# Patient Record
Sex: FEMALE | Race: OTHER | NOT HISPANIC OR LATINO | Employment: FULL TIME | URBAN - METROPOLITAN AREA
[De-identification: names, ages, dates, MRNs, and addresses within clinical notes are randomized per-mention and may not be internally consistent; named-entity substitution may affect disease eponyms.]

---

## 2017-01-16 ENCOUNTER — HOSPITAL ENCOUNTER (OUTPATIENT)
Dept: RADIOLOGY | Facility: CLINIC | Age: 42
Discharge: HOME | End: 2017-01-16
Payer: COMMERCIAL

## 2017-01-16 ENCOUNTER — ALLSCRIPTS OFFICE VISIT (OUTPATIENT)
Dept: OTHER | Facility: OTHER | Age: 42
End: 2017-01-16

## 2017-01-16 DIAGNOSIS — M25.561 PAIN IN RIGHT KNEE: ICD-10-CM

## 2017-01-16 DIAGNOSIS — M25.562 PAIN IN LEFT KNEE: ICD-10-CM

## 2017-01-16 PROCEDURE — 73562 X-RAY EXAM OF KNEE 3: CPT

## 2018-01-13 VITALS
DIASTOLIC BLOOD PRESSURE: 70 MMHG | SYSTOLIC BLOOD PRESSURE: 106 MMHG | HEIGHT: 66 IN | BODY MASS INDEX: 21.21 KG/M2 | HEART RATE: 84 BPM | WEIGHT: 132 LBS

## 2018-01-17 ENCOUNTER — APPOINTMENT (EMERGENCY)
Dept: RADIOLOGY | Facility: HOSPITAL | Age: 43
DRG: 494 | End: 2018-01-17
Payer: COMMERCIAL

## 2018-01-17 ENCOUNTER — HOSPITAL ENCOUNTER (INPATIENT)
Facility: HOSPITAL | Age: 43
LOS: 5 days | Discharge: HOME HEALTH | DRG: 494 | End: 2018-01-22
Attending: SURGERY | Admitting: SURGERY
Payer: COMMERCIAL

## 2018-01-17 DIAGNOSIS — S82.101A CLOSED FRACTURE OF RIGHT PROXIMAL TIBIA: ICD-10-CM

## 2018-01-17 DIAGNOSIS — S82.101A CLOSED FRACTURE OF PROXIMAL END OF RIGHT TIBIA, UNSPECIFIED FRACTURE MORPHOLOGY, INITIAL ENCOUNTER: Primary | ICD-10-CM

## 2018-01-17 LAB
BASE EXCESS BLDA CALC-SCNC: 1 MMOL/L (ref -2–3)
CA-I BLD-SCNC: 1.16 MMOL/L (ref 1.12–1.32)
GLUCOSE SERPL-MCNC: 121 MG/DL (ref 65–140)
HCO3 BLDA-SCNC: 24.2 MMOL/L (ref 24–30)
HCT VFR BLD CALC: 42 % (ref 34.8–46.1)
HGB BLDA-MCNC: 14.3 G/DL (ref 11.5–15.4)
PCO2 BLD: 25 MMOL/L (ref 21–32)
PCO2 BLD: 33.9 MM HG (ref 42–50)
PH BLD: 7.46 [PH] (ref 7.3–7.4)
PO2 BLD: 21 MM HG (ref 35–45)
POTASSIUM BLD-SCNC: 3.6 MMOL/L (ref 3.5–5.3)
SAO2 % BLD FROM PO2: 40 % (ref 95–98)
SODIUM BLD-SCNC: 140 MMOL/L (ref 136–145)
SPECIMEN SOURCE: ABNORMAL

## 2018-01-17 PROCEDURE — 71045 X-RAY EXAM CHEST 1 VIEW: CPT

## 2018-01-17 PROCEDURE — 96375 TX/PRO/DX INJ NEW DRUG ADDON: CPT

## 2018-01-17 PROCEDURE — 82803 BLOOD GASES ANY COMBINATION: CPT

## 2018-01-17 PROCEDURE — 84132 ASSAY OF SERUM POTASSIUM: CPT

## 2018-01-17 PROCEDURE — 84295 ASSAY OF SERUM SODIUM: CPT

## 2018-01-17 PROCEDURE — 73590 X-RAY EXAM OF LOWER LEG: CPT

## 2018-01-17 PROCEDURE — 36415 COLL VENOUS BLD VENIPUNCTURE: CPT | Performed by: SURGERY

## 2018-01-17 PROCEDURE — 82947 ASSAY GLUCOSE BLOOD QUANT: CPT

## 2018-01-17 PROCEDURE — 96374 THER/PROPH/DIAG INJ IV PUSH: CPT

## 2018-01-17 PROCEDURE — 82330 ASSAY OF CALCIUM: CPT

## 2018-01-17 PROCEDURE — 85014 HEMATOCRIT: CPT

## 2018-01-17 PROCEDURE — 73700 CT LOWER EXTREMITY W/O DYE: CPT

## 2018-01-17 RX ORDER — OXYCODONE HYDROCHLORIDE 5 MG/1
5 TABLET ORAL EVERY 4 HOURS PRN
Status: DISCONTINUED | OUTPATIENT
Start: 2018-01-17 | End: 2018-01-22 | Stop reason: HOSPADM

## 2018-01-17 RX ORDER — ACETAMINOPHEN 325 MG/1
650 TABLET ORAL EVERY 6 HOURS PRN
Status: DISCONTINUED | OUTPATIENT
Start: 2018-01-17 | End: 2018-01-22 | Stop reason: HOSPADM

## 2018-01-17 RX ORDER — ONDANSETRON 2 MG/ML
4 INJECTION INTRAMUSCULAR; INTRAVENOUS EVERY 4 HOURS PRN
Status: DISCONTINUED | OUTPATIENT
Start: 2018-01-17 | End: 2018-01-22 | Stop reason: HOSPADM

## 2018-01-17 RX ORDER — FENTANYL CITRATE 50 UG/ML
INJECTION, SOLUTION INTRAMUSCULAR; INTRAVENOUS CODE/TRAUMA/SEDATION MEDICATION
Status: DISCONTINUED | OUTPATIENT
Start: 2018-01-17 | End: 2018-01-22 | Stop reason: HOSPADM

## 2018-01-17 RX ORDER — OXYCODONE HYDROCHLORIDE 10 MG/1
10 TABLET ORAL EVERY 4 HOURS PRN
Status: DISCONTINUED | OUTPATIENT
Start: 2018-01-17 | End: 2018-01-22 | Stop reason: HOSPADM

## 2018-01-17 RX ADMIN — HYDROMORPHONE HYDROCHLORIDE 1 MG: 1 INJECTION, SOLUTION INTRAMUSCULAR; INTRAVENOUS; SUBCUTANEOUS at 23:47

## 2018-01-17 RX ADMIN — SODIUM CHLORIDE 1000 ML: 0.9 INJECTION, SOLUTION INTRAVENOUS at 23:12

## 2018-01-17 RX ADMIN — FENTANYL CITRATE 50 MCG: 50 INJECTION, SOLUTION INTRAMUSCULAR; INTRAVENOUS at 23:12

## 2018-01-18 ENCOUNTER — ANESTHESIA EVENT (INPATIENT)
Dept: PERIOP | Facility: HOSPITAL | Age: 43
DRG: 494 | End: 2018-01-18
Payer: COMMERCIAL

## 2018-01-18 ENCOUNTER — ANESTHESIA (INPATIENT)
Dept: PERIOP | Facility: HOSPITAL | Age: 43
DRG: 494 | End: 2018-01-18
Payer: COMMERCIAL

## 2018-01-18 ENCOUNTER — APPOINTMENT (INPATIENT)
Dept: RADIOLOGY | Facility: HOSPITAL | Age: 43
DRG: 494 | End: 2018-01-18
Payer: COMMERCIAL

## 2018-01-18 ENCOUNTER — APPOINTMENT (INPATIENT)
Dept: NON INVASIVE DIAGNOSTICS | Facility: HOSPITAL | Age: 43
DRG: 494 | End: 2018-01-18
Payer: COMMERCIAL

## 2018-01-18 PROBLEM — S82.101A CLOSED FRACTURE OF RIGHT PROXIMAL TIBIA: Status: ACTIVE | Noted: 2018-01-17

## 2018-01-18 PROBLEM — I49.8 BIGEMINY: Status: ACTIVE | Noted: 2018-01-18

## 2018-01-18 PROBLEM — S82.839A: Status: ACTIVE | Noted: 2018-01-18

## 2018-01-18 LAB
ABO GROUP BLD: NORMAL
ANION GAP SERPL CALCULATED.3IONS-SCNC: 6 MMOL/L (ref 4–13)
BLD GP AB SCN SERPL QL: NEGATIVE
BUN SERPL-MCNC: 7 MG/DL (ref 5–25)
CALCIUM SERPL-MCNC: 8.4 MG/DL (ref 8.3–10.1)
CHLORIDE SERPL-SCNC: 105 MMOL/L (ref 100–108)
CO2 SERPL-SCNC: 26 MMOL/L (ref 21–32)
CREAT SERPL-MCNC: 0.63 MG/DL (ref 0.6–1.3)
ERYTHROCYTE [DISTWIDTH] IN BLOOD BY AUTOMATED COUNT: 13.5 % (ref 11.6–15.1)
GFR SERPL CREATININE-BSD FRML MDRD: 129 ML/MIN/1.73SQ M
GLUCOSE SERPL-MCNC: 121 MG/DL (ref 65–140)
HCG SERPL QL: NEGATIVE
HCT VFR BLD AUTO: 38.6 % (ref 34.8–46.1)
HGB BLD-MCNC: 12.8 G/DL (ref 11.5–15.4)
HOLD SPECIMEN: NORMAL
INR PPP: 1.12 (ref 0.86–1.16)
MCH RBC QN AUTO: 29.5 PG (ref 26.8–34.3)
MCHC RBC AUTO-ENTMCNC: 33.2 G/DL (ref 31.4–37.4)
MCV RBC AUTO: 89 FL (ref 82–98)
PLATELET # BLD AUTO: 195 THOUSANDS/UL (ref 149–390)
PMV BLD AUTO: 11.7 FL (ref 8.9–12.7)
POTASSIUM SERPL-SCNC: 3.5 MMOL/L (ref 3.5–5.3)
PROTHROMBIN TIME: 14.4 SECONDS (ref 12.1–14.4)
RBC # BLD AUTO: 4.34 MILLION/UL (ref 3.81–5.12)
RH BLD: POSITIVE
SODIUM SERPL-SCNC: 137 MMOL/L (ref 136–145)
SPECIMEN EXPIRATION DATE: NORMAL
WBC # BLD AUTO: 8.9 THOUSAND/UL (ref 4.31–10.16)

## 2018-01-18 PROCEDURE — C1769 GUIDE WIRE: HCPCS | Performed by: ORTHOPAEDIC SURGERY

## 2018-01-18 PROCEDURE — 99285 EMERGENCY DEPT VISIT HI MDM: CPT

## 2018-01-18 PROCEDURE — C1713 ANCHOR/SCREW BN/BN,TIS/BN: HCPCS | Performed by: ORTHOPAEDIC SURGERY

## 2018-01-18 PROCEDURE — 86850 RBC ANTIBODY SCREEN: CPT | Performed by: STUDENT IN AN ORGANIZED HEALTH CARE EDUCATION/TRAINING PROGRAM

## 2018-01-18 PROCEDURE — 80048 BASIC METABOLIC PNL TOTAL CA: CPT | Performed by: STUDENT IN AN ORGANIZED HEALTH CARE EDUCATION/TRAINING PROGRAM

## 2018-01-18 PROCEDURE — 86901 BLOOD TYPING SEROLOGIC RH(D): CPT | Performed by: STUDENT IN AN ORGANIZED HEALTH CARE EDUCATION/TRAINING PROGRAM

## 2018-01-18 PROCEDURE — 73560 X-RAY EXAM OF KNEE 1 OR 2: CPT

## 2018-01-18 PROCEDURE — 84703 CHORIONIC GONADOTROPIN ASSAY: CPT

## 2018-01-18 PROCEDURE — 85610 PROTHROMBIN TIME: CPT | Performed by: STUDENT IN AN ORGANIZED HEALTH CARE EDUCATION/TRAINING PROGRAM

## 2018-01-18 PROCEDURE — 0QSG04Z REPOSITION RIGHT TIBIA WITH INTERNAL FIXATION DEVICE, OPEN APPROACH: ICD-10-PCS | Performed by: ORTHOPAEDIC SURGERY

## 2018-01-18 PROCEDURE — 36415 COLL VENOUS BLD VENIPUNCTURE: CPT | Performed by: STUDENT IN AN ORGANIZED HEALTH CARE EDUCATION/TRAINING PROGRAM

## 2018-01-18 PROCEDURE — 93306 TTE W/DOPPLER COMPLETE: CPT

## 2018-01-18 PROCEDURE — 86900 BLOOD TYPING SEROLOGIC ABO: CPT | Performed by: STUDENT IN AN ORGANIZED HEALTH CARE EDUCATION/TRAINING PROGRAM

## 2018-01-18 PROCEDURE — 85027 COMPLETE CBC AUTOMATED: CPT | Performed by: STUDENT IN AN ORGANIZED HEALTH CARE EDUCATION/TRAINING PROGRAM

## 2018-01-18 PROCEDURE — 73502 X-RAY EXAM HIP UNI 2-3 VIEWS: CPT

## 2018-01-18 DEVICE — 5.0MM CANNULATED LOCKING SCREW 45MM
Type: IMPLANTABLE DEVICE | Site: TIBIA | Status: NON-FUNCTIONAL
Removed: 2018-08-02

## 2018-01-18 DEVICE — 4.5MM CORTEX SCREW SELF-TAPPING 36MM
Type: IMPLANTABLE DEVICE | Site: TIBIA | Status: NON-FUNCTIONAL
Removed: 2018-08-02

## 2018-01-18 DEVICE — 5.0MM LOCKING SCREW SLF-TPNG WITH T25 STARDRIVE RECESS 24MM
Type: IMPLANTABLE DEVICE | Site: TIBIA | Status: NON-FUNCTIONAL
Removed: 2018-08-02

## 2018-01-18 DEVICE — 5.0MM CANNULATED LOCKING SCREW 40MM
Type: IMPLANTABLE DEVICE | Site: TIBIA | Status: NON-FUNCTIONAL
Removed: 2018-08-02

## 2018-01-18 DEVICE — 5.0MM CANNULATED LOCKING SCREW 55MM
Type: IMPLANTABLE DEVICE | Site: TIBIA | Status: NON-FUNCTIONAL
Removed: 2018-08-02

## 2018-01-18 DEVICE — 5.0MM CANNULATED CONICAL SCREW 75MM
Type: IMPLANTABLE DEVICE | Site: TIBIA | Status: NON-FUNCTIONAL
Removed: 2018-08-02

## 2018-01-18 DEVICE — 5.0MM LOCKING SCREW SLF-TPNG WITH T25 STARDRIVE RECESS 42MM
Type: IMPLANTABLE DEVICE | Site: TIBIA | Status: NON-FUNCTIONAL
Removed: 2018-08-02

## 2018-01-18 DEVICE — 4.5MM LCP PROXIMAL TIBIA PLATE 14 HOLES/262MM-RIGHT
Type: IMPLANTABLE DEVICE | Site: TIBIA | Status: NON-FUNCTIONAL
Brand: LCP
Removed: 2018-08-02

## 2018-01-18 RX ORDER — GLYCOPYRROLATE 0.2 MG/ML
INJECTION INTRAMUSCULAR; INTRAVENOUS AS NEEDED
Status: DISCONTINUED | OUTPATIENT
Start: 2018-01-18 | End: 2018-01-18 | Stop reason: SURG

## 2018-01-18 RX ORDER — FENTANYL CITRATE/PF 50 MCG/ML
50 SYRINGE (ML) INJECTION
Status: COMPLETED | OUTPATIENT
Start: 2018-01-18 | End: 2018-01-18

## 2018-01-18 RX ORDER — MAGNESIUM HYDROXIDE 1200 MG/15ML
LIQUID ORAL AS NEEDED
Status: DISCONTINUED | OUTPATIENT
Start: 2018-01-18 | End: 2018-01-18 | Stop reason: HOSPADM

## 2018-01-18 RX ORDER — TRAMADOL HYDROCHLORIDE 50 MG/1
50 TABLET ORAL EVERY 6 HOURS PRN
Qty: 30 TABLET | Refills: 0 | Status: SHIPPED | OUTPATIENT
Start: 2018-01-18 | End: 2018-01-22 | Stop reason: HOSPADM

## 2018-01-18 RX ORDER — ONDANSETRON 2 MG/ML
4 INJECTION INTRAMUSCULAR; INTRAVENOUS ONCE AS NEEDED
Status: DISCONTINUED | OUTPATIENT
Start: 2018-01-18 | End: 2018-01-18 | Stop reason: HOSPADM

## 2018-01-18 RX ORDER — ONDANSETRON 2 MG/ML
INJECTION INTRAMUSCULAR; INTRAVENOUS AS NEEDED
Status: DISCONTINUED | OUTPATIENT
Start: 2018-01-18 | End: 2018-01-18 | Stop reason: SURG

## 2018-01-18 RX ORDER — SODIUM CHLORIDE, SODIUM LACTATE, POTASSIUM CHLORIDE, CALCIUM CHLORIDE 600; 310; 30; 20 MG/100ML; MG/100ML; MG/100ML; MG/100ML
125 INJECTION, SOLUTION INTRAVENOUS CONTINUOUS
Status: DISCONTINUED | OUTPATIENT
Start: 2018-01-18 | End: 2018-01-22

## 2018-01-18 RX ORDER — OXYCODONE HYDROCHLORIDE 5 MG/1
TABLET ORAL
Qty: 30 TABLET | Refills: 0 | Status: SHIPPED | OUTPATIENT
Start: 2018-01-18 | End: 2018-01-22 | Stop reason: HOSPADM

## 2018-01-18 RX ORDER — FENTANYL CITRATE 50 UG/ML
INJECTION, SOLUTION INTRAMUSCULAR; INTRAVENOUS AS NEEDED
Status: DISCONTINUED | OUTPATIENT
Start: 2018-01-18 | End: 2018-01-18 | Stop reason: SURG

## 2018-01-18 RX ORDER — PROPOFOL 10 MG/ML
INJECTION, EMULSION INTRAVENOUS AS NEEDED
Status: DISCONTINUED | OUTPATIENT
Start: 2018-01-18 | End: 2018-01-18 | Stop reason: SURG

## 2018-01-18 RX ORDER — DIPHENHYDRAMINE HYDROCHLORIDE 50 MG/ML
12.5 INJECTION INTRAMUSCULAR; INTRAVENOUS ONCE AS NEEDED
Status: DISCONTINUED | OUTPATIENT
Start: 2018-01-18 | End: 2018-01-18 | Stop reason: HOSPADM

## 2018-01-18 RX ORDER — LIDOCAINE HYDROCHLORIDE 10 MG/ML
INJECTION, SOLUTION INFILTRATION; PERINEURAL AS NEEDED
Status: DISCONTINUED | OUTPATIENT
Start: 2018-01-18 | End: 2018-01-18 | Stop reason: SURG

## 2018-01-18 RX ORDER — SODIUM CHLORIDE, SODIUM LACTATE, POTASSIUM CHLORIDE, CALCIUM CHLORIDE 600; 310; 30; 20 MG/100ML; MG/100ML; MG/100ML; MG/100ML
INJECTION, SOLUTION INTRAVENOUS CONTINUOUS PRN
Status: DISCONTINUED | OUTPATIENT
Start: 2018-01-18 | End: 2018-01-18

## 2018-01-18 RX ORDER — MIDAZOLAM HYDROCHLORIDE 1 MG/ML
INJECTION INTRAMUSCULAR; INTRAVENOUS AS NEEDED
Status: DISCONTINUED | OUTPATIENT
Start: 2018-01-18 | End: 2018-01-18 | Stop reason: SURG

## 2018-01-18 RX ORDER — SCOLOPAMINE TRANSDERMAL SYSTEM 1 MG/1
1 PATCH, EXTENDED RELEASE TRANSDERMAL
Status: COMPLETED | OUTPATIENT
Start: 2018-01-18 | End: 2018-01-18

## 2018-01-18 RX ORDER — ROCURONIUM BROMIDE 10 MG/ML
INJECTION, SOLUTION INTRAVENOUS AS NEEDED
Status: DISCONTINUED | OUTPATIENT
Start: 2018-01-18 | End: 2018-01-18 | Stop reason: SURG

## 2018-01-18 RX ORDER — METOCLOPRAMIDE HYDROCHLORIDE 5 MG/ML
10 INJECTION INTRAMUSCULAR; INTRAVENOUS ONCE AS NEEDED
Status: DISCONTINUED | OUTPATIENT
Start: 2018-01-18 | End: 2018-01-18 | Stop reason: HOSPADM

## 2018-01-18 RX ADMIN — PROPOFOL 150 MG: 10 INJECTION, EMULSION INTRAVENOUS at 10:17

## 2018-01-18 RX ADMIN — OXYCODONE HYDROCHLORIDE 10 MG: 10 TABLET ORAL at 14:55

## 2018-01-18 RX ADMIN — ONDANSETRON 4 MG: 2 INJECTION INTRAMUSCULAR; INTRAVENOUS at 10:21

## 2018-01-18 RX ADMIN — HYDROMORPHONE HYDROCHLORIDE 0.4 MG: 1 INJECTION, SOLUTION INTRAMUSCULAR; INTRAVENOUS; SUBCUTANEOUS at 13:06

## 2018-01-18 RX ADMIN — HYDROMORPHONE HYDROCHLORIDE 0.4 MG: 1 INJECTION, SOLUTION INTRAMUSCULAR; INTRAVENOUS; SUBCUTANEOUS at 12:56

## 2018-01-18 RX ADMIN — HYDROMORPHONE HYDROCHLORIDE 0.4 MG: 1 INJECTION, SOLUTION INTRAMUSCULAR; INTRAVENOUS; SUBCUTANEOUS at 13:01

## 2018-01-18 RX ADMIN — HYDROMORPHONE HYDROCHLORIDE 0.5 MG: 1 INJECTION, SOLUTION INTRAMUSCULAR; INTRAVENOUS; SUBCUTANEOUS at 11:10

## 2018-01-18 RX ADMIN — DEXAMETHASONE SODIUM PHOSPHATE 10 MG: 10 INJECTION INTRAMUSCULAR; INTRAVENOUS at 10:21

## 2018-01-18 RX ADMIN — SODIUM CHLORIDE, SODIUM LACTATE, POTASSIUM CHLORIDE, AND CALCIUM CHLORIDE: .6; .31; .03; .02 INJECTION, SOLUTION INTRAVENOUS at 18:10

## 2018-01-18 RX ADMIN — NEOSTIGMINE METHYLSULFATE 3 MG: 1 INJECTION, SOLUTION INTRAMUSCULAR; INTRAVENOUS; SUBCUTANEOUS at 11:12

## 2018-01-18 RX ADMIN — GLYCOPYRROLATE 0.6 MG: 0.2 INJECTION, SOLUTION INTRAMUSCULAR; INTRAVENOUS at 11:12

## 2018-01-18 RX ADMIN — CEFAZOLIN SODIUM 2000 MG: 2 SOLUTION INTRAVENOUS at 18:01

## 2018-01-18 RX ADMIN — SODIUM CHLORIDE, SODIUM LACTATE, POTASSIUM CHLORIDE, AND CALCIUM CHLORIDE: .6; .31; .03; .02 INJECTION, SOLUTION INTRAVENOUS at 09:47

## 2018-01-18 RX ADMIN — ROCURONIUM BROMIDE 40 MG: 10 INJECTION INTRAVENOUS at 10:17

## 2018-01-18 RX ADMIN — OXYCODONE HYDROCHLORIDE 10 MG: 10 TABLET ORAL at 21:48

## 2018-01-18 RX ADMIN — HYDROMORPHONE HYDROCHLORIDE 0.5 MG: 1 INJECTION, SOLUTION INTRAMUSCULAR; INTRAVENOUS; SUBCUTANEOUS at 18:00

## 2018-01-18 RX ADMIN — HYDROMORPHONE HYDROCHLORIDE 0.5 MG: 1 INJECTION, SOLUTION INTRAMUSCULAR; INTRAVENOUS; SUBCUTANEOUS at 06:25

## 2018-01-18 RX ADMIN — HYDROMORPHONE HYDROCHLORIDE 0.5 MG: 1 INJECTION, SOLUTION INTRAMUSCULAR; INTRAVENOUS; SUBCUTANEOUS at 02:10

## 2018-01-18 RX ADMIN — CEFAZOLIN SODIUM 2000 MG: 2 SOLUTION INTRAVENOUS at 10:23

## 2018-01-18 RX ADMIN — FENTANYL CITRATE 50 MCG: 50 INJECTION, SOLUTION INTRAMUSCULAR; INTRAVENOUS at 11:07

## 2018-01-18 RX ADMIN — SCOPALAMINE 1 PATCH: 1 PATCH, EXTENDED RELEASE TRANSDERMAL at 10:07

## 2018-01-18 RX ADMIN — HYDROMORPHONE HYDROCHLORIDE 0.5 MG: 1 INJECTION, SOLUTION INTRAMUSCULAR; INTRAVENOUS; SUBCUTANEOUS at 20:28

## 2018-01-18 RX ADMIN — FENTANYL CITRATE 50 MCG: 50 INJECTION INTRAMUSCULAR; INTRAVENOUS at 12:00

## 2018-01-18 RX ADMIN — FENTANYL CITRATE 50 MCG: 50 INJECTION INTRAMUSCULAR; INTRAVENOUS at 12:12

## 2018-01-18 RX ADMIN — LIDOCAINE HYDROCHLORIDE 50 MG: 10 INJECTION, SOLUTION INFILTRATION; PERINEURAL at 10:17

## 2018-01-18 RX ADMIN — OXYCODONE HYDROCHLORIDE 10 MG: 10 TABLET ORAL at 04:23

## 2018-01-18 RX ADMIN — HYDROMORPHONE HYDROCHLORIDE 0.4 MG: 1 INJECTION, SOLUTION INTRAMUSCULAR; INTRAVENOUS; SUBCUTANEOUS at 12:47

## 2018-01-18 RX ADMIN — SODIUM CHLORIDE, SODIUM LACTATE, POTASSIUM CHLORIDE, AND CALCIUM CHLORIDE: .6; .31; .03; .02 INJECTION, SOLUTION INTRAVENOUS at 11:00

## 2018-01-18 RX ADMIN — MIDAZOLAM HYDROCHLORIDE 2 MG: 1 INJECTION, SOLUTION INTRAMUSCULAR; INTRAVENOUS at 10:14

## 2018-01-18 RX ADMIN — HYDROMORPHONE HYDROCHLORIDE 0.4 MG: 1 INJECTION, SOLUTION INTRAMUSCULAR; INTRAVENOUS; SUBCUTANEOUS at 12:35

## 2018-01-18 RX ADMIN — ONDANSETRON 4 MG: 2 INJECTION INTRAMUSCULAR; INTRAVENOUS at 11:13

## 2018-01-18 RX ADMIN — FENTANYL CITRATE 50 MCG: 50 INJECTION, SOLUTION INTRAMUSCULAR; INTRAVENOUS at 10:17

## 2018-01-18 NOTE — OP NOTE
OPERATIVE REPORT  PATIENT NAME: Shivam Herzog    :  1975  MRN: [id number]  Pt Location: BE OR ROOM 15    SURGERY DATE: 2018    Surgeon(s) and Role:     * Desi Farmer MD - Primary     * Luisa Carbone PA-C - Devorah Lara MD - Assisting    Preop Diagnosis:  Closed fracture of proximal end of right tibia, unspecified fracture morphology, initial encounter [S82 101A]  Closed right tibial shaft fracture   Post-Op Diagnosis Codes:     * Closed fracture of proximal end of right tibia, unspecified fracture morphology, initial encounter [S82 101A]  Closed right tibial shaft fracture  Procedure(s) (LRB):  OPEN REDUCTION W/ INTERNAL FIXATION (ORIF) TIBIAL PLATEAU (Right)  OPEN REDUCTION W/ INTERNAL FIXATION (ORIF) TIBIA (Right)    Specimen(s):  * No specimens in log *    Estimated Blood Loss:   Minimal    Drains:       Anesthesia Type:   General    Operative Indications:  Closed fracture of proximal end of right tibia, unspecified fracture morphology, initial encounter [S82 101A]  Closed right tibial shaft fracture    Operative Findings:  ORIF of both fractures utilizing long locked lateral plate    Complications:   None    Procedure and Technique: Following induction of adequate level of general anesthesia, a Payan catheters and sterilely introduced into this patient's bladder  Antibiotics were administered  The right thigh was then fitted with a thigh-high tourniquet  The right lower extremity was then prepped and draped sterilely  The right lower extremity was exsanguinated gravity, the tourniquet was inflated to 300 mm of mercury  An anterolateral skin incision was created proximally gain access tibial plateau, a almost directly anterior incision was created distally for treatment of the shaft lesion  The shaft underwent open reduction and at this point time a front to back screw was placed in lag manner across the fracture obliquity    Attention was directed towards the tibial plateau  More proximally, the anterior compartment was lifted off the proximal lateral tibia and a full-thickness flap  The plateau fracture was identified  It was split depressed  The split was opened, the depressed segment was elevated, the split was closed  At this point time, a long lateral plate was applied and run subcutaneously between the 2 incisions  Through these incisions, a thorough anterior compartment fasciotomy was carried out  Proximal fixation was with 4 screws in the proximal fracture fragment  Distal fixation was with 3 screws, 2 which were locked bicortical   The fluoroscope was brought in, and final fluoroscopic films document a well-aligned fracture, but a good hardware position  The wound was then flushed with saline and closed  A drain was placed deep brought out via a separate anterolateral stab incision  The skin was closed utilizing 2 O nylon suture in mattress fashion  The drain was reconstituted  Sterile dressings applied  The tourniquet was let down, the right lower extremity is in demonstrated immediate return of brisk capillary refill  She was then placed in a posterior splint with the foot neutral position  She was awakened from general anesthesia, taken recovery room in stable condition with plans to include nonweightbearing on the right lower extremity  She will also require DVT prophylaxis     I was present for the entire procedure    Patient Disposition:  PACU     SIGNATURE: Mic Diaz MD  DATE: January 18, 2018  TIME: 11:23 AM
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
## 2018-01-18 NOTE — ED NOTES
OR requesting a pregnancy test on the patient prior to the OR  OR time will be approx 1030 this morning   Added preg test to the labs on hold       [facility]  01/18/18 0924
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
## 2018-01-18 NOTE — ED NOTES
Pt medicated for her pain   Pt's R LE repositioned on pillows, per her request      Mateusz Skelton RN  01/18/18 0551

## 2018-01-18 NOTE — SOCIAL WORK
CM met with the Pt at bedside to explain the CM role and discuss any potential D/C needs  Pt lives with boyfriend and children in a 2 story home with 2STE  Pt's 13ear old son will stay with grandparents while Pt is hospitalized  PTA IADL's, does not use any DME  Pt drives and is employed  No hx of HHC or IP rehab  Pt reports hx of OutPt PT and Depression without MH admission and managed by PCP  Pt's home pharmacy is [facility] in [place]
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
## 2018-01-18 NOTE — ED NOTES
Pt c/o increased pain in her R LE  Requesting pain medications       Carmen Macedo, RN  01/18/18 5031
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
## 2018-01-18 NOTE — ANESTHESIA POSTPROCEDURE EVALUATION
Post-Op Assessment Note      CV Status:  Stable    Mental Status:  Alert and awake    Hydration Status:  Euvolemic    PONV Controlled:  Controlled    Airway Patency:  Patent    Post Op Vitals Reviewed: Yes          Staff: CRNA           /60 (01/18/18 1145)    Temp (P) 99 2 °F (37 3 °C) (01/18/18 1141)    Pulse 76 (01/18/18 1145)   Resp 12 (01/18/18 1145)    SpO2 96 % (01/18/18 1145)
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
## 2018-01-19 LAB
APTT PPP: 34 SECONDS (ref 23–35)
HCT VFR BLD AUTO: 31.4 % (ref 34.8–46.1)
HGB BLD-MCNC: 10.4 G/DL (ref 11.5–15.4)

## 2018-01-19 PROCEDURE — G8978 MOBILITY CURRENT STATUS: HCPCS

## 2018-01-19 PROCEDURE — G8979 MOBILITY GOAL STATUS: HCPCS

## 2018-01-19 PROCEDURE — 97166 OT EVAL MOD COMPLEX 45 MIN: CPT

## 2018-01-19 PROCEDURE — 85730 THROMBOPLASTIN TIME PARTIAL: CPT | Performed by: STUDENT IN AN ORGANIZED HEALTH CARE EDUCATION/TRAINING PROGRAM

## 2018-01-19 PROCEDURE — G8987 SELF CARE CURRENT STATUS: HCPCS

## 2018-01-19 PROCEDURE — 97163 PT EVAL HIGH COMPLEX 45 MIN: CPT

## 2018-01-19 PROCEDURE — G8988 SELF CARE GOAL STATUS: HCPCS

## 2018-01-19 PROCEDURE — 85014 HEMATOCRIT: CPT | Performed by: EMERGENCY MEDICINE

## 2018-01-19 PROCEDURE — 85018 HEMOGLOBIN: CPT | Performed by: EMERGENCY MEDICINE

## 2018-01-19 RX ADMIN — CEFAZOLIN SODIUM 2000 MG: 2 SOLUTION INTRAVENOUS at 02:01

## 2018-01-19 RX ADMIN — OXYCODONE HYDROCHLORIDE 10 MG: 10 TABLET ORAL at 19:02

## 2018-01-19 RX ADMIN — HYDROMORPHONE HYDROCHLORIDE 0.5 MG: 1 INJECTION, SOLUTION INTRAMUSCULAR; INTRAVENOUS; SUBCUTANEOUS at 11:08

## 2018-01-19 RX ADMIN — ENOXAPARIN SODIUM 40 MG: 40 INJECTION SUBCUTANEOUS at 09:14

## 2018-01-19 RX ADMIN — HYDROMORPHONE HYDROCHLORIDE 0.5 MG: 1 INJECTION, SOLUTION INTRAMUSCULAR; INTRAVENOUS; SUBCUTANEOUS at 16:25

## 2018-01-19 RX ADMIN — HYDROMORPHONE HYDROCHLORIDE 0.5 MG: 1 INJECTION, SOLUTION INTRAMUSCULAR; INTRAVENOUS; SUBCUTANEOUS at 21:19

## 2018-01-19 RX ADMIN — OXYCODONE HYDROCHLORIDE 10 MG: 10 TABLET ORAL at 03:05

## 2018-01-19 RX ADMIN — OXYCODONE HYDROCHLORIDE 10 MG: 10 TABLET ORAL at 14:09

## 2018-01-19 RX ADMIN — OXYCODONE HYDROCHLORIDE 10 MG: 10 TABLET ORAL at 09:14

## 2018-01-19 RX ADMIN — HYDROMORPHONE HYDROCHLORIDE 0.5 MG: 1 INJECTION, SOLUTION INTRAMUSCULAR; INTRAVENOUS; SUBCUTANEOUS at 05:36

## 2018-01-19 RX ADMIN — OXYCODONE HYDROCHLORIDE 10 MG: 10 TABLET ORAL at 23:21
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
## 2018-01-19 NOTE — PLAN OF CARE
Problem: DISCHARGE PLANNING - CARE MANAGEMENT  Goal: Discharge to post-acute care or home with appropriate resources  INTERVENTIONS:  - Conduct assessment to determine patient/family and health care team treatment goals, and need for post-acute services based on payer coverage, community resources, and patient preferences, and barriers to discharge  - Address psychosocial, clinical, and financial barriers to discharge as identified in assessment in conjunction with the patient/family and health care team  - Arrange appropriate level of post-acute services according to patients   needs and preference and payer coverage in collaboration with the physician and health care team  - Communicate with and update the patient/family, physician, and health care team regarding progress on the discharge plan  - Arrange appropriate transportation to post-acute venues   Outcome: Progressing      Problem: Prexisting or High Potential for Compromised Skin Integrity  Goal: Skin integrity is maintained or improved  INTERVENTIONS:  - Identify patients at risk for skin breakdown  - Assess and monitor skin integrity  - Assess and monitor nutrition and hydration status  - Monitor labs (i e  albumin)  - Assess for incontinence   - Turn and reposition patient  - Assist with mobility/ambulation  - Relieve pressure over bony prominences  - Avoid friction and shearing  - Provide appropriate hygiene as needed including keeping skin clean and dry  - Evaluate need for skin moisturizer/barrier cream  - Collaborate with interdisciplinary team (i e  Nutrition, Rehabilitation, etc )   - Patient/family teaching   Outcome: Progressing      Problem: Potential for Falls  Goal: Patient will remain free of falls  INTERVENTIONS:  - Assess patient frequently for physical needs  -  Identify cognitive and physical deficits and behaviors that affect risk of falls    -  [place] fall precautions as indicated by assessment   - Educate patient/family on patient safety including physical limitations  - Instruct patient to call for assistance with activity based on assessment  - Modify environment to reduce risk of injury  - Consider OT/PT consult to assist with strengthening/mobility   Outcome: Progressing

## 2018-01-19 NOTE — PHYSICAL THERAPY NOTE
Physical Therapy Evaluation    Patient's Name:Ronn Prather    Today's Date:01/19/18    Patient Active Problem List   Diagnosis    Closed fracture of right proximal tibia    Fracture of head of fibula    Bigeminy       History reviewed  No pertinent past medical history  Past Surgical History:   Procedure Laterality Date    ORIF TIBIA FRACTURE Right 1/18/2018    Procedure: OPEN REDUCTION W/ INTERNAL FIXATION (ORIF) TIBIA;  Surgeon: Berta Zavaleta MD;  [facility];  Service: Orthopedics    ORIF TIBIAL PLATEAU Right 1/18/3722    Procedure: OPEN REDUCTION W/ INTERNAL FIXATION (ORIF) TIBIAL PLATEAU;  Surgeon: Berta Zavaleta MD;  [facility];  Service: Orthopedics        01/19/18 1550   Pain Assessment   Pain Assessment 0-10   Pain Score 8   Pain Type Acute pain   Pain Location Leg   Pain Orientation [facility] Pain Intervention(s) Ambulation/increased activity   Response to Interventions improved comfort w repositioning in chair   Home Living   Type of [address] [place] [address]; Able to live on main level with bedroom/bathroom  (2 [place]RON)   Home Equipment Crutches   Prior Function   Level of [place] Independent with ADLs and functional mobility   Lives With Family   Receives Help From [facility] in the last 6 months 0   Restrictions/Precautions   RLE Weight Bearing Per Order NWB   Other Precautions Pain; Fall Risk;Telemetry   General   Family/Caregiver Present No   Cognition   Arousal/Participation Alert   Orientation Level Oriented X4   Following Commands Follows all commands and directions without difficulty   RUE Assessment   RUE Assessment WFL   LUE Assessment   LUE Assessment WFL   RLE Assessment   RLE Assessment X   Strength RLE   RLE Overall Strength (hip fl 2-/5 knee ext 2-/5)   LLE Assessment   LLE Assessment X   Strength LLE   LLE Overall Strength 4/5   Coordination   Movements are Fluid and Coordinated 0   Coordination and Movement Description antalgic LE instability   Bed Mobility   Supine to Sit 4  Minimal assistance   Additional items Increased time required;Verbal cues;LE management;HOB elevated; Bedrails   Transfers   Sit to Stand 4  Minimal assistance   Additional items Increased time required;Verbal cues   Stand to Sit 4  Minimal assistance   Additional items Increased time required;Verbal cues   Stand pivot 4  Minimal assistance   Additional items Increased time required;Verbal cues  (RW)   Ambulation/Elevation   Gait pattern Decreased foot clearance; Improper Weight shift; Poor UE support; Antalgic  (RLE NWB)   Gait Assistance 4  Minimal assist   Additional items Verbal cues; Tactile cues   Assistive Device Rolling walker   Distance 3 ft   Balance   Dynamic Sitting Good  (forward reach)   Static Standing Fair  (RW)   Dynamic Standing Fair -  (RW)   Endurance Deficit   Endurance Deficit Yes   Endurance Deficit Description antalgic LE instability   Activity Tolerance   Activity Tolerance Patient limited by pain   Nurse Made Aware yes   Assessment   Prognosis Good   Problem List Decreased strength;Decreased range of motion;Decreased endurance; Impaired balance;Decreased mobility; Decreased coordination; Impaired judgement;Decreased safety awareness;Pain;Orthopedic restrictions;Decreased skin integrity   Assessment Pt is a 40 y o  female admitted [facility]/17/2018 w/ Closed fracture of right proximal tibia; is s/p ORIF and NWB RLE Pt exhibits significant impairments with weakness, decreased ROM, impaired balance, decreased endurance, impaired coordination, gait deviations, pain, decreased activity tolerance, decreased functional mobility tolerance, decreased safety awareness, impaired judgement, fall risk, orthopedic restrictions and decreased skin integrity; these impact independence with mobility, ADLs, and IADLs; requires min assist w transf and amb 3 ft using RW RLE maintained NWB- gait antalgic decreased foot clearance, step length walker reliant; objective measures on the Barthel Index also reveal limitations;  therapy prognosis is impacted by relevant co morbidities as noted in evaluation; clinical presentation is currently unstable/unpredictable - pt is on telemetry, neurovasc checks, presents w abnormal labs, phys impairments as noted- a significant regression from baseline; PTA, pt was Independent with mobility lives in multilevel- w BYRON, fam support avail skilled PT is indicated to to optimize functional independence and discharge planning; pending functional progress, PT recommendation at discharge is home with family support RW home PT; pt will need a wc w elev leg rests to achieve indep w locomotion and ADLs    Goals   Patient Goals go home   STG Expiration Date 01/29/18   Short Term Goal #1 1  Independent with Bed Mobility Rolling Right and Left     2  Independent with Bed Mobility Supine-Sit     3  Modified Independent with Transfer Bed-Chair     4  Increase Dynamic Sitting Balance at least 1 Grade for improved stability with functional reach activities     5  Increase Dynamic Standing Balance at least 1 Grade for improved ease with Activities of Daily Living     6  Increase Lower Extremity Strength at least 1 Grade for improved ease mobility tasks     7  Modified Independent with  Ambulation 30 feet using RW RLE NWB to facilitate home and community mobility 8  Modified Independent with Ascending/Descending 2 steps to facilitate home and community accessibility  9  Indep w wc mob 300 ft 10  Indep w wc parts mgt   Plan   Treatment/Interventions Functional transfer training;LE strengthening/ROM; Elevations; Therapeutic exercise; Endurance training;Patient/family training;Equipment eval/education; Bed mobility;Gait training; Compensatory technique education;Continued evaluation;Spoke to nursing   PT Frequency (6x/wk)   Recommendation   Recommendation Home with family support;Home PT   Equipment Recommended Walker  (wc w elev leg rests)   Barthel Index   Feeding 10   Bathing 5   Grooming Score 0   Dressing Score 5   Bladder Score 10   Bowels Score 10   Toilet Use Score 5   Transfers (Bed/Chair) Score 10   Mobility (Level Surface) Score 0   Stairs Score 0   Barthel Index Score 55

## 2018-01-19 NOTE — PROGRESS NOTES
Progress Note - Benny Showers 1975, 40 y o  female MRN: [id number]    Unit/Bed#: [facility] 911-01 Encounter: [id number]    Primary Care Provider: Jony Deluca MD   Date and time admitted to hospital: 1/17/2018 11:01 PM        Bigeminy   Assessment & Plan      discontinue tele, echo ordered- essentially normal EF 70%  Follow up pcp        Fracture of head of fibula   Assessment & Plan    - to OR yesterday with ortho        * Closed fracture of right proximal tibia   Assessment & Plan    -to OR yesterday with ortho  -pain control  -PT/OT           Dispo: pt/or  Check hgb screen for post op anemia      Subjective/Objective   Chief Complaint: I feel ok, just sore    Subjective: feeling good after surgery, has no complaints    Objective:     Meds/Allergies   No prescriptions prior to admission  Vitals: Blood pressure 101/60, pulse 69, temperature 98 2 °F (36 8 °C), temperature source Oral, resp  rate 18, weight 59 9 kg (132 lb), last menstrual period 12/25/2017, SpO2 97 %  There is no height or weight on file to calculate BMI  SpO2: SpO2: 97 %    ABG: No results found for: PHART, QFO6OEZ, PO2ART, NCB0OQD, C4LWTQYY, BEART, SOURCE      Intake/Output Summary (Last 24 hours) at 01/19/18 1138  Last data filed at 01/19/18 1015   Gross per 24 hour   Intake             1920 ml   Output             3485 ml   Net            -1565 ml       Invasive Devices     Peripheral Intravenous Line            Peripheral IV 01/17/18 Right Antecubital 1 day          Drain            Closed/Suction Drain Right Leg Accordion 10 Fr  1 day                Nutrition/GI Proph/Bowel Reg: regular    Physical Exam:   Vital signs reviewed  Gen  appearance: No acute distress  Alert and oriented x3  Head: Atraumatic, normocephalic  Eyes: EOMI, PERRLA  No icterus  Neck: Supple, no lymphadenopathy, full range of motion  Chest: Regular rate and rhythm  Lungs are clear to auscultation bilaterally  Nontender    Abdomen: Soft nontender nondistended  No signs of ecchymosis  Positive bowel sounds  Extremities: right lower extremity c/d/i incions  +fhl/ehl  Neuro: Cranial nerves II through XII intact  Nonfocal exam  GCS 15  Skin: Warm, dry  Lab Results: CBC: No results found for: WBC, HGB, HCT, MCV, PLT, ADJUSTEDWBC, MCH, MCHC, RDW, MPV, NRBC  Imaging/EKG Studies: Results: I have personally reviewed pertinent reports      Other Studies: none  VTE Prophylaxis: lovenox

## 2018-01-19 NOTE — PLAN OF CARE
Problem: PHYSICAL THERAPY ADULT  Goal: Performs mobility at highest level of function for planned discharge setting  See evaluation for individualized goals  Treatment/Interventions: Functional transfer training, LE strengthening/ROM, Elevations, Therapeutic exercise, Endurance training, Patient/family training, Equipment eval/education, Bed mobility, Gait training, Compensatory technique education, Continued evaluation, Spoke to nursing  Equipment Recommended: Amira Salguero (wc w elev leg rests)       See flowsheet documentation for full assessment, interventions and recommendations    Prognosis: Good  Problem List: Decreased strength, Decreased range of motion, Decreased endurance, Impaired balance, Decreased mobility, Decreased coordination, Impaired judgement, Decreased safety awareness, Pain, Orthopedic restrictions, Decreased skin integrity  Assessment: Pt is a 40 y o  female admitted to [facility] on 1/17/2018 w/ Closed fracture of right proximal tibia; is s/p ORIF and NWB RLE Pt exhibits significant impairments with weakness, decreased ROM, impaired balance, decreased endurance, impaired coordination, gait deviations, pain, decreased activity tolerance, decreased functional mobility tolerance, decreased safety awareness, impaired judgement, fall risk, orthopedic restrictions and decreased skin integrity; these impact independence with mobility, ADLs, and IADLs; requires min assist w transf and amb 3 ft using RW RLE maintained NWB- gait antalgic decreased foot clearance, step length walker reliant; objective measures on the Barthel Index also reveal limitations;  therapy prognosis is impacted by relevant co morbidities as noted in evaluation; clinical presentation is currently unstable/unpredictable - pt is on telemetry, neurovasc checks, presents w abnormal labs, phys impairments as noted- a significant regression from baseline; PTA, pt was Independent with mobility lives in multilevel- w [place], fam support avail skilled PT is indicated to to optimize functional independence and discharge planning; pending functional progress, PT recommendation at discharge is home with family support RW home PT; pt will need a wc w elev leg rests to achieve indep w locomotion and ADLs         Recommendation: Home with family support, Home PT          See flowsheet documentation for full assessment

## 2018-01-19 NOTE — PROGRESS NOTES
TRAUMA resident aware that pt is in [address]  Pt is asymptomatic and Nora Garcia states to monitor pt and call if she becomes symptomatic  Will cont to monitor

## 2018-01-19 NOTE — OCCUPATIONAL THERAPY NOTE
OccupationalTherapy Evaluation     Patient Name: Saurabh Kebede  WVUTM'I Date: 1/19/2018  Problem List  Patient Active Problem List   Diagnosis    Closed fracture of right proximal tibia    Fracture of head of fibula    Bigeminy     Past Medical History  History reviewed  No pertinent past medical history  Past Surgical History  Past Surgical History:   Procedure Laterality Date    ORIF TIBIA FRACTURE Right 1/18/2018    Procedure: OPEN REDUCTION W/ INTERNAL FIXATION (ORIF) TIBIA;  Surgeon: Troy Sharp MD;  [facility]: [facility];  Service: Orthopedics    ORIF TIBIAL PLATEAU Right 6/27/8480    Procedure: OPEN REDUCTION W/ INTERNAL FIXATION (ORIF) TIBIAL PLATEAU;  Surgeon: Troy Sharp MD;  [facility];  Service: Orthopedics         01/19/18 6995   Note Type   Note type Eval/Treat   Restrictions/Precautions   Weight Bearing Precautions Per Order Yes   RUE Weight Bearing Per Order WBAT   LUE Weight Bearing Per Order WBAT   RLE Weight Bearing Per Order NWB   LLE Weight Bearing Per Order WBAT   Other Precautions Pain   Pain Assessment   Pain Assessment 0-10   Pain Score 8   Pain Type Acute pain   Pain Location Leg   Pain Orientation Right   Hospital Pain Intervention(s) Repositioned; Ambulation/increased activity;Cold applied; Emotional support   Home Living   Type of [address] Multi-level; Able to live on main level with bedroom/bathroom;Stairs to enter with rails   Kameron Ch[address] 89   Prior Function   Level of [place] Independent with ADLs and functional mobility   Lives With [employer] Help From Family   ADL Assistance Independent   IADLs Independent   Falls in the last 6 months 0   Vocational Full time employment   Lifestyle   Autonomy I ADLS AND MOBILITY - I IADLS    Reciprocal Relationships SUPPORTIVE FAMILY AND FRIENDS   Service to Others WORKS FT   Intrinsic Gratification ACTIVE PTA   Subjective Subjective OFFERS NO C/O    ADL   Eating Assistance 7  Independent   Grooming Assistance 7  Independent   UB Bathing Assistance 5  Supervision/Setup   LB Bathing Assistance 3  Moderate Assistance   UB Dressing Assistance 5  Supervision/Setup   LB Dressing Assistance 3  Moderate Assistance   Toileting Assistance  4  Minimal Assistance   Bed Mobility   Supine to Sit 4  Minimal assistance   Transfers   Sit to Stand 4  Minimal assistance   Stand to Sit 4  Minimal assistance   Stand pivot 4  Minimal assistance   Balance   Static Sitting Fair +   Dynamic Sitting Fair   Static Standing Fair -   Dynamic Standing Fair -   Ambulatory Fair -   Activity Tolerance   Activity Tolerance Patient limited by fatigue;Patient limited by pain   RUE Assessment   RUE Assessment WFL   LUE Assessment   LUE Assessment WFL   Cognition   Overall Cognitive Status WFL   Assessment   Limitation Decreased ADL status; Decreased endurance;Decreased self-care trans;Decreased high-level ADLs   Prognosis Good   Assessment Pt is a 40 y o  female who was admitted to [facility] on 1/17/2018 with Closed fracture of right proximal tibia s/p ORIF  At baseline pt was completing adls and mobility independently w/o ad  Pt lives with family in 2 story home with first floor setup  Currently pt requires moderate assist for overall ADLS and min assist for functional mobility/transfers  Pt currently presents with impairments in the following categories -steps to enter environment, difficulty performing ADLS and difficulty performing IADLS  activity tolerance, endurance and standing balance/tolerance   These impairments, as well as pt's fatigue, pain, orthopedic restricitions , WBS  and risk for falls  limit pt's ability to safely engage in all baseline areas of occupation, includingdressing, toileting, functional mobility/transfers, community mobility, house maintenance, meal prep, work/volunteer work , care of children, social participation  and leisure activities  From OT standpoint, recommend home with family support upon D/C  Will need RW and BSC for home - also interested in possible w/c rental (CM informed)OT will continue to follow to address the below stated goals  Goals   Patient Goals go home   LTG Time Frame 7-10   Long Term Goal #1 refer to established goals below   Plan   Treatment Interventions ADL retraining;Functional transfer training; Endurance training;Patient/family training;Equipment evaluation/education; Compensatory technique education; Activityengagement   Goal Expiration Date   01/29/18   OT Frequency 3-5x/wk   Recommendation   OT Discharge Recommendation Home with family support   Equipment Recommended Bedside commode  (rw)   Barthel Index   Feeding 10   Bathing 0   Grooming Score 5   Dressing Score 5   Bladder Score 10   Bowels Score 10   Toilet Use Score 5   Transfers (Bed/Chair) Score 10   Mobility (Level Surface) Score 0   Stairs Score 0   Barthel Index Score 55       OCCUPATIONAL THERAPY GOALS:    *MOD I ADLS AFTER SETUP WITH USE OF ADAPTIVE DEVICES PRN  *MOD I TOILETING AND CLOTHING MANAGEMENT   *MOD I FUNCTIONAL MOB AND TRANSFERS TO ALL SURFACES WITH FAIR+ TO GOOD BALANCE/SAFETY FOR PARTICIPATION IN DYNAMIC ADLS   *DEMONSTRATE GOOD CARRYOVER WITH SAFE USE OF RW AND CARRYOVER WITH NWB RLE  DURING FUNCTIONAL TASKS  *ASSESS DME NEEDS  *INCREASE ACTIVITY TOLERANCE TO 40-45 MIN FOR PARTICIPATION IN ADLS AND ENJOYABLE ACTIVITIES     * ASSIST WITH SAFE D/C RECOMMENDATIONS     Aniceto Nur, OT

## 2018-01-19 NOTE — PROGRESS NOTES
Orthopedics   Vicky Parent 40 y o  female MRN: [id number]  Unit/Bed#: [facility] 911-01    Subjective:  42F Post operative day 1 ORIF right Tibia plateau and shaft fractures  Pain well controlled  Labs:    0  Lab Value Date/Time   HCT 38 6 01/18/2018 0430   HGB 12 8 01/18/2018 0430   HGB 14 3 01/17/2018 2311   INR 1 12 01/18/2018 0430   WBC 8 90 01/18/2018 0430       Meds:    Current Facility-Administered Medications:      EMS REPLENISHMENT MED, , Does not apply, Once, Omelia Rubins, DO    acetaminophen (TYLENOL) tablet 650 mg, 650 mg, Oral, Q6H PRN, Cam Lino MD    enoxaparin (LOVENOX) subcutaneous injection 40 mg, 40 mg, Subcutaneous, Q24H [address], Emery Baeza MD    fentanyl citrate (PF) 100 MCG/2ML, , Intravenous, Code/Trauma/Sedation Med, Omelia Rubins, DO, 50 mcg at 01/17/18 2312    HYDROmorphone (DILAUDID) injection 0 5 mg, 0 5 mg, Intravenous, Q3H PRN, Cam Lino MD, 0 5 mg at 01/19/18 0536    lactated ringers infusion, 125 mL/hr, Intravenous, Continuous, Niles Johnston MD, Stopped at 01/19/18 0304    ondansetron [facility]) injection 4 mg, 4 mg, Intravenous, Q4H PRN, Cam Lino MD    oxyCODONE (ROXICODONE) immediate release tablet 10 mg, 10 mg, Oral, Q4H PRN, Cam Lino MD, 10 mg at 01/19/18 0305    oxyCODONE (ROXICODONE) IR tablet 5 mg, 5 mg, Oral, Q4H PRN, Cam Lino MD    sodium chloride 0 9 % bolus, , , Code/Trauma/Sedation Med, Omelia Rubins, DO, 1,000 mL at 01/17/18 2312    Blood Culture:   No results found for: BLOODCX    Wound Culture:   No results found for: WOUNDCULT    Ins and Outs:  I/O last 24 hours:   In: 2280 [P O :680; I V :1600]  Out: 2720 [Urine:2585; Drains:135]          Physical:  Vitals:    01/19/18 0337   BP: 96/68   Pulse: 72   Resp: 18   Temp: 98 5 °F (36 9 °C)   SpO2: 99%     right lower extremity  · Dressings clean Dry Intact  · 4/5 strength to EHL/FHL  · Sensation intact L1-S1  · +2 DP Pulse    _*_*_*_*_*_*_*_*_*_*_*_*_*_*_*_*_*_*_*_*_*_*_*_*_*_*_*_*_*_*_*_*_*_*_*_*_*_*_*_*_*    Assessment:   42F Post operative day 1 ORIF right Tibia plateau and shaft fractures      Plan:  · Nonweight bearing right lower extremity in splint  · Up out of bed  · Ice, Elevation to affected extremity  · Analgesics  · DVT prophylaxis  · Dispo: Ortho will follow  · Will continue to assess for acute blood loss anemia    Eben Oliveira MD

## 2018-01-19 NOTE — PLAN OF CARE
Problem: OCCUPATIONAL THERAPY ADULT  Goal: Performs self-care activities at highest level of function for planned discharge setting  See evaluation for individualized goals  Treatment Interventions: ADL retraining, Functional transfer training, Endurance training, Patient/family training, Equipment evaluation/education, Compensatory technique education, Activityengagement  Equipment Recommended: Bedside commode (rw)       See flowsheet documentation for full assessment, interventions and recommendations  Limitation: Decreased ADL status, Decreased endurance, Decreased self-care trans, Decreased high-level ADLs  Prognosis: Good  Assessment: Pt is a 40 y o  female who was admitted to [facility] on 1/17/2018 with Closed fracture of right proximal tibia s/p ORIF  At baseline pt was completing adls and mobility independently w/o ad  Pt lives with family in 2 story home with first floor setup  Currently pt requires moderate assist for overall ADLS and min assist for functional mobility/transfers  Pt currently presents with impairments in the following categories -steps to enter environment, difficulty performing ADLS and difficulty performing IADLS  activity tolerance, endurance and standing balance/tolerance  These impairments, as well as pt's fatigue, pain, orthopedic restricitions , WBS  and risk for falls  limit pt's ability to safely engage in all baseline areas of occupation, includingdressing, toileting, functional mobility/transfers, community mobility, house maintenance, meal prep, work/volunteer work , care of children, social participation  and leisure activities  From OT standpoint, recommend home with family support upon D/C  Will need RW and BSC for home - also interested in possible w/c rental (CM informed)OT will continue to follow to address the below stated goals        OT Discharge Recommendation: Home with family support

## 2018-01-20 LAB
ALBUMIN SERPL BCP-MCNC: 3 G/DL (ref 3.5–5)
ALP SERPL-CCNC: 52 U/L (ref 46–116)
ALT SERPL W P-5'-P-CCNC: 14 U/L (ref 12–78)
AST SERPL W P-5'-P-CCNC: 22 U/L (ref 5–45)
BILIRUB DIRECT SERPL-MCNC: 0.14 MG/DL (ref 0–0.2)
BILIRUB SERPL-MCNC: 0.39 MG/DL (ref 0.2–1)
PROT SERPL-MCNC: 6.9 G/DL (ref 6.4–8.2)

## 2018-01-20 PROCEDURE — 97530 THERAPEUTIC ACTIVITIES: CPT

## 2018-01-20 PROCEDURE — 97535 SELF CARE MNGMENT TRAINING: CPT

## 2018-01-20 PROCEDURE — 97116 GAIT TRAINING THERAPY: CPT

## 2018-01-20 PROCEDURE — 80076 HEPATIC FUNCTION PANEL: CPT | Performed by: STUDENT IN AN ORGANIZED HEALTH CARE EDUCATION/TRAINING PROGRAM

## 2018-01-20 RX ADMIN — HYDROMORPHONE HYDROCHLORIDE 0.5 MG: 1 INJECTION, SOLUTION INTRAMUSCULAR; INTRAVENOUS; SUBCUTANEOUS at 05:40

## 2018-01-20 RX ADMIN — ENOXAPARIN SODIUM 40 MG: 40 INJECTION SUBCUTANEOUS at 10:20

## 2018-01-20 RX ADMIN — OXYCODONE HYDROCHLORIDE 10 MG: 10 TABLET ORAL at 15:37

## 2018-01-20 RX ADMIN — ACETAMINOPHEN 650 MG: 325 TABLET, FILM COATED ORAL at 11:42

## 2018-01-20 RX ADMIN — OXYCODONE HYDROCHLORIDE 10 MG: 10 TABLET ORAL at 11:40

## 2018-01-20 RX ADMIN — HYDROMORPHONE HYDROCHLORIDE 0.5 MG: 1 INJECTION, SOLUTION INTRAMUSCULAR; INTRAVENOUS; SUBCUTANEOUS at 10:19

## 2018-01-20 RX ADMIN — OXYCODONE HYDROCHLORIDE 10 MG: 10 TABLET ORAL at 20:01

## 2018-01-20 RX ADMIN — OXYCODONE HYDROCHLORIDE 10 MG: 10 TABLET ORAL at 03:29

## 2018-01-20 RX ADMIN — HYDROMORPHONE HYDROCHLORIDE 0.5 MG: 1 INJECTION, SOLUTION INTRAMUSCULAR; INTRAVENOUS; SUBCUTANEOUS at 17:45

## 2018-01-20 RX ADMIN — OXYCODONE HYDROCHLORIDE 10 MG: 10 TABLET ORAL at 07:33

## 2018-01-20 RX ADMIN — HYDROMORPHONE HYDROCHLORIDE 0.5 MG: 1 INJECTION, SOLUTION INTRAMUSCULAR; INTRAVENOUS; SUBCUTANEOUS at 14:10

## 2018-01-20 NOTE — PLAN OF CARE
Problem: OCCUPATIONAL THERAPY ADULT  Goal: Performs self-care activities at highest level of function for planned discharge setting  See evaluation for individualized goals  Treatment Interventions: ADL retraining, Functional transfer training, Endurance training, Patient/family training, Equipment evaluation/education, Compensatory technique education, Activityengagement  Equipment Recommended: Bedside commode (rw)       See flowsheet documentation for full assessment, interventions and recommendations  Outcome: Progressing  Limitation: Decreased ADL status, Decreased endurance, Decreased self-care trans, Decreased high-level ADLs  Prognosis: Good  Assessment: Pt participated in OT session w/ focus on LB ADLs, functional transfers/mob , dynamic balance when in stance, toileting, and compensatory technique education  Pt w/ minimal reports of pain while at rest - received w/ RLE elevated on x2 pillows - family present  Pt demonstrated G static and dynamic balance when standing unsupported for clothing management and grooming tasks  Pt noted w/ G safety awareness and insight into deficits - using RW/sink for support as needed when participating in functional activities in stance  Pt able to don underwear and pants w/ Min A to thread over RLE - pt family available to assist 24/hrs as needed - pt encouraged to attempt when LE's elevated in recliner chair to minimize bending - pt verbalized understanding  OT recommending BSC and shower chair for at home use following d/c to maximize safety and functional independence w/ ADLs  Refer to above for details regarding levels of assist required for functional tasks assessed during session  From OT perspective pt is safe to d/c home w/ family support vs  Home OT pending progress  Pt left seated OOB w/ LE's elevated, +ice for R knee, call auguste/personal items in reach and all needs met  RN informed of session/pt status and resumed care    OT to continue POC as appropriate during acute care stay         OT Discharge Recommendation: Home with family support (vs  home OT)  OT - OK to Discharge: Yes (when medically appropriate)

## 2018-01-20 NOTE — ASSESSMENT & PLAN NOTE
- s/p R tibial plateau ORIF 0/54  - drain discontinued today 1/20  - f/u H/H  - pain control  - PT/OT recommending home/home PT

## 2018-01-20 NOTE — PLAN OF CARE
Problem: PHYSICAL THERAPY ADULT  Goal: Performs mobility at highest level of function for planned discharge setting  See evaluation for individualized goals  Treatment/Interventions: Functional transfer training, LE strengthening/ROM, Elevations, Therapeutic exercise, Endurance training, Patient/family training, Equipment eval/education, Bed mobility, Gait training, Compensatory technique education, Continued evaluation, Spoke to nursing  Equipment Recommended: [place] (wc w elev leg rests)       See flowsheet documentation for full assessment, interventions and recommendations  Outcome: Progressing  Prognosis: Good  Problem List: Decreased strength, Decreased range of motion, Decreased endurance, Impaired balance, Decreased mobility, Orthopedic restrictions, Pain  Assessment: Patient seated on standard toilet, agreeable to partiicpate in therapy  She demonstrates improved mobility as she is able to stand with use of hand rails in bathroom and increased time due to pain levels  She was able to use abelardo UE support on walker to hop back to chair, however noted weakness and fatigue  Increased pain with while dependent  She continues to attempt to perform activities independently, however continues to require assistance at this time  She would continue to benefit from skilled PT to maximize functional independence  Barriers to Discharge: Inaccessible home environment, Decreased caregiver support  Barriers to Discharge Comments: Lives with children, STEPS  Recommendation:  (Rehab )     PT - OK to Discharge: Yes (to rehab when medically stable)    See flowsheet documentation for full assessment

## 2018-01-20 NOTE — PHYSICAL THERAPY NOTE
Physical Therapy Progress Note      01/20/18 [facility]   Pain Assessment   Pain Assessment 0-10   Pain Score 8   Pain Type Acute pain   Pain Location Leg   Pain Orientation Right   Hospital Pain Intervention(s) Ambulation/increased activity;Repositioned;Distraction; Emotional support   Response to Interventions Tolerated   Restrictions/Precautions   Weight Bearing Precautions Per Order Yes   RLE Weight Bearing Per Order NWB   Other Precautions Pain; Fall Risk   General   Chart Reviewed Yes   Response to Previous Treatment Patient with no complaints from previous session  Family/Caregiver Present No   Cognition   Overall Cognitive Status WFL   Comments Patient is pleasant and cooperative   Transfers   Toilet transfer 5  Supervision   Additional items Increased time required;Standard toilet  (rails )   Ambulation/Elevation   Gait pattern Decreased foot clearance; Improper Weight shift; Poor UE support; Antalgic   Gait Assistance 4  Minimal assist   Additional items Assist x 1;Verbal cues; Tactile cues   Assistive Device Rolling walker   Distance 8 feet    Balance   Static Sitting Good   Dynamic Sitting Fair   Static Standing Fair -   Dynamic Standing Fair -   Endurance Deficit   Endurance Deficit Yes   Endurance Deficit Description Generalized weakness   Activity Tolerance   Nurse Made Aware Appropriate to see per RN   Exercises   Hip Flexion Sitting;5 reps;AAROM; Right   Hip Abduction Sitting;5 reps;AAROM; Right   Knee AROM Long Arc Quad Sitting;5 reps;AAROM; Right   Assessment   Prognosis Good   Problem List Decreased strength;Decreased range of motion;Decreased endurance; Impaired balance;Decreased mobility;Orthopedic restrictions;Pain   Assessment Patient seated on standard toilet, agreeable to partiicpate in therapy  She demonstrates improved mobility as she is able to stand with use of hand rails in bathroom and increased time due to pain levels   She was able to use abelardo UE support on walker to hop back to chair, however noted weakness and fatigue  Increased pain with while dependent  She continues to attempt to perform activities independently, however continues to require assistance at this time  She would continue to benefit from skilled PT to maximize functional independence  Barriers to Discharge Inaccessible home environment;Decreased caregiver support   Barriers to Discharge Comments Lives with children, STEPS   Goals   Patient Goals To get better   STG Expiration Date 01/29/18   Treatment Day 1   Plan   Treatment/Interventions Functional transfer training;LE strengthening/ROM; Therapeutic exercise; Endurance training;Gait training;Spoke to nursing   Progress Progressing toward goals   PT Frequency 5x/wk  (1x weekend)   Recommendation   Recommendation (Rehab )   Equipment Recommended Walker  (Rolling)   PT - OK to Discharge Yes  (to rehab when medically stable)        01/20/18 [facility]   Pain Assessment   Pain Assessment 0-10   Pain Score 8   Pain Type Acute pain   Pain Location Leg   Pain Orientation Right   Hospital Pain Intervention(s) Ambulation/increased activity;Repositioned;Distraction; Emotional support   Response to Interventions Tolerated   Restrictions/Precautions   Weight Bearing Precautions Per Order Yes   RLE Weight Bearing Per Order NWB   Other Precautions Pain; Fall Risk   General   Chart Reviewed Yes   Response to Previous Treatment Patient with no complaints from previous session  Family/Caregiver Present No   Cognition   Overall Cognitive Status WFL   Comments Patient is pleasant and cooperative   Transfers   Toilet transfer 5  Supervision   Additional items Increased time required;Standard toilet  (rails )   Ambulation/Elevation   Gait pattern Decreased foot clearance; Improper Weight shift; Poor UE support; Antalgic   Gait Assistance 4  Minimal assist   Additional items Assist x 1;Verbal cues; Tactile cues   Assistive Device Rolling walker   Distance 8 feet    Balance   Static Sitting Good   Dynamic Sitting Fair Static Standing Fair -   Dynamic Standing Fair -   Endurance Deficit   Endurance Deficit Yes   Endurance Deficit Description Generalized weakness   Activity Tolerance   Nurse Made Aware Appropriate to see per RN   Exercises   Hip Flexion Sitting;5 reps;AAROM; Right   Hip Abduction Sitting;5 reps;AAROM; Right   Knee AROM Long Arc Quad Sitting;5 reps;AAROM; Right   Assessment   Prognosis Good   Problem List Decreased strength;Decreased range of motion;Decreased endurance; Impaired balance;Decreased mobility;Orthopedic restrictions;Pain   Assessment Patient seated on standard toilet, agreeable to partiicpate in therapy  She demonstrates improved mobility as she is able to stand with use of hand rails in bathroom and increased time due to pain levels  She was able to use abelardo UE support on walker to hop back to chair, however noted weakness and fatigue  Increased pain with while dependent  She continues to attempt to perform activities independently, however continues to require assistance at this time  She would continue to benefit from skilled PT to maximize functional independence  Barriers to Discharge Inaccessible home environment;Decreased caregiver support   Barriers to Discharge Comments Lives with children, STEPS   Goals   Patient Goals To get better   [facility] Expiration Date 01/29/18   Treatment Day 1   Plan   Treatment/Interventions Functional transfer training;LE strengthening/ROM; Therapeutic exercise; Endurance training;Gait training;Spoke to nursing   Progress Progressing toward goals   PT Frequency 5x/wk  (1x weekend)   Recommendation   Recommendation (Rehab )   Equipment Recommended Walker  (Rolling)   PT - OK to Discharge Yes  (to rehab when medically stable)       Arben Cardenas, PTA

## 2018-01-20 NOTE — PROGRESS NOTES
Orthopedics   Tim Ridley 40 y o  female MRN: [id number]  Unit/Bed#: [facility] 911-01    Subjective:  42F Post operative day 2 ORIF right Tibia plateau and shaft fractures  Pain well controlled  Labs:    0  Lab Value Date/Time   HCT 31 4 (L) 01/19/2018 1238   HCT 38 6 01/18/2018 0430   HGB 10 4 (L) 01/19/2018 1238   HGB 12 8 01/18/2018 0430   HGB 14 3 01/17/2018 2311   INR 1 12 01/18/2018 0430   WBC 8 90 01/18/2018 0430       Meds:    Current Facility-Administered Medications:      EMS REPLENISHMENT MED, , Does not apply, Once, Lebron Morris DO    acetaminophen (TYLENOL) tablet 650 mg, 650 mg, Oral, Q6H PRN, Yifan Larry MD    enoxaparin (LOVENOX) subcutaneous injection 40 mg, 40 mg, Subcutaneous, Q24H [address], Bhavani Everett MD, 40 mg at 01/19/18 0914    fentanyl citrate (PF) 100 MCG/2ML, , Intravenous, Code/Trauma/Sedation Med, Lebron Morris, DO, 50 mcg at 01/17/18 2312    HYDROmorphone (DILAUDID) injection 0 5 mg, 0 5 mg, Intravenous, Q3H PRN, Yifan Larry MD, 0 5 mg at 01/20/18 0540    lactated ringers infusion, 125 mL/hr, Intravenous, Continuous, Ceferino Blakely MD, Stopped at 01/19/18 0304    ondansetron (ZOFRAN) injection 4 mg, 4 mg, Intravenous, Q4H PRN, Yifan Larry MD    oxyCODONE (ROXICODONE) immediate release tablet 10 mg, 10 mg, Oral, Q4H PRN, Yifan Larry MD, 10 mg at 01/20/18 0329    oxyCODONE (ROXICODONE) IR tablet 5 mg, 5 mg, Oral, Q4H PRN, Yifan Larry MD    sodium chloride 0 9 % bolus, , , Code/Trauma/Sedation Med, Lebron Rigginsg, DO, 1,000 mL at 01/17/18 2312    Blood Culture:   No results found for: BLOODCX    Wound Culture:   No results found for: WOUNDCULT    Ins and Outs:  I/O last 24 hours:   In: 2020 [P O :2020]  Out: 7917 [Urine:3725; Drains:145]          Physical:  Vitals:    01/20/18 0339   BP: 102/51   Pulse: 78   Resp: 17   Temp: 98 5 °F (36 9 °C)   SpO2: 100%     right lower extremity  · Dressings clean Dry Intact  · 4/5 strength to EHL/FHL  · Sensation intact L1-S1  · +2 DP Pulse  · No erythema distally    _*_*_*_*_*_*_*_*_*_*_*_*_*_*_*_*_*_*_*_*_*_*_*_*_*_*_*_*_*_*_*_*_*_*_*_*_*_*_*_*_*    Assessment:   42F Post operative day 2 ORIF right Tibia plateau and shaft fractures      Plan:  · Nonweight bearing right lower extremity in splint  · Up out of bed  · Ice, Elevation to affected extremity  · Analgesics  · DVT prophylaxis  · Dispo: Ortho will follow  · Will continue to assess for acute blood loss anemia    Arnaldo Call PA-C

## 2018-01-20 NOTE — OCCUPATIONAL THERAPY NOTE
01/20/18 1437   Restrictions/Precautions   Weight Bearing Precautions Per Order Yes   RUE Weight Bearing Per Order WBAT   LUE Weight Bearing Per Order WBAT   RLE Weight Bearing Per Order NWB   LLE Weight Bearing Per Order WBAT   Other Precautions Pain; Fall Risk;WBS   Lifestyle   Reciprocal Relationships Pt reports having very supportive friends and family who will assist as needed following d/c   Pain Assessment   Pain Assessment 0-10   Pain Score 3   Pain Type Acute pain;Surgical pain   Pain Location Leg   Pain Orientation Right   Pain Descriptors Discomfort;Tightness;Radiating   Pain Frequency Constant/continuous   ADL   Where Assessed Chair  (standing at sink)   Grooming Assistance 5  Supervision/Setup   Grooming Deficit Steadying; Wash/dry hands; Wash/dry face; Teeth care   Grooming Comments Pt noted w/ G static/dynamic standing balance - use of RW for support when fatigued   [place] 1977; Increased time to complete; Thread RLE into pants; Thread RLE into underwear   LB Dressing Comments Pt able to stand unsupported for clothing management demonstrating G dynamic balance   Toileting Assistance  6  Modified independent  (grab bars - raised toilet seat)   Toileting Comments RW kept in close proximity for safety   Functional Standing Tolerance   Time 2-3 minutes   Activity LB dressing/grooming   Comments Pt demonstrated G tolerance to standing tasks - use of RW/sink for support as needed   Bed Mobility   Supine to Sit Unable to assess   Sit to Supine Unable to assess   Additional Comments Pt seated OOB in bedside chair upon entry and returned sitting OOB post session - all needs within reach   Transfers   Sit to Stand 5  Supervision   Additional items Assist x 1; Armrests   Stand to Sit 5  Supervision   Additional items Assist x 1; Armrests   Toilet transfer 6  Modified independent   Additional items Assist x 1;Raised toilet seat   Functional Mobility   Functional Mobility 5  Supervision   Additional Comments x1 w/ G tolerance to NWB precautions   Additional items Rolling walker   Toilet Transfers   Toilet Transfer From Rolling walker   Toilet Transfer Type To and from   Toilet Transfer to Raised toilet seat with rails   Toilet Transfer Technique Ambulating   Toilet Transfers Modified independence   Cognition   Overall Cognitive Status [facility]   Arousal/Participation Alert; Responsive; Cooperative   Attention Within functional limits   Orientation Level Oriented X4   Memory Within functional limits   Following Commands Follows all commands and directions without difficulty   Comments Pt is very pleasant and cooperative - motivated to participate w/ therapy   Activity Tolerance   Activity Tolerance Patient tolerated treatment well   Medical Staff Made Aware RN approved treatment and made aware of pt status post session   Assessment   Assessment Pt participated in OT session w/ focus on LB ADLs, functional transfers/mob , dynamic balance when in stance, toileting, and compensatory technique education  Pt w/ minimal reports of pain while at rest - received w/ RLE elevated on x2 pillows - family present  Pt demonstrated G static and dynamic balance when standing unsupported for clothing management and grooming tasks  Pt noted w/ G safety awareness and insight into deficits - using RW/sink for support as needed when participating in functional activities in stance  Pt able to don underwear and pants w/ Min A to thread over RLE - pt family available to assist 24/hrs as needed - pt encouraged to attempt when LE's elevated in recliner chair to minimize bending - pt verbalized understanding  OT recommending BSC and shower chair for at home use following d/c to maximize safety and functional independence w/ ADLs  Refer to above for details regarding levels of assist required for functional tasks assessed during session    From OT perspective pt is safe to d/c home w/ family support vs  Home OT pending progress  Pt left seated OOB w/ LE's elevated, +ice for R knee, call auguste/personal items in reach and all needs met  RN informed of session/pt status and resumed care  OT to continue POC as appropriate during acute care stay  Plan   Treatment Interventions ADL retraining;Functional transfer training; Endurance training;Patient/family training; Compensatory technique education   Goal Expiration Date 01/29/18   Treatment Day 1   OT Frequency 3-5x/wk   Recommendation   OT Discharge Recommendation Home with family support  (vs  home OT)   Equipment Recommended Bedside commode  (+ shower chair)   OT - OK to Discharge Yes  (when medically appropriate)   Barthel Index   Feeding 10   Bathing 5   Grooming Score 5   Dressing Score 5   Bladder Score 10   Bowels Score 10   Toilet Use Score 10   Transfers (Bed/Chair) Score 10   Mobility (Level Surface) Score 0   Stairs Score 0   Barthel Index Score 65   Modified [place] Scale   Modified [place] Scale 3   AISHA Mullen

## 2018-01-20 NOTE — PLAN OF CARE
Problem: DISCHARGE PLANNING - CARE MANAGEMENT  Goal: Discharge to post-acute care or home with appropriate resources  INTERVENTIONS:  - Conduct assessment to determine patient/family and health care team treatment goals, and need for post-acute services based on payer coverage, community resources, and patient preferences, and barriers to discharge  - Address psychosocial, clinical, and financial barriers to discharge as identified in assessment in conjunction with the patient/family and health care team  - Arrange appropriate level of post-acute services according to patients   needs and preference and payer coverage in collaboration with the physician and health care team  - Communicate with and update the patient/family, physician, and health care team regarding progress on the discharge plan  - Arrange appropriate transportation to post-acute venues   Outcome: Progressing      Problem: Prexisting or High Potential for Compromised Skin Integrity  Goal: Skin integrity is maintained or improved  INTERVENTIONS:  - Identify patients at risk for skin breakdown  - Assess and monitor skin integrity  - Assess and monitor nutrition and hydration status  - Monitor labs (i e  albumin)  - Assess for incontinence   - Turn and reposition patient  - Assist with mobility/ambulation  - Relieve pressure over bony prominences  - Avoid friction and shearing  - Provide appropriate hygiene as needed including keeping skin clean and dry  - Evaluate need for skin moisturizer/barrier cream  - Collaborate with interdisciplinary team (i e  Nutrition, Rehabilitation, etc )   - Patient/family teaching   Outcome: Progressing      Problem: Potential for Falls  Goal: Patient will remain free of falls  INTERVENTIONS:  - Assess patient frequently for physical needs  -  Identify cognitive and physical deficits and behaviors that affect risk of falls    -  [place] fall precautions as indicated by assessment   - Educate patient/family on patient safety including physical limitations  - Instruct patient to call for assistance with activity based on assessment  - Modify environment to reduce risk of injury  - Consider OT/PT consult to assist with strengthening/mobility   Outcome: Progressing      Problem: Nutrition/Hydration-ADULT  Goal: Nutrient/Hydration intake appropriate for improving, restoring or maintaining nutritional needs  Monitor and assess patient's nutrition/hydration status for malnutrition (ex- brittle hair, bruises, dry skin, pale skin and conjunctiva, muscle wasting, smooth red tongue, and disorientation)  Collaborate with interdisciplinary team and initiate plan and interventions as ordered  Monitor patient's weight and dietary intake as ordered or per policy  Utilize nutrition screening tool and intervene per policy  Determine patient's food preferences and provide high-protein, high-caloric foods as appropriate       INTERVENTIONS:  - Monitor oral intake, urinary output, labs, and treatment plans  - Assess nutrition and hydration status and recommend course of action  - Evaluate amount of meals eaten  - Assist patient with eating if necessary   - Allow adequate time for meals  - Recommend/ encourage appropriate diets, oral nutritional supplements, and vitamin/mineral supplements  - Order, calculate, and assess calorie counts as needed  - Recommend, monitor, and adjust tube feedings and TPN/PPN based on assessed needs  - Assess need for intravenous fluids  - Provide specific nutrition/hydration education as appropriate  - Include patient/family/caregiver in decisions related to nutrition   Outcome: Progressing

## 2018-01-20 NOTE — PROGRESS NOTES
Progress Note - Arturo Campos 1975, 40 y o  female MRN: [id number]    Unit/Bed#: [facility] 911-01 Encounter: [id number]    Primary Care Provider: Walker Paget, MD   Date and time admitted to hospital: 1/17/2018 11:01 PM    Bigeminy   Assessment & Plan    discontinue tele, echo ordered- essentially normal EF 70%  Follow up pcp        Fracture of head of fibula   Assessment & Plan    - OR on 1/18 with ortho        * Closed fracture of right proximal tibia   Assessment & Plan    - s/p R tibial plateau ORIF 3/81  - drain discontinued today 1/20  - f/u H/H  - pain control  - PT/OT recommending home/home PT          Subjective/Objective   Chief Complaint: Feeling okay    Subjective: No acute events  Was OOB to bathroom yesterday  Pain is controlled, though painful when she gets up  Motivated to get out of bed  Objective:   Meds/Allergies   No prescriptions prior to admission  Vitals: Blood pressure 112/57, pulse 99, temperature 98 1 °F (36 7 °C), temperature source Oral, resp  rate 16, weight 59 9 kg (132 lb), last menstrual period 12/25/2017, SpO2 100 %  There is no height or weight on file to calculate BMI   SpO2 Device: O2 Device: None (Room air)    ABG: No results found for: PHART, ITX4GVI, PO2ART, DEZ2NGJ, R8WSSQBO, BEART, SOURCE      Intake/Output Summary (Last 24 hours) at 01/20/18 1001  Last data filed at 01/20/18 3801   Gross per 24 hour   Intake             1180 ml   Output              745 ml   Net              435 ml       Invasive Devices     Peripheral Intravenous Line            Peripheral IV 01/17/18 Right Antecubital 2 days          Drain            Closed/Suction Drain Right Leg Accordion 10 Fr  1 day                Nutrition/GI Proph/Bowel Reg: Regular    Physical Exam:   GENERAL APPEARANCE: No acute distress, sitting comfortably  HEENT: NCAT  CV: regular rate  LUNGS: no respiratory distress  ABD: soft NT ND  EXT: RLE wrapped , palp DP, motor sensation intact  NEURO: non-focal  SKIN: intact    Lab Results:   CBC:   Lab Results   Component Value Date    HGB 10 4 (L) 01/19/2018    HCT 31 4 (L) 01/19/2018     Imaging/EKG Studies: Results: I have personally reviewed pertinent reports      Other Studies: n/a  VTE Prophylaxis: Lovenox

## 2018-01-21 RX ADMIN — OXYCODONE HYDROCHLORIDE 10 MG: 10 TABLET ORAL at 04:21

## 2018-01-21 RX ADMIN — ACETAMINOPHEN 650 MG: 325 TABLET, FILM COATED ORAL at 07:36

## 2018-01-21 RX ADMIN — OXYCODONE HYDROCHLORIDE 10 MG: 10 TABLET ORAL at 00:04

## 2018-01-21 RX ADMIN — ACETAMINOPHEN 650 MG: 325 TABLET, FILM COATED ORAL at 13:40

## 2018-01-21 RX ADMIN — OXYCODONE HYDROCHLORIDE 10 MG: 10 TABLET ORAL at 09:00

## 2018-01-21 RX ADMIN — ACETAMINOPHEN 650 MG: 325 TABLET, FILM COATED ORAL at 19:30

## 2018-01-21 RX ADMIN — OXYCODONE HYDROCHLORIDE 10 MG: 10 TABLET ORAL at 16:56

## 2018-01-21 RX ADMIN — ENOXAPARIN SODIUM 40 MG: 40 INJECTION SUBCUTANEOUS at 09:01

## 2018-01-21 RX ADMIN — ACETAMINOPHEN 650 MG: 325 TABLET, FILM COATED ORAL at 00:07

## 2018-01-21 RX ADMIN — OXYCODONE HYDROCHLORIDE 10 MG: 10 TABLET ORAL at 13:00

## 2018-01-21 NOTE — PROGRESS NOTES
Progress Note - Flako Perla 1975, 40 y o  female MRN: [id number]    Unit/Bed#: [facility] 911-01 Encounter: [id number]    Primary Care Provider: Santos Fox MD   Date and time admitted to hospital: 1/17/2018 11:01 PM      Bigeminy   Assessment & Plan    discontinue tele, echo ordered- essentially normal EF 70%  Follow up pcp        Fracture of head of fibula   Assessment & Plan    - OR on 1/18 with ortho        * Closed fracture of right proximal tibia   Assessment & Plan    - s/p R tibial plateau ORIF 4/58  - drain discontinued today 1/20  - f/u H/H  - pain control  - PT recommending rehab                  Subjective/Objective   Chief Complaint: Leg pain    Subjective: No acute events  Tolerating diet  Worked with PT yesterday, now recommending rehab  Objective:     Meds/Allergies   No prescriptions prior to admission  Vitals: Blood pressure 131/58, pulse 76, temperature 98 3 °F (36 8 °C), temperature source Oral, resp  rate 16, weight 59 9 kg (132 lb), last menstrual period 12/25/2017, SpO2 100 %  There is no height or weight on file to calculate BMI  SpO2 Device: O2 Device: None (Room air)    ABG: No results found for: PHART, NDQ0CSQ, PO2ART, YUZ5EDE, I2GYYLKB, BEART, SOURCE      Intake/Output Summary (Last 24 hours) at 01/21/18 1543  Last data filed at 01/21/18 0700   Gross per 24 hour   Intake              420 ml   Output                0 ml   Net              420 ml       Invasive Devices     Drain            Closed/Suction Drain Right Leg Accordion 10 Fr  3 days                Nutrition/GI Proph/Bowel Reg: Regular    Physical Exam:   GENERAL APPEARANCE: awake and alert  HEENT: NCAT  CV: regular rate  LUNGS: no respiratory distress  ABD: soft NT ND  EXT: palp DP, motor/sense intact  NEURO: non-focal  SKIN: intact    Lab Results: Results: I have personally reviewed pertinent reports  Imaging/EKG Studies: Results: I have personally reviewed pertinent reports      Other Studies: n/a  VTE Prophylaxis: Lovenox

## 2018-01-21 NOTE — ASSESSMENT & PLAN NOTE
- s/p R tibial plateau ORIF 8/00  - drain discontinued today 1/20  - f/u H/H  - pain control  - PT recommending rehab

## 2018-01-22 VITALS
RESPIRATION RATE: 16 BRPM | TEMPERATURE: 97.7 F | DIASTOLIC BLOOD PRESSURE: 66 MMHG | WEIGHT: 132 LBS | OXYGEN SATURATION: 100 % | HEART RATE: 106 BPM | HEIGHT: 67 IN | SYSTOLIC BLOOD PRESSURE: 135 MMHG | BODY MASS INDEX: 20.72 KG/M2

## 2018-01-22 PROCEDURE — 97530 THERAPEUTIC ACTIVITIES: CPT

## 2018-01-22 PROCEDURE — 97116 GAIT TRAINING THERAPY: CPT

## 2018-01-22 RX ORDER — ACETAMINOPHEN 325 MG/1
TABLET ORAL
Qty: 30 TABLET | Refills: 0 | Status: SHIPPED | OUTPATIENT
Start: 2018-01-22

## 2018-01-22 RX ORDER — DOCUSATE SODIUM 100 MG/1
100 CAPSULE, LIQUID FILLED ORAL 2 TIMES DAILY
Qty: 10 CAPSULE | Refills: 0 | Status: SHIPPED | OUTPATIENT
Start: 2018-01-22 | End: 2018-02-01 | Stop reason: SDUPTHER

## 2018-01-22 RX ORDER — OXYCODONE HYDROCHLORIDE 5 MG/1
TABLET ORAL
Qty: 30 TABLET | Refills: 0 | Status: SHIPPED | OUTPATIENT
Start: 2018-01-22 | End: 2018-01-26 | Stop reason: SDUPTHER

## 2018-01-22 RX ORDER — SENNOSIDES 8.6 MG
1 TABLET ORAL 2 TIMES DAILY
Qty: 120 EACH | Refills: 0 | Status: SHIPPED | OUTPATIENT
Start: 2018-01-22 | End: 2018-03-19

## 2018-01-22 RX ADMIN — ENOXAPARIN SODIUM 40 MG: 40 INJECTION SUBCUTANEOUS at 08:15

## 2018-01-22 RX ADMIN — OXYCODONE HYDROCHLORIDE 10 MG: 10 TABLET ORAL at 13:23

## 2018-01-22 RX ADMIN — OXYCODONE HYDROCHLORIDE 10 MG: 10 TABLET ORAL at 00:00

## 2018-01-22 RX ADMIN — OXYCODONE HYDROCHLORIDE 10 MG: 10 TABLET ORAL at 08:17

## 2018-01-22 RX ADMIN — ACETAMINOPHEN 650 MG: 325 TABLET, FILM COATED ORAL at 03:18

## 2018-01-22 RX ADMIN — OXYCODONE HYDROCHLORIDE 10 MG: 10 TABLET ORAL at 04:01

## 2018-01-22 NOTE — PHYSICAL THERAPY NOTE
Physical Therapy Progress Note     01/22/18 1005   Pain Assessment   Pain Assessment 0-10   Pain Score 5   Hospital Pain Intervention(s) Repositioned;Cold applied; Ambulation/increased activity; Emotional support   Response to Interventions Tolerated  Restrictions/Precautions   RLE Weight Bearing Per Order NWB   Other Precautions Pain   Subjective   Subjective The pt  states that she is willing to trial the stairs in order to be able to go home  She notes that she has help at home, and that she is comfortable with returning home  Transfers   Sit to Stand 5  Supervision   Additional items Increased time required   Stand to Sit 5  Supervision   Ambulation/Elevation   Gait pattern Excessively slow;Decreased foot clearance; Forward Flexion   Gait Assistance 5  Supervision   Additional items Verbal cues   Assistive Device Rolling walker   Distance 10 feet x 2, 40 feet x 2  Stair Management Assistance 4  Minimal assist   Additional items Assist x 1;Verbal cues; Tactile cues; Increased time required   Stair Management Technique One rail L;With crutches  (L rail and 1 crutch x 7 steps, 3 steps with B AC)   Number of Stairs 10  (Ascending and descending )   Balance   Static Sitting Normal   Dynamic Sitting Normal   Static Standing Good   Ambulatory Fair +   Activity Tolerance   Activity Tolerance Patient tolerated treatment well;Patient limited by pain   Nurse [address], RN  Assessment   Prognosis Good   Problem List Decreased strength;Decreased range of motion;Decreased endurance; Impaired balance;Decreased mobility;Orthopedic restrictions;Pain   Assessment The pt  has improving mobility as she is ambulating household distances with the rolling walker without assistance  She was able to manage her RLE for transfers as well  She was able to demonstrate stairs with the left rail and the crutch, and then with bilateral crutches in order to enter the house  She does require assistance for the stairs, and the pt  states that she has family that will be there to assist her at all times  She has demonstrated the necessary mobility in order to return home safely  Recommend that the pt  dons a shoe on her LLE with ambulation as well as needing a rolling walker and a commode  The pt  was sized and provided bilateral axillary crutches  Barriers to Discharge None   Goals   Patient Goals To go home  STG Expiration Date 01/29/18   Treatment Day 2   Plan   Treatment/Interventions Functional transfer training;LE strengthening/ROM; Elevations; Endurance training; Therapeutic exercise;Patient/family training;Bed mobility;Gait training   Progress Progressing toward goals   PT Frequency Other (Comment)  (6x a week )   Recommendation   Recommendation Home PT; Home with family support   Equipment Recommended Amira Salguero; Other (Comment)  (Rolling walker,  commode  )   PT - OK to Discharge Yes     Milka Yousif, PTA

## 2018-01-22 NOTE — ASSESSMENT & PLAN NOTE
- POD #4 s/p ORIF R tibial plateau   - drain discontinued today 1/20  - NWB RLE  - F/u with orthopedics in 2 weeks  - lovenox for 28 days  - pain control  - home PT/OT upon discharge

## 2018-01-22 NOTE — ASSESSMENT & PLAN NOTE
-ECHO without significant abnormality  - asymptomatic  - f/u with PCP for cards referral as outpatient for long term follow up

## 2018-01-22 NOTE — SOCIAL WORK
Pt cleared to d/c home with VNA, rolling walker and BSC   Pt's medication was sent to [facility]
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
## 2018-01-22 NOTE — DISCHARGE INSTRUCTIONS
Discharge Instructions - Orthopedics  Lokesh Hyman 40 y o  female MRN: [id number]  Unit/Bed#: PACU 07    Weight Bearing Status:                                           Do not bear weight on right leg  DVT prophylaxis:  Complete course of Lovenox as directed    Pain:  Continue analgesics as directed    Dressing Instructions:   Keep dressing clean, dry and intact until follow up appointment  PT/OT:  N/A, at present    Appt Instructions: If you do not have your appointment, please call the clinic at [phone] to f/u with Dr Walter Evans in 2 weeks  Otherwise followup as scheduled below:      Contact the office sooner if you experience any increased numbness/tingling in the extremities  Miscellaneous:  No working, driving or hazardous activity until cleared and while taking narcotics

## 2018-01-22 NOTE — PLAN OF CARE
Problem: PHYSICAL THERAPY ADULT  Goal: Performs mobility at highest level of function for planned discharge setting  See evaluation for individualized goals  Treatment/Interventions: Functional transfer training, LE strengthening/ROM, Elevations, Therapeutic exercise, Endurance training, Patient/family training, Equipment eval/education, Bed mobility, Gait training, Compensatory technique education, Continued evaluation, Spoke to nursing  Equipment Recommended: Aleixs Raza (wc w elev leg rests)       See flowsheet documentation for full assessment, interventions and recommendations  Outcome: Adequate for Discharge  Prognosis: Good  Problem List: Decreased strength, Decreased range of motion, Decreased endurance, Impaired balance, Decreased mobility, Orthopedic restrictions, Pain  Assessment: The pt  has improving mobility as she is ambulating household distances with the rolling walker without assistance  She was able to manage her RLE for transfers as well  She was able to demonstrate stairs with the left rail and the crutch, and then with bilateral crutches in order to enter the house  She does require assistance for the stairs, and the pt  states that she has family that will be there to assist her at all times  She has demonstrated the necessary mobility in order to return home safely  Recommend that the pt  dons a shoe on her LLE with ambulation as well as needing a rolling walker and a commode  The pt  was sized and provided bilateral axillary crutches  Barriers to Discharge: None  Barriers to Discharge Comments: Lives with children, STEPS  Recommendation: Home PT, Home with family support     PT - OK to Discharge: Yes    See flowsheet documentation for full assessment

## 2018-01-22 NOTE — PROGRESS NOTES
Orthopedics   [facility] 40 [facility]  [facility] MRN: [id number]  Unit/Bed#: [facility] 911-01    Subjective:  42F Post operative day 4 ORIF right Tibia plateau and shaft fractures  No issues overnight  Labs:    0  Lab Value Date/Time   HCT 31 4 (L) 01/19/2018 1238   HCT 38 6 01/18/2018 0430   HGB 10 4 (L) 01/19/2018 1238   HGB 12 8 01/18/2018 0430   HGB 14 3 01/17/2018 2311   INR 1 12 01/18/2018 0430   WBC 8 90 01/18/2018 0430       Meds:    Current Facility-Administered Medications:     acetaminophen (TYLENOL) tablet 650 mg, 650 mg, Oral, Q6H PRN, Romelia Jackson MD, 650 mg at 01/22/18 0318    enoxaparin (LOVENOX) subcutaneous injection 40 mg, 40 mg, Subcutaneous, Q24H [facility], Maulik Odonnell MD, 40 mg at 01/21/18 0901    fentanyl citrate (PF) 100 MCG/2ML, , Intravenous, Code/Trauma/Sedation Med, Tad Siddiqui DO, 50 mcg at 01/17/18 2312    HYDROmorphone (DILAUDID) injection 0 5 mg, 0 5 mg, Intravenous, Q3H PRN, Romelia Jackson MD, 0 5 mg at 01/20/18 1745    ondansetron (ZOFRAN) injection 4 mg, 4 mg, Intravenous, Q4H PRN, Romelia Jackson MD    oxyCODONE (ROXICODONE) immediate release tablet 10 mg, 10 mg, Oral, Q4H PRN, Romelia Jackson MD, 10 mg at 01/22/18 0401    oxyCODONE (ROXICODONE) IR tablet 5 mg, 5 mg, Oral, Q4H PRN, Romelia Jackson MD    sodium chloride 0 9 % bolus, , , Code/Trauma/Sedation MedTad DO, 1,000 mL at 01/17/18 2312    Blood Culture:   No results found for: BLOODCX    Wound Culture:   No results found for: WOUNDCULT    Ins and Outs:  I/O last 24 hours:   In: 1150 [P O :1150]  Out: -           Physical:  Vitals:    01/21/18 2325   BP: 112/55   Pulse: 89   Resp: 18   Temp: 98 3 °F (36 8 °C)   SpO2: 100%     right lower extremity  · Dressing and splint clean Dry Intact  · 4/5 strength to EHL/FHL  · Sensation intact L1-S1  · Toes warm and well perfused    _*_*_*_*_*_*_*_*_*_*_*_*_*_*_*_*_*_*_*_*_*_*_*_*_*_*_*_*_*_*_*_*_*_*_*_*_*_*_*_*_*    Assessment:   42F Post operative day 4 ORIF right Tibia plateau and shaft fractures  Doing well      Plan:  · Nonweight bearing right lower extremity in splint  · Up out of bed  · Ice, Elevation to affected extremity  · Analgesics  · DVT prophylaxis  · Dispo: [id number] Rachael Howard for discharge from ortho perspective  · Patient noted to have acute blood loss anemia due to a drop in Hbg of > 2 0g from preop levels, will monitor vital signs and resuscitate with IV fluids as needed    Tyler Melendez

## 2018-01-24 ENCOUNTER — TRANSITIONAL CARE MANAGEMENT (OUTPATIENT)
Dept: INTERNAL MEDICINE CLINIC | Facility: CLINIC | Age: 43
End: 2018-01-24

## 2018-01-25 ENCOUNTER — TELEPHONE (OUTPATIENT)
Dept: OBGYN CLINIC | Facility: HOSPITAL | Age: 43
End: 2018-01-25

## 2018-01-25 NOTE — TELEPHONE ENCOUNTER
Dr Magui Ruiz patient    Patient had surgery 1/18  She states the pain medications given to her are not controlling her pain  She is taking oxycodone and tylenol  She states she is taking 2 oxycodone and it only lasts about 3 hours  What can be done for this?  Please advise

## 2018-01-25 NOTE — TELEPHONE ENCOUNTER
Patient called that she is having uncontrolled pain  Taking oxycodone 5mg 2 every 3-4 hrs, tylenol 325mg  1 tab every 6 hrs  Patient is not on any anticoagulation therapy and is not taking any NSAIDS    Please advise

## 2018-01-25 NOTE — TELEPHONE ENCOUNTER
I have reviewed the patient's chart and her surgery    This is appropriate medication and should not be exceeded    If she is not obtaining pain relief than she should be seen and evaluated      Thank you

## 2018-01-26 ENCOUNTER — APPOINTMENT (OUTPATIENT)
Dept: RADIOLOGY | Facility: MEDICAL CENTER | Age: 43
End: 2018-01-26
Payer: COMMERCIAL

## 2018-01-26 ENCOUNTER — OFFICE VISIT (OUTPATIENT)
Dept: OBGYN CLINIC | Facility: MEDICAL CENTER | Age: 43
End: 2018-01-26

## 2018-01-26 VITALS
BODY MASS INDEX: 21.97 KG/M2 | HEIGHT: 67 IN | DIASTOLIC BLOOD PRESSURE: 84 MMHG | SYSTOLIC BLOOD PRESSURE: 118 MMHG | WEIGHT: 140 LBS | HEART RATE: 116 BPM

## 2018-01-26 DIAGNOSIS — Z48.89 AFTERCARE FOLLOWING SURGERY FOR INJURY OR TRAUMA: Primary | ICD-10-CM

## 2018-01-26 DIAGNOSIS — Z48.89 AFTERCARE FOLLOWING SURGERY FOR INJURY OR TRAUMA: ICD-10-CM

## 2018-01-26 PROCEDURE — 73590 X-RAY EXAM OF LOWER LEG: CPT

## 2018-01-26 PROCEDURE — 99024 POSTOP FOLLOW-UP VISIT: CPT | Performed by: ORTHOPAEDIC SURGERY

## 2018-01-26 RX ORDER — NAPROXEN 375 MG/1
375 TABLET ORAL
Qty: 60 TABLET | Refills: 2 | Status: SHIPPED | OUTPATIENT
Start: 2018-01-26 | End: 2018-02-01 | Stop reason: SDUPTHER

## 2018-01-26 RX ORDER — OXYCODONE HYDROCHLORIDE 5 MG/1
TABLET ORAL
Qty: 30 TABLET | Refills: 0 | Status: SHIPPED | OUTPATIENT
Start: 2018-01-26 | End: 2018-02-01 | Stop reason: SDUPTHER

## 2018-01-26 RX ORDER — IBUPROFEN 200 MG
TABLET ORAL EVERY 6 HOURS PRN
COMMUNITY
End: 2018-04-13
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
## 2018-01-26 NOTE — TELEPHONE ENCOUNTER
Patient advised that she can be seen in [address] today at 2pm due to increased pain, unrelieved by oxycodone 5mg 1-2 tabs every 4-6 hrs  Advised patient okay to take ibuprofen OTC for added pain control and Tylenol 1000mg TID

## 2018-01-26 NOTE — PATIENT INSTRUCTIONS
You have been provided a removable boot  This boot can be removed for hours of sleep and personal hygiene  You should elevate the leg frequently waist high,   As well as perform ankle range of motion while elevated      You will be seen in follow-up in 1 week for suture removal     Your remain nonweightbearing on the right leg at the present time

## 2018-01-26 NOTE — TELEPHONE ENCOUNTER
We wish to see the patient,  There had been enough calls regarding her pain,     That we wished to examine her  Please convey this to the patient and her family,    Without the patient we will not be giving her additional prescriptions

## 2018-01-30 NOTE — CASE MANAGEMENT
Notification of Discharge  This is a Notification of Discharge from our facility [address]  Please be advised that this patient has been discharge from our facility  Below you will find the admission and discharge date and time including the patients disposition  PRESENTATION DATE: 1/17/2018 11:01 PM  IP ADMISSION DATE: 1/17/18 2331  DISCHARGE DATE: 1/22/2018  2:03 PM  DISPOSITION: Home with [address] [place] [address] in the [facility] by Augie Alaniz for 2017  Network Utilization Review Department  Phone: [phone]; Fax [phone]  ATTENTION: The Network Utilization Review Department is now centralized for our 7 Facilities  Make a note that we have a new phone and fax numbers for our Department  Please call with any questions or concerns to [phone] and carefully follow the prompts so that you are directed to the right person  All voicemails are confidential  Fax any determinations, approvals, denials, and requests for initial or continue stay review clinical to [phone]  Due to HIGH CALL volume, it would be easier if you could please send faxed requests to expedite your requests and in part, help us provide discharge notifications faster

## 2018-02-01 ENCOUNTER — TELEPHONE (OUTPATIENT)
Dept: OBGYN CLINIC | Facility: HOSPITAL | Age: 43
End: 2018-02-01

## 2018-02-01 ENCOUNTER — OFFICE VISIT (OUTPATIENT)
Dept: OBGYN CLINIC | Facility: HOSPITAL | Age: 43
End: 2018-02-01

## 2018-02-01 ENCOUNTER — HOSPITAL ENCOUNTER (OUTPATIENT)
Dept: RADIOLOGY | Facility: HOSPITAL | Age: 43
Discharge: HOME | End: 2018-02-01
Attending: ORTHOPAEDIC SURGERY
Payer: COMMERCIAL

## 2018-02-01 VITALS
HEIGHT: 67 IN | WEIGHT: 132 LBS | DIASTOLIC BLOOD PRESSURE: 83 MMHG | SYSTOLIC BLOOD PRESSURE: 122 MMHG | BODY MASS INDEX: 20.72 KG/M2 | HEART RATE: 101 BPM

## 2018-02-01 DIAGNOSIS — Z48.89 AFTERCARE FOLLOWING SURGERY FOR INJURY OR TRAUMA: ICD-10-CM

## 2018-02-01 DIAGNOSIS — Z48.89 AFTERCARE FOLLOWING SURGERY FOR INJURY AND TRAUMA: Primary | ICD-10-CM

## 2018-02-01 PROBLEM — Z47.89 ORTHOPEDIC AFTERCARE: Status: ACTIVE | Noted: 2018-02-01

## 2018-02-01 PROCEDURE — 73590 X-RAY EXAM OF LOWER LEG: CPT

## 2018-02-01 PROCEDURE — 99024 POSTOP FOLLOW-UP VISIT: CPT | Performed by: ORTHOPAEDIC SURGERY

## 2018-02-01 RX ORDER — DOCUSATE SODIUM 100 MG/1
100 CAPSULE, LIQUID FILLED ORAL 2 TIMES DAILY
Qty: 30 CAPSULE | Refills: 1 | Status: SHIPPED | OUTPATIENT
Start: 2018-02-01 | End: 2018-03-19

## 2018-02-01 RX ORDER — OXYCODONE HYDROCHLORIDE 5 MG/1
TABLET ORAL
Qty: 30 TABLET | Refills: 0 | Status: SHIPPED | OUTPATIENT
Start: 2018-02-01 | End: 2018-02-06 | Stop reason: SDUPTHER

## 2018-02-01 RX ORDER — NAPROXEN 375 MG/1
375 TABLET ORAL
Qty: 90 TABLET | Refills: 0 | Status: SHIPPED | OUTPATIENT
Start: 2018-02-01 | End: 2018-04-13

## 2018-02-01 NOTE — PROGRESS NOTES
42 y o female status post ORIF right tibia fracture on 01/18/2018  She reports persistent but improving pain over her right leg  She has been adhering to her nonweightbearing restrictions is decreasing the amount of pain medication she requires  Denies any new injury to the right of a  Denies any numbness, tingling, fevers or chills    Review of Systems  Review of systems negative unless otherwise specified in HPI    Past Medical History  History reviewed  No pertinent past medical history  Past Surgical History  Past Surgical History:   Procedure Laterality Date    KNEE SURGERY      ORIF TIBIA FRACTURE Right 1/18/2018    Procedure: OPEN REDUCTION W/ INTERNAL FIXATION (ORIF) TIBIA;  Surgeon: Sonya Allison MD;  [facility];  Service: Orthopedics    ORIF TIBIAL PLATEAU Right 5/83/0466    Procedure: OPEN REDUCTION W/ INTERNAL FIXATION (ORIF) TIBIAL PLATEAU;  Surgeon: Sonya Allison MD;  [facility];  Service: Orthopedics       Current Medications  Current Outpatient Prescriptions on File Prior to Visit   Medication Sig Dispense Refill    acetaminophen (TYLENOL) 325 mg tablet 650 mg every 6 hours for mild pain 30 tablet 0    docusate sodium (COLACE) 100 mg capsule Take 1 capsule by mouth 2 (two) times a day 10 capsule 0    enoxaparin (LOVENOX) 40 mg/0 4 mL Inject 0 4 mL under the skin daily in the early morning for 30 days 11 2 mL 0    ibuprofen (MOTRIN) 200 mg tablet Take by mouth every 6 (six) hours as needed for mild pain      naproxen (NAPROSYN) 375 mg tablet Take 1 tablet (375 mg total) by mouth 3 (three) times a day with meals for 60 doses 60 tablet 2    oxyCODONE (ROXICODONE) 5 mg immediate release tablet 5 to 10 mg every 4 hours as needed for moderate to severe pain 30 tablet 0    senna (SENOKOT) 8 6 mg Take 1 tablet by mouth 2 (two) times a day 120 each 0     No current facility-administered medications on file prior to visit          Recent Labs (HCT,HGB,PT,INR,ESR,CRP,GLU,HgA1C)  @LABALLVALUEIP(HCT:3,HGB:3,PT,INR,WBC:3,ESR,CRP,GLUCOSE,HGBA1C)@      Physical exam  · General: Awake, Alert, Oriented  · Eyes: Pupils equal, round and reactive to light  · Heart: regular rate and rhythm  · Lungs: No audible wheezing  · Abdomen: soft  right lower extremity  · Well-healed incisions that are clean dry and intact with sutures in place  Minimal amount of swelling over the anterior tibia  · Tenderness palpation surrounding the incision without erythema or induration  · Knee and ankle range of motion limited secondary to pain  · Motor and sensation intact distally  · Toes well more perfused      Imaging  X-rays of the right tibia show stable alignment of the hardware without evidence of loosening or fracture      Assessment/Plan:   40 y  o female status post ORIF right tibia fracture on 01/18/2018  Sutures removed at today's visit  She has had an uneventful postoperative course thus far is progressing well  We will continue to have her nonweightbearing to her right lower extremity and start her on physical therapy to work on range of motion and strengthening    Refilled pain medications was given today Will see back in follow-up in 6 weeks for repeat examination and x-rays

## 2018-02-01 NOTE — TELEPHONE ENCOUNTER
I called the [facility]    A treatment plan was discussed    No further action required at present patient is to continue home PT for range of motion with out weight-bearing

## 2018-02-01 NOTE — TELEPHONE ENCOUNTER
Benton Brennan called from PT at [address] [place] [address] Nadeen NEWELL  She would like ina lomax to call her back about this pt

## 2018-02-06 ENCOUNTER — TELEPHONE (OUTPATIENT)
Dept: OBGYN CLINIC | Facility: HOSPITAL | Age: 43
End: 2018-02-06

## 2018-02-06 DIAGNOSIS — Z48.89 AFTERCARE FOLLOWING SURGERY FOR INJURY OR TRAUMA: ICD-10-CM

## 2018-02-06 RX ORDER — OXYCODONE HYDROCHLORIDE 5 MG/1
TABLET ORAL
Qty: 30 TABLET | Refills: 0 | Status: SHIPPED | OUTPATIENT
Start: 2018-02-06 | End: 2018-02-14 | Stop reason: SDUPTHER

## 2018-02-06 NOTE — TELEPHONE ENCOUNTER
Caller: [facility]  Callback# [phone]  Fax# [phone] [facility]  Dr Jaylen August      Requesting an order for tub transfer bench please fax when ready thanks

## 2018-02-12 NOTE — TELEPHONE ENCOUNTER
This prescription requests can be taken care of tomorrow all while we are in the office  Please call the patient and let her know of the above

## 2018-02-14 DIAGNOSIS — Z48.89 AFTERCARE FOLLOWING SURGERY FOR INJURY OR TRAUMA: ICD-10-CM

## 2018-02-14 RX ORDER — OXYCODONE HYDROCHLORIDE 5 MG/1
TABLET ORAL
Qty: 60 TABLET | Refills: 0 | Status: SHIPPED | OUTPATIENT
Start: 2018-02-14 | End: 2018-02-16 | Stop reason: SDUPTHER

## 2018-02-14 NOTE — TELEPHONE ENCOUNTER
Patient is calling back stating that we do not have a script for her pain medication and is wondering when the script will be ready for pickup

## 2018-02-15 DIAGNOSIS — Z48.89 AFTERCARE FOLLOWING SURGERY FOR INJURY OR TRAUMA: ICD-10-CM

## 2018-02-16 DIAGNOSIS — Z48.89 AFTERCARE FOLLOWING SURGERY FOR INJURY OR TRAUMA: ICD-10-CM

## 2018-02-16 RX ORDER — OXYCODONE HYDROCHLORIDE 5 MG/1
TABLET ORAL
Qty: 60 TABLET | Refills: 0 | Status: CANCELLED | OUTPATIENT
Start: 2018-02-16

## 2018-02-16 RX ORDER — OXYCODONE HYDROCHLORIDE 5 MG/1
5 TABLET ORAL EVERY 4 HOURS PRN
Qty: 30 TABLET | Refills: 0 | OUTPATIENT
Start: 2018-02-16

## 2018-02-16 RX ORDER — OXYCODONE HYDROCHLORIDE 5 MG/1
5 TABLET ORAL EVERY 6 HOURS PRN
Qty: 40 TABLET | Refills: 0 | Status: SHIPPED | OUTPATIENT
Start: 2018-02-16 | End: 2018-03-08 | Stop reason: SDUPTHER

## 2018-02-20 ENCOUNTER — TELEPHONE (OUTPATIENT)
Dept: FAMILY MEDICINE CLINIC | Facility: CLINIC | Age: 43
End: 2018-02-20

## 2018-02-20 ENCOUNTER — TELEPHONE (OUTPATIENT)
Dept: OBGYN CLINIC | Facility: HOSPITAL | Age: 43
End: 2018-02-20

## 2018-02-20 NOTE — TELEPHONE ENCOUNTER
Dr Olga Macias office - Sandra Guzman fx ORIF 1/181/18    Lilly Sample from PT [facility] called to let Dr Magui Ruiz know that patient is having swelling to the R foot with numbness  Between 2 toes  Advised this is normal    Advised ice and elevation, maintain NWB  Patient has appt on 3/2  Please advise if okay to monitor

## 2018-02-23 ENCOUNTER — TELEPHONE (OUTPATIENT)
Dept: OBGYN CLINIC | Facility: HOSPITAL | Age: 43
End: 2018-02-23

## 2018-02-23 NOTE — TELEPHONE ENCOUNTER
Caller: [facility]  call back number: [phone]  Patient's doctor: Dr Smitha Hill is asking to extend the PT orders   She needs a verbal

## 2018-03-02 ENCOUNTER — TELEPHONE (OUTPATIENT)
Dept: OBGYN CLINIC | Facility: CLINIC | Age: 43
End: 2018-03-02
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
## 2018-03-02 NOTE — TELEPHONE ENCOUNTER
Please continue physical therapy on this patient's behalf  Please call the patient and Syo McComb  and inform them of above    Thank you

## 2018-03-08 ENCOUNTER — TELEPHONE (OUTPATIENT)
Dept: OBGYN CLINIC | Facility: HOSPITAL | Age: 43
End: 2018-03-08

## 2018-03-08 DIAGNOSIS — Z48.89 AFTERCARE FOLLOWING SURGERY FOR INJURY OR TRAUMA: ICD-10-CM

## 2018-03-08 RX ORDER — OXYCODONE HYDROCHLORIDE 5 MG/1
5 TABLET ORAL EVERY 6 HOURS PRN
Qty: 60 TABLET | Refills: 0 | Status: SHIPPED | OUTPATIENT
Start: 2018-03-08 | End: 2019-07-26

## 2018-03-13 ENCOUNTER — HOSPITAL ENCOUNTER (OUTPATIENT)
Dept: RADIOLOGY | Facility: HOSPITAL | Age: 43
Discharge: HOME | End: 2018-03-13
Attending: ORTHOPAEDIC SURGERY
Payer: COMMERCIAL

## 2018-03-13 ENCOUNTER — OFFICE VISIT (OUTPATIENT)
Dept: OBGYN CLINIC | Facility: HOSPITAL | Age: 43
End: 2018-03-13

## 2018-03-13 VITALS
HEART RATE: 79 BPM | DIASTOLIC BLOOD PRESSURE: 55 MMHG | BODY MASS INDEX: 20.4 KG/M2 | SYSTOLIC BLOOD PRESSURE: 112 MMHG | HEIGHT: 67 IN | WEIGHT: 130 LBS

## 2018-03-13 DIAGNOSIS — Z48.89 AFTERCARE FOLLOWING SURGERY: ICD-10-CM

## 2018-03-13 DIAGNOSIS — Z48.89 AFTERCARE FOLLOWING SURGERY: Primary | ICD-10-CM

## 2018-03-13 PROBLEM — S82.241D CLOSED DISPLACED SPIRAL FRACTURE OF SHAFT OF RIGHT TIBIA WITH ROUTINE HEALING: Status: ACTIVE | Noted: 2018-03-13

## 2018-03-13 PROCEDURE — 73590 X-RAY EXAM OF LOWER LEG: CPT

## 2018-03-13 PROCEDURE — 99024 POSTOP FOLLOW-UP VISIT: CPT | Performed by: ORTHOPAEDIC SURGERY

## 2018-03-13 NOTE — PROGRESS NOTES
40 y o female presents for 2 months postoperative visit status post right tibial plateau fx fixation  She is doing well  She's having some mild burning sensation occasionally into her ankle  Her pain is well-controlled except for at night  He denies calf pain   She is receiving home PT she's been nonweightbearing    Review of Systems  Review of systems negative unless otherwise specified in HPI    Past Medical History  Past Medical History:   Diagnosis Date    Anemia     Edema     Fatigue     Vitamin B12 deficiency     Vitamin D deficiency        Past Surgical History  Past Surgical History:   Procedure Laterality Date    KNEE SURGERY      ORIF TIBIA FRACTURE Right 1/18/2018    Procedure: OPEN REDUCTION W/ INTERNAL FIXATION (ORIF) TIBIA;  Surgeon: uLigi Tobar MD;  [facility];  Service: Orthopedics    ORIF TIBIAL PLATEAU Right 5/67/6137    Procedure: OPEN REDUCTION W/ INTERNAL FIXATION (ORIF) TIBIAL PLATEAU;  Surgeon: Luigi Tobar MD;  [facility];  Service: Orthopedics       Current Medications  Current Outpatient Prescriptions on File Prior to Visit   Medication Sig Dispense Refill    acetaminophen (TYLENOL) 325 mg tablet 650 mg every 6 hours for mild pain 30 tablet 0    docusate sodium (COLACE) 100 mg capsule Take 1 capsule (100 mg total) by mouth 2 (two) times a day 30 capsule 1    ibuprofen (MOTRIN) 200 mg tablet Take by mouth every 6 (six) hours as needed for mild pain      oxyCODONE (ROXICODONE) 5 mg immediate release tablet Take 1 tablet (5 mg total) by mouth every 6 (six) hours as needed for severe pain Max Daily Amount: 20 mg 60 tablet 0    senna (SENOKOT) 8 6 mg Take 1 tablet by mouth 2 (two) times a day 120 each 0    enoxaparin (LOVENOX) 40 mg/0 4 mL Inject 0 4 mL under the skin daily in the early morning for 30 days 11 2 mL 0    naproxen (NAPROSYN) 375 mg tablet Take 1 tablet (375 mg total) by mouth 3 (three) times a day with meals for 60 doses 90 tablet 0     No current facility-administered medications on file prior to visit  Recent Labs [facility])    0  Lab Value Date/Time   HCT 31 4 (L) 2018 1238   HCT 43 6 10/17/2015 1109   HGB 10 4 (L) 2018 1238   HGB 14 4 10/17/2015 1109   WBC 8 90 2018 0430   WBC 4 24 (L) 10/17/2015 1109   INR 1 12 2018 0430   GLUCOSE 121 2018 0430   GLUCOSE 121 2018 2311   GLUCOSE 85 10/17/2015 1109         Physical exam  · General: Awake, Alert, Oriented  · Eyes: Pupils equal, round and reactive to light  · Heart: regular rate and rhythm  · Lungs: No audible wheezing  · Abdomen: soft  right Knee exam  · Right lower leg surgical incisions are well-healed without signs of infection  Calf is soft and nontender with expected atrophy present  Imaging  4 views right tibia were obtained here today and show stable fixation status post ORIF of the tibial shaft and proximal tibia fracture with continued healing  Procedure  none    Assessment:   40 y  o female 2 months Status post right tibia plateau fracture fixation     Plan  · Weight Bearin% wt bearing  · Physical therapy: home PT for knee and ankle ROM  · Analgesics: no RX today   · DVT ppx: none  · Xray next visit  · Follow-up 4 weeks     Patient was seen, examined and plan formulated by Dr Morris Escobar

## 2018-03-16 ENCOUNTER — TELEPHONE (OUTPATIENT)
Dept: OBGYN CLINIC | Facility: HOSPITAL | Age: 43
End: 2018-03-16

## 2018-03-16 DIAGNOSIS — Z48.89 AFTERCARE FOLLOWING SURGERY: Primary | ICD-10-CM

## 2018-03-16 NOTE — TELEPHONE ENCOUNTER
This patient absolutely can attend outpatient therapy  Physical therapy should work on restoring right knee and right ankle motion  In addition the can work on strengthening  Any time she is walking she must have her Cam walker boot on her right lower extremity  Please call the patient and inform of above   Thank you

## 2018-03-16 NOTE — TELEPHONE ENCOUNTER
[facility]   [phone]  Patient sees Dr Fide Cuevas is calling  She is getting ready to discharge patient from home physical therapy  She would like to know if patient could start out patient physical therapy next week  If this is ok, could we put a referral in the patient's chart  Please let Sharad Sender know how we can proceed

## 2018-03-16 NOTE — TELEPHONE ENCOUNTER
I spoke to Tamara Bond & she will assist the patient in setting this up  Patient will be going to a [facility]  Tamara Bond asked if a referral to out patient PT could be put in patient's chart with this information possibly included on the referral?  Please advise

## 2018-03-18 ENCOUNTER — TELEPHONE (OUTPATIENT)
Dept: OTHER | Facility: HOSPITAL | Age: 43
End: 2018-03-18

## 2018-03-18 NOTE — TELEPHONE ENCOUNTER
Patient called answering service  Recently allowed to place weight-bearing to a partial degree on right lower extremity  Has mild swelling to the right leg after prolonged weight-bearing  Patient admits she is no longer elevating leg  Denies any other significant problems  Advised ice, elevation, and if the swelling worsens or any other problems arise, repeat phone call  Patient voices understanding

## 2018-03-19 ENCOUNTER — OFFICE VISIT (OUTPATIENT)
Dept: CARDIOLOGY CLINIC | Facility: CLINIC | Age: 43
End: 2018-03-19
Payer: COMMERCIAL

## 2018-03-19 VITALS
OXYGEN SATURATION: 98 % | SYSTOLIC BLOOD PRESSURE: 112 MMHG | BODY MASS INDEX: 20.4 KG/M2 | WEIGHT: 130 LBS | DIASTOLIC BLOOD PRESSURE: 72 MMHG | HEART RATE: 78 BPM | HEIGHT: 67 IN

## 2018-03-19 DIAGNOSIS — I49.8 VENTRICULAR BIGEMINY: Primary | ICD-10-CM

## 2018-03-19 PROCEDURE — 93000 ELECTROCARDIOGRAM COMPLETE: CPT | Performed by: INTERNAL MEDICINE

## 2018-03-19 PROCEDURE — 99204 OFFICE O/P NEW MOD 45 MIN: CPT | Performed by: INTERNAL MEDICINE
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
## 2018-03-20 ENCOUNTER — HOSPITAL ENCOUNTER (OUTPATIENT)
Dept: NON INVASIVE DIAGNOSTICS | Facility: HOSPITAL | Age: 43
Discharge: HOME | End: 2018-03-20
Attending: INTERNAL MEDICINE
Payer: COMMERCIAL

## 2018-03-20 ENCOUNTER — OFFICE VISIT (OUTPATIENT)
Dept: OBGYN CLINIC | Facility: HOSPITAL | Age: 43
End: 2018-03-20

## 2018-03-20 VITALS
WEIGHT: 130.07 LBS | DIASTOLIC BLOOD PRESSURE: 85 MMHG | HEIGHT: 66 IN | HEART RATE: 76 BPM | BODY MASS INDEX: 20.9 KG/M2 | SYSTOLIC BLOOD PRESSURE: 132 MMHG

## 2018-03-20 DIAGNOSIS — Z48.89 AFTERCARE FOLLOWING SURGERY: Primary | ICD-10-CM

## 2018-03-20 DIAGNOSIS — I49.8 VENTRICULAR BIGEMINY: ICD-10-CM

## 2018-03-20 PROCEDURE — 93225 XTRNL ECG REC<48 HRS REC: CPT

## 2018-03-20 PROCEDURE — 99024 POSTOP FOLLOW-UP VISIT: CPT | Performed by: ORTHOPAEDIC SURGERY

## 2018-03-20 PROCEDURE — 93226 XTRNL ECG REC<48 HR SCAN A/R: CPT

## 2018-03-20 NOTE — PROGRESS NOTES
Assessment:       1  Aftercare following surgery          Plan:        - continue partial weight bearing right lower extremity with CAM walker  - transition to outpatient PT  - follow up as planned in 3 weeks            Subjective:     Patient ID: Maren Vincent is a 40 y o  female  Chief Complaint:    2 months s/p ORIF R tibial plateau  She was advanced to partial weight bearing and transition to outpatient PT at prior visit  She presents today for repeat evaluation after episode of knee swelling after partial weight-bearing last week which is now resolved  No other major issues  Social History     Occupational History    Teacher in [place]      Social History Main Topics    Smoking status: Never Smoker    Smokeless tobacco: Never Used    Alcohol use No      Comment: social drinker as per allscripts    Drug use: No    Sexual activity: Not on file      Review of Systems   Constitutional: Negative  Negative for chills and fever  HENT: Negative  Respiratory: Negative  Negative for shortness of breath and wheezing  Cardiovascular: Negative  Negative for chest pain and palpitations  Gastrointestinal: Negative  Negative for diarrhea, nausea and vomiting  Endocrine: Negative  Genitourinary: Negative  Skin: Negative  Neurological: Negative  Hematological: Negative  Psychiatric/Behavioral: Negative  Objective:     Right Knee Exam     Tenderness   The patient is experiencing tenderness in the lateral joint line  Other   Erythema: absent  Sensation: normal  Pulse: present  Swelling: none    Comments:  Incisions well healing, c/d/i  Appropriate ROM postop for knee and ankle            Physical Exam   Constitutional: She is oriented to person, place, and time  She appears well-developed and well-nourished  HENT:   Head: Normocephalic and atraumatic  Neck: Normal range of motion  Cardiovascular: Normal rate and intact distal pulses  Pulmonary/Chest: Effort normal  She has no wheezes  Abdominal: Soft  She exhibits no distension  Neurological: She is alert and oriented to person, place, and time  Skin: Skin is warm and dry  Psychiatric: She has a normal mood and affect           No pertinent imaging to be reviewed

## 2018-03-26 ENCOUNTER — EVALUATION (OUTPATIENT)
Dept: PHYSICAL THERAPY | Facility: CLINIC | Age: 43
End: 2018-03-26
Payer: COMMERCIAL

## 2018-03-26 DIAGNOSIS — S82.101D CLOSED FRACTURE OF PROXIMAL END OF RIGHT TIBIA WITH ROUTINE HEALING, UNSPECIFIED FRACTURE MORPHOLOGY, SUBSEQUENT ENCOUNTER: ICD-10-CM

## 2018-03-26 DIAGNOSIS — M17.10 OSTEOARTHRITIS, LOCALIZED, KNEE: ICD-10-CM

## 2018-03-26 DIAGNOSIS — Z48.89 AFTERCARE FOLLOWING SURGERY: ICD-10-CM

## 2018-03-26 DIAGNOSIS — S82.241D CLOSED DISPLACED SPIRAL FRACTURE OF SHAFT OF RIGHT TIBIA WITH ROUTINE HEALING: Primary | ICD-10-CM

## 2018-03-26 DIAGNOSIS — S82.831D CLOSED FRACTURE OF HEAD OF RIGHT FIBULA WITH ROUTINE HEALING, SUBSEQUENT ENCOUNTER: ICD-10-CM

## 2018-03-26 PROCEDURE — 97162 PT EVAL MOD COMPLEX 30 MIN: CPT

## 2018-03-26 PROCEDURE — G8978 MOBILITY CURRENT STATUS: HCPCS

## 2018-03-26 PROCEDURE — G8979 MOBILITY GOAL STATUS: HCPCS
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
## 2018-03-27 ENCOUNTER — OFFICE VISIT (OUTPATIENT)
Dept: INTERNAL MEDICINE CLINIC | Facility: CLINIC | Age: 43
End: 2018-03-27
Payer: COMMERCIAL

## 2018-03-27 VITALS
TEMPERATURE: 98 F | WEIGHT: 144 LBS | OXYGEN SATURATION: 98 % | DIASTOLIC BLOOD PRESSURE: 82 MMHG | HEIGHT: 67 IN | SYSTOLIC BLOOD PRESSURE: 122 MMHG | BODY MASS INDEX: 22.6 KG/M2 | RESPIRATION RATE: 18 BRPM | HEART RATE: 96 BPM

## 2018-03-27 DIAGNOSIS — I49.3 ASYMPTOMATIC PVCS: ICD-10-CM

## 2018-03-27 DIAGNOSIS — E55.9 VITAMIN D DEFICIENCY: ICD-10-CM

## 2018-03-27 DIAGNOSIS — Z00.00 HEALTH MAINTENANCE EXAMINATION: Primary | ICD-10-CM

## 2018-03-27 DIAGNOSIS — E53.8 VITAMIN B12 DEFICIENCY: ICD-10-CM

## 2018-03-27 DIAGNOSIS — D64.89 ANEMIA DUE TO OTHER CAUSE, NOT CLASSIFIED: ICD-10-CM

## 2018-03-27 PROCEDURE — 99396 PREV VISIT EST AGE 40-64: CPT | Performed by: INTERNAL MEDICINE
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
## 2018-03-27 NOTE — ASSESSMENT & PLAN NOTE
Reviewed Holter results: frequent PVCs  She remains asymptomatic  Normal echo  Discussed symptoms to warrant ER evaluation    Recommend starting medication to decrease burden, she is waiting to hear from her cardiologist

## 2018-03-28 PROCEDURE — 93227 XTRNL ECG REC<48 HR R&I: CPT | Performed by: INTERNAL MEDICINE

## 2018-03-29 ENCOUNTER — APPOINTMENT (OUTPATIENT)
Dept: LAB | Facility: CLINIC | Age: 43
End: 2018-03-29
Payer: COMMERCIAL

## 2018-03-29 ENCOUNTER — TELEPHONE (OUTPATIENT)
Dept: CARDIOLOGY CLINIC | Facility: CLINIC | Age: 43
End: 2018-03-29

## 2018-03-29 ENCOUNTER — OFFICE VISIT (OUTPATIENT)
Dept: PHYSICAL THERAPY | Facility: CLINIC | Age: 43
End: 2018-03-29
Payer: COMMERCIAL

## 2018-03-29 DIAGNOSIS — Z48.89 AFTERCARE FOLLOWING SURGERY: Primary | ICD-10-CM

## 2018-03-29 DIAGNOSIS — S82.241D CLOSED DISPLACED SPIRAL FRACTURE OF SHAFT OF RIGHT TIBIA WITH ROUTINE HEALING: ICD-10-CM

## 2018-03-29 LAB
25(OH)D3 SERPL-MCNC: 8.9 NG/ML (ref 30–100)
ALBUMIN SERPL BCP-MCNC: 3.5 G/DL (ref 3.5–5)
ALP SERPL-CCNC: 93 U/L (ref 46–116)
ALT SERPL W P-5'-P-CCNC: 15 U/L (ref 12–78)
ANION GAP SERPL CALCULATED.3IONS-SCNC: 3 MMOL/L (ref 4–13)
AST SERPL W P-5'-P-CCNC: 12 U/L (ref 5–45)
BASOPHILS # BLD AUTO: 0.02 THOUSANDS/ΜL (ref 0–0.1)
BASOPHILS NFR BLD AUTO: 0 % (ref 0–1)
BILIRUB SERPL-MCNC: 0.46 MG/DL (ref 0.2–1)
BUN SERPL-MCNC: 8 MG/DL (ref 5–25)
CALCIUM SERPL-MCNC: 9.1 MG/DL (ref 8.3–10.1)
CHLORIDE SERPL-SCNC: 106 MMOL/L (ref 100–108)
CHOLEST SERPL-MCNC: 140 MG/DL (ref 50–200)
CO2 SERPL-SCNC: 28 MMOL/L (ref 21–32)
CREAT SERPL-MCNC: 0.66 MG/DL (ref 0.6–1.3)
EOSINOPHIL # BLD AUTO: 0.08 THOUSAND/ΜL (ref 0–0.61)
EOSINOPHIL NFR BLD AUTO: 1 % (ref 0–6)
ERYTHROCYTE [DISTWIDTH] IN BLOOD BY AUTOMATED COUNT: 13.5 % (ref 11.6–15.1)
GFR SERPL CREATININE-BSD FRML MDRD: 127 ML/MIN/1.73SQ M
GLUCOSE P FAST SERPL-MCNC: 81 MG/DL (ref 65–99)
HCT VFR BLD AUTO: 39.6 % (ref 34.8–46.1)
HDLC SERPL-MCNC: 54 MG/DL (ref 40–60)
HGB BLD-MCNC: 12.9 G/DL (ref 11.5–15.4)
LDLC SERPL CALC-MCNC: 75 MG/DL (ref 0–100)
LYMPHOCYTES # BLD AUTO: 2.18 THOUSANDS/ΜL (ref 0.6–4.47)
LYMPHOCYTES NFR BLD AUTO: 38 % (ref 14–44)
MCH RBC QN AUTO: 28.5 PG (ref 26.8–34.3)
MCHC RBC AUTO-ENTMCNC: 32.6 G/DL (ref 31.4–37.4)
MCV RBC AUTO: 87 FL (ref 82–98)
MONOCYTES # BLD AUTO: 0.44 THOUSAND/ΜL (ref 0.17–1.22)
MONOCYTES NFR BLD AUTO: 8 % (ref 4–12)
NEUTROPHILS # BLD AUTO: 2.97 THOUSANDS/ΜL (ref 1.85–7.62)
NEUTS SEG NFR BLD AUTO: 53 % (ref 43–75)
NRBC BLD AUTO-RTO: 0 /100 WBCS
PLATELET # BLD AUTO: 238 THOUSANDS/UL (ref 149–390)
PMV BLD AUTO: 12.5 FL (ref 8.9–12.7)
POTASSIUM SERPL-SCNC: 3.9 MMOL/L (ref 3.5–5.3)
PROT SERPL-MCNC: 7.8 G/DL (ref 6.4–8.2)
RBC # BLD AUTO: 4.53 MILLION/UL (ref 3.81–5.12)
SODIUM SERPL-SCNC: 137 MMOL/L (ref 136–145)
TRIGL SERPL-MCNC: 57 MG/DL
TSH SERPL DL<=0.05 MIU/L-ACNC: 0.99 UIU/ML (ref 0.36–3.74)
VIT B12 SERPL-MCNC: 150 PG/ML (ref 100–900)
WBC # BLD AUTO: 5.7 THOUSAND/UL (ref 4.31–10.16)

## 2018-03-29 PROCEDURE — 97112 NEUROMUSCULAR REEDUCATION: CPT

## 2018-03-29 PROCEDURE — 82306 VITAMIN D 25 HYDROXY: CPT | Performed by: INTERNAL MEDICINE

## 2018-03-29 PROCEDURE — 97110 THERAPEUTIC EXERCISES: CPT

## 2018-03-29 PROCEDURE — 97140 MANUAL THERAPY 1/> REGIONS: CPT

## 2018-03-29 PROCEDURE — 36415 COLL VENOUS BLD VENIPUNCTURE: CPT | Performed by: INTERNAL MEDICINE

## 2018-03-29 PROCEDURE — 84443 ASSAY THYROID STIM HORMONE: CPT | Performed by: INTERNAL MEDICINE

## 2018-03-29 PROCEDURE — 80053 COMPREHEN METABOLIC PANEL: CPT | Performed by: INTERNAL MEDICINE

## 2018-03-29 PROCEDURE — 82607 VITAMIN B-12: CPT | Performed by: INTERNAL MEDICINE

## 2018-03-29 PROCEDURE — 80061 LIPID PANEL: CPT | Performed by: INTERNAL MEDICINE

## 2018-03-29 PROCEDURE — 85025 COMPLETE CBC W/AUTO DIFF WBC: CPT | Performed by: INTERNAL MEDICINE

## 2018-03-29 NOTE — PROGRESS NOTES
Daily Note     Today's date: 3/29/2018  Patient name: Maren Vincent  : 1975  MRN: [id number]  Referring provider: Placido Alcantar MD  Dx:   Encounter Diagnosis     ICD-10-CM    1  Aftercare following surgery Z48 89    2  Closed displaced spiral fracture of shaft of right tibia with routine healing S82 230D                   Subjective: Patient reports pain is 5/10  She reports having more difficulty straightening the leg in standing and bending it supine  Objective: See treatment diary below    Precautions: N/A    Daily Treatment Diary     Manual  3/26 3/29           Dorsiflexion/ plantarflexion mobs  8 min           Soft tissue  5 min           Knee flexion/ ext mobs                                           Exercise Diary              Warm Up: Upright bike  5 min           Heel slides X 15 X 15           Supine Knee ext stretch 2 min 3 min; 4lb           Quad sets 15 x 3 sec 15 x 3 sec           SLR  X 5           Assess hip strength             Dorsiflexion/ PF stretch w/ OP             DF/Inv/ Eversion w/ TB             DF stretch seated  20 sec x 3           BAPS board Df/ pf, CW  15x each           Leg Press  22x 35#                                                                                                                                    Modalities              Cp/ H-wave                                             Assessment: Tolerated treatment  well  Patient demonstrated improved ROM in foot post manual therapy, reported symptom relief after soft tissue at knee  Improved flexion/ extension noted with various Te  Patient demonstrated fatigue post treatment and would benefit from continued PT      Plan: Continue per plan of care  Progress treatment as tolerated  Progress leg press NV

## 2018-03-30 DIAGNOSIS — E55.9 VITAMIN D DEFICIENCY: Primary | ICD-10-CM

## 2018-03-30 RX ORDER — ERGOCALCIFEROL 1.25 MG/1
50000 CAPSULE ORAL WEEKLY
Qty: 12 CAPSULE | Refills: 0 | Status: SHIPPED | OUTPATIENT
Start: 2018-03-30 | End: 2019-03-15 | Stop reason: ALTCHOICE

## 2018-03-30 RX ORDER — CYANOCOBALAMIN 1000 UG/ML
1000 INJECTION INTRAMUSCULAR; SUBCUTANEOUS
Qty: 10 ML | Refills: 0 | Status: SHIPPED | OUTPATIENT
Start: 2018-03-30 | End: 2019-04-15 | Stop reason: SDUPTHER

## 2018-03-30 NOTE — TELEPHONE ENCOUNTER
Vitamin D very low, need to start weekly replacement  Sent to pharmacy  When completed, need to take vitamin D3 2000 units daily  Vitamin B12 is very very low  Recommend to start monthly B12 injections  Can give this any time, can schedule nurse visit      Rest of labs including cholesterol, thyroid are normal

## 2018-03-30 NOTE — TELEPHONE ENCOUNTER
Notified patient, Patient stated in the past she use to give herself the b12 injection, is that an option for her again? Please advise  If not, she will schedule nurse visit

## 2018-04-02 ENCOUNTER — APPOINTMENT (OUTPATIENT)
Dept: PHYSICAL THERAPY | Facility: CLINIC | Age: 43
End: 2018-04-02
Payer: COMMERCIAL

## 2018-04-05 ENCOUNTER — OFFICE VISIT (OUTPATIENT)
Dept: PHYSICAL THERAPY | Facility: CLINIC | Age: 43
End: 2018-04-05
Payer: COMMERCIAL

## 2018-04-05 DIAGNOSIS — S82.241D CLOSED DISPLACED SPIRAL FRACTURE OF SHAFT OF RIGHT TIBIA WITH ROUTINE HEALING: ICD-10-CM

## 2018-04-05 DIAGNOSIS — Z48.89 AFTERCARE FOLLOWING SURGERY: Primary | ICD-10-CM

## 2018-04-05 DIAGNOSIS — M17.10 OSTEOARTHRITIS, LOCALIZED, KNEE: ICD-10-CM

## 2018-04-05 DIAGNOSIS — S82.831D CLOSED FRACTURE OF HEAD OF RIGHT FIBULA WITH ROUTINE HEALING, SUBSEQUENT ENCOUNTER: ICD-10-CM

## 2018-04-05 DIAGNOSIS — S82.101D CLOSED FRACTURE OF PROXIMAL END OF RIGHT TIBIA WITH ROUTINE HEALING, UNSPECIFIED FRACTURE MORPHOLOGY, SUBSEQUENT ENCOUNTER: ICD-10-CM

## 2018-04-05 PROCEDURE — 97140 MANUAL THERAPY 1/> REGIONS: CPT | Performed by: PHYSICAL THERAPIST

## 2018-04-05 PROCEDURE — 97110 THERAPEUTIC EXERCISES: CPT | Performed by: PHYSICAL THERAPIST

## 2018-04-05 PROCEDURE — 97112 NEUROMUSCULAR REEDUCATION: CPT | Performed by: PHYSICAL THERAPIST

## 2018-04-05 NOTE — PROGRESS NOTES
Daily Note     Today's date: 2018  Patient name: Lizz Castillo  : 1975  MRN: [id number]  Referring provider: Aileen Garrido MD  Dx:   Encounter Diagnosis     ICD-10-CM    1  Aftercare following surgery Z48 89    2  Closed displaced spiral fracture of shaft of right tibia with routine healing S82 241D    3  Closed fracture of head of right fibula with routine healing, subsequent encounter S82 831D    4  Closed fracture of proximal end of right tibia with routine healing, unspecified fracture morphology, subsequent encounter S82 101D    5   Osteoarthritis, localized, knee M17 10        Start Time: 1032  Stop Time: 1119  Total time in clinic (min): 47 minutes    Subjective: Patient notes she is feeling sore from constant weather changes over the past 2 weeks      Objective: See treatment diary below     Manual  3/26 3/29  4/5                 Dorsiflexion/ plantarflexion mobs   8 min  6'                 Soft tissue   5 min  3'                 Knee flexion/ ext mobs      3'                                                                       Exercise Diary                        Warm Up: Upright bike   5 min  5'                 Heel slides X 15 X 15  20 x5"                 Supine Knee ext stretch 2 min 3 min; 4lb  3'   4 lbs                 Quad sets 15 x 3 sec 15 x 3 sec  15 x10"                 SLR   X 5  5x                 Assess hip strength                       Dorsiflexion/ PF stretch w/ OP                       DF/Inv/ Eversion w/ TB                       DF stretch seated   20 sec x 3  20" x3                 BAPS board Df/ pf, CW   15x each  20x each                 Leg Press, single leg   22x 35#  35# 3x10                  s/l R hip ABD      10x                                                                                                                                                                                                                       Modalities                      Cp/ H-wave                                                                              Assessment: Tolerated treatment well  Patient would benefit from continued PT  Patient had improved knee and ankle mobility post manuals  Patient was progressed with exercise program overall with good tolerance  Plan: Continue per plan of care

## 2018-04-09 ENCOUNTER — OFFICE VISIT (OUTPATIENT)
Dept: PHYSICAL THERAPY | Facility: CLINIC | Age: 43
End: 2018-04-09
Payer: COMMERCIAL

## 2018-04-09 DIAGNOSIS — S82.241D CLOSED DISPLACED SPIRAL FRACTURE OF SHAFT OF RIGHT TIBIA WITH ROUTINE HEALING: ICD-10-CM

## 2018-04-09 DIAGNOSIS — Z48.89 AFTERCARE FOLLOWING SURGERY: Primary | ICD-10-CM

## 2018-04-09 PROCEDURE — 97110 THERAPEUTIC EXERCISES: CPT

## 2018-04-09 PROCEDURE — 97140 MANUAL THERAPY 1/> REGIONS: CPT

## 2018-04-09 PROCEDURE — 97112 NEUROMUSCULAR REEDUCATION: CPT

## 2018-04-09 NOTE — PROGRESS NOTES
Daily Note     Today's date: 2018  Patient name: Laney Libman  : 1975  MRN: [id number]  Referring provider: Danica Sanchez MD  Dx:   Encounter Diagnosis     ICD-10-CM    1  Aftercare following surgery Z48 89    2  Closed displaced spiral fracture of shaft of right tibia with routine healing S82 909D                   Subjective: Patient reports pain levels today are 4/10  Objective: See treatment diary below       Manual  3/26 3/29  4/5  4/9               Dorsiflexion/ plantarflexion mobs   8 min  6'  5'               Soft tissue   5 min  3'                 Knee flexion/ ext mobs      3'  3'                                                                     Exercise Diary                        Warm Up: Upright bike   5 min  5'  5'               Heel slides X 15 X 15  20 x5"  10 x 10"               Supine Knee ext stretch 2 min 3 min; 4lb  3'   4 lbs  3' 4lb               Quad sets 15 x 3 sec 15 x 3 sec  15 x10"  15 x 10"               SLR   X 5  5x  x 15               Prone knee extension stretch        3 min 1lb               TKE                       DF/Inv/ Eversion w/ TB                       DF stretch seated   20 sec x 3  20" x3  20" x 5               BAPS board Df/ pf, CW   15x each  20x each  20x each               Leg Press, single leg   22x 35#  35# 3x10  35# 3 x 10                s/l R hip ABD      10x  15x                                                                                                                                                                                                                     Modalities                        CP for knee/ tibia        10 min CP                                                                            Assessment: Tolerated treatment well  Patient tolerated addition of prone knee extension stretch well  Given handout to continue at home    Patient demonstrated fatigue post treatment and would benefit from continued PT      Plan: Assess foot ROM  Monitor quad set and consider NMES  Continue per plan of care  Progress treatment as tolerated

## 2018-04-12 ENCOUNTER — OFFICE VISIT (OUTPATIENT)
Dept: PHYSICAL THERAPY | Facility: CLINIC | Age: 43
End: 2018-04-12
Payer: COMMERCIAL

## 2018-04-12 DIAGNOSIS — S82.831D CLOSED FRACTURE OF HEAD OF RIGHT FIBULA WITH ROUTINE HEALING, SUBSEQUENT ENCOUNTER: ICD-10-CM

## 2018-04-12 DIAGNOSIS — Z48.89 AFTERCARE FOLLOWING SURGERY: Primary | ICD-10-CM

## 2018-04-12 DIAGNOSIS — S82.241D CLOSED DISPLACED SPIRAL FRACTURE OF SHAFT OF RIGHT TIBIA WITH ROUTINE HEALING: ICD-10-CM

## 2018-04-12 PROCEDURE — 97112 NEUROMUSCULAR REEDUCATION: CPT

## 2018-04-12 PROCEDURE — 97140 MANUAL THERAPY 1/> REGIONS: CPT

## 2018-04-12 PROCEDURE — 97110 THERAPEUTIC EXERCISES: CPT

## 2018-04-12 NOTE — PROGRESS NOTES
Daily Note     Today's date: 2018  Patient name: Leatha Stevens  : 1975  MRN: [id number]  Referring provider: Luis Felipe Martínez MD  Dx:   Encounter Diagnosis     ICD-10-CM    1  Aftercare following surgery Z48 89    2  Closed displaced spiral fracture of shaft of right tibia with routine healing S82 368D    3  Closed fracture of head of right fibula with routine healing, subsequent encounter S82 831D                   Subjective: Patient reports pain is 6/10 in knee  She reports increased swelling with increased activity yesterday        Objective: See treatment diary below         Manual  3/26 3/29  4/5  4/9  4/12             Dorsiflexion/ plantarflexion mobs   8 min  6'  5'               Soft tissue   5 min  3'    5'            Knee flexion/ ext mobs      3'  3'  3'                                                                   Exercise Diary                      Warm Up: Upright bike   5 min  5'  5' 5'             Heel slides X 15 X 15  20 x5"  10 x 10"  10 x 10"             Supine Knee ext stretch 2 min 3 min; 4lb  3'   4 lbs  3' 4lb  3' 4lb             Quad sets 15 x 3 sec 15 x 3 sec  15 x10"  15 x 10"  15 x 5"             SLR   X 5  5x  x 15  3 x 5             Prone knee extension stretch        3 min 1lb  3 min 1lb             TKE Standing          x 15             DF/Inv/ Eversion w/ TB                       DF stretch seated   20 sec x 3  20" x3  20" x 5  20" x 5             BAPS board Df/ pf, CW   15x each  20x each  20x each  NP             Leg Press, single leg   22x 35#  35# 3x10  35# 3 x 10  40#  2 x 10              s/l R hip ABD      10x  15x  20x              DF Stretch supine          10 x 10'                                                                                                                                                                                           Modalities                        CP for knee/ tibia        10 min CP                                                                        Assessment: Improved range and improve pain levels noted with manual and Te  Patient still has difficulty with SLR, quad set, and TKE  Tolerated treatment  well  Patient demonstrated fatigue post treatment and would benefit from continued PT      Plan: Consider NMES for improved quad activation  Continue per plan of care  Progress treatment as tolerated

## 2018-04-13 ENCOUNTER — OFFICE VISIT (OUTPATIENT)
Dept: OBGYN CLINIC | Facility: MEDICAL CENTER | Age: 43
End: 2018-04-13

## 2018-04-13 ENCOUNTER — APPOINTMENT (OUTPATIENT)
Dept: RADIOLOGY | Facility: MEDICAL CENTER | Age: 43
End: 2018-04-13
Payer: COMMERCIAL

## 2018-04-13 VITALS — SYSTOLIC BLOOD PRESSURE: 113 MMHG | DIASTOLIC BLOOD PRESSURE: 78 MMHG | HEART RATE: 112 BPM

## 2018-04-13 DIAGNOSIS — Z09 FRACTURE FOLLOW-UP: ICD-10-CM

## 2018-04-13 PROCEDURE — 99024 POSTOP FOLLOW-UP VISIT: CPT | Performed by: ORTHOPAEDIC SURGERY

## 2018-04-13 PROCEDURE — 73590 X-RAY EXAM OF LOWER LEG: CPT

## 2018-04-13 RX ORDER — NAPROXEN 375 MG/1
250 TABLET ORAL 2 TIMES DAILY WITH MEALS
COMMUNITY
End: 2020-02-07 | Stop reason: ALTCHOICE

## 2018-04-13 NOTE — PROGRESS NOTES
40 y o female presents for 3 months postoperative visit status post right tibial plateau fx fixation  Patient doing well  Continues to be partial weight-bearing on right leg  Notes that her pain is drastically improved since previous visit  However she does note that she continues to have moderate swelling in the right leg    No other complaints at this time    Review of Systems  Review of systems negative unless otherwise specified in HPI    Past Medical History  Past Medical History:   Diagnosis Date    Anemia     Edema     Fatigue     Vitamin B12 deficiency     Vitamin D deficiency        Past Surgical History  Past Surgical History:   Procedure Laterality Date    KNEE ARTHROSCOPY Bilateral     KNEE SURGERY      ORIF TIBIA FRACTURE Right 1/18/2018    Procedure: OPEN REDUCTION W/ INTERNAL FIXATION (ORIF) TIBIA;  Surgeon: Erasmo Del Real MD;  Location: [facility];  Service: Orthopedics    ORIF TIBIAL PLATEAU Right 6/27/0469    Procedure: OPEN REDUCTION W/ INTERNAL FIXATION (ORIF) TIBIAL PLATEAU;  Surgeon: Erasmo Del Real MD;  Location: [facility];  Service: Orthopedics    TUBAL LIGATION         Current Medications  Current Outpatient Prescriptions on File Prior to Visit   Medication Sig Dispense Refill    acetaminophen (TYLENOL) 325 mg tablet 650 mg every 6 hours for mild pain 30 tablet 0    cyanocobalamin 1,000 mcg/mL Inject 1 mL (1,000 mcg total) into the shoulder, thigh, or buttocks every 30 (thirty) days 10 mL 0    ergocalciferol (VITAMIN D2) 50,000 units Take 1 capsule (50,000 Units total) by mouth once a week 12 capsule 0    oxyCODONE (ROXICODONE) 5 mg immediate release tablet Take 1 tablet (5 mg total) by mouth every 6 (six) hours as needed for severe pain Max Daily Amount: 20 mg 60 tablet 0    SYRINGE-NEEDLE, DISP, 3 ML 25G X 1" 3 ML MISC by Does not apply route every 30 (thirty) days 50 each 0    [DISCONTINUED] ibuprofen (MOTRIN) 200 mg tablet Take by mouth every 6 (six) hours as needed for mild pain      [DISCONTINUED] naproxen (NAPROSYN) 375 mg tablet Take 1 tablet (375 mg total) by mouth 3 (three) times a day with meals for 60 doses 90 tablet 0     No current facility-administered medications on file prior to visit  Recent Labs [facility])    0  Lab Value Date/Time   HCT 39 6 03/29/2018 1002   HCT 43 6 10/17/2015 1109   HGB 12 9 03/29/2018 1002   HGB 14 4 10/17/2015 1109   WBC 5 70 03/29/2018 1002   WBC 4 24 (L) 10/17/2015 1109   INR 1 12 01/18/2018 0430   GLUCOSE 121 01/18/2018 0430   GLUCOSE 121 01/17/2018 2311   GLUCOSE 85 10/17/2015 1109         Physical exam  · General: Awake, Alert, Oriented  · Eyes: Pupils equal, round and reactive to light  · Heart: regular rate and rhythm  · Lungs: No audible wheezing  · Abdomen: soft  right Knee exam  · Incisions well-healing  · Extension 5° flexion 110°  · Stable to varus valgus stress  · Negative lock negative posterior drawer  · Sensation grossly intact the entire leg  · 4+ out of 5 strength knee flexion extension    Imaging  X-ray right leg shows well-positioned hardware well-healing fracture of the tibial shaft and tibial plateau    Procedure  None    Assessment:   40 y  o female 3 months status post surgical fixation of right ankle fracture fixation   Doing well    Plan  · Weight Bearing:  As tolerated  · Physical therapy:  Continue therapy sessions for stretching and strengthening right leg  · Analgesics:  As needed   · DVT ppx:  None  · Patient may slowly progress to weight-bearing as tolerated and may slowly decreased usage of Cam boot over the next several weeks  · Follow-up 4 weeks   ·

## 2018-04-16 ENCOUNTER — OFFICE VISIT (OUTPATIENT)
Dept: PHYSICAL THERAPY | Facility: CLINIC | Age: 43
End: 2018-04-16
Payer: COMMERCIAL

## 2018-04-16 DIAGNOSIS — Z48.89 AFTERCARE FOLLOWING SURGERY: Primary | ICD-10-CM

## 2018-04-16 DIAGNOSIS — S82.241D CLOSED DISPLACED SPIRAL FRACTURE OF SHAFT OF RIGHT TIBIA WITH ROUTINE HEALING: ICD-10-CM

## 2018-04-16 PROCEDURE — 97110 THERAPEUTIC EXERCISES: CPT

## 2018-04-16 PROCEDURE — 97010 HOT OR COLD PACKS THERAPY: CPT

## 2018-04-16 PROCEDURE — 97140 MANUAL THERAPY 1/> REGIONS: CPT

## 2018-04-16 PROCEDURE — 97112 NEUROMUSCULAR REEDUCATION: CPT

## 2018-04-16 NOTE — PROGRESS NOTES
Daily Note     Today's date: 2018  Patient name: Nava Ibarra  : 1975  MRN: [id number]  Referring provider: Sergio Pedroza MD  Dx:   Encounter Diagnosis     ICD-10-CM    1  Aftercare following surgery Z48 89    2  Closed displaced spiral fracture of shaft of right tibia with routine healing S82 568D                   Subjective: Patient is now partial weight bearing in her normal shoe  She reports upon returning to the doctor, needing to continue to work on extension range of motion  Patient reports coming in today pain levels are 2/10        Objective: See treatment diary below         Manual  3/26 3/29  4           Dorsiflexion/ plantarflexion mobs   8 min  6'  5'               Soft tissue   5 min  3'    5' 5 min           Knee flexion/ ext mobs      3'  3'  3'  5 min                                                                 Exercise Diary                      Warm Up: Upright bike   5 min  5'  5' 5'  5'           Heel slides X 15 X 15  20 x5"  10 x 10"  10 x 10"  10 x 5"           Supine Knee ext stretch 2 min 3 min; 4lb  3'   4 lbs  3' 4lb  3' 4lb            Quad sets 15 x 3 sec 15 x 3 sec  15 x10"  15 x 10"  15 x 5"  15 x 5"           SLR   X 5  5x  x 15  3 x 5             Prone knee extension stretch        3 min 1lb  3 min 1lb  4 min; 4lb           TKE Standing          x 15  15 x 3"           DF/Inv/ Eversion w/ TB                       DF stretch seated   20 sec x 3  20" x3  20" x 5  20" x 5  20" x 5           BAPS board Df/ pf, CW   15x each  20x each  20x each  NP  NP           Leg Press, single leg   22x 35#  35# 3x10  35# 3 x 10  40#  2 x 10  40# 3 x 10            s/l R hip ABD      10x  15x  20x  20x            DF Stretch supine          10 x 10'  10 x 10"           Knee extensionater stretch            3 min                                                                                                                                                                 Modalities         4/9               CP for knee/ tibia        10 min CP    8 min                                                                    Assessment: Tolerated treatment well  Improved knee extension ROM noted post stretching, improved performance noted with TKE  Patient reports less pain in posterior knee with manual today and more over tibial tuberosity  Pain levels improve with manual  Patient demonstrated fatigue post treatment, exhibited good technique with therapeutic exercises and would benefit from continued PT      Plan: Continue per plan of care  Progress treatment as tolerated

## 2018-04-19 ENCOUNTER — OFFICE VISIT (OUTPATIENT)
Dept: PHYSICAL THERAPY | Facility: CLINIC | Age: 43
End: 2018-04-19
Payer: COMMERCIAL

## 2018-04-19 DIAGNOSIS — S82.241D CLOSED DISPLACED SPIRAL FRACTURE OF SHAFT OF RIGHT TIBIA WITH ROUTINE HEALING: ICD-10-CM

## 2018-04-19 DIAGNOSIS — Z48.89 AFTERCARE FOLLOWING SURGERY: Primary | ICD-10-CM

## 2018-04-19 DIAGNOSIS — S82.831D CLOSED FRACTURE OF HEAD OF RIGHT FIBULA WITH ROUTINE HEALING, SUBSEQUENT ENCOUNTER: ICD-10-CM

## 2018-04-19 PROCEDURE — 97110 THERAPEUTIC EXERCISES: CPT

## 2018-04-19 PROCEDURE — 97140 MANUAL THERAPY 1/> REGIONS: CPT

## 2018-04-19 PROCEDURE — 97112 NEUROMUSCULAR REEDUCATION: CPT

## 2018-04-19 PROCEDURE — 97010 HOT OR COLD PACKS THERAPY: CPT

## 2018-04-19 NOTE — PROGRESS NOTES
Daily Note     Today's date: 2018  Patient name: Lizz Castillo  : 1975  MRN: [id number]  Referring provider: Aileen Garrido MD  Dx:   Encounter Diagnosis     ICD-10-CM    1  Aftercare following surgery Z48 89    2  Closed displaced spiral fracture of shaft of right tibia with routine healing S82 241D    3  Closed fracture of head of right fibula with routine healing, subsequent encounter S82 163F                   Subjective: Patient reports to continue stretching but that the knee keeps tightening back up  She reports pain levels are about 5/10 in the front of the knee        Objective: See treatment diary below      Manual  3/26 3/29  4         Dorsiflexion/ plantarflexion mobs   8 min  6'  5'               Soft tissue   5 min  3'    5' 5 min  5 min         Knee ext mobs/ stretching      3'  3'  3'  5 min  5 min                                                               Exercise Diary                      Warm Up: Upright bike   5 min  5'  5' 5'  5'  5'         Heel slides X 15 X 15  20 x5"  10 x 10"  10 x 10"  10 x 5"  10 x 10"         Supine Knee ext stretch 2 min 3 min; 4lb  3'   4 lbs  3' 4lb  3' 4lb NP  Np         Quad sets 15 x 3 sec 15 x 3 sec  15 x10"  15 x 10"  15 x 5"  15 x 5"  15x5"         SLR   X 5  5x  x 15  3 x 5  3 x 5  4 x 5         Prone knee extension stretch        3 min 1lb  3 min 1lb  3 min; 4lb  3 min; 4lb         TKE Standing          x 15  15 x 3"  15 x 3"         DF/Inv/ Eversion w/ TB                       DF stretch seated   20 sec x 3  20" x3  20" x 5  20" x 5  20" x 5  20"x5         BAPS board Df/ pf, CW   15x each  20x each  20x each  NP  NP           Leg Press, single leg   22x 35#  35# 3x10  35# 3 x 10  40#  2 x 10  40# 3 x 10  40# 3 x 10          s/l R hip ABD      10x  15x  20x  20x  30x          DF Stretch supine          10 x 10'  10 x 10"  10x10"         Knee extensionater stretch            3 min  3 min                                                                                                                                                               Modalities         4/9               CP for knee/ tibia        10 min CP    8 min  10 min                                                               Assessment: Tolerated treatment well  Patient continues to demonstrate improved range of motion with stretching/ manual, but knee stiffens up  Patient demonstrated fatigue post treatment, exhibited good technique with therapeutic exercises and would benefit from continued PT      Plan: Continue per plan of care  Progress treatment as tolerated  Progress note last visit

## 2018-04-23 ENCOUNTER — EVALUATION (OUTPATIENT)
Dept: PHYSICAL THERAPY | Facility: CLINIC | Age: 43
End: 2018-04-23
Payer: COMMERCIAL

## 2018-04-23 DIAGNOSIS — S82.241D CLOSED DISPLACED SPIRAL FRACTURE OF SHAFT OF RIGHT TIBIA WITH ROUTINE HEALING: ICD-10-CM

## 2018-04-23 DIAGNOSIS — Z48.89 AFTERCARE FOLLOWING SURGERY: Primary | ICD-10-CM

## 2018-04-23 DIAGNOSIS — S82.101D CLOSED FRACTURE OF PROXIMAL END OF RIGHT TIBIA WITH ROUTINE HEALING, UNSPECIFIED FRACTURE MORPHOLOGY, SUBSEQUENT ENCOUNTER: ICD-10-CM

## 2018-04-23 DIAGNOSIS — S82.831D CLOSED FRACTURE OF HEAD OF RIGHT FIBULA WITH ROUTINE HEALING, SUBSEQUENT ENCOUNTER: ICD-10-CM

## 2018-04-23 PROCEDURE — 97112 NEUROMUSCULAR REEDUCATION: CPT

## 2018-04-23 PROCEDURE — G8993 SUB PT/OT CURRENT STATUS: HCPCS

## 2018-04-23 PROCEDURE — G8994 SUB PT/OT GOAL STATUS: HCPCS

## 2018-04-23 NOTE — PROGRESS NOTES
PT Evaluation     Today's date: 2018  Patient name: Laney Libman  : 1975  MRN: [id number]  Referring provider: Danica Sanchez MD  Dx:   Encounter Diagnosis     ICD-10-CM    1  Aftercare following surgery Z48 89    2  Closed displaced spiral fracture of shaft of right tibia with routine healing S82 241D    3  Closed fracture of head of right fibula with routine healing, subsequent encounter S82 831D    4  Closed fracture of proximal end of right tibia with routine healing, unspecified fracture morphology, subsequent encounter S82 101D                   Assessment  Impairments: abnormal coordination, abnormal gait, abnormal muscle firing, abnormal muscle tone, abnormal or restricted ROM, abnormal movement, activity intolerance, impaired balance, impaired physical strength, lacks appropriate home exercise program, pain with function and weight-bearing intolerance    Assessment details: Upon 4 week reassessment, patient demonstrates improved swelling, improved range of motion, improved strength  Still significant deficits noted in each of these areas  Plan to continue with physical therapy to minimize impairments and maximize function  Thank you for the referral  PT is a 40y o  year old female with the impairments listed above  Patient will benefit from skilled physical therapy to address impairments and maximize function  Thank you for the referral   Understanding of Dx/Px/POC: good   Prognosis: good    Goals  Impairment Related Goals:  Patient will have full  knee flexion/ extension AROM  --Not met  Patient will have full ankle AROM  --Not met  Patient will have gross LE strength minimum 4/5  --Not met  Patient will have 50% improvement in strength  --Not met  Functional Goals:  Patient will be able to walk two blocks with no difficulty  --Not met  Patient will report no difficulty with household chores  --Not met  Patient will be independent with HEP    Patient will ascend/ descend flight of steps with minimal to no pain/ compensation/ difficulty  Plan  Patient would benefit from: skilled PT  Planned modality interventions: cryotherapy  Planned therapy interventions: joint mobilization, manual therapy, massage, Espinal taping, neuromuscular re-education, patient education, postural training, body mechanics training, behavior modification, balance/weight bearing training, balance, strengthening, stretching, therapeutic exercise, community reintegration, flexibility, gait training, graded activity, graded exercise and home exercise program  Frequency: 2x week  Duration in weeks: 12  Treatment plan discussed with: patient        Subjective Evaluation    History of Present Illness  Mechanism of injury: Patient reports 50-60% improvement since starting physical therapy  Patient reports improved pain levels, improved range of motion, she reports being able to manage better on her own  She reports decreased swelling  She continues to report the pain levels vary-- despite being more active, she's not needing to take pain medication except at night  She reports feeling the pain all in her knee-- front, back inside  She reports still getting the pins and needles in her lateral ankle  She reports a numbness and pain in her foot-- especially with weight bearing activities  She is still partial weight bearing at this time  Pain  No pain reported  Current pain ratin  At best pain ratin  At worst pain ratin  Quality: dull ache, burning and throbbing    Patient Goals  Patient goal: "Be back to where I was before the accident  Be able to walk without assistance  Be able to walk my dog "        Objective     Observations     Additional Observation Details  Swelling noted in knee  See numbers below for specifics  Scars still healing well, slight undermining noted, patient reported more noticeable than previous   Small bump noted in more proximal scar, patient reported more swollen previously, plan to continue to monitor scars  No increased tissue temperature/ signs of infection  Patient is ambulating in regular shoe, partial weight bearing with B crutches  Step length equal, decreased TE at IC  Neurological Testing     Sensation     Knee   Left Knee   Intact: light touch    Right Knee   Intact: light touch   Diminished: light touch     Active Range of Motion   Left Knee   Flexion: 126 degrees   Extension: 3 degrees     Right Knee   Flexion: 108 degrees   Extension: 10 degrees   Left Ankle/Foot   Dorsiflexion (ke): 2 degrees   Plantar flexion: 50 degrees   Inversion: 20 degrees   Eversion: 20 degrees   Great toe extension: WFL    Right Ankle/Foot   Dorsiflexion (ke): 10 degrees   Plantar flexion: 20 degrees   Inversion: 15 degrees   Eversion: 20 degrees     Passive Range of Motion   Left Knee   Flexion: 128 degrees   Extension: 2 degrees     Right Knee   Flexion: 112 degrees   Extension: 8 degrees   Left Ankle/Foot    Dorsiflexion (ke): 2 degrees   Plantar flexion: 50 degrees   Inversion: 20 degrees   Eversion: 25 degrees     Right Ankle/Foot    Dorsiflexion (ke): 8 degrees   Plantar flexion: 22 degrees   Inversion: 16 degrees   Eversion: 25 degrees     Additional Passive Range of Motion Details  *lacks 2 degrees extension on L, lacks 8 degrees extension on R     Lacks 8 on Right    Strength/Myotome Testing     Right Knee   Flexion: 3+  Extension: 3-    Right Ankle/Foot   Dorsiflexion: 4-  Plantar flexion: 3-  Inversion: 4-  Eversion: 4-    Additional Strength Details  Quad set on R: Fair  Straight leg raise: fair, slight lag noted    Swelling     Left Knee Girth Measurement (cm)   Joint line: 34 cm  10 cm above joint line: 35 cm  10 cm below joint line: 31 3 cm    Right Knee Girth Measurement (cm)   Joint line: 36 cm  10 cm above joint line: 36 2 cm  10 cm below joint line: 35 cm  Left Ankle/Foot   Metatarsal heads: 20 cm  Figure 8: 46 cm    Right Ankle/Foot   Metatarsal heads: 20 3 cm  Figure 8: 48 5 cm      Flowsheet Rows    Flowsheet Row Most Recent Value   PT/OT G-Codes   Current Score  45   Projected Score  58   FOTO information reviewed  Yes   Assessment Type  Re-evaluation   G code set  Other PT/OT Secondary   Other PT Secondary Current Status ()  CK   Other PT Secondary Goal Status ()  CK          Precautions: N/A      Manual  3/26 3/29  4/5 4/9 4/12 4/16 4/19 4/23       Dorsiflexion/ plantarflexion mobs   8 min  6'  5'               Soft tissue   5 min  3'    5' 5 min  5 min         Knee ext mobs/ stretching      3'  3'  3'  5 min  5 min  5 min                                                             Exercise Diary         4/9 4/12             Warm Up: Upright bike   5 min  5'  5' 5'  5'  5'  5'       Heel slides X 15 X 15  20 x5"  10 x 10"  10 x 10"  10 x 5"  10 x 10"         Supine Knee ext stretch 2 min 3 min; 4lb  3'   4 lbs  3' 4lb  3' 4lb NP  Np         Quad sets 15 x 3 sec 15 x 3 sec  15 x10"  15 x 10"  15 x 5"  15 x 5"  15x5"  15x5"       SLR   X 5  5x  x 15  3 x 5  3 x 5  4 x 5  4x5       Prone knee extension stretch        3 min 1lb  3 min 1lb  3 min; 4lb  3 min; 4lb         TKE Standing          x 15  15 x 3"  15 x 3"         DF/Inv/ Eversion w/ TB                       DF stretch seated   20 sec x 3  20" x3  20" x 5  20" x 5  20" x 5  20"x5         BAPS board Df/ pf, CW   15x each  20x each  20x each  NP  NP           Leg Press, single leg   22x 35#  35# 3x10  35# 3 x 10  40#  2 x 10  40# 3 x 10  40# 3 x 10          s/l R hip ABD      10x  15x  20x  20x  30x          DF Stretch supine          10 x 10'  10 x 10"  10x10"         Knee extensionater stretch            3 min  3 min  3 min                                                                                                                                                             Modalities         4/9               CP for knee/ tibia        10 min CP    8 min  10 min

## 2018-04-26 ENCOUNTER — OFFICE VISIT (OUTPATIENT)
Dept: PHYSICAL THERAPY | Facility: CLINIC | Age: 43
End: 2018-04-26
Payer: COMMERCIAL

## 2018-04-26 DIAGNOSIS — S82.101D CLOSED FRACTURE OF PROXIMAL END OF RIGHT TIBIA WITH ROUTINE HEALING, UNSPECIFIED FRACTURE MORPHOLOGY, SUBSEQUENT ENCOUNTER: ICD-10-CM

## 2018-04-26 DIAGNOSIS — S82.241D CLOSED DISPLACED SPIRAL FRACTURE OF SHAFT OF RIGHT TIBIA WITH ROUTINE HEALING: ICD-10-CM

## 2018-04-26 DIAGNOSIS — Z48.89 AFTERCARE FOLLOWING SURGERY: Primary | ICD-10-CM

## 2018-04-26 DIAGNOSIS — S82.831D CLOSED FRACTURE OF HEAD OF RIGHT FIBULA WITH ROUTINE HEALING, SUBSEQUENT ENCOUNTER: ICD-10-CM

## 2018-04-26 PROCEDURE — 97140 MANUAL THERAPY 1/> REGIONS: CPT

## 2018-04-26 PROCEDURE — 97110 THERAPEUTIC EXERCISES: CPT

## 2018-04-26 PROCEDURE — 97014 ELECTRIC STIMULATION THERAPY: CPT

## 2018-04-26 NOTE — PROGRESS NOTES
Daily Note     Today's date: 2018  Patient name: Leatha Stevens  : 1975  MRN: [id number]  Referring provider: Luis Felipe Martínez MD  Dx:   Encounter Diagnosis     ICD-10-CM    1  Aftercare following surgery Z48 89    2  Closed displaced spiral fracture of shaft of right tibia with routine healing S82 241D    3  Closed fracture of head of right fibula with routine healing, subsequent encounter S82 831D    4  Closed fracture of proximal end of right tibia with routine healing, unspecified fracture morphology, subsequent encounter S82 101D                   Subjective: Patient reports pain is about 2/10 starting PT today  She reports she did not take her pain medication before PT        Objective: See treatment diary below    recautions: N/A      Manual  3/26 3/29  4/5  4/9  4/12  4/16  4/19  4/23  4/26     Dorsiflexion/ plantarflexion mobs   8 min  6'  5'          6 min     Soft tissue   5 min  3'    5' 5 min  5 min         Knee ext mobs/ stretching      3'  3'  3'  5 min  5 min  5 min  4 min                                                           Exercise Diary                      Warm Up: Upright bike   5 min  5'  5' 5'  5'  5'  5'  5'     Heel slides X 15 X 15  20 x5"  10 x 10"  10 x 10"  10 x 5"  10 x 10"  HS Curls       Supine Knee ext stretch 2 min 3 min; 4lb  3'   4 lbs  3' 4lb  3' 4lb NP  Np         Quad sets 15 x 3 sec 15 x 3 sec  15 x10"  15 x 10"  15 x 5"  15 x 5"  15x5"  15x5"  10 min w/ NMES     SLR   X 5  5x  x 15  3 x 5  3 x 5  4 x 5  4x5       Prone knee extension stretch        3 min 1lb  3 min 1lb  3 min; 4lb  3 min; 4lb    3 min; 4lb     TKE Standing          x 15  15 x 3"  15 x 3"    20x 3 sec     DF/Inv/ Eversion w/ TB                       DF stretch seated   20 sec x 3  20" x3  20" x 5  20" x 5  20" x 5  20"x5    20"x5     BAPS board Df/ pf, CW   15x each  20x each  20x each  NP  NP           Leg Press, single leg   22x 35#  35# 3x10  35# 3 x 10  40#  2 x 10  40# 3 x 10  40# 3 x 10    40# 4 x 10      s/l R hip ABD      10x  15x  20x  20x  30x          DF Stretch supine          10 x 10'  10 x 10"  10x10"         Knee extensionater stretch            3 min  3 min  3 min  3 min                                                                                                                                                           Modalities         4/9               CP for knee/ tibia        10 min CP    8 min  10 min  10 min  10 min                                                           Assessment: Tolerated treatment fair  Patient had significant increase in pain levels with manual stretching  Demonstrates improved range post stretching that remains temporarily  Patient demonstrated fatigue post treatment, exhibited good technique with therapeutic exercises and would benefit from continued PT      Plan: Continue per plan of care  Progress treatment as tolerated

## 2018-04-30 ENCOUNTER — OFFICE VISIT (OUTPATIENT)
Dept: PHYSICAL THERAPY | Facility: CLINIC | Age: 43
End: 2018-04-30
Payer: COMMERCIAL

## 2018-04-30 ENCOUNTER — TELEPHONE (OUTPATIENT)
Dept: OBGYN CLINIC | Facility: HOSPITAL | Age: 43
End: 2018-04-30

## 2018-04-30 DIAGNOSIS — S82.831D CLOSED FRACTURE OF HEAD OF RIGHT FIBULA WITH ROUTINE HEALING, SUBSEQUENT ENCOUNTER: ICD-10-CM

## 2018-04-30 DIAGNOSIS — Z48.89 AFTERCARE FOLLOWING SURGERY: Primary | ICD-10-CM

## 2018-04-30 DIAGNOSIS — S82.241D CLOSED DISPLACED SPIRAL FRACTURE OF SHAFT OF RIGHT TIBIA WITH ROUTINE HEALING: ICD-10-CM

## 2018-04-30 DIAGNOSIS — S82.101D CLOSED FRACTURE OF PROXIMAL END OF RIGHT TIBIA WITH ROUTINE HEALING, UNSPECIFIED FRACTURE MORPHOLOGY, SUBSEQUENT ENCOUNTER: ICD-10-CM

## 2018-04-30 PROCEDURE — 97140 MANUAL THERAPY 1/> REGIONS: CPT

## 2018-04-30 PROCEDURE — 97110 THERAPEUTIC EXERCISES: CPT

## 2018-04-30 PROCEDURE — 97014 ELECTRIC STIMULATION THERAPY: CPT

## 2018-04-30 NOTE — PROGRESS NOTES
Daily Note     Today's date: 2018  Patient name: Dwaine Kiran  : 1975  MRN: [id number]  Referring provider: Zainab Danielle MD  Dx:   Encounter Diagnosis     ICD-10-CM    1  Aftercare following surgery Z48 89    2  Closed displaced spiral fracture of shaft of right tibia with routine healing S82 241D    3  Closed fracture of head of right fibula with routine healing, subsequent encounter S82 831D    4  Closed fracture of proximal end of right tibia with routine healing, unspecified fracture morphology, subsequent encounter S82 101D                   Subjective: Patient reports she's not sure why, maybe because of the weather, but she reports increased burning and tingling type sensation in her medial ankle, medial arch, and foot        Objective: See treatment diary below    Precautions: N/A      Manual  3/26 3/29  4/5  4/9  4/12  4/16  4/19  4/23  4/26  4/30   Dorsiflexion/ plantarflexion mobs   8 min  6'  5'          6 min  6 min   Soft tissue   5 min  3'    5' 5 min  5 min         Knee ext mobs/ stretching      3'  3'  3'  5 min  5 min  5 min  4 min  6 min                                                         Exercise Diary                      Warm Up: Upright bike   5 min  5'  5' 5'  5'  5'  5'  5'  5'   Heel slides X 15 X 15  20 x5"  10 x 10"  10 x 10"  10 x 5"  10 x 10"  HS Curls    GTB x15   Supine Knee ext stretch 2 min 3 min; 4lb  3'   4 lbs  3' 4lb  3' 4lb NP  Np         Quad sets 15 x 3 sec 15 x 3 sec  15 x10"  15 x 10"  15 x 5"  15 x 5"  15x5"  15x5"  10 min w/ NMES  10 min w/ NMES   SLR   X 5  5x  x 15  3 x 5  3 x 5  4 x 5  4x5       Prone knee extension stretch        3 min 1lb  3 min 1lb  3 min; 4lb  3 min; 4lb    3 min; 4lb  3 min' 4lb   TKE Standing          x 15  15 x 3"  15 x 3"    20x 3 sec  20x3"   DF/Inv/ Eversion w/ TB                    YTB x 20   DF stretch seated   20 sec x 3  20" x3  20" x 5  20" x 5  20" x 5  20"x5    20"x5  10"x10   [facility]   15x each  20x each  20x each  NP  NP           Leg Press, single leg   22x 35#  35# 3x10  35# 3 x 10  40#  2 x 10  40# 3 x 10  40# 3 x 10    40# 4 x 10  45# 2 x 20    s/l R hip ABD      10x  15x  20x  20x  30x      30x    DF Stretch supine          10 x 10'  10 x 10"  10x10"      10x10"   Knee extensionater stretch            3 min  3 min  3 min  3 min                                                                                                                                                           Modalities         4/9               CP for knee/ tibia        10 min CP    8 min  10 min  10 min  10 min                                                         Assessment: Tolerated treatment well  Improved knee extension and dorsiflexion ROM noted post manual therapy and stretching  Reported ease with addition of new exercises, so consider upgrading bands  Patient demonstrated fatigue post treatment, exhibited good technique with therapeutic exercises and would benefit from continued PT      Plan: Continue per plan of care  Progress treatment as tolerated

## 2018-04-30 NOTE — TELEPHONE ENCOUNTER
Dr Jose M Louis    Patient called to get in sooner to see Dr Jose M Louis  She states that where all the work was done on her leg she is noticing a lump on her leg  She states that it goes up and down  No pain in that area, burning sensation (More at night), swelling but she is not sure if this is the cause of the swelling  Patient was trying to hold off until her next appt but PT stated they thought she should be seen  Please call pt at [phone]

## 2018-05-01 NOTE — TELEPHONE ENCOUNTER
He I think this represents a normal occurrence after the surgery that this patient has had  I would recommend she continue to proceed with physical therapy as I recommended at her last visit  Please call patient and inform of above    Thank you

## 2018-05-03 ENCOUNTER — OFFICE VISIT (OUTPATIENT)
Dept: PHYSICAL THERAPY | Facility: CLINIC | Age: 43
End: 2018-05-03
Payer: COMMERCIAL

## 2018-05-03 DIAGNOSIS — S82.241D CLOSED DISPLACED SPIRAL FRACTURE OF SHAFT OF RIGHT TIBIA WITH ROUTINE HEALING: ICD-10-CM

## 2018-05-03 DIAGNOSIS — Z48.89 AFTERCARE FOLLOWING SURGERY: Primary | ICD-10-CM

## 2018-05-03 DIAGNOSIS — S82.831D CLOSED FRACTURE OF HEAD OF RIGHT FIBULA WITH ROUTINE HEALING, SUBSEQUENT ENCOUNTER: ICD-10-CM

## 2018-05-03 PROCEDURE — 97110 THERAPEUTIC EXERCISES: CPT

## 2018-05-03 PROCEDURE — 97140 MANUAL THERAPY 1/> REGIONS: CPT

## 2018-05-03 PROCEDURE — 97010 HOT OR COLD PACKS THERAPY: CPT

## 2018-05-03 NOTE — TELEPHONE ENCOUNTER
Patient advised above information and verbalized understanding  I advised her to ice and elevate 20 min on 20 min off, Ok for Naproxen and Tylenol to decrease need for oxycodone 5mg  Pain medication

## 2018-05-03 NOTE — PROGRESS NOTES
Daily Note     Today's date: 5/3/2018  Patient name: Esha Bell  : 1975  MRN: [id number]  Referring provider: Jeff Pompa MD  Dx:   Encounter Diagnosis     ICD-10-CM    1  Aftercare following surgery Z48 89    2  Closed displaced spiral fracture of shaft of right tibia with routine healing S82 241D    3  Closed fracture of head of right fibula with routine healing, subsequent encounter S82 839Z                   Subjective: Patient reports she spoke to her doctor and he is not concerned about the bump she feels  She reports pain levels are about the same, 5/10  Objective: See treatment diary below      Precautions: N/A      Manual  5/3            Dorsiflexion/ plantarflexion mobs 4 min            Soft tissue             Knee ext mobs/ stretching 6 min                                              Exercise Diary              Warm Up: Upright bike 5 min            Heel slides [id number]            Supine Knee ext stretch             Quad sets 15x 3 sec            SLR 4 x 5            Prone knee extension stretch 4lb, 3 min            TKE Standing GTB 30x3"            DF/Inv/ Eversion w/ TB GTB x 20            DF stretch seated             DF Stretch Standing 20"x5 on step            Leg Press, single leg 45lb 3 x 10             S/l R hip ABD 2 x 10            HS Curls GTB x 20                                                                                                                    Modalities              CP for knee/ tibia 10 min                                              Assessment: Tolerated treatment well  Improved quality of quad set  Patient still has weakness/difficulty maintaining TKE with SLR, moderate difficulty with TKE with GTB and HS curls  Patient demonstrated fatigue post treatment, exhibited good technique with therapeutic exercises and would benefit from continued PT      Plan: Continue per plan of care  Progress treatment as tolerated

## 2018-05-07 ENCOUNTER — OFFICE VISIT (OUTPATIENT)
Dept: PHYSICAL THERAPY | Facility: CLINIC | Age: 43
End: 2018-05-07
Payer: COMMERCIAL

## 2018-05-07 DIAGNOSIS — S82.241D CLOSED DISPLACED SPIRAL FRACTURE OF SHAFT OF RIGHT TIBIA WITH ROUTINE HEALING: ICD-10-CM

## 2018-05-07 DIAGNOSIS — S82.831D CLOSED FRACTURE OF HEAD OF RIGHT FIBULA WITH ROUTINE HEALING, SUBSEQUENT ENCOUNTER: ICD-10-CM

## 2018-05-07 DIAGNOSIS — S82.101D CLOSED FRACTURE OF PROXIMAL END OF RIGHT TIBIA WITH ROUTINE HEALING, UNSPECIFIED FRACTURE MORPHOLOGY, SUBSEQUENT ENCOUNTER: ICD-10-CM

## 2018-05-07 DIAGNOSIS — Z48.89 AFTERCARE FOLLOWING SURGERY: Primary | ICD-10-CM

## 2018-05-07 PROCEDURE — 97112 NEUROMUSCULAR REEDUCATION: CPT

## 2018-05-07 PROCEDURE — 97110 THERAPEUTIC EXERCISES: CPT

## 2018-05-07 PROCEDURE — 97014 ELECTRIC STIMULATION THERAPY: CPT

## 2018-05-07 PROCEDURE — 97140 MANUAL THERAPY 1/> REGIONS: CPT

## 2018-05-07 PROCEDURE — 97010 HOT OR COLD PACKS THERAPY: CPT

## 2018-05-07 NOTE — PROGRESS NOTES
Daily Note     Today's date: 2018  Patient name: Kan Jimenez  : 1975  MRN: [id number]  Referring provider: Rebecca Ann MD  Dx:   Encounter Diagnosis     ICD-10-CM    1  Aftercare following surgery Z48 89    2  Closed displaced spiral fracture of shaft of right tibia with routine healing S82 241D    3  Closed fracture of head of right fibula with routine healing, subsequent encounter S82 831D    4  Closed fracture of proximal end of right tibia with routine healing, unspecified fracture morphology, subsequent encounter S82 101D                   Subjective: Patient reports pain is about 5/10 in her knee  She continues to report her knee stiffens up quickly with doing the exercises  Objective: See treatment diary below    Precautions: N/A      Manual  /3 5/7           Dorsiflexion (posterior) talus mobs 4 min  4 min           Soft tissue             Knee ext mobs/ stretching 6 min 6 min                                             Exercise Diary              Warm Up: Upright bike 5 min 5 min           Heel slides [id number] NP           Supine Knee ext stretch  NP           Quad sets 15x 3 sec 20x3 sec           SLR 4 x 5 4 x 5           Prone knee extension stretch 4lb, 3 min 4lb, 3 min           TKE Standing GTB 30x3" GTB 30x3"           DF/Inv/ Eversion w/ TB GTB x 20 GTB x 20           DF stretch seated  10 sec x 10           DF Stretch Standing 20"x5 on step 20" x 5 on step           Leg Press, single leg 45lb 3 x 10 45lb 3 x 10            S/l R hip ABD 2 x 10 2 x 10           HS Curls GTB x 20  GTB x 20           Mini squats  X 10                                                                                  NMES w/ quad sets  8 min                 Modalities              CP for knee/ tibia 10 min 10 min                                             Assessment: Tolerated treatment well  Patient continues to report moderate difficulty/fatigue with standing TKE   Reports difficulty with addition of mini squats  Patient given green TB to add TKE at home  Lag noticed with SLR though quality of quad set continues to improve  Patient demonstrated fatigue post treatment, exhibited good technique with therapeutic exercises and would benefit from continued PT      Plan: Continue per plan of care  Progress treatment as tolerated

## 2018-05-10 ENCOUNTER — TRANSCRIBE ORDERS (OUTPATIENT)
Dept: PHYSICAL THERAPY | Facility: CLINIC | Age: 43
End: 2018-05-10

## 2018-05-10 ENCOUNTER — OFFICE VISIT (OUTPATIENT)
Dept: PHYSICAL THERAPY | Facility: CLINIC | Age: 43
End: 2018-05-10
Payer: COMMERCIAL

## 2018-05-10 DIAGNOSIS — S82.831D CLOSED FRACTURE OF HEAD OF RIGHT FIBULA WITH ROUTINE HEALING, SUBSEQUENT ENCOUNTER: Primary | ICD-10-CM

## 2018-05-10 DIAGNOSIS — S82.101D CLOSED FRACTURE OF PROXIMAL END OF RIGHT TIBIA WITH ROUTINE HEALING, UNSPECIFIED FRACTURE MORPHOLOGY, SUBSEQUENT ENCOUNTER: ICD-10-CM

## 2018-05-10 DIAGNOSIS — S82.241D CLOSED DISPLACED SPIRAL FRACTURE OF SHAFT OF RIGHT TIBIA WITH ROUTINE HEALING: ICD-10-CM

## 2018-05-10 PROCEDURE — 97112 NEUROMUSCULAR REEDUCATION: CPT | Performed by: PHYSICAL THERAPIST

## 2018-05-10 PROCEDURE — 97140 MANUAL THERAPY 1/> REGIONS: CPT | Performed by: PHYSICAL THERAPIST

## 2018-05-10 PROCEDURE — 97014 ELECTRIC STIMULATION THERAPY: CPT | Performed by: PHYSICAL THERAPIST

## 2018-05-10 NOTE — PROGRESS NOTES
Daily Note     Today's date: 5/10/2018  Patient name: Qamar Veras  : 1975  MRN: [id number]  Referring provider: Ken Zelaya MD  Dx:   Encounter Diagnosis     ICD-10-CM    1  Closed fracture of head of right fibula with routine healing, subsequent encounter S82 831D    2  Closed fracture of proximal end of right tibia with routine healing, unspecified fracture morphology, subsequent encounter S82 101D    3  Closed displaced spiral fracture of shaft of right tibia with routine healing S82 241D                   Subjective: Patient reports continued pain at there R knee  She reports she f/u with MD tomorrow  Objective: See treatment diary below    Precautions: N/A      Manual  /3 5/ 5/8          Dorsiflexion (posterior) talus mobs 4 min  4 min CMF          Soft tissue             Knee ext mobs/ stretching 6 min 6 min CMF                                            Exercise Diary              Warm Up: Upright bike 5 min 5 min 5 min  Heel slides [id number] NP :10x10          Supine Knee ext stretch  NP           Quad sets 15x 3 sec 20x3 sec :05x20          SLR 4 x 5 4 x 5 2x10                       TKE Standing GTB 30x3" GTB 30x3" 8# at leann 2x10          DF/Inv/ Eversion w/ TB GTB x 20 GTB x 20           DF stretch seated  10 sec x 10           DF Stretch Standing 20"x5 on step 20" x 5 on step           Leg Press, single leg 45lb 3 x 10 45lb 3 x 10            S/l R hip ABD 2 x 10 2 x 10           HS Curls GTB x 20  GTB x 20           Mini squats  X 10                                        Gait    CGA 5 min                                        NMES w/ quad sets  8 min                 Modalities              CP for knee/ tibia 10 min 10 min 10 mi                                             Assessment: Modified program today  Pt is WBAT at per last MD note  Spent 15 min  Speaking with pt for first time and examining pt  Plan: Continue per plan of care    Progress treatment as tolerated

## 2018-05-10 NOTE — LETTER
5/10/18    Re: Asad Dozier  : 18    I have had the please to  Ronn's case starting today as her previous PT has resigned  I have reviewed your notes as well as the previous physical therapist notes to familiarize myself with her case  We we continue to work on ROM, strength, and gait  Please contact me with any questions or concerns      Thank you for the referral       Udell Cheadle, PT, DPT, SCS

## 2018-05-11 ENCOUNTER — OFFICE VISIT (OUTPATIENT)
Dept: OBGYN CLINIC | Facility: MEDICAL CENTER | Age: 43
End: 2018-05-11
Payer: COMMERCIAL

## 2018-05-11 ENCOUNTER — APPOINTMENT (OUTPATIENT)
Dept: RADIOLOGY | Facility: MEDICAL CENTER | Age: 43
End: 2018-05-11
Payer: COMMERCIAL

## 2018-05-11 VITALS
BODY MASS INDEX: 20.72 KG/M2 | DIASTOLIC BLOOD PRESSURE: 84 MMHG | SYSTOLIC BLOOD PRESSURE: 119 MMHG | HEART RATE: 85 BPM | HEIGHT: 67 IN | WEIGHT: 132 LBS

## 2018-05-11 DIAGNOSIS — Z48.89 AFTERCARE FOLLOWING SURGERY: ICD-10-CM

## 2018-05-11 DIAGNOSIS — Z87.81 S/P ORIF (OPEN REDUCTION INTERNAL FIXATION) FRACTURE: ICD-10-CM

## 2018-05-11 DIAGNOSIS — Z48.89 AFTERCARE FOLLOWING SURGERY: Primary | ICD-10-CM

## 2018-05-11 DIAGNOSIS — M79.604 LEG PAIN, RIGHT: ICD-10-CM

## 2018-05-11 DIAGNOSIS — Z98.890 S/P ORIF (OPEN REDUCTION INTERNAL FIXATION) FRACTURE: ICD-10-CM

## 2018-05-11 PROCEDURE — 99212 OFFICE O/P EST SF 10 MIN: CPT | Performed by: ORTHOPAEDIC SURGERY

## 2018-05-11 PROCEDURE — 73590 X-RAY EXAM OF LOWER LEG: CPT

## 2018-05-11 NOTE — PROGRESS NOTES
40 y o female returns today for re-evaluation and is now 4 months s/p right tibia ORIF from 1/18/18  She has bene attending formal PT and presents using her crutches and has been about 50% weight bearing  Denies any calf pain  She notes varying swelling anteriorly mid-tibia  No calf pain, fevers, or chills       Review of Systems  Review of systems negative unless otherwise specified in HPI    Past Medical History  Past Medical History:   Diagnosis Date    Anemia     Edema     Fatigue     Vitamin B12 deficiency     Vitamin D deficiency        Past Surgical History  Past Surgical History:   Procedure Laterality Date    KNEE ARTHROSCOPY Bilateral     KNEE SURGERY      ORIF TIBIA FRACTURE Right 1/18/2018    Procedure: OPEN REDUCTION W/ INTERNAL FIXATION (ORIF) TIBIA;  Surgeon: Timmy Elder MD;  Location: [facility];  Service: Orthopedics    ORIF TIBIAL PLATEAU Right 4/24/3923    Procedure: OPEN REDUCTION W/ INTERNAL FIXATION (ORIF) TIBIAL PLATEAU;  Surgeon: Timmy Elder MD;  Location: [facility];  Service: Orthopedics    TUBAL LIGATION         Current Medications  Current Outpatient Prescriptions on File Prior to Visit   Medication Sig Dispense Refill    acetaminophen (TYLENOL) 325 mg tablet 650 mg every 6 hours for mild pain 30 tablet 0    cyanocobalamin 1,000 mcg/mL Inject 1 mL (1,000 mcg total) into the shoulder, thigh, or buttocks every 30 (thirty) days 10 mL 0    ergocalciferol (VITAMIN D2) 50,000 units Take 1 capsule (50,000 Units total) by mouth once a week 12 capsule 0    naproxen (NAPROSYN) 375 mg tablet Take 250 mg by mouth 2 (two) times a day with meals      oxyCODONE (ROXICODONE) 5 mg immediate release tablet Take 1 tablet (5 mg total) by mouth every 6 (six) hours as needed for severe pain Max Daily Amount: 20 mg 60 tablet 0    SYRINGE-NEEDLE, DISP, 3 ML 25G X 1" 3 ML MISC by Does not apply route every 30 (thirty) days 50 each 0     No current facility-administered medications on file prior to visit  Recent Labs [facility])    0  Lab Value Date/Time   HCT 39 6 03/29/2018 1002   HCT 43 6 10/17/2015 1109   HGB 12 9 03/29/2018 1002   HGB 14 4 10/17/2015 1109   WBC 5 70 03/29/2018 1002   WBC 4 24 (L) 10/17/2015 1109   INR 1 12 01/18/2018 0430   GLUCOSE 121 01/18/2018 0430   GLUCOSE 121 01/17/2018 2311   GLUCOSE 85 10/17/2015 1109         Physical exam  · General: Awake, Alert, Oriented  · Eyes: Pupils equal, round and reactive to light  · Heart: regular rate and rhythm  · Lungs: No audible wheezing  · Abdomen: soft    Right LE:  Well healed incisions, no evidence of infection  Mild swelling   Good right knee ROM with lacking the last 5 degrees of full extension   Calf is soft and non-tender, no signs of DVT  Moves her ankle well without complaints       Imaging  Right tibia/fibula x-rays taken and reviewed today show: healing tibia fractures with hardware in acceptable position and alignment  Procedure      Assessment/Plan:   40 y  o female 4 months s/p right tibia ORIF from 1/18/18  She is healing and should continue to improve    Continue PT and wean off of crutches and progress to WBAT  ICE and elevate  Re-check in 6 weeks with repeat x-rays

## 2018-05-14 ENCOUNTER — OFFICE VISIT (OUTPATIENT)
Dept: PHYSICAL THERAPY | Facility: CLINIC | Age: 43
End: 2018-05-14
Payer: COMMERCIAL

## 2018-05-14 DIAGNOSIS — S82.101D CLOSED FRACTURE OF PROXIMAL END OF RIGHT TIBIA WITH ROUTINE HEALING, UNSPECIFIED FRACTURE MORPHOLOGY, SUBSEQUENT ENCOUNTER: ICD-10-CM

## 2018-05-14 DIAGNOSIS — Z48.89 AFTERCARE FOLLOWING SURGERY: ICD-10-CM

## 2018-05-14 DIAGNOSIS — S82.831D CLOSED FRACTURE OF HEAD OF RIGHT FIBULA WITH ROUTINE HEALING, SUBSEQUENT ENCOUNTER: Primary | ICD-10-CM

## 2018-05-14 DIAGNOSIS — S82.241D CLOSED DISPLACED SPIRAL FRACTURE OF SHAFT OF RIGHT TIBIA WITH ROUTINE HEALING: ICD-10-CM

## 2018-05-14 PROCEDURE — 97112 NEUROMUSCULAR REEDUCATION: CPT | Performed by: PHYSICAL THERAPIST

## 2018-05-14 PROCEDURE — 97140 MANUAL THERAPY 1/> REGIONS: CPT | Performed by: PHYSICAL THERAPIST

## 2018-05-14 PROCEDURE — 97110 THERAPEUTIC EXERCISES: CPT | Performed by: PHYSICAL THERAPIST

## 2018-05-14 NOTE — PROGRESS NOTES
Daily Note     Today's date: 2018  Patient name: Meet Hawkins  : 1975  MRN: [id number]  Referring provider: Rajni Burton MD  Dx:   Encounter Diagnosis     ICD-10-CM    1  Closed fracture of head of right fibula with routine healing, subsequent encounter S82 831D    2  Closed fracture of proximal end of right tibia with routine healing, unspecified fracture morphology, subsequent encounter S82 101D    3  Closed displaced spiral fracture of shaft of right tibia with routine healing S82 241D    4  Aftercare following surgery Z48 89                   Subjective: Patient saw MD and he encouraged WBAT and to get rid of the crutches as soon as able  Reported to clinic with 1 crutch today  Objective: See treatment diary below    Precautions: N/A      Manual  5/3 5/7 5/8 5/14         Dorsiflexion (posterior) talus mobs 4 min  4 min CMF CMF         Soft tissue             Knee ext mobs/ stretching 6 min 6 min CMF np                                           Exercise Diary              Warm Up: Upright bike 5 min 5 min 5 min  5 min         Heel slides [id number] NP :10x10 :10x10         Supine Knee ext stretch  NP  -         Quad sets 15x 3 sec 20x3 sec :05x20 -         SLR 4 x 5 4 x 5 2x10 3x10                      TKE Standing GTB 30x3" GTB 30x3" 8# at leann 2x10 np         DF/Inv/ Eversion w/ TB GTB x 20 GTB x 20 D/c D/c         DF stretch seated  10 sec x 10 D/c D/c         DF Stretch Standing 20"x5 on step 20" x 5 on step  :20x5         Leg Press, single leg 45lb 3 x 10 45lb 3 x 10  50# 4x10          S/l R hip ABD 2 x 10 2 x 10  2x10         HS Curls GTB x 20  GTB x 20           Mini squats  X 10 x20 with ball on wall x20 w/ ball on wall          Heel Raises    2x10          Gastroc Str    20"x5          Gait    CGA 5 min   Step-overs          Biodex     LOS Lvl 1/2  x2          Stool scoots    4 laps R          NMES w/ quad sets  8 min  -               Modalities              CP for knee/ tibia 10 min 10 min 10 mi  10 min                                           Assessment: Progressed there ex this visit by adding more standing TE and more challenging strengthening activities  Continues to favor left side  Patient able to correct to equal WB with cueing during mini squats  Plan: Continue per plan of care  Progress treatment as tolerated

## 2018-05-17 ENCOUNTER — TELEPHONE (OUTPATIENT)
Dept: OBGYN CLINIC | Facility: HOSPITAL | Age: 43
End: 2018-05-17

## 2018-05-17 NOTE — TELEPHONE ENCOUNTER
Patient sees Dr Steve Edwards  She had surgery back in January on her leg  She is traveling on a plane to [facility] next Tuesday and she wants to make sure that the doctor is okay with her traveling?

## 2018-05-17 NOTE — TELEPHONE ENCOUNTER
Patient was given above information and advised to get up an stretch her legs frequently, every 45min to an hr  Foot pumps while seated  Verbalized understand

## 2018-05-18 ENCOUNTER — OFFICE VISIT (OUTPATIENT)
Dept: PHYSICAL THERAPY | Facility: CLINIC | Age: 43
End: 2018-05-18
Payer: COMMERCIAL

## 2018-05-18 DIAGNOSIS — S82.241D CLOSED DISPLACED SPIRAL FRACTURE OF SHAFT OF RIGHT TIBIA WITH ROUTINE HEALING: ICD-10-CM

## 2018-05-18 DIAGNOSIS — S82.101D CLOSED FRACTURE OF PROXIMAL END OF RIGHT TIBIA WITH ROUTINE HEALING, UNSPECIFIED FRACTURE MORPHOLOGY, SUBSEQUENT ENCOUNTER: ICD-10-CM

## 2018-05-18 DIAGNOSIS — S82.831D CLOSED FRACTURE OF HEAD OF RIGHT FIBULA WITH ROUTINE HEALING, SUBSEQUENT ENCOUNTER: Primary | ICD-10-CM

## 2018-05-18 PROCEDURE — 97140 MANUAL THERAPY 1/> REGIONS: CPT

## 2018-05-18 PROCEDURE — 97110 THERAPEUTIC EXERCISES: CPT

## 2018-05-18 PROCEDURE — 97010 HOT OR COLD PACKS THERAPY: CPT

## 2018-05-18 PROCEDURE — 97112 NEUROMUSCULAR REEDUCATION: CPT

## 2018-05-18 NOTE — PROGRESS NOTES
Daily Note     Today's date: 2018  Patient name: Lizz Castillo  : 1975  MRN: [id number]  Referring provider: Aileen Garrido MD  Dx: No diagnosis found  Subjective: Pt has increased R leg pain today 6/10  States it's been painful because of the weather  Objective: See treatment diary below      Manual  5/3 5/7 5/8 5/14  5/18             Dorsiflexion (posterior) talus mobs 4 min  4 min CMF CMF  CMF             Soft tissue                       Knee ext mobs/ stretching 6 min 6 min CMF np  CMF + extensionator                                                                   Exercise Diary                        Warm Up: Upright bike 5 min 5 min 5 min  5 min  5 min             Heel slides [id number] NP :10x10 :10x10  :10x10             Supine Knee ext stretch   NP   -               Quad sets 15x 3 sec 20x3 sec :05x20 -               SLR 4 x 5 4 x 5 2x10 3x10  3x10                                     TKE Standing GTB 30x3" GTB 30x3" 8# at leann 2x10 np  np             DF/Inv/ Eversion w/ TB GTB x 20 GTB x 20 D/c D/c              DF stretch seated   10 sec x 10 D/c D/c               DF Stretch Standing 20"x5 on step 20" x 5 on step   :20x5  :20x5             Leg Press, single leg 45lb 3 x 10 45lb 3 x 10   50# 4x10  50#   4x10              S/l R hip ABD 2 x 10 2 x 10   2x10  2x10             HS Curls GTB x 20  GTB x 20                   Mini squats   X 10 x20 with ball on wall x20 w/ ball on wall  [id number] ball on wall              Heel Raises       2x10  2x10              Gastroc Str       20"x5  20''x5              Gait      CGA 5 min  Step-overs  2x10 4''              Biodex        LOS Lvl 1/2  x2  LOS static x 2              Stool scoots       4 laps R  4 laps R              NMES w/ quad sets   8 min   -                     Modalities                        CP for knee/ tibia 10 min 10 min 10 mi   10 min  10  10                                                                  Assessment: Tolerated treatment fair  Patient demonstrated fatigue post treatment and would benefit from continued PT   Displays slight extension lag with SLRs  Requires additional time to complete exercises  Able to GT without crutches across clinic twice but displays antalgic gait with trunk slightly flexed  Encouraged normal step pattern and upright posture as well as equal weight bearing during CKC exercises  Plan: Continue per plan of care

## 2018-05-21 ENCOUNTER — OFFICE VISIT (OUTPATIENT)
Dept: PHYSICAL THERAPY | Facility: CLINIC | Age: 43
End: 2018-05-21
Payer: COMMERCIAL

## 2018-05-21 DIAGNOSIS — Z48.89 AFTERCARE FOLLOWING SURGERY: ICD-10-CM

## 2018-05-21 DIAGNOSIS — S82.831D CLOSED FRACTURE OF HEAD OF RIGHT FIBULA WITH ROUTINE HEALING, SUBSEQUENT ENCOUNTER: Primary | ICD-10-CM

## 2018-05-21 DIAGNOSIS — S82.101D CLOSED FRACTURE OF PROXIMAL END OF RIGHT TIBIA WITH ROUTINE HEALING, UNSPECIFIED FRACTURE MORPHOLOGY, SUBSEQUENT ENCOUNTER: ICD-10-CM

## 2018-05-21 DIAGNOSIS — S82.241D CLOSED DISPLACED SPIRAL FRACTURE OF SHAFT OF RIGHT TIBIA WITH ROUTINE HEALING: ICD-10-CM

## 2018-05-21 PROCEDURE — 97112 NEUROMUSCULAR REEDUCATION: CPT

## 2018-05-21 PROCEDURE — 97110 THERAPEUTIC EXERCISES: CPT

## 2018-05-21 PROCEDURE — 97140 MANUAL THERAPY 1/> REGIONS: CPT

## 2018-05-21 NOTE — PROGRESS NOTES
Daily Note     Today's date: 2018  Patient name: Leatha Stevens  : 1975  MRN: [id number]  Referring provider: Luis Felipe Martínez MD  Dx:   Encounter Diagnosis     ICD-10-CM    1  Closed fracture of head of right fibula with routine healing, subsequent encounter S82 831D    2  Closed fracture of proximal end of right tibia with routine healing, unspecified fracture morphology, subsequent encounter S82 101D    3  Closed displaced spiral fracture of shaft of right tibia with routine healing S82 241D    4  Aftercare following surgery Z48 89                   Subjective: Patient reports 6/10 pain in her R knee  She notes straightening her knee continues to be her biggest challenge  She notes daily activities are improved; just requires more time to perform  Objective: See treatment diary below  Manual  5/3 5/7 5/8 5/14  5/18  5/21           Dorsiflexion (posterior) talus mobs 4 min  4 min CMF CMF  CMF             Soft tissue                       Knee ext mobs/ stretching 6 min 6 min CMF np  CMF + extensionator extensinator +KLS                                                                  Exercise Diary                        Warm Up: Upright bike 5 min 5 min 5 min  5 min  5 min 5 min           Heel slides [id number] NP :10x10 :10x10  :10x10 :10x10           SLR flexion 4 x 5 4 x 5 2x10 3x10  3x10 3x10           SLR abd           3x10           TKE Standing GTB 30x3" GTB 30x3" 8# at leann 2x10 np  np             DF Stretch Standing 20"x5 on step 20" x 5 on step   :20x5  :20x5 :20x5           Leg Press, single leg 45lb 3 x 10 45lb 3 x 10   50# 4x10  50#   4x10 50# 4x10            S/l R hip ABD 2 x 10 2 x 10   2x10  2x10 2x10           Mini squats   X 10 x20 with ball on wall x20 w/ ball on wall  [id number] ball on wall x20 with ball on wall             Heel Raises       2x10  2x10 2x10            Gastroc Str       20"x5  20''x5 :20x5            Gait      CGA 5 min   Step-overs  2x10 4'' around clinic            Biodex        LOS Lvl 1/2  x2  LOS static x 2 LOS static x2            Stool scoots       4 laps R  4 laps R 4 laps R                  Modalities                        CP for knee/ tibia 10 min 10 min 10 mi  10 min  10 defer                                                                 Assessment: Continued with outlined program  Progressed gait without AD; improved heel toe following stretching  Trial increase in weight with leg press next visit within tolerance  Patient instructed in HEP  Plan: Progress treatment as tolerated

## 2018-05-24 ENCOUNTER — APPOINTMENT (OUTPATIENT)
Dept: PHYSICAL THERAPY | Facility: CLINIC | Age: 43
End: 2018-05-24
Payer: COMMERCIAL

## 2018-05-29 ENCOUNTER — OFFICE VISIT (OUTPATIENT)
Dept: PHYSICAL THERAPY | Facility: CLINIC | Age: 43
End: 2018-05-29
Payer: COMMERCIAL

## 2018-05-29 DIAGNOSIS — S82.241D CLOSED DISPLACED SPIRAL FRACTURE OF SHAFT OF RIGHT TIBIA WITH ROUTINE HEALING: ICD-10-CM

## 2018-05-29 DIAGNOSIS — S82.101D CLOSED FRACTURE OF PROXIMAL END OF RIGHT TIBIA WITH ROUTINE HEALING, UNSPECIFIED FRACTURE MORPHOLOGY, SUBSEQUENT ENCOUNTER: ICD-10-CM

## 2018-05-29 DIAGNOSIS — S82.831D CLOSED FRACTURE OF HEAD OF RIGHT FIBULA WITH ROUTINE HEALING, SUBSEQUENT ENCOUNTER: Primary | ICD-10-CM

## 2018-05-29 DIAGNOSIS — Z48.89 AFTERCARE FOLLOWING SURGERY: ICD-10-CM

## 2018-05-29 PROCEDURE — 97110 THERAPEUTIC EXERCISES: CPT | Performed by: PHYSICAL THERAPIST

## 2018-05-29 PROCEDURE — 97140 MANUAL THERAPY 1/> REGIONS: CPT | Performed by: PHYSICAL THERAPIST

## 2018-05-29 PROCEDURE — 97112 NEUROMUSCULAR REEDUCATION: CPT | Performed by: PHYSICAL THERAPIST

## 2018-05-29 NOTE — PROGRESS NOTES
Daily Note     Today's date: 2018  Patient name: Aure Wu  : 1975  MRN: [id number]  Referring provider: Rosa Maria Sanabria MD  Dx:   Encounter Diagnosis     ICD-10-CM    1  Closed fracture of head of right fibula with routine healing, subsequent encounter S82 831D    2  Closed fracture of proximal end of right tibia with routine healing, unspecified fracture morphology, subsequent encounter S82 101D    3  Closed displaced spiral fracture of shaft of right tibia with routine healing S82 241D    4  Aftercare following surgery Z48 89                   Subjective: Patient reports 7/10 pain in her R knee  She states she has been keeping up with her HEP while in [facility]  Objective: See treatment diary below  Manual  5/3 5/7 5/8 5/14  5/18  5/21  5/29         STJ/TCJ mobs       cmf         Knee flexion       cmf                                                                                Exercise Diary                         Upright bike 5 min 5 min 5 min  5 min  5 min 5 min  5 min          Heel slides [id number] NP :10x10 :10x10  :10x10 :10x10  :10x10         SLR flexion 4 x 5 4 x 5 2x10 3x10  3x10 3x10  3x10         SLR abd           3x10  3x10         ERMI ext         2 min  x2         DF Stretch Standing 20"x5 on step 20" x 5 on step   :20x5  :20x5 :20x5  :20x5         Leg Press, single leg 45lb 3 x 10 45lb 3 x 10   50# 4x10  50#   4x10 50# 4x10  50#  3x10          S/l R hip ABD 2 x 10 2 x 10   2x10  2x10 2x10  2x10         Mini squats   X 10 x20 with ball on wall x20 w/ ball on wall  [id number] ball on wall x20 with ball on wall   x20 with ball on wall          Heel Raises       2x10  2x10 2x10  3x10          Gait      CGA 5 min   Step-overs  2x10 4'' around clinic  around clinic          Biodex        LOS Lvl 1/2  x2  LOS static x 2 LOS static x2  LOS static x2          Stool scoots       4 laps R  4 laps R 4 laps R   4 laps R               Modalities                        CP for knee/ tibia 10 min 10 min 10 mi  10 min  10 defer                                                                 Assessment: Pt was able to complete progression of exercises with moderate fatigue noted  Plan: Progress treatment as tolerated

## 2018-05-31 ENCOUNTER — OFFICE VISIT (OUTPATIENT)
Dept: PHYSICAL THERAPY | Facility: CLINIC | Age: 43
End: 2018-05-31
Payer: COMMERCIAL

## 2018-05-31 DIAGNOSIS — S82.241D CLOSED DISPLACED SPIRAL FRACTURE OF SHAFT OF RIGHT TIBIA WITH ROUTINE HEALING: ICD-10-CM

## 2018-05-31 DIAGNOSIS — S82.101D CLOSED FRACTURE OF PROXIMAL END OF RIGHT TIBIA WITH ROUTINE HEALING, UNSPECIFIED FRACTURE MORPHOLOGY, SUBSEQUENT ENCOUNTER: ICD-10-CM

## 2018-05-31 DIAGNOSIS — S82.831D CLOSED FRACTURE OF HEAD OF RIGHT FIBULA WITH ROUTINE HEALING, SUBSEQUENT ENCOUNTER: Primary | ICD-10-CM

## 2018-05-31 PROCEDURE — G8991 OTHER PT/OT GOAL STATUS: HCPCS

## 2018-05-31 PROCEDURE — 97140 MANUAL THERAPY 1/> REGIONS: CPT

## 2018-05-31 PROCEDURE — G8990 OTHER PT/OT CURRENT STATUS: HCPCS

## 2018-05-31 PROCEDURE — 97112 NEUROMUSCULAR REEDUCATION: CPT

## 2018-05-31 PROCEDURE — 97110 THERAPEUTIC EXERCISES: CPT

## 2018-05-31 NOTE — PROGRESS NOTES
Daily Note     Today's date: 2018  Patient name: Leatha Stevens  : 1975  MRN: [id number]  Referring provider: Luis Felipe Martínez MD  Dx:   Encounter Diagnosis     ICD-10-CM    1  Closed fracture of head of right fibula with routine healing, subsequent encounter S82 831D    2  Closed fracture of proximal end of right tibia with routine healing, unspecified fracture morphology, subsequent encounter S82 101D    3  Closed displaced spiral fracture of shaft of right tibia with routine healing S82 241D                   Subjective: Patient states she feels stiffness this morning  She reports pain as 6/10 with ambulation  Objective: See treatment diary below  Manual  5/3 5/7 5/8 5/14  5/18  5/21  5/29 5/31       STJ/TCJ mobs             cmf         Knee flexion             cmf KLS                                                                                     Exercise Diary                        Upright bike 5 min 5 min 5 min  5 min  5 min 5 min  5 min  5 min       Heel slides [id number] NP :10x10 :10x10  :10x10 :10x10  :10x10 :10x10       SLR flexion 4 x 5 4 x 5 2x10 3x10  3x10 3x10  3x10 3x10       SLR abd           3x10  3x10 3x10        ERMI ext              2 min  x2 2 min x2        DF Stretch Standing 20"x5 on step 20" x 5 on step   :20x5  :20x5 :20x5 :20x5 :20x5        Leg Press, single leg 45lb 3 x 10 45lb 3 x 10   50# 4x10  50#   4x10 50# 4x10  50#  3x10 50# 3x10       Mini squats   X 10 x20 with ball on wall x20 w/ ball on wall  [id number] ball on wall x20 with ball on wall   x20 with ball on wall x20 with ball on wall        Heel Raises       2x10  2x10 2x10  3x10 3x10        Gait      CGA 5 min   Step-overs  2x10 4'' around clinic  around clinic around clinic        Biodex        LOS Lvl 1/2  x2  LOS static x 2 LOS static x2  LOS static x2 LOS static 2x       Stool scoots    4 laps R 4 laps R 4 laps R 4 laps R 4 laps R     Bridges        :03x20     Clamshells        NPV      Step ups 8 in 2x10                                    Modalities                        CP for knee/ tibia 10 min 10 min 10 mi  10 min  10 defer                                                                 Assessment: Continued to progress outlined program  Noted pain with PROM knee flexion at end range  She notes pain with increased WBing  Overall improving stability and gait  Plan: Progress treatment as tolerated

## 2018-06-04 ENCOUNTER — EVALUATION (OUTPATIENT)
Dept: PHYSICAL THERAPY | Facility: CLINIC | Age: 43
End: 2018-06-04
Payer: COMMERCIAL

## 2018-06-04 DIAGNOSIS — Z48.89 AFTERCARE FOLLOWING SURGERY: ICD-10-CM

## 2018-06-04 DIAGNOSIS — S82.831D CLOSED FRACTURE OF HEAD OF RIGHT FIBULA WITH ROUTINE HEALING, SUBSEQUENT ENCOUNTER: Primary | ICD-10-CM

## 2018-06-04 DIAGNOSIS — S82.101D CLOSED FRACTURE OF PROXIMAL END OF RIGHT TIBIA WITH ROUTINE HEALING, UNSPECIFIED FRACTURE MORPHOLOGY, SUBSEQUENT ENCOUNTER: ICD-10-CM

## 2018-06-04 DIAGNOSIS — S82.241D CLOSED DISPLACED SPIRAL FRACTURE OF SHAFT OF RIGHT TIBIA WITH ROUTINE HEALING: ICD-10-CM

## 2018-06-04 PROCEDURE — G8991 OTHER PT/OT GOAL STATUS: HCPCS | Performed by: PHYSICAL THERAPIST

## 2018-06-04 PROCEDURE — G8990 OTHER PT/OT CURRENT STATUS: HCPCS | Performed by: PHYSICAL THERAPIST

## 2018-06-04 PROCEDURE — 97110 THERAPEUTIC EXERCISES: CPT | Performed by: PHYSICAL THERAPIST

## 2018-06-04 PROCEDURE — 97112 NEUROMUSCULAR REEDUCATION: CPT | Performed by: PHYSICAL THERAPIST

## 2018-06-04 PROCEDURE — 97140 MANUAL THERAPY 1/> REGIONS: CPT | Performed by: PHYSICAL THERAPIST

## 2018-06-04 NOTE — PROGRESS NOTES
PT Evaluation     Today's date: 2018  Patient name: Johanna Teixeira  : 1975  MRN: [id number]  Referring provider: Anthony De La Cruz MD  Dx:   Encounter Diagnosis     ICD-10-CM    1  Closed fracture of head of right fibula with routine healing, subsequent encounter S82 831D    2  Closed fracture of proximal end of right tibia with routine healing, unspecified fracture morphology, subsequent encounter S82 101D    3  Closed displaced spiral fracture of shaft of right tibia with routine healing S82 241D    4  Aftercare following surgery [id number]                   Assessment  Impairments: abnormal coordination, abnormal gait, abnormal muscle firing, abnormal muscle tone, abnormal or restricted ROM, abnormal movement, activity intolerance, impaired balance, impaired physical strength, lacks appropriate home exercise program, pain with function and weight-bearing intolerance    Assessment details: Patient presents with improved swelling, improved range of motion, improved strength  Still significant deficits noted in each of these areas  Plan to continue with physical therapy to minimize impairments and maximize function  Thank you for the referral    Understanding of Dx/Px/POC: good   Prognosis: good    Goals  Impairment Related Goals:  Patient will have full  knee flexion/ extension AROM in 6 weeks  --partially met  Patient will have full ankle AROM in 6 weeks  --partially met  Patient will have gross LE strength minimum 4/5 in 8 weeks  --partially met  Patient will have 50% improvement in strength in 8 weeks  --partiallymet  Functional Goals:  Patient will be able to walk two blocks with no difficulty in 12 weeks  --not met  Patient will report no difficulty with household chores in 12 weeks  --Not met  Patient will be independent with HEP in 12 weeks - not met  Patient will ascend/ descend flight of steps with minimal to no pain/ compensation/ difficulty in 12 weeks   - not met    Plan  Patient would benefit from: skilled PT  Planned modality interventions: cryotherapy  Planned therapy interventions: joint mobilization, manual therapy, massage, Espinal taping, neuromuscular re-education, patient education, postural training, body mechanics training, behavior modification, balance/weight bearing training, balance, strengthening, stretching, therapeutic exercise, community reintegration, flexibility, gait training, graded activity, graded exercise and home exercise program  Frequency: 2x week  Duration in weeks: 8  Treatment plan discussed with: patient        Subjective Evaluation    History of Present Illness  Mechanism of injury: Patient reports 60-70% improvement since starting physical therapy  She notes her pain is worse at her R knee  She has little pain at the R ankle  She notes 0/10 pain currently at the R ankle and 2/10 at the worse  She reports continued swelling  She notes she is improving her WB status  She is using a crutch and plans on getting a SPC this week  She notes she is still having difficulty sleeping at night due to pain at the R knee  Pain  No pain reported  Current pain ratin  At best pain ratin  At worst pain ratin  Location: R knee  Quality: dull ache, burning and throbbing    Patient Goals  Patient goal: "Be back to where I was before the accident  Be able to walk without assistance  Be able to walk my dog "        Objective     Tenderness     Right Knee   Tenderness in the lateral joint line, lateral patella, medial joint line and patellar tendon  Additional Tenderness Details  Over incision sites and throughout anterior tibialis      Neurological Testing     Sensation     Knee   Left Knee   Intact: light touch    Right Knee   Intact: light touch   Diminished: light touch     Additional Neurological Details  Neurovascular itact      Active Range of Motion     Right Knee   Flexion: 110 degrees   Extension: 7 degrees   Left Ankle/Foot   Great toe extension: WFL    Right Ankle/Foot   Dorsiflexion (ke): 0 degrees   Plantar flexion: WFL  Inversion: 10 degrees   Eversion: 7 degrees     Passive Range of Motion     Right Knee   Flexion: 117 degrees   Extension: 3 degrees     Right Ankle/Foot    Dorsiflexion (ke): 2 degrees   Plantar flexion: WFL  Inversion: 15 degrees   Eversion: 10 degrees     Joint Play     Right Ankle/Foot  Joints within functional limits are the midfoot and forefoot  Hypomobile in the fibular head, proximal tibiofibular joint, distal tibiofibular joint, talocrural joint and subtalar joint  Strength/Myotome Testing     Right Ankle/Foot   Dorsiflexion: 4+  Plantar flexion: 4+  Inversion: 4+  Eversion: 4+    Additional Strength Details  4+/5 noted with B hip abd and ER strength  4-/5 noted at the R quad  4/5 noted throughout the rest of RLE, except otherwise noted  Tests     Additional Tests Details  McConnel's test was positive on the R       Swelling     Right Ankle/Foot   Metatarsal heads: 18 cm  Figure 8: 46 cm    General Comments     Knee Comments  Gait is antalgic in nature with use of a single axillary crutch  Ankle/Foot Comments   B calcaneal eversion is noted during WB   OTC orthotics recommended  Flowsheet Rows      Most Recent Value   PT/OT G-Codes   Current Score  54   Projected Score  58   FOTO information reviewed  Yes   Assessment Type  Re-evaluation   G code set  Other PT/OT Primary   Other PT Primary Current Status ()  CK   Other PT Primary Goal Status ()  CH          Precautions: N/A    Manual  5/3 5/7 5/8 5/14 5/18 5/21 5/29 5/31  6/4     STJ/TCJ mobs             cmf    CMF     Knee flexion             cmf KLS  CMF     Re-eval                  CMF      IASTM                  NPV      PF taping                  NPV           Exercise Diary                        Upright bike 5 min 5 min 5 min  5 min  5 min 5 min  5 min   5 min  5 min      Heel slides [id number] NP :10x10 :10x10  :10x10 :10x10  :10x10 :10x10  NP     SLR flexion 4 x 5 4 x 5 2x10 3x10  3x10 3x10  3x10 3x10  3x10     SLR abd           3x10  3x10 3x10   3x10     ERMI ext              2 min  x2 2 min x2   2 min  x1     DF Stretch Standing 20"x5 on step 20" x 5 on step   :20x5  :20x5 :20x5 :20x5 :20x5   :20x4     Leg Press, single leg 45lb 3 x 10 45lb 3 x 10   50# 4x10  50#   4x10 50# 4x10  50#  3x10 50# 3x10  50#  3x10     Mini squats   X 10 x20 with ball on wall x20 w/ ball on wall  [id number] ball on wall x20 with ball on wall   x20 with ball on wall x20 with ball on wall  x20 with ball on wall      Heel Raises       2x10  2x10 2x10  3x10 3x10  d/c      Gait      CGA 5 min  Step-overs  2x10 4'' around clinic  around clinic around clinic  NP      Biodex        LOS Lvl 1/2  x2  LOS static x 2 LOS static x2  LOS static x2 LOS static 2x  NPV     Stool scoots    4 laps R 4 laps R 4 laps R 4 laps R 4 laps R 4 laps    Bridges        :03x20 NPV    Clamshells        NPV  NPV    Step ups         8 in 2x10 8 in 2x10                                   Modalities                        CP for knee/ tibia 10 min 10 min 10 mi   10 min  10 defer

## 2018-06-07 ENCOUNTER — OFFICE VISIT (OUTPATIENT)
Dept: PHYSICAL THERAPY | Facility: CLINIC | Age: 43
End: 2018-06-07
Payer: COMMERCIAL

## 2018-06-07 DIAGNOSIS — S82.101D CLOSED FRACTURE OF PROXIMAL END OF RIGHT TIBIA WITH ROUTINE HEALING, UNSPECIFIED FRACTURE MORPHOLOGY, SUBSEQUENT ENCOUNTER: ICD-10-CM

## 2018-06-07 DIAGNOSIS — S82.241D CLOSED DISPLACED SPIRAL FRACTURE OF SHAFT OF RIGHT TIBIA WITH ROUTINE HEALING: ICD-10-CM

## 2018-06-07 DIAGNOSIS — S82.831D CLOSED FRACTURE OF HEAD OF RIGHT FIBULA WITH ROUTINE HEALING, SUBSEQUENT ENCOUNTER: Primary | ICD-10-CM

## 2018-06-07 PROCEDURE — 97112 NEUROMUSCULAR REEDUCATION: CPT

## 2018-06-07 PROCEDURE — 97110 THERAPEUTIC EXERCISES: CPT

## 2018-06-07 PROCEDURE — 97140 MANUAL THERAPY 1/> REGIONS: CPT

## 2018-06-07 NOTE — PROGRESS NOTES
Daily Note     Today's date: 2018  Patient name: Gloria Mendoza  : 1975  MRN: [id number]  Referring provider: Julita Graham MD  Dx:   Encounter Diagnosis     ICD-10-CM    1  Closed fracture of head of right fibula with routine healing, subsequent encounter S82 831D    2  Closed fracture of proximal end of right tibia with routine healing, unspecified fracture morphology, subsequent encounter S82 101D    3  Closed displaced spiral fracture of shaft of right tibia with routine healing S82 241D                   Subjective: Patient states she bought a bike on Monday and began riding it short distances around her neighborhood  She note she rode further yesterday which required her to walk her bike up slight hills which exacerbated her pain symptoms  She reports having increase soreness today  She notes she also bought a physioball to perform ball squats on the wall  She ambulates into therapy with SPC  Objective: See treatment diary below  Precautions: N/A     Manual  5/3 5/7 5/8 5/14  5/18  5/21  5/29 5/31  6/4 6/7   STJ/TCJ mobs             cmf    CMF    Knee flexion             cmf KLS  CMF KLS   Re-eval                  CMF      IASTM                  NPV      PF taping                  NPV           Exercise Diary                        Upright bike 5 min 5 min 5 min  5 min  5 min 5 min  5 min  5 min  5 min  5 min   Heel slides [id number] NP :10x10 :10x10  :10x10 :10x10  :10x10 :10x10  NP :10x10   SLR flexion 4 x 5 4 x 5 2x10 3x10  3x10 3x10  3x10 3x10  3x10 3x10   SLR abd           3x10  3x10 3x10   3x10 3x10   ERMI ext              2 min  x2 2 min x2   2 min   x1 3 min x2   DF Stretch Standing 20"x5 on step 20" x 5 on step   :20x5  :20x5 :20x5 :20x5 :20x5   :20x4     Leg Press, single leg 45lb 3 x 10 45lb 3 x 10   50# 4x10  50#   4x10 50# 4x10  50#  3x10 50# 3x10  50#  3x10 55# 3x10   Mini squats   X 10 x20 with ball on wall x20 w/ ball on wall  [id number] ball on wall x20 with ball on wall   x20 with ball on wall x20 with ball on wall  x20 with ball on wall x20 with ball on wall     Heel Raises       2x10  2x10 2x10  3x10 3x10  d/c D/c     Gait      CGA 5 min  Step-overs  2x10 4'' around clinic  around clinic around clinic  NP      Biodex        LOS Lvl 1/2  x2  LOS static x 2 LOS static x2  LOS static x2 LOS static 2x  NPV LOS static 2x   Stool scoots       4 laps R 4 laps R 4 laps R 4 laps R 4 laps R 4 laps     Bridges               :03x20 NPV :03x20   Clamshells               NPV  NPV Red 2x10   Step ups                8 in 2x10 8 in 2x10                                                           Modalities                        CP for knee/ tibia 10 min 10 min 10 mi  10 min  10 defer                                                                 Assessment: Continued with outlined program, slight increase in pain symptoms following riding her bike  Added clamshells with moderate muscle fatigue  Plan: Progress treatment as tolerated

## 2018-06-11 ENCOUNTER — OFFICE VISIT (OUTPATIENT)
Dept: PHYSICAL THERAPY | Facility: CLINIC | Age: 43
End: 2018-06-11
Payer: COMMERCIAL

## 2018-06-11 DIAGNOSIS — S82.241D CLOSED DISPLACED SPIRAL FRACTURE OF SHAFT OF RIGHT TIBIA WITH ROUTINE HEALING: ICD-10-CM

## 2018-06-11 DIAGNOSIS — Z48.89 AFTERCARE FOLLOWING SURGERY: ICD-10-CM

## 2018-06-11 DIAGNOSIS — S82.831D CLOSED FRACTURE OF HEAD OF RIGHT FIBULA WITH ROUTINE HEALING, SUBSEQUENT ENCOUNTER: Primary | ICD-10-CM

## 2018-06-11 DIAGNOSIS — S82.101D CLOSED FRACTURE OF PROXIMAL END OF RIGHT TIBIA WITH ROUTINE HEALING, UNSPECIFIED FRACTURE MORPHOLOGY, SUBSEQUENT ENCOUNTER: ICD-10-CM

## 2018-06-11 PROCEDURE — 97140 MANUAL THERAPY 1/> REGIONS: CPT | Performed by: PHYSICAL THERAPIST

## 2018-06-11 PROCEDURE — 97110 THERAPEUTIC EXERCISES: CPT | Performed by: PHYSICAL THERAPIST

## 2018-06-11 NOTE — PROGRESS NOTES
Daily Note     Today's date: 2018  Patient name: Gloria Mendoza  : 1975  MRN: [id number]  Referring provider: Julita Graham MD  Dx:   Encounter Diagnosis     ICD-10-CM    1  Closed fracture of head of right fibula with routine healing, subsequent encounter S82 831D    2  Closed fracture of proximal end of right tibia with routine healing, unspecified fracture morphology, subsequent encounter S82 101D    3  Closed displaced spiral fracture of shaft of right tibia with routine healing S82 241D    4  Aftercare following surgery Z48 [id number]                   Subjective: Patient stated moderate stiffness prior to treatment session  Objective: See treatment diary below  Precautions: N/A     Manual  5/3 5/7 5/8 5/14  5/18  5/21  5/29 5/31  6/4 6/7 6/11   STJ/TCJ mobs             cmf    CMF     Knee flexion             cmf KLS  CMF KLS KK   Re-eval                  CMF       IASTM                  NPV       PF taping                  NPV            Exercise Diary                         Upright bike 5 min 5 min 5 min  5 min  5 min 5 min  5 min  5 min  5 min  5 min 5 min  Heel slides [id number] NP :10x10 :10x10  :10x10 :10x10  :10x10 :10x10  NP :10x10 np   SLR flexion 4 x 5 4 x 5 2x10 3x10  3x10 3x10  3x10 3x10  3x10 3x10 3x10   SLR abd           3x10  3x10 3x10   3x10 3x10 3x10   ERMI ext              2 min  x2 2 min x2   2 min  x1 3 min x2 3' x 2    DF Stretch Standing 20"x5 on step 20" x 5 on step   :20x5  :20x5 :20x5 :20x5 :20x5   :20x4      Leg Press, single leg 45lb 3 x 10 45lb 3 x 10   50# 4x10  50#   4x10 50# 4x10  50#  3x10 50# 3x10  50#  3x10 55# 3x10 55# 3x10   Mini squats   X 10 x20 with ball on wall x20 w/ ball on wall  [id number] ball on wall x20 with ball on wall   x20 with ball on wall x20 with ball on wall  x20 with ball on wall x20 with ball on wall  20x with ball on wall    Heel Raises       2x10  2x10 2x10  3x10 3x10  d/c D/c      Gait      CGA 5 min   Step-overs  2x10 4'' around clinic  around clinic around clinic  NP       Biodex        LOS Lvl 1/2  x2  LOS static x 2 LOS static x2  LOS static x2 LOS static 2x  NPV LOS static 2x LOS static 3x   Stool scoots       4 laps R 4 laps R 4 laps R 4 laps R 4 laps R 4 laps      Bridges               :03x20 NPV :03x20 :03x20   Clamshells               NPV  NPV Red 2x10 Red 2x10   Step ups                8 in 2x10 8 in 2x10   8 in  2x10                                                           Modalities                        CP for knee/ tibia 10 min 10 min 10 mi  10 min  10 defer                                                                 Assessment: Patient performed exercises without c/o pain; moderate pain with manual knee flexion PROM  Plan: Progress treatment as tolerated

## 2018-06-14 ENCOUNTER — OFFICE VISIT (OUTPATIENT)
Dept: PHYSICAL THERAPY | Facility: CLINIC | Age: 43
End: 2018-06-14
Payer: COMMERCIAL

## 2018-06-14 DIAGNOSIS — S82.101D CLOSED FRACTURE OF PROXIMAL END OF RIGHT TIBIA WITH ROUTINE HEALING, UNSPECIFIED FRACTURE MORPHOLOGY, SUBSEQUENT ENCOUNTER: ICD-10-CM

## 2018-06-14 DIAGNOSIS — S82.831D CLOSED FRACTURE OF HEAD OF RIGHT FIBULA WITH ROUTINE HEALING, SUBSEQUENT ENCOUNTER: Primary | ICD-10-CM

## 2018-06-14 DIAGNOSIS — S82.241D CLOSED DISPLACED SPIRAL FRACTURE OF SHAFT OF RIGHT TIBIA WITH ROUTINE HEALING: ICD-10-CM

## 2018-06-14 PROCEDURE — 97140 MANUAL THERAPY 1/> REGIONS: CPT

## 2018-06-14 PROCEDURE — 97112 NEUROMUSCULAR REEDUCATION: CPT

## 2018-06-14 PROCEDURE — 97110 THERAPEUTIC EXERCISES: CPT

## 2018-06-14 NOTE — PROGRESS NOTES
Daily Note     Today's date: 2018  Patient name: Chelsey Palacio  : 1975  MRN: [id number]  Referring provider: Felicity Santos MD  Dx:   Encounter Diagnosis     ICD-10-CM    1  Closed fracture of head of right fibula with routine healing, subsequent encounter S82 831D    2  Closed fracture of proximal end of right tibia with routine healing, unspecified fracture morphology, subsequent encounter S82 101D    3  Closed displaced spiral fracture of shaft of right tibia with routine healing S82 241D                   Subjective: Patient states she has been able to stair climb while at home with improved strength, but still notes pain and stiffness  She reports being able to "move" around better on a daily basis  She continues to use [facility] for ambulation  Objective: See treatment diary below  Precautions: N/A     Manual     STJ/TCJ mobs           CMF       Knee flexion KLS         CMF KLS KK   Re-eval           CMF        IASTM           NPV        PF taping                  NPV             Exercise Diary                          Upright bike 5 min-NP         5 min  5 min 5 min  Heel slides Manual today         NP :10x10 np   SLR flexion 1 5# 2x10         3x10 3x10 3x10   SLR abd 1 5# 2x10         3x10 3x10 3x10   ERMI ext 3 min x2         2 min   x1 3 min x2 3' x 2    Leg Press, single leg 45lb 3 x 10         50#  3x10 55# 3x10 55# 3x10   Mini squats 20x with ball on wall         x20 with ball on wall x20 with ball on wall  20x with ball on wall    Gait            NP        Biodex            NPV LOS static 2x LOS static 3x   Stool scoots          4 laps       Bridges :03x20        NPV :03x20 :03x20   Clamshells GTB 2x10        NPV Red 2x10 Red 2x10   Step ups           8 in 2x10   8 in  2x10                                                  Modalities              CP knee/tibia                                                                       Assessment: Continued with outlined program; moderate muscle fatigue and pain with LE strengthening  Patient continues to be limited with PROM flexion and ext due to pain  ** Patient arrived 35 minutes late; will resume all exercises next visit  Plan: Progress treatment as tolerated

## 2018-06-18 ENCOUNTER — OFFICE VISIT (OUTPATIENT)
Dept: PHYSICAL THERAPY | Facility: CLINIC | Age: 43
End: 2018-06-18
Payer: COMMERCIAL

## 2018-06-18 DIAGNOSIS — Z48.89 AFTERCARE FOLLOWING SURGERY: ICD-10-CM

## 2018-06-18 DIAGNOSIS — S82.831D CLOSED FRACTURE OF HEAD OF RIGHT FIBULA WITH ROUTINE HEALING, SUBSEQUENT ENCOUNTER: Primary | ICD-10-CM

## 2018-06-18 DIAGNOSIS — S82.241D CLOSED DISPLACED SPIRAL FRACTURE OF SHAFT OF RIGHT TIBIA WITH ROUTINE HEALING: ICD-10-CM

## 2018-06-18 DIAGNOSIS — S82.101D CLOSED FRACTURE OF PROXIMAL END OF RIGHT TIBIA WITH ROUTINE HEALING, UNSPECIFIED FRACTURE MORPHOLOGY, SUBSEQUENT ENCOUNTER: ICD-10-CM

## 2018-06-18 PROCEDURE — 97110 THERAPEUTIC EXERCISES: CPT

## 2018-06-18 PROCEDURE — 97140 MANUAL THERAPY 1/> REGIONS: CPT

## 2018-06-18 PROCEDURE — 97112 NEUROMUSCULAR REEDUCATION: CPT

## 2018-06-18 NOTE — PROGRESS NOTES
Daily Note     Today's date: 2018  Patient name: Chino Jackson  : 1975  MRN: [id number]  Referring provider: Leandro Salas MD  Dx:   Encounter Diagnosis     ICD-10-CM    1  Closed fracture of head of right fibula with routine healing, subsequent encounter S82 831D    2  Closed fracture of proximal end of right tibia with routine healing, unspecified fracture morphology, subsequent encounter S82 101D    3  Closed displaced spiral fracture of shaft of right tibia with routine healing S82 241D    4  Aftercare following surgery Z48 89                   Subjective: Patient reports R knee pain as 4/10 upon arrival  She states it felt worse over the weekend because she is constantly moving  She reports her ankle has been "giving out" on her  Objective: See treatment diary below  Precautions: N/A     Manual     STJ/TCJ mobs                  CMF       Knee flexion KLS KLS              CMF KLS KK   Re-eval                  CMF        IASTM                  NPV        PF taping                  NPV             Exercise Diary                         Upright bike 5 min-NP 5 min RCB              5 min  5 min 5 min  Heel slides Manual today manual              NP :10x10 np   SLR flexion 1 5# 2x10 1 5# 3x10              3x10 3x10 3x10   SLR abd 1 5# 2x10 1 5# 3x10              3x10 3x10 3x10   ERMI ext 3 min x2 3 minx2              2 min   x1 3 min x2 3' x 2    Leg Press, single leg 45lb 3 x 10 45# 3x10              50#  3x10 55# 3x10 55# 3x10   Mini squats 20x with ball on wall 20x with ball on wall              x20 with ball on wall x20 with ball on wall  20x with ball on wall    Gait    Around clinic               NP        Biodex    LOS x2              NPV LOS static 2x LOS static 3x   Stool scoots                 4 laps       Bridges :03x20 :03x30             NPV :03x20 :03x20   Clamshells GTB 2x10 GTB 2x10             NPV Red 2x10 Red 2x10   Step ups    8 in 2x10             8 in 2x10   8 in  2x10   Ankle Tband 4 way   RTB 20x                                                                         Modalities                         CP knee/tibia                                                                                                             Assessment: Continued to progress outlined program  Resumed all exercises this visit with moderate muscle fatigue  Updated HEP  Instructed on proper gait; improved weight shifting without use of SPC  Plan: Progress treatment as tolerated

## 2018-06-21 ENCOUNTER — OFFICE VISIT (OUTPATIENT)
Dept: PHYSICAL THERAPY | Facility: CLINIC | Age: 43
End: 2018-06-21
Payer: COMMERCIAL

## 2018-06-21 DIAGNOSIS — Z48.89 AFTERCARE FOLLOWING SURGERY: ICD-10-CM

## 2018-06-21 DIAGNOSIS — S82.241D CLOSED DISPLACED SPIRAL FRACTURE OF SHAFT OF RIGHT TIBIA WITH ROUTINE HEALING: ICD-10-CM

## 2018-06-21 DIAGNOSIS — S82.831D CLOSED FRACTURE OF HEAD OF RIGHT FIBULA WITH ROUTINE HEALING, SUBSEQUENT ENCOUNTER: Primary | ICD-10-CM

## 2018-06-21 DIAGNOSIS — S82.101D CLOSED FRACTURE OF PROXIMAL END OF RIGHT TIBIA WITH ROUTINE HEALING, UNSPECIFIED FRACTURE MORPHOLOGY, SUBSEQUENT ENCOUNTER: ICD-10-CM

## 2018-06-21 PROCEDURE — 97140 MANUAL THERAPY 1/> REGIONS: CPT

## 2018-06-21 PROCEDURE — 97110 THERAPEUTIC EXERCISES: CPT

## 2018-06-21 PROCEDURE — 97112 NEUROMUSCULAR REEDUCATION: CPT

## 2018-06-21 NOTE — PROGRESS NOTES
Daily Note     Today's date: 2018  Patient name: Esha Bell  : 1975  MRN: [id number]  Referring provider: Jeff Pompa MD  Dx:   Encounter Diagnosis     ICD-10-CM    1  Closed fracture of head of right fibula with routine healing, subsequent encounter S82 831D    2  Closed fracture of proximal end of right tibia with routine healing, unspecified fracture morphology, subsequent encounter S82 101D    3  Closed displaced spiral fracture of shaft of right tibia with routine healing S82 241D    4  Aftercare following surgery Z48 [id number]                   Subjective: Patient reports 3/10 pain upon arrival  She states she was wearing flip flops yesterday which she had noticed an increase in symptoms in R knee and ankle  She continues to attempt ambulation while at home without [facility]; utilizing heel/toe gait pattern  Slight improvement in gait upon arrival to therapy  Objective: See treatment diary below  Precautions: N/A     Manual     STJ/TCJ mobs                  CMF       Knee flexion KLS KLS KLS            CMF KLS KK   Re-eval                  CMF        IASTM                  NPV        PF taping     NPV             NPV             Exercise Diary                       Upright bike 5 min-NP 5 min RCB 5 min RCB             5 min  5 min 5 min  Heel slides Manual today manual manual            NP :10x10 np   SLR flexion 1 5# 2x10 1 5# 3x10 1 5# 3x10            3x10 3x10 3x10   SLR abd 1 5# 2x10 1 5# 3x10 1 5# 3x10            3x10 3x10 3x10   ERMI ext 3 min x2 3 minx2 3 minx2            2 min   x1 3 min x2 3' x 2    Leg Press, single leg 45lb 3 x 10 45# 3x10 55# 3x10            50#  3x10 55# 3x10 55# 3x10   Mini squats 20x with ball on wall 20x with ball on wall 20x with ball on wall             x20 with ball on wall x20 with ball on wall  20x with ball on wall    Gait    Around clinic  Heel/toe            NP        Biodex    LOS x2              NPV LOS static 2x LOS static 3x   Stool scoots                 4 laps       Bridges :03x20 :03x30 :03x30           NPV :03x20 :03x20   Clamshells GTB 2x10 GTB 2x10 GTB 2x10           NPV Red 2x10 Red 2x10   Step ups    8 in 2x10             8 in 2x10   8 in  2x10   Ankle Tband 4 way   RTB 20x GTB 20x                                                                       Modalities                         CP knee/tibia                                                                                                             Assessment: Continued to progress outlined program   Demonstrates improved ROM as charted below:  AROM R knee   Flexion: 112  Extension:7    PROM R knee  Flexion:124  Extension:2  Patient returns to MD tomorrow, follow up next visit  Plan: Progress treatment as tolerated

## 2018-06-22 ENCOUNTER — OFFICE VISIT (OUTPATIENT)
Dept: OBGYN CLINIC | Facility: MEDICAL CENTER | Age: 43
End: 2018-06-22
Payer: COMMERCIAL

## 2018-06-22 ENCOUNTER — APPOINTMENT (OUTPATIENT)
Dept: RADIOLOGY | Facility: MEDICAL CENTER | Age: 43
End: 2018-06-22
Payer: COMMERCIAL

## 2018-06-22 VITALS
HEART RATE: 69 BPM | HEIGHT: 67 IN | SYSTOLIC BLOOD PRESSURE: 115 MMHG | BODY MASS INDEX: 20.4 KG/M2 | WEIGHT: 130 LBS | DIASTOLIC BLOOD PRESSURE: 67 MMHG

## 2018-06-22 DIAGNOSIS — Z98.890 S/P ORIF (OPEN REDUCTION INTERNAL FIXATION) FRACTURE: Primary | ICD-10-CM

## 2018-06-22 DIAGNOSIS — Z87.81 S/P ORIF (OPEN REDUCTION INTERNAL FIXATION) FRACTURE: ICD-10-CM

## 2018-06-22 DIAGNOSIS — Z98.890 S/P ORIF (OPEN REDUCTION INTERNAL FIXATION) FRACTURE: ICD-10-CM

## 2018-06-22 DIAGNOSIS — T84.84XA PAINFUL ORTHOPAEDIC HARDWARE (HCC): ICD-10-CM

## 2018-06-22 DIAGNOSIS — M25.561 ACUTE PAIN OF RIGHT KNEE: ICD-10-CM

## 2018-06-22 DIAGNOSIS — Z87.81 S/P ORIF (OPEN REDUCTION INTERNAL FIXATION) FRACTURE: Primary | ICD-10-CM

## 2018-06-22 PROCEDURE — 99213 OFFICE O/P EST LOW 20 MIN: CPT | Performed by: ORTHOPAEDIC SURGERY

## 2018-06-22 PROCEDURE — 73590 X-RAY EXAM OF LOWER LEG: CPT

## 2018-06-22 NOTE — PROGRESS NOTES
42 y o female today for a re evaluation  5 months S/P Right tibia fibula  ORIF  Patient is currently in PT 2 x a week and using a can to walk  Patient notes she is still having constant pain in the Right leg area  She is having trouble with extending her leg  Ache pain in the Right knee and also swelling in the ankle and knee  She is currently taking Oxycodone when needed and daily Tylenol       Review of Systems  Review of systems negative unless otherwise specified in HPI    Past Medical History  Past Medical History:   Diagnosis Date    Anemia     Edema     Fatigue     Vitamin B12 deficiency     Vitamin D deficiency        Past Surgical History  Past Surgical History:   Procedure Laterality Date    KNEE ARTHROSCOPY Bilateral     KNEE SURGERY      ORIF TIBIA FRACTURE Right 1/18/2018    Procedure: OPEN REDUCTION W/ INTERNAL FIXATION (ORIF) TIBIA;  Surgeon: Ismael Garcia MD;  [facility];  Service: Orthopedics    ORIF TIBIAL PLATEAU Right 2/03/8413    Procedure: OPEN REDUCTION W/ INTERNAL FIXATION (ORIF) TIBIAL PLATEAU;  Surgeon: Ismael Garcia MD;  [facility];  Service: Orthopedics    TUBAL LIGATION         Current Medications  Current Outpatient Prescriptions on File Prior to Visit   Medication Sig Dispense Refill    acetaminophen (TYLENOL) 325 mg tablet 650 mg every 6 hours for mild pain 30 tablet 0    cyanocobalamin 1,000 mcg/mL Inject 1 mL (1,000 mcg total) into the shoulder, thigh, or buttocks every 30 (thirty) days 10 mL 0    ergocalciferol (VITAMIN D2) 50,000 units Take 1 capsule (50,000 Units total) by mouth once a week 12 capsule 0    naproxen (NAPROSYN) 375 mg tablet Take 250 mg by mouth 2 (two) times a day with meals      oxyCODONE (ROXICODONE) 5 mg immediate release tablet Take 1 tablet (5 mg total) by mouth every 6 (six) hours as needed for severe pain Max Daily Amount: 20 mg 60 tablet 0    SYRINGE-NEEDLE, DISP, 3 ML 25G X 1" 3 ML MISC by Does not apply route every 30 (thirty) days 50 each 0     No current facility-administered medications on file prior to visit  Recent Labs [facility] NABILA)    0  Lab Value Date/Time   HCT 39 6 03/29/2018 1002   HCT 43 6 10/17/2015 1109   HGB 12 9 03/29/2018 1002   HGB 14 4 10/17/2015 1109   WBC 5 70 03/29/2018 1002   WBC 4 24 (L) 10/17/2015 1109   INR 1 12 01/18/2018 0430   GLUCOSE 121 01/18/2018 0430   GLUCOSE 121 01/17/2018 2311   GLUCOSE 85 10/17/2015 1109         Physical exam  · General: Awake, Alert, Oriented  · Eyes: Pupils equal, round and reactive to light  · Heart: regular rate and rhythm  · Lungs: No audible wheezing  · Abdomen: soft    Extension good  Skin intact  Tender to the Touch  Incision site healing well     Imaging  Xray Right tibia and fibula findings rods and screws in place  Fracture is healing  Procedure  N/A    Assessment/Plan:   40 y  o female S/P Right tibia Fibula   Discussed with patient about removal or plates, rods and screws  Patient will like to schedule to have rods, plates and screws removed from Right tibia and fibula  Follow up PO

## 2018-06-25 ENCOUNTER — OFFICE VISIT (OUTPATIENT)
Dept: PHYSICAL THERAPY | Facility: CLINIC | Age: 43
End: 2018-06-25
Payer: COMMERCIAL

## 2018-06-25 DIAGNOSIS — S82.241D CLOSED DISPLACED SPIRAL FRACTURE OF SHAFT OF RIGHT TIBIA WITH ROUTINE HEALING: ICD-10-CM

## 2018-06-25 DIAGNOSIS — S82.831D CLOSED FRACTURE OF HEAD OF RIGHT FIBULA WITH ROUTINE HEALING, SUBSEQUENT ENCOUNTER: Primary | ICD-10-CM

## 2018-06-25 DIAGNOSIS — S82.101D CLOSED FRACTURE OF PROXIMAL END OF RIGHT TIBIA WITH ROUTINE HEALING, UNSPECIFIED FRACTURE MORPHOLOGY, SUBSEQUENT ENCOUNTER: ICD-10-CM

## 2018-06-25 PROCEDURE — 97110 THERAPEUTIC EXERCISES: CPT

## 2018-06-25 PROCEDURE — 97112 NEUROMUSCULAR REEDUCATION: CPT

## 2018-06-25 PROCEDURE — 97140 MANUAL THERAPY 1/> REGIONS: CPT

## 2018-06-25 NOTE — PROGRESS NOTES
Daily Note     Today's date: 2018  Patient name: Leatha Stevens  : 1975  MRN: [id number]  Referring provider: Luis Felipe Martínez MD  Dx:   Encounter Diagnosis     ICD-10-CM    1  Closed fracture of head of right fibula with routine healing, subsequent encounter S82 831D    2  Closed fracture of proximal end of right tibia with routine healing, unspecified fracture morphology, subsequent encounter S82 101D    3  Closed displaced spiral fracture of shaft of right tibia with routine healing S82 241D                   Subjective: Patient states she returned to MD which states he will remove hardware in August as planned to date  She has continued pain in her R knee, mostly at night while resting  She states she has been wearing shoes which has helped with her ankle stability  Objective: See treatment diary below  Precautions: N/A     Manual     STJ/TCJ mobs                  CMF       Knee flexion KLS KLS KLS KLS          CMF KLS KK   Re-eval                  CMF        IASTM                  NPV        PF taping     NPV             NPV             Exercise Diary                     Upright bike 5 min-NP 5 min RCB 5 min RCB  5 min RCB          5 min  5 min 5 min  Heel slides Manual today manual manual manual          NP :10x10 np   SLR flexion 1 5# 2x10 1 5# 3x10 1 5# 3x10 1 5# 3x10          3x10 3x10 3x10   SLR abd 1 5# 2x10 1 5# 3x10 1 5# 3x10 1 5# 3x10          3x10 3x10 3x10   ERMI ext 3 min x2 3 minx2 3 minx2 3 min x2          2 min   x1 3 min x2 3' x 2    Leg Press, single leg 45lb 3 x 10 45# 3x10 55# 3x10 60# 3x10          50#  3x10 55# 3x10 55# 3x10   Mini squats 20x with ball on wall 20x with ball on wall 20x with ball on wall  20x with ball on wall           x20 with ball on wall x20 with ball on wall  20x with ball on wall    Gait    Around clinic  Heel/toe Heel/toe          NP        Biodex    LOS x2              NPV LOS static 2x LOS static 3x   Stool scoots                 4 laps       Bridges :03x20 :03x30 :03x30 SL 15x         NPV :03x20 :03x20   Clamshells GTB 2x10 GTB 2x10 GTB 2x10 GTB 2x10         NPV Red 2x10 Red 2x10   Step ups    8 in 2x10             8 in 2x10   8 in  2x10   Ankle Tband 4 way   RTB 20x GTB 20x GTB 20x                                                                     Modalities                         CP knee/tibia                                                                                                              Assessment: Continued with outlined program  Noted muscle fatigue with progressions  No complaints of pain  Plan: Progress treatment as tolerated

## 2018-06-28 ENCOUNTER — OFFICE VISIT (OUTPATIENT)
Dept: PHYSICAL THERAPY | Facility: CLINIC | Age: 43
End: 2018-06-28
Payer: COMMERCIAL

## 2018-06-28 ENCOUNTER — TELEPHONE (OUTPATIENT)
Dept: PREADMISSION TESTING | Facility: HOSPITAL | Age: 43
End: 2018-06-28

## 2018-06-28 DIAGNOSIS — S82.831D CLOSED FRACTURE OF HEAD OF RIGHT FIBULA WITH ROUTINE HEALING, SUBSEQUENT ENCOUNTER: Primary | ICD-10-CM

## 2018-06-28 DIAGNOSIS — S82.241D CLOSED DISPLACED SPIRAL FRACTURE OF SHAFT OF RIGHT TIBIA WITH ROUTINE HEALING: ICD-10-CM

## 2018-06-28 DIAGNOSIS — S82.101D CLOSED FRACTURE OF PROXIMAL END OF RIGHT TIBIA WITH ROUTINE HEALING, UNSPECIFIED FRACTURE MORPHOLOGY, SUBSEQUENT ENCOUNTER: ICD-10-CM

## 2018-06-28 PROCEDURE — 97112 NEUROMUSCULAR REEDUCATION: CPT

## 2018-06-28 PROCEDURE — 97140 MANUAL THERAPY 1/> REGIONS: CPT

## 2018-06-28 PROCEDURE — 97110 THERAPEUTIC EXERCISES: CPT

## 2018-06-28 NOTE — PROGRESS NOTES
Daily Note     Today's date: 2018  Patient name: Lenin Gayle  : 1975  MRN: [id number]  Referring provider: Ramirez Kwan MD  Dx:   Encounter Diagnosis     ICD-10-CM    1  Closed fracture of head of right fibula with routine healing, subsequent encounter S82 831D    2  Closed fracture of proximal end of right tibia with routine healing, unspecified fracture morphology, subsequent encounter S82 101D    3  Closed displaced spiral fracture of shaft of right tibia with routine healing S82 241D                   Subjective: Patient states she had increase pain last night; she notes she usually has increase pain at night but last night she thought the weather might have contributed to increase symptoms  Objective: See treatment diary below  Precautions: N/A     Manual     STJ/TCJ mobs              CMF       Knee flexion KLS KLS KLS KLS KLS    CMF KLS KK   Re-eval              CMF        IASTM              NPV        PF taping     NPV         NPV             Exercise Diary               Upright bike 5 min-NP 5 min RCB 5 min RCB  5 min RCB 5 min    5 min  5 min 5 min  Heel slides Manual today manual manual manual manual    NP :10x10 np   SLR flexion 1 5# 2x10 1 5# 3x10 1 5# 3x10 1 5# 3x10 1 5# 3x10    3x10 3x10 3x10   SLR abd 1 5# 2x10 1 5# 3x10 1 5# 3x10 1 5# 3x10 1 5# 3x10    3x10 3x10 3x10   ERMI ext 3 min x2 3 minx2 3 minx2 3 min x2 3 min x 2    2 min   x1 3 min x2 3' x 2    Leg Press, single leg 45lb 3 x 10 45# 3x10 55# 3x10 60# 3x10     50#  3x10 55# 3x10 55# 3x10   Mini squats 20x with ball on wall 20x with ball on wall 20x with ball on wall  20x with ball on wall  20x with ball on wall    x20 with ball on wall x20 with ball on wall  20x with ball on wall    Gait    Around clinic  Heel/toe Heel/toe Heel/toe    NP        Biodex    LOS x2          NPV LOS static 2x LOS static 3x   Stool scoots             4 laps       Bridges :03x20 :03x30 :03x30 SL 15x SL 30x   NPV :03x20 :03x20   Clamshells GTB 2x10 GTB 2x10 GTB 2x10 GTB 2x10 GTB 2x10   NPV Red 2x10 Red 2x10   Step ups    8 in 2x10     8 in 20x   8 in 2x10   8 in  2x10   Ankle Tband 4 way   RTB 20x GTB 20x GTB 20x GTB 20x            Xwalks         RTB 20'4                                 Modalities                     CP knee/tibia                                                                                                 Assessment: Continued with outlined program  Trialed xwalks with improved stability; noted muscle fatigue  No exacerbating pain symptoms  Discomfort in R quad with positional changes  Plan: Progress treatment as tolerated

## 2018-07-02 ENCOUNTER — OFFICE VISIT (OUTPATIENT)
Dept: PHYSICAL THERAPY | Facility: CLINIC | Age: 43
End: 2018-07-02
Payer: COMMERCIAL

## 2018-07-02 ENCOUNTER — APPOINTMENT (OUTPATIENT)
Dept: RADIOLOGY | Facility: MEDICAL CENTER | Age: 43
End: 2018-07-02
Payer: COMMERCIAL

## 2018-07-02 ENCOUNTER — APPOINTMENT (OUTPATIENT)
Dept: LAB | Facility: MEDICAL CENTER | Age: 43
End: 2018-07-02
Payer: COMMERCIAL

## 2018-07-02 DIAGNOSIS — S82.831D CLOSED FRACTURE OF HEAD OF RIGHT FIBULA WITH ROUTINE HEALING, SUBSEQUENT ENCOUNTER: Primary | ICD-10-CM

## 2018-07-02 DIAGNOSIS — T84.84XA PAINFUL ORTHOPAEDIC HARDWARE (HCC): ICD-10-CM

## 2018-07-02 DIAGNOSIS — M25.561 ACUTE PAIN OF RIGHT KNEE: ICD-10-CM

## 2018-07-02 DIAGNOSIS — Z87.81 S/P ORIF (OPEN REDUCTION INTERNAL FIXATION) FRACTURE: ICD-10-CM

## 2018-07-02 DIAGNOSIS — Z98.890 S/P ORIF (OPEN REDUCTION INTERNAL FIXATION) FRACTURE: ICD-10-CM

## 2018-07-02 DIAGNOSIS — S82.241D CLOSED DISPLACED SPIRAL FRACTURE OF SHAFT OF RIGHT TIBIA WITH ROUTINE HEALING: ICD-10-CM

## 2018-07-02 DIAGNOSIS — S82.101D CLOSED FRACTURE OF PROXIMAL END OF RIGHT TIBIA WITH ROUTINE HEALING, UNSPECIFIED FRACTURE MORPHOLOGY, SUBSEQUENT ENCOUNTER: ICD-10-CM

## 2018-07-02 LAB
ALBUMIN SERPL BCP-MCNC: 3.8 G/DL (ref 3.5–5)
ALP SERPL-CCNC: 82 U/L (ref 46–116)
ALT SERPL W P-5'-P-CCNC: 18 U/L (ref 12–78)
ANION GAP SERPL CALCULATED.3IONS-SCNC: 4 MMOL/L (ref 4–13)
AST SERPL W P-5'-P-CCNC: 9 U/L (ref 5–45)
BILIRUB SERPL-MCNC: 0.5 MG/DL (ref 0.2–1)
BUN SERPL-MCNC: 9 MG/DL (ref 5–25)
CALCIUM SERPL-MCNC: 9.1 MG/DL (ref 8.3–10.1)
CHLORIDE SERPL-SCNC: 106 MMOL/L (ref 100–108)
CO2 SERPL-SCNC: 27 MMOL/L (ref 21–32)
CREAT SERPL-MCNC: 0.74 MG/DL (ref 0.6–1.3)
ERYTHROCYTE [DISTWIDTH] IN BLOOD BY AUTOMATED COUNT: 15.7 % (ref 11.6–15.1)
GFR SERPL CREATININE-BSD FRML MDRD: 116 ML/MIN/1.73SQ M
GLUCOSE SERPL-MCNC: 86 MG/DL (ref 65–140)
HCT VFR BLD AUTO: 44.5 % (ref 34.8–46.1)
HGB BLD-MCNC: 13.7 G/DL (ref 11.5–15.4)
INR PPP: 1.09 (ref 0.86–1.17)
MCH RBC QN AUTO: 27.7 PG (ref 26.8–34.3)
MCHC RBC AUTO-ENTMCNC: 30.8 G/DL (ref 31.4–37.4)
MCV RBC AUTO: 90 FL (ref 82–98)
PLATELET # BLD AUTO: 217 THOUSANDS/UL (ref 149–390)
PMV BLD AUTO: 12.6 FL (ref 8.9–12.7)
POTASSIUM SERPL-SCNC: 4 MMOL/L (ref 3.5–5.3)
PROT SERPL-MCNC: 8.1 G/DL (ref 6.4–8.2)
PROTHROMBIN TIME: 14.2 SECONDS (ref 11.8–14.2)
RBC # BLD AUTO: 4.95 MILLION/UL (ref 3.81–5.12)
SODIUM SERPL-SCNC: 137 MMOL/L (ref 136–145)
WBC # BLD AUTO: 4.38 THOUSAND/UL (ref 4.31–10.16)

## 2018-07-02 PROCEDURE — 71046 X-RAY EXAM CHEST 2 VIEWS: CPT

## 2018-07-02 PROCEDURE — 97140 MANUAL THERAPY 1/> REGIONS: CPT

## 2018-07-02 PROCEDURE — 97112 NEUROMUSCULAR REEDUCATION: CPT

## 2018-07-02 PROCEDURE — 97110 THERAPEUTIC EXERCISES: CPT

## 2018-07-02 PROCEDURE — 36415 COLL VENOUS BLD VENIPUNCTURE: CPT

## 2018-07-02 PROCEDURE — 80053 COMPREHEN METABOLIC PANEL: CPT

## 2018-07-02 PROCEDURE — 85027 COMPLETE CBC AUTOMATED: CPT

## 2018-07-02 PROCEDURE — 85610 PROTHROMBIN TIME: CPT

## 2018-07-02 NOTE — PROGRESS NOTES
Daily Note     Today's date: 2018  Patient name: Chino Jackson  : 1975  MRN: [id number]  Referring provider: Leandro Salas MD  Dx:   Encounter Diagnosis     ICD-10-CM    1  Closed fracture of head of right fibula with routine healing, subsequent encounter S82 831D    2  Closed fracture of proximal end of right tibia with routine healing, unspecified fracture morphology, subsequent encounter S82 101D    3  Closed displaced spiral fracture of shaft of right tibia with routine healing S82 241D                   Subjective: Patient states she performed exercises while swimming over the weekend  She notes continued ankle instability when not wearing supportive shoes  Objective: See treatment diary below  Precautions: N/A          Manual      STJ/TCJ mobs      CMF     Knee flexion KLS KLS KLS KLS  KLS     Re-eval           IASTM           PF taping      KLS        NPV        Exercise Diary            Upright bike 5 min-NP 5 min RCB 5 min RCB  5 min RCB 5 min  5 min  RCB     Heel slides Manual today manual manual manual manual manual     SLR flexion 1 5# 2x10 1 5# 3x10 1 5# 3x10 1 5# 3x10 1 5# 3x10 2# 3x10     SLR abd 1 5# 2x10 1 5# 3x10 1 5# 3x10 1 5# 3x10 1 5# 3x10 2# 3x10     ERMI ext 3 min x2 3 minx2 3 minx2 3 min x2 3 min x 2 3 min   x2     Leg Press, single leg 45lb 3 x 10 45# 3x10 55# 3x10 60# 3x10   60# 3x10      Mini squats 20x with ball on wall 20x with ball on wall 20x with ball on wall  20x with ball on wall  20x with ball on wall x30 with ball on wall     Gait    Around clinic  Heel/toe Heel/toe Heel/toe Heel/toe     Biodex    LOS x2            Stool scoots                Bridges :03x20 :03x30 :03x30 SL 15x SL 30x SL 30x      Clamshells GTB 2x10 GTB 2x10 GTB 2x10 GTB 2x10 GTB 2x10 BTB 2x10      Step ups    8 in 2x10     8 in 20x 8in 20x     Ankle Tband 4 way   RTB 20x GTB 20x GTB 20x GTB 20x  GTB 20x each     Xwalks         RTB 20'4  RTB 20'x 4                       Modalities                 CP knee/tibia                                                                               Assessment: Continued with outlined program  Trialed PF taping, assess nv  She demonstrates improved mobility with wall squats  Increased resistance and reps with moderate muscle fatigue  Instructed on heel/toe continuously with gait around clinic  Will continue to progress strengthening within tolerance  Plan: Progress treatment as tolerated

## 2018-07-05 ENCOUNTER — OFFICE VISIT (OUTPATIENT)
Dept: PHYSICAL THERAPY | Facility: CLINIC | Age: 43
End: 2018-07-05
Payer: COMMERCIAL

## 2018-07-05 DIAGNOSIS — S82.241D CLOSED DISPLACED SPIRAL FRACTURE OF SHAFT OF RIGHT TIBIA WITH ROUTINE HEALING: ICD-10-CM

## 2018-07-05 DIAGNOSIS — S82.831D CLOSED FRACTURE OF HEAD OF RIGHT FIBULA WITH ROUTINE HEALING, SUBSEQUENT ENCOUNTER: Primary | ICD-10-CM

## 2018-07-05 DIAGNOSIS — S82.101D CLOSED FRACTURE OF PROXIMAL END OF RIGHT TIBIA WITH ROUTINE HEALING, UNSPECIFIED FRACTURE MORPHOLOGY, SUBSEQUENT ENCOUNTER: ICD-10-CM

## 2018-07-05 DIAGNOSIS — Z48.89 AFTERCARE FOLLOWING SURGERY: ICD-10-CM

## 2018-07-05 PROCEDURE — 97140 MANUAL THERAPY 1/> REGIONS: CPT

## 2018-07-05 PROCEDURE — 97110 THERAPEUTIC EXERCISES: CPT

## 2018-07-05 PROCEDURE — 97112 NEUROMUSCULAR REEDUCATION: CPT

## 2018-07-05 NOTE — PROGRESS NOTES
Daily Note     Today's date: 2018  Patient name: Nicolasa Sandhu  : 1975  MRN: [id number]  Referring provider: Elyssa Wagner MD  Dx:   Encounter Diagnosis     ICD-10-CM    1  Closed fracture of head of right fibula with routine healing, subsequent encounter S82 831D    2  Closed fracture of proximal end of right tibia with routine healing, unspecified fracture morphology, subsequent encounter S82 101D    3  Closed displaced spiral fracture of shaft of right tibia with routine healing S82 241D    4  Aftercare following surgery [id number]        Start Time: 955  Stop Time: 1110  Total time in clinic (min): 75 minutes    Subjective: Reports upon arival 4/10  Decreased tightness and discomfort post intervention  Client is anticipating surgical intervention in 2018    Precautions: N/A           Manual      STJ/TCJ mobs           CMF       Knee flexion KLS KLS KLS KLS   KLS  KS     Re-eval                   IASTM                   PF taping           KLS             NPV             Exercise Diary                    Upright bike 5 min-NP 5 min RCB 5 min RCB  5 min RCB 5 min  5 min  RCB  5 min RCB     Heel slides Manual today manual manual manual manual manual manual     SLR flexion 1 5# 2x10 1 5# 3x10 1 5# 3x10 1 5# 3x10 1 5# 3x10 2# 3x10  2 # 3 x 10     SLR abd 1 5# 2x10 1 5# 3x10 1 5# 3x10 1 5# 3x10 1 5# 3x10 2# 3x10  2# 3 x 10     ERMI ext 3 min x2 3 minx2 3 minx2 3 min x2 3 min x 2 3 min   x2  3 min x 2     Leg Press, single leg 45lb 3 x 10 45# 3x10 55# 3x10 60# 3x10   60# 3x10   60# 3 x 10     Mini squats 20x with ball on wall 20x with ball on wall 20x with ball on wall  20x with ball on wall  20x with ball on wall x30 with ball on wall  x 30 with ball on wall     Gait    Around clinic  Heel/toe Heel/toe Heel/toe Heel/toe  heel toe     Biodex    LOS x2               Stool scoots                   Bridges :03x20 :03x30 :03x30 SL 15x SL 30x SL 30x   SL 30 x     Clamshells GTB 2x10 GTB 2x10 GTB 2x10 GTB 2x10 GTB 2x10 BTB 2x10   BTB 2 x10     Step ups    8 in 2x10     8 in 20x 8in 20x  8 " x 20     Ankle Tband 4 way   RTB 20x GTB 20x GTB 20x GTB 20x  GTB 20x each  GTB 20 x each     Xwalks         RTB 20'4  RTB 20'x 4  RTB 20'  X 4                         Modalities                   CP knee/tibia                                                                                        Assessment: Tolerated treatment well  Patient exhibited good technique with therapeutic exercises and would benefit from continued PT      Plan: Progress treatment as tolerated

## 2018-07-09 ENCOUNTER — OFFICE VISIT (OUTPATIENT)
Dept: PHYSICAL THERAPY | Facility: CLINIC | Age: 43
End: 2018-07-09
Payer: COMMERCIAL

## 2018-07-09 DIAGNOSIS — S82.831D CLOSED FRACTURE OF HEAD OF RIGHT FIBULA WITH ROUTINE HEALING, SUBSEQUENT ENCOUNTER: Primary | ICD-10-CM

## 2018-07-09 PROCEDURE — 97112 NEUROMUSCULAR REEDUCATION: CPT | Performed by: PHYSICAL THERAPIST

## 2018-07-09 PROCEDURE — 97116 GAIT TRAINING THERAPY: CPT | Performed by: PHYSICAL THERAPIST

## 2018-07-09 PROCEDURE — 97110 THERAPEUTIC EXERCISES: CPT | Performed by: PHYSICAL THERAPIST

## 2018-07-09 NOTE — PROGRESS NOTES
Daily Note     Today's date: 2018  Patient name: Lenin Gayle  : 1975  MRN: [id number]  Referring provider: Ramirez Kwan MD  Dx:   Encounter Diagnosis     ICD-10-CM    1  Closed fracture of head of right fibula with routine healing, subsequent encounter S82 831D        Start Time: 1030  Stop Time: 1150  Total time in clinic (min): 80 minutes    Subjective: Pt did not report pain today  Objective: See treatment diary below  Manual    STJ/TCJ mobs           CMF       Knee flexion KLS KLS KLS KLS   KLS  KS     Re-eval                   IASTM                   PF taping           KLS             NPV             Exercise Diary                    Upright bike 5 min-NP 5 min RCB 5 min RCB  5 min RCB 5 min  5 min  RCB  5 min RCB  5 min   Heel slides Manual today manual manual manual manual manual manual  manual   SLR flexion 1 5# 2x10 1 5# 3x10 1 5# 3x10 1 5# 3x10 1 5# 3x10 2# 3x10  2 # 3 x 10  1 5#  3x10   SLR abd 1 5# 2x10 1 5# 3x10 1 5# 3x10 1 5# 3x10 1 5# 3x10 2# 3x10  2# 3 x 10  1 5#  3x10   ERMI ext 3 min x2 3 minx2 3 minx2 3 min x2 3 min x 2 3 min   x2  3 min x 2  3min x2   Leg Press, single leg 45lb 3 x 10 45# 3x10 55# 3x10 60# 3x10   60# 3x10   60# 3 x 10  60#  3x10   Mini squats 20x with ball on wall 20x with ball on wall 20x with ball on wall  20x with ball on wall  20x with ball on wall x30 with ball on wall  x 30 with ball on wall  x30 with ball   Gait    Around clinic  Heel/toe Heel/toe Heel/toe Heel/toe  heel toe  heel toe   Biodex    LOS x2               Stool scoots                    Bridges :03x20 :03x30 :03x30 SL 15x SL 30x SL 30x   SL 30 x SL 30x   Clamshells GTB 2x10 GTB 2x10 GTB 2x10 GTB 2x10 GTB 2x10 BTB 2x10   BTB 2 x10  BTB  2x10   Step ups    8 in 2x10     8 in 20x 8in 20x  8 " x 20  8"x20   Ankle Tband 4 way   RTB 20x GTB 20x GTB 20x GTB 20x  GTB 20x each  GTB 20 x each  GTB  20x each   Xwalks         RTB 20'4  RTB 20'x 4  RTB 20'  X 4  RTB  20'x4   Sit to stands with R leg in front        10x    Shallow Lunges                 10x each side with support   Modalities                   CP knee/tibia                                                                                        Assessment: Tolerated treatment well  New exercises were introduced to further strengthen R LE  Pt required cues during new exercises for proper technique  She demonstrated fatigue following the new exercises  Patient would benefit from continued PT to improve overall strength of R LE and improve functional mobility  Plan: Continue per plan of care

## 2018-07-12 ENCOUNTER — EVALUATION (OUTPATIENT)
Dept: PHYSICAL THERAPY | Facility: CLINIC | Age: 43
End: 2018-07-12
Payer: COMMERCIAL

## 2018-07-12 DIAGNOSIS — S82.241D CLOSED DISPLACED SPIRAL FRACTURE OF SHAFT OF RIGHT TIBIA WITH ROUTINE HEALING: ICD-10-CM

## 2018-07-12 DIAGNOSIS — S82.831D CLOSED FRACTURE OF HEAD OF RIGHT FIBULA WITH ROUTINE HEALING, SUBSEQUENT ENCOUNTER: Primary | ICD-10-CM

## 2018-07-12 DIAGNOSIS — S82.101D CLOSED FRACTURE OF PROXIMAL END OF RIGHT TIBIA WITH ROUTINE HEALING, UNSPECIFIED FRACTURE MORPHOLOGY, SUBSEQUENT ENCOUNTER: ICD-10-CM

## 2018-07-12 PROCEDURE — 97110 THERAPEUTIC EXERCISES: CPT | Performed by: PHYSICAL THERAPIST

## 2018-07-12 PROCEDURE — G8991 OTHER PT/OT GOAL STATUS: HCPCS

## 2018-07-12 PROCEDURE — 97116 GAIT TRAINING THERAPY: CPT | Performed by: PHYSICAL THERAPIST

## 2018-07-12 PROCEDURE — G8990 OTHER PT/OT CURRENT STATUS: HCPCS

## 2018-07-12 PROCEDURE — 97112 NEUROMUSCULAR REEDUCATION: CPT | Performed by: PHYSICAL THERAPIST

## 2018-07-12 PROCEDURE — 97140 MANUAL THERAPY 1/> REGIONS: CPT | Performed by: PHYSICAL THERAPIST

## 2018-07-12 NOTE — PROGRESS NOTES
PT Evaluation     Today's date: 2018  Patient name: Aure Wu  : 1975  MRN: [id number]  Referring provider: Rosa Maria Sanabria MD  Dx:   Encounter Diagnosis     ICD-10-CM    1  Closed fracture of head of right fibula with routine healing, subsequent encounter S82 831D    2  Closed fracture of proximal end of right tibia with routine healing, unspecified fracture morphology, subsequent encounter S82 101D    3  Closed displaced spiral fracture of shaft of right tibia with routine healing S82 241D                   Assessment  Impairments: abnormal coordination, abnormal gait, abnormal muscle firing, abnormal muscle tone, abnormal or restricted ROM, abnormal movement, activity intolerance, impaired balance, impaired physical strength, pain with function and weight-bearing intolerance    Assessment details: Patient presents with improved swelling, improved range of motion, improved strength  She still has significant deficits noted in strength and ROM  Plan to continue with physical therapy to minimize impairments and maximize function  Thank you for the referral    Understanding of Dx/Px/POC: good   Prognosis: good    Goals  Impairment Related Goals:  Patient will have full  knee flexion/ extension AROM in 6 weeks  --not met  Patient will have full ankle AROM in 6 weeks  -- not met  Patient will have gross LE strength minimum 4/5 in 8 weeks  -- met  Patient will have 50% improvement in strength in 8 weeks  -- met  Functional Goals:  Patient will be able to walk two blocks with no difficulty in 12 weeks  --not met  Patient will report no difficulty with household chores in 12 weeks  --Not met  Patient will be independent with HEP in 12 weeks - partially met  Patient will ascend/ descend flight of steps with minimal to no pain/ compensation/ difficulty in 12 weeks   - not met (minimal difficulty)     Plan  Patient would benefit from: skilled PT  Planned modality interventions: cryotherapy  Planned therapy interventions: joint mobilization, manual therapy, massage, Espinal taping, neuromuscular re-education, patient education, postural training, body mechanics training, behavior modification, balance/weight bearing training, balance, strengthening, stretching, therapeutic exercise, flexibility, gait training, graded activity, graded exercise, home exercise program and community reintegration  Frequency: 2x week  Duration in weeks: 8  Treatment plan discussed with: patient and PTA        Subjective Evaluation    History of Present Illness  Mechanism of injury: Patient reports 60-70% improvement since starting physical therapy  She is still having difficulty standing for more than 15 minutes, walking long distances and going down stairs  She feels as though her overall mobility has improved and she can stand longer than she did prior to therapy  She is not using a device at home or for short distances but she uses a SPC for long distances and uneven surfaces  She notes her pain  at her R knee and ankle  Ankle pain is worse without shoes  She is not currently wearing orthotics  She notes 4/10 pain currently at the R ankle and 7/10 at the worse  She notes she is still having difficulty sleeping at night due to pain at the R knee  She reports numbness from her R ankle down to her foot  Pain  No pain reported  Current pain ratin  At best pain ratin  At worst pain ratin  Location: R knee  Quality: dull ache, burning and throbbing    Patient Goals  Patient goal: "Be back to where I was before the accident  Be able to walk without assistance  Be able to walk my dog "        Objective     Tenderness     Right Knee   Tenderness in the lateral joint line, lateral patella, medial joint line and pes anserinus  Additional Tenderness Details  Over incision sites and throughout anterior tibialis  Tenderness also noted over R tarsal tunnel      Neurological Testing     Sensation Knee   Left Knee   Intact: light touch    Right Knee   Intact: light touch   Diminished: light touch     Additional Neurological Details  Neurovascular itact  Active Range of Motion     Right Knee   Flexion: 112 degrees   Extension: 6 degrees     Right Ankle/Foot   Dorsiflexion (ke): 2 degrees   Plantar flexion: WFL  Inversion: 18 degrees   Eversion: 25 degrees     Passive Range of Motion     Right Knee   Flexion: 120 degrees   Extension: 2 degrees     Joint Play     Right Ankle/Foot  Joints within functional limits are the midfoot and forefoot  Hypomobile in the fibular head, proximal tibiofibular joint, distal tibiofibular joint, talocrural joint and subtalar joint  Strength/Myotome Testing     Right Ankle/Foot   Dorsiflexion: 4+  Plantar flexion: 4+  Inversion: 4+  Eversion: 4+    Additional Strength Details  4+/5 noted with R hip abductors  4-/5 noted at the R quad  4+/5 at R hamstring 4/5 in ankle plantar flexors and dorsiflexors  Tests     Additional Tests Details        Swelling     Right Ankle/Foot   Metatarsal heads: 18 cm  Figure 8: 46 cm    General Comments     Knee Comments  Decreased step length noted on the L due to poor toe off on the R  Ankle/Foot Comments   B calcaneal eversion is noted during WB   OTC orthotics recommended  Pt plans to get them             Precautions: N/A    Manual 7/12 7/9   STJ/TCJ mobs CMF           Knee flexion            Re-eval CMF/SM           IASTM            PF taping                         Exercise Diary             Upright bike 5 min          5 min   Heel slides manual        manual   SLR flexion 2#  3x10        1 5#  3x10   SLR abd 2#  3x10        1 5#  3x10   ERMI ext 3min x2        3min x2   Leg Press, single leg 3x10        60#  3x10   Mini squats         x30 with ball   Gait  Heel toe        heel toe   Biodex             Stool scoots             Bridges SL 30x       SL 30x   Clamshells         BTB  2x10   Step ups  8"x20        8"x20 Ankle Tband 4 way GTB  20x each        GTB  20x each   Xwalks RTB  20'x4        RTB  20'x4   Sit to stands with R leg in front 10x       10x    Shallow Lunges          10x each side with support   Modalities            CP knee/tibia

## 2018-07-23 NOTE — PRE-PROCEDURE INSTRUCTIONS
Pre-Surgery Instructions:   Medication Instructions    acetaminophen (TYLENOL) 325 mg tablet Instructed patient per Anesthesia Guidelines   cyanocobalamin 1,000 mcg/mL Patient was instructed by Physician and understands   ergocalciferol (VITAMIN D2) 50,000 units Patient was instructed by Physician and understands   naproxen (NAPROSYN) 375 mg tablet epic    oxyCODONE (ROXICODONE) 5 mg immediate release tablet epic   Acetaminophen Med Class     Continue to take this medication on your normal schedule  If this is an oral medication and you take it in the morning, then you may take this medicine with a sip of water  NSAID Med Class     Stop taking this medication at least 3 days prior to surgery/procedure    Pre procedure instructions given verbalizes understanding

## 2018-07-30 ENCOUNTER — APPOINTMENT (OUTPATIENT)
Dept: PHYSICAL THERAPY | Facility: CLINIC | Age: 43
End: 2018-07-30
Payer: COMMERCIAL

## 2018-08-02 ENCOUNTER — HOSPITAL ENCOUNTER (OUTPATIENT)
Facility: HOSPITAL | Age: 43
Setting detail: OUTPATIENT SURGERY
Discharge: HOME | End: 2018-08-02
Attending: ORTHOPAEDIC SURGERY | Admitting: ORTHOPAEDIC SURGERY
Payer: COMMERCIAL

## 2018-08-02 ENCOUNTER — APPOINTMENT (OUTPATIENT)
Dept: RADIOLOGY | Facility: HOSPITAL | Age: 43
End: 2018-08-02
Payer: COMMERCIAL

## 2018-08-02 ENCOUNTER — ANESTHESIA (OUTPATIENT)
Dept: PERIOP | Facility: HOSPITAL | Age: 43
End: 2018-08-02
Payer: COMMERCIAL

## 2018-08-02 ENCOUNTER — ANESTHESIA EVENT (OUTPATIENT)
Dept: PERIOP | Facility: HOSPITAL | Age: 43
End: 2018-08-02
Payer: COMMERCIAL

## 2018-08-02 VITALS
WEIGHT: 140 LBS | OXYGEN SATURATION: 100 % | BODY MASS INDEX: 21.97 KG/M2 | DIASTOLIC BLOOD PRESSURE: 49 MMHG | RESPIRATION RATE: 16 BRPM | SYSTOLIC BLOOD PRESSURE: 108 MMHG | TEMPERATURE: 99.2 F | HEART RATE: 72 BPM | HEIGHT: 67 IN

## 2018-08-02 DIAGNOSIS — T84.84XA PAINFUL ORTHOPAEDIC HARDWARE (HCC): Primary | ICD-10-CM

## 2018-08-02 PROCEDURE — 73590 X-RAY EXAM OF LOWER LEG: CPT

## 2018-08-02 PROCEDURE — 20680 REMOVAL OF IMPLANT DEEP: CPT | Performed by: ORTHOPAEDIC SURGERY

## 2018-08-02 RX ORDER — LIDOCAINE HYDROCHLORIDE 10 MG/ML
INJECTION, SOLUTION INFILTRATION; PERINEURAL AS NEEDED
Status: DISCONTINUED | OUTPATIENT
Start: 2018-08-02 | End: 2018-08-02 | Stop reason: SURG

## 2018-08-02 RX ORDER — FENTANYL CITRATE/PF 50 MCG/ML
25 SYRINGE (ML) INJECTION
Status: DISCONTINUED | OUTPATIENT
Start: 2018-08-02 | End: 2018-08-02 | Stop reason: HOSPADM

## 2018-08-02 RX ORDER — MEPERIDINE HYDROCHLORIDE 25 MG/ML
12.5 INJECTION INTRAMUSCULAR; INTRAVENOUS; SUBCUTANEOUS ONCE
Status: COMPLETED | OUTPATIENT
Start: 2018-08-02 | End: 2018-08-02

## 2018-08-02 RX ORDER — SODIUM CHLORIDE, SODIUM LACTATE, POTASSIUM CHLORIDE, CALCIUM CHLORIDE 600; 310; 30; 20 MG/100ML; MG/100ML; MG/100ML; MG/100ML
20 INJECTION, SOLUTION INTRAVENOUS CONTINUOUS
Status: DISCONTINUED | OUTPATIENT
Start: 2018-08-02 | End: 2018-08-02 | Stop reason: HOSPADM

## 2018-08-02 RX ORDER — METOCLOPRAMIDE HYDROCHLORIDE 5 MG/ML
10 INJECTION INTRAMUSCULAR; INTRAVENOUS ONCE AS NEEDED
Status: DISCONTINUED | OUTPATIENT
Start: 2018-08-02 | End: 2018-08-02 | Stop reason: HOSPADM

## 2018-08-02 RX ORDER — ALBUTEROL SULFATE 2.5 MG/3ML
2.5 SOLUTION RESPIRATORY (INHALATION) ONCE AS NEEDED
Status: DISCONTINUED | OUTPATIENT
Start: 2018-08-02 | End: 2018-08-02 | Stop reason: HOSPADM

## 2018-08-02 RX ORDER — MAGNESIUM HYDROXIDE 1200 MG/15ML
LIQUID ORAL AS NEEDED
Status: DISCONTINUED | OUTPATIENT
Start: 2018-08-02 | End: 2018-08-02 | Stop reason: HOSPADM

## 2018-08-02 RX ORDER — PROPOFOL 10 MG/ML
INJECTION, EMULSION INTRAVENOUS AS NEEDED
Status: DISCONTINUED | OUTPATIENT
Start: 2018-08-02 | End: 2018-08-02 | Stop reason: SURG

## 2018-08-02 RX ORDER — OXYCODONE HYDROCHLORIDE 5 MG/1
5 TABLET ORAL EVERY 4 HOURS PRN
Status: DISCONTINUED | OUTPATIENT
Start: 2018-08-02 | End: 2018-08-02 | Stop reason: HOSPADM

## 2018-08-02 RX ORDER — SCOLOPAMINE TRANSDERMAL SYSTEM 1 MG/1
1 PATCH, EXTENDED RELEASE TRANSDERMAL
Status: DISCONTINUED | OUTPATIENT
Start: 2018-08-02 | End: 2018-08-02 | Stop reason: HOSPADM

## 2018-08-02 RX ORDER — PROMETHAZINE HYDROCHLORIDE 25 MG/ML
25 INJECTION, SOLUTION INTRAMUSCULAR; INTRAVENOUS ONCE AS NEEDED
Status: DISCONTINUED | OUTPATIENT
Start: 2018-08-02 | End: 2018-08-02 | Stop reason: HOSPADM

## 2018-08-02 RX ORDER — ONDANSETRON 2 MG/ML
4 INJECTION INTRAMUSCULAR; INTRAVENOUS ONCE AS NEEDED
Status: DISCONTINUED | OUTPATIENT
Start: 2018-08-02 | End: 2018-08-02 | Stop reason: HOSPADM

## 2018-08-02 RX ORDER — FENTANYL CITRATE 50 UG/ML
INJECTION, SOLUTION INTRAMUSCULAR; INTRAVENOUS AS NEEDED
Status: DISCONTINUED | OUTPATIENT
Start: 2018-08-02 | End: 2018-08-02 | Stop reason: SURG

## 2018-08-02 RX ORDER — OXYCODONE HYDROCHLORIDE 5 MG/1
TABLET ORAL
Qty: 30 TABLET | Refills: 0 | Status: SHIPPED | OUTPATIENT
Start: 2018-08-02 | End: 2018-08-17

## 2018-08-02 RX ORDER — ONDANSETRON 2 MG/ML
4 INJECTION INTRAMUSCULAR; INTRAVENOUS EVERY 6 HOURS PRN
Status: DISCONTINUED | OUTPATIENT
Start: 2018-08-02 | End: 2018-08-02 | Stop reason: HOSPADM

## 2018-08-02 RX ORDER — ONDANSETRON 2 MG/ML
INJECTION INTRAMUSCULAR; INTRAVENOUS AS NEEDED
Status: DISCONTINUED | OUTPATIENT
Start: 2018-08-02 | End: 2018-08-02 | Stop reason: SURG

## 2018-08-02 RX ORDER — OXYCODONE HYDROCHLORIDE 10 MG/1
10 TABLET ORAL EVERY 4 HOURS PRN
Status: DISCONTINUED | OUTPATIENT
Start: 2018-08-02 | End: 2018-08-02 | Stop reason: HOSPADM

## 2018-08-02 RX ADMIN — PROPOFOL 150 MG: 10 INJECTION, EMULSION INTRAVENOUS at 08:44

## 2018-08-02 RX ADMIN — HYDROMORPHONE HYDROCHLORIDE 0.2 MG: 1 INJECTION, SOLUTION INTRAMUSCULAR; INTRAVENOUS; SUBCUTANEOUS at 10:39

## 2018-08-02 RX ADMIN — ONDANSETRON 4 MG: 2 INJECTION INTRAMUSCULAR; INTRAVENOUS at 09:00

## 2018-08-02 RX ADMIN — HYDROMORPHONE HYDROCHLORIDE 0.2 MG: 1 INJECTION, SOLUTION INTRAMUSCULAR; INTRAVENOUS; SUBCUTANEOUS at 10:34

## 2018-08-02 RX ADMIN — FENTANYL CITRATE 50 MCG: 50 INJECTION, SOLUTION INTRAMUSCULAR; INTRAVENOUS at 09:03

## 2018-08-02 RX ADMIN — FENTANYL CITRATE 25 MCG: 50 INJECTION, SOLUTION INTRAMUSCULAR; INTRAVENOUS at 10:25

## 2018-08-02 RX ADMIN — PROPOFOL 50 MG: 10 INJECTION, EMULSION INTRAVENOUS at 08:55

## 2018-08-02 RX ADMIN — FENTANYL CITRATE 25 MCG: 50 INJECTION, SOLUTION INTRAMUSCULAR; INTRAVENOUS at 10:01

## 2018-08-02 RX ADMIN — OXYCODONE HYDROCHLORIDE 10 MG: 10 TABLET ORAL at 11:38

## 2018-08-02 RX ADMIN — SODIUM CHLORIDE, SODIUM LACTATE, POTASSIUM CHLORIDE, AND CALCIUM CHLORIDE: .6; .31; .03; .02 INJECTION, SOLUTION INTRAVENOUS at 08:38

## 2018-08-02 RX ADMIN — MEPERIDINE HYDROCHLORIDE 12.5 MG: 25 INJECTION INTRAMUSCULAR; INTRAVENOUS; SUBCUTANEOUS at 09:59

## 2018-08-02 RX ADMIN — FENTANYL CITRATE 25 MCG: 50 INJECTION, SOLUTION INTRAMUSCULAR; INTRAVENOUS at 09:00

## 2018-08-02 RX ADMIN — SODIUM CHLORIDE, SODIUM LACTATE, POTASSIUM CHLORIDE, AND CALCIUM CHLORIDE 20 ML/HR: .6; .31; .03; .02 INJECTION, SOLUTION INTRAVENOUS at 08:36

## 2018-08-02 RX ADMIN — FENTANYL CITRATE 25 MCG: 50 INJECTION, SOLUTION INTRAMUSCULAR; INTRAVENOUS at 10:20

## 2018-08-02 RX ADMIN — SCOPOLAMINE 1 PATCH: 1 PATCH, EXTENDED RELEASE TRANSDERMAL at 08:00

## 2018-08-02 RX ADMIN — HYDROMORPHONE HYDROCHLORIDE 0.2 MG: 1 INJECTION, SOLUTION INTRAMUSCULAR; INTRAVENOUS; SUBCUTANEOUS at 10:48

## 2018-08-02 RX ADMIN — FENTANYL CITRATE 25 MCG: 50 INJECTION, SOLUTION INTRAMUSCULAR; INTRAVENOUS at 10:09

## 2018-08-02 RX ADMIN — LIDOCAINE HYDROCHLORIDE 50 MG: 10 INJECTION, SOLUTION INFILTRATION; PERINEURAL at 08:44

## 2018-08-02 RX ADMIN — HYDROMORPHONE HYDROCHLORIDE 0.2 MG: 1 INJECTION, SOLUTION INTRAMUSCULAR; INTRAVENOUS; SUBCUTANEOUS at 10:55

## 2018-08-02 RX ADMIN — HYDROMORPHONE HYDROCHLORIDE 0.2 MG: 1 INJECTION, SOLUTION INTRAMUSCULAR; INTRAVENOUS; SUBCUTANEOUS at 10:27

## 2018-08-02 RX ADMIN — DEXAMETHASONE SODIUM PHOSPHATE 4 MG: 10 INJECTION INTRAMUSCULAR; INTRAVENOUS at 09:00

## 2018-08-02 RX ADMIN — CEFAZOLIN SODIUM 2000 MG: 2 SOLUTION INTRAVENOUS at 08:47

## 2018-08-02 RX ADMIN — FENTANYL CITRATE 25 MCG: 50 INJECTION, SOLUTION INTRAMUSCULAR; INTRAVENOUS at 08:44

## 2018-08-02 NOTE — CONSULTS
Office Visit     6/22/2018  [address] [place] [address] [facility] [place]      Robyn Jara MD   Orthopedic Surgery   S/P ORIF (open reduction internal fixation) fracture +2 more   Dx   Right Knee - Follow-up;  Referred by Jose Cruz Garza MD   Reason for Visit    Encounter Notes         Progress Notes from [facility] (Orthopedic Surgery)   Instructions         Return for PO Right Tiba Fibual      After Visit Summary (Printed 6/22/2018)   Additional Documentation     Vitals:    /67    Pulse 69    Ht 5' 7" (1 702 m)    Wt 59 kg (130 lb)    BMI 20 36 kg/m²    BSA 1 68 m²         More Vitals    Flowsheets:    Custom Formula Data       SmartForms:    [facility] PRE-CHARTING     [facility] SCRIBE ATTESTATION       Encounter Info:    Billing Info,    History,    Allergies,    Detailed Report       Orders Placed         CBC and Platelet       Comprehensive metabolic panel       Protime-INR      Basic metabolic panel       XR chest pa & lateral       XR tibia fibula 2 vw right      Case request operating room: REMOVAL HARDWARE TIBIA Once      EKG 12 lead      All Encounter Results   Medication Changes        None      Medication List   Visit Diagnoses         S/P ORIF (open reduction internal fixation) fracture      Acute pain of right knee      Painful orthopaedic hardware [facility])      Problem List

## 2018-08-02 NOTE — OP NOTE
OPERATIVE REPORT  PATIENT NAME: Qamar Veras    :  1975  MRN: [id number]  Pt Location:  OR ROOM 15    SURGERY DATE: 2018    Surgeon(s) and Role:     * Juni Bhakta MD - Primary     * Ana Schneider PA-C - Assisting     * Chey Monsivais - Assisting    Preop Diagnosis:  Painful orthopaedic hardware [facility]) [T84 84XA]  Acute pain of right knee [M25 561]    Post-Op Diagnosis Codes:     * Painful orthopaedic hardware ([address] ) [T84 84XA]     * Acute pain of right knee [M25 561]    Procedure(s) (LRB):  REMOVAL HARDWARE TIBIA (Right)    Specimen(s):  * No specimens in log *    Estimated Blood Loss:   Minimal    Drains:       Anesthesia Type:   General    Operative Indications:  Painful orthopaedic hardware ([address] ) [T84 84XA]  Acute pain of right knee [M25 561]      Operative Findings:  Fluoroscopically documented removal of hardware     Complications:   None    Procedure and Technique: Following induction of adequate level of general anesthesia, the right lower extremity then had a thigh-high tourniquet applied  It was not utilized during the case  The right lower extremity was then prepped and draped sterilely  Antibiotics were administered  Her 2 previous incision was opened up  The anterior compartment was lifted off the proximal lateral tibia, the tibial shaft distally  The plate screws were exposed  Utilizing the appropriate screwdrivers, the screws removed without difficulty  The plate was then lifted off the bone and removed through the proximal incision  The fluoroscope was brought in, and final fluoroscopic films document a healed fracture, and no hardware  Satisfied with the extent of surgery, the wounds then flushed with saline and closed  The anterior compartment fascia was closed loosely with number Vicryl suture  The subcu tissue closed 2 Vicryl suture  The skin was closed to a nylons  A sterile dressings applied    She was then awakened from general anesthesia, taken recovery room stable condition with plans to include follow up in the office as an outpatient   I was present for the entire procedure    Patient Disposition:  PACU     SIGNATURE: Robyn Jara MD  DATE: August 2, 2018  TIME: 9:32 AM

## 2018-08-02 NOTE — ANESTHESIA PREPROCEDURE EVALUATION
Review of Systems/Medical History  Patient summary reviewed  Chart reviewed  No history of anesthetic complications     Cardiovascular  Negative cardio ROS Exercise tolerance (METS): >4,     Pulmonary  Negative pulmonary ROS        GI/Hepatic  Negative GI/hepatic ROS          Negative  ROS        Endo/Other  Negative endo/other ROS      GYN  Negative gynecology ROS          Hematology  Anemia ,     Musculoskeletal    Arthritis     Neurology  Negative neurology ROS      Psychology   Negative psychology ROS              Physical Exam    Airway    Mallampati score: II  TM Distance: >3 FB  Neck ROM: full     Dental   No notable dental hx     Cardiovascular  Comment: Negative ROS, Rhythm: regular, Rate: normal, Cardiovascular exam normal    Pulmonary  Pulmonary exam normal Breath sounds clear to auscultation,     Other Findings        Anesthesia Plan  ASA Score- 2     Anesthesia Type- general with ASA Monitors  Additional Monitors:   Airway Plan: LMA  Plan Factors-    Induction- intravenous  Postoperative Plan-     Informed Consent- Anesthetic plan and risks discussed with patient  I personally reviewed this patient with the CRNA  Discussed and agreed on the Anesthesia Plan with the CRNA  Mai Rowland

## 2018-08-02 NOTE — DISCHARGE INSTRUCTIONS
Discharge Instructions - Orthopedics  Narendra Garcia 44 y o  female MRN: [id number]  Unit/Bed#: PACU 10    Weight Bearing Status:                                           Allowed right leg weight-bearing as tolerated    DVT prophylaxis:  Take 1 adult strength aspirin twice a day    Pain:  Continue analgesics as directed    Dressing Instructions:   Keep dressing clean, dry and intact until follow up appointment  PT/OT:  N/A    Appt Instructions: If you do not have your appointment, please call the clinic at [phone] to f/u with Dr Cesar Perdomo in 2 weeks  Otherwise followup as scheduled below:      Contact the office sooner if you experience any increased numbness/tingling in the extremities        Miscellaneous:

## 2018-08-02 NOTE — INTERVAL H&P NOTE
H&P reviewed  After examining the patient I find no changes in the patients condition since the H&P had been written      Preop for hardware removal from the right tibia

## 2018-08-02 NOTE — H&P (VIEW-ONLY)
Office Visit     6/22/2018  [address] [facility] [place]      Sandhya Pavon MD   Orthopedic Surgery   S/P ORIF (open reduction internal fixation) fracture +2 more   Dx   Right Knee - Follow-up;  Referred by Satinder Pappas MD   Reason for Visit    Encounter Notes         Progress Notes from [facility] (Orthopedic Surgery)   Instructions         Return for PO Right Tiba Fibual      After Visit Summary (Printed 6/22/2018)   Additional Documentation     Vitals:    /67    Pulse 69    Ht 5' 7" (1 702 m)    Wt 59 kg (130 lb)    BMI 20 36 kg/m²    BSA 1 68 m²         More Vitals    Flowsheets:    Custom Formula Data       SmartForms:    [facility] PRE-CHARTING     [facility] SCRIBE ATTESTATION       Encounter Info:    Billing Info,    History,    Allergies,    Detailed Report       Orders Placed         CBC and Platelet       Comprehensive metabolic panel       Protime-INR      Basic metabolic panel       XR chest pa & lateral       XR tibia fibula 2 vw right      Case request operating room: REMOVAL HARDWARE TIBIA Once      EKG 12 lead      All Encounter Results   Medication Changes        None      Medication List   Visit Diagnoses         S/P ORIF (open reduction internal fixation) fracture      Acute pain of right knee      Painful orthopaedic hardware [facility])      Problem List

## 2018-08-02 NOTE — ANESTHESIA POSTPROCEDURE EVALUATION
Post-Op Assessment Note      CV Status:  Stable    Mental Status:  Awake    Hydration Status:  Stable    PONV Controlled:  None    Airway Patency:  Patent        Staff: CRNA, Anesthesiologist       Comments: Pt awake and following commands          /66 (08/02/18 0953)    Temp 98 4 °F (36 9 °C) (08/02/18 0953)    Pulse 78 (08/02/18 0953)   Resp 14 (08/02/18 0953)    SpO2 99 % (08/02/18 0953)

## 2018-08-02 NOTE — PERIOPERATIVE NURSING NOTE
VSS, pt denies denies nausea, reports improvement in pain, resting quietly, assessment unchanged, report called, no questions, pt transferred to [facility]

## 2018-08-03 ENCOUNTER — TELEPHONE (OUTPATIENT)
Dept: OBGYN CLINIC | Facility: CLINIC | Age: 43
End: 2018-08-03

## 2018-08-03 NOTE — TELEPHONE ENCOUNTER
Patient's discharge instruction states to leave her dressing in placed until post op which is 2 weeks away  Please advise if this is correct or if she is okay to change it after 3 days, and okay to shower

## 2018-08-03 NOTE — TELEPHONE ENCOUNTER
Dr Maryanne Rodriguez has a question about her dressing sp right lower leg hardware removal 8/2/18  Best contact #[phone]    Thank you

## 2018-08-03 NOTE — TELEPHONE ENCOUNTER
She has 2 large incisions    I anticipate drainage to continue for a short duration of time,  Given the removal of hardware and all the screw holes  I was trying to keep the dressing longer rather than shorter    Please negotiate at least 5 days postop rather than 3    Then the patient can change it, and do that around her showering      Thank you

## 2018-08-17 ENCOUNTER — APPOINTMENT (OUTPATIENT)
Dept: RADIOLOGY | Facility: MEDICAL CENTER | Age: 43
End: 2018-08-17
Payer: COMMERCIAL

## 2018-08-17 ENCOUNTER — OFFICE VISIT (OUTPATIENT)
Dept: OBGYN CLINIC | Facility: MEDICAL CENTER | Age: 43
End: 2018-08-17

## 2018-08-17 VITALS
WEIGHT: 140 LBS | HEART RATE: 76 BPM | BODY MASS INDEX: 21.97 KG/M2 | DIASTOLIC BLOOD PRESSURE: 76 MMHG | HEIGHT: 67 IN | SYSTOLIC BLOOD PRESSURE: 122 MMHG

## 2018-08-17 DIAGNOSIS — Z48.89 AFTERCARE FOLLOWING SURGERY: Primary | ICD-10-CM

## 2018-08-17 DIAGNOSIS — Z48.89 AFTERCARE FOLLOWING SURGERY: ICD-10-CM

## 2018-08-17 PROCEDURE — 73590 X-RAY EXAM OF LOWER LEG: CPT

## 2018-08-17 PROCEDURE — 99024 POSTOP FOLLOW-UP VISIT: CPT | Performed by: ORTHOPAEDIC SURGERY

## 2018-08-17 NOTE — PROGRESS NOTES
44 y o female who presents following ORIF right tibia fracture in Jan 2018 and removal of hardware on 08/02/2018  Since discharge from hospital she reports improvement in her right leg pain fingers she is able to ambulate with minimal discomfort and uses a cane and old places  Denies any drainage from the wound numbness tingling fevers or chills      Review of Systems  Review of systems negative unless otherwise specified in HPI    Past Medical History  Past Medical History:   Diagnosis Date    Anemia     Edema     Fatigue     Vitamin B12 deficiency     Vitamin D deficiency        Past Surgical History  Past Surgical History:   Procedure Laterality Date    KNEE ARTHROSCOPY Bilateral     KNEE SURGERY      ORIF TIBIA FRACTURE Right 1/18/2018    Procedure: OPEN REDUCTION W/ INTERNAL FIXATION (ORIF) TIBIA;  Surgeon: Harika Lozano MD;  Location: [facility];  Service: Orthopedics    ORIF TIBIAL PLATEAU Right 7/40/8706    Procedure: OPEN REDUCTION W/ INTERNAL FIXATION (ORIF) TIBIAL PLATEAU;  Surgeon: Harika Lozano MD;  Location: [facility];  Service: Orthopedics    KS REMOVAL DEEP IMPLANT Right 8/2/2018    Procedure: REMOVAL HARDWARE TIBIA;  Surgeon: Harika Lozano MD;  Location: [facility];  Service: Orthopedics    TUBAL LIGATION         Current Medications  Current Outpatient Prescriptions on File Prior to Visit   Medication Sig Dispense Refill    acetaminophen (TYLENOL) 325 mg tablet 650 mg every 6 hours for mild pain 30 tablet 0    cyanocobalamin 1,000 mcg/mL Inject 1 mL (1,000 mcg total) into the shoulder, thigh, or buttocks every 30 (thirty) days 10 mL 0    ergocalciferol (VITAMIN D2) 50,000 units Take 1 capsule (50,000 Units total) by mouth once a week 12 capsule 0    naproxen (NAPROSYN) 375 mg tablet Take 250 mg by mouth 2 (two) times a day with meals      oxyCODONE (ROXICODONE) 5 mg immediate release tablet Take 1 tablet (5 mg total) by mouth every 6 (six) hours as needed for severe pain Max Daily Amount: 20 mg 60 tablet 0    SYRINGE-NEEDLE, DISP, 3 ML 25G X 1" 3 ML MISC by Does not apply route every 30 (thirty) days 50 each 0    [DISCONTINUED] oxyCODONE (ROXICODONE) 5 mg immediate release tablet 1 pill po Q6 Hrs prn 30 tablet 0     No current facility-administered medications on file prior to visit  Recent Labs [facility])    0  Lab Value Date/Time   HCT 44 5 07/02/2018 1148   HCT 43 6 10/17/2015 1109   HGB 13 7 07/02/2018 1148   HGB 14 4 10/17/2015 1109   WBC 4 38 07/02/2018 1148   WBC 4 24 (L) 10/17/2015 1109   INR 1 09 07/02/2018 1148   GLUCOSE 86 07/02/2018 1148   GLUCOSE 121 01/17/2018 2311   GLUCOSE 85 10/17/2015 1109         Physical exam  · General: Awake, Alert, Oriented  · Eyes: Pupils equal, round and reactive to light  · Heart: regular rate and rhythm  · Lungs: No audible wheezing  · Abdomen: soft  Right lower extremity  · Since clean dry and intact with sutures in place  · Knee extension to approximately 5° flexion to 110° with pain  · Crepitation with flexion-extension  Motor sensation intact distally  Imaging  X-rays right tibia today shows the retained hardware arthritic changes in the lateral compartment the knee    Assessment/Plan:   44 y  o female who presents following ORIF right tibia fracture in Jan 2018 and removal of hardware on 08/02/2018      · Sutures removed in office today  · Weightbearing as tolerated right lower extremity  · Physical therapy for knee range of motion strengthening and gait training  · Follow-up 4 weeks

## 2018-08-23 ENCOUNTER — EVALUATION (OUTPATIENT)
Dept: PHYSICAL THERAPY | Facility: CLINIC | Age: 43
End: 2018-08-23
Payer: COMMERCIAL

## 2018-08-23 DIAGNOSIS — Z98.890 S/P ORIF (OPEN REDUCTION INTERNAL FIXATION) FRACTURE: ICD-10-CM

## 2018-08-23 DIAGNOSIS — Z87.81 S/P ORIF (OPEN REDUCTION INTERNAL FIXATION) FRACTURE: ICD-10-CM

## 2018-08-23 DIAGNOSIS — S82.241D CLOSED DISPLACED SPIRAL FRACTURE OF SHAFT OF RIGHT TIBIA WITH ROUTINE HEALING: Primary | ICD-10-CM

## 2018-08-23 DIAGNOSIS — S82.831D CLOSED FRACTURE OF HEAD OF RIGHT FIBULA WITH ROUTINE HEALING, SUBSEQUENT ENCOUNTER: ICD-10-CM

## 2018-08-23 PROCEDURE — 97110 THERAPEUTIC EXERCISES: CPT | Performed by: PHYSICAL THERAPIST

## 2018-08-23 PROCEDURE — G8990 OTHER PT/OT CURRENT STATUS: HCPCS | Performed by: PHYSICAL THERAPIST

## 2018-08-23 PROCEDURE — 97161 PT EVAL LOW COMPLEX 20 MIN: CPT | Performed by: PHYSICAL THERAPIST

## 2018-08-23 PROCEDURE — G8991 OTHER PT/OT GOAL STATUS: HCPCS | Performed by: PHYSICAL THERAPIST

## 2018-08-23 NOTE — PROGRESS NOTES
PT Discharge    Today's date: 2018  Patient name: Taffy Opitz  : 1975  MRN: [id number]  Referring provider: Iman John MD  Dx:   Encounter Diagnosis     ICD-10-CM    1  Closed fracture of head of right fibula with routine healing, subsequent encounter S82 831D    2  Closed fracture of proximal end of right tibia with routine healing, unspecified fracture morphology, subsequent encounter S82 101D    3  Closed displaced spiral fracture of shaft of right tibia with routine healing S82 241D        Start Time: 1030  Stop Time: 1200  Total time in clinic (min): 90 minutes    Assessment/Plan    Subjective    Objective    Flowsheet Rows      Most Recent Value   PT/OT G-Codes   Current Score  49   Projected Score  58   FOTO information reviewed  Yes   Assessment Type  Re-evaluation   G code set  Other PT/OT Primary   Other PT Primary Current Status ()  CK   Other PT Primary Goal Status ()  CH          Pt did not return to PT and underwent another procedure  D/C from PT at this time

## 2018-08-23 NOTE — PROGRESS NOTES
PT Evaluation     Today's date: 2018  Patient name: Daniel Holt  : 1975  MRN: [id number]  Referring provider: Joanne Aiken MD  Dx:   Encounter Diagnosis     ICD-10-CM    1  Closed displaced spiral fracture of shaft of right tibia with routine healing S82 241D    2  S/P ORIF (open reduction internal fixation) fracture Z96 7     Z87 81    3  Closed fracture of head of right fibula with routine healing, subsequent encounter S82 831D                   Assessment  Impairments: abnormal coordination, abnormal gait, abnormal muscle firing, abnormal or restricted ROM, abnormal movement, activity intolerance, impaired balance, impaired physical strength, lacks appropriate home exercise program, pain with function and weight-bearing intolerance    Assessment details: Patient is a 44 y o  female who presents with the above listed impairments  Patient would benefit from skilled PT services to address these impairments and to maximize function  Thank you for the referral     Understanding of Dx/Px/POC: good   Prognosis: good    Goals  Impairment Goals  - Decrease pain by 50% in 3 weeks  - Increase right flexibility by 50% in 3 weeks  - Increase right lower extremity strength golbally to 5/5 in 6 weeks  - Increase right hip abductor and external rotator strength strength to 5/5  6 weeks      Functional Goals  - Return to Prior Level of Function in 6 weeks  - Patient will be independent with HEP in 6 weeks    Plan  Patient would benefit from: skilled PT  Planned modality interventions: cryotherapy  Planned therapy interventions: joint mobilization, manual therapy, neuromuscular re-education, patient education, strengthening, stretching, therapeutic activities, therapeutic exercise, home exercise program, functional ROM exercises, Espinal taping and postural training  Frequency: 2x week (2-3x week)  Duration in weeks: 6  Treatment plan discussed with: patient        Subjective Evaluation    History of Present Illness  Mechanism of injury: Patient reports notes she underwent a hardware removal on 18 of the proximal and distal tibia and fibula on the R  She reports back to PT  She reports she is using a SPC for ambulation outside of the house  She notes increased pain at the knee and hip when standing and walking  She notes difficulty with steps and with kneeling  R knee pain 0/10 currently and 6/10 at the worst   Occupation: Teacher  PLOF: Independent   Pain  Current pain ratin  At best pain ratin  At worst pain ratin  Location: R ankle  Quality: dull ache and sharp    Patient Goals  Patient goal: Return to PLOF        Objective     Tenderness   Left Ankle/Foot   No tenderness  Right Ankle/Foot   No tenderness  Neurological Testing     Sensation     Ankle/Foot   Left Ankle/Foot   Intact: light touch    Right Ankle/Foot   Intact: light touch     Active Range of Motion     Right Ankle/Foot   Dorsiflexion (ke): -5 degrees   Plantar flexion: 45 degrees   Inversion: 20 degrees   Eversion: 16 degrees     Additional Active Range of Motion Details  0-114 R knee AROM  0-116 R knee PROM      Passive Range of Motion   Left Ankle/Foot  Normal passive range of motion    Right Ankle/Foot  Normal passive range of motion  Dorsiflexion (ke): -4 degrees   Plantar flexion: 46 degrees   Inversion: 21 degrees   Eversion: 17 degrees     Joint Play   Left Ankle/Foot  Hypomobile in the talocrural joint and subtalar joint  Right Ankle/Foot  Hypomobile in the talocrural joint and subtalar joint  Strength/Myotome Testing     Left Ankle/Foot   Normal strength    Right Ankle/Foot   Normal strength    Additional Strength Details  4/5 strength noted RLE grossly      Swelling   Left Ankle/Foot   Figure 8: 46 cm    Right Ankle/Foot   Figure 8: 47 cm    General Comments     Ankle/Foot Comments   Gait is antalgic and lacks toe off on the R           Diagnosis: s/p hardware removal R tib/fib   Precautions: WBAT   Manuals 8/23       PROM R knee        Mobs ankle                        Exercise Diary        Bike                Step-ups        X-walks        SL ER        4-way SLR        Leg press        Bridge with HS curls                                ERMI ext 2'x2       Knee flexion S :10x10       Gastroc S :20x5       soleus S  :20x5                                        Gait - step overs             Modalities             CP PRN

## 2018-08-27 ENCOUNTER — OFFICE VISIT (OUTPATIENT)
Dept: PHYSICAL THERAPY | Facility: CLINIC | Age: 43
End: 2018-08-27
Payer: COMMERCIAL

## 2018-08-27 DIAGNOSIS — Z98.890 S/P ORIF (OPEN REDUCTION INTERNAL FIXATION) FRACTURE: ICD-10-CM

## 2018-08-27 DIAGNOSIS — S82.831D CLOSED FRACTURE OF HEAD OF RIGHT FIBULA WITH ROUTINE HEALING, SUBSEQUENT ENCOUNTER: ICD-10-CM

## 2018-08-27 DIAGNOSIS — Z87.81 S/P ORIF (OPEN REDUCTION INTERNAL FIXATION) FRACTURE: ICD-10-CM

## 2018-08-27 DIAGNOSIS — S82.241D CLOSED DISPLACED SPIRAL FRACTURE OF SHAFT OF RIGHT TIBIA WITH ROUTINE HEALING: Primary | ICD-10-CM

## 2018-08-27 PROCEDURE — 97140 MANUAL THERAPY 1/> REGIONS: CPT

## 2018-08-27 PROCEDURE — 97112 NEUROMUSCULAR REEDUCATION: CPT

## 2018-08-27 NOTE — PROGRESS NOTES
Daily Note     Today's date: 2018  Patient name: Johanna Teixeira  : 1975  MRN: [id number]  Referring provider: Anthony De La Cruz MD  Dx:   Encounter Diagnosis     ICD-10-CM    1  Closed displaced spiral fracture of shaft of right tibia with routine healing S82 241D    2  S/P ORIF (open reduction internal fixation) fracture Z96 7     Z87 81    3  Closed fracture of head of right fibula with routine healing, subsequent encounter S82 831D                   Subjective: Patient reports 3/10 pain in her R knee this morning  She notes she has a little more pain in her R ankle recently  Objective: See treatment diary below  Diagnosis: s/p hardware removal R tib/fib   Precautions: WBAT   Manuals          PROM R knee   KLS         Mobs ankle                                         Exercise Diary             Bike   5 min                       Step-ups             X-walks   RTB 20'x2          SL ER   RTB 2x10         4-way SLR   2x10          Leg press             Bridge with HS curls                                                       ERMI ext 2'x2 2 min x2          Knee flexion S :10x10 :10x10         Gastroc S :20x5 :20x5         soleus S  :20x5 :20x5                                       Gait - step overs             Modalities             CP PRN                                               Assessment: Continued with outlined program  Patient demonstrates improved ROM with less pain  Moderate muscle fatigue with LE strengthening  Plan: Progress treatment as tolerated

## 2018-08-29 ENCOUNTER — OFFICE VISIT (OUTPATIENT)
Dept: PHYSICAL THERAPY | Facility: CLINIC | Age: 43
End: 2018-08-29
Payer: COMMERCIAL

## 2018-08-29 DIAGNOSIS — Z87.81 S/P ORIF (OPEN REDUCTION INTERNAL FIXATION) FRACTURE: ICD-10-CM

## 2018-08-29 DIAGNOSIS — S82.241D CLOSED DISPLACED SPIRAL FRACTURE OF SHAFT OF RIGHT TIBIA WITH ROUTINE HEALING: Primary | ICD-10-CM

## 2018-08-29 DIAGNOSIS — Z98.890 S/P ORIF (OPEN REDUCTION INTERNAL FIXATION) FRACTURE: ICD-10-CM

## 2018-08-29 PROCEDURE — 97140 MANUAL THERAPY 1/> REGIONS: CPT

## 2018-08-29 PROCEDURE — 97110 THERAPEUTIC EXERCISES: CPT

## 2018-08-29 PROCEDURE — 97112 NEUROMUSCULAR REEDUCATION: CPT
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
## 2018-08-30 ENCOUNTER — OFFICE VISIT (OUTPATIENT)
Dept: PHYSICAL THERAPY | Facility: CLINIC | Age: 43
End: 2018-08-30
Payer: COMMERCIAL

## 2018-08-30 DIAGNOSIS — S82.831D CLOSED FRACTURE OF HEAD OF RIGHT FIBULA WITH ROUTINE HEALING, SUBSEQUENT ENCOUNTER: ICD-10-CM

## 2018-08-30 DIAGNOSIS — Z98.890 S/P ORIF (OPEN REDUCTION INTERNAL FIXATION) FRACTURE: ICD-10-CM

## 2018-08-30 DIAGNOSIS — S82.241D CLOSED DISPLACED SPIRAL FRACTURE OF SHAFT OF RIGHT TIBIA WITH ROUTINE HEALING: Primary | ICD-10-CM

## 2018-08-30 DIAGNOSIS — Z87.81 S/P ORIF (OPEN REDUCTION INTERNAL FIXATION) FRACTURE: ICD-10-CM

## 2018-08-30 PROCEDURE — 97140 MANUAL THERAPY 1/> REGIONS: CPT

## 2018-08-30 PROCEDURE — 97110 THERAPEUTIC EXERCISES: CPT

## 2018-08-30 PROCEDURE — 97112 NEUROMUSCULAR REEDUCATION: CPT

## 2018-08-30 NOTE — PROGRESS NOTES
Daily Note     Today's date: 2018  Patient name: Bebeto Peter  : 1975  MRN: [id number]  Referring provider: Lien Chou MD  Dx:   Encounter Diagnosis     ICD-10-CM    1  Closed displaced spiral fracture of shaft of right tibia with routine healing S82 241D    2  S/P ORIF (open reduction internal fixation) fracture Z96 7     Z87 81    3  Closed fracture of head of right fibula with routine healing, subsequent encounter S82 831D                   Subjective: Patient states she continues to ambulate without SPC and feels more stable  She has no pain  Objective: See treatment diary below  Diagnosis: s/p hardware removal R tib/fib   Precautions: WBAT   Manuals      PROM R knee   KLS KLS KLS     Mobs ankle                                         Exercise Diary             Bike   5 min 5 min  5 min                                 X-walks   RTB 20'x2  RTB 20'x4  RTB 20'x4     SL ER   RTB 2x10 RTB 2x10  GTB 2x10      4-way SLR   2x10  2x10  2x10      Leg press     70# SL 3x10 75# SL 3x10     Bridge with HS curls       2x10      Hamstring curls      GTB 2x10 GTB 2x10                                 ERMI ext 2'x2 2 min x2  2 min x 2  2 min x2      Knee flexion S :10x10 :10x10 :10x10 :10x10     Gastroc S :20x5 :20x5 :20x5  :20x5      soleus S  :20x5 :20x5  :20x5  :20x5      Lunges             SLS        :30x2      Calf raises on LP         Step ups     8 in 2x10                                                                      Gait - step overs             Modalities             CP PRN                                                   Assessment: Continued with outlined program  Progressed with RLE strengthening with moderate muscle fatigue  Improved gait and TKE  Plan: Progress treatment as tolerated

## 2018-09-05 ENCOUNTER — OFFICE VISIT (OUTPATIENT)
Dept: PHYSICAL THERAPY | Facility: CLINIC | Age: 43
End: 2018-09-05
Payer: COMMERCIAL

## 2018-09-05 DIAGNOSIS — S82.241D CLOSED DISPLACED SPIRAL FRACTURE OF SHAFT OF RIGHT TIBIA WITH ROUTINE HEALING: Primary | ICD-10-CM

## 2018-09-05 DIAGNOSIS — S82.831D CLOSED FRACTURE OF HEAD OF RIGHT FIBULA WITH ROUTINE HEALING, SUBSEQUENT ENCOUNTER: ICD-10-CM

## 2018-09-05 DIAGNOSIS — Z87.81 S/P ORIF (OPEN REDUCTION INTERNAL FIXATION) FRACTURE: ICD-10-CM

## 2018-09-05 DIAGNOSIS — Z98.890 S/P ORIF (OPEN REDUCTION INTERNAL FIXATION) FRACTURE: ICD-10-CM

## 2018-09-05 PROCEDURE — 97140 MANUAL THERAPY 1/> REGIONS: CPT | Performed by: PHYSICAL THERAPIST

## 2018-09-05 PROCEDURE — 97110 THERAPEUTIC EXERCISES: CPT | Performed by: PHYSICAL THERAPIST

## 2018-09-05 PROCEDURE — 97112 NEUROMUSCULAR REEDUCATION: CPT | Performed by: PHYSICAL THERAPIST

## 2018-09-05 NOTE — PROGRESS NOTES
Daily Note     Today's date: 2018  Patient name: Kirstie Matthews  : 1975  MRN: [id number]  Referring provider: Florinda Robison MD  Dx:   Encounter Diagnosis     ICD-10-CM    1  Closed displaced spiral fracture of shaft of right tibia with routine healing S82 241D    2  S/P ORIF (open reduction internal fixation) fracture Z96 7     Z87 81    3  Closed fracture of head of right fibula with routine healing, subsequent encounter S82 831D        Start Time: 959  Stop Time: 1103  Total time in clinic (min): 64 minutes    Subjective: Patient notes she is doing better overall, less pain and can walk with slightly less difficulty  Objective: See treatment diary below  Diagnosis: s/p hardware removal R tib/fib   Precautions: WBAT   Manuals    PROM R knee   KLS KLS KLS RS   Mobs ankle                                         Exercise Diary             Bike   5 min 5 min  5 min   5 min                               X-walks   RTB 20'x2  RTB 20'x4  RTB 20'x4  GTB 20'x4   SL ER   RTB 2x10 RTB 2x10  GTB 2x10   GTB 2x10   4-way SLR   2x10  2x10  2x10   1# 2x10   Leg press     70# SL 3x10 75# SL 3x10  75# SL 3x10   Bridge with HS curls       2x10   2x10   Hamstring curls      GTB 2x10 GTB 2x10  GTB 2x10                               ERMI ext 2'x2 2 min x2  2 min x 2  2 min x2   2 min x2   Knee flexion S :10x10 :10x10 :10x10 :10x10  :10x10   Gastroc S :20x5 :20x5 :20x5  :20x5   :20x5   soleus S  :20x5 :20x5  :20x5  :20x5   :20x5   Lunges             SLS        :30x2   :30x2   Calf raises on LP              Step ups        8 in 2x10   8 in 2x10                                                                                                                    Gait - step overs             Modalities             CP PRN                                                Assessment: Tolerated treatment well  Patient demonstrated fatigue post treatment and would benefit from continued PT  Progressed patient as able  Plan: Continue per plan of care

## 2018-09-06 ENCOUNTER — OFFICE VISIT (OUTPATIENT)
Dept: PHYSICAL THERAPY | Facility: CLINIC | Age: 43
End: 2018-09-06
Payer: COMMERCIAL

## 2018-09-06 DIAGNOSIS — S82.241D CLOSED DISPLACED SPIRAL FRACTURE OF SHAFT OF RIGHT TIBIA WITH ROUTINE HEALING: Primary | ICD-10-CM

## 2018-09-06 DIAGNOSIS — Z87.81 S/P ORIF (OPEN REDUCTION INTERNAL FIXATION) FRACTURE: ICD-10-CM

## 2018-09-06 DIAGNOSIS — S82.831D CLOSED FRACTURE OF HEAD OF RIGHT FIBULA WITH ROUTINE HEALING, SUBSEQUENT ENCOUNTER: ICD-10-CM

## 2018-09-06 DIAGNOSIS — Z98.890 S/P ORIF (OPEN REDUCTION INTERNAL FIXATION) FRACTURE: ICD-10-CM

## 2018-09-06 PROCEDURE — 97112 NEUROMUSCULAR REEDUCATION: CPT

## 2018-09-06 PROCEDURE — 97140 MANUAL THERAPY 1/> REGIONS: CPT

## 2018-09-06 PROCEDURE — 97110 THERAPEUTIC EXERCISES: CPT

## 2018-09-06 NOTE — PROGRESS NOTES
Daily Note     Today's date: 2018  Patient name: Laney Libman  : 1975  MRN: [id number]  Referring provider: Danica Sanchez MD  Dx:   Encounter Diagnosis     ICD-10-CM    1  Closed displaced spiral fracture of shaft of right tibia with routine healing S82 241D    2  S/P ORIF (open reduction internal fixation) fracture Z96 7     Z87 81    3  Closed fracture of head of right fibula with routine healing, subsequent encounter S82 831D                   Subjective: Patient states she continues to notice an improvement with ambulation  She reports walking around her neighborhood frequently  Objective: See treatment diary below  Diagnosis: s/p hardware removal R tib/fib   Precautions: WBAT   Manuals    PROM R knee KLS   KLS RS   Mobs ankle                                   Exercise Diary           Bike 5 min   5 min   5 min                           X-walks GTB 20'x 4   RTB 20'x4  GTB 20'x4   SL ER GTB 3x10   GTB 2x10   GTB 2x10   4-way SLR 1 5# 2x10   2x10   1# 2x10   Leg press 80# SL 2x10   75# SL 3x10  75# SL 3x10   Bridge with HS curls 2x10    2x10   2x10   Hamstring curls      GTB 2x10  GTB 2x10   Ball squats on wall 2x10                      ERMI ext D/c    2 min x2   2 min x2   Knee flexion S :10x10   :10x10  :10x10   Gastroc S HEP   :20x5   :20x5   soleus S HEP   :20x5   :20x5   Lunges           SLS      :30x2   :30x2   Calf raises on LP  80# SL 2x10         Step ups  8 in 2x10   8 in 2x10   8 in 2x10   CKC TKE :03x20                                                                                                 Gait - step overs             Modalities             CP PRN                                               Assessment: Continued to progress LE strengthening with moderate muscle fatigue  She demonstrates full knee extension at this time; trialed CKC TKE  She continues to progress well towards long term goals  Continued cueing for proper gait sequence       Plan: Progress treatment as tolerated

## 2018-09-07 ENCOUNTER — APPOINTMENT (OUTPATIENT)
Dept: PHYSICAL THERAPY | Facility: CLINIC | Age: 43
End: 2018-09-07
Payer: COMMERCIAL

## 2018-09-10 ENCOUNTER — APPOINTMENT (OUTPATIENT)
Dept: PHYSICAL THERAPY | Facility: CLINIC | Age: 43
End: 2018-09-10
Payer: COMMERCIAL

## 2018-09-11 ENCOUNTER — OFFICE VISIT (OUTPATIENT)
Dept: PHYSICAL THERAPY | Facility: CLINIC | Age: 43
End: 2018-09-11
Payer: COMMERCIAL

## 2018-09-11 DIAGNOSIS — Z87.81 S/P ORIF (OPEN REDUCTION INTERNAL FIXATION) FRACTURE: ICD-10-CM

## 2018-09-11 DIAGNOSIS — Z98.890 S/P ORIF (OPEN REDUCTION INTERNAL FIXATION) FRACTURE: ICD-10-CM

## 2018-09-11 DIAGNOSIS — S82.831D CLOSED FRACTURE OF HEAD OF RIGHT FIBULA WITH ROUTINE HEALING, SUBSEQUENT ENCOUNTER: ICD-10-CM

## 2018-09-11 DIAGNOSIS — S82.241D CLOSED DISPLACED SPIRAL FRACTURE OF SHAFT OF RIGHT TIBIA WITH ROUTINE HEALING: Primary | ICD-10-CM

## 2018-09-11 PROCEDURE — 97110 THERAPEUTIC EXERCISES: CPT

## 2018-09-11 PROCEDURE — 97112 NEUROMUSCULAR REEDUCATION: CPT

## 2018-09-11 PROCEDURE — 97140 MANUAL THERAPY 1/> REGIONS: CPT

## 2018-09-11 NOTE — PROGRESS NOTES
Daily Note     Today's date: 2018  Patient name: Kan Jimenez  : 1975  MRN: [id number]  Referring provider: Rebecca Ann MD  Dx:   Encounter Diagnosis     ICD-10-CM    1  Closed displaced spiral fracture of shaft of right tibia with routine healing S82 241D    2  S/P ORIF (open reduction internal fixation) fracture Z96 7     Z87 81    3  Closed fracture of head of right fibula with routine healing, subsequent encounter S82 391D                   Subjective: Patient states she has no pain upon arrival, however she has increase pain while sleeping  She states she bends her knees while sleeping and once she begins to extend following any length of time, her symptoms arise  She reports she has been doing more since she has felt better and has increase fatigue as well         Objective: See treatment diary below  Diagnosis: s/p hardware removal R tib/fib   Precautions: WBAT   Manuals    PROM R knee KLS KLS   KLS RS   Mobs ankle                                         Exercise Diary             Bike 5 min 5 min   5 min   5 min                               X-walks GTB 20'x 4 GTB 20'x4    RTB 20'x4  GTB 20'x4   SL ER GTB 3x10 GTB 3x10   GTB 2x10   GTB 2x10   4-way SLR 1 5# 2x10 1 5# 2x10   2x10   1# 2x10   Leg press 80# SL 2x10    75# SL 3x10  75# SL 3x10   Bridge with HS curls 2x10  3x10   2x10   2x10   Hamstring curls    GTB 3x10   GTB 2x10  GTB 2x10   Ball squats on wall 2x10  4x10                       ERMI ext  2 min x 2    2 min x2   2 min x2   Knee flexion S :10x10 :10x10   :10x10  :10x10   Gastroc S HEP     :20x5   :20x5   soleus S HEP     :20x5   :20x5   Lunges             SLS        :30x2   :30x2   Calf raises on LP  80# SL 2x10 80# SL 3x10          Step ups  8 in 2x10     8 in 2x10   8 in 2x10   CKC TKE :03x20  :03x20                                                                                                            Gait - step overs   5 min       Modalities             CP PRN                                               Assessment: Patient exhibits improved ROM and TKE; lacking while ambulating intermittenlty, but improved once cued for proper gait  She continues to progress well with LE strengthening  AROM 0-125  PROM 0-130      Plan: Progress treatment as tolerated

## 2018-09-12 ENCOUNTER — OFFICE VISIT (OUTPATIENT)
Dept: PHYSICAL THERAPY | Facility: CLINIC | Age: 43
End: 2018-09-12
Payer: COMMERCIAL

## 2018-09-12 DIAGNOSIS — S82.831D CLOSED FRACTURE OF HEAD OF RIGHT FIBULA WITH ROUTINE HEALING, SUBSEQUENT ENCOUNTER: ICD-10-CM

## 2018-09-12 DIAGNOSIS — Z98.890 S/P ORIF (OPEN REDUCTION INTERNAL FIXATION) FRACTURE: ICD-10-CM

## 2018-09-12 DIAGNOSIS — Z87.81 S/P ORIF (OPEN REDUCTION INTERNAL FIXATION) FRACTURE: ICD-10-CM

## 2018-09-12 DIAGNOSIS — S82.241D CLOSED DISPLACED SPIRAL FRACTURE OF SHAFT OF RIGHT TIBIA WITH ROUTINE HEALING: Primary | ICD-10-CM

## 2018-09-12 PROCEDURE — G8990 OTHER PT/OT CURRENT STATUS: HCPCS

## 2018-09-12 PROCEDURE — 97112 NEUROMUSCULAR REEDUCATION: CPT

## 2018-09-12 PROCEDURE — G8991 OTHER PT/OT GOAL STATUS: HCPCS

## 2018-09-12 PROCEDURE — 97110 THERAPEUTIC EXERCISES: CPT

## 2018-09-12 PROCEDURE — 97140 MANUAL THERAPY 1/> REGIONS: CPT

## 2018-09-12 NOTE — PROGRESS NOTES
Daily Note     Today's date: 2018  Patient name: Hermila Thompson  : 1975  MRN: [id number]  Referring provider: Marquise Arias MD  Dx:   Encounter Diagnosis     ICD-10-CM    1  Closed displaced spiral fracture of shaft of right tibia with routine healing S82 241D    2  S/P ORIF (open reduction internal fixation) fracture Z96 7     Z87 81    3  Closed fracture of head of right fibula with routine healing, subsequent encounter S82 831D                   Subjective: Patient states she had no burning or discomfort behind her knee last night  She demonstrates improved gait without AD this time  She has been able to ambulate further distances with no losses of balance or near falls  Objective: See treatment diary below  Diagnosis: s/p hardware removal R tib/fib   Precautions: WBAT   Manuals      PROM R knee KLS KLS KLS     Mobs ankle                                   Exercise Diary           Bike 5 min 5 min 5 min                             X-walks GTB 20'x 4 GTB 20'x4  GTB 20'x4     SL ER GTB 3x10 GTB 3x10 GTB 3x10     4-way SLR 1 5# 2x10 1 5# 2x10 1 5# 2x10      Leg press 80# SL 2x10        Bridge with HS curls 2x10  3x10 3x10      Hamstring curls    GTB 3x10 GTB 3x10     Ball squats on wall 2x10  4x10 4x10     ERMI ext   2 min x 2        Knee flexion S :10x10 :10x10 :10x10     Gastroc S HEP         soleus S HEP         Lunges     10x each      SLS            Calf raises on LP  80# SL 2x10 80# SL 3x10  80# SL 3x10     Step ups  8 in 2x10   8 in 2x10      CKC TKE :03x20  :03x20 15# :03x20                                                                                          Gait - step overs   5 min 5 min - improved heel strike     Modalities           CP PRN                                               Assessment: Continued with outlined program  She demonstrates improved LE strengthening and is able to progress well towards long term goals  No exacerbating pain symptoms  She returns to ortho on Friday  Will follow up next week  AROM 0-125  PROM 0-130      Plan: Progress treatment as tolerated

## 2018-09-14 ENCOUNTER — APPOINTMENT (OUTPATIENT)
Dept: RADIOLOGY | Facility: MEDICAL CENTER | Age: 43
End: 2018-09-14
Payer: COMMERCIAL

## 2018-09-14 ENCOUNTER — OFFICE VISIT (OUTPATIENT)
Dept: OBGYN CLINIC | Facility: MEDICAL CENTER | Age: 43
End: 2018-09-14
Payer: COMMERCIAL

## 2018-09-14 VITALS
BODY MASS INDEX: 21.09 KG/M2 | SYSTOLIC BLOOD PRESSURE: 126 MMHG | HEIGHT: 67 IN | HEART RATE: 85 BPM | WEIGHT: 134.4 LBS | RESPIRATION RATE: 18 BRPM | DIASTOLIC BLOOD PRESSURE: 83 MMHG

## 2018-09-14 DIAGNOSIS — Z48.89 AFTERCARE FOLLOWING SURGERY: ICD-10-CM

## 2018-09-14 DIAGNOSIS — M22.2X1 PATELLOFEMORAL DISORDER OF RIGHT KNEE: ICD-10-CM

## 2018-09-14 DIAGNOSIS — Z48.89 AFTERCARE FOLLOWING SURGERY: Primary | ICD-10-CM

## 2018-09-14 DIAGNOSIS — M25.561 RIGHT KNEE PAIN, UNSPECIFIED CHRONICITY: ICD-10-CM

## 2018-09-14 PROCEDURE — 99024 POSTOP FOLLOW-UP VISIT: CPT | Performed by: ORTHOPAEDIC SURGERY

## 2018-09-14 PROCEDURE — 73590 X-RAY EXAM OF LOWER LEG: CPT

## 2018-09-14 PROCEDURE — 20610 DRAIN/INJ JOINT/BURSA W/O US: CPT | Performed by: ORTHOPAEDIC SURGERY

## 2018-09-14 RX ORDER — BETAMETHASONE SODIUM PHOSPHATE AND BETAMETHASONE ACETATE 3; 3 MG/ML; MG/ML
12 INJECTION, SUSPENSION INTRA-ARTICULAR; INTRALESIONAL; INTRAMUSCULAR; SOFT TISSUE
Status: COMPLETED | OUTPATIENT
Start: 2018-09-14 | End: 2018-09-14

## 2018-09-14 RX ORDER — LIDOCAINE HYDROCHLORIDE 10 MG/ML
2 INJECTION, SOLUTION INFILTRATION; PERINEURAL
Status: COMPLETED | OUTPATIENT
Start: 2018-09-14 | End: 2018-09-14

## 2018-09-14 RX ORDER — BUPIVACAINE HYDROCHLORIDE 2.5 MG/ML
2 INJECTION, SOLUTION INFILTRATION; PERINEURAL
Status: COMPLETED | OUTPATIENT
Start: 2018-09-14 | End: 2018-09-14

## 2018-09-14 RX ADMIN — BETAMETHASONE SODIUM PHOSPHATE AND BETAMETHASONE ACETATE 12 MG: 3; 3 INJECTION, SUSPENSION INTRA-ARTICULAR; INTRALESIONAL; INTRAMUSCULAR; SOFT TISSUE at 16:12

## 2018-09-14 RX ADMIN — LIDOCAINE HYDROCHLORIDE 2 ML: 10 INJECTION, SOLUTION INFILTRATION; PERINEURAL at 16:12

## 2018-09-14 RX ADMIN — BUPIVACAINE HYDROCHLORIDE 2 ML: 2.5 INJECTION, SOLUTION INFILTRATION; PERINEURAL at 16:12

## 2018-09-14 NOTE — PROGRESS NOTES
Assessment:  1  Aftercare following surgery  XR tibia fibula 2 vw right       Plan:  43 y/o female who presents today for a post operative follow up visit for her RIGHT knee and s/p removal of hardware on 8/2/18  Patient was instructed to continue with formal PT and supplement with HEP, especially for quadriceps strengthening due to patellofemoral symptoms noted today  Patient was offered a cortisone injection today for pain relief  She agreed and injection was performed  She was instructed to ice and modify activity for 2-3 days  She understood everything that was explained to her in the office today and all questions were answered  She will follow up in 6 weeks for re evaluation of her PF symptoms of her R knee and s/p removal of tibial hardware  To do next visit:  Return in about 6 weeks (around 10/26/2018) for Recheck of R knee and s/p removal of hardware  Scribe Attestation    I,:   Tuckercindi Long am acting as a scribe while in the presence of the attending physician :        I,:   Keon Rosaod MD personally performed the services described in this documentation    as scribed in my presence :              Subjective:   Bebeto Peter is a 44 y o  female who presents post operatively following ORIF right tibia fracture in Jan 2018 and is now about 6 weeks s/p removal of hardware on 08/02/2018  Patient states that she is doing well post operatively in terms on the "abnormal" sensation in the lateral aspect of her LE  Today, her main compliant is mild pain "under" her patella, worse with ambulating for prolonged periods of time and rising from a seated position         Review of systems negative unless otherwise specified in HPI    Past Medical History:   Diagnosis Date    Anemia     Edema     Fatigue     Vitamin B12 deficiency     Vitamin D deficiency        Past Surgical History:   Procedure Laterality Date    KNEE ARTHROSCOPY Bilateral     KNEE SURGERY      ORIF TIBIA FRACTURE Right 1/18/2018    Procedure: OPEN REDUCTION W/ INTERNAL FIXATION (ORIF) TIBIA;  Surgeon: Anderson Timmons MD;  Location: [facility];  Service: Orthopedics    ORIF TIBIAL PLATEAU Right 2/17/0100    Procedure: OPEN REDUCTION W/ INTERNAL FIXATION (ORIF) TIBIAL PLATEAU;  Surgeon: Anderson Timmons MD;  [facility];  Service: Orthopedics    TN REMOVAL DEEP IMPLANT Right 8/2/2018    Procedure: REMOVAL HARDWARE TIBIA;  Surgeon: Anderson Timmons MD;  Location: [facility];  Service: Orthopedics    TUBAL LIGATION         Family History   Problem Relation Age of Onset    Cancer Mother     Heart disease Mother         heart surgery    Cancer Father     Heart attack Father         stent    Diabetes Father     Hypertension Father     Arthritis Family     Stomach cancer Family     Ovarian cancer Family     Alcohol abuse Neg Hx     Depression Neg Hx     Drug abuse Neg Hx     Substance Abuse Neg Hx        Social History     Occupational History    Teacher in [place]      Social History Main Topics    Smoking status: Never Smoker    Smokeless tobacco: Never Used    Alcohol use No      Comment: social drink sporatic    Drug use: No    Sexual activity: Yes         Current Outpatient Prescriptions:     acetaminophen (TYLENOL) 325 mg tablet, 650 mg every 6 hours for mild pain, Disp: 30 tablet, Rfl: 0    cyanocobalamin 1,000 mcg/mL, Inject 1 mL (1,000 mcg total) into the shoulder, thigh, or buttocks every 30 (thirty) days, Disp: 10 mL, Rfl: 0    ergocalciferol (VITAMIN D2) 50,000 units, Take 1 capsule (50,000 Units total) by mouth once a week, Disp: 12 capsule, Rfl: 0    naproxen (NAPROSYN) 375 mg tablet, Take 250 mg by mouth 2 (two) times a day with meals, Disp: , Rfl:     oxyCODONE (ROXICODONE) 5 mg immediate release tablet, Take 1 tablet (5 mg total) by mouth every 6 (six) hours as needed for severe pain Max Daily Amount: 20 mg, Disp: 60 tablet, Rfl: 0    SYRINGE-NEEDLE, DISP, 3 ML 25G X 1" 3 ML MISC, by Does not apply route every 30 (thirty) days, Disp: 50 each, Rfl: 0    No Known Allergies         Vitals:    09/14/18 1522   BP: 126/83   Pulse: 85   Resp: 18       Objective:          Physical Exam                    Right Knee Exam     Tenderness   The patient is experiencing no tenderness  Range of Motion   Extension: 5 (w/ pain)   Flexion: 120     Muscle Strength     The patient has normal right knee strength  Tests   Varus: negative  Valgus: negative    Other   Erythema: absent  Sensation: normal  Swelling: none  Other tests: no effusion present    Comments: Well healed lateral incision that is C/D/I  No calf tenderness or pain  Patient is NVID  Mild Valgus deformity  Gait is non antalgic             Diagnostics, reviewed and taken today if performed as documents:     The attending physician has personally reviewed the pertinent films in PACS and interpretation is as follows:    RIGHT TIBIA:  retained hardware mild arthritic changes in the lateral and Patellofemoral compartments the knee    Procedures, if performed today:  Large joint arthrocentesis  Date/Time: 9/14/2018 4:12 PM  Consent given by: patient  Site marked: site marked  Timeout: Immediately prior to procedure a time out was called to verify the correct patient, procedure, equipment, support staff and site/side marked as required   Supporting Documentation  Indications: pain and diagnostic evaluation   Procedure Details  Location: knee - R knee  Preparation: Patient was prepped and draped in the usual sterile fashion  Needle size: 22 G  Ultrasound guidance: no  Approach: lateral  Medications administered: 2 mL bupivacaine 0 25 %; 2 mL lidocaine 1 %; 12 mg betamethasone acetate-betamethasone sodium phosphate 6 (3-3) mg/mL    Patient tolerance: patient tolerated the procedure well with no immediate complications  Dressing:  Sterile dressing applied

## 2018-09-17 ENCOUNTER — EVALUATION (OUTPATIENT)
Dept: PHYSICAL THERAPY | Facility: CLINIC | Age: 43
End: 2018-09-17
Payer: COMMERCIAL

## 2018-09-17 DIAGNOSIS — Z98.890 S/P ORIF (OPEN REDUCTION INTERNAL FIXATION) FRACTURE: ICD-10-CM

## 2018-09-17 DIAGNOSIS — S82.831D CLOSED FRACTURE OF HEAD OF RIGHT FIBULA WITH ROUTINE HEALING, SUBSEQUENT ENCOUNTER: ICD-10-CM

## 2018-09-17 DIAGNOSIS — Z87.81 S/P ORIF (OPEN REDUCTION INTERNAL FIXATION) FRACTURE: ICD-10-CM

## 2018-09-17 DIAGNOSIS — S82.241D CLOSED DISPLACED SPIRAL FRACTURE OF SHAFT OF RIGHT TIBIA WITH ROUTINE HEALING: Primary | ICD-10-CM

## 2018-09-17 PROCEDURE — 97140 MANUAL THERAPY 1/> REGIONS: CPT | Performed by: PHYSICAL THERAPIST

## 2018-09-17 PROCEDURE — 97110 THERAPEUTIC EXERCISES: CPT | Performed by: PHYSICAL THERAPIST

## 2018-09-17 PROCEDURE — 97112 NEUROMUSCULAR REEDUCATION: CPT | Performed by: PHYSICAL THERAPIST

## 2018-09-17 NOTE — PROGRESS NOTES
PT Evaluation     Today's date: 2018  Patient name: Karen Brooks  : 1975  MRN: [id number]  Referring provider: Jared Dumont MD  Dx:   Encounter Diagnosis     ICD-10-CM    1  Closed displaced spiral fracture of shaft of right tibia with routine healing S82 241D    2  S/P ORIF (open reduction internal fixation) fracture Z96 7     Z87 81    3  Closed fracture of head of right fibula with routine healing, subsequent encounter S82 831D                   Assessment  Impairments: abnormal coordination, abnormal gait, abnormal muscle firing, abnormal or restricted ROM, abnormal movement, activity intolerance, impaired balance, impaired physical strength, lacks appropriate home exercise program and pain with function    Assessment details: Patient is a 44 y o  female who presents with the above listed impairments  Patient would continue to benefit from skilled PT services to address these impairments and to maximize function  Thank you for the referral     Understanding of Dx/Px/POC: good   Prognosis: good    Goals  Impairment Goals  - Decrease pain by 50% in 3 weeks  - partially met  - Increase right flexibility by 50% in 3 weeks  - met  - Increase right lower extremity strength golbally to 5/5 in 6 weeks  - partially met  - Increase right hip abductor and external rotator strength strength to 5/5  6 weeks  - partially met    Functional Goals  - Return to Prior Level of Function in 6 weeks  - partially met  - Patient will be independent with HEP in 6 weeks   - partially met    Plan  Patient would benefit from: skilled PT  Planned modality interventions: cryotherapy  Planned therapy interventions: joint mobilization, manual therapy, neuromuscular re-education, patient education, strengthening, stretching, therapeutic activities, therapeutic exercise, home exercise program, functional ROM exercises, Espinal taping and postural training  Frequency: 2x week (2-3x week)  Duration in weeks: 6  Treatment plan discussed with: patient        Subjective Evaluation    History of Present Illness  Mechanism of injury: Pt reports a 75-80% improvement to date  She notes an overall improvement with her gait, pain (less frequent), and function  She still notes stiffness and soreness at the R knee which will increase with walking  She reports she still has some numbness at the R knee and foot  She reports she is nervous to return to work  She states she did receive a cortisone injection from ortho this past Friday in the R knee  Pain  Current pain ratin  At best pain ratin  At worst pain ratin  Location: R ankle  Quality: dull ache and sharp    Patient Goals  Patient goal: Return to PLOF        Objective     Tenderness   Left Ankle/Foot   No tenderness  Right Ankle/Foot   No tenderness  Neurological Testing     Sensation     Ankle/Foot   Left Ankle/Foot   Intact: light touch    Right Ankle/Foot   Intact: light touch     Active Range of Motion     Right Ankle/Foot   Dorsiflexion (ke): 7 degrees   Plantar flexion: 50 degrees   Inversion: 22 degrees   Eversion: 17 degrees     Additional Active Range of Motion Details  2-125 R knee AROM  0-127 R knee PROM      Passive Range of Motion   Left Ankle/Foot  Normal passive range of motion    Right Ankle/Foot  Normal passive range of motion  Dorsiflexion (ke): 5 degrees   Plantar flexion: 50 degrees   Inversion: 22 degrees   Eversion: 17 degrees     Joint Play   Left Ankle/Foot  Hypomobile in the talocrural joint and subtalar joint  Right Ankle/Foot  Hypomobile in the talocrural joint and subtalar joint  Strength/Myotome Testing     Left Ankle/Foot   Normal strength    Right Ankle/Foot   Normal strength    Additional Strength Details  4+/5 strength noted RLE grossly      Swelling   Left Ankle/Foot   Figure 8: 46 cm    Right Ankle/Foot   Figure 8: 47 cm    General Comments     Ankle/Foot Comments   Gait is antalgic and lacks toe off on the R         Diagnosis: s/p hardware removal R tib/fib   Precautions: WBAT   Manuals 9/6 9/11 9/12 9/17    PROM R knee KLS KLS KLS CMF    Mobs ankle                        Re-eval       CMF    Exercise Diary           Bike 5 min 5 min 5 min 5 min                             X-walks GTB 20'x 4 GTB 20'x4  GTB 20'x4 GTB 20'x4    SL ER GTB 3x10 GTB 3x10 GTB 3x10 GTB 3x10    4-way SLR 1 5# 2x10 1 5# 2x10 1 5# 2x10      Leg press 80# SL 2x10        Bridge with HS curls 2x10  3x10 3x10  3x10    Hamstring curls    GTB 3x10 GTB 3x10     Ball squats on wall 2x10  4x10 4x10 2x15    ERMI ext   2 min x 2        Knee flexion S :10x10 :10x10 :10x10     Gastroc S HEP         soleus S HEP         Lunges     10x each      SLS            Calf raises on LP  80# SL 2x10 80# SL 3x10  80# SL 3x10 80# SL 3x10    Step ups  8 in 2x10   8 in 2x10  8'' 2x10    CKC TKE :03x20  :03x20 15# :03x20                                                                                          Gait - step overs   5 min 5 min - improved heel strike     Modalities           CP PRN

## 2018-09-18 ENCOUNTER — OFFICE VISIT (OUTPATIENT)
Dept: PHYSICAL THERAPY | Facility: CLINIC | Age: 43
End: 2018-09-18
Payer: COMMERCIAL

## 2018-09-18 DIAGNOSIS — Z87.81 S/P ORIF (OPEN REDUCTION INTERNAL FIXATION) FRACTURE: ICD-10-CM

## 2018-09-18 DIAGNOSIS — S82.831D CLOSED FRACTURE OF HEAD OF RIGHT FIBULA WITH ROUTINE HEALING, SUBSEQUENT ENCOUNTER: ICD-10-CM

## 2018-09-18 DIAGNOSIS — Z98.890 S/P ORIF (OPEN REDUCTION INTERNAL FIXATION) FRACTURE: ICD-10-CM

## 2018-09-18 DIAGNOSIS — S82.241D CLOSED DISPLACED SPIRAL FRACTURE OF SHAFT OF RIGHT TIBIA WITH ROUTINE HEALING: Primary | ICD-10-CM

## 2018-09-18 PROCEDURE — 97140 MANUAL THERAPY 1/> REGIONS: CPT

## 2018-09-18 PROCEDURE — 97112 NEUROMUSCULAR REEDUCATION: CPT

## 2018-09-18 PROCEDURE — 97110 THERAPEUTIC EXERCISES: CPT

## 2018-09-18 NOTE — PROGRESS NOTES
Daily Note     Today's date: 2018  Patient name: Chino Jackson  : 1975  MRN: [id number]  Referring provider: Leandro Salas MD  Dx:   Encounter Diagnosis     ICD-10-CM    1  Closed displaced spiral fracture of shaft of right tibia with routine healing S82 241D    2  S/P ORIF (open reduction internal fixation) fracture Z96 7     Z87 81    3  Closed fracture of head of right fibula with routine healing, subsequent encounter S82 271D                   Subjective: Patient states she feel stiff today with the rain  She was a little sore last night following treatment session  She reports her biggest complaint is standing for extended periods of time or any      Objective: See treatment diary below          Diagnosis: s/p hardware removal R tib/fib   Precautions: WBAT   Manuals    PROM R knee KLS KLS KLS CMF KLS   Mobs ankle                            Re-eval       CMF     Exercise Diary             Bike 5 min 5 min 5 min 5 min   5 min                               X-walks GTB 20'x 4 GTB 20'x4  GTB 20'x4 GTB 20'x4 BTB 20'x4   SL ER GTB 3x10 GTB 3x10 GTB 3x10 GTB 3x10 BTB 3x10   4-way SLR 1 5# 2x10 1 5# 2x10 1 5# 2x10    2# 2x10    Leg press 80# SL 2x10           Bridge with HS curls 2x10  3x10 3x10  3x10 3x10   Hamstring curls    GTB 3x10 GTB 3x10   BTB 3x10   Ball squats on wall 2x10  4x10 4x10 2x15 2x15   ERMI ext   2 min x 2         Knee flexion S :10x10 :10x10 :10x10   :10x10   Lunges     10x each    10x each    SLS on foam              Calf raises on LP  80# SL 2x10 80# SL 3x10  80# SL 3x10 80# SL 3x10 80# SL 3x10    Step ups  8 in 2x10   8 in 2x10  8'' 2x10 8 in 2x10   CKC TKE :03x20  :03x20 15# :03x20   15# :03x20                                                                                                      Gait - step overs   5 min 5 min - improved heel strike       Modalities             CP PRN                                               Assessment: Continued with outlined program  Progressed as noted with moderate muscle fatigue  No complaints of pain  Plan: Progress treatment as tolerated

## 2018-09-20 ENCOUNTER — OFFICE VISIT (OUTPATIENT)
Dept: PHYSICAL THERAPY | Facility: CLINIC | Age: 43
End: 2018-09-20
Payer: COMMERCIAL

## 2018-09-20 DIAGNOSIS — S82.241D CLOSED DISPLACED SPIRAL FRACTURE OF SHAFT OF RIGHT TIBIA WITH ROUTINE HEALING: Primary | ICD-10-CM

## 2018-09-20 DIAGNOSIS — Z87.81 S/P ORIF (OPEN REDUCTION INTERNAL FIXATION) FRACTURE: ICD-10-CM

## 2018-09-20 DIAGNOSIS — Z98.890 S/P ORIF (OPEN REDUCTION INTERNAL FIXATION) FRACTURE: ICD-10-CM

## 2018-09-20 PROCEDURE — 97140 MANUAL THERAPY 1/> REGIONS: CPT

## 2018-09-20 PROCEDURE — 97110 THERAPEUTIC EXERCISES: CPT

## 2018-09-20 PROCEDURE — 97112 NEUROMUSCULAR REEDUCATION: CPT

## 2018-09-20 NOTE — PROGRESS NOTES
Daily Note     Today's date: 2018  Patient name: Clare Chaparro  : 1975  MRN: [id number]  Referring provider: Terry Bean MD  Dx:   Encounter Diagnosis     ICD-10-CM    1  Closed displaced spiral fracture of shaft of right tibia with routine healing S82 241D    2  S/P ORIF (open reduction internal fixation) fracture Z96 7     Z87 81                   Subjective: Patient states she continues to notice an improvement when walking and is able to do more like ride her bike and walk  Objective: See treatment diary below               Diagnosis: s/p hardware removal R tib/fib   Precautions: WBAT   Manuals    PROM R knee KLS   CMF KLS   Mobs ankle                        Re-eval     CMF     Exercise Diary           Bike 5 min   5 min  5 min                           X-walks BTB 20'x 4   GTB 20'x4 BTB 20'x4   SL ER BTB 3x10   GTB 3x10 BTB 3x10   4-way SLR 2# 2x10     2# 2x10    Leg press 80# SL 2x10         Bridge with HS curls 3x10   3x10 3x10   Hamstring curls  BTB 3x10     BTB 3x10   Ball squats on wall 2x15   2x15 2x15   ERMI ext           Knee flexion S      :10x10   Lunges 10x each     10x each    SLS on foam  :30x4         Calf raises on LP  80# SL 2x10   80# SL 3x10 80# SL 3x10    Step ups  8 in 2x10   8'' 2x10 8 in 2x10   CKC TKE :03x20 20#       15# :03x20   Tandem stance Foam :30x2                                                                                  Gait - step overs           Modalities           CP PRN                                             Assessment: Continued with outlined program  She demonstrates improved LE strength as she is able to progress; moderate muscle fatigue  No exacerbating pain symptoms  Plan: Progress treatment as tolerated

## 2018-09-24 ENCOUNTER — OFFICE VISIT (OUTPATIENT)
Dept: PHYSICAL THERAPY | Facility: CLINIC | Age: 43
End: 2018-09-24
Payer: COMMERCIAL

## 2018-09-24 DIAGNOSIS — Z87.81 S/P ORIF (OPEN REDUCTION INTERNAL FIXATION) FRACTURE: ICD-10-CM

## 2018-09-24 DIAGNOSIS — S82.241D CLOSED DISPLACED SPIRAL FRACTURE OF SHAFT OF RIGHT TIBIA WITH ROUTINE HEALING: Primary | ICD-10-CM

## 2018-09-24 DIAGNOSIS — S82.831D CLOSED FRACTURE OF HEAD OF RIGHT FIBULA WITH ROUTINE HEALING, SUBSEQUENT ENCOUNTER: ICD-10-CM

## 2018-09-24 DIAGNOSIS — Z98.890 S/P ORIF (OPEN REDUCTION INTERNAL FIXATION) FRACTURE: ICD-10-CM

## 2018-09-24 PROCEDURE — 97110 THERAPEUTIC EXERCISES: CPT

## 2018-09-24 PROCEDURE — 97140 MANUAL THERAPY 1/> REGIONS: CPT

## 2018-09-24 PROCEDURE — 97112 NEUROMUSCULAR REEDUCATION: CPT

## 2018-09-24 NOTE — PROGRESS NOTES
Daily Note     Today's date: 2018  Patient name: Nic Torres  : 1975  MRN: [id number]  Referring provider: Ant Hall MD  Dx:   Encounter Diagnosis     ICD-10-CM    1  Closed displaced spiral fracture of shaft of right tibia with routine healing S82 241D    2  S/P ORIF (open reduction internal fixation) fracture Z96 7     Z87 81    3  Closed fracture of head of right fibula with routine healing, subsequent encounter S82 911D                   Subjective: Patient states she has not noticed an improvement since her injection  She has increase discomfort while ambulating or standing  Objective: See treatment diary below  Diagnosis: s/p hardware removal R tib/fib   Precautions: WBAT   Manuals    PROM R knee KLS KLS   CMF KLS   Mobs ankle                            Re-eval       CMF     Exercise Diary             Bike 5 min TM 5 min   5 min  5 min   Quad stretch   :20x5                        X-walks BTB 20'x 4    GTB 20'x4 BTB 20'x4   SL ER BTB 3x10 BTB 3x10   GTB 3x10 BTB 3x10   4-way SLR 2# 2x10 2 5# 2x10      2# 2x10    Leg press 80# SL 2x10 80# SL 2x10         Bridge with HS curls 3x10 3x10   3x10 3x10   Hamstring curls  BTB 3x10 BTB 3x10     BTB 3x10   Ball squats on wall 2x15 2x15   2x15 2x15   Lunges 10x each 10x each     10x each    SLS on foam  :30x4 :30x4         Calf raises on LP  80# SL 2x10 80# SL 2x10   80# SL 3x10 80# SL 3x10    Step ups  8 in 2x10 8 in 3x10   8'' 2x10 8 in 2x10   CKC TKE :03x20 20#   20# :03x20     15# :03x20   Tandem stance Foam :30x2 Foam :30x2         Wall sits    :30x4                                                                                Gait - step overs             Modalities             CP PRN                                                Assessment: Continued with outlined program  Progressed with wall sits, noted quad fatigue and cued for even weight distribution  Moderate muscle fatigue with strengthening   She has no exacerbating pain symptoms  Plan: Progress treatment as tolerated

## 2018-09-25 ENCOUNTER — APPOINTMENT (OUTPATIENT)
Dept: PHYSICAL THERAPY | Facility: CLINIC | Age: 43
End: 2018-09-25
Payer: COMMERCIAL

## 2018-10-01 ENCOUNTER — OFFICE VISIT (OUTPATIENT)
Dept: PHYSICAL THERAPY | Facility: CLINIC | Age: 43
End: 2018-10-01
Payer: COMMERCIAL

## 2018-10-01 DIAGNOSIS — S82.241D CLOSED DISPLACED SPIRAL FRACTURE OF SHAFT OF RIGHT TIBIA WITH ROUTINE HEALING: Primary | ICD-10-CM

## 2018-10-01 DIAGNOSIS — Z98.890 S/P ORIF (OPEN REDUCTION INTERNAL FIXATION) FRACTURE: ICD-10-CM

## 2018-10-01 DIAGNOSIS — Z87.81 S/P ORIF (OPEN REDUCTION INTERNAL FIXATION) FRACTURE: ICD-10-CM

## 2018-10-01 PROCEDURE — 97112 NEUROMUSCULAR REEDUCATION: CPT

## 2018-10-01 PROCEDURE — 97140 MANUAL THERAPY 1/> REGIONS: CPT

## 2018-10-01 PROCEDURE — 97110 THERAPEUTIC EXERCISES: CPT

## 2018-10-01 NOTE — PROGRESS NOTES
Daily Note     Today's date: 10/1/2018  Patient name: Shanice Kelly  : 1975  MRN: [id number]  Referring provider: Gene Estrella MD  Dx:   Encounter Diagnosis     ICD-10-CM    1  Closed displaced spiral fracture of shaft of right tibia with routine healing S82 241D    2  S/P ORIF (open reduction internal fixation) fracture Z96 7     Z87 81                   Subjective: Pt states that she has been increasing her exercising at home, using an [place] that tells her what exercises to perform  She states they are a lot of the same ones she does here  Objective: See treatment diary below  Diagnosis: s/p hardware removal R tib/fib   Precautions: WBAT   Manuals 9/20 9/24  10/1 9/17 9/18   PROM R knee KLS KLS  TJH CMF KLS   Mobs ankle                            Re-eval       CMF     Exercise Diary             Bike 5 min TM 5 min  TM 5 min 5 min  5 min   Quad stretch   :20x5   :20x5                     X-walks BTB 20'x 4    GTB 20'x4 BTB 20'x4   SL ER BTB 3x10 BTB 3x10   GTB 3x10 BTB 3x10   4-way SLR 2# 2x10 2 5# 2x10   2 5# 2x10   2# 2x10    Leg press 80# SL 2x10 80# SL 2x10  80# SL  3x10       Bridge with HS curls 3x10 3x10  3x10 3x10 3x10   Hamstring curls  BTB 3x10 BTB 3x10     BTB 3x10   Ball squats on wall 2x15 2x15  2x15 2x15 2x15   Lunges 10x each 10x each  10x each   10x each    SLS on foam  :30x4 :30x4  :30x4       Calf raises on LP  80# SL 2x10 80# SL 2x10  80# SL  3x10 80# SL 3x10 80# SL 3x10    Step ups  8 in 2x10 8 in 3x10  8 in 3x10 8'' 2x10 8 in 2x10   CKC TKE :03x20 20#   20# :03x20     15# :03x20   Tandem stance Foam :30x2 Foam :30x2  Foam  :30x2       Wall sits    :30x4  :30x4                                                                              Gait - step overs             Modalities             CP PRN                                                Assessment: Continued with outlined program   Moderate muscle fatigue with strengthening    Pt uses good technique and form with TE   She has no exacerbating pain symptoms  Plan: Progress treatment as tolerated

## 2018-10-02 ENCOUNTER — OFFICE VISIT (OUTPATIENT)
Dept: PHYSICAL THERAPY | Facility: CLINIC | Age: 43
End: 2018-10-02
Payer: COMMERCIAL

## 2018-10-02 DIAGNOSIS — Z87.81 S/P ORIF (OPEN REDUCTION INTERNAL FIXATION) FRACTURE: ICD-10-CM

## 2018-10-02 DIAGNOSIS — Z98.890 S/P ORIF (OPEN REDUCTION INTERNAL FIXATION) FRACTURE: ICD-10-CM

## 2018-10-02 DIAGNOSIS — S82.241D CLOSED DISPLACED SPIRAL FRACTURE OF SHAFT OF RIGHT TIBIA WITH ROUTINE HEALING: Primary | ICD-10-CM

## 2018-10-02 DIAGNOSIS — S82.831D CLOSED FRACTURE OF HEAD OF RIGHT FIBULA WITH ROUTINE HEALING, SUBSEQUENT ENCOUNTER: ICD-10-CM

## 2018-10-02 PROCEDURE — 97140 MANUAL THERAPY 1/> REGIONS: CPT

## 2018-10-02 PROCEDURE — 97110 THERAPEUTIC EXERCISES: CPT

## 2018-10-02 PROCEDURE — 97112 NEUROMUSCULAR REEDUCATION: CPT

## 2018-10-02 NOTE — PROGRESS NOTES
Daily Note     Today's date: 10/2/2018  Patient name: Enrico Santacruz  : 1975  MRN: [id number]  Referring provider: Enid Ames MD  Dx:   Encounter Diagnosis     ICD-10-CM    1  Closed displaced spiral fracture of shaft of right tibia with routine healing S82 241D    2  S/P ORIF (open reduction internal fixation) fracture Z96 7     Z87 81    3  Closed fracture of head of right fibula with routine healing, subsequent encounter S82 621D                   Subjective: Patient states she has burning at night behind her knee and continued stiffness  She has started to perform higher level exercises while at home on her off days         Objective: See treatment diary below  Diagnosis: s/p hardware removal R tib/fib   Precautions: WBAT   Manuals 9/20 9/24  10/1 10/2    PROM R knee KLS KLS  TJH KLS    Mobs ankle                          Re-eval           Exercise Diary            Bike 5 min TM 5 min  TM 5 min TM 5 min     Quad stretch   :20x5   :20x5 :20x5                  X-walks BTB 20'x 4     BTB 20'x4    SL ER BTB 3x10 BTB 3x10   GTB 3x10    4-way SLR 2# 2x10 2 5# 2x10   2 5# 2x10 3# 2x10     Leg press 80# SL 2x10 80# SL 2x10  80# SL  3x10 80# SL 3x10     Bridge with HS curls 3x10 3x10  3x10 3x10    Hamstring curls  BTB 3x10 BTB 3x10        Ball squats on wall 2x15 2x15  2x15 2x15    Lunges 10x each 10x each  10x each 10x each     SLS on foam  :30x4 :30x4  :30x4      Calf raises on LP  80# SL 2x10 80# SL 2x10  80# SL  3x10 80# SL 3x10    Step ups  8 in 2x10 8 in 3x10  8 in 3x10 8'' 2x10    CKC TKE :03x20 20#   20# :03x20   20# :03x20     Tandem stance Foam :30x2 Foam :30x2  Foam  :30x2      Wall sits    :30x4  :30x4 :30x4      Lateral lunges       Red disc 2x10      Planks        NPV                                                 Gait - step overs             Modalities             CP PRN                                                Assessment: Continued with outlined program  She demonstrates improved LE strength with noted exercises; moderate muscle fatigue  No exacerbating pain symptoms  Plan: Progress treatment as tolerated

## 2018-10-04 ENCOUNTER — OFFICE VISIT (OUTPATIENT)
Dept: PHYSICAL THERAPY | Facility: CLINIC | Age: 43
End: 2018-10-04
Payer: COMMERCIAL

## 2018-10-04 DIAGNOSIS — Z87.81 S/P ORIF (OPEN REDUCTION INTERNAL FIXATION) FRACTURE: ICD-10-CM

## 2018-10-04 DIAGNOSIS — S82.241D CLOSED DISPLACED SPIRAL FRACTURE OF SHAFT OF RIGHT TIBIA WITH ROUTINE HEALING: Primary | ICD-10-CM

## 2018-10-04 DIAGNOSIS — S82.831D CLOSED FRACTURE OF HEAD OF RIGHT FIBULA WITH ROUTINE HEALING, SUBSEQUENT ENCOUNTER: ICD-10-CM

## 2018-10-04 DIAGNOSIS — Z98.890 S/P ORIF (OPEN REDUCTION INTERNAL FIXATION) FRACTURE: ICD-10-CM

## 2018-10-04 PROCEDURE — 97112 NEUROMUSCULAR REEDUCATION: CPT | Performed by: PHYSICAL THERAPIST

## 2018-10-04 PROCEDURE — 97140 MANUAL THERAPY 1/> REGIONS: CPT | Performed by: PHYSICAL THERAPIST

## 2018-10-04 PROCEDURE — 97110 THERAPEUTIC EXERCISES: CPT | Performed by: PHYSICAL THERAPIST

## 2018-10-04 NOTE — PROGRESS NOTES
Daily Note     Today's date: 10/4/2018  Patient name: Monster Ford  : 1975  MRN: [id number]  Referring provider: Sekou Santana MD  Dx:   Encounter Diagnosis     ICD-10-CM    1  Closed displaced spiral fracture of shaft of right tibia with routine healing S82 241D    2  S/P ORIF (open reduction internal fixation) fracture Z96 7     Z87 81    3  Closed fracture of head of right fibula with routine healing, subsequent encounter S82 011D                   Subjective: Patient notes stiffness and pain at the R knee today  Objective: See treatment diary below  McConnel's test was positive  Diagnosis: s/p hardware removal R tib/fib   Precautions: WBAT   Manuals 9/20 9/24  10/1 10/2 10/4   PROM R knee KLS KLS  TJH KLS CMF   Mobs ankle            McConnel taping         CMF    Re-eval           Exercise Diary            Bike 5 min TM 5 min  TM 5 min TM 5 min  TM 5 min     Quad stretch   :20x5   :20x5 :20x5  :20x5                X-walks BTB 20'x 4     BTB 20'x4 BTB 20'x4   SL ER BTB 3x10 BTB 3x10   GTB 3x10 GTB 3x10   4-way SLR 2# 2x10 2 5# 2x10   2 5# 2x10 3# 2x10  3# 2x10   Leg press 80# SL 2x10 80# SL 2x10  80# SL  3x10 80# SL 3x10  80# SL 3x10   Bridge with HS curls 3x10 3x10  3x10 3x10 3x10   Hamstring curls  BTB 3x10 BTB 3x10        Ball squats on wall 2x15 2x15  2x15 2x15 2x15   Lunges 10x each 10x each  10x each 10x each  x10 each   SLS on foam  :30x4 :30x4  :30x4      Calf raises on LP  80# SL 2x10 80# SL 2x10  80# SL  3x10 80# SL 3x10 80# SL 3x10   Step ups  8 in 2x10 8 in 3x10  8 in 3x10 8'' 2x10 8'' 2x10   CKC TKE :03x20 20#   20# :03x20   20# :03x20  20# 3x10   Tandem stance Foam :30x2 Foam :30x2  Foam  :30x2      Wall sits    :30x4  :30x4 :30x4   :30x4   Lateral lunges       Red disc 2x10   Red disc 2x10   Planks        NPV   :20x2                                              Gait - step overs             Modalities             CP PRN                                              Assessment: Continued with outlined program  Moderate to max fatigue noted  Decreased pain noted after McConnel taping performed today  Plan: Progress treatment as tolerated

## 2018-10-08 ENCOUNTER — OFFICE VISIT (OUTPATIENT)
Dept: PHYSICAL THERAPY | Facility: CLINIC | Age: 43
End: 2018-10-08
Payer: COMMERCIAL

## 2018-10-08 DIAGNOSIS — Z98.890 S/P ORIF (OPEN REDUCTION INTERNAL FIXATION) FRACTURE: ICD-10-CM

## 2018-10-08 DIAGNOSIS — Z87.81 S/P ORIF (OPEN REDUCTION INTERNAL FIXATION) FRACTURE: ICD-10-CM

## 2018-10-08 DIAGNOSIS — S82.831D CLOSED FRACTURE OF HEAD OF RIGHT FIBULA WITH ROUTINE HEALING, SUBSEQUENT ENCOUNTER: ICD-10-CM

## 2018-10-08 DIAGNOSIS — S82.241D CLOSED DISPLACED SPIRAL FRACTURE OF SHAFT OF RIGHT TIBIA WITH ROUTINE HEALING: Primary | ICD-10-CM

## 2018-10-08 PROCEDURE — 97110 THERAPEUTIC EXERCISES: CPT | Performed by: PHYSICAL THERAPIST

## 2018-10-08 PROCEDURE — 97112 NEUROMUSCULAR REEDUCATION: CPT | Performed by: PHYSICAL THERAPIST

## 2018-10-08 NOTE — PROGRESS NOTES
Daily Note     Today's date: 10/8/2018  Patient name: Marcia Lorenzo  : 1975  MRN: [id number]  Referring provider: Yair Coto MD  Dx:   Encounter Diagnosis     ICD-10-CM    1  Closed displaced spiral fracture of shaft of right tibia with routine healing S82 241D    2  S/P ORIF (open reduction internal fixation) fracture Z96 7     Z87 81    3  Closed fracture of head of right fibula with routine healing, subsequent encounter S82 831D                   Subjective: Patient notes "burning" under the R knee cap when sleeping at night  Objective: See treatment diary below  McCsharel's test was positive  Diagnosis: s/p hardware removal R tib/fib   Precautions: WBAT   Manuals 10/8    10/4   PROM R knee     CMF   Mobs ankle        McConnel taping CMF    CMF    Re-eval        Exercise Diary        Bike 5 min  TM 5 min  Quad stretch     :20x5            X-walks BTB x20'x4    BTB 20'x4   SL ER GTB 3x10    GTB 3x10   4-way SLR 3# 3x10    3# 2x10   Leg press 80# SL 3x10    80# SL 3x10   Bridge with HS curls 3x10    3x10   Hamstring curls         Ball squats on wall 2x15    2x15   Lunges     x10 each   SLS on foam  :30x4       Calf raises on LP  80# Sl 3x10    80# SL 3x10   Step ups  8'' 2x10    8'' 2x10   CKC TKE     20# 3x10   Tandem stance        Wall sits  :30x4     :30x4   Lateral lunges      Red disc 2x10   Planks  :20x2     :20x2                                  Gait - step overs         Modalities         CP PRN                                        Assessment: Continued with outlined program  Moderate to max fatigue noted  Plan: Progress treatment as tolerated

## 2018-10-09 ENCOUNTER — OFFICE VISIT (OUTPATIENT)
Dept: PHYSICAL THERAPY | Facility: CLINIC | Age: 43
End: 2018-10-09
Payer: COMMERCIAL

## 2018-10-09 DIAGNOSIS — S82.831D CLOSED FRACTURE OF HEAD OF RIGHT FIBULA WITH ROUTINE HEALING, SUBSEQUENT ENCOUNTER: ICD-10-CM

## 2018-10-09 DIAGNOSIS — Z87.81 S/P ORIF (OPEN REDUCTION INTERNAL FIXATION) FRACTURE: ICD-10-CM

## 2018-10-09 DIAGNOSIS — S82.241D CLOSED DISPLACED SPIRAL FRACTURE OF SHAFT OF RIGHT TIBIA WITH ROUTINE HEALING: Primary | ICD-10-CM

## 2018-10-09 DIAGNOSIS — Z98.890 S/P ORIF (OPEN REDUCTION INTERNAL FIXATION) FRACTURE: ICD-10-CM

## 2018-10-09 PROCEDURE — 97110 THERAPEUTIC EXERCISES: CPT

## 2018-10-09 PROCEDURE — 97112 NEUROMUSCULAR REEDUCATION: CPT

## 2018-10-09 NOTE — PROGRESS NOTES
Daily Note     Today's date: 10/9/2018  Patient name: Dixie Tao  : 1975  MRN: [id number]  Referring provider: Jose De Jesus Marino MD  Dx:   Encounter Diagnosis     ICD-10-CM    1  Closed displaced spiral fracture of shaft of right tibia with routine healing S82 241D    2  S/P ORIF (open reduction internal fixation) fracture Z96 7     Z87 81    3  Closed fracture of head of right fibula with routine healing, subsequent encounter S82 831D                   Subjective:Patinet states she has felt better since yesterday's visit  She states she had burning under her knee cap last night, which she still feels had decreased  Objective: See treatment diary below  Diagnosis: s/p hardware removal R tib/fib   Precautions: WBAT   Manuals 10/8 10/9     10/4   PROM R knee         CMF   Mobs ankle             McConnel taping CMF       CMF    Re-eval             Exercise Diary             Bike 5 min  TM 5 min     TM 5 min  Quad stretch   :20x5     :20x5                 X-walks BTB x20'x4 BTB 20'x4     BTB 20'x4   SL ER GTB 3x10 BTB 2x10      GTB 3x10   4-way SLR 3# 3x10 3# 3x10     3# 2x10   Leg press 80# SL 3x10 80# SL 3x10     80# SL 3x10   Bridge with HS curls 3x10 3x10      3x10   Hamstring curls              Ball squats on wall 2x15 2x15     2x15   Lunges         x10 each   SLS on foam  :30x4 :30x4         Calf raises on LP  80# Sl 3x10 SL 80# 3x10     80# SL 3x10   Step ups  8'' 2x10 12 in 2x10     8'' 2x10   CKC TKE         20# 3x10   Tandem stance   :30x4 foam         Wall sits  :30x4 :30x4       :30x4   Lateral lunges   Red disc 2x10      Red disc 2x10   Planks  :20x2 :20x2       :20x2                                              Gait - step overs             Modalities             CP PRN                                               Assessment: Continued with outlined program  Patient demonstrates improved LE strength and proprioception  Updated HEP  Plan: Progress treatment as tolerated

## 2018-10-11 ENCOUNTER — OFFICE VISIT (OUTPATIENT)
Dept: PHYSICAL THERAPY | Facility: CLINIC | Age: 43
End: 2018-10-11
Payer: COMMERCIAL

## 2018-10-11 DIAGNOSIS — S82.831D CLOSED FRACTURE OF HEAD OF RIGHT FIBULA WITH ROUTINE HEALING, SUBSEQUENT ENCOUNTER: Primary | ICD-10-CM

## 2018-10-11 PROCEDURE — 97110 THERAPEUTIC EXERCISES: CPT | Performed by: PHYSICAL THERAPIST

## 2018-10-11 PROCEDURE — 97112 NEUROMUSCULAR REEDUCATION: CPT | Performed by: PHYSICAL THERAPIST

## 2018-10-11 NOTE — PROGRESS NOTES
Daily Note     Today's date: 10/11/2018  Patient name: Richard Peck  : 1975  MRN: [id number]  Referring provider: Juani Baker MD  Dx:   Encounter Diagnosis     ICD-10-CM    1  Closed fracture of head of right fibula with routine healing, subsequent encounter H95 100B                   Subjective: Pt states she has not had any burning sensation under her knee cap over the past couple of days/nights  Objective: See treatment diary below  Diagnosis: s/p hardware removal R tib/fib   Precautions: WBAT   Manuals 10/8 10/9  10/11   10/4   PROM R knee         CMF   Mobs ankle             McConnel taping CMF  CMF  CMF   CMF    Re-eval             Exercise Diary             Bike 5 min  TM 5 min  TM 5 min   TM 5 min  Quad stretch   :20x5  :20x5   :20x5                 X-walks BTB x20'x4 BTB 20'x4  BTB 20'x4   BTB 20'x4   SL ER GTB 3x10 BTB 2x10   BTB 2x10   GTB 3x10   4-way SLR 3# 3x10 3# 3x10  3# 3x10   3# 2x10   Leg press 80# SL 3x10 80# SL 3x10  80# SL 3x10   80# SL 3x10   Bridge with HS curls 3x10 3x10   3x10   3x10   Hamstring curls              Ball squats on wall 2x15 2x15  2x15   2x15   Lunges         x10 each   SLS on foam  :30x4 :30x4  :30x4       Calf raises on LP  80# Sl 3x10 SL 80# 3x10  SL 80# 3x10   80# SL 3x10   Step ups  8'' 2x10 12 in 2x10  12'' 2x10   8'' 2x10   CKC TKE         20# 3x10   Tandem stance   :30x4 foam  :30x4       Wall sits  :30x4 :30x4   :30x4    :30x4   Lateral lunges   Red disc 2x10  Red disc 2x10    Red disc 2x10   Planks  :20x2 :20x2   :20x2    :20x2                                              Gait - step overs             Modalities             CP PRN                                               Assessment: Continued with outlined program with moderate fatigue noted  Plan: Progress treatment as tolerated

## 2018-10-15 ENCOUNTER — OFFICE VISIT (OUTPATIENT)
Dept: PHYSICAL THERAPY | Facility: CLINIC | Age: 43
End: 2018-10-15
Payer: COMMERCIAL

## 2018-10-15 DIAGNOSIS — Z98.890 S/P ORIF (OPEN REDUCTION INTERNAL FIXATION) FRACTURE: ICD-10-CM

## 2018-10-15 DIAGNOSIS — S82.241D CLOSED DISPLACED SPIRAL FRACTURE OF SHAFT OF RIGHT TIBIA WITH ROUTINE HEALING: ICD-10-CM

## 2018-10-15 DIAGNOSIS — Z87.81 S/P ORIF (OPEN REDUCTION INTERNAL FIXATION) FRACTURE: ICD-10-CM

## 2018-10-15 DIAGNOSIS — S82.831D CLOSED FRACTURE OF HEAD OF RIGHT FIBULA WITH ROUTINE HEALING, SUBSEQUENT ENCOUNTER: Primary | ICD-10-CM

## 2018-10-15 PROCEDURE — 97112 NEUROMUSCULAR REEDUCATION: CPT

## 2018-10-15 PROCEDURE — 97110 THERAPEUTIC EXERCISES: CPT

## 2018-10-15 NOTE — PROGRESS NOTES
Daily Note     Today's date: 10/15/2018  Patient name: Monster Ford  : 1975  MRN: [id number]  Referring provider: Sekou Santana MD  Dx:   Encounter Diagnosis     ICD-10-CM    1  Closed fracture of head of right fibula with routine healing, subsequent encounter S82 831D    2  Closed displaced spiral fracture of shaft of right tibia with routine healing S82 241D    3  S/P ORIF (open reduction internal fixation) fracture Z96 7     Z87 81                   Subjective: Patient states she is tight today  She states she feels she is compensating with her LLE  Objective: See treatment diary below  Diagnosis: s/p hardware removal R tib/fib   Precautions: WBAT   Manuals 10/8 10/9  10/11 10/15    PROM R knee            Mobs ankle            McConnel taping CMF  CMF  CMF KLS     Re-eval            Exercise Diary            Bike 5 min  TM 5 min  TM 5 min TM 5 min     Quad stretch   :20x5  :20x5 :20x5                 X-walks BTB x20'x4 BTB 20'x4  BTB 20'x4 BTB 20'x4    SL ER GTB 3x10 BTB 2x10   BTB 2x10 BTB 3x10    4-way SLR 3# 3x10 3# 3x10  3# 3x10 3# 3x10    Leg press 80# SL 3x10 80# SL 3x10  80# SL 3x10 85# 3x10 SL    Bridge with HS curls 3x10 3x10   3x10 3x10    Hamstring curls             Ball squats on wall 2x15 2x15  2x15 2x15    Lunges            SLS on foam  :30x4 :30x4  :30x4 :30x4    Calf raises on LP  80# Sl 3x10 SL 80# 3x10  SL 80# 3x10 SL 85# 3x10    Step ups  8'' 2x10 12 in 2x10  12'' 2x10 12' 2x10    CKC TKE            Tandem stance   :30x4 foam  :30x4 :30x4    Wall sits  :30x4 :30x4   :30x4 :30x4    Lateral lunges   Red disc 2x10  Red disc 2x10 Red disc 2x10    Planks  :20x2 :20x2   :20x2 :20x4                                             Gait - step overs            Modalities             CP PRN                                               Assessment: Continued with outlined program, noted mod to max muscle fatigue  Improving LE strength  No exacerbating pain symptoms         Plan: Progress treatment as tolerated

## 2018-10-16 ENCOUNTER — APPOINTMENT (OUTPATIENT)
Dept: PHYSICAL THERAPY | Facility: CLINIC | Age: 43
End: 2018-10-16
Payer: COMMERCIAL

## 2018-10-17 ENCOUNTER — APPOINTMENT (OUTPATIENT)
Dept: PHYSICAL THERAPY | Facility: CLINIC | Age: 43
End: 2018-10-17
Payer: COMMERCIAL

## 2018-10-17 ENCOUNTER — OFFICE VISIT (OUTPATIENT)
Dept: PHYSICAL THERAPY | Facility: CLINIC | Age: 43
End: 2018-10-17
Payer: COMMERCIAL

## 2018-10-17 DIAGNOSIS — S82.241D CLOSED DISPLACED SPIRAL FRACTURE OF SHAFT OF RIGHT TIBIA WITH ROUTINE HEALING: ICD-10-CM

## 2018-10-17 DIAGNOSIS — S82.831D CLOSED FRACTURE OF HEAD OF RIGHT FIBULA WITH ROUTINE HEALING, SUBSEQUENT ENCOUNTER: Primary | ICD-10-CM

## 2018-10-17 DIAGNOSIS — Z87.81 S/P ORIF (OPEN REDUCTION INTERNAL FIXATION) FRACTURE: ICD-10-CM

## 2018-10-17 DIAGNOSIS — Z98.890 S/P ORIF (OPEN REDUCTION INTERNAL FIXATION) FRACTURE: ICD-10-CM

## 2018-10-17 PROCEDURE — 97110 THERAPEUTIC EXERCISES: CPT

## 2018-10-17 PROCEDURE — 97112 NEUROMUSCULAR REEDUCATION: CPT

## 2018-10-17 NOTE — PROGRESS NOTES
Daily Note     Today's date: 10/17/2018  Patient name: Benjamin Gonzales  : 1975  MRN: [id number]  Referring provider: Yoseph De Anda MD  Dx:   Encounter Diagnosis     ICD-10-CM    1  Closed fracture of head of right fibula with routine healing, subsequent encounter S82 831D    2  Closed displaced spiral fracture of shaft of right tibia with routine healing S82 241D    3  S/P ORIF (open reduction internal fixation) fracture Z96 7     Z87 81                   Subjective: Patient states she just feels stiff all the time  She has been compliant with HEP  Objective: See treatment diary below  Diagnosis: s/p hardware removal R tib/fib   Precautions: WBAT   Manuals 10/8 10/9  10/11 10/15 10/17   PROM R knee             Mobs ankle             McConnel taping CMF  CMF  CMF KLS Resume nv    Re-eval             Exercise Diary             Bike 5 min   TM 5 min  TM 5 min TM 5 min  TM 5 min    Quad stretch   :20x5  :20x5 :20x5 :20x5                  X-walks BTB x20'x4 BTB 20'x4  BTB 20'x4 BTB 20'x4 BTB 20'x4   SL ER GTB 3x10 BTB 2x10   BTB 2x10 BTB 3x10 BTB 3x10   4-way SLR 3# 3x10 3# 3x10  3# 3x10 3# 3x10 3# 3x10   Leg press 80# SL 3x10 80# SL 3x10  80# SL 3x10 85# 3x10 SL 85# SL 3x10    Bridge with HS curls 3x10 3x10   3x10 3x10 3x10   Hamstring curls              Ball squats on wall 2x15 2x15  2x15 2x15 2x15   Lunges             SLS on foam  :30x4 :30x4  :30x4 :30x4 :30x4   Calf raises on LP  80# Sl 3x10 SL 80# 3x10  SL 80# 3x10 SL 85# 3x10 SL 85# 3x10   Step ups  8'' 2x10 12 in 2x10  12'' 2x10 12' 2x10 12" 2x10   CKC TKE             Tandem stance   :30x4 foam  :30x4 :30x4 :30x4   Wall sits  :30x4 :30x4   :30x4 :30x4 :30x4   Lateral lunges   Red disc 2x10  Red disc 2x10 Red disc 2x10 Red disc 2x10   Planks  :20x2 :20x2   :20x2 :20x4  :20x4   Lateral planks             Rebounder tosses                             Gait - step overs             Modalities             CP PRN                                               Assessment: Continued to progress outlined program  Patient demonstrates improved LE strength, however noted muscle fatigue throughout exercises  Plan: Progress treatment as tolerated

## 2018-10-18 ENCOUNTER — OFFICE VISIT (OUTPATIENT)
Dept: PHYSICAL THERAPY | Facility: CLINIC | Age: 43
End: 2018-10-18
Payer: COMMERCIAL

## 2018-10-18 DIAGNOSIS — S82.241D CLOSED DISPLACED SPIRAL FRACTURE OF SHAFT OF RIGHT TIBIA WITH ROUTINE HEALING: ICD-10-CM

## 2018-10-18 DIAGNOSIS — Z98.890 S/P ORIF (OPEN REDUCTION INTERNAL FIXATION) FRACTURE: ICD-10-CM

## 2018-10-18 DIAGNOSIS — S82.831D CLOSED FRACTURE OF HEAD OF RIGHT FIBULA WITH ROUTINE HEALING, SUBSEQUENT ENCOUNTER: Primary | ICD-10-CM

## 2018-10-18 DIAGNOSIS — Z87.81 S/P ORIF (OPEN REDUCTION INTERNAL FIXATION) FRACTURE: ICD-10-CM

## 2018-10-18 PROCEDURE — 97140 MANUAL THERAPY 1/> REGIONS: CPT

## 2018-10-18 PROCEDURE — 97110 THERAPEUTIC EXERCISES: CPT

## 2018-10-18 PROCEDURE — 97112 NEUROMUSCULAR REEDUCATION: CPT

## 2018-10-18 NOTE — PROGRESS NOTES
Daily Note     Today's date: 10/18/2018  Patient name: Shanice Kelly  : 1975  MRN: [id number]  Referring provider: Gene Estrella MD  Dx:   Encounter Diagnosis     ICD-10-CM    1  Closed fracture of head of right fibula with routine healing, subsequent encounter S82 831D    2  Closed displaced spiral fracture of shaft of right tibia with routine healing S82 241D    3  S/P ORIF (open reduction internal fixation) fracture Z96 7     Z87 81                   Subjective: Patient states she continues to have stiffness  She demonstrates improved gait when ambulating into therapy  Objective: See treatment diary below  Diagnosis: s/p hardware removal R tib/fib   Precautions: WBAT   Manuals 10/18   10/15 10/17   PROM R knee           Mobs ankle           McConnel taping KLS   KLS Resume nv    Re-eval           Exercise Diary           Bike 5 min  TM 5 min  TM 5 min    Quad stretch :20x5   :20x5 :20x5                X-walks BTB x20'x4   BTB 20'x4 BTB 20'x4   SL ER Black 3x10   BTB 3x10 BTB 3x10   4-way SLR 3# 3x10   3# 3x10 3# 3x10   Leg press 85# SL 3x10   85# 3x10 SL 85# SL 3x10    Bridge with HS curls 3x10   3x10 3x10   Ball squats on wall 2x15   2x15 2x15   Lunges 2x10         SLS on foam  :30x4   :30x4 :30x4   Calf raises on LP  85# SL 3x10   SL 85# 3x10 SL 85# 3x10   Step ups  12'' 2x10   12' 2x10 12" 2x10   CKC TKE           Tandem stance :30x4   :30x4 :30x4   Wall sits  :30x4   :30x4 :30x4   Lateral lunges Red disc 2x10   Red disc 2x10 Red disc 2x10   Planks  :20x4   :20x4  :20x4   Lateral planks :20x2         Rebounder tosses  20x          Tandem walking Foam 2 laps                    Modalities           CP PRN                                           Assessment: Continued to progress outlined program  Patient demonstrates improved knee stability, flexibility and strength  Mod to max muscle fatigue  No exacerbating pain symptoms  Plan: Progress treatment as tolerated

## 2018-10-22 ENCOUNTER — EVALUATION (OUTPATIENT)
Dept: PHYSICAL THERAPY | Facility: CLINIC | Age: 43
End: 2018-10-22
Payer: COMMERCIAL

## 2018-10-22 DIAGNOSIS — S82.831D CLOSED FRACTURE OF HEAD OF RIGHT FIBULA WITH ROUTINE HEALING, SUBSEQUENT ENCOUNTER: Primary | ICD-10-CM

## 2018-10-22 PROCEDURE — G8990 OTHER PT/OT CURRENT STATUS: HCPCS | Performed by: PHYSICAL THERAPIST

## 2018-10-22 PROCEDURE — G8991 OTHER PT/OT GOAL STATUS: HCPCS | Performed by: PHYSICAL THERAPIST

## 2018-10-22 PROCEDURE — 97110 THERAPEUTIC EXERCISES: CPT | Performed by: PHYSICAL THERAPIST

## 2018-10-22 PROCEDURE — 97140 MANUAL THERAPY 1/> REGIONS: CPT | Performed by: PHYSICAL THERAPIST

## 2018-10-22 PROCEDURE — 97112 NEUROMUSCULAR REEDUCATION: CPT | Performed by: PHYSICAL THERAPIST

## 2018-10-22 NOTE — PROGRESS NOTES
PT Evaluation     Today's date: 10/22/2018  Patient name: Katharina Song  : 1975  MRN: [id number]  Referring provider: Bharati Matthews MD  Dx:   Encounter Diagnosis     ICD-10-CM    1  Closed fracture of head of right fibula with routine healing, subsequent encounter S82 970S                   Assessment  Impairments: abnormal gait, abnormal muscle firing, abnormal or restricted ROM, abnormal movement, activity intolerance, impaired balance, impaired physical strength and pain with function    Assessment details: Patient is a 44 y o  female who presents with the above listed impairments  Patient would continue to benefit from skilled PT services to address these impairments and to maximize function  Thank you for the referral     Understanding of Dx/Px/POC: good   Prognosis: good    Goals  Impairment Goals  - Decrease pain by 50% in 3 weeks  - partially met  - Increase right flexibility by 50% in 3 weeks  - met  - Increase right lower extremity strength golbally to 5/5 in 6 weeks  - partially met  - Increase right hip abductor and external rotator strength strength to 5/5  6 weeks  -  met    Functional Goals  - Return to Prior Level of Function in 6 weeks  - partially met  - Patient will be independent with HEP in 6 weeks  - partially met    Plan  Patient would benefit from: skilled PT  Planned modality interventions: cryotherapy  Planned therapy interventions: joint mobilization, manual therapy, neuromuscular re-education, patient education, strengthening, stretching, therapeutic activities, therapeutic exercise, home exercise program, functional ROM exercises, Espinal taping and postural training  Frequency: 2x week (2-3x week)  Duration in weeks: 4  Treatment plan discussed with: patient        Subjective Evaluation    History of Present Illness  Mechanism of injury: Pt feels she continues to improve  She reports that she is doing more outside of PT    However, her pain will increase if she does push to hard  She notes walking longer distances (30 minutes or more) will start to have increased pain  Pain  Current pain ratin  At best pain ratin  At worst pain ratin  Location: R ankle/knee  Quality: dull ache and sharp    Patient Goals  Patient goal: Return to PLOF        Objective     Tenderness   Left Ankle/Foot   No tenderness  Right Ankle/Foot   Tenderness in the anterior ankle  Additional Tenderness Details  Throughout tibialis anterior, and medial/lateral joint line of the R knee  Neurological Testing     Sensation     Ankle/Foot   Left Ankle/Foot   Intact: light touch    Right Ankle/Foot   Intact: light touch     Active Range of Motion     Right Ankle/Foot   Dorsiflexion (ke): 2 degrees   Plantar flexion: 50 degrees   Inversion: 25 degrees   Eversion: 20 degrees     Additional Active Range of Motion Details  2-125 R knee AROM  0-127 R knee PROM      Passive Range of Motion   Left Ankle/Foot  Normal passive range of motion    Right Ankle/Foot  Normal passive range of motion  Dorsiflexion (ke): 5 degrees   Plantar flexion: 50 degrees   Inversion: WFL  Eversion: WFL    Joint Play   Left Ankle/Foot  Hypomobile in the talocrural joint and subtalar joint  Right Ankle/Foot  Hypomobile in the talocrural joint and subtalar joint  Strength/Myotome Testing     Left Ankle/Foot   Normal strength    Right Ankle/Foot   Normal strength    Additional Strength Details  5/5 strength noted RLE grossly  Except with quad strength, which was 4/5  Swelling   Left Ankle/Foot   Figure 8: 46 cm    Right Ankle/Foot   Figure 8: 45 cm    General Comments     Ankle/Foot Comments   Gait is minimally lacks toe off on the R        Flowsheet Rows      Most Recent Value   PT/OT G-Codes   Current Score  60   Projected Score  64   FOTO information reviewed  Yes   Assessment Type  Re-evaluation   G code set  Other PT/OT Primary   Other PT Primary Current Status ()  CK   Other PT Primary Goal Status ()          Diagnosis: s/p hardware removal R tib/fib   Precautions: WBAT   Manuals 10/18 10/22 10/22 10/15 10/17   PROM R knee           Mobs ankle (STJ/TCJ)   CMF        MWM TCJ  CMF      McConnel taping KLS   KLS Resume nv    Re-eval           Exercise Diary           Bike 5 min  5 min    TM 5 min  TM 5 min    Quad stretch :20x5   :20x5 :20x5                X-walks BTB x20'x4   BTB 20'x4 BTB 20'x4   SL ER Black 3x10   BTB 3x10 BTB 3x10   4-way SLR 3# 3x10 3# 3x10  3# 3x10 3# 3x10   Leg press 85# SL 3x10   85# 3x10 SL 85# SL 3x10    Bridge with HS curls 3x10   3x10 3x10   Ball squats on wall 2x15 2x15  2x15 2x15   Lunges 2x10         SLS on foam  :30x4   :30x4 :30x4   Calf raises on LP  85# SL 3x10 85# 3x10 with hold  SL 85# 3x10 SL 85# 3x10   Step ups  12'' 2x10   12' 2x10 12" 2x10   CKC TKE           Tandem stance :30x4   :30x4 :30x4   Wall sits  :30x4 :30x4  :30x4 :30x4   Lateral lunges Red disc 2x10   Red disc 2x10 Red disc 2x10   Planks  :20x4   :20x4  :20x4   Lateral planks :20x2         Rebounder tosses  20x          Tandem walking Foam 2 laps         Pro stretch   :30x4        Modalities           CP PRN

## 2018-10-23 ENCOUNTER — OFFICE VISIT (OUTPATIENT)
Dept: PHYSICAL THERAPY | Facility: CLINIC | Age: 43
End: 2018-10-23
Payer: COMMERCIAL

## 2018-10-23 DIAGNOSIS — S82.241D CLOSED DISPLACED SPIRAL FRACTURE OF SHAFT OF RIGHT TIBIA WITH ROUTINE HEALING: ICD-10-CM

## 2018-10-23 DIAGNOSIS — Z98.890 S/P ORIF (OPEN REDUCTION INTERNAL FIXATION) FRACTURE: ICD-10-CM

## 2018-10-23 DIAGNOSIS — S82.831D CLOSED FRACTURE OF HEAD OF RIGHT FIBULA WITH ROUTINE HEALING, SUBSEQUENT ENCOUNTER: Primary | ICD-10-CM

## 2018-10-23 DIAGNOSIS — Z87.81 S/P ORIF (OPEN REDUCTION INTERNAL FIXATION) FRACTURE: ICD-10-CM

## 2018-10-23 PROCEDURE — 97110 THERAPEUTIC EXERCISES: CPT

## 2018-10-23 PROCEDURE — 97112 NEUROMUSCULAR REEDUCATION: CPT

## 2018-10-23 PROCEDURE — 97140 MANUAL THERAPY 1/> REGIONS: CPT

## 2018-10-23 NOTE — PROGRESS NOTES
Daily Note     Today's date: 10/23/2018  Patient name: Monster Ford  : 1975  MRN: [id number]  Referring provider: Sekou Santana MD  Dx:   Encounter Diagnosis     ICD-10-CM    1  Closed fracture of head of right fibula with routine healing, subsequent encounter S82 831D    2  Closed displaced spiral fracture of shaft of right tibia with routine healing S82 241D    3  S/P ORIF (open reduction internal fixation) fracture Z96 7     Z87 81                   Subjective: Patient has no new complaints  She states she still has tightness and soreness following last session  Objective: See treatment diary below  Diagnosis: s/p hardware removal R tib/fib   Precautions: WBAT   Manuals 10/18 10/22 10/23  10/17   PROM R knee            Mobs ankle (STJ/TCJ)   CMF CMF      MWM TCJ   CMF CMF      McConnel taping KLS      Resume nv    Re-eval            Exercise Diary            Bike 5 min  5 min  TM 5 min   TM 5 min    Quad stretch :20x5   :20x5  :20x5                 X-walks BTB x20'x4     BTB 20'x4   SL ER Black 3x10   Black 3x10  BTB 3x10   4-way SLR 3# 3x10 3# 3x10 4# 3x10   3# 3x10   Leg press 85# SL 3x10   SL 85# 3x10  85# SL 3x10    Bridge with HS curls 3x10 3x10 3x10  3x10   Ball squats on wall 2x15 2x15 2x15  2x15   Lunges 2x10   2x10      SLS on foam  :30x4      :30x4   Calf raises on LP  85# SL 3x10 85# 3x10 with hold 85# 3x10 with hold  SL 85# 3x10   Step ups  12'' 2x10   12" 2x10  12" 2x10   CKC TKE            Tandem stance :30x4      :30x4   Wall sits  :30x4 :30x4 :30x4  :30x4   Lateral lunges Red disc 2x10   Red disc 2x10  Red disc 2x10   Planks  :20x4   :20x4   :20x4   Lateral planks :20x2   :20x2 each      Rebounder tosses  20x           Tandem walking Foam 2 laps   Foam 2 laps       Pro stretch   :30x4 :30x4      Modalities             CP PRN                                                Assessment:Continued to progress outlined program  Mod to max muscle fatigue         Plan: Progress treatment as tolerated

## 2018-10-24 DIAGNOSIS — E55.9 VITAMIN D DEFICIENCY: ICD-10-CM

## 2018-10-24 RX ORDER — ERGOCALCIFEROL 1.25 MG/1
CAPSULE ORAL
Qty: 12 CAPSULE | Refills: 0 | OUTPATIENT
Start: 2018-10-24

## 2018-10-24 NOTE — TELEPHONE ENCOUNTER
No need for high dose vitamin D  Take D3 2000 units daily  You should also be taking vitamin B12 1000 mcg daily

## 2018-10-25 ENCOUNTER — OFFICE VISIT (OUTPATIENT)
Dept: PHYSICAL THERAPY | Facility: CLINIC | Age: 43
End: 2018-10-25
Payer: COMMERCIAL

## 2018-10-25 DIAGNOSIS — S82.831D CLOSED FRACTURE OF HEAD OF RIGHT FIBULA WITH ROUTINE HEALING, SUBSEQUENT ENCOUNTER: Primary | ICD-10-CM

## 2018-10-25 DIAGNOSIS — S82.241D CLOSED DISPLACED SPIRAL FRACTURE OF SHAFT OF RIGHT TIBIA WITH ROUTINE HEALING: ICD-10-CM

## 2018-10-25 DIAGNOSIS — Z98.890 S/P ORIF (OPEN REDUCTION INTERNAL FIXATION) FRACTURE: ICD-10-CM

## 2018-10-25 DIAGNOSIS — Z87.81 S/P ORIF (OPEN REDUCTION INTERNAL FIXATION) FRACTURE: ICD-10-CM

## 2018-10-25 PROCEDURE — 97140 MANUAL THERAPY 1/> REGIONS: CPT

## 2018-10-25 PROCEDURE — 97112 NEUROMUSCULAR REEDUCATION: CPT

## 2018-10-25 PROCEDURE — 97110 THERAPEUTIC EXERCISES: CPT

## 2018-10-25 NOTE — PROGRESS NOTES
Daily Note     Today's date: 10/25/2018  Patient name: Warren Bermudez  : 1975  MRN: [id number]  Referring provider: Dwayne Sparks MD  Dx:   Encounter Diagnosis     ICD-10-CM    1  Closed fracture of head of right fibula with routine healing, subsequent encounter S82 831D    2  Closed displaced spiral fracture of shaft of right tibia with routine healing S82 241D    3  S/P ORIF (open reduction internal fixation) fracture Z96 7     Z87 81                   Subjective: Patient states she has constant tightness  She states she in unsure if she is sleeping in an "odd" position which intensifies her stiffness  Objective: See treatment diary below  Diagnosis: s/p hardware removal R tib/fib   Precautions: WBAT   Manuals 10/18 10/22 10/23 10/25    PROM R knee            Mobs ankle (STJ/TCJ)   CMF CMF CMF    MWM TCJ   CMF CMF CMF    McConnel taping KLS           Re-eval            Exercise Diary            Bike 5 min  5 min  TM 5 min  TM 5 min     Quad stretch :20x5   :20x5 :20x5                 X-walks BTB x20'x4          SL ER Black 3x10   Black 3x10      4-way SLR 3# 3x10 3# 3x10 4# 3x10  4# 3x10    Leg press 85# SL 3x10   SL 85# 3x10      Bridge with HS curls 3x10 3x10 3x10 3x10    Ball squats on wall 2x15 2x15 2x15 2x15    Lunges 2x10   2x10      SLS on foam  :30x4          Calf raises on LP  85# SL 3x10 85# 3x10 with hold 85# 3x10 with hold      Step ups  12'' 2x10   12" 2x10      CKC TKE            Tandem stance :30x4          Wall sits  :30x4 :30x4 :30x4      Lateral lunges Red disc 2x10   Red disc 2x10      Planks  :20x4   :20x4       Lateral planks :20x2   :20x2 each      Rebounder tosses  20x            Tandem walking Foam 2 laps   Foam 2 laps                HS S    :20x5    Sartorius S    :20x5                    Pro stretch   :30x4 :30x4 :30x4      Modalities             CP PRN                                               Assessment: Focused on stretching and manual therapy   Patient reports decrease in tightness following PT session and increase mobility  Patient returns to ortho tomorrow; will follow up  Plan: Progress treatment as tolerated

## 2018-10-26 ENCOUNTER — OFFICE VISIT (OUTPATIENT)
Dept: OBGYN CLINIC | Facility: MEDICAL CENTER | Age: 43
End: 2018-10-26

## 2018-10-26 VITALS
DIASTOLIC BLOOD PRESSURE: 76 MMHG | RESPIRATION RATE: 16 BRPM | HEIGHT: 67 IN | BODY MASS INDEX: 21.6 KG/M2 | WEIGHT: 137.6 LBS | HEART RATE: 88 BPM | SYSTOLIC BLOOD PRESSURE: 114 MMHG

## 2018-10-26 DIAGNOSIS — Z98.890 S/P ORIF (OPEN REDUCTION INTERNAL FIXATION) FRACTURE: ICD-10-CM

## 2018-10-26 DIAGNOSIS — M22.2X1 PATELLOFEMORAL DISORDER OF RIGHT KNEE: ICD-10-CM

## 2018-10-26 DIAGNOSIS — Z87.81 S/P ORIF (OPEN REDUCTION INTERNAL FIXATION) FRACTURE: ICD-10-CM

## 2018-10-26 DIAGNOSIS — Z48.89 AFTERCARE FOLLOWING SURGERY: Primary | ICD-10-CM

## 2018-10-26 PROCEDURE — 99024 POSTOP FOLLOW-UP VISIT: CPT | Performed by: ORTHOPAEDIC SURGERY

## 2018-10-26 NOTE — PROGRESS NOTES
44 y o female please see my other note    Review of Systems  Review of systems negative unless otherwise specified in HPI    Past Medical History  Past Medical History:   Diagnosis Date    Anemia     Edema     Fatigue     Vitamin B12 deficiency     Vitamin D deficiency        Past Surgical History  Past Surgical History:   Procedure Laterality Date    KNEE ARTHROSCOPY Bilateral     KNEE SURGERY      ORIF TIBIA FRACTURE Right 1/18/2018    Procedure: OPEN REDUCTION W/ INTERNAL FIXATION (ORIF) TIBIA;  Surgeon: Moses Ferrer MD;  [facility];  Service: Orthopedics    ORIF TIBIAL PLATEAU Right 5/66/8670    Procedure: OPEN REDUCTION W/ INTERNAL FIXATION (ORIF) TIBIAL PLATEAU;  Surgeon: Mosse Ferrer MD;  [facility];  Service: Orthopedics    VT REMOVAL DEEP IMPLANT Right 8/2/2018    Procedure: REMOVAL HARDWARE TIBIA;  Surgeon: Moses Ferrer MD;  [facility];  Service: Orthopedics    TUBAL LIGATION         Current Medications  Current Outpatient Prescriptions on File Prior to Visit   Medication Sig Dispense Refill    acetaminophen (TYLENOL) 325 mg tablet 650 mg every 6 hours for mild pain 30 tablet 0    cyanocobalamin 1,000 mcg/mL Inject 1 mL (1,000 mcg total) into the shoulder, thigh, or buttocks every 30 (thirty) days 10 mL 0    ergocalciferol (VITAMIN D2) 50,000 units Take 1 capsule (50,000 Units total) by mouth once a week 12 capsule 0    naproxen (NAPROSYN) 375 mg tablet Take 250 mg by mouth 2 (two) times a day with meals      oxyCODONE (ROXICODONE) 5 mg immediate release tablet Take 1 tablet (5 mg total) by mouth every 6 (six) hours as needed for severe pain Max Daily Amount: 20 mg 60 tablet 0    SYRINGE-NEEDLE, DISP, 3 ML 25G X 1" 3 ML MISC by Does not apply route every 30 (thirty) days 50 each 0     No current facility-administered medications on file prior to visit          Recent Labs (HCT,HGB,PT,INR,ESR,CRP,GLU,HgA1C)    0  Lab Value Date/Time   HCT 44 5 07/02/2018 1148   HCT 43 6 10/17/2015 1109   HGB 13 7 07/02/2018 1148   HGB 14 4 10/17/2015 1109   WBC 4 38 07/02/2018 1148   WBC 4 24 (L) 10/17/2015 1109   INR 1 09 07/02/2018 1148   GLUCOSE 121 01/17/2018 2311   GLUCOSE 85 10/17/2015 1109         Physical exam  · General: Awake, Alert, Oriented  · Eyes: Pupils equal, round and reactive to light  · Heart: regular rate and rhythm  · Lungs: No audible wheezing  · Abdomen: soft  Please see my other note    Imaging  Please see my other note    Procedure  Please see my other note    Assessment/Plan:   44 y  o female please see my other note

## 2018-10-26 NOTE — PROGRESS NOTES
Assessment:  1  Aftercare following surgery  Ambulatory referral to Physical Therapy   2  Patellofemoral disorder of right knee  Ambulatory referral to Physical Therapy   3  S/P ORIF (open reduction internal fixation) fracture  Ambulatory referral to Physical Therapy       Plan:  10 months status post ORIF of right tibial fracture  3 months status post removal of hardware on 08/02/2018  Patient continues to note mild pain stiffness with daily activities which she was instructed is most likely due to deconditioning any symptoms will continue to take decrease over time  Formal physical therapy will continue quad strengthening program for continued patellofemoral symptoms  She will follow up in 2 months for evaluation of her patellofemoral symptoms and status post removal of tibial hardware  Possible return to work at this time  She understood all questions were answered    To do next visit:  Return in about 8 weeks (around 12/21/2018) for Recheck RIGHT knee  Scribe Attestation    I,:   Mee Renner am acting as a scribe while in the presence of the attending physician :        I,:   Denton Hong MD personally performed the services described in this documentation    as scribed in my presence :              Subjective:   Warren Bermudez is a 44 y o  female who presents post operative visit following ORIF right tibia fracture in January 2018  She is now 3 months S/P removal of hardware performed 8/2/2018  Patient states that she is doing well post operatively  She has "her good days and bad days"  She continues to note mild residual stiffness/pain with certain daily activities such as driving and prolonged walking/standing  Denies numbness and tingling  No calf tenderness or pain       Review of systems negative unless otherwise specified in HPI    Past Medical History:   Diagnosis Date    Anemia     Edema     Fatigue     Vitamin B12 deficiency     Vitamin D deficiency        Past Surgical History:   Procedure Laterality Date    KNEE ARTHROSCOPY Bilateral     KNEE SURGERY      ORIF TIBIA FRACTURE Right 1/18/2018    Procedure: OPEN REDUCTION W/ INTERNAL FIXATION (ORIF) TIBIA;  Surgeon: Sharad Flores MD;  [facility];  Service: Orthopedics    ORIF TIBIAL PLATEAU Right 9/87/3192    Procedure: OPEN REDUCTION W/ INTERNAL FIXATION (ORIF) TIBIAL PLATEAU;  Surgeon: Sharad Flores MD;  [facility];  Service: Orthopedics    HI REMOVAL DEEP IMPLANT Right 8/2/2018    Procedure: REMOVAL HARDWARE TIBIA;  Surgeon: Sharad Flores MD;  [facility];  Service: Orthopedics    TUBAL LIGATION         Family History   Problem Relation Age of Onset    Cancer Mother     Heart disease Mother         heart surgery    Cancer Father     Heart attack Father         stent    Diabetes Father     Hypertension Father     Arthritis Family     Stomach cancer Family     Ovarian cancer Family     Alcohol abuse Neg Hx     Depression Neg Hx     Drug abuse Neg Hx     Substance Abuse Neg Hx        Social History     Occupational History    Teacher in [place]      Social History Main Topics    Smoking status: Never Smoker    Smokeless tobacco: Never Used    Alcohol use No      Comment: social drink sporatic    Drug use: No    Sexual activity: Yes         Current Outpatient Prescriptions:     acetaminophen (TYLENOL) 325 mg tablet, 650 mg every 6 hours for mild pain, Disp: 30 tablet, Rfl: 0    cyanocobalamin 1,000 mcg/mL, Inject 1 mL (1,000 mcg total) into the shoulder, thigh, or buttocks every 30 (thirty) days, Disp: 10 mL, Rfl: 0    ergocalciferol (VITAMIN D2) 50,000 units, Take 1 capsule (50,000 Units total) by mouth once a week, Disp: 12 capsule, Rfl: 0    naproxen (NAPROSYN) 375 mg tablet, Take 250 mg by mouth 2 (two) times a day with meals, Disp: , Rfl:     oxyCODONE (ROXICODONE) 5 mg immediate release tablet, Take 1 tablet (5 mg total) by mouth every 6 (six) hours as needed for severe pain Max Daily Amount: 20 mg, Disp: 60 tablet, Rfl: 0    SYRINGE-NEEDLE, DISP, 3 ML 25G X 1" 3 ML MISC, by Does not apply route every 30 (thirty) days, Disp: 50 each, Rfl: 0    No Known Allergies         Vitals:    10/26/18 1503   BP: 114/76   Pulse: 88   Resp: 16       Objective:          Physical Exam                    Right Knee Exam     Tenderness   Right knee tenderness location: Hypersensativity over incisions  Range of Motion   Extension: 0   Flexion: 130     Tests   Varus: negative  Valgus: negative    Other   Erythema: absent  Sensation: normal  Pulse: present  Swelling: none  Other tests: no effusion present    Comments:  Mild patellar crepitus, patella is tracking well  Anterolateral incision C/D/I, no signs of infection  Patient is NVID            Diagnostics, reviewed and taken today if performed as documented:    None performed      Procedures, if performed today:  Procedures    None performed      Portions of the record may have been created with voice recognition software   Occasional wrong word or "sound a like" substitutions may have occurred due to the inherent limitations of voice recognition software   Read the chart carefully and recognize, using context, where substitutions have occurred

## 2018-11-29 ENCOUNTER — EVALUATION (OUTPATIENT)
Dept: PHYSICAL THERAPY | Facility: CLINIC | Age: 43
End: 2018-11-29
Payer: COMMERCIAL

## 2018-11-29 DIAGNOSIS — Z98.890 S/P ORIF (OPEN REDUCTION INTERNAL FIXATION) FRACTURE: Primary | ICD-10-CM

## 2018-11-29 DIAGNOSIS — Z87.81 S/P ORIF (OPEN REDUCTION INTERNAL FIXATION) FRACTURE: Primary | ICD-10-CM

## 2018-11-29 PROCEDURE — 97164 PT RE-EVAL EST PLAN CARE: CPT | Performed by: PHYSICAL THERAPIST

## 2018-11-29 NOTE — PROGRESS NOTES
PT Re-evaluation    Today's date: 2018  Patient name: Loni Mendez  : 1975  MRN: [id number]  Referring provider: Thu Weber MD  Dx:   Encounter Diagnosis     ICD-10-CM    1  S/P ORIF (open reduction internal fixation) fracture Z96 7     Z87 81                   Assessment  Assessment details: Patient is a 44 y o  female who presents with the above listed impairments  Patient would continue to benefit from skilled PT services to address these impairments and to maximize function  Thank you for the referral     Impairments: abnormal gait, abnormal muscle firing, abnormal or restricted ROM, abnormal movement, activity intolerance, impaired balance, impaired physical strength and pain with function  Understanding of Dx/Px/POC: good   Prognosis: good    Goals  Impairment Goals  - Decrease pain by 50% in 3 weeks  - not  met  - Increase right flexibility by 50% in 3 weeks  - not met  - Increase right lower extremity strength golbally to 5/5 in 6 weeks  - not met  - Increase right hip abductor and external rotator strength strength to 5/5  6 weeks  - not met    Functional Goals  - Return to Prior Level of Function in 6 weeks  - partially met  - Patient will be independent with HEP in 6 weeks  - partially met    Plan  Plan details: Pt no longer has active insurance and will be seen at decreased frequency due to financial barriers  She will perform a HEP/fitness program to continue to work on her impairments  Pt was show how to McConnel tape herself at home    Patient would benefit from: skilled PT  Planned modality interventions: cryotherapy  Planned therapy interventions: joint mobilization, manual therapy, neuromuscular re-education, patient education, strengthening, stretching, therapeutic activities, therapeutic exercise, home exercise program, functional ROM exercises, Espinal taping and postural training  Frequency: 1x week (2-3x week)  Duration in weeks: 4  Treatment plan discussed with: patient, referring physician and PTA        Subjective Evaluation    History of Present Illness  Mechanism of injury: Pt reports back to PT  States she has been meaning to come back but hasn't had time to call  She notes some tingling/numbness at the R foot  This is primarily present with sitting but can occur with standing  She notes that she does have difficulty driving for a long period of time (>20 min) without having to put the car in cruise control  She notes she is able to sit and stand for longer periods of time before her pain increases  She states she is now able to walk through the grocery store  She notes most of her pain is at the R knee  Pain  Current pain ratin  At best pain ratin  At worst pain ratin  Location: R ankle/knee  Quality: dull ache and sharp    Patient Goals  Patient goal: Return to PLOF        Objective     Tenderness   Left Ankle/Foot   No tenderness  Right Ankle/Foot   Tenderness in the anterior ankle  Additional Tenderness Details  Lateral joint line at the R knee and R lateral PF joint  Neurological Testing     Sensation     Ankle/Foot   Left Ankle/Foot   Intact: light touch    Right Ankle/Foot   Intact: light touch     Reflexes   Left   Patellar (L4): normal (2+)  Achilles (S1): normal (2+)    Right   Patellar (L4): normal (2+)  Achilles (S1): normal (2+)    Active Range of Motion     Right Ankle/Foot   Dorsiflexion (ke): -2 degrees   Plantar flexion: 50 degrees   Inversion: 22 degrees   Eversion: 20 degrees     Additional Active Range of Motion Details  2-121 R knee AROM      Passive Range of Motion   Left Ankle/Foot  Normal passive range of motion    Right Ankle/Foot  Normal passive range of motion  Dorsiflexion (ke): 0 degrees   Inversion: WFL  Eversion: WFL    Joint Play   Left Ankle/Foot  Hypomobile in the talocrural joint and subtalar joint  Right Ankle/Foot  Hypomobile in the talocrural joint and subtalar joint  Strength/Myotome Testing     Left Ankle/Foot   Normal strength    Right Ankle/Foot   Normal strength    Additional Strength Details  5/5 strength noted RLE grossly  Except with quad and hip abd/ER strength, which was 4/5  General Comments     Ankle/Foot Comments   Gait lacks toe off on the R         Diagnosis: s/p hardware removal R tib/fib   Precautions: WBAT   Manuals 10/18 10/22 10/22 10/15 10/17   PROM R knee           Mobs ankle (STJ/TCJ)   CMF        MWM TCJ  CMF      McConnel taping KLS  CMF KLS Resume nv    Re-eval    CMF       Exercise Diary           Bike 5 min     TM 5 min  TM 5 min    Quad stretch :20x5   :20x5 :20x5                X-walks BTB x20'x4   BTB 20'x4 BTB 20'x4   SL ER Black 3x10   BTB 3x10 BTB 3x10   4-way SLR 3# 3x10 3# 3x10  3# 3x10 3# 3x10   Leg press 85# SL 3x10   85# 3x10 SL 85# SL 3x10    Bridge with HS curls 3x10   3x10 3x10   Ball squats on wall 2x15 2x15  2x15 2x15   Lunges 2x10         SLS on foam  :30x4   :30x4 :30x4   Calf raises on LP  85# SL 3x10 85# 3x10 with hold  SL 85# 3x10 SL 85# 3x10   Step ups  12'' 2x10   12' 2x10 12" 2x10   CKC TKE           Tandem stance :30x4   :30x4 :30x4   Wall sits  :30x4 :30x4  :30x4 :30x4   Lateral lunges Red disc 2x10   Red disc 2x10 Red disc 2x10   Planks  :20x4   :20x4  :20x4   Lateral planks :20x2         Rebounder tosses  20x          Tandem walking Foam 2 laps         Pro stretch   :30x4        Modalities           CP PRN

## 2018-12-03 ENCOUNTER — OFFICE VISIT (OUTPATIENT)
Dept: CARDIOLOGY CLINIC | Facility: CLINIC | Age: 43
End: 2018-12-03
Payer: COMMERCIAL

## 2018-12-03 VITALS
DIASTOLIC BLOOD PRESSURE: 76 MMHG | WEIGHT: 138.9 LBS | HEART RATE: 86 BPM | BODY MASS INDEX: 21.8 KG/M2 | HEIGHT: 67 IN | SYSTOLIC BLOOD PRESSURE: 120 MMHG | OXYGEN SATURATION: 93 %

## 2018-12-03 DIAGNOSIS — I49.8 VENTRICULAR BIGEMINY: ICD-10-CM

## 2018-12-03 DIAGNOSIS — I49.3 ASYMPTOMATIC PVCS: Primary | ICD-10-CM

## 2018-12-03 DIAGNOSIS — R06.00 DYSPNEA ON EXERTION: ICD-10-CM

## 2018-12-03 PROCEDURE — 99214 OFFICE O/P EST MOD 30 MIN: CPT | Performed by: INTERNAL MEDICINE

## 2018-12-03 NOTE — PROGRESS NOTES
Cardiology Follow-up    Steve Meza  [id number]  1975  [address] [facility] [place] [id number]      1  Asymptomatic PVCs     2  Ventricular bigeminy     3  Dyspnea on exertion  NM myocardial perfusion spect (rx stress and/or rest)       Discussion/Summary:  Ms Kay Guevara is a pleasant 56-year-old female who presents to the office today for the re-evaluation of premature ventricular contractions  Regarding her PVCs, with these she is asymptomatic  I do not detect any ectopy on exam   She did undergo an echocardiogram during her hospital stay earlier this year which was relatively unrevealing except for a pulmonary pressure which is at the upper limits of normal  I had asked that she undergo a 24-hour Holter monitor to quantify her PVC burden which revealed a relatively low burden at 3%  For now no further work-up or treatment is recommended  Otherwise she does note some shortness of breath with exertion  Given her family history I have asked that she undergo a pharmacologic nuclear stress test for further evaluation as she cannot exercise on the treadmill given her issues with her leg  Otherwise her blood pressure is well controlled in the office today on no medication  She will return to the office in one year or sooner if deemed necessary  History of Present Illness:  Ms Kay Guevara is a very pleasant 56-year-old female who presents to the office today for the re-evaluation of PVCs  She continues to report shortness of breath with exertion with activities such as ascending a flight of steps  She denies any exertional chest pain  She denies any signs or symptoms of congestive heart failure including increasing lower extremity edema, paroxysmal nocturnal dyspnea, orthopnea, acute weight gain or increasing abdominal girth    She denies syncope or presyncope  She denies symptoms of claudication or symptoms of palpitations       Patient Active Problem List   Diagnosis    Closed fracture of right proximal tibia    Fracture of head of fibula    Ventricular bigeminy    Closed displaced spiral fracture of shaft of right tibia with routine healing    Aftercare following surgery    Anemia    Edema    Osteoarthritis, localized, knee    Vitamin B12 deficiency    Vitamin D deficiency    Asymptomatic PVCs    Leg pain, right    S/P ORIF (open reduction internal fixation) fracture    Acute pain of right knee    Painful orthopaedic hardware ([address] )    Patellofemoral disorder of right knee    Right knee pain     Past Medical History:   Diagnosis Date    Anemia     Edema     Fatigue     Vitamin B12 deficiency     Vitamin D deficiency      Social History     Social History    Marital status:      Spouse name: N/A    Number of children: 1    Years of education: N/A     Occupational History    Teacher in [employer]      Social History Main Topics    Smoking status: Never Smoker    Smokeless tobacco: Never Used    Alcohol use No      Comment: social drink sporatic    Drug use: No    Sexual activity: Yes     Other Topics Concern    Not on file     Social History Narrative    Single with Daughter      Family History   Problem Relation Age of Onset    Cancer Mother     Heart disease Mother         heart surgery    Cancer Father     Heart attack Father         stent    Diabetes Father     Hypertension Father     Arthritis Family     Stomach cancer Family     Ovarian cancer Family     Alcohol abuse Neg Hx     Depression Neg Hx     Drug abuse Neg Hx     Substance Abuse Neg Hx      Past Surgical History:   Procedure Laterality Date    KNEE ARTHROSCOPY Bilateral     KNEE SURGERY      ORIF TIBIA FRACTURE Right 1/18/2018    Procedure: OPEN REDUCTION W/ INTERNAL FIXATION (ORIF) TIBIA;  Surgeon: Johnny Nascimento MD;  [facility]; Service: Orthopedics    ORIF TIBIAL PLATEAU Right 5/33/3766    Procedure: OPEN REDUCTION W/ INTERNAL FIXATION (ORIF) TIBIAL PLATEAU;  Surgeon: Lynne Jackson MD;  [facility];  Service: Orthopedics    SC REMOVAL DEEP IMPLANT Right 8/2/2018    Procedure: REMOVAL HARDWARE TIBIA;  Surgeon: Lynne Jackson MD;  [facility];  Service: Orthopedics    TUBAL LIGATION         Current Outpatient Prescriptions:     acetaminophen (TYLENOL) 325 mg tablet, 650 mg every 6 hours for mild pain, Disp: 30 tablet, Rfl: 0    cyanocobalamin 1,000 mcg/mL, Inject 1 mL (1,000 mcg total) into the shoulder, thigh, or buttocks every 30 (thirty) days, Disp: 10 mL, Rfl: 0    ergocalciferol (VITAMIN D2) 50,000 units, Take 1 capsule (50,000 Units total) by mouth once a week, Disp: 12 capsule, Rfl: 0    naproxen (NAPROSYN) 375 mg tablet, Take 250 mg by mouth 2 (two) times a day with meals, Disp: , Rfl:     oxyCODONE (ROXICODONE) 5 mg immediate release tablet, Take 1 tablet (5 mg total) by mouth every 6 (six) hours as needed for severe pain Max Daily Amount: 20 mg, Disp: 60 tablet, Rfl: 0    SYRINGE-NEEDLE, DISP, 3 ML 25G X 1" 3 ML MISC, by Does not apply route every 30 (thirty) days, Disp: 50 each, Rfl: 0  No Known Allergies  Imaging: Xr Tibia Fibula 2 Vw Right    Result Date: 3/18/2018  Narrative: RIGHT TIBIA AND FIBULA INDICATION:   Z48 89: Encounter for other specified surgical aftercare  COMPARISON:  February 1, 2018 VIEWS:  AP and lateral IMAGES:  4 FINDINGS: Sideplate and screw fixation again noted transfixing the comminuted proximal tibial fracture  Hardware is intact  Alignment of fracture fragments is near anatomic  Fracture lines remain well-visualized  The proximal fibular fracture remains stable in  its appearance  There is overall diffuse osteopenia present  No lytic or blastic lesions are seen  Soft tissues are unremarkable       Impression: No significant interval change in alignment and appearance of the proximal tibial fracture status post ORIF  No evidence of hardware complication Workstation performed: [id number]       ECG:  Normal sinus rhythm with sinus arrhythmia    Review of Systems:  Review of Systems   Respiratory: Positive for shortness of breath  Cardiovascular: Negative for chest pain and palpitations  Musculoskeletal: Positive for joint swelling  All other systems reviewed and are negative  Vitals:    12/03/18 0841   BP: 120/76   BP Location: Left arm   Patient Position: Sitting   Cuff Size: Adult   Pulse: 86   SpO2: 93%   Weight: 63 kg (138 lb 14 4 oz)   Height: 5' 7" (1 702 m)     Vitals:    12/03/18 0841   Weight: 63 kg (138 lb 14 4 oz)     Height: 5' 7" (170 2 cm)     Physical Exam:  General appearance:  Appears stated age, alert, well appearing and in no distress  HEENT:  PERRLA, EOMI, no scleral icterus, no conjunctival pallor  NECK:  Supple, No elevated JVP, no thyromegaly, no carotid bruits  HEART:  Regular rate and rhythm, normal S1/S2, no S3/S4, no murmur or rub  LUNGS:  Clear to auscultation bilaterally  ABDOMEN:  Soft, non-tender, positive bowel sounds, no rebound or guarding, no organomegaly   EXTREMITIES:  No edema   RLE boot in place  VASCULAR:  Normal pedal pulses   SKIN: No lesions or rashes on exposed skin  NEURO:  CN II-XII intact, no focal deficits No

## 2018-12-21 ENCOUNTER — OFFICE VISIT (OUTPATIENT)
Dept: OBGYN CLINIC | Facility: MEDICAL CENTER | Age: 43
End: 2018-12-21
Payer: COMMERCIAL

## 2018-12-21 VITALS
HEIGHT: 67 IN | HEART RATE: 90 BPM | WEIGHT: 136 LBS | BODY MASS INDEX: 21.35 KG/M2 | SYSTOLIC BLOOD PRESSURE: 111 MMHG | RESPIRATION RATE: 18 BRPM | DIASTOLIC BLOOD PRESSURE: 78 MMHG

## 2018-12-21 DIAGNOSIS — M22.2X1 PATELLOFEMORAL DISORDER OF RIGHT KNEE: ICD-10-CM

## 2018-12-21 DIAGNOSIS — Z48.89 AFTERCARE FOLLOWING SURGERY FOR INJURY OR TRAUMA: Primary | ICD-10-CM

## 2018-12-21 DIAGNOSIS — M25.561 RIGHT KNEE PAIN, UNSPECIFIED CHRONICITY: ICD-10-CM

## 2018-12-21 PROCEDURE — 99213 OFFICE O/P EST LOW 20 MIN: CPT | Performed by: ORTHOPAEDIC SURGERY

## 2018-12-26 ENCOUNTER — HOSPITAL ENCOUNTER (OUTPATIENT)
Dept: NON INVASIVE DIAGNOSTICS | Facility: CLINIC | Age: 43
Discharge: HOME | End: 2018-12-26
Payer: COMMERCIAL

## 2018-12-26 DIAGNOSIS — R06.00 DYSPNEA ON EXERTION: ICD-10-CM

## 2018-12-26 PROCEDURE — 78452 HT MUSCLE IMAGE SPECT MULT: CPT | Performed by: INTERNAL MEDICINE

## 2018-12-26 PROCEDURE — A9502 TC99M TETROFOSMIN: HCPCS

## 2018-12-26 PROCEDURE — 93017 CV STRESS TEST TRACING ONLY: CPT

## 2018-12-26 PROCEDURE — 78452 HT MUSCLE IMAGE SPECT MULT: CPT

## 2018-12-26 PROCEDURE — 93016 CV STRESS TEST SUPVJ ONLY: CPT | Performed by: INTERNAL MEDICINE

## 2018-12-26 PROCEDURE — 93018 CV STRESS TEST I&R ONLY: CPT | Performed by: INTERNAL MEDICINE

## 2018-12-26 RX ADMIN — REGADENOSON 0.4 MG: 0.08 INJECTION, SOLUTION INTRAVENOUS at 13:54

## 2018-12-26 NOTE — PROGRESS NOTES
PT Discharge    Today's date: 2018  Patient name: Melissa Walker  : 1975  MRN: [id number]  Referring provider: Leroy Denton MD  Dx:   Encounter Diagnosis     ICD-10-CM    1  S/P ORIF (open reduction internal fixation) fracture Z96 7 PT plan of care cert/re-cert    G72 39        Start Time: 5346  Stop Time: 0900  Total time in clinic (min): 25 minutes    Assessment/Plan    Subjective    Objective        Pt has not returned to PT  D/C from PT at this time

## 2018-12-28 LAB
CHEST PAIN STATEMENT: NORMAL
MAX DIASTOLIC BP: 72 MMHG
MAX HEART RATE: 137 BPM
MAX PREDICTED HEART RATE: 178 BPM
MAX. SYSTOLIC BP: 122 MMHG
PROTOCOL NAME: NORMAL
REASON FOR TERMINATION: NORMAL
TARGET HR FORMULA: NORMAL
TEST INDICATION: NORMAL
TIME IN EXERCISE PHASE: NORMAL

## 2019-01-08 ENCOUNTER — TELEPHONE (OUTPATIENT)
Dept: INTERNAL MEDICINE CLINIC | Facility: CLINIC | Age: 44
End: 2019-01-08

## 2019-01-08 NOTE — TELEPHONE ENCOUNTER
Most pink eye (conjunctivities) is viral   If you feel it is getting worse, recommend to go to a local urgent care center to be evaluated

## 2019-01-27 ENCOUNTER — APPOINTMENT (EMERGENCY)
Dept: RADIOLOGY | Facility: HOSPITAL | Age: 44
End: 2019-01-27
Payer: COMMERCIAL

## 2019-01-27 ENCOUNTER — HOSPITAL ENCOUNTER (EMERGENCY)
Facility: HOSPITAL | Age: 44
Discharge: HOME | End: 2019-01-27
Attending: EMERGENCY MEDICINE | Admitting: EMERGENCY MEDICINE
Payer: COMMERCIAL

## 2019-01-27 VITALS
TEMPERATURE: 98.3 F | SYSTOLIC BLOOD PRESSURE: 166 MMHG | HEIGHT: 67 IN | DIASTOLIC BLOOD PRESSURE: 68 MMHG | WEIGHT: 132 LBS | BODY MASS INDEX: 20.72 KG/M2 | HEART RATE: 81 BPM | OXYGEN SATURATION: 99 % | RESPIRATION RATE: 18 BRPM

## 2019-01-27 DIAGNOSIS — M54.9 BACK PAIN: Primary | ICD-10-CM

## 2019-01-27 DIAGNOSIS — R07.9 CHEST PAIN: ICD-10-CM

## 2019-01-27 LAB
ANION GAP SERPL CALCULATED.3IONS-SCNC: 12 MMOL/L (ref 4–13)
APTT PPP: 35 SECONDS (ref 26–38)
BASOPHILS # BLD AUTO: 0.02 THOUSANDS/ΜL (ref 0–0.1)
BASOPHILS NFR BLD AUTO: 0 % (ref 0–1)
BUN SERPL-MCNC: 8 MG/DL (ref 5–25)
CALCIUM SERPL-MCNC: 9.1 MG/DL (ref 8.3–10.1)
CHLORIDE SERPL-SCNC: 103 MMOL/L (ref 100–108)
CO2 SERPL-SCNC: 24 MMOL/L (ref 21–32)
CREAT SERPL-MCNC: 0.69 MG/DL (ref 0.6–1.3)
EOSINOPHIL # BLD AUTO: 0.02 THOUSAND/ΜL (ref 0–0.61)
EOSINOPHIL NFR BLD AUTO: 0 % (ref 0–6)
ERYTHROCYTE [DISTWIDTH] IN BLOOD BY AUTOMATED COUNT: 13 % (ref 11.6–15.1)
GFR SERPL CREATININE-BSD FRML MDRD: 124 ML/MIN/1.73SQ M
GLUCOSE SERPL-MCNC: 105 MG/DL (ref 65–140)
HCT VFR BLD AUTO: 43.3 % (ref 34.8–46.1)
HGB BLD-MCNC: 14.1 G/DL (ref 11.5–15.4)
IMM GRANULOCYTES # BLD AUTO: 0.02 THOUSAND/UL (ref 0–0.2)
IMM GRANULOCYTES NFR BLD AUTO: 0 % (ref 0–2)
INR PPP: 1.04 (ref 0.86–1.17)
LYMPHOCYTES # BLD AUTO: 1.32 THOUSANDS/ΜL (ref 0.6–4.47)
LYMPHOCYTES NFR BLD AUTO: 18 % (ref 14–44)
MCH RBC QN AUTO: 29.1 PG (ref 26.8–34.3)
MCHC RBC AUTO-ENTMCNC: 32.6 G/DL (ref 31.4–37.4)
MCV RBC AUTO: 90 FL (ref 82–98)
MONOCYTES # BLD AUTO: 0.44 THOUSAND/ΜL (ref 0.17–1.22)
MONOCYTES NFR BLD AUTO: 6 % (ref 4–12)
NEUTROPHILS # BLD AUTO: 5.49 THOUSANDS/ΜL (ref 1.85–7.62)
NEUTS SEG NFR BLD AUTO: 76 % (ref 43–75)
NRBC BLD AUTO-RTO: 0 /100 WBCS
PLATELET # BLD AUTO: 239 THOUSANDS/UL (ref 149–390)
PMV BLD AUTO: 11.4 FL (ref 8.9–12.7)
POTASSIUM SERPL-SCNC: 3.4 MMOL/L (ref 3.5–5.3)
PROTHROMBIN TIME: 13.3 SECONDS (ref 11.8–14.2)
RBC # BLD AUTO: 4.84 MILLION/UL (ref 3.81–5.12)
SODIUM SERPL-SCNC: 139 MMOL/L (ref 136–145)
TROPONIN I SERPL-MCNC: <0.02 NG/ML
WBC # BLD AUTO: 7.31 THOUSAND/UL (ref 4.31–10.16)

## 2019-01-27 PROCEDURE — 36415 COLL VENOUS BLD VENIPUNCTURE: CPT | Performed by: EMERGENCY MEDICINE

## 2019-01-27 PROCEDURE — 71046 X-RAY EXAM CHEST 2 VIEWS: CPT

## 2019-01-27 PROCEDURE — 85025 COMPLETE CBC W/AUTO DIFF WBC: CPT | Performed by: EMERGENCY MEDICINE

## 2019-01-27 PROCEDURE — 85730 THROMBOPLASTIN TIME PARTIAL: CPT | Performed by: EMERGENCY MEDICINE

## 2019-01-27 PROCEDURE — 93005 ELECTROCARDIOGRAM TRACING: CPT

## 2019-01-27 PROCEDURE — 84484 ASSAY OF TROPONIN QUANT: CPT | Performed by: EMERGENCY MEDICINE

## 2019-01-27 PROCEDURE — 85610 PROTHROMBIN TIME: CPT | Performed by: EMERGENCY MEDICINE

## 2019-01-27 PROCEDURE — 99285 EMERGENCY DEPT VISIT HI MDM: CPT

## 2019-01-27 PROCEDURE — 80048 BASIC METABOLIC PNL TOTAL CA: CPT | Performed by: EMERGENCY MEDICINE

## 2019-01-27 RX ORDER — HYDROCODONE BITARTRATE AND ACETAMINOPHEN 5; 325 MG/1; MG/1
1 TABLET ORAL 3 TIMES DAILY
Qty: 15 TABLET | Refills: 0 | Status: SHIPPED | OUTPATIENT
Start: 2019-01-27 | End: 2019-02-06

## 2019-01-27 RX ORDER — HYDROCODONE BITARTRATE AND ACETAMINOPHEN 5; 325 MG/1; MG/1
1 TABLET ORAL ONCE
Status: COMPLETED | OUTPATIENT
Start: 2019-01-27 | End: 2019-01-27

## 2019-01-27 RX ADMIN — HYDROCODONE BITARTRATE AND ACETAMINOPHEN 1 TABLET: 5; 325 TABLET ORAL at 19:39

## 2019-01-27 NOTE — ED PROVIDER NOTES
History  Chief Complaint   Patient presents with    Chest Pain     Pt  c/o tightness in the middle of chest that began about 20 minutes ago with back pain and nausea since this morning  Patient presents to the emergency department for evaluation of mid scapular back pain that began while she was sleeping last evening  She took Tylenol Motrin without relief  On arrival here she developed a little anterior chest discomfort she is now gone  The patient had a negative exercise stress test in 2018  She denies fever chills cough  Denies leg or calf pain  Denies a history of DVT or pulmonary embolism  Denies belly or lower back pain  Denies jaw arm neck pain  Denies rash  Back pain is positional in hurts when she leans forward  Prior to Admission Medications   Prescriptions Last Dose Informant Patient Reported? Taking?    SYRINGE-NEEDLE, DISP, 3 ML 25G X 1" 3 ML MISC  Self No Yes   Sig: by Does not apply route every 30 (thirty) days   acetaminophen (TYLENOL) 325 mg tablet  Self No No   Si mg every 6 hours for mild pain   cyanocobalamin 1,000 mcg/mL  Self No Yes   Sig: Inject 1 mL (1,000 mcg total) into the shoulder, thigh, or buttocks every 30 (thirty) days   ergocalciferol (VITAMIN D2) 50,000 units  Self No Yes   Sig: Take 1 capsule (50,000 Units total) by mouth once a week   naproxen (NAPROSYN) 375 mg tablet  Self Yes Yes   Sig: Take 250 mg by mouth 2 (two) times a day with meals   oxyCODONE (ROXICODONE) 5 mg immediate release tablet  Self No Yes   Sig: Take 1 tablet (5 mg total) by mouth every 6 (six) hours as needed for severe pain Max Daily Amount: 20 mg      Facility-Administered Medications: None       Past Medical History:   Diagnosis Date    Anemia     Edema     Fatigue     Vitamin B12 deficiency     Vitamin D deficiency        Past Surgical History:   Procedure Laterality Date    KNEE ARTHROSCOPY Bilateral     KNEE SURGERY      ORIF TIBIA FRACTURE Right 1/18/2018    Procedure: OPEN REDUCTION W/ INTERNAL FIXATION (ORIF) TIBIA;  Surgeon: Carolina Ward MD;  [facility];  Service: Orthopedics    ORIF TIBIAL PLATEAU Right 2/32/8251    Procedure: OPEN REDUCTION W/ INTERNAL FIXATION (ORIF) TIBIAL PLATEAU;  Surgeon: Carolina Ward MD;  [facility];  Service: Orthopedics    MT REMOVAL DEEP IMPLANT Right 8/2/2018    Procedure: REMOVAL HARDWARE TIBIA;  Surgeon: Carolina Ward MD;  [facility];  Service: Orthopedics    TUBAL LIGATION         Family History   Problem Relation Age of Onset    Cancer Mother     Heart disease Mother         heart surgery    Cancer Father     Heart attack Father         stent    Diabetes Father     Hypertension Father     Arthritis Family     Stomach cancer Family     Ovarian cancer Family     Alcohol abuse Neg Hx     Depression Neg Hx     Drug abuse Neg Hx     Substance Abuse Neg Hx      I have reviewed and agree with the history as documented  Social History   Substance Use Topics    Smoking status: Never Smoker    Smokeless tobacco: Never Used    Alcohol use No      Comment: social drink sporatic        Review of Systems   Constitutional: Negative  Negative for activity change, appetite change, chills, diaphoresis, fatigue and fever  HENT: Negative  Negative for rhinorrhea, sinus pain, sneezing, sore throat and trouble swallowing  Eyes: Negative  Negative for photophobia and visual disturbance  Respiratory: Negative  Negative for cough, chest tightness, shortness of breath, wheezing and stridor  Cardiovascular: Positive for chest pain  Gastrointestinal: Negative  Negative for abdominal distention, abdominal pain, blood in stool, constipation, diarrhea and rectal pain  Endocrine: Negative  Genitourinary: Negative  Negative for difficulty urinating, dysuria, flank pain, frequency, hematuria, urgency, vaginal bleeding, vaginal discharge and vaginal pain     Musculoskeletal: Positive for back pain  Negative for myalgias and neck pain  Skin: Negative  Negative for rash and wound  Allergic/Immunologic: Negative  Neurological: Negative  Negative for dizziness, tremors, seizures, syncope, facial asymmetry, speech difficulty, weakness, light-headedness, numbness and headaches  Hematological: Negative  Does not bruise/bleed easily  Psychiatric/Behavioral: Negative  Negative for confusion  Physical Exam  Physical Exam   Constitutional: She is oriented to person, place, and time  She appears well-developed and well-nourished  HENT:   Head: Normocephalic and atraumatic  Right Ear: External ear normal    Left Ear: External ear normal    Mouth/Throat: Oropharynx is clear and moist    Eyes: Pupils are equal, round, and reactive to light  Conjunctivae and EOM are normal  Right eye exhibits no discharge  Left eye exhibits no discharge  No scleral icterus  Neck: Normal range of motion  Neck supple  Cardiovascular: Normal rate, regular rhythm, normal heart sounds and intact distal pulses  Pulmonary/Chest: Effort normal and breath sounds normal  No respiratory distress  She has no wheezes  She has no rales  She exhibits no tenderness  Abdominal: Soft  Bowel sounds are normal  She exhibits no distension and no mass  There is no tenderness  There is no rebound and no guarding  No hernia  Musculoskeletal: Normal range of motion  She exhibits no edema, tenderness or deformity  No calf or leg tenderness  No calf swelling  Neurological: She is alert and oriented to person, place, and time  She has normal reflexes  She displays normal reflexes  No cranial nerve deficit or sensory deficit  She exhibits normal muscle tone  Coordination normal    Skin: Skin is warm and dry  No rash noted  No erythema  No pallor  Psychiatric: She has a normal mood and affect  Her behavior is normal  Judgment and thought content normal    Nursing note and vitals reviewed        Vital Signs  ED Triage Vitals   Temperature Pulse Respirations Blood Pressure SpO2   01/27/19 1750 01/27/19 1747 01/27/19 1747 01/27/19 1747 01/27/19 1747   98 3 °F (36 8 °C) 81 18 166/68 99 %      Temp Source Heart Rate Source Patient Position - Orthostatic VS BP Location FiO2 (%)   01/27/19 1750 -- -- -- --   Oral          Pain Score       01/27/19 1939       Worst Possible Pain           Vitals:    01/27/19 1747   BP: 166/68   Pulse: 81       Visual Acuity      ED Medications  Medications   HYDROcodone-acetaminophen (NORCO) 5-325 mg per tablet 1 tablet (1 tablet Oral Given 1/27/19 1939)       Diagnostic Studies  Results Reviewed     Procedure Component Value Units Date/Time    Troponin I [[id number]]  (Normal) Collected:  01/27/19 1844    Lab Status:  Final result Specimen:  Blood from Arm, Right Updated:  01/27/19 1908     Troponin I <0 02 ng/mL     Protime-INR [[id number]]  (Normal) Collected:  01/27/19 1844    Lab Status:  Final result Specimen:  Blood from Arm, Right Updated:  01/27/19 1902     Protime 13 3 seconds      INR 1 04    APTT [[id number]]  (Normal) Collected:  01/27/19 1844    Lab Status:  Final result Specimen:  Blood from Arm, Right Updated:  01/27/19 1902     PTT 35 seconds     Basic metabolic panel [[id number]]  (Abnormal) Collected:  01/27/19 1844    Lab Status:  Final result Specimen:  Blood from Arm, Right Updated:  01/27/19 1901     Sodium 139 mmol/L      Potassium 3 4 (L) mmol/L      Chloride 103 mmol/L      CO2 24 mmol/L      ANION GAP 12 mmol/L      BUN 8 mg/dL      Creatinine 0 69 mg/dL      Glucose 105 mg/dL      Calcium 9 1 mg/dL      eGFR 124 ml/min/1 73sq m     Narrative:         National Kidney Disease Education Program recommendations are as follows:  GFR calculation is accurate only with a steady state creatinine  Chronic Kidney disease less than 60 ml/min/1 73 sq  meters  Kidney failure less than 15 ml/min/1 73 sq  meters      CBC and differential [[id number]]  (Abnormal) Collected:  01/27/19 1844    Lab Status:  Final result Specimen:  Blood from Arm, Right Updated:  01/27/19 1853     WBC 7 31 Thousand/uL      RBC 4 84 Million/uL      Hemoglobin 14 1 g/dL      Hematocrit 43 3 %      MCV 90 fL      MCH 29 1 pg      MCHC 32 6 g/dL      RDW 13 0 %      MPV 11 4 fL      Platelets 085 Thousands/uL      nRBC 0 /100 WBCs      Neutrophils Relative 76 (H) %      Immat GRANS % 0 %      Lymphocytes Relative 18 %      Monocytes Relative 6 %      Eosinophils Relative 0 %      Basophils Relative 0 %      Neutrophils Absolute 5 49 Thousands/µL      Immature Grans Absolute 0 02 Thousand/uL      Lymphocytes Absolute 1 32 Thousands/µL      Monocytes Absolute 0 44 Thousand/µL      Eosinophils Absolute 0 02 Thousand/µL      Basophils Absolute 0 02 Thousands/µL                  XR chest 2 views   ED Interpretation by Crystal Anaya MD (01/27 1944)   Nad                 Procedures  ECG 12 Lead Documentation  Date/Time: 1/27/2019 6:37 PM  Performed by: Briana Melendez by: Bibi Bowens     ECG reviewed by me, the ED Provider: yes    Patient location:  ED  Previous ECG:     Previous ECG:  Compared to current    Similarity:  No change  Interpretation:     Interpretation: normal    Rate:     ECG rate:  67    ECG rate assessment: normal    Rhythm:     Rhythm: sinus rhythm    Ectopy:     Ectopy: none    QRS:     QRS axis:  Normal    QRS intervals:  Normal  Conduction:     Conduction: normal    ST segments:     ST segments:  Normal  T waves:     T waves: normal             Phone Contacts  ED Phone Contact    ED Course  ED Course as of Jan 27 2003   Sun Jan 27, 2019 1949 Pt stable for d/c  Labs/Ekg/CXRay and Trop  Vicodin given  Will f/u with PCP  Neg Stress Test in December           HEART Risk Score      Most Recent Value   History  0 Filed at: 01/27/2019 1930   ECG  0 Filed at: 01/27/2019 1930   Age  0 Filed at: 01/27/2019 1930   Risk Factors  0 Filed at: 01/27/2019 1930   Troponin  0 Filed at: 01/27/2019 1930 Heart Score Risk Calculator   History  0 Filed at: 01/27/2019 1930   ECG  0 Filed at: 01/27/2019 1930   Age  0 Filed at: 01/27/2019 1930   Risk Factors  0 Filed at: 01/27/2019 1930   Troponin  0 Filed at: 01/27/2019 1930   HEART Score  0 Filed at: 01/27/2019 1930   HEART Score  0 Filed at: 01/27/2019 1930                            [facility]  CritCare Time    Disposition  Final diagnoses:   Back pain   Chest pain     Time reflects when diagnosis was documented in both MDM as applicable and the Disposition within this note     Time User Action Codes Description Comment    1/27/2019  7:30 PM Arshimon Hof Add [M54 9] Back pain     1/27/2019  7:30 PM Kellie Hopef Add [R07 9] Chest pain       ED Disposition     ED Disposition Condition Comment    Discharge  Ronn Garcia discharge to home/self care  Condition at discharge: Stable        Follow-up Information     Follow up With Specialties Details [facility]  Schedule an appointment as soon as possible for a visit            Patient's Medications   Discharge Prescriptions    HYDROCODONE-ACETAMINOPHEN (NORCO) 5-325 MG PER TABLET    Take 1 tablet by mouth 3 (three) times a day for 10 days Max Daily Amount: 3 tablets       Start Date: 1/27/2019 End Date: 2/6/2019       Order Dose: 1 tablet       Quantity: 15 tablet    Refills: 0     No discharge procedures on file      ED Provider  Electronically Signed by           Heather Best MD  01/27/19 2003

## 2019-01-28 NOTE — DISCHARGE INSTRUCTIONS
Back Pain   WHAT YOU NEED TO KNOW:   Back pain is common  It can be caused by many conditions, such as arthritis or the breakdown of spinal discs  Your risk for back pain is increased by injuries, lack of activity, or repeated bending and twisting  You may feel sore or stiff on one or both sides of your back  The pain may spread to your buttocks or thighs  DISCHARGE INSTRUCTIONS:   Medicines:   · NSAIDs  help decrease swelling and pain  This medicine is available with or without a doctor's order  NSAIDs can cause stomach bleeding or kidney problems in certain people  If you take blood thinner medicine, always ask your healthcare provider if NSAIDs are safe for you  Always read the medicine label and follow directions  · Acetaminophen  decreases pain  It is available without a doctor's order  Ask how much to take and how often to take it  Follow directions  Acetaminophen can cause liver damage if not taken correctly  · Prescription pain medicine  may be given  Ask your healthcare provider how to take this medicine safely  · Take your medicine as directed  Contact your healthcare provider if you think your medicine is not helping or if you have side effects  Tell him or her if you are allergic to any medicine  Keep a list of the medicines, vitamins, and herbs you take  Include the amounts, and when and why you take them  Bring the list or the pill bottles to follow-up visits  Carry your medicine list with you in case of an emergency  Follow up with your healthcare provider in 2 weeks, or as directed:  Write down your questions so you remember to ask them during your visits  How to manage your back pain:   · Apply ice  on your back or affected area for 15 to 20 minutes every hour or as directed  Use an ice pack, or put crushed ice in a plastic bag  Cover it with a towel  Ice helps prevent tissue damage and decreases pain      · Apply heat  on your back or affected area for 20 to 30 minutes every 2 hours for as many days as directed  Heat helps decrease pain and muscle spasms  · Stay active  as much as you can without causing more pain  Bed rest could make your back pain worse  Avoid heavy lifting until your pain is gone  Return to the emergency department if:   · You have pain, numbness, or weakness in one or both legs  · Your pain becomes so severe that you cannot walk  · You cannot control your urine or bowel movements  · You have severe back pain with chest pain  · You have severe back pain, nausea, and vomiting  · You have severe back pain that spreads to your side or genital area  Contact your healthcare provider if:   · You have back pain that does not get better with rest and pain medicine  · You have a fever  · You have pain that worsens when you are on your back or when you rest     · You have pain that worsens when you cough or sneeze  · You lose weight without trying  · You have questions or concerns about your condition or care  © 2017 [employer] Information is for End User's use only and may not be sold, redistributed or otherwise used for commercial purposes  All illustrations and images included in CareNotes® are the copyrighted property of [employer]  or [employer]ie Alan[employer]  The above information is an  only  It is not intended as medical advice for individual conditions or treatments  Talk to your doctor, nurse or pharmacist before following any medical regimen to see if it is safe and effective for you  Chest Pain   WHAT YOU NEED TO KNOW:   What causes chest pain? Chest pain can be caused by a range of conditions, from not serious to life-threatening  Chest pain can be a symptom of a digestive problem, such as acid reflux or a stomach ulcer  An anxiety attack or a strong emotion, such as anger, can also cause chest pain   Infection, inflammation, or a fracture in the bones or cartilage in your chest can cause pain or discomfort  Sometimes chest pain or pressure is caused by poor blood flow to your heart (angina)  Chest pain may also be caused by life-threatening conditions such as a heart attack or blood clot in your lungs  What other symptoms might I have with chest pain? · A burning feeling behind your breastbone    · A racing or slow heartbeat     · Fever or sweating     · Nausea or vomiting     · Shortness of breath     · Discomfort or pressure that spreads from your chest to your back, jaw, or arm     · Feeling weak, tired, or faint  How is the cause of chest pain diagnosed? Your healthcare provider will examine you  Describe your chest pain in as much detail as possible  Tell him or her where your pain is and when it began  Tell the provider if you notice anything that makes the pain worse or better  Tell him or her if it is constant or comes and goes  Your healthcare provider will ask about any medicines you use and medical conditions you have  He or she will also examine you  You may also need any of the following tests:  · An EKG  is a test that records your heart's electrical activity  · Blood tests  check for heart damage and signs of a heart attack  · An echocardiogram  uses sound waves to see if blood is flowing normally through your heart  · An ultrasound, x-ray, CT, or MRI scan  may show the cause of your chest pain  You may be given contrast liquid to help your heart show up better in the pictures  Tell the healthcare provider if you have ever had an allergic reaction to contrast liquid  Do not enter the MRI room with anything metal  Metal can cause serious injury  Tell the healthcare provider if you have any metal in or on your body  · An endoscopy  may be done to check for ulcers or problem with your esophagus  How is chest pain treated? Medicines may be given to treat the cause of your chest pain   Examples include pain medicine, anxiety medicine, or medicines to increase blood flow to your heart  Do not  take certain medicines without asking your healthcare provider first  These include NSAIDs, herbal or vitamin supplements, or hormones (estrogen or progestin)  What are some healthy living tips? The following are general healthy guidelines  If your chest pain is caused by a heart problem, your healthcare provider will give you specific guidelines to follow  · Do not smoke  Nicotine and other chemicals in cigarettes and cigars can cause lung and heart damage  Ask your healthcare provider for information if you currently smoke and need help to quit  E-cigarettes or smokeless tobacco still contain nicotine  Talk to your healthcare provider before you use these products  · Eat a variety of healthy, low-fat foods  Healthy foods include fruits, vegetables, whole-grain breads, low-fat dairy products, beans, lean meats, and fish  Ask for more information about a heart healthy diet  · Ask about activity  Your healthcare provider will tell you which activities to limit or avoid  Ask when you can drive, return to work, and have sex  Ask about the best exercise plan for you  · Maintain a healthy weight  Ask your healthcare provider how much you should weigh  Ask him or her to help you create a weight loss plan if you are overweight  Call 911 if:   · You have any of the following signs of a heart attack:      ¨ Squeezing, pressure, or pain in your chest that lasts longer than 5 minutes or returns    ¨ Discomfort or pain in your back, neck, jaw, stomach, or arm     ¨ Trouble breathing    ¨ Nausea or vomiting    ¨ Lightheadedness or a sudden cold sweat, especially with chest pain or trouble breathing    When should I seek immediate care? · You have chest discomfort that gets worse, even with medicine  · You cough or vomit blood  · Your bowel movements are black or bloody  · You cannot stop vomiting, or it hurts to swallow  When should I contact my healthcare provider?    · You have questions or concerns about your condition or care  CARE AGREEMENT:   You have the right to help plan your care  Learn about your health condition and how it may be treated  Discuss treatment options with your caregivers to decide what care you want to receive  You always have the right to refuse treatment  The above information is an  only  It is not intended as medical advice for individual conditions or treatments  Talk to your doctor, nurse or pharmacist before following any medical regimen to see if it is safe and effective for you  © 2017 [employer] Information is for End User's use only and may not be sold, redistributed or otherwise used for commercial purposes  All illustrations and images included in CareNotes® are the copyrighted property of [employer]  or [employer]ie Alaniz

## 2019-01-29 LAB
ATRIAL RATE: 70 BPM
P AXIS: 77 DEGREES
PR INTERVAL: 132 MS
QRS AXIS: 42 DEGREES
QRSD INTERVAL: 80 MS
QT INTERVAL: 384 MS
QTC INTERVAL: 406 MS
T WAVE AXIS: 40 DEGREES
VENTRICULAR RATE: 67 BPM

## 2019-01-29 PROCEDURE — 93010 ELECTROCARDIOGRAM REPORT: CPT | Performed by: INTERNAL MEDICINE

## 2019-01-30 ENCOUNTER — TELEPHONE (OUTPATIENT)
Dept: INTERNAL MEDICINE CLINIC | Facility: CLINIC | Age: 44
End: 2019-01-30

## 2019-01-30 NOTE — TELEPHONE ENCOUNTER
Patient notified  States pain has subsided and she is feeling better   Scheduled annual physical in March

## 2019-01-30 NOTE — TELEPHONE ENCOUNTER
Ask if still having chest or back pains    Can schedule ER follow up    If not, please schedule annual physical in March

## 2019-02-28 ENCOUNTER — EVALUATION (OUTPATIENT)
Dept: PHYSICAL THERAPY | Facility: CLINIC | Age: 44
End: 2019-02-28
Payer: COMMERCIAL

## 2019-02-28 ENCOUNTER — TELEPHONE (OUTPATIENT)
Dept: INTERNAL MEDICINE CLINIC | Facility: CLINIC | Age: 44
End: 2019-02-28

## 2019-02-28 DIAGNOSIS — Z48.89 AFTERCARE FOLLOWING SURGERY: ICD-10-CM

## 2019-02-28 DIAGNOSIS — Z87.81 S/P ORIF (OPEN REDUCTION INTERNAL FIXATION) FRACTURE: ICD-10-CM

## 2019-02-28 DIAGNOSIS — Z98.890 S/P ORIF (OPEN REDUCTION INTERNAL FIXATION) FRACTURE: ICD-10-CM

## 2019-02-28 DIAGNOSIS — M22.2X1 PATELLOFEMORAL DISORDER OF RIGHT KNEE: ICD-10-CM

## 2019-02-28 PROCEDURE — 97162 PT EVAL MOD COMPLEX 30 MIN: CPT | Performed by: PHYSICAL THERAPIST

## 2019-02-28 NOTE — TELEPHONE ENCOUNTER
Ask patient if she would like a referral for counseling for depression/anxiety    Please keep appt in 2 weeks, can also discuss then

## 2019-02-28 NOTE — TELEPHONE ENCOUNTER
PT  HAS A LOT OF DEPRESSION  HE WANTED TO TALK TO YOU ABOUT THAT AND MAYBE GETTING A REFERRAL TO BEHAVIORAL HEALTH

## 2019-02-28 NOTE — PROGRESS NOTES
PT Evaluation     Today's date: 2019  Patient name: Blaze Sanchez  : 1975  MRN: [id number]  Referring provider: Lavonne Delgadillo MD  Dx:   Encounter Diagnosis     ICD-10-CM    1  Aftercare following surgery Z48 89 Ambulatory referral to Physical Therapy   2  Patellofemoral disorder of right knee M22 2X1 Ambulatory referral to Physical Therapy   3  S/P ORIF (open reduction internal fixation) fracture Z96 7 Ambulatory referral to Physical Therapy    Z87 81                   Assessment  Assessment details: Patient is a 44 y o  female who presents with the above listed impairments  Patient would benefit from skilled PT services to address these impairments and to maximize function  Thank you for the referral     Impairments: abnormal coordination, abnormal gait, abnormal muscle firing, abnormal or restricted ROM, abnormal movement, activity intolerance, impaired balance, impaired physical strength, lacks appropriate home exercise program, pain with function and weight-bearing intolerance  Understanding of Dx/Px/POC: good   Prognosis: good    Goals  Impairment Goals  - Decrease pain by 50% in 6 weeks  - Increase right flexibility by 50% in 6 weeks  - Increase right lower extremity strength golbally to 5/5 in 8 weeks      Functional Goals  - Return to Prior Level of Function in 8 weeks  - Patient will be independent with HEP in 8 weeks    Plan  Patient would benefit from: skilled PT  Planned modality interventions: cryotherapy  Planned therapy interventions: joint mobilization, manual therapy, neuromuscular re-education, patient education, strengthening, stretching, therapeutic activities, therapeutic exercise, home exercise program, functional ROM exercises, Espinal taping and postural training  Frequency: 2x week (2-3x week)  Duration in weeks: 8  Treatment plan discussed with: patient, PTA and referring physician        Subjective Evaluation    History of Present Illness  Mechanism of injury: Patient reports back to PT after undergoing removal of hardware at the R tibia and fibula on on 2018  She notes that she is now able to walk on her RLE, however, walking for an extended period of time or standing for an extended periods of time she will have increased pain and have to move (15-20 min)  The same time frame also applies with sitting  She notes her pain is constant at the R knee, ankle, and foot  She notes tingling at the after standing for 15 minutes from the knee to the foot  Occupation: Unemployed  PLOF: Independent  Pain  Current pain rating: 3  At best pain ratin  At worst pain ratin  Location: RLE    Patient Goals  Patient goal: Pt wants to be able to jog and walk for long periods without pain  Objective     Static Posture     Comments  Pt was given depression hotline  She states she is not suicidal   PCP was contacted in regards to getting her a referral for behavioral health  Tenderness     Right Knee   Tenderness in the lateral joint line, lateral patella, medial joint line, medial patella and patellar tendon  Left Ankle/Foot   No tenderness  Right Ankle/Foot   No tenderness  Additional Tenderness Details  Pt was also tender throughout tibialis anterior  Neurological Testing     Sensation     Knee   Left Knee   Intact: light touch    Right Knee   Intact: light touch     Ankle/Foot   Left Ankle/Foot   Intact: light touch    Right Ankle/Foot   Intact: light touch     Reflexes   Left   Patellar (L4): normal (2+)  Achilles (S1): normal (2+)    Right   Patellar (L4): normal (2+)  Achilles (S1): normal (2+)    Additional Neurological Details  A nerve glide place on the superficial peroneal nerve eliminated paresthesia at the dorsum of the R foot      Active Range of Motion     Right Knee   Flexion: 126 degrees   Extension: 2 degrees   Left Ankle/Foot   Normal active range of motion    Right Ankle/Foot   Dorsiflexion (ke): 1 degrees   Plantar flexion: WFL  Inversion: WFL  Eversion: WFL    Passive Range of Motion   Left Ankle/Foot  Normal passive range of motion    Right Ankle/Foot    Dorsiflexion (ke): 2 degrees     Patellar Static Positioning   [facility]    Joint Play     Right Ankle/Foot  Hypomobile in the talocrural joint and subtalar joint  Strength/Myotome Testing     Left Ankle/Foot   Normal strength    Right Ankle/Foot   Dorsiflexion: 4+  Plantar flexion: 4+  Inversion: 4+  Eversion: 4+  Great toe flexion: 5  Great toe extension: 5    Additional Strength Details  right Lower Extremity:  Hip Flexoin:4/5  Quadriceps:4-/5  Hamstrings:4-/5  Gluts:4/5  Hip External Rotation:4/5  Hip Internal Rotation:4+/5  Hip Abduction:4/5  Hip Adduction:4+/5    Tests     Additional Tests Details  Cande's was + on the left  Natty Elbert test was + for decreased hip and quadriceps flexibility on the right  Hamstring length was 50 degrees from 0 when placed in 90/90 position on the right  Debbie Loron was + on the right  Nobels was + on the right  General Comments:      Knee Comments  LQS was negative  Ankle/Foot Comments   Gait lacks toe off on the R           Daily Treatment Diary   Diagnosis: s/p removal of hardware at the R tibia and fibula on on 8/2/2018     Precautions: -   Manuals 2/28       TCJ mobs        IASTM R tib antioer        SPN nerve glides R                Exercise Diary        Alter-G                 Gastroc S        Soleus S                Leg press        Lateral step downs        Bridge with HS curls        HS S        Quad S        ITB S        Wall sits                                                                                Modalities             CP PRN

## 2019-02-28 NOTE — TELEPHONE ENCOUNTER
Patient notified  States she would like referral for counseling for depression/anxiety  States she has [facility] insurance

## 2019-03-07 ENCOUNTER — OFFICE VISIT (OUTPATIENT)
Dept: PHYSICAL THERAPY | Facility: CLINIC | Age: 44
End: 2019-03-07
Payer: COMMERCIAL

## 2019-03-07 DIAGNOSIS — Z87.81 S/P ORIF (OPEN REDUCTION INTERNAL FIXATION) FRACTURE: ICD-10-CM

## 2019-03-07 DIAGNOSIS — M22.2X1 PATELLOFEMORAL DISORDER OF RIGHT KNEE: Primary | ICD-10-CM

## 2019-03-07 DIAGNOSIS — Z98.890 S/P ORIF (OPEN REDUCTION INTERNAL FIXATION) FRACTURE: ICD-10-CM

## 2019-03-07 PROCEDURE — 97140 MANUAL THERAPY 1/> REGIONS: CPT

## 2019-03-07 PROCEDURE — 97112 NEUROMUSCULAR REEDUCATION: CPT

## 2019-03-07 NOTE — PROGRESS NOTES
Daily Note     Today's date: 3/7/2019  Patient name: Richard Peck  : 1975  MRN: [id number]  Referring provider: Juani Baker MD  Dx:   Encounter Diagnosis     ICD-10-CM    1  Patellofemoral disorder of right knee M22 2X1    2  S/P ORIF (open reduction internal fixation) fracture Z96 7     Z87 81                   Subjective: Patient has no new complaints since initial evaluation  She states she has increase discomfort following daily activities  Objective: See treatment diary below  Diagnosis: s/p removal of hardware at the R tibia and fibula on on 2018  Precautions: -   Manuals 2/28 3/7         TCJ mobs   CMF         IASTM R tib antioer   KLS         SPN nerve glides R   10x                        Exercise Diary             Alter-G    Bike 5 min                       Gastroc S   :20x5         Soleus S   :20x5                       Leg press             Lateral step downs             Bridge with HS curls             HS S   :20x5         Quad S   :20x5         ITB S   :20x5         Wall sits                                                                                                Modalities             CP PRN                                             Assessment: Initiated outlined program  Noted tenderness R ant tib  Focused on stretching and manuals this visit  Will continue to progress next visit within tolerance  Plan: Progress treatment as tolerated

## 2019-03-08 ENCOUNTER — OFFICE VISIT (OUTPATIENT)
Dept: PHYSICAL THERAPY | Facility: CLINIC | Age: 44
End: 2019-03-08
Payer: COMMERCIAL

## 2019-03-08 DIAGNOSIS — Z87.81 S/P ORIF (OPEN REDUCTION INTERNAL FIXATION) FRACTURE: ICD-10-CM

## 2019-03-08 DIAGNOSIS — Z98.890 S/P ORIF (OPEN REDUCTION INTERNAL FIXATION) FRACTURE: ICD-10-CM

## 2019-03-08 DIAGNOSIS — M22.2X1 PATELLOFEMORAL DISORDER OF RIGHT KNEE: Primary | ICD-10-CM

## 2019-03-08 PROCEDURE — 97110 THERAPEUTIC EXERCISES: CPT

## 2019-03-08 PROCEDURE — 97112 NEUROMUSCULAR REEDUCATION: CPT

## 2019-03-08 PROCEDURE — 97140 MANUAL THERAPY 1/> REGIONS: CPT

## 2019-03-08 NOTE — PROGRESS NOTES
Daily Note     Today's date: 3/8/2019  Patient name: Yifan Alvarez  : 1975  MRN: [id number]  Referring provider: Jordana Eid MD  Dx:   Encounter Diagnosis     ICD-10-CM    1  Patellofemoral disorder of right knee M22 2X1    2  S/P ORIF (open reduction internal fixation) fracture Z96 7     Z87 81                   Subjective: Patient states she had numbness in her R toes throughout the night  She reports increase tenderness at sit of distal scar near ankle  Objective: See treatment diary below  Diagnosis: s/p removal of hardware at the R tibia and fibula on on 2018  Precautions: -   Manuals 2/28 3/7 3/8       TCJ mobs   CMF CMF       IASTM R tib antioer   KLS KLS       SPN nerve glides R   10x  15x                      Exercise Diary             Alter-G    Bike 5 min Bike 5 min                      Gastroc S   :20x5 :20x5       Soleus S   :20x5 :20x5                     Leg press             Lateral step downs             Bridge with HS curls     2x10        HS S   :20x5 :20x5       Quad S   :20x5 :20x5       ITB S   :20x5 :20x5 assist       Wall sits     :30x4       Heel raises     30x        Xwalks     20'x4 RTB        Bird dips                                                       Modalities             CP PRN                                             Assessment: Continued to progress outlined program  Hypersensitivity with IASTM along presentation of scar  Initiated strengthening exercises with moderate muscle fatigue  Plan: Progress treatment as tolerated

## 2019-03-14 ENCOUNTER — OFFICE VISIT (OUTPATIENT)
Dept: PHYSICAL THERAPY | Facility: CLINIC | Age: 44
End: 2019-03-14
Payer: COMMERCIAL

## 2019-03-14 DIAGNOSIS — Z98.890 S/P ORIF (OPEN REDUCTION INTERNAL FIXATION) FRACTURE: ICD-10-CM

## 2019-03-14 DIAGNOSIS — Z87.81 S/P ORIF (OPEN REDUCTION INTERNAL FIXATION) FRACTURE: ICD-10-CM

## 2019-03-14 DIAGNOSIS — Z48.89 AFTERCARE FOLLOWING SURGERY: ICD-10-CM

## 2019-03-14 DIAGNOSIS — M22.2X1 PATELLOFEMORAL DISORDER OF RIGHT KNEE: Primary | ICD-10-CM

## 2019-03-14 PROCEDURE — 97140 MANUAL THERAPY 1/> REGIONS: CPT | Performed by: PHYSICAL THERAPIST

## 2019-03-14 PROCEDURE — 97112 NEUROMUSCULAR REEDUCATION: CPT | Performed by: PHYSICAL THERAPIST

## 2019-03-14 PROCEDURE — 97110 THERAPEUTIC EXERCISES: CPT | Performed by: PHYSICAL THERAPIST

## 2019-03-14 NOTE — PROGRESS NOTES
Daily Note     Today's date: 3/14/2019  Patient name: Steve Meza  : 1975  MRN: [id number]  Referring provider: Vish Werner MD  Dx:   Encounter Diagnosis     ICD-10-CM    1  Patellofemoral disorder of right knee M22 2X1    2  S/P ORIF (open reduction internal fixation) fracture Z96 7     Z87 81    3  Aftercare following surgery Z48 89                   Subjective: Patient states she had numbness in her R toes throughout the night  She reports increase tenderness at sit of distal scar near ankle  Objective: See treatment diary below  Diagnosis: s/p removal of hardware at the R tibia and fibula on on 2018  Precautions: -   Manuals 2/28 3/7 3/8  3/14     TCJ mobs   CMF CMF  CMF     IASTM R tib antioer   KLS KLS  CMF     SPN nerve glides R   10x  15x   CMF                   Exercise Diary             Alter-G    Bike 5 min Bike 5 min   Bike 5 min                    Gastroc S   :20x5 :20x5  :20x5     Soleus S   :20x5 :20x5  :20x5                   Leg press             Lateral step downs             Bridge with HS curls     2x10   2x10     HS S   :20x5 :20x5  :20x5     Quad S   :20x5 :20x5  :20x5     ITB S   :20x5 :20x5 assist  :20x5     Wall sits     :30x4  :30x4     Heel raises     30x   x30     Xwalks     20'x4 RTB   20'x4 RTB     Bird dips                                                       Modalities             CP PRN                                             Assessment: Continued to progress outlined program  Moderate to max fatigue noted  Pt most sensitive over the incision site with IASTM  Plan: Progress treatment as tolerated

## 2019-03-15 ENCOUNTER — APPOINTMENT (OUTPATIENT)
Dept: LAB | Facility: CLINIC | Age: 44
End: 2019-03-15
Payer: COMMERCIAL

## 2019-03-15 ENCOUNTER — OFFICE VISIT (OUTPATIENT)
Dept: PHYSICAL THERAPY | Facility: CLINIC | Age: 44
End: 2019-03-15
Payer: COMMERCIAL

## 2019-03-15 ENCOUNTER — OFFICE VISIT (OUTPATIENT)
Dept: INTERNAL MEDICINE CLINIC | Facility: CLINIC | Age: 44
End: 2019-03-15
Payer: COMMERCIAL

## 2019-03-15 VITALS
TEMPERATURE: 98.4 F | RESPIRATION RATE: 16 BRPM | BODY MASS INDEX: 21.47 KG/M2 | DIASTOLIC BLOOD PRESSURE: 88 MMHG | SYSTOLIC BLOOD PRESSURE: 110 MMHG | HEART RATE: 80 BPM | HEIGHT: 67 IN | WEIGHT: 136.8 LBS

## 2019-03-15 DIAGNOSIS — Z98.890 S/P ORIF (OPEN REDUCTION INTERNAL FIXATION) FRACTURE: ICD-10-CM

## 2019-03-15 DIAGNOSIS — M22.2X1 PATELLOFEMORAL DISORDER OF RIGHT KNEE: Primary | ICD-10-CM

## 2019-03-15 DIAGNOSIS — E55.9 VITAMIN D DEFICIENCY: ICD-10-CM

## 2019-03-15 DIAGNOSIS — Z00.00 HEALTH MAINTENANCE EXAMINATION: Primary | ICD-10-CM

## 2019-03-15 DIAGNOSIS — M79.604 LEG PAIN, RIGHT: ICD-10-CM

## 2019-03-15 DIAGNOSIS — Z87.81 S/P ORIF (OPEN REDUCTION INTERNAL FIXATION) FRACTURE: ICD-10-CM

## 2019-03-15 DIAGNOSIS — F33.1 MODERATE EPISODE OF RECURRENT MAJOR DEPRESSIVE DISORDER (HCC): ICD-10-CM

## 2019-03-15 DIAGNOSIS — E53.8 VITAMIN B12 DEFICIENCY: ICD-10-CM

## 2019-03-15 PROBLEM — Z48.89 AFTERCARE FOLLOWING SURGERY: Status: RESOLVED | Noted: 2018-03-20 | Resolved: 2019-03-15

## 2019-03-15 LAB
25(OH)D3 SERPL-MCNC: 17.8 NG/ML (ref 30–100)
ALBUMIN SERPL BCP-MCNC: 3.5 G/DL (ref 3.5–5)
ALP SERPL-CCNC: 74 U/L (ref 46–116)
ALT SERPL W P-5'-P-CCNC: 18 U/L (ref 12–78)
ANION GAP SERPL CALCULATED.3IONS-SCNC: 10 MMOL/L (ref 4–13)
AST SERPL W P-5'-P-CCNC: 12 U/L (ref 5–45)
BILIRUB SERPL-MCNC: 0.4 MG/DL (ref 0.2–1)
BUN SERPL-MCNC: 8 MG/DL (ref 5–25)
CALCIUM SERPL-MCNC: 8.8 MG/DL (ref 8.3–10.1)
CHLORIDE SERPL-SCNC: 105 MMOL/L (ref 100–108)
CO2 SERPL-SCNC: 27 MMOL/L (ref 21–32)
CREAT SERPL-MCNC: 0.74 MG/DL (ref 0.6–1.3)
GFR SERPL CREATININE-BSD FRML MDRD: 116 ML/MIN/1.73SQ M
GLUCOSE SERPL-MCNC: 94 MG/DL (ref 65–140)
POTASSIUM SERPL-SCNC: 3.6 MMOL/L (ref 3.5–5.3)
PROT SERPL-MCNC: 7.5 G/DL (ref 6.4–8.2)
SODIUM SERPL-SCNC: 142 MMOL/L (ref 136–145)
TSH SERPL DL<=0.05 MIU/L-ACNC: 1.18 UIU/ML (ref 0.36–3.74)
VIT B12 SERPL-MCNC: 187 PG/ML (ref 100–900)

## 2019-03-15 PROCEDURE — 36415 COLL VENOUS BLD VENIPUNCTURE: CPT | Performed by: INTERNAL MEDICINE

## 2019-03-15 PROCEDURE — 99396 PREV VISIT EST AGE 40-64: CPT | Performed by: INTERNAL MEDICINE

## 2019-03-15 PROCEDURE — 82607 VITAMIN B-12: CPT | Performed by: INTERNAL MEDICINE

## 2019-03-15 PROCEDURE — 82306 VITAMIN D 25 HYDROXY: CPT | Performed by: INTERNAL MEDICINE

## 2019-03-15 PROCEDURE — 84443 ASSAY THYROID STIM HORMONE: CPT | Performed by: INTERNAL MEDICINE

## 2019-03-15 PROCEDURE — 97110 THERAPEUTIC EXERCISES: CPT

## 2019-03-15 PROCEDURE — 97112 NEUROMUSCULAR REEDUCATION: CPT

## 2019-03-15 PROCEDURE — 80053 COMPREHEN METABOLIC PANEL: CPT | Performed by: INTERNAL MEDICINE

## 2019-03-15 PROCEDURE — 97140 MANUAL THERAPY 1/> REGIONS: CPT

## 2019-03-15 RX ORDER — MULTIVIT-MIN/IRON/FOLIC ACID/K 18-600-40
1 CAPSULE ORAL DAILY
Qty: 90 CAPSULE | Refills: 1
Start: 2019-03-15 | End: 2019-11-29 | Stop reason: SDUPTHER

## 2019-03-15 RX ORDER — AMITRIPTYLINE HYDROCHLORIDE 25 MG/1
TABLET, FILM COATED ORAL
Qty: 90 TABLET | Refills: 1 | Status: SHIPPED | OUTPATIENT
Start: 2019-03-15 | End: 2019-08-27 | Stop reason: SDUPTHER

## 2019-03-15 NOTE — ASSESSMENT & PLAN NOTE
Continue physical therapy  Suspect nerve impingement, ? EMG  Keep appointment with Ortho later this month

## 2019-03-15 NOTE — PROGRESS NOTES
Daily Note     Today's date: 3/15/2019  Patient name: Maren Vincent  : 1975  MRN: [id number]  Referring provider: Placido Alcantar MD  Dx:   Encounter Diagnosis     ICD-10-CM    1  Patellofemoral disorder of right knee M22 2X1    2  S/P ORIF (open reduction internal fixation) fracture Z96 7     Z87 81                   Subjective: Patient states she had increase tingling in her R foot throughout the night which she reports actually hurts  She reports her symptoms have decreased this morning a little  Objective: See treatment diary below  Diagnosis: s/p removal of hardware at the R tibia and fibula on on 2018  Precautions: -   Manuals 2/28 3/7 3/8  3/14 3/15   TCJ mobs   CMF CMF  CMF     IASTM R tib antioer   KLS KLS  CMF KLS   SPN nerve glides R   10x  15x   CMF KLS                 Exercise Diary             Alter-G    Bike 5 min Bike 5 min   Bike 5 min  75% 2 0mph 7 min                 Gastroc S   :20x5 :20x5  :20x5 :20x5   Soleus S   :20x5 :20x5  :20x5 :20x5                 Leg press             Lateral step downs             Bridge with HS curls     2x10   2x10 2x15   HS S   :20x5 :20x5  :20x5 :20x5   Quad S   :20x5 :20x5  :20x5 :20x5   ITB S   :20x5 :20x5 assist  :20x5 :20x5   Wall sits     :30x4  :30x4 :30x4   Heel raises     30x   x30 x30   Xwalks     20'x4 RTB   20'x4 RTB 20'x4 RTB    Bird dips         5# 2x10                                             Modalities             CP PRN                                               Assessment: Continued with outlined program  Patient able to tolerate Alter -G as noted; no increase tingling  Moderate muscle fatigue with strengthening exercises  Plan: Progress treatment as tolerated

## 2019-03-15 NOTE — PROGRESS NOTES
Assessment/Plan: Moderate episode of recurrent major depressive disorder ([address] )  Symptoms worse the past few months since out of work and in pain  Trial of amitriptyline, agree with counseling  S/P ORIF (open reduction internal fixation) fracture  Continue physical therapy  Suspect nerve impingement, ? EMG  Keep appointment with Ortho later this month  Asymptomatic PVCs  No symptoms  Vitamin B12 deficiency  She is complaint with her monthly B12 injections at home  Vitamin D deficiency  Completed weekly replacement about a year ago, start daily D3  Diagnoses and all orders for this visit:    Health maintenance examination  Comments:  Mammogram, PAPs due  Moderate episode of recurrent major depressive disorder (HCC)  -     amitriptyline (ELAVIL) 25 mg tablet; Take 1 tablet PO qHS for a week then take 2 tablets qHS  -     TSH, 3rd generation with Free T4 reflex  -     Comprehensive metabolic panel    Leg pain, right  -     amitriptyline (ELAVIL) 25 mg tablet; Take 1 tablet PO qHS for a week then take 2 tablets qHS  S/P ORIF (open reduction internal fixation) fracture    Vitamin D deficiency  -     Vitamin D 25 hydroxy  -     Cholecalciferol (VITAMIN D) 2000 units CAPS; Take 1 capsule (2,000 Units total) by mouth daily    Vitamin B12 deficiency  -     Vitamin B12      Follow up in 2 months or as needed  Subjective:      Patient ID: Hans Caballero is a 44 y o  female  Tennis Calvin feels frustrated  She has been out of work for almost a year and half, still experiencing pain in her right leg  Recently, she complains of more frequent tingling in her toes in feet area  She has been going to physical therapy, they have been working on possible scar tissue around the nerve  She is unable to do things more than 15 minutes at a time due to pain  She cannot tolerate prolonged standing, sitting or driving    She takes naproxen and or oxycodone for severe pain only, does not take this daily  She reports feeling more down and depressed the past few months because of her situation  She has been looking into counseling, to see who was covered under her insurance  She reports being on Lexapro in the past which did not help  She denies any palpitations, complains of intermittent chest pains  Pain are nonradiating, would last for a few seconds, no other accompanying symptoms  She has been giving herself B12 injections regularly  The following portions of the patient's history were reviewed and updated as appropriate: allergies, current medications, past medical history, past social history and problem list     Review of Systems   Constitutional: Positive for fatigue  Negative for appetite change  HENT: Negative for congestion and ear pain  Eyes: Negative for visual disturbance  Respiratory: Negative for cough and shortness of breath  Cardiovascular: Positive for chest pain  Negative for palpitations and leg swelling  Gastrointestinal: Negative for abdominal pain, constipation and diarrhea  Genitourinary: Negative for dysuria, frequency and urgency  Musculoskeletal: Negative for arthralgias and myalgias  Skin: Negative for rash and wound  Neurological: Positive for numbness  Negative for dizziness, light-headedness and headaches  Hematological: Does not bruise/bleed easily  Psychiatric/Behavioral: Positive for dysphoric mood  Negative for agitation, behavioral problems, confusion, decreased concentration and sleep disturbance  The patient is not nervous/anxious  Objective:      /88   Pulse 80   Temp 98 4 °F (36 9 °C)   Resp 16   Ht 5' 7" (1 702 m)   Wt 62 1 kg (136 lb 12 8 oz)   BMI 21 43 kg/m²          Physical Exam   Constitutional: She is oriented to person, place, and time  She appears well-developed and well-nourished  HENT:   Head: Normocephalic and atraumatic     Nose: Nose normal    Eyes: Pupils are equal, round, and reactive to light  Conjunctivae are normal    Neck: Neck supple  Cardiovascular: Normal rate, regular rhythm and normal heart sounds  Pulmonary/Chest: Effort normal and breath sounds normal  She has no wheezes  She has no rales  Abdominal: Soft  Bowel sounds are normal    Musculoskeletal: She exhibits no edema  antalgic   Neurological: She is alert and oriented to person, place, and time  Skin: Skin is warm  No rash noted  Psychiatric: Her behavior is normal  Her mood appears not anxious  Cognition and memory are normal  She exhibits a depressed mood  Nursing note and vitals reviewed  Lab results reviewed with patient

## 2019-03-15 NOTE — ASSESSMENT & PLAN NOTE
Symptoms worse the past few months since out of work and in pain  Trial of amitriptyline, agree with counseling

## 2019-03-21 ENCOUNTER — OFFICE VISIT (OUTPATIENT)
Dept: PHYSICAL THERAPY | Facility: CLINIC | Age: 44
End: 2019-03-21
Payer: COMMERCIAL

## 2019-03-21 DIAGNOSIS — Z87.81 S/P ORIF (OPEN REDUCTION INTERNAL FIXATION) FRACTURE: ICD-10-CM

## 2019-03-21 DIAGNOSIS — Z98.890 S/P ORIF (OPEN REDUCTION INTERNAL FIXATION) FRACTURE: ICD-10-CM

## 2019-03-21 DIAGNOSIS — M22.2X1 PATELLOFEMORAL DISORDER OF RIGHT KNEE: Primary | ICD-10-CM

## 2019-03-21 PROCEDURE — 97110 THERAPEUTIC EXERCISES: CPT

## 2019-03-21 PROCEDURE — 97112 NEUROMUSCULAR REEDUCATION: CPT

## 2019-03-21 PROCEDURE — 97140 MANUAL THERAPY 1/> REGIONS: CPT

## 2019-03-21 NOTE — PROGRESS NOTES
Daily Note     Today's date: 3/21/2019  Patient name: Gabby Landis  : 1975  MRN: [id number]  Referring provider: Ana Luisa Cox MD  Dx:   Encounter Diagnosis     ICD-10-CM    1  Patellofemoral disorder of right knee M22 2X1    2  S/P ORIF (open reduction internal fixation) fracture Z96 7     Z87 81                   Subjective: Patient states she has not noticed any improvement since beginning therapy  She reports numbness which keeps her awake at night  She states when she is sitting she notices an increase in tingling and numbness in her R ankle and toes  She states she is only able to sit for about 15 minutes before these symptoms increase  Objective: See treatment diary below  Diagnosis: s/p removal of hardware at the R tibia and fibula on on 2018  Precautions: -   Manuals 3/21  3/8  3/14 3/15   TCJ mobs CMF  CMF  CMF     IASTM R tib antioer KLS  KLS  CMF KLS   SPN nerve glides R KLS  15x   CMF KLS                Exercise Diary            Alter-G  75% 2 0mph 7 min  Bike 5 min   Bike 5 min  75% 2 0mph 7 min                Gastroc S :20x5  :20x5  :20x5 :20x5   Soleus S :20x5  :20x5  :20x5 :20x5                Leg press            Lateral step downs 6in 10x          Bridge with HS curls 2x15  2x10   2x10 2x15   HS S :20x5  :20x5  :20x5 :20x5   Quad S :20x5  :20x5  :20x5 :20x5   ITB S :20x5  :20x5 assist  :20x5 :20x5   Wall sits :30x4  :30x4  :30x4 :30x4   Heel raises x30  30x   x30 x30   Xwalks RTB 20'x4  20'x4 RTB   20'x4 RTB 20'x4 RTB    Bird dips 5# 2x10       5# 2x10                                          Modalities            CP PRN                                             Assessment: Continued with outlined program  Patient reports tingling intermittently throughout PT session  She was able to ambulate on Alter-G as noted without pain  Noted quad weakness with lateral step downs  Plan: Progress treatment as tolerated

## 2019-03-22 ENCOUNTER — OFFICE VISIT (OUTPATIENT)
Dept: OBGYN CLINIC | Facility: MEDICAL CENTER | Age: 44
End: 2019-03-22
Payer: COMMERCIAL

## 2019-03-22 ENCOUNTER — OFFICE VISIT (OUTPATIENT)
Dept: PHYSICAL THERAPY | Facility: CLINIC | Age: 44
End: 2019-03-22
Payer: COMMERCIAL

## 2019-03-22 VITALS
DIASTOLIC BLOOD PRESSURE: 86 MMHG | WEIGHT: 136 LBS | SYSTOLIC BLOOD PRESSURE: 122 MMHG | HEIGHT: 67 IN | HEART RATE: 93 BPM | BODY MASS INDEX: 21.35 KG/M2

## 2019-03-22 DIAGNOSIS — Z98.890 S/P ORIF (OPEN REDUCTION INTERNAL FIXATION) FRACTURE: ICD-10-CM

## 2019-03-22 DIAGNOSIS — G57.01 PIRIFORMIS SYNDROME OF RIGHT SIDE: Primary | ICD-10-CM

## 2019-03-22 DIAGNOSIS — M25.561 CHRONIC PAIN OF RIGHT KNEE: ICD-10-CM

## 2019-03-22 DIAGNOSIS — M22.2X1 PATELLOFEMORAL DISORDER OF RIGHT KNEE: ICD-10-CM

## 2019-03-22 DIAGNOSIS — Z87.81 S/P ORIF (OPEN REDUCTION INTERNAL FIXATION) FRACTURE: ICD-10-CM

## 2019-03-22 DIAGNOSIS — G89.29 CHRONIC PAIN OF RIGHT KNEE: ICD-10-CM

## 2019-03-22 DIAGNOSIS — M22.2X1 PATELLOFEMORAL DISORDER OF RIGHT KNEE: Primary | ICD-10-CM

## 2019-03-22 DIAGNOSIS — Z48.89 AFTERCARE FOLLOWING SURGERY FOR INJURY OR TRAUMA: ICD-10-CM

## 2019-03-22 PROCEDURE — 97112 NEUROMUSCULAR REEDUCATION: CPT

## 2019-03-22 PROCEDURE — 97110 THERAPEUTIC EXERCISES: CPT

## 2019-03-22 PROCEDURE — 97140 MANUAL THERAPY 1/> REGIONS: CPT

## 2019-03-22 PROCEDURE — 99213 OFFICE O/P EST LOW 20 MIN: CPT | Performed by: ORTHOPAEDIC SURGERY

## 2019-03-22 NOTE — PROGRESS NOTES
Assessment:  No diagnosis found  Plan:  The patient presents  about 14 months Right tibia ORIF, 1/2018, and then 7 months s/p hardware removal, 8/1018  Today she complains of right anterior and lateral knee and right toe pain  She describes numbness from mid posterior thigh, lateral knee and entire calf, foot and toes  She has reproduction of right leg numbness with piriformis compression and tension  The right knee is tender over medial joint line  Visco-supplementation is ordered for right knee  Piriformis treatment will be added to physical therapy  She should follow up in 6 weeks  To do next visit:  Return in about 6 weeks (around 5/3/2019)  The above stated was discussed in layman's terms and the patient expressed understanding  All questions were answered to the patient's satisfaction  Scribe Attestation    I,:   Tomas Stephenson am acting as a scribe while in the presence of the attending physician :        I,:   Kandis Castro MD personally performed the services described in this documentation    as scribed in my presence :              Subjective:   aHns Caballero is a 44 y o  female who presents about 14 months Right tibia ORIF, 1/2018, and then 7 months s/p hardware removal, 8/1018  Today she complains of right anterior and lateral knee and right toe pain  She describes numbness from mid posterior thigh, lateral knee and entire calf, foot and toes  She rates her pain at 4/10 and 8/10 at its worse  She does use amitriptyline, naproxen, oxycodone and tylenol with some benefit  She does wear compression stocking with increase of tingling  She is currently in physical therapy with slow progress          Review of systems negative unless otherwise specified in HPI    Past Medical History:   Diagnosis Date    Anemia     Edema     Fatigue     Vitamin B12 deficiency     Vitamin D deficiency        Past Surgical History:   Procedure Laterality Date    KNEE ARTHROSCOPY Bilateral     KNEE SURGERY      ORIF TIBIA FRACTURE Right 1/18/2018    Procedure: OPEN REDUCTION W/ INTERNAL FIXATION (ORIF) TIBIA;  Surgeon: Susy Brody MD;  Location: [facility];  Service: Orthopedics    ORIF TIBIAL PLATEAU Right 5/09/6367    Procedure: OPEN REDUCTION W/ INTERNAL FIXATION (ORIF) TIBIAL PLATEAU;  Surgeon: Susy Brody MD;  [facility];  Service: Orthopedics    [place] REMOVAL DEEP IMPLANT Right 8/2/2018    Procedure: REMOVAL HARDWARE TIBIA;  Surgeon: Susy Brody MD;  [facility];  Service: Orthopedics    TUBAL LIGATION         Family History   Problem Relation Age of Onset    Cancer Mother     Heart disease Mother         heart surgery    Cancer Father     Heart attack Father         stent    Diabetes Father     Hypertension Father     Arthritis Family     Stomach cancer Family     Ovarian cancer Family     Alcohol abuse Neg Hx     Depression Neg Hx     Drug abuse Neg Hx     Substance Abuse Neg Hx        Social History     Occupational History    Occupation: Teacher in [place]   Tobacco Use    Smoking status: Never Smoker    Smokeless tobacco: Never Used   Substance and Sexual Activity    Alcohol use: No     Comment: social drink sporatic    Drug use: No    Sexual activity: Yes         Current Outpatient Medications:     acetaminophen (TYLENOL) 325 mg tablet, 650 mg every 6 hours for mild pain, Disp: 30 tablet, Rfl: 0    amitriptyline (ELAVIL) 25 mg tablet, Take 1 tablet PO qHS for a week then take 2 tablets qHS  , Disp: 90 tablet, Rfl: 1    Cholecalciferol (VITAMIN D) 2000 units CAPS, Take 1 capsule (2,000 Units total) by mouth daily, Disp: 90 capsule, Rfl: 1    cyanocobalamin 1,000 mcg/mL, Inject 1 mL (1,000 mcg total) into the shoulder, thigh, or buttocks every 30 (thirty) days, Disp: 10 mL, Rfl: 0    naproxen (NAPROSYN) 375 mg tablet, Take 250 mg by mouth 2 (two) times a day with meals, Disp: , Rfl:     oxyCODONE (ROXICODONE) 5 mg immediate release tablet, Take 1 tablet (5 mg total) by mouth every 6 (six) hours as needed for severe pain Max Daily Amount: 20 mg, Disp: 60 tablet, Rfl: 0    SYRINGE-NEEDLE, DISP, 3 ML 25G X 1" 3 ML MISC, by Does not apply route every 30 (thirty) days, Disp: 50 each, Rfl: 0    No Known Allergies         Vitals:    03/22/19 1450   BP: 122/86   Pulse: 93       Objective:  Physical exam  · General: Awake, Alert, Oriented  · Eyes: Pupils equal, round and reactive to light  · Heart: regular rate and rhythm  · Lungs: No audible wheezing  · Abdomen: soft                    Ortho Exam   Right lower extremity:  Negative straight leg raise  Increase of numbness with piriformis compression  Increase of numbness with piriformis tension    Right knee:  Well healed scar  No effusion  Crepitus with ROM  AROM 0-120     Calf soft and supple        Diagnostics, reviewed and taken today if performed as documented:    None performed      The attending physician has personally reviewed the pertinent films in PACS and interpretation is as follows:      Procedures, if performed today:    Procedures    None performed      Portions of the record may have been created with voice recognition software   Occasional wrong word or "sound a like" substitutions may have occurred due to the inherent limitations of voice recognition software   Read the chart carefully and recognize, using context, where substitutions have occurred

## 2019-03-22 NOTE — PROGRESS NOTES
Daily Note     Today's date: 3/22/2019  Patient name: Steff Banks  : 1975  MRN: [id number]  Referring provider: Mitzy Daniels MD  Dx:   Encounter Diagnosis     ICD-10-CM    1  Patellofemoral disorder of right knee M22 2X1    2  S/P ORIF (open reduction internal fixation) fracture Z96 7     Z87 81                   Subjective: Patient has no new complaints  She intermittently has pain and numbness in her R ankle and foot  She reports this begins about 15 minutes into sitting, but her symptoms present at different times as well and she is unable to abolish symptoms       Objective: See treatment diary below  Diagnosis: s/p removal of hardware at the R tibia and fibula on on 2018  Precautions: -   Manuals 3/21 3/22 3/8  3/14 3/15   TCJ mobs CMF  CMF  CMF     IASTM R tib antioer KLS KLS KLS  CMF KLS   SPN nerve glides R KLS KLS 15x   CMF KLS                 Exercise Diary             Alter-G  75% 2 0mph 7 min 78% 2 0 7mph Bike 5 min   Bike 5 min  75% 2 0mph 7 min                 Gastroc S :20x5 :20x5 :20x5  :20x5 :20x5   Soleus S :20x5 :20x5 :20x5  :20x5 :20x5                 Leg press             Lateral step downs 6in 10x 6 in 10x         Bridge with HS curls 2x15 2x15 2x10   2x10 2x15   HS S :20x5 :20x5 :20x5  :20x5 :20x5   Quad S :20x5 :20x5 :20x5  :20x5 :20x5   ITB S :20x5 :20x5 :20x5 assist  :20x5 :20x5   Wall sits :30x4 :30x4 :30x4  :30x4 :30x4   Heel raises x30 x30  30x   x30 x30   Xwalks RTB 20'x4 GTB 20'x4 20'x4 RTB   20'x4 RTB 20'x4 RTB    Bird dips 5# 2x10  5# 2x10      5# 2x10   Ball squats on wall   20x                                      Modalities             CP PRN                                             Assessment: Continued with outlined program  Difficulty performing lateral step downs due to quad weakness and pain in her R ankle  Moderate muscle fatigue with strengthening exercises  Plan: Progress treatment as tolerated

## 2019-03-24 ENCOUNTER — TELEPHONE (OUTPATIENT)
Dept: INTERNAL MEDICINE CLINIC | Facility: CLINIC | Age: 44
End: 2019-03-24

## 2019-03-24 DIAGNOSIS — E55.9 VITAMIN D DEFICIENCY: ICD-10-CM

## 2019-03-24 RX ORDER — ERGOCALCIFEROL 1.25 MG/1
50000 CAPSULE ORAL WEEKLY
Qty: 12 CAPSULE | Refills: 0 | Status: SHIPPED | OUTPATIENT
Start: 2019-03-24 | End: 2019-08-27 | Stop reason: ALTCHOICE

## 2019-03-24 NOTE — TELEPHONE ENCOUNTER
Vitamin B12 remain low  Recommend to start taking oral B12 1000 mcg daily  Please do B12 injections weekly for a month then back to once a month  Restart weekly vitamin D, once done, please take D3 2000 units daily  Sent to pharmacy  Rest of labs ok

## 2019-03-28 ENCOUNTER — OFFICE VISIT (OUTPATIENT)
Dept: PHYSICAL THERAPY | Facility: CLINIC | Age: 44
End: 2019-03-28
Payer: COMMERCIAL

## 2019-03-28 DIAGNOSIS — M22.2X1 PATELLOFEMORAL DISORDER OF RIGHT KNEE: Primary | ICD-10-CM

## 2019-03-28 DIAGNOSIS — Z87.81 S/P ORIF (OPEN REDUCTION INTERNAL FIXATION) FRACTURE: ICD-10-CM

## 2019-03-28 DIAGNOSIS — Z98.890 S/P ORIF (OPEN REDUCTION INTERNAL FIXATION) FRACTURE: ICD-10-CM

## 2019-03-28 PROCEDURE — 97140 MANUAL THERAPY 1/> REGIONS: CPT

## 2019-03-28 PROCEDURE — 97110 THERAPEUTIC EXERCISES: CPT

## 2019-03-28 PROCEDURE — 97112 NEUROMUSCULAR REEDUCATION: CPT

## 2019-03-28 NOTE — PROGRESS NOTES
Daily Note     Today's date: 3/28/2019  Patient name: Tano Pittman  : 1975  MRN: [id number]  Referring provider: Joo Richardson MD  Dx:   Encounter Diagnosis     ICD-10-CM    1  Patellofemoral disorder of right knee M22 2X1    2  S/P ORIF (open reduction internal fixation) fracture Z96 7     Z87 81                   Subjective: Patient states she returned to ortho who states she should continue with therapy at this time  She states she has some improvement, but the numbness and tingling is about the same all the time  Objective: See treatment diary below  Diagnosis: s/p removal of hardware at the R tibia and fibula on on 2018  Precautions: -   Manuals 3/21 3/22 3/28  3/15   TCJ mobs CMF   CMF      IASTM R tib antioer KLS KLS KLS  KLS   SPN nerve glides R KLS KLS 15x   KLS                Exercise Diary            Alter-G  75% 2 0mph 7 min 78% 2 0 7min 79% 2 0mph 7 min  75% 2 0mph 7 min                Gastroc S :20x5 :20x5 :20x5  :20x5   Soleus S :20x5 :20x5 :20x5  :20x5   Piriformis S     :20x5      Leg press            Lateral step downs 6in 10x 6 in 10x 6 in 10x       Bridge with HS curls 2x15 2x15 2x10   2x15   HS S :20x5 :20x5 :20x5  :20x5   Quad S :20x5 :20x5 :20x5  :20x5   ITB S :20x5 :20x5 :20x5 manual  :20x5   Wall sits :30x4 :30x4 :30x4  :30x4   Heel raises x30 x30  30x   x30   Xwalks RTB 20'x4 GTB 20'x4 GTB 20'x4  20'x4 RTB    Bird dips 5# 2x10  5# 2x10  5# 2x10  5# 2x10   Ball squats on wall   20x  20x                                Modalities             CP PRN                                              Assessment:Continued with outlined program  Patient increased WBing on Alter-G without exacerbating pain  She reports increase tingling and numbness down RLE (at knee and into foot)  Added piriformis stretch; and updated for home  Tolerated exercises well with progressions  Plan: Progress treatment as tolerated

## 2019-03-29 ENCOUNTER — DOCTOR'S OFFICE (OUTPATIENT)
Dept: URBAN - METROPOLITAN AREA CLINIC 137 | Facility: CLINIC | Age: 44
Setting detail: OPHTHALMOLOGY
End: 2019-03-29
Payer: COMMERCIAL

## 2019-03-29 ENCOUNTER — OPTICAL OFFICE (OUTPATIENT)
Dept: URBAN - METROPOLITAN AREA CLINIC 146 | Facility: CLINIC | Age: 44
Setting detail: OPHTHALMOLOGY
End: 2019-03-29
Payer: COMMERCIAL

## 2019-03-29 ENCOUNTER — OFFICE VISIT (OUTPATIENT)
Dept: PHYSICAL THERAPY | Facility: CLINIC | Age: 44
End: 2019-03-29
Payer: COMMERCIAL

## 2019-03-29 DIAGNOSIS — Z87.81 S/P ORIF (OPEN REDUCTION INTERNAL FIXATION) FRACTURE: ICD-10-CM

## 2019-03-29 DIAGNOSIS — H52.223: ICD-10-CM

## 2019-03-29 DIAGNOSIS — Z98.890 S/P ORIF (OPEN REDUCTION INTERNAL FIXATION) FRACTURE: ICD-10-CM

## 2019-03-29 DIAGNOSIS — H52.13: ICD-10-CM

## 2019-03-29 DIAGNOSIS — M22.2X1 PATELLOFEMORAL DISORDER OF RIGHT KNEE: Primary | ICD-10-CM

## 2019-03-29 PROCEDURE — 92004 COMPRE OPH EXAM NEW PT 1/>: CPT | Performed by: OPTOMETRIST

## 2019-03-29 PROCEDURE — 92310 CONTACT LENS FITTING OU: CPT | Performed by: OPTOMETRIST

## 2019-03-29 PROCEDURE — 92015 DETERMINE REFRACTIVE STATE: CPT | Performed by: OPTOMETRIST

## 2019-03-29 PROCEDURE — V2020 VISION SVCS FRAMES PURCHASES: HCPCS | Performed by: OPTOMETRIST

## 2019-03-29 PROCEDURE — 97110 THERAPEUTIC EXERCISES: CPT

## 2019-03-29 PROCEDURE — V2103 SPHEROCYLINDR 4.00D/12-2.00D: HCPCS | Performed by: OPTOMETRIST

## 2019-03-29 PROCEDURE — 97140 MANUAL THERAPY 1/> REGIONS: CPT

## 2019-03-29 PROCEDURE — 97112 NEUROMUSCULAR REEDUCATION: CPT

## 2019-03-29 ASSESSMENT — REFRACTION_MANIFEST
OS_VA3: 20/
OD_VA1: 20/
OD_VA3: 20/
OS_VA1: 20/20-
OS_VA2: 20/
OS_VA3: 20/
OS_VA1: 20/
OD_VA2: 20/
OS_CYLINDER: -1.00
OS_SPHERE: -4.00
OD_VA3: 20/
OS_AXIS: 140
OD_SPHERE: -4.00
OD_VA2: 20/
OD_CYLINDER: -1.25
OS_VA2: 20/
OD_AXIS: 180
OU_VA: 20/20
OU_VA: 20/
OD_VA1: 20/20

## 2019-03-29 ASSESSMENT — CONFRONTATIONAL VISUAL FIELD TEST (CVF)
OS_FINDINGS: FULL
OD_FINDINGS: FULL

## 2019-03-29 ASSESSMENT — REFRACTION_AUTOREFRACTION
OS_CYLINDER: -0.75
OD_AXIS: 3
OD_CYLINDER: -1.50
OS_AXIS: 162
OD_SPHERE: -3.75
OS_SPHERE: -3.50

## 2019-03-29 ASSESSMENT — REFRACTION_CURRENTRX
OS_OVR_VA: 20/
OS_OVR_VA: 20/
OD_OVR_VA: 20/
OS_OVR_VA: 20/
OD_OVR_VA: 20/
OD_OVR_VA: 20/

## 2019-03-29 ASSESSMENT — SPHEQUIV_DERIVED
OD_SPHEQUIV: -4.625
OD_SPHEQUIV: -4.5
OS_SPHEQUIV: -4.5
OS_SPHEQUIV: -3.875

## 2019-03-29 ASSESSMENT — VISUAL ACUITY
OS_BCVA: 20/25-1
OD_BCVA: 20/20

## 2019-03-29 NOTE — PROGRESS NOTES
Daily Note     Today's date: 3/29/2019  Patient name: Gabby Landis  : 1975  MRN: [id number]  Referring provider: Ana Luisa Cox MD  Dx:   Encounter Diagnosis     ICD-10-CM    1  Patellofemoral disorder of right knee M22 2X1    2  S/P ORIF (open reduction internal fixation) fracture Z96 7     Z87 81                   Subjective: Patient reports 5/10 pain this morning in her R knee and ankle  She states she continues to have tingling and numbness in RLE from knee to toes  She has been compliant with piriformis stretch  Objective: See treatment diary below  Diagnosis: s/p removal of hardware at the R tibia and fibula on on 2018  Precautions: -   Manuals 3/21 3/22 3/28 3/29    TCJ mobs CMF   CMF CMF    IASTM R tib antioer KLS KLS KLS KLS    SPN nerve glides R KLS KLS 15x  15x                  Exercise Diary            Alter-G  75% 2 0mph 7 min 78% 2 0 7min 79% 2 0mph 7 min 81% 2 0mph 7min                  Gastroc S :20x5 :20x5 :20x5 :20x5    Soleus S :20x5 :20x5 :20x5 :20x5    Piriformis S     :20x5 :20x5    Leg press            Lateral step downs 6in 10x 6 in 10x 6 in 10x  6in 10x     Bridge with HS curls 2x15 2x15 2x10  2x10     HS S :20x5 :20x5 :20x5 :20x5    Quad S :20x5 :20x5 :20x5 :20x5    ITB S :20x5 :20x5 :20x5 manual :20x5 manual    Wall sits :30x4 :30x4 :30x4 :30x4    Heel raises x30 x30  30x  30x    Xwalks RTB 20'x4 GTB 20'x4 GTB 20'x4 GTB 20'x4    Bird dips 5# 2x10  5# 2x10  5# 2x10 5# 2x10    Ball squats on wall   20x  20x 20x                               Modalities            CP PRN                                           Assessment: Continued with outlined program  Challenged with lateral step downs due to poor quad control  Increased WBing on Alter-G with moderate muscle fatigue  Plan: Progress treatment as tolerated

## 2019-04-04 ENCOUNTER — OFFICE VISIT (OUTPATIENT)
Dept: PHYSICAL THERAPY | Facility: CLINIC | Age: 44
End: 2019-04-04
Payer: COMMERCIAL

## 2019-04-04 DIAGNOSIS — M22.2X1 PATELLOFEMORAL DISORDER OF RIGHT KNEE: Primary | ICD-10-CM

## 2019-04-04 DIAGNOSIS — Z87.81 S/P ORIF (OPEN REDUCTION INTERNAL FIXATION) FRACTURE: ICD-10-CM

## 2019-04-04 DIAGNOSIS — Z98.890 S/P ORIF (OPEN REDUCTION INTERNAL FIXATION) FRACTURE: ICD-10-CM

## 2019-04-04 PROCEDURE — 97110 THERAPEUTIC EXERCISES: CPT

## 2019-04-04 PROCEDURE — 97140 MANUAL THERAPY 1/> REGIONS: CPT

## 2019-04-04 PROCEDURE — 97112 NEUROMUSCULAR REEDUCATION: CPT

## 2019-04-05 ENCOUNTER — OFFICE VISIT (OUTPATIENT)
Dept: PHYSICAL THERAPY | Facility: CLINIC | Age: 44
End: 2019-04-05
Payer: COMMERCIAL

## 2019-04-05 DIAGNOSIS — Z98.890 S/P ORIF (OPEN REDUCTION INTERNAL FIXATION) FRACTURE: ICD-10-CM

## 2019-04-05 DIAGNOSIS — Z87.81 S/P ORIF (OPEN REDUCTION INTERNAL FIXATION) FRACTURE: ICD-10-CM

## 2019-04-05 DIAGNOSIS — M22.2X1 PATELLOFEMORAL DISORDER OF RIGHT KNEE: Primary | ICD-10-CM

## 2019-04-05 PROCEDURE — 97110 THERAPEUTIC EXERCISES: CPT | Performed by: PHYSICAL THERAPIST

## 2019-04-05 PROCEDURE — 97140 MANUAL THERAPY 1/> REGIONS: CPT | Performed by: PHYSICAL THERAPIST

## 2019-04-11 ENCOUNTER — OFFICE VISIT (OUTPATIENT)
Dept: PHYSICAL THERAPY | Facility: CLINIC | Age: 44
End: 2019-04-11
Payer: COMMERCIAL

## 2019-04-11 DIAGNOSIS — Z87.81 S/P ORIF (OPEN REDUCTION INTERNAL FIXATION) FRACTURE: ICD-10-CM

## 2019-04-11 DIAGNOSIS — Z98.890 S/P ORIF (OPEN REDUCTION INTERNAL FIXATION) FRACTURE: ICD-10-CM

## 2019-04-11 DIAGNOSIS — M22.2X1 PATELLOFEMORAL DISORDER OF RIGHT KNEE: Primary | ICD-10-CM

## 2019-04-11 PROCEDURE — 97140 MANUAL THERAPY 1/> REGIONS: CPT

## 2019-04-11 PROCEDURE — 97112 NEUROMUSCULAR REEDUCATION: CPT

## 2019-04-11 PROCEDURE — 97110 THERAPEUTIC EXERCISES: CPT

## 2019-04-12 ENCOUNTER — OFFICE VISIT (OUTPATIENT)
Dept: OBGYN CLINIC | Facility: MEDICAL CENTER | Age: 44
End: 2019-04-12
Payer: COMMERCIAL

## 2019-04-12 ENCOUNTER — APPOINTMENT (OUTPATIENT)
Dept: PHYSICAL THERAPY | Facility: CLINIC | Age: 44
End: 2019-04-12
Payer: COMMERCIAL

## 2019-04-12 VITALS
BODY MASS INDEX: 21.69 KG/M2 | SYSTOLIC BLOOD PRESSURE: 125 MMHG | HEIGHT: 67 IN | DIASTOLIC BLOOD PRESSURE: 85 MMHG | HEART RATE: 84 BPM | RESPIRATION RATE: 18 BRPM | WEIGHT: 138.2 LBS

## 2019-04-12 DIAGNOSIS — G57.01 PIRIFORMIS SYNDROME OF RIGHT SIDE: ICD-10-CM

## 2019-04-12 DIAGNOSIS — M17.31 POST-TRAUMATIC OSTEOARTHRITIS OF RIGHT KNEE: Primary | ICD-10-CM

## 2019-04-12 PROCEDURE — 20610 DRAIN/INJ JOINT/BURSA W/O US: CPT | Performed by: ORTHOPAEDIC SURGERY

## 2019-04-12 RX ORDER — HYALURONATE SODIUM 10 MG/ML
20 SYRINGE (ML) INTRAARTICULAR
Status: COMPLETED | OUTPATIENT
Start: 2019-04-12 | End: 2019-04-12

## 2019-04-12 RX ORDER — GABAPENTIN 100 MG/1
100 CAPSULE ORAL 3 TIMES DAILY
Qty: 90 CAPSULE | Refills: 0 | Status: SHIPPED | OUTPATIENT
Start: 2019-04-12 | End: 2020-02-07 | Stop reason: SDUPTHER

## 2019-04-12 RX ADMIN — Medication 20 MG: at 11:03

## 2019-04-15 DIAGNOSIS — E55.9 VITAMIN D DEFICIENCY: ICD-10-CM

## 2019-04-16 RX ORDER — CYANOCOBALAMIN 1000 UG/ML
INJECTION INTRAMUSCULAR; SUBCUTANEOUS
Qty: 10 ML | Refills: 0 | Status: SHIPPED | OUTPATIENT
Start: 2019-04-16 | End: 2019-05-16 | Stop reason: SDUPTHER

## 2019-04-17 ENCOUNTER — HOSPITAL ENCOUNTER (OUTPATIENT)
Dept: NEUROLOGY | Facility: CLINIC | Age: 44
Discharge: HOME | End: 2019-04-17
Payer: COMMERCIAL

## 2019-04-17 DIAGNOSIS — G57.01 PIRIFORMIS SYNDROME OF RIGHT SIDE: ICD-10-CM

## 2019-04-17 PROCEDURE — 95909 NRV CNDJ TST 5-6 STUDIES: CPT | Performed by: PHYSICAL MEDICINE & REHABILITATION

## 2019-04-17 PROCEDURE — 95886 MUSC TEST DONE W/N TEST COMP: CPT | Performed by: PHYSICAL MEDICINE & REHABILITATION

## 2019-04-18 ENCOUNTER — EVALUATION (OUTPATIENT)
Dept: PHYSICAL THERAPY | Facility: CLINIC | Age: 44
End: 2019-04-18
Payer: COMMERCIAL

## 2019-04-18 DIAGNOSIS — Z87.81 S/P ORIF (OPEN REDUCTION INTERNAL FIXATION) FRACTURE: ICD-10-CM

## 2019-04-18 DIAGNOSIS — Z98.890 S/P ORIF (OPEN REDUCTION INTERNAL FIXATION) FRACTURE: ICD-10-CM

## 2019-04-18 DIAGNOSIS — M22.2X1 PATELLOFEMORAL DISORDER OF RIGHT KNEE: Primary | ICD-10-CM

## 2019-04-18 PROCEDURE — 97112 NEUROMUSCULAR REEDUCATION: CPT | Performed by: PHYSICAL THERAPIST

## 2019-04-18 PROCEDURE — 97110 THERAPEUTIC EXERCISES: CPT | Performed by: PHYSICAL THERAPIST

## 2019-04-18 PROCEDURE — 97140 MANUAL THERAPY 1/> REGIONS: CPT | Performed by: PHYSICAL THERAPIST

## 2019-04-19 ENCOUNTER — OFFICE VISIT (OUTPATIENT)
Dept: OBGYN CLINIC | Facility: MEDICAL CENTER | Age: 44
End: 2019-04-19
Payer: COMMERCIAL

## 2019-04-19 ENCOUNTER — OFFICE VISIT (OUTPATIENT)
Dept: PHYSICAL THERAPY | Facility: CLINIC | Age: 44
End: 2019-04-19
Payer: COMMERCIAL

## 2019-04-19 VITALS
HEART RATE: 87 BPM | WEIGHT: 139.2 LBS | HEIGHT: 67 IN | DIASTOLIC BLOOD PRESSURE: 79 MMHG | SYSTOLIC BLOOD PRESSURE: 116 MMHG | BODY MASS INDEX: 21.85 KG/M2 | RESPIRATION RATE: 18 BRPM

## 2019-04-19 DIAGNOSIS — M17.11 ARTHRITIS OF RIGHT KNEE: Primary | ICD-10-CM

## 2019-04-19 DIAGNOSIS — M22.2X1 PATELLOFEMORAL DISORDER OF RIGHT KNEE: Primary | ICD-10-CM

## 2019-04-19 DIAGNOSIS — Z87.81 S/P ORIF (OPEN REDUCTION INTERNAL FIXATION) FRACTURE: ICD-10-CM

## 2019-04-19 DIAGNOSIS — Z98.890 S/P ORIF (OPEN REDUCTION INTERNAL FIXATION) FRACTURE: ICD-10-CM

## 2019-04-19 PROCEDURE — 97112 NEUROMUSCULAR REEDUCATION: CPT | Performed by: PHYSICAL THERAPIST

## 2019-04-19 PROCEDURE — 20610 DRAIN/INJ JOINT/BURSA W/O US: CPT | Performed by: ORTHOPAEDIC SURGERY

## 2019-04-19 PROCEDURE — 97110 THERAPEUTIC EXERCISES: CPT | Performed by: PHYSICAL THERAPIST

## 2019-04-19 PROCEDURE — 97140 MANUAL THERAPY 1/> REGIONS: CPT | Performed by: PHYSICAL THERAPIST

## 2019-04-19 RX ORDER — HYALURONATE SODIUM 10 MG/ML
20 SYRINGE (ML) INTRAARTICULAR
Status: COMPLETED | OUTPATIENT
Start: 2019-04-19 | End: 2019-04-19

## 2019-04-19 RX ADMIN — Medication 20 MG: at 14:59

## 2019-04-22 ENCOUNTER — OFFICE VISIT (OUTPATIENT)
Dept: PHYSICAL THERAPY | Facility: CLINIC | Age: 44
End: 2019-04-22
Payer: COMMERCIAL

## 2019-04-22 DIAGNOSIS — Z87.81 S/P ORIF (OPEN REDUCTION INTERNAL FIXATION) FRACTURE: ICD-10-CM

## 2019-04-22 DIAGNOSIS — M22.2X1 PATELLOFEMORAL DISORDER OF RIGHT KNEE: Primary | ICD-10-CM

## 2019-04-22 DIAGNOSIS — Z98.890 S/P ORIF (OPEN REDUCTION INTERNAL FIXATION) FRACTURE: ICD-10-CM

## 2019-04-22 PROCEDURE — 97110 THERAPEUTIC EXERCISES: CPT

## 2019-04-22 PROCEDURE — 97140 MANUAL THERAPY 1/> REGIONS: CPT

## 2019-04-22 PROCEDURE — 97112 NEUROMUSCULAR REEDUCATION: CPT

## 2019-04-25 ENCOUNTER — OFFICE VISIT (OUTPATIENT)
Dept: PHYSICAL THERAPY | Facility: CLINIC | Age: 44
End: 2019-04-25
Payer: COMMERCIAL

## 2019-04-25 DIAGNOSIS — Z98.890 S/P ORIF (OPEN REDUCTION INTERNAL FIXATION) FRACTURE: ICD-10-CM

## 2019-04-25 DIAGNOSIS — Z87.81 S/P ORIF (OPEN REDUCTION INTERNAL FIXATION) FRACTURE: ICD-10-CM

## 2019-04-25 DIAGNOSIS — M22.2X1 PATELLOFEMORAL DISORDER OF RIGHT KNEE: Primary | ICD-10-CM

## 2019-04-25 PROCEDURE — 97112 NEUROMUSCULAR REEDUCATION: CPT

## 2019-04-25 PROCEDURE — 97140 MANUAL THERAPY 1/> REGIONS: CPT

## 2019-04-25 PROCEDURE — 97110 THERAPEUTIC EXERCISES: CPT

## 2019-04-26 ENCOUNTER — OFFICE VISIT (OUTPATIENT)
Dept: OBGYN CLINIC | Facility: MEDICAL CENTER | Age: 44
End: 2019-04-26
Payer: COMMERCIAL

## 2019-04-26 VITALS
HEART RATE: 71 BPM | BODY MASS INDEX: 21.82 KG/M2 | SYSTOLIC BLOOD PRESSURE: 121 MMHG | WEIGHT: 139 LBS | HEIGHT: 67 IN | DIASTOLIC BLOOD PRESSURE: 82 MMHG

## 2019-04-26 DIAGNOSIS — M17.31 POST-TRAUMATIC OSTEOARTHRITIS OF RIGHT KNEE: Primary | ICD-10-CM

## 2019-04-26 DIAGNOSIS — Z98.890 S/P HARDWARE REMOVAL: ICD-10-CM

## 2019-04-26 PROCEDURE — 20610 DRAIN/INJ JOINT/BURSA W/O US: CPT | Performed by: ORTHOPAEDIC SURGERY

## 2019-04-26 RX ORDER — HYALURONATE SODIUM 10 MG/ML
20 SYRINGE (ML) INTRAARTICULAR
Status: COMPLETED | OUTPATIENT
Start: 2019-04-26 | End: 2019-04-26

## 2019-04-26 RX ADMIN — Medication 20 MG: at 09:41

## 2019-04-29 ENCOUNTER — APPOINTMENT (OUTPATIENT)
Dept: PHYSICAL THERAPY | Facility: CLINIC | Age: 44
End: 2019-04-29
Payer: COMMERCIAL

## 2019-05-01 ENCOUNTER — TELEPHONE (OUTPATIENT)
Dept: OBGYN CLINIC | Facility: HOSPITAL | Age: 44
End: 2019-05-01

## 2019-05-02 ENCOUNTER — OFFICE VISIT (OUTPATIENT)
Dept: PHYSICAL THERAPY | Facility: CLINIC | Age: 44
End: 2019-05-02
Payer: COMMERCIAL

## 2019-05-02 DIAGNOSIS — M22.2X1 PATELLOFEMORAL DISORDER OF RIGHT KNEE: Primary | ICD-10-CM

## 2019-05-02 DIAGNOSIS — Z98.890 S/P ORIF (OPEN REDUCTION INTERNAL FIXATION) FRACTURE: ICD-10-CM

## 2019-05-02 DIAGNOSIS — Z87.81 S/P ORIF (OPEN REDUCTION INTERNAL FIXATION) FRACTURE: ICD-10-CM

## 2019-05-02 PROCEDURE — 97110 THERAPEUTIC EXERCISES: CPT | Performed by: PHYSICAL THERAPIST

## 2019-05-02 PROCEDURE — 97112 NEUROMUSCULAR REEDUCATION: CPT | Performed by: PHYSICAL THERAPIST

## 2019-05-02 PROCEDURE — 97140 MANUAL THERAPY 1/> REGIONS: CPT | Performed by: PHYSICAL THERAPIST

## 2019-05-08 ENCOUNTER — OFFICE VISIT (OUTPATIENT)
Dept: PHYSICAL THERAPY | Facility: CLINIC | Age: 44
End: 2019-05-08
Payer: COMMERCIAL

## 2019-05-08 DIAGNOSIS — M22.2X1 PATELLOFEMORAL DISORDER OF RIGHT KNEE: Primary | ICD-10-CM

## 2019-05-08 DIAGNOSIS — Z87.81 S/P ORIF (OPEN REDUCTION INTERNAL FIXATION) FRACTURE: ICD-10-CM

## 2019-05-08 DIAGNOSIS — Z98.890 S/P ORIF (OPEN REDUCTION INTERNAL FIXATION) FRACTURE: ICD-10-CM

## 2019-05-08 PROCEDURE — 97110 THERAPEUTIC EXERCISES: CPT

## 2019-05-08 PROCEDURE — 97140 MANUAL THERAPY 1/> REGIONS: CPT

## 2019-05-08 PROCEDURE — 97112 NEUROMUSCULAR REEDUCATION: CPT

## 2019-05-09 ENCOUNTER — OFFICE VISIT (OUTPATIENT)
Dept: PHYSICAL THERAPY | Facility: CLINIC | Age: 44
End: 2019-05-09
Payer: COMMERCIAL

## 2019-05-09 DIAGNOSIS — Z98.890 S/P ORIF (OPEN REDUCTION INTERNAL FIXATION) FRACTURE: Primary | ICD-10-CM

## 2019-05-09 DIAGNOSIS — M22.2X1 PATELLOFEMORAL DISORDER OF RIGHT KNEE: ICD-10-CM

## 2019-05-09 DIAGNOSIS — Z87.81 S/P ORIF (OPEN REDUCTION INTERNAL FIXATION) FRACTURE: Primary | ICD-10-CM

## 2019-05-09 PROCEDURE — 97140 MANUAL THERAPY 1/> REGIONS: CPT

## 2019-05-09 PROCEDURE — 97112 NEUROMUSCULAR REEDUCATION: CPT

## 2019-05-09 PROCEDURE — 97110 THERAPEUTIC EXERCISES: CPT

## 2019-05-13 ENCOUNTER — OFFICE VISIT (OUTPATIENT)
Dept: PHYSICAL THERAPY | Facility: CLINIC | Age: 44
End: 2019-05-13
Payer: COMMERCIAL

## 2019-05-13 DIAGNOSIS — M22.2X1 PATELLOFEMORAL DISORDER OF RIGHT KNEE: ICD-10-CM

## 2019-05-13 DIAGNOSIS — Z98.890 S/P ORIF (OPEN REDUCTION INTERNAL FIXATION) FRACTURE: Primary | ICD-10-CM

## 2019-05-13 DIAGNOSIS — Z87.81 S/P ORIF (OPEN REDUCTION INTERNAL FIXATION) FRACTURE: Primary | ICD-10-CM

## 2019-05-13 PROCEDURE — 97140 MANUAL THERAPY 1/> REGIONS: CPT

## 2019-05-13 PROCEDURE — 97112 NEUROMUSCULAR REEDUCATION: CPT

## 2019-05-13 PROCEDURE — 97110 THERAPEUTIC EXERCISES: CPT

## 2019-05-16 ENCOUNTER — TRANSCRIBE ORDERS (OUTPATIENT)
Dept: LAB | Facility: CLINIC | Age: 44
End: 2019-05-16

## 2019-05-16 ENCOUNTER — OFFICE VISIT (OUTPATIENT)
Dept: INTERNAL MEDICINE CLINIC | Facility: CLINIC | Age: 44
End: 2019-05-16
Payer: COMMERCIAL

## 2019-05-16 ENCOUNTER — APPOINTMENT (OUTPATIENT)
Dept: LAB | Facility: CLINIC | Age: 44
End: 2019-05-16
Payer: COMMERCIAL

## 2019-05-16 ENCOUNTER — OFFICE VISIT (OUTPATIENT)
Dept: PHYSICAL THERAPY | Facility: CLINIC | Age: 44
End: 2019-05-16
Payer: COMMERCIAL

## 2019-05-16 VITALS
RESPIRATION RATE: 18 BRPM | WEIGHT: 140 LBS | SYSTOLIC BLOOD PRESSURE: 136 MMHG | BODY MASS INDEX: 21.97 KG/M2 | TEMPERATURE: 99.2 F | HEIGHT: 67 IN | HEART RATE: 90 BPM | DIASTOLIC BLOOD PRESSURE: 90 MMHG

## 2019-05-16 DIAGNOSIS — Z87.81 S/P ORIF (OPEN REDUCTION INTERNAL FIXATION) FRACTURE: Primary | ICD-10-CM

## 2019-05-16 DIAGNOSIS — F33.1 MODERATE EPISODE OF RECURRENT MAJOR DEPRESSIVE DISORDER (HCC): Primary | ICD-10-CM

## 2019-05-16 DIAGNOSIS — E53.8 VITAMIN B12 DEFICIENCY: ICD-10-CM

## 2019-05-16 DIAGNOSIS — M22.2X1 PATELLOFEMORAL DISORDER OF RIGHT KNEE: ICD-10-CM

## 2019-05-16 DIAGNOSIS — M17.31 POST-TRAUMATIC OSTEOARTHRITIS OF RIGHT KNEE: ICD-10-CM

## 2019-05-16 DIAGNOSIS — Z12.31 ENCOUNTER FOR SCREENING MAMMOGRAM FOR BREAST CANCER: ICD-10-CM

## 2019-05-16 DIAGNOSIS — M25.541 ARTHRALGIA OF BOTH HANDS: ICD-10-CM

## 2019-05-16 DIAGNOSIS — E55.9 VITAMIN D DEFICIENCY: ICD-10-CM

## 2019-05-16 DIAGNOSIS — Z98.890 S/P ORIF (OPEN REDUCTION INTERNAL FIXATION) FRACTURE: Primary | ICD-10-CM

## 2019-05-16 DIAGNOSIS — M25.542 ARTHRALGIA OF BOTH HANDS: ICD-10-CM

## 2019-05-16 DIAGNOSIS — Z71.9 HEALTH COUNSELING: ICD-10-CM

## 2019-05-16 PROBLEM — M25.50 ARTHRALGIA: Status: ACTIVE | Noted: 2019-05-16

## 2019-05-16 LAB
BASOPHILS # BLD AUTO: 0.03 THOUSANDS/ΜL (ref 0–0.1)
BASOPHILS NFR BLD AUTO: 1 % (ref 0–1)
CRP SERPL QL: <3 MG/L
EOSINOPHIL # BLD AUTO: 0.02 THOUSAND/ΜL (ref 0–0.61)
EOSINOPHIL NFR BLD AUTO: 0 % (ref 0–6)
ERYTHROCYTE [DISTWIDTH] IN BLOOD BY AUTOMATED COUNT: 13.7 % (ref 11.6–15.1)
ERYTHROCYTE [SEDIMENTATION RATE] IN BLOOD: 6 MM/HOUR (ref 0–20)
HCT VFR BLD AUTO: 43.1 % (ref 34.8–46.1)
HGB BLD-MCNC: 13.8 G/DL (ref 11.5–15.4)
IMM GRANULOCYTES # BLD AUTO: 0.01 THOUSAND/UL (ref 0–0.2)
IMM GRANULOCYTES NFR BLD AUTO: 0 % (ref 0–2)
LYMPHOCYTES # BLD AUTO: 1.86 THOUSANDS/ΜL (ref 0.6–4.47)
LYMPHOCYTES NFR BLD AUTO: 35 % (ref 14–44)
MCH RBC QN AUTO: 28.7 PG (ref 26.8–34.3)
MCHC RBC AUTO-ENTMCNC: 32 G/DL (ref 31.4–37.4)
MCV RBC AUTO: 90 FL (ref 82–98)
MONOCYTES # BLD AUTO: 0.39 THOUSAND/ΜL (ref 0.17–1.22)
MONOCYTES NFR BLD AUTO: 7 % (ref 4–12)
NEUTROPHILS # BLD AUTO: 3.05 THOUSANDS/ΜL (ref 1.85–7.62)
NEUTS SEG NFR BLD AUTO: 57 % (ref 43–75)
NRBC BLD AUTO-RTO: 0 /100 WBCS
PLATELET # BLD AUTO: 211 THOUSANDS/UL (ref 149–390)
PMV BLD AUTO: 11.8 FL (ref 8.9–12.7)
RBC # BLD AUTO: 4.81 MILLION/UL (ref 3.81–5.12)
WBC # BLD AUTO: 5.36 THOUSAND/UL (ref 4.31–10.16)

## 2019-05-16 PROCEDURE — 86038 ANTINUCLEAR ANTIBODIES: CPT | Performed by: INTERNAL MEDICINE

## 2019-05-16 PROCEDURE — 85025 COMPLETE CBC W/AUTO DIFF WBC: CPT | Performed by: INTERNAL MEDICINE

## 2019-05-16 PROCEDURE — 97140 MANUAL THERAPY 1/> REGIONS: CPT

## 2019-05-16 PROCEDURE — 85652 RBC SED RATE AUTOMATED: CPT | Performed by: INTERNAL MEDICINE

## 2019-05-16 PROCEDURE — 97112 NEUROMUSCULAR REEDUCATION: CPT

## 2019-05-16 PROCEDURE — 86200 CCP ANTIBODY: CPT | Performed by: INTERNAL MEDICINE

## 2019-05-16 PROCEDURE — 97110 THERAPEUTIC EXERCISES: CPT

## 2019-05-16 PROCEDURE — 99214 OFFICE O/P EST MOD 30 MIN: CPT | Performed by: INTERNAL MEDICINE

## 2019-05-16 PROCEDURE — 86430 RHEUMATOID FACTOR TEST QUAL: CPT | Performed by: INTERNAL MEDICINE

## 2019-05-16 PROCEDURE — 36415 COLL VENOUS BLD VENIPUNCTURE: CPT | Performed by: INTERNAL MEDICINE

## 2019-05-16 PROCEDURE — 86140 C-REACTIVE PROTEIN: CPT | Performed by: INTERNAL MEDICINE

## 2019-05-16 RX ORDER — CYANOCOBALAMIN 1000 UG/ML
1000 INJECTION INTRAMUSCULAR; SUBCUTANEOUS
Qty: 10 ML | Refills: 0 | Status: SHIPPED | OUTPATIENT
Start: 2019-05-16 | End: 2019-08-29 | Stop reason: SDUPTHER

## 2019-05-17 LAB — RHEUMATOID FACT SER QL LA: NEGATIVE

## 2019-05-18 LAB — RYE IGE QN: NEGATIVE

## 2019-05-19 LAB — CCP IGA+IGG SERPL IA-ACNC: 6 UNITS (ref 0–19)

## 2019-05-20 ENCOUNTER — TELEPHONE (OUTPATIENT)
Dept: INTERNAL MEDICINE CLINIC | Facility: CLINIC | Age: 44
End: 2019-05-20

## 2019-05-21 DIAGNOSIS — Z48.89 AFTERCARE FOLLOWING SURGERY FOR INJURY OR TRAUMA: ICD-10-CM

## 2019-05-21 RX ORDER — OXYCODONE HYDROCHLORIDE 5 MG/1
5 TABLET ORAL EVERY 6 HOURS PRN
Qty: 60 TABLET | Refills: 0 | Status: CANCELLED | OUTPATIENT
Start: 2019-05-21

## 2019-05-22 ENCOUNTER — APPOINTMENT (OUTPATIENT)
Dept: PHYSICAL THERAPY | Facility: CLINIC | Age: 44
End: 2019-05-22
Payer: COMMERCIAL

## 2019-05-23 ENCOUNTER — OFFICE VISIT (OUTPATIENT)
Dept: PHYSICAL THERAPY | Facility: CLINIC | Age: 44
End: 2019-05-23
Payer: COMMERCIAL

## 2019-05-23 DIAGNOSIS — M22.2X1 PATELLOFEMORAL DISORDER OF RIGHT KNEE: ICD-10-CM

## 2019-05-23 DIAGNOSIS — Z87.81 S/P ORIF (OPEN REDUCTION INTERNAL FIXATION) FRACTURE: Primary | ICD-10-CM

## 2019-05-23 DIAGNOSIS — Z98.890 S/P ORIF (OPEN REDUCTION INTERNAL FIXATION) FRACTURE: Primary | ICD-10-CM

## 2019-05-23 PROCEDURE — 97112 NEUROMUSCULAR REEDUCATION: CPT

## 2019-05-23 PROCEDURE — 97110 THERAPEUTIC EXERCISES: CPT

## 2019-05-23 PROCEDURE — 97140 MANUAL THERAPY 1/> REGIONS: CPT

## 2019-05-24 ENCOUNTER — OFFICE VISIT (OUTPATIENT)
Dept: PHYSICAL THERAPY | Facility: CLINIC | Age: 44
End: 2019-05-24
Payer: COMMERCIAL

## 2019-05-24 DIAGNOSIS — M22.2X1 PATELLOFEMORAL DISORDER OF RIGHT KNEE: ICD-10-CM

## 2019-05-24 DIAGNOSIS — Z98.890 S/P ORIF (OPEN REDUCTION INTERNAL FIXATION) FRACTURE: Primary | ICD-10-CM

## 2019-05-24 DIAGNOSIS — Z87.81 S/P ORIF (OPEN REDUCTION INTERNAL FIXATION) FRACTURE: Primary | ICD-10-CM

## 2019-05-24 PROCEDURE — 97112 NEUROMUSCULAR REEDUCATION: CPT

## 2019-05-24 PROCEDURE — 97140 MANUAL THERAPY 1/> REGIONS: CPT

## 2019-05-24 PROCEDURE — 97110 THERAPEUTIC EXERCISES: CPT

## 2019-05-28 ENCOUNTER — EVALUATION (OUTPATIENT)
Dept: PHYSICAL THERAPY | Facility: CLINIC | Age: 44
End: 2019-05-28
Payer: COMMERCIAL

## 2019-05-28 DIAGNOSIS — M22.2X1 PATELLOFEMORAL DISORDER OF RIGHT KNEE: ICD-10-CM

## 2019-05-28 DIAGNOSIS — Z98.890 S/P ORIF (OPEN REDUCTION INTERNAL FIXATION) FRACTURE: Primary | ICD-10-CM

## 2019-05-28 DIAGNOSIS — Z87.81 S/P ORIF (OPEN REDUCTION INTERNAL FIXATION) FRACTURE: Primary | ICD-10-CM

## 2019-05-28 PROCEDURE — 97140 MANUAL THERAPY 1/> REGIONS: CPT | Performed by: PHYSICAL THERAPIST

## 2019-05-30 ENCOUNTER — OFFICE VISIT (OUTPATIENT)
Dept: PHYSICAL THERAPY | Facility: CLINIC | Age: 44
End: 2019-05-30
Payer: COMMERCIAL

## 2019-05-30 DIAGNOSIS — Z87.81 S/P ORIF (OPEN REDUCTION INTERNAL FIXATION) FRACTURE: Primary | ICD-10-CM

## 2019-05-30 DIAGNOSIS — M22.2X1 PATELLOFEMORAL DISORDER OF RIGHT KNEE: ICD-10-CM

## 2019-05-30 DIAGNOSIS — Z98.890 S/P ORIF (OPEN REDUCTION INTERNAL FIXATION) FRACTURE: Primary | ICD-10-CM

## 2019-05-30 PROCEDURE — 97112 NEUROMUSCULAR REEDUCATION: CPT

## 2019-05-30 PROCEDURE — 97110 THERAPEUTIC EXERCISES: CPT

## 2019-05-30 PROCEDURE — 97140 MANUAL THERAPY 1/> REGIONS: CPT

## 2019-06-06 ENCOUNTER — OFFICE VISIT (OUTPATIENT)
Dept: PHYSICAL THERAPY | Facility: CLINIC | Age: 44
End: 2019-06-06
Payer: COMMERCIAL

## 2019-06-06 DIAGNOSIS — M22.2X1 PATELLOFEMORAL DISORDER OF RIGHT KNEE: ICD-10-CM

## 2019-06-06 DIAGNOSIS — Z87.81 S/P ORIF (OPEN REDUCTION INTERNAL FIXATION) FRACTURE: Primary | ICD-10-CM

## 2019-06-06 DIAGNOSIS — Z98.890 S/P ORIF (OPEN REDUCTION INTERNAL FIXATION) FRACTURE: Primary | ICD-10-CM

## 2019-06-06 PROCEDURE — 97140 MANUAL THERAPY 1/> REGIONS: CPT

## 2019-06-06 PROCEDURE — 97110 THERAPEUTIC EXERCISES: CPT

## 2019-06-06 PROCEDURE — 97112 NEUROMUSCULAR REEDUCATION: CPT

## 2019-06-07 ENCOUNTER — EVALUATION (OUTPATIENT)
Dept: PHYSICAL THERAPY | Facility: CLINIC | Age: 44
End: 2019-06-07
Payer: COMMERCIAL

## 2019-06-07 DIAGNOSIS — Z98.890 S/P ORIF (OPEN REDUCTION INTERNAL FIXATION) FRACTURE: Primary | ICD-10-CM

## 2019-06-07 DIAGNOSIS — M22.2X1 PATELLOFEMORAL DISORDER OF RIGHT KNEE: ICD-10-CM

## 2019-06-07 DIAGNOSIS — Z87.81 S/P ORIF (OPEN REDUCTION INTERNAL FIXATION) FRACTURE: Primary | ICD-10-CM

## 2019-06-07 PROCEDURE — 97140 MANUAL THERAPY 1/> REGIONS: CPT | Performed by: PHYSICAL THERAPIST

## 2019-06-07 PROCEDURE — 97112 NEUROMUSCULAR REEDUCATION: CPT | Performed by: PHYSICAL THERAPIST

## 2019-06-12 ENCOUNTER — OFFICE VISIT (OUTPATIENT)
Dept: PHYSICAL THERAPY | Facility: CLINIC | Age: 44
End: 2019-06-12
Payer: COMMERCIAL

## 2019-06-12 DIAGNOSIS — M22.2X1 PATELLOFEMORAL DISORDER OF RIGHT KNEE: ICD-10-CM

## 2019-06-12 DIAGNOSIS — Z98.890 S/P ORIF (OPEN REDUCTION INTERNAL FIXATION) FRACTURE: Primary | ICD-10-CM

## 2019-06-12 DIAGNOSIS — Z87.81 S/P ORIF (OPEN REDUCTION INTERNAL FIXATION) FRACTURE: Primary | ICD-10-CM

## 2019-06-12 PROCEDURE — 97112 NEUROMUSCULAR REEDUCATION: CPT

## 2019-06-12 PROCEDURE — 97140 MANUAL THERAPY 1/> REGIONS: CPT

## 2019-06-12 PROCEDURE — 97110 THERAPEUTIC EXERCISES: CPT

## 2019-06-14 ENCOUNTER — OFFICE VISIT (OUTPATIENT)
Dept: PHYSICAL THERAPY | Facility: CLINIC | Age: 44
End: 2019-06-14
Payer: COMMERCIAL

## 2019-06-14 DIAGNOSIS — Z98.890 S/P ORIF (OPEN REDUCTION INTERNAL FIXATION) FRACTURE: Primary | ICD-10-CM

## 2019-06-14 DIAGNOSIS — Z87.81 S/P ORIF (OPEN REDUCTION INTERNAL FIXATION) FRACTURE: Primary | ICD-10-CM

## 2019-06-14 DIAGNOSIS — M22.2X1 PATELLOFEMORAL DISORDER OF RIGHT KNEE: ICD-10-CM

## 2019-06-14 PROCEDURE — 97110 THERAPEUTIC EXERCISES: CPT

## 2019-06-14 PROCEDURE — 97112 NEUROMUSCULAR REEDUCATION: CPT

## 2019-06-14 PROCEDURE — 97140 MANUAL THERAPY 1/> REGIONS: CPT

## 2019-06-19 ENCOUNTER — OFFICE VISIT (OUTPATIENT)
Dept: PHYSICAL THERAPY | Facility: CLINIC | Age: 44
End: 2019-06-19
Payer: COMMERCIAL

## 2019-06-19 DIAGNOSIS — Z98.890 S/P ORIF (OPEN REDUCTION INTERNAL FIXATION) FRACTURE: Primary | ICD-10-CM

## 2019-06-19 DIAGNOSIS — Z87.81 S/P ORIF (OPEN REDUCTION INTERNAL FIXATION) FRACTURE: Primary | ICD-10-CM

## 2019-06-19 DIAGNOSIS — M22.2X1 PATELLOFEMORAL DISORDER OF RIGHT KNEE: ICD-10-CM

## 2019-06-19 PROCEDURE — 97140 MANUAL THERAPY 1/> REGIONS: CPT | Performed by: PHYSICAL THERAPIST

## 2019-06-19 PROCEDURE — 97110 THERAPEUTIC EXERCISES: CPT | Performed by: PHYSICAL THERAPIST

## 2019-06-19 PROCEDURE — 97112 NEUROMUSCULAR REEDUCATION: CPT | Performed by: PHYSICAL THERAPIST

## 2019-06-21 ENCOUNTER — OFFICE VISIT (OUTPATIENT)
Dept: PHYSICAL THERAPY | Facility: CLINIC | Age: 44
End: 2019-06-21
Payer: COMMERCIAL

## 2019-06-21 DIAGNOSIS — M22.2X1 PATELLOFEMORAL DISORDER OF RIGHT KNEE: ICD-10-CM

## 2019-06-21 DIAGNOSIS — Z98.890 S/P ORIF (OPEN REDUCTION INTERNAL FIXATION) FRACTURE: Primary | ICD-10-CM

## 2019-06-21 DIAGNOSIS — Z87.81 S/P ORIF (OPEN REDUCTION INTERNAL FIXATION) FRACTURE: Primary | ICD-10-CM

## 2019-06-21 PROCEDURE — 97112 NEUROMUSCULAR REEDUCATION: CPT

## 2019-06-21 PROCEDURE — 97140 MANUAL THERAPY 1/> REGIONS: CPT

## 2019-06-21 PROCEDURE — 97110 THERAPEUTIC EXERCISES: CPT

## 2019-06-26 ENCOUNTER — APPOINTMENT (OUTPATIENT)
Dept: PHYSICAL THERAPY | Facility: CLINIC | Age: 44
End: 2019-06-26
Payer: COMMERCIAL

## 2019-06-28 ENCOUNTER — OFFICE VISIT (OUTPATIENT)
Dept: PHYSICAL THERAPY | Facility: CLINIC | Age: 44
End: 2019-06-28
Payer: COMMERCIAL

## 2019-06-28 DIAGNOSIS — Z98.890 S/P ORIF (OPEN REDUCTION INTERNAL FIXATION) FRACTURE: Primary | ICD-10-CM

## 2019-06-28 DIAGNOSIS — M22.2X1 PATELLOFEMORAL DISORDER OF RIGHT KNEE: ICD-10-CM

## 2019-06-28 DIAGNOSIS — Z87.81 S/P ORIF (OPEN REDUCTION INTERNAL FIXATION) FRACTURE: Primary | ICD-10-CM

## 2019-06-28 PROCEDURE — 97140 MANUAL THERAPY 1/> REGIONS: CPT

## 2019-06-28 PROCEDURE — 97110 THERAPEUTIC EXERCISES: CPT

## 2019-06-28 PROCEDURE — 97112 NEUROMUSCULAR REEDUCATION: CPT

## 2019-07-03 ENCOUNTER — OFFICE VISIT (OUTPATIENT)
Dept: PHYSICAL THERAPY | Facility: CLINIC | Age: 44
End: 2019-07-03
Payer: COMMERCIAL

## 2019-07-03 DIAGNOSIS — M22.2X1 PATELLOFEMORAL DISORDER OF RIGHT KNEE: ICD-10-CM

## 2019-07-03 DIAGNOSIS — Z87.81 S/P ORIF (OPEN REDUCTION INTERNAL FIXATION) FRACTURE: Primary | ICD-10-CM

## 2019-07-03 DIAGNOSIS — Z98.890 S/P ORIF (OPEN REDUCTION INTERNAL FIXATION) FRACTURE: Primary | ICD-10-CM

## 2019-07-03 PROCEDURE — 97140 MANUAL THERAPY 1/> REGIONS: CPT

## 2019-07-03 PROCEDURE — 97112 NEUROMUSCULAR REEDUCATION: CPT

## 2019-07-03 PROCEDURE — 97110 THERAPEUTIC EXERCISES: CPT

## 2019-07-03 NOTE — PROGRESS NOTES
Daily Note     Today's date: 7/3/2019  Patient name: Laney Libman  : 1975  MRN: [id number]  Referring provider: Danica Sanchez MD  Dx:   Encounter Diagnosis     ICD-10-CM    1  S/P ORIF (open reduction internal fixation) fracture Z96 7     Z87 81    2  Patellofemoral disorder of right knee M22 2X1                   Subjective: Patient states she has 5/10 pain in her R ankle  She states her R knee is "really really tight"  She states she feels about the same  Patient states her leg swells so much throughout the day  Objective: See treatment diary below  Diagnosis: s/p removal of hardware at the R tibia and fibula on on 2018     Precautions: -   Manuals 6/21 6/28 7/3 6/14 6/19   TCJ mobs RT CMF CMF CMF CMF   IASTM R tib distal   CMF KLS       MWM ankle       CMF CMF   Manual distal patella glide       CMF     Taping for patella vicky             SPN nerve glides R              Re-eval             Exercise Diary             Alter-G  TM 5 mins TM 8 min  TM 8 min   5 min TM walk briskly                 Piriformis S             Leg press Eccentric 110# 3x10 Eccentric 125# 3x10 Eccentric 125# 3x10  Eccentric 110# 3x10 Eccentric 110# 3x10   Lateral step downs 8in 3x10 8in 3x10 8in 2x10  8 in 3x10 8in 3x10   Bridge with HS curls 2x15 2x15 2x15   2x15    HS S :20x5 :20x5 :20x5  :20x5 :20x5   Quad S :20x5 :20x5 :20x5  :20x5 :20x5   ITB S :20x5 :20x5 :20x5   :20x5   Wall sits :60x4 toes up  :60x4 toes up  :60x4 toes up  :60x4 toes up  :60x4 toes up    Xwalks Black 20'x4 Black 20'x4 Black 20'x4   Black 20'x4   Bird dips 5# 3x10  5# 3x10  5# 3x10   5# 3x10 5# 3x10   Ball squats on wall 3x10 3x10  3x10  3x10 3x10    HS scoots 10# 20'x4 10# 20'x4 10# 20'x4  10# 20'x4 10# 20'x4   SLS rebounder Green ball 20x Green ball 20x Green ball 30x  Green ball 20x Green ball 20x                                              Modalities             CP PRN                                               Assessment: Continued with outlined program  Tenderness with IASTM at anterior tib insertion  Moderate muscle fatigue with LE strengthening exercises  **Patient arrived 30 minutes late, still accommodated       Plan: Progress treatment as tolerated

## 2019-07-10 ENCOUNTER — OFFICE VISIT (OUTPATIENT)
Dept: PHYSICAL THERAPY | Facility: CLINIC | Age: 44
End: 2019-07-10
Payer: COMMERCIAL

## 2019-07-10 DIAGNOSIS — M22.2X1 PATELLOFEMORAL DISORDER OF RIGHT KNEE: ICD-10-CM

## 2019-07-10 DIAGNOSIS — Z98.890 S/P ORIF (OPEN REDUCTION INTERNAL FIXATION) FRACTURE: Primary | ICD-10-CM

## 2019-07-10 DIAGNOSIS — Z87.81 S/P ORIF (OPEN REDUCTION INTERNAL FIXATION) FRACTURE: Primary | ICD-10-CM

## 2019-07-10 PROCEDURE — 97116 GAIT TRAINING THERAPY: CPT

## 2019-07-10 PROCEDURE — 97140 MANUAL THERAPY 1/> REGIONS: CPT

## 2019-07-10 PROCEDURE — 97112 NEUROMUSCULAR REEDUCATION: CPT

## 2019-07-10 PROCEDURE — 97110 THERAPEUTIC EXERCISES: CPT

## 2019-07-10 NOTE — PROGRESS NOTES
Daily Note     Today's date: 7/10/2019  Patient name: Chino Jackson  : 1975  MRN: [id number]  Referring provider: Leandro Salas MD  Dx:   Encounter Diagnosis     ICD-10-CM    1  S/P ORIF (open reduction internal fixation) fracture Z96 7     Z87 81    2  Patellofemoral disorder of right knee M22 2X1                   Subjective: Patient reports 5/10 pain in her R ankle daily mostly with activity  She reports 2-2 5/10 at rest  She states she has a lot of soreness in her R knee, 8/10 pain with activity and 5/10 at rest  She states she missed her Friday appt and can tell the difference with her pain and stiffness increasing  She states she is just in excruciating pain now  Objective: See treatment diary below  Diagnosis: s/p removal of hardware at the R tibia and fibula on on 2018     Precautions: -   Manuals 6/21 6/28 7/3 7/10    TCJ mobs RT CMF CMF CMF    IASTM R tib distal   CMF KLS KLS    MWM ankle           Manual distal patella glide           Taping for patella vicky            SPN nerve glides R             Re-eval            Exercise Diary            Alter-G  TM 5 mins TM 8 min  TM 8 min  TM 8 min brisk walk                 Piriformis S            Leg press Eccentric 110# 3x10 Eccentric 125# 3x10 Eccentric 125# 3x10 Eccentric 125# 3x10    Lateral step downs 8in 3x10 8in 3x10 8in 2x10 8 in 3x10    Bridge with HS curls 2x15 2x15 2x15 2x15    HS S :20x5 :20x5 :20x5  :20x5    Quad S :20x5 :20x5 :20x5  :20x5    ITB S :20x5 :20x5 :20x5  :20x5    Wall sits :60x4 toes up  :60x4 toes up  :60x4 toes up  :60x4 toes up     Xwalks Black 20'x4 Black 20'x4 Black 20'x4 Black 20'x4    Bird dips 5# 3x10  5# 3x10  5# 3x10   5# 3x10    Ball squats on wall 3x10 3x10  3x10  3x10    HS scoots 10# 20'x4 10# 20'x4 10# 20'x4 10# 20'x4    SLS rebounder Green ball 20x Green ball 20x Green ball 30x Green ball 20x                                           Modalities            CP PRN                                             Assessment: Continued with outlined program  Patient shows no signs of distress throughout ambulation on the treadmill or with exercise  Moderate muscle fatigue demonstrated with strengthening exercises  She is able to increase weight and reps as noted  Increased tenderness with IASTM to distal tib  Plan: Progress treatment as tolerated

## 2019-07-12 ENCOUNTER — APPOINTMENT (OUTPATIENT)
Dept: PHYSICAL THERAPY | Facility: CLINIC | Age: 44
End: 2019-07-12
Payer: COMMERCIAL

## 2019-07-26 ENCOUNTER — OFFICE VISIT (OUTPATIENT)
Dept: OBGYN CLINIC | Facility: MEDICAL CENTER | Age: 44
End: 2019-07-26
Payer: COMMERCIAL

## 2019-07-26 VITALS
WEIGHT: 140 LBS | DIASTOLIC BLOOD PRESSURE: 85 MMHG | SYSTOLIC BLOOD PRESSURE: 131 MMHG | HEIGHT: 67 IN | BODY MASS INDEX: 21.97 KG/M2 | HEART RATE: 74 BPM

## 2019-07-26 DIAGNOSIS — M25.561 PAIN IN JOINT INVOLVING RIGHT LOWER LEG: Primary | ICD-10-CM

## 2019-07-26 DIAGNOSIS — M17.31 POST-TRAUMATIC OSTEOARTHRITIS OF RIGHT KNEE: ICD-10-CM

## 2019-07-26 DIAGNOSIS — Z48.89 AFTERCARE FOLLOWING SURGERY FOR INJURY OR TRAUMA: ICD-10-CM

## 2019-07-26 PROCEDURE — 99213 OFFICE O/P EST LOW 20 MIN: CPT | Performed by: ORTHOPAEDIC SURGERY

## 2019-07-26 RX ORDER — OXYCODONE HYDROCHLORIDE 5 MG/1
5 TABLET ORAL EVERY 8 HOURS PRN
Qty: 60 TABLET | Refills: 0 | Status: SHIPPED | OUTPATIENT
Start: 2019-07-26 | End: 2020-02-07 | Stop reason: ALTCHOICE

## 2019-07-26 RX ORDER — NABUMETONE 750 MG/1
750 TABLET, FILM COATED ORAL 2 TIMES DAILY
Qty: 60 TABLET | Refills: 1 | Status: SHIPPED | OUTPATIENT
Start: 2019-07-26 | End: 2019-08-30 | Stop reason: SDUPTHER

## 2019-07-26 NOTE — PROGRESS NOTES
Assessment:  1  Pain in joint involving right lower leg  oxyCODONE (ROXICODONE) 5 mg immediate release tablet    nabumetone (RELAFEN) 750 mg tablet   2  Post-traumatic osteoarthritis of right knee  oxyCODONE (ROXICODONE) 5 mg immediate release tablet    nabumetone (RELAFEN) 750 mg tablet    Injection procedure prior authorization   3  Aftercare following surgery for injury or trauma         Plan:  The presence of her right lower extremity symptoms recommend changing her oral anti-inflammatory from Naprosyn to Relafen 750 mg b i d     Will also give her another refill of her oxycodone take at night  Ice and elevate as indicated  Weightbearing activities as tolerated  Home exercises as well as therapy as indicated  Repeat evaluation 1 month to assess how the change of her NSAID has affected her as well as 3 months from now for repeat of Euflexxa gel series to her right knee  To do next visit:  Return in about 1 month (around 8/26/2019) for re-check as well as in 3 months for Euflexxa #1 right knee  The above stated was discussed in layman's terms and the patient expressed understanding  All questions were answered to the patient's satisfaction  Scribe Attestation    I,:   Asha Patel am acting as a scribe while in the presence of the attending physician :        I,:   Priscilla Babinski, MD personally performed the services described in this documentation    as scribed in my presence :              Subjective:   Hermila Thompson is a 44 y o  female who presents 3 months after completion of the Euflexxa three series to her right knee, post-traumatic OA  She has a history of right tibia ORIF with removal of hardware  She notes pain and swelling at her right leg from her knee to her ankle  She notes that her symptoms start within 15 mins of being on her feet  She has been in therapy  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, teacher     She takes her Neurontin and oxycodone at night and naproxen BID  Review of systems negative unless otherwise specified in HPI    Past Medical History:   Diagnosis Date    Anemia     Arthritis     Depression     Edema     Fatigue     Vitamin B12 deficiency     Vitamin D deficiency        Past Surgical History:   Procedure Laterality Date    KNEE ARTHROSCOPY Bilateral     KNEE SURGERY      ORIF TIBIA FRACTURE Right 1/18/2018    Procedure: OPEN REDUCTION W/ INTERNAL FIXATION (ORIF) TIBIA;  Surgeon: Eder Dumont MD;  [facility];  Service: Orthopedics    ORIF TIBIAL PLATEAU Right 9/03/2383    Procedure: OPEN REDUCTION W/ INTERNAL FIXATION (ORIF) TIBIAL PLATEAU;  Surgeon: Eder Dumont MD;  [facility];  Service: Orthopedics    [place] REMOVAL DEEP IMPLANT Right 8/2/2018    Procedure: REMOVAL HARDWARE TIBIA;  Surgeon: Eder Dumont MD;  [facility];  Service: Orthopedics    TUBAL LIGATION         Family History   Problem Relation Age of Onset    Cancer Mother     Heart disease Mother         heart surgery    Cancer Father     Heart attack Father         stent    Diabetes Father     Hypertension Father     Arthritis Family     Stomach cancer Family     Ovarian cancer Family     Rheum arthritis Daughter     Lupus Maternal Grandmother     Lupus Cousin     Alcohol abuse Neg Hx     Depression Neg Hx     Drug abuse Neg Hx     Substance Abuse Neg Hx     Mental illness Neg Hx        Social History     Occupational History    Occupation: Teacher in [place]   Tobacco Use    Smoking status: Never Smoker    Smokeless tobacco: Never Used   Substance and Sexual Activity    Alcohol use: No     Comment: social drink sporatic    Drug use: No    Sexual activity: Yes         Current Outpatient Medications:     acetaminophen (TYLENOL) 325 mg tablet, 650 mg every 6 hours for mild pain, Disp: 30 tablet, Rfl: 0    amitriptyline (ELAVIL) 25 mg tablet, Take 1 tablet PO qHS for a week then take 2 tablets qHS  , Disp: 90 tablet, Rfl: 1    Cholecalciferol (VITAMIN D) 2000 units CAPS, Take 1 capsule (2,000 Units total) by mouth daily, Disp: 90 capsule, Rfl: 1    cyanocobalamin 1,000 mcg/mL, Inject 1 mL (1,000 mcg total) into a muscle every 30 (thirty) days, Disp: 10 mL, Rfl: 0    ergocalciferol (VITAMIN D2) 50,000 units, Take 1 capsule (50,000 Units total) by mouth once a week, Disp: 12 capsule, Rfl: 0    gabapentin (NEURONTIN) 100 mg capsule, Take 1 capsule (100 mg total) by mouth 3 (three) times a day, Disp: 90 capsule, Rfl: 0    naproxen (NAPROSYN) 375 mg tablet, Take 250 mg by mouth 2 (two) times a day with meals, Disp: , Rfl:     oxyCODONE (ROXICODONE) 5 mg immediate release tablet, Take 1 tablet (5 mg total) by mouth every 6 (six) hours as needed for severe pain Max Daily Amount: 20 mg, Disp: 60 tablet, Rfl: 0    SYRINGE-NEEDLE, DISP, 3 ML 25G X 1" 3 ML MISC, by Does not apply route every 30 (thirty) days, Disp: 50 each, Rfl: 0    No Known Allergies         Vitals:    07/26/19 0922   BP: 131/85   Pulse: 74       Objective:                    Right Knee Exam     Muscle Strength   The patient has normal right knee strength  Tenderness   Right knee tenderness location: mild diffuse tenderness anteriorly  Range of Motion   The patient has normal right knee ROM  Right knee flexion: with crepitus  Other   Erythema: absent  Sensation: normal  Swelling: mild  Effusion: no effusion present    Comments:    Healed incisions  Intact extensor mechanism  Calf and thigh are soft and non-tender without signs of DVT             Diagnostics, reviewed and taken today if performed as documented:    None performed          Procedures, if performed today:    Procedures    None performed      Portions of the record may have been created with voice recognition software  Occasional wrong word or "sound a like" substitutions may have occurred due to the inherent limitations of voice recognition software    Read the chart carefully and recognize, using context, where substitutions have occurred

## 2019-07-29 ENCOUNTER — TELEPHONE (OUTPATIENT)
Dept: OBGYN CLINIC | Facility: HOSPITAL | Age: 44
End: 2019-07-29

## 2019-07-29 NOTE — TELEPHONE ENCOUNTER
Caller: [facility]  Patient: Suad Giulia  C/B #: [phone]  Dr Alvina Martines pharmacy provided [address] information for prior authorization on oxyCODONE (ROXICODONE) 5 mg and nabumetone (RELAFEN) 750 mg tablet  Pharmacy phone #[phone] and member ID #[id number]  She advised [facility] with only allow 5 days of Oxycodone without prior authorization and the nabumetone also needs prior authorization

## 2019-08-22 NOTE — PROGRESS NOTES
PT Discharge    Today's date: 2019  Patient name: Meet Hawkins  : 1975  MRN: [id number]  Referring provider: Rajni Burton MD  Dx:   Encounter Diagnosis     ICD-10-CM    1  S/P ORIF (open reduction internal fixation) fracture Z96 7     Z87 81    2  Patellofemoral disorder of right knee M22 2X1        Start Time: 3684  Stop Time: 0850  Total time in clinic (min): 55 minutes    Assessment/Plan    Subjective    Objective           Pt did noted return to PT   D/C at this time

## 2019-08-27 ENCOUNTER — OFFICE VISIT (OUTPATIENT)
Dept: INTERNAL MEDICINE CLINIC | Facility: CLINIC | Age: 44
End: 2019-08-27
Payer: COMMERCIAL

## 2019-08-27 ENCOUNTER — TRANSCRIBE ORDERS (OUTPATIENT)
Dept: LAB | Facility: CLINIC | Age: 44
End: 2019-08-27

## 2019-08-27 ENCOUNTER — APPOINTMENT (OUTPATIENT)
Dept: LAB | Facility: CLINIC | Age: 44
End: 2019-08-27
Payer: COMMERCIAL

## 2019-08-27 VITALS
SYSTOLIC BLOOD PRESSURE: 118 MMHG | BODY MASS INDEX: 23.1 KG/M2 | HEART RATE: 78 BPM | WEIGHT: 147.2 LBS | DIASTOLIC BLOOD PRESSURE: 80 MMHG | RESPIRATION RATE: 18 BRPM | HEIGHT: 67 IN | TEMPERATURE: 98.3 F

## 2019-08-27 DIAGNOSIS — E53.8 VITAMIN B12 DEFICIENCY: ICD-10-CM

## 2019-08-27 DIAGNOSIS — M79.604 LEG PAIN, RIGHT: ICD-10-CM

## 2019-08-27 DIAGNOSIS — F33.1 MODERATE EPISODE OF RECURRENT MAJOR DEPRESSIVE DISORDER (HCC): ICD-10-CM

## 2019-08-27 DIAGNOSIS — R10.13 DYSPEPSIA: ICD-10-CM

## 2019-08-27 DIAGNOSIS — F41.9 ANXIETY: Primary | ICD-10-CM

## 2019-08-27 DIAGNOSIS — M17.31 POST-TRAUMATIC OSTEOARTHRITIS OF RIGHT KNEE: ICD-10-CM

## 2019-08-27 DIAGNOSIS — E55.9 VITAMIN D DEFICIENCY: ICD-10-CM

## 2019-08-27 DIAGNOSIS — Z71.9 HEALTH COUNSELING: ICD-10-CM

## 2019-08-27 PROBLEM — B97.7 HUMAN PAPILLOMA VIRUS INFECTION: Status: ACTIVE | Noted: 2019-08-27

## 2019-08-27 PROBLEM — R87.611 PAPANICOLAOU SMEAR OF CERVIX WITH ATYPICAL SQUAMOUS CELLS CANNOT EXCLUDE HIGH GRADE SQUAMOUS INTRAEPITHELIAL LESION (ASC-H): Status: ACTIVE | Noted: 2019-08-27

## 2019-08-27 PROCEDURE — 82607 VITAMIN B-12: CPT

## 2019-08-27 PROCEDURE — 82306 VITAMIN D 25 HYDROXY: CPT

## 2019-08-27 PROCEDURE — 1036F TOBACCO NON-USER: CPT | Performed by: INTERNAL MEDICINE

## 2019-08-27 PROCEDURE — 3008F BODY MASS INDEX DOCD: CPT | Performed by: INTERNAL MEDICINE

## 2019-08-27 PROCEDURE — 99214 OFFICE O/P EST MOD 30 MIN: CPT | Performed by: INTERNAL MEDICINE

## 2019-08-27 PROCEDURE — 36415 COLL VENOUS BLD VENIPUNCTURE: CPT

## 2019-08-27 RX ORDER — AMITRIPTYLINE HYDROCHLORIDE 50 MG/1
TABLET, FILM COATED ORAL
Qty: 90 TABLET | Refills: 1 | Status: SHIPPED | OUTPATIENT
Start: 2019-08-27 | End: 2019-11-29 | Stop reason: SDUPTHER

## 2019-08-27 NOTE — ASSESSMENT & PLAN NOTE
Symptoms worse since she may be returning to work  Increase amitriptyline to 50 mg  She is looking for a counselor who accepts her insurance

## 2019-08-27 NOTE — PROGRESS NOTES
Assessment/Plan:    Anxiety  Symptoms worse since she may be returning to work  Increase amitriptyline to 50 mg  She is looking for a counselor who accepts her insurance  Post-traumatic osteoarthritis of right knee  Follow up with Ortho later this week, did not start nabumetone and oxycodone  She is taking gabapentin tid  Vitamin B12 deficiency  Recheck B12 levels  Vitamin D deficiency  Takes D3 daily  Arthralgia  Improved  Diagnoses and all orders for this visit:    Anxiety    Moderate episode of recurrent major depressive disorder (HCC)  -     amitriptyline (ELAVIL) 50 mg tablet; Take 1 tablet PO qHS for a week then take 2 tablets qHS  Leg pain, right  -     amitriptyline (ELAVIL) 50 mg tablet; Take 1 tablet PO qHS for a week then take 2 tablets qHS  Post-traumatic osteoarthritis of right knee    Dyspepsia  Comments:  May be due to recent increase in anxiety, may take Tums or Pepcid prn  Health counseling  Comments:  Schedule mammogram       Follow up in 3 months or as needed  Subjective:      Patient ID: Azael Turner is a 38 y o  female  Shaunice is feeling stressed  Since August started, she has been worrying about her return to work  She has more pain, will see Ortho later this week, will find out if she will be released for work  She has been practicing her drive to work several times  She reports pain during and after the drive  She walks regularly, denies any recent falls or injury  She still experiences pain, did not fill oxycodone and nabumetone since it was not approved by her insurance  She reports feeling very anxious and stressed thinking about returning to work  She tries to avoid walking with someone on her right side  She feels very stressed when she is in a car  She reports her stomach has been feeling queasy frequently, reports stool slightly loose  Denies any nausea, vomiting or bloody stools    She denies any palpitations, chest pain or shortness of breath  She has been taking her amitriptyline regularly  She reports she feels that this has not been working this month since she has been more anxious  She has been looking for counseling who is covered by her insurance but is unsuccessful  The following portions of the patient's history were reviewed and updated as appropriate: allergies, current medications, past medical history, past social history and problem list     Review of Systems   Constitutional: Negative for appetite change and fatigue  HENT: Negative for congestion and ear pain  Eyes: Negative for visual disturbance  Respiratory: Negative for cough and shortness of breath  Cardiovascular: Negative for chest pain and leg swelling  Gastrointestinal: Negative for abdominal pain, constipation, diarrhea and nausea  Genitourinary: Negative for dysuria  Musculoskeletal: Positive for arthralgias  Negative for gait problem and myalgias  Skin: Negative for rash and wound  Neurological: Positive for numbness  Negative for dizziness and headaches  Psychiatric/Behavioral: Positive for sleep disturbance  Negative for confusion  The patient is nervous/anxious  Objective:      /80   Pulse 78   Temp 98 3 °F (36 8 °C)   Resp 18   Ht 5' 7" (1 702 m)   Wt 66 8 kg (147 lb 3 2 oz)   BMI 23 05 kg/m²          Physical Exam   Constitutional: She is oriented to person, place, and time  She appears well-developed and well-nourished  HENT:   Head: Normocephalic and atraumatic  Nose: Nose normal    Eyes: Pupils are equal, round, and reactive to light  Conjunctivae are normal    Neck: Neck supple  Cardiovascular: Normal rate, regular rhythm and normal heart sounds  Pulmonary/Chest: Effort normal and breath sounds normal  She has no wheezes  She has no rales  Abdominal: Soft  Bowel sounds are normal    Musculoskeletal: She exhibits no edema     Neurological: She is alert and oriented to person, place, and time    Skin: Skin is warm  No rash noted  Psychiatric: She has a normal mood and affect  Her behavior is normal    Nursing note and vitals reviewed  Lab results reviewed with patient

## 2019-08-27 NOTE — ASSESSMENT & PLAN NOTE
Follow up with Ortho later this week, did not start nabumetone and oxycodone  She is taking gabapentin tid

## 2019-08-28 DIAGNOSIS — E55.9 VITAMIN D DEFICIENCY: Primary | ICD-10-CM

## 2019-08-28 LAB
25(OH)D3 SERPL-MCNC: 17.4 NG/ML (ref 30–100)
VIT B12 SERPL-MCNC: 284 PG/ML (ref 100–900)

## 2019-08-28 RX ORDER — ERGOCALCIFEROL 1.25 MG/1
50000 CAPSULE ORAL WEEKLY
Qty: 12 CAPSULE | Refills: 0 | Status: SHIPPED | OUTPATIENT
Start: 2019-08-28 | End: 2020-02-07 | Stop reason: ALTCHOICE

## 2019-08-28 NOTE — TELEPHONE ENCOUNTER
Vitamin D levels came back very low again  Recommend to start high dose weekly D2, sent to pharmacy  Once completed (12 weeks), start vitamin D3 2000 units daily  Vitamin B12 also remains low, are you taking 1000 mcg daily? If you are, recommend to restart monthly injections  If not, please start again  Let me know

## 2019-08-28 NOTE — TELEPHONE ENCOUNTER
Patient notified  Patient states she has been doing Vitamin B12 injections herself at home, once monthly

## 2019-08-29 RX ORDER — CYANOCOBALAMIN 1000 UG/ML
1000 INJECTION INTRAMUSCULAR; SUBCUTANEOUS
Qty: 10 ML | Refills: 1 | Status: SHIPPED | OUTPATIENT
Start: 2019-08-29 | End: 2020-06-12 | Stop reason: SDUPTHER

## 2019-08-30 ENCOUNTER — OFFICE VISIT (OUTPATIENT)
Dept: OBGYN CLINIC | Facility: MEDICAL CENTER | Age: 44
End: 2019-08-30
Payer: COMMERCIAL

## 2019-08-30 ENCOUNTER — TELEPHONE (OUTPATIENT)
Dept: OBGYN CLINIC | Facility: MEDICAL CENTER | Age: 44
End: 2019-08-30

## 2019-08-30 VITALS
WEIGHT: 147 LBS | BODY MASS INDEX: 23.07 KG/M2 | SYSTOLIC BLOOD PRESSURE: 123 MMHG | HEIGHT: 67 IN | DIASTOLIC BLOOD PRESSURE: 79 MMHG | HEART RATE: 74 BPM

## 2019-08-30 DIAGNOSIS — M25.561 PAIN IN JOINT INVOLVING RIGHT LOWER LEG: ICD-10-CM

## 2019-08-30 DIAGNOSIS — M17.31 POST-TRAUMATIC OSTEOARTHRITIS OF RIGHT KNEE: ICD-10-CM

## 2019-08-30 DIAGNOSIS — M25.561 CHRONIC PAIN OF RIGHT KNEE: Primary | ICD-10-CM

## 2019-08-30 DIAGNOSIS — G89.29 CHRONIC PAIN OF RIGHT KNEE: Primary | ICD-10-CM

## 2019-08-30 PROCEDURE — 99213 OFFICE O/P EST LOW 20 MIN: CPT | Performed by: ORTHOPAEDIC SURGERY

## 2019-08-30 RX ORDER — MELOXICAM 15 MG/1
15 TABLET ORAL DAILY
Qty: 30 TABLET | Refills: 1 | Status: SHIPPED | OUTPATIENT
Start: 2019-08-30 | End: 2019-11-20 | Stop reason: SDUPTHER

## 2019-08-30 RX ORDER — NABUMETONE 750 MG/1
750 TABLET, FILM COATED ORAL 2 TIMES DAILY
Qty: 60 TABLET | Refills: 1 | Status: SHIPPED | OUTPATIENT
Start: 2019-08-30 | End: 2020-02-07 | Stop reason: ALTCHOICE

## 2019-08-30 NOTE — LETTER
August 30, 2019     Patient: Chelsey Palacio   YOB: 1975   Date of Visit: 8/30/2019       To Whom it May Concern:    Chelsey Palacio is under my professional care  She was seen in my office on 8/30/2019  She may return to work with limitations she may return to work with modified schedule working 3 days per week betwween 6 and 8 hours per day  She requires frequent breaks and limited use of stairs  These recommendations are in place from today forward  If you have any questions or concerns, please don't hesitate to call  Sincerely,          Nancy Valencia MD        CC: Tayler Draper

## 2019-08-30 NOTE — PROGRESS NOTES
Rosella Goldmann 38 y  o female presents for long-term follow-up  She continues to be plagued by pain in the lateral aspect of the right knee, meds greatly increased with prolonged weight-bearing such as standing greater than 20-30 minutes  This occurs despite surgeries x2 to the right lower extremity to repair a break and ultimately removed hardware  She feels continually incompletely rehabilitated and has muscle weakness and fatigability that occurs more readily than any weight-bearing left knee pain or fatigability    Review of Systems  Review of systems negative unless otherwise specified in HPI    Past Medical History  Past Medical History:   Diagnosis Date    Anemia     Arthritis     Depression     Edema     Fatigue     Vitamin B12 deficiency     Vitamin D deficiency        Past Surgical History  Past Surgical History:   Procedure Laterality Date    KNEE ARTHROSCOPY Bilateral     KNEE SURGERY      ORIF TIBIA FRACTURE Right 1/18/2018    Procedure: OPEN REDUCTION W/ INTERNAL FIXATION (ORIF) TIBIA;  Surgeon: Maryam Oliveira MD;  [facility];  Service: Orthopedics    ORIF TIBIAL PLATEAU Right 1/14/5537    Procedure: OPEN REDUCTION W/ INTERNAL FIXATION (ORIF) TIBIAL PLATEAU;  Surgeon: Maryam Oliveira MD;  [facility];  Service: Orthopedics    [place] REMOVAL DEEP IMPLANT Right 8/2/2018    Procedure: REMOVAL HARDWARE TIBIA;  Surgeon: Maryam Oliveira MD;  [facility];  Service: Orthopedics    TUBAL LIGATION         Current Medications  Current Outpatient Medications on File Prior to Visit   Medication Sig Dispense Refill    acetaminophen (TYLENOL) 325 mg tablet 650 mg every 6 hours for mild pain 30 tablet 0    amitriptyline (ELAVIL) 50 mg tablet Take 1 tablet PO qHS for a week then take 2 tablets qHS   90 tablet 1    Cholecalciferol (VITAMIN D) 2000 units CAPS Take 1 capsule (2,000 Units total) by mouth daily 90 capsule 1    cyanocobalamin 1,000 mcg/mL Inject 1 mL (1,000 mcg total) into a muscle every 30 (thirty) days 10 mL 1    ergocalciferol (VITAMIN D2) 50,000 units Take 1 capsule (50,000 Units total) by mouth once a week 12 capsule 0    gabapentin (NEURONTIN) 100 mg capsule Take 1 capsule (100 mg total) by mouth 3 (three) times a day 90 capsule 0    nabumetone (RELAFEN) 750 mg tablet Take 1 tablet (750 mg total) by mouth 2 (two) times a day 60 tablet 1    naproxen (NAPROSYN) 375 mg tablet Take 250 mg by mouth 2 (two) times a day with meals      oxyCODONE (ROXICODONE) 5 mg immediate release tablet Take 1 tablet (5 mg total) by mouth every 8 (eight) hours as needed for severe painMax Daily Amount: 15 mg 60 tablet 0    SYRINGE-NEEDLE, DISP, 3 ML 25G X 1" 3 ML MISC by Does not apply route every 30 (thirty) days 50 each 0     No current facility-administered medications on file prior to visit  Recent Labs [facility]  0   Lab Value Date/Time    HCT 43 1 05/16/2019 1706    HCT 43 6 10/17/2015 1109    HGB 13 8 05/16/2019 1706    HGB 14 4 10/17/2015 1109    WBC 5 36 05/16/2019 1706    WBC 4 24 (L) 10/17/2015 1109    INR 1 04 01/27/2019 1844    ESR 6 05/16/2019 1706    CRP <3 0 05/16/2019 1706    GLUCOSE 121 01/17/2018 2311    GLUCOSE 85 10/17/2015 1109         Physical exam  · General: Awake, Alert, Oriented  · Eyes: Pupils equal, round and reactive to light  · Heart: regular rate and rhythm  · Lungs: No audible wheezing  · Abdomen: soft  Examination finds gait pattern with antalgia  Right knee has valgus inclination  There is a healed anterolateral incision  There is bony enlargement tenderness laterally  There is some crepitation flexion extension  There is no palpable warmth the synovium  Calf compartments are soft and supple  Toes are warm, sensate, mobile on the right foot    Imaging  No new x-rays accompany her    Procedure  None indicated or performed today    Assessment/Plan:   38 y  o female who continues experience painful posttraumatic residuals from a right tibia plateau and tibial shaft fracture  I recommended a return to work in a modified capacity working 3 days a week [employer] 68 hours a day allowing her frequent rest and limited use stairs  She is considered a candidate for systemic anti-inflammatories and this is prescribed    In addition I would welcome the opportunity see back in October plan on viscosupplementation of the right knee

## 2019-09-12 ENCOUNTER — TELEPHONE (OUTPATIENT)
Dept: OBGYN CLINIC | Facility: HOSPITAL | Age: 44
End: 2019-09-12

## 2019-09-12 NOTE — TELEPHONE ENCOUNTER
Please have this staff,    contact the administrative staff,    at [facility],   Regarding correspondence on behalf of this patient      Thank you very much    TAMMY

## 2019-09-12 NOTE — TELEPHONE ENCOUNTER
Ally from the [employer] called in  CB# [phone] [id number]    Need to talk to you regarding this patient  Was not clear what it was in reference to  Please advise

## 2019-09-18 ENCOUNTER — TELEPHONE (OUTPATIENT)
Dept: OBGYN CLINIC | Facility: HOSPITAL | Age: 44
End: 2019-09-18

## 2019-09-18 NOTE — TELEPHONE ENCOUNTER
Message reviewed    Refer to the office note from August 30th    The doctor recommended working 3 times a week,  6-8 hours a day,  and the limits imposed on the patient      You are welcome to share the chart recording and notes with the [employer]

## 2019-09-18 NOTE — TELEPHONE ENCOUNTER
Scott is calling from [employer] in reference to the patient  Asking for prognosis, patient states she would like to return to work  Patient informed JOCELYNE that she can only work three days out of the week  JOCELYNE is asking for clarification, state patient has been out for almost two years  Scott is asking if they can extend her to continue to be out of work until she can return to full duty    Please call Scott at [phone]

## 2019-09-18 NOTE — TELEPHONE ENCOUNTER
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

## 2019-09-18 NOTE — TELEPHONE ENCOUNTER
Your message received    I discussed this personally with Mr Farzana Castaneda,   There will be more discussion and discovery prior to the writing of a new absence from work note      No further action at the current time

## 2019-09-19 ENCOUNTER — CONSULT (OUTPATIENT)
Dept: PAIN MEDICINE | Facility: CLINIC | Age: 44
End: 2019-09-19
Payer: COMMERCIAL

## 2019-09-19 VITALS
WEIGHT: 147 LBS | BODY MASS INDEX: 23.63 KG/M2 | HEART RATE: 92 BPM | HEIGHT: 66 IN | DIASTOLIC BLOOD PRESSURE: 88 MMHG | SYSTOLIC BLOOD PRESSURE: 139 MMHG

## 2019-09-19 DIAGNOSIS — M17.31 POST-TRAUMATIC OSTEOARTHRITIS OF RIGHT KNEE: ICD-10-CM

## 2019-09-19 DIAGNOSIS — G89.29 CHRONIC PAIN OF RIGHT KNEE: Primary | ICD-10-CM

## 2019-09-19 DIAGNOSIS — M25.561 PAIN IN JOINT INVOLVING RIGHT LOWER LEG: ICD-10-CM

## 2019-09-19 DIAGNOSIS — M25.561 CHRONIC PAIN OF RIGHT KNEE: Primary | ICD-10-CM

## 2019-09-19 PROCEDURE — 99244 OFF/OP CNSLTJ NEW/EST MOD 40: CPT | Performed by: PHYSICAL MEDICINE & REHABILITATION
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
## 2019-09-27 ENCOUNTER — HOSPITAL ENCOUNTER (OUTPATIENT)
Dept: RADIOLOGY | Age: 44
Discharge: HOME | End: 2019-09-27
Payer: COMMERCIAL

## 2019-09-27 VITALS — WEIGHT: 147 LBS | BODY MASS INDEX: 23.63 KG/M2 | HEIGHT: 66 IN

## 2019-09-27 DIAGNOSIS — Z12.31 ENCOUNTER FOR SCREENING MAMMOGRAM FOR BREAST CANCER: ICD-10-CM

## 2019-09-27 PROCEDURE — 77067 SCR MAMMO BI INCL CAD: CPT

## 2019-09-27 PROCEDURE — 77063 BREAST TOMOSYNTHESIS BI: CPT

## 2019-10-07 ENCOUNTER — TELEPHONE (OUTPATIENT)
Dept: OBGYN CLINIC | Facility: MEDICAL CENTER | Age: 44
End: 2019-10-07

## 2019-10-07 NOTE — TELEPHONE ENCOUNTER
See below  Please call pt and cancel all f/u appts  She may still be seen if she would like, but just know we CANNOT do visco injections on her at this time

## 2019-10-07 NOTE — TELEPHONE ENCOUNTER
Patient is not yet authorized for her Euflexxa injections  You are correct that she is not eligible until 10/26/19 however the insurance will not allow us to process the request sooner then the due date of next visco injections  This patients insurance requires authorization and needs to come from specialty pharmacy  The patient should have not been put on the schedule since we have no note in the chart for confirmation

## 2019-10-11 ENCOUNTER — OFFICE VISIT (OUTPATIENT)
Dept: OBGYN CLINIC | Facility: MEDICAL CENTER | Age: 44
End: 2019-10-11
Payer: COMMERCIAL

## 2019-10-11 VITALS
DIASTOLIC BLOOD PRESSURE: 71 MMHG | BODY MASS INDEX: 23.63 KG/M2 | SYSTOLIC BLOOD PRESSURE: 111 MMHG | HEIGHT: 66 IN | WEIGHT: 147 LBS | HEART RATE: 84 BPM

## 2019-10-11 DIAGNOSIS — M25.561 CHRONIC PAIN OF RIGHT KNEE: ICD-10-CM

## 2019-10-11 DIAGNOSIS — G89.29 CHRONIC PAIN OF RIGHT KNEE: ICD-10-CM

## 2019-10-11 DIAGNOSIS — M17.31 POST-TRAUMATIC OSTEOARTHRITIS OF RIGHT KNEE: Primary | ICD-10-CM

## 2019-10-11 PROCEDURE — 99213 OFFICE O/P EST LOW 20 MIN: CPT | Performed by: ORTHOPAEDIC SURGERY

## 2019-10-11 PROCEDURE — 20610 DRAIN/INJ JOINT/BURSA W/O US: CPT | Performed by: ORTHOPAEDIC SURGERY

## 2019-10-11 RX ORDER — BUPIVACAINE HYDROCHLORIDE 2.5 MG/ML
2 INJECTION, SOLUTION INFILTRATION; PERINEURAL
Status: COMPLETED | OUTPATIENT
Start: 2019-10-11 | End: 2019-10-11

## 2019-10-11 RX ORDER — LIDOCAINE HYDROCHLORIDE 10 MG/ML
2 INJECTION, SOLUTION INFILTRATION; PERINEURAL
Status: COMPLETED | OUTPATIENT
Start: 2019-10-11 | End: 2019-10-11

## 2019-10-11 RX ADMIN — BUPIVACAINE HYDROCHLORIDE 2 ML: 2.5 INJECTION, SOLUTION INFILTRATION; PERINEURAL at 14:43

## 2019-10-11 RX ADMIN — LIDOCAINE HYDROCHLORIDE 2 ML: 10 INJECTION, SOLUTION INFILTRATION; PERINEURAL at 14:43

## 2019-10-11 NOTE — PROGRESS NOTES
Assessment:   Diagnosis ICD-10-CM Associated Orders   1  Post-traumatic osteoarthritis of right knee M17 31 Large joint arthrocentesis: R knee     Injection procedure prior authorization   2  Chronic pain of right knee M25 561 Large joint arthrocentesis: R knee    G89 29 Injection procedure prior authorization       Plan:  Right knee known posttraumatic osteoarthritis  Patient continues to have pain status post 2 surgeries for fracture fixation as well as painful hardware removal   Recommendation is to continue with pain management  Due to insurance recommendations we are unable to initiate the viscosupplementation injections today however can initiate them in approximately 3 weeks  She was offered, accepted, performed an injection of lidocaine, Marcaine and Toradol for symptomatic relief  Patient tolerated procedure well  Ice and post injection protocol advised  Weightbearing activities as tolerated  We will Re-petition her insurance for Euflexxa for her next visit  To do next visit:  Return in about 3 weeks (around 11/1/2019) for re-check and visco #1 right knee  The above stated was discussed in layman's terms and the patient expressed understanding  All questions were answered to the patient's satisfaction  Scribe Attestation    I,:   Keisha Terrazas am acting as a scribe while in the presence of the attending physician :        I,:   Yevgeniy Grier MD personally performed the services described in this documentation    as scribed in my presence :              Subjective:   Danae Quintanilla is a 38 y o  female who presents repeat evaluation of her right knee known posttraumatic osteoarthritis  She has a history of proximal tibia ORIF with removal painful hardware  She continues to have pain  Nearly 6 months ago she completed the Euflexxa series with somewhat improvement of her symptoms    She was recently evaluated by pain management physician in which a nerve block was discussed however she states that she was told it would be denied by her insurance  Continues to have pain within minutes of weight-bearing activities  Her implore would not allow her to come back and accommodate her work restrictions  Continues to have varing pain at her knee on a daily basis        Review of systems negative unless otherwise specified in HPI    Past Medical History:   Diagnosis Date    Anemia     Arthritis     Depression     Edema     Fatigue     Vitamin B12 deficiency     Vitamin D deficiency        Past Surgical History:   Procedure Laterality Date    KNEE ARTHROSCOPY Bilateral     KNEE SURGERY      ORIF TIBIA FRACTURE Right 1/18/2018    Procedure: OPEN REDUCTION W/ INTERNAL FIXATION (ORIF) TIBIA;  Surgeon: Luis Covarrubias MD;  [facility];  Service: Orthopedics    ORIF TIBIAL PLATEAU Right 5/69/3334    Procedure: OPEN REDUCTION W/ INTERNAL FIXATION (ORIF) TIBIAL PLATEAU;  Surgeon: Luis Covarrubias MD;  [facility];  Service: Orthopedics    [place] REMOVAL DEEP IMPLANT Right 8/2/2018    Procedure: REMOVAL HARDWARE TIBIA;  Surgeon: Luis Covarrubias MD;  [facility];  Service: Orthopedics    TUBAL LIGATION         Family History   Problem Relation Age of Onset    Cancer Mother         either cervical or ovarian    Heart disease Mother         heart surgery    Heart attack Father         stent    Diabetes Father     Hypertension Father     Cancer Father 62        stomach cancer    Arthritis Family     Stomach cancer Family     Ovarian cancer Family     Rheum arthritis Daughter     Lupus Maternal Grandmother     Lupus Cousin     No Known Problems Maternal Grandfather     No Known Problems Paternal Grandmother     No Known Problems Paternal Grandfather     No Known Problems Daughter     No Known Problems Maternal Aunt     No Known Problems Maternal Aunt     No Known Problems Maternal Aunt     No Known Problems Paternal Aunt     Alcohol abuse Neg Hx     Depression Neg Hx     Drug abuse Neg Hx     Substance Abuse Neg Hx     Mental illness Neg Hx        Social History     Occupational History    Occupation: Teacher in [place]   Tobacco Use    Smoking status: Never Smoker    Smokeless tobacco: Never Used   Substance and Sexual Activity    Alcohol use: No     Comment: social drink sporatic    Drug use: No    Sexual activity: Yes         Current Outpatient Medications:     acetaminophen (TYLENOL) 325 mg tablet, 650 mg every 6 hours for mild pain, Disp: 30 tablet, Rfl: 0    amitriptyline (ELAVIL) 50 mg tablet, Take 1 tablet PO qHS for a week then take 2 tablets qHS  , Disp: 90 tablet, Rfl: 1    Cholecalciferol (VITAMIN D) 2000 units CAPS, Take 1 capsule (2,000 Units total) by mouth daily, Disp: 90 capsule, Rfl: 1    cyanocobalamin 1,000 mcg/mL, Inject 1 mL (1,000 mcg total) into a muscle every 30 (thirty) days, Disp: 10 mL, Rfl: 1    ergocalciferol (VITAMIN D2) 50,000 units, Take 1 capsule (50,000 Units total) by mouth once a week, Disp: 12 capsule, Rfl: 0    gabapentin (NEURONTIN) 100 mg capsule, Take 1 capsule (100 mg total) by mouth 3 (three) times a day, Disp: 90 capsule, Rfl: 0    meloxicam (MOBIC) 15 mg tablet, Take 1 tablet (15 mg total) by mouth daily, Disp: 30 tablet, Rfl: 1    nabumetone (RELAFEN) 750 mg tablet, Take 1 tablet (750 mg total) by mouth 2 (two) times a day, Disp: 60 tablet, Rfl: 1    naproxen (NAPROSYN) 375 mg tablet, Take 250 mg by mouth 2 (two) times a day with meals, Disp: , Rfl:     oxyCODONE (ROXICODONE) 5 mg immediate release tablet, Take 1 tablet (5 mg total) by mouth every 8 (eight) hours as needed for severe painMax Daily Amount: 15 mg, Disp: 60 tablet, Rfl: 0    SYRINGE-NEEDLE, DISP, 3 ML 25G X 1" 3 ML MISC, by Does not apply route every 30 (thirty) days, Disp: 50 each, Rfl: 0    No Known Allergies         Vitals:    10/11/19 1422   BP: 111/71   Pulse: 84       Objective:                    Right Knee Exam     Muscle Strength   The patient has normal right knee strength  Tenderness   The patient is experiencing tenderness in the lateral joint line (mild diffuse tenderness anteriorly)  Range of Motion   The patient has normal right knee ROM  Right knee flexion: with crepitus  Other   Erythema: absent  Sensation: normal  Swelling: mild  Effusion: no effusion present    Comments:    Mild valgus alignment  Healed incisions  Intact extensor mechanism  Calf and thigh are soft and non-tender without signs of DVT             Diagnostics, reviewed and taken today if performed as documented:    None performed          Procedures, if performed today:    Large joint arthrocentesis: R knee  Date/Time: 10/11/2019 2:43 PM  Consent given by: patient  Site marked: site marked  Supporting Documentation  Indications: pain   Procedure Details  Location: knee - R knee  Preparation: Patient was prepped and draped in the usual sterile fashion  Needle size: 22 G  Ultrasound guidance: no  Approach: anteromedial  Medications administered: 2 mL bupivacaine 0 25 %; 2 mL lidocaine 1 % (and 2mLs of Toradol)    Patient tolerance: patient tolerated the procedure well with no immediate complications  Dressing:  Sterile dressing applied             Portions of the record may have been created with voice recognition software  Occasional wrong word or "sound a like" substitutions may have occurred due to the inherent limitations of voice recognition software  Read the chart carefully and recognize, using context, where substitutions have occurred

## 2019-10-16 ENCOUNTER — HOSPITAL ENCOUNTER (OUTPATIENT)
Dept: MAMMOGRAPHY | Facility: CLINIC | Age: 44
Discharge: HOME | End: 2019-10-16
Payer: COMMERCIAL

## 2019-10-16 ENCOUNTER — HOSPITAL ENCOUNTER (OUTPATIENT)
Dept: ULTRASOUND IMAGING | Facility: CLINIC | Age: 44
Discharge: HOME | End: 2019-10-16
Payer: COMMERCIAL

## 2019-10-16 DIAGNOSIS — R92.8 ABNORMAL MAMMOGRAM: ICD-10-CM

## 2019-10-16 PROCEDURE — 76642 ULTRASOUND BREAST LIMITED: CPT

## 2019-10-16 PROCEDURE — G0279 TOMOSYNTHESIS, MAMMO: HCPCS

## 2019-10-16 PROCEDURE — 77065 DX MAMMO INCL CAD UNI: CPT

## 2019-10-16 NOTE — PROGRESS NOTES
Met with patient and Dr Renea Thompson                 regarding recommendation for;      _____ RIGHT ___x___LEFT      __x___Ultrasound guided  ______Stereotactic  Breast biopsy  __x___Verbalized understanding        Blood thinners:  _____yes ___x__no    Date stopped: _____n/a______    Biopsy teaching sheet given:  ____x___yes ______no

## 2019-10-24 ENCOUNTER — HOSPITAL ENCOUNTER (OUTPATIENT)
Dept: ULTRASOUND IMAGING | Facility: CLINIC | Age: 44
Discharge: HOME | End: 2019-10-24
Payer: COMMERCIAL

## 2019-10-24 ENCOUNTER — HOSPITAL ENCOUNTER (OUTPATIENT)
Dept: MAMMOGRAPHY | Facility: CLINIC | Age: 44
Discharge: HOME | End: 2019-10-24

## 2019-10-24 VITALS
WEIGHT: 147 LBS | SYSTOLIC BLOOD PRESSURE: 130 MMHG | BODY MASS INDEX: 23.73 KG/M2 | HEART RATE: 84 BPM | DIASTOLIC BLOOD PRESSURE: 80 MMHG

## 2019-10-24 DIAGNOSIS — R92.8 ABNORMAL MAMMOGRAM: ICD-10-CM

## 2019-10-24 DIAGNOSIS — R92.8 ABNORMAL ULTRASOUND OF BREAST: ICD-10-CM

## 2019-10-24 PROCEDURE — 88305 TISSUE EXAM BY PATHOLOGIST: CPT | Performed by: PATHOLOGY

## 2019-10-24 PROCEDURE — 19083 BX BREAST 1ST LESION US IMAG: CPT

## 2019-10-24 RX ORDER — LIDOCAINE HYDROCHLORIDE 10 MG/ML
4 INJECTION, SOLUTION INFILTRATION; PERINEURAL ONCE
Status: COMPLETED | OUTPATIENT
Start: 2019-10-24 | End: 2019-10-24

## 2019-10-24 RX ADMIN — LIDOCAINE HYDROCHLORIDE 4 ML: 10 INJECTION, SOLUTION INFILTRATION; PERINEURAL at 10:41

## 2019-10-24 NOTE — DISCHARGE INSTR - OTHER ORDERS
POST LARGE CORE BREAST BIOPSY PATIENT INFORMATION      1  Place an ice pack inside your bra over the top of the dressing every hour for 20 minutes (20 minutes on, 60 minutes off)  Do this until bedtime  2  Do not shower or bathe until the following morning  3  You may bathe your breast carefully with the steri-strips in place  Be careful    Not to loosen them  The steri-strips will fall off in 3-5 days  4  You may have mild discomfort, and you may have some bruising where the   Needle entered the skin  This should clear within 5-7 days  5  If you need medicine for discomfort, take acetaminophen products such as   Tylenol  You may also take Advil or Motrin products  6  Do not participate in strenuous activities such as-tennis, aerobics, skiing,  Weight lifting, etc  for 24 hours  Refrain from swimming/soaking for 72 hours  7  Wearing a bra for sleeping may be more comfortable for the first 24-48 hours  8  Watch for continued bleeding, pain or fever over 101; please call with any questions or concerns  For procedures done at the \[facility]\""  [facility] L[facility] call:  Edward Hyatt RN at [phone]  Salvador Tang RN at [phone]                    *After 4 PM call the Interventional Radiology Department                    [phone] and ask to speak with the nurse on call  For procedures done at the [address] [place] [address] call:         Isabel Green RN at   *After 4 PM call the Interventional Radiology Department   [phone] and ask to speak with the nurse on call  For procedures done at [address] [place] [address] call: The Radiology Nurse at [phone]  *After 4 PM call your physician, or go to the Emergency Department  9          The final results of your biopsy are usually available within one week

## 2019-10-24 NOTE — PROGRESS NOTES
Ice pack given:    ____X_yes _____no    Discharge instructions signed by patient:    __X___yes _____no    Discharge instructions given to patient:    __X___yes _____no    Discharged via:    __X___amulatory    _____wheelchair    _____stretcher    Stable on discharge:    ___X__yes ____no

## 2019-10-24 NOTE — PROGRESS NOTES
Procedure type:    __X___ultrasound guided _____stereotactic    Breast:    ___X__Left _____Right    Location: 9 o'clock 5cm fn    Needle: 12ga Maddie    # of passes: 3    Clip: Heart (Ultra clip)    Performed by: Dr Bradley Quiet held for 5 minutes by:  Ricke Gowers Mendez(PCA)    Steri Strips:    __x___yes _____no    Band aid:    __x___yes_____no    Tape and guaze:    _____yes _____no    Tolerated procedure:    __x___yes _____no

## 2019-10-25 ENCOUNTER — TELEPHONE (OUTPATIENT)
Dept: MAMMOGRAPHY | Facility: CLINIC | Age: 44
End: 2019-10-25

## 2019-10-25 NOTE — PROGRESS NOTES
Post procedure call completed on 10/25/19 @ 0949    Bleeding: _____yes __x___no    Pain: _____yes ___x___no    Redness/Swelling: __x____yes ______no    Band aid removed: __x___yes _____no    Steri-Strips intact: ___x___yes _____no  Pt states her left breast is a little sore and a little swollen

## 2019-10-29 PROBLEM — Z01.419 WELL WOMAN EXAM: Status: ACTIVE | Noted: 2019-10-29

## 2019-10-29 NOTE — PROGRESS NOTES
Assessment/Plan   Diagnoses and all orders for this visit:    Well woman exam  -     Liquid-based pap, screening    Hematuria, unspecified type  -     nitrofurantoin (MACROBID) 100 mg capsule; Take 1 capsule (100 mg total) by mouth 2 (two) times a day  -     Urine culture; Future    Fibroids  -     US pelvis complete w transvaginal; Future    Screening examination for STD (sexually transmitted disease)  -     Hepatitis B surface antigen; Future  -     Hepatitis C antibody; Future  -     HIV 1/2 AG-AB combo; Future  -     RPR; Future  -     Chlamydia/GC amplified DNA by PCR  -     Trichomonas Vaginalis, EMMA    Vaginal discharge  -     Chlamydia/GC amplified DNA by PCR  -     Trichomonas Vaginalis, EMMA  -     Genital Comprehensive Culture        Discussion    All questions have been answered to her satisfaction  RTO for APE or sooner if needed      Subjective     Pt for annual GYN exam  Periods get do come monthly but aren't always regular  Will occasionally get a potty d/c that lasts a few days after her period  Last 5-7 days  Pt does have h/o fibroids  Had D&C and "possible ablation" approx 2015, pt unsure what she had done  Periods have been better and more tolerable since  Pt is sexually active and desires full STD work up  She does c/o constant d/c and recurrent yeast She is having some d/c now but no itching or irritation or current yeast sx  No STD history that she is aware of  She has had multiple abnormal paps and has had many colpos  She had a very bad experience once with a lot of bleeding after a colpo and she is very nervous about needing another one  Last pap was approx 2 years ago and was abnormal    Pt alsow complaints of cloudy urine w an odor  She had this previously and it quickly progressed intp pyelonephritis  Urine in office + leuks/nitrites and blood  Will tx w Macrobid and send out urine        Tianna Vines is a 38 y o  female who presents for annual well woman exam    LMP -10/16/19   No vulvar itch/burn; No vaginal itch/burn; No abn discharge or odor; No urinary sx - burning/pain/frequency/hematuria  (+) SBEs - no breast masses, asymmetry, nipple discharge or bleeding, changes in skin of breast, or breast tenderness bilaterally  Had recent left breast stereotactic breast biopsy  No abd/pelvic pain or HAs; No menopausal symptoms: No hot flashes/night sweats, problems with intercourse, vaginal dryness; sleeping well  Pt is sexually active in a mutually monog/ sexual relationship x9 mths although her partner has other partners; No issues with intercourse; She desires std/hiv/hep testing; Feels safe at home  Contraception: TL  (+) PCP for routine Bw/care; Last Pap - ish   History of abnormal Pap smear: h/o ASCUS cant r/o HGSIL, unsure if she ever had LEEP  History of abnormal mammogram: 10/16/19 had sterotactic bx that was fibrocystic/benign      Review of Systems   Constitutional: Negative  Respiratory: Negative  Gastrointestinal: Negative  Endocrine: Negative  Genitourinary: Negative  Neurological: Positive for dizziness and headaches  Psychiatric/Behavioral: Positive for decreased concentration  The patient is nervous/anxious          The following portions of the patient's history were reviewed and updated as appropriate: allergies, current medications, past family history, past medical history, past social history, past surgical history and problem list          OB History        6    Para   3    Term   1       2    AB   3    Living           SAB        TAB   3    Ectopic        Multiple        Live Births               Obstetric Comments   Surgical VIp x 3  2 early deliveries, one w cerclage placed during final pregnancy             Past Medical History:   Diagnosis Date    Anemia     Arthritis     Depression     Edema     Fatigue     Vitamin B12 deficiency     Vitamin D deficiency        Past Surgical History:   Procedure Laterality Date    KNEE ARTHROSCOPY Bilateral     KNEE SURGERY      ORIF TIBIA FRACTURE Right 1/18/2018    Procedure: OPEN REDUCTION W/ INTERNAL FIXATION (ORIF) TIBIA;  Surgeon: Vena Blizzard, MD;  [facility];  Service: Orthopedics    ORIF TIBIAL PLATEAU Right 0/50/6823    Procedure: OPEN REDUCTION W/ INTERNAL FIXATION (ORIF) TIBIAL PLATEAU;  Surgeon: Vena Blizzard, MD;  [facility];  Service: Orthopedics    [place] REMOVAL DEEP IMPLANT Right 8/2/2018    Procedure: REMOVAL HARDWARE TIBIA;  Surgeon: Vena Blizzard, MD;  [facility];  Service: Orthopedics    TUBAL LIGATION      US GUIDED BREAST BIOPSY LEFT COMPLETE Left 10/24/2019       Family History   Problem Relation Age of Onset    Cancer Mother         either cervical or ovarian    Heart disease Mother         heart surgery    Heart attack Father         stent    Diabetes Father     Hypertension Father     Cancer Father 62        stomach cancer    Arthritis Family     Stomach cancer Family     Ovarian cancer Family     Rheum arthritis Daughter     Lupus Maternal Grandmother     Lupus Cousin     No Known Problems Maternal Grandfather     No Known Problems Paternal Grandmother     No Known Problems Paternal Grandfather     No Known Problems Daughter     No Known Problems Maternal Aunt     No Known Problems Maternal Aunt     No Known Problems Maternal Aunt     No Known Problems Paternal Aunt     Alcohol abuse Neg Hx     Depression Neg Hx     Drug abuse Neg Hx     Substance Abuse Neg Hx     Mental illness Neg Hx        Social History     Socioeconomic History    Marital status:      Spouse name: Not on file    Number of children: 1    Years of education: Not on file    Highest education level: Not on file   Occupational History    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[place] [address] resource strain: Not on file    Food insecurity:     Worry: Not on file     Inability: Not on file   Crystal Garay Transportation needs:     Medical: Not on file     Non-medical: Not on file   Tobacco Use    Smoking status: Never Smoker    Smokeless tobacco: Never Used   Substance and Sexual Activity    Alcohol use: No     Comment: social drink sporatic    Drug use: No    Sexual activity: Yes   Lifestyle    Physical activity:     Days per week: Not on file     Minutes per session: Not on file    Stress: Not on file   Relationships    Social connections:     Talks on phone: Not on file     Gets together: Not on file     Attends [employer] service: Not on file     Active member of club or organization: Not on file     Attends meetings of clubs or organizations: Not on file     Relationship status: Not on file    Intimate partner violence:     Fear of current or ex partner: Not on file     Emotionally abused: Not on file     Physically abused: Not on file     Forced sexual activity: Not on file   Other Topics Concern    Not on file   Social History Narrative    Single    Daughter, grand daughter and son lives with her         Current Outpatient Medications:     acetaminophen (TYLENOL) 325 mg tablet, 650 mg every 6 hours for mild pain, Disp: 30 tablet, Rfl: 0    amitriptyline (ELAVIL) 50 mg tablet, Take 1 tablet PO qHS for a week then take 2 tablets qHS  , Disp: 90 tablet, Rfl: 1    Cholecalciferol (VITAMIN D) 2000 units CAPS, Take 1 capsule (2,000 Units total) by mouth daily, Disp: 90 capsule, Rfl: 1    cyanocobalamin 1,000 mcg/mL, Inject 1 mL (1,000 mcg total) into a muscle every 30 (thirty) days, Disp: 10 mL, Rfl: 1    ergocalciferol (VITAMIN D2) 50,000 units, Take 1 capsule (50,000 Units total) by mouth once a week, Disp: 12 capsule, Rfl: 0    gabapentin (NEURONTIN) 100 mg capsule, Take 1 capsule (100 mg total) by mouth 3 (three) times a day, Disp: 90 capsule, Rfl: 0    meloxicam (MOBIC) 15 mg tablet, Take 1 tablet (15 mg total) by mouth daily, Disp: 30 tablet, Rfl: 1    nabumetone (RELAFEN) 750 mg tablet, Take 1 tablet (750 mg total) by mouth 2 (two) times a day, Disp: 60 tablet, Rfl: 1    naproxen (NAPROSYN) 375 mg tablet, Take 250 mg by mouth 2 (two) times a day with meals, Disp: , Rfl:     nitrofurantoin (MACROBID) 100 mg capsule, Take 1 capsule (100 mg total) by mouth 2 (two) times a day, Disp: 10 capsule, Rfl: 0    oxyCODONE (ROXICODONE) 5 mg immediate release tablet, Take 1 tablet (5 mg total) by mouth every 8 (eight) hours as needed for severe painMax Daily Amount: 15 mg, Disp: 60 tablet, Rfl: 0    SYRINGE-NEEDLE, DISP, 3 ML 25G X 1" 3 ML MISC, by Does not apply route every 30 (thirty) days, Disp: 50 each, Rfl: 0    No Known Allergies    Objective   Vitals:    10/30/19 0853   BP: 110/76   BP Location: Left arm   Patient Position: Sitting   Cuff Size: Standard   Weight: 66 5 kg (146 lb 9 6 oz)   Height: 5' 6" (1 676 m)     Physical Exam   Constitutional: She is oriented to person, place, and time  She appears well-developed and well-nourished  HENT:   Head: Normocephalic and atraumatic  Cardiovascular: Normal rate and regular rhythm  Pulmonary/Chest: Effort normal and breath sounds normal  Right breast exhibits no inverted nipple, no mass, no nipple discharge, no skin change and no tenderness  Left breast exhibits no inverted nipple, no mass, no nipple discharge, no skin change and no tenderness  Breasts are symmetrical    Abdominal: Soft  She exhibits no distension and no mass  There is no rebound and no guarding  Genitourinary: Vagina normal and uterus normal  No vaginal discharge found  Neurological: She is alert and oriented to person, place, and time  Skin: Skin is warm and dry  Psychiatric: She has a normal mood and affect  Patient Instructions   Pap and cx's done  Slip written for BW to screen HIV/Hep B/C RPR  Rx sent in for Macrobid  F/u 1 year, earlier as needed

## 2019-10-30 ENCOUNTER — OFFICE VISIT (OUTPATIENT)
Dept: OBGYN CLINIC | Facility: CLINIC | Age: 44
End: 2019-10-30
Payer: COMMERCIAL

## 2019-10-30 VITALS
DIASTOLIC BLOOD PRESSURE: 76 MMHG | HEIGHT: 66 IN | BODY MASS INDEX: 23.56 KG/M2 | SYSTOLIC BLOOD PRESSURE: 110 MMHG | WEIGHT: 146.6 LBS

## 2019-10-30 DIAGNOSIS — N89.8 VAGINAL DISCHARGE: ICD-10-CM

## 2019-10-30 DIAGNOSIS — R31.9 HEMATURIA, UNSPECIFIED TYPE: ICD-10-CM

## 2019-10-30 DIAGNOSIS — Z11.3 SCREENING EXAMINATION FOR STD (SEXUALLY TRANSMITTED DISEASE): ICD-10-CM

## 2019-10-30 DIAGNOSIS — D21.9 FIBROIDS: ICD-10-CM

## 2019-10-30 DIAGNOSIS — Z01.419 WELL WOMAN EXAM: Primary | ICD-10-CM

## 2019-10-30 LAB
SL AMB  POCT GLUCOSE, UA: ABNORMAL
SL AMB LEUKOCYTE ESTERASE,UA: 125
SL AMB POCT BILIRUBIN,UA: 1
SL AMB POCT BLOOD,UA: 200
SL AMB POCT CLARITY,UA: ABNORMAL
SL AMB POCT COLOR,UA: ABNORMAL
SL AMB POCT KETONES,UA: 5
SL AMB POCT NITRITE,UA: ABNORMAL
SL AMB POCT PH,UA: 6
SL AMB POCT SPECIFIC GRAVITY,UA: 1.03
SL AMB POCT URINE PROTEIN: ABNORMAL
SL AMB POCT UROBILINOGEN: 1

## 2019-10-30 PROCEDURE — 87624 HPV HI-RISK TYP POOLED RSLT: CPT | Performed by: PHYSICIAN ASSISTANT

## 2019-10-30 PROCEDURE — 81002 URINALYSIS NONAUTO W/O SCOPE: CPT | Performed by: PHYSICIAN ASSISTANT

## 2019-10-30 PROCEDURE — 87591 N.GONORRHOEAE DNA AMP PROB: CPT | Performed by: PHYSICIAN ASSISTANT

## 2019-10-30 PROCEDURE — G0145 SCR C/V CYTO,THINLAYER,RESCR: HCPCS | Performed by: PHYSICIAN ASSISTANT

## 2019-10-30 PROCEDURE — 87661 TRICHOMONAS VAGINALIS AMPLIF: CPT | Performed by: PHYSICIAN ASSISTANT

## 2019-10-30 PROCEDURE — 87186 SC STD MICRODIL/AGAR DIL: CPT | Performed by: PHYSICIAN ASSISTANT

## 2019-10-30 PROCEDURE — 87070 CULTURE OTHR SPECIMN AEROBIC: CPT | Performed by: PHYSICIAN ASSISTANT

## 2019-10-30 PROCEDURE — 99386 PREV VISIT NEW AGE 40-64: CPT | Performed by: PHYSICIAN ASSISTANT

## 2019-10-30 PROCEDURE — 87491 CHLMYD TRACH DNA AMP PROBE: CPT | Performed by: PHYSICIAN ASSISTANT

## 2019-10-30 PROCEDURE — 87077 CULTURE AEROBIC IDENTIFY: CPT | Performed by: PHYSICIAN ASSISTANT

## 2019-10-30 PROCEDURE — 87086 URINE CULTURE/COLONY COUNT: CPT | Performed by: PHYSICIAN ASSISTANT

## 2019-10-30 RX ORDER — NITROFURANTOIN 25; 75 MG/1; MG/1
100 CAPSULE ORAL 2 TIMES DAILY
Qty: 10 CAPSULE | Refills: 0 | Status: SHIPPED | OUTPATIENT
Start: 2019-10-30 | End: 2020-02-07 | Stop reason: ALTCHOICE

## 2019-10-30 NOTE — PATIENT INSTRUCTIONS
Pap and cx's done  Slip written for BW to screen HIV/Hep B/C RPR  Rx sent in for Macrobid  F/u 1 year, earlier as needed

## 2019-10-31 ENCOUNTER — APPOINTMENT (OUTPATIENT)
Dept: LAB | Facility: CLINIC | Age: 44
End: 2019-10-31
Payer: COMMERCIAL

## 2019-10-31 DIAGNOSIS — Z11.3 SCREENING EXAMINATION FOR STD (SEXUALLY TRANSMITTED DISEASE): ICD-10-CM

## 2019-10-31 LAB
HPV HR 12 DNA CVX QL NAA+PROBE: NEGATIVE
HPV16 DNA CVX QL NAA+PROBE: NEGATIVE
HPV18 DNA CVX QL NAA+PROBE: NEGATIVE

## 2019-10-31 PROCEDURE — 86803 HEPATITIS C AB TEST: CPT

## 2019-10-31 PROCEDURE — 86592 SYPHILIS TEST NON-TREP QUAL: CPT

## 2019-10-31 PROCEDURE — 36415 COLL VENOUS BLD VENIPUNCTURE: CPT

## 2019-10-31 PROCEDURE — 87389 HIV-1 AG W/HIV-1&-2 AB AG IA: CPT

## 2019-10-31 PROCEDURE — 87340 HEPATITIS B SURFACE AG IA: CPT

## 2019-11-01 ENCOUNTER — TELEPHONE (OUTPATIENT)
Dept: INTERNAL MEDICINE CLINIC | Facility: CLINIC | Age: 44
End: 2019-11-01

## 2019-11-01 LAB
BACTERIA UR CULT: ABNORMAL
C TRACH DNA SPEC QL NAA+PROBE: NEGATIVE
HBV SURFACE AG SER QL: NORMAL
HCV AB SER QL: NORMAL
HIV 1+2 AB+HIV1 P24 AG SERPL QL IA: NORMAL
N GONORRHOEA DNA SPEC QL NAA+PROBE: NEGATIVE
RPR SER QL: NORMAL

## 2019-11-01 NOTE — TELEPHONE ENCOUNTER
Patient states she did ask to work 3 days a week 4 hours a day in other positions and closer but they denied her   That is why she needs the disability forms completed

## 2019-11-01 NOTE — TELEPHONE ENCOUNTER
Please call patient, completing disability form    Are you not able to get another position/job to work? Or work somewhere closer?

## 2019-11-02 LAB
BACTERIA GENITAL AEROBE CULT: NORMAL
T VAGINALIS RRNA SPEC QL NAA+PROBE: NEGATIVE

## 2019-11-04 ENCOUNTER — TELEPHONE (OUTPATIENT)
Dept: INTERNAL MEDICINE CLINIC | Facility: CLINIC | Age: 44
End: 2019-11-04

## 2019-11-04 NOTE — TELEPHONE ENCOUNTER
Left message letting patient know form is complete and ready for   Will be a fee of $15 00  Form at

## 2019-11-06 ENCOUNTER — HOSPITAL ENCOUNTER (OUTPATIENT)
Dept: ULTRASOUND IMAGING | Facility: HOSPITAL | Age: 44
Discharge: HOME | End: 2019-11-06
Payer: COMMERCIAL

## 2019-11-06 ENCOUNTER — HOSPITAL ENCOUNTER (EMERGENCY)
Facility: HOSPITAL | Age: 44
Discharge: HOME | End: 2019-11-06
Attending: EMERGENCY MEDICINE
Payer: COMMERCIAL

## 2019-11-06 VITALS
TEMPERATURE: 98.3 F | OXYGEN SATURATION: 100 % | SYSTOLIC BLOOD PRESSURE: 132 MMHG | HEART RATE: 79 BPM | DIASTOLIC BLOOD PRESSURE: 84 MMHG | RESPIRATION RATE: 18 BRPM

## 2019-11-06 DIAGNOSIS — D21.9 FIBROIDS: ICD-10-CM

## 2019-11-06 DIAGNOSIS — F41.9 ANXIETY: Primary | ICD-10-CM

## 2019-11-06 DIAGNOSIS — F32.A DEPRESSION: ICD-10-CM

## 2019-11-06 LAB
LAB AP GYN PRIMARY INTERPRETATION: NORMAL
Lab: NORMAL

## 2019-11-06 PROCEDURE — 76830 TRANSVAGINAL US NON-OB: CPT

## 2019-11-06 PROCEDURE — 99284 EMERGENCY DEPT VISIT MOD MDM: CPT | Performed by: EMERGENCY MEDICINE

## 2019-11-06 PROCEDURE — 76856 US EXAM PELVIC COMPLETE: CPT

## 2019-11-06 PROCEDURE — 99284 EMERGENCY DEPT VISIT MOD MDM: CPT
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
## 2019-11-15 ENCOUNTER — TELEPHONE (OUTPATIENT)
Dept: OBGYN CLINIC | Facility: HOSPITAL | Age: 44
End: 2019-11-15

## 2019-11-15 NOTE — TELEPHONE ENCOUNTER
Patient sees Dr Felicity Beasley  She is checking on the status of her disability paperwork        She be reached at [phone]

## 2019-11-19 NOTE — TELEPHONE ENCOUNTER
Advised per office, disability forms mailed 11/4 to division of pension and benefits  She would like a copy also mailed to address on file

## 2019-11-20 DIAGNOSIS — M17.31 POST-TRAUMATIC OSTEOARTHRITIS OF RIGHT KNEE: ICD-10-CM

## 2019-11-21 RX ORDER — MELOXICAM 15 MG/1
TABLET ORAL
Qty: 30 TABLET | Refills: 1 | Status: SHIPPED | OUTPATIENT
Start: 2019-11-21 | End: 2021-04-05

## 2019-11-27 ENCOUNTER — TELEPHONE (OUTPATIENT)
Dept: OBGYN CLINIC | Facility: HOSPITAL | Age: 44
End: 2019-11-27

## 2019-11-27 NOTE — TELEPHONE ENCOUNTER
Noah Jaquez TO BE COMPLETED BY CENTRAL AUTH TEAM:     Physician: DR Kenia Lo     Medication: Joyce Lewis    Number of Injections in Series (Appointments scheduled 1 week apart from one another): 3    Schedule after this date: 11/29/19    Billing Info: Buy and Bill/Specialty Pharmacy-Patient Supply>> DNB/PHARMACY    Appointment Message Line: RIGHT KNEE Joyce Lewis #1,2,3 DNB/PHARMACY  (please copy and paste appointment message line when scheduling appointment)    Additional Comments: INJECTIONS SENT TO [facility] OFFICE

## 2019-11-29 ENCOUNTER — OFFICE VISIT (OUTPATIENT)
Dept: INTERNAL MEDICINE CLINIC | Facility: CLINIC | Age: 44
End: 2019-11-29
Payer: COMMERCIAL

## 2019-11-29 VITALS
HEIGHT: 66 IN | OXYGEN SATURATION: 96 % | HEART RATE: 78 BPM | SYSTOLIC BLOOD PRESSURE: 122 MMHG | RESPIRATION RATE: 18 BRPM | DIASTOLIC BLOOD PRESSURE: 80 MMHG | WEIGHT: 145.8 LBS | BODY MASS INDEX: 23.43 KG/M2 | TEMPERATURE: 98.4 F

## 2019-11-29 DIAGNOSIS — F41.9 ANXIETY: Primary | ICD-10-CM

## 2019-11-29 DIAGNOSIS — M17.31 POST-TRAUMATIC OSTEOARTHRITIS OF RIGHT KNEE: ICD-10-CM

## 2019-11-29 DIAGNOSIS — E55.9 VITAMIN D DEFICIENCY: ICD-10-CM

## 2019-11-29 DIAGNOSIS — M79.604 LEG PAIN, RIGHT: ICD-10-CM

## 2019-11-29 DIAGNOSIS — F33.1 MODERATE EPISODE OF RECURRENT MAJOR DEPRESSIVE DISORDER (HCC): ICD-10-CM

## 2019-11-29 DIAGNOSIS — E53.8 VITAMIN B12 DEFICIENCY: ICD-10-CM

## 2019-11-29 PROCEDURE — 99214 OFFICE O/P EST MOD 30 MIN: CPT | Performed by: INTERNAL MEDICINE

## 2019-11-29 PROCEDURE — 3008F BODY MASS INDEX DOCD: CPT | Performed by: INTERNAL MEDICINE

## 2019-11-29 PROCEDURE — 1036F TOBACCO NON-USER: CPT | Performed by: INTERNAL MEDICINE

## 2019-11-29 RX ORDER — BUSPIRONE HYDROCHLORIDE 5 MG/1
5 TABLET ORAL 2 TIMES DAILY PRN
Qty: 60 TABLET | Refills: 1 | Status: SHIPPED | OUTPATIENT
Start: 2019-11-29 | End: 2020-02-07 | Stop reason: SDUPTHER

## 2019-11-29 RX ORDER — MULTIVIT-MIN/IRON/FOLIC ACID/K 18-600-40
1 CAPSULE ORAL DAILY
Qty: 90 CAPSULE | Refills: 1 | Status: SHIPPED | OUTPATIENT
Start: 2019-11-29 | End: 2019-11-29 | Stop reason: ALTCHOICE

## 2019-11-29 RX ORDER — AMITRIPTYLINE HYDROCHLORIDE 50 MG/1
TABLET, FILM COATED ORAL
Qty: 90 TABLET | Refills: 1 | Status: SHIPPED | OUTPATIENT
Start: 2019-11-29 | End: 2020-02-07 | Stop reason: SDUPTHER

## 2019-11-29 NOTE — PROGRESS NOTES
Assessment/Plan: Moderate episode of recurrent major depressive disorder (HCC)  Stable, on amitriptyline only  Anxiety  Episodes of anxiety, add buspirone to take prn  Now seeing a counselor q2 weeks  Post-traumatic osteoarthritis of right knee  Schedule injections  Follow up with Ortho  S/P ORIF (open reduction internal fixation) fracture  Restart physical therapy  Vitamin B12 deficiency  On monthly B12 injections  Vitamin D deficiency  On daily D3  Diagnoses and all orders for this visit:    Anxiety  -     busPIRone (BUSPAR) 5 mg tablet; Take 1 tablet (5 mg total) by mouth 2 (two) times a day as needed (anxiety)    Moderate episode of recurrent major depressive disorder (HCC)  -     amitriptyline (ELAVIL) 50 mg tablet; Take 1 tablet PO qHS for a week then take 2 tablets qHS  -     TSH, 3rd generation with Free T4 reflex; Future  -     Basic metabolic panel; Future    Leg pain, right  -     amitriptyline (ELAVIL) 50 mg tablet; Take 1 tablet PO qHS for a week then take 2 tablets qHS  -     Ambulatory referral to Physical Therapy; Future    Vitamin D deficiency  -     Discontinue: Cholecalciferol (VITAMIN D) 50 MCG (2000 UT) CAPS; Take 1 capsule (2,000 Units total) by mouth daily  -     Vitamin D 25 hydroxy; Future    Vitamin B12 deficiency  -     Vitamin B12; Future    Post-traumatic osteoarthritis of right knee  -     Ambulatory referral to Physical Therapy; Future      Follow up in 2 months or as needed  Subjective:      Patient ID: Danae Quintanilla is a 38 y o  female  [place] reports having a bad day earlier this month, was at the ER  She has been calling several places to see a counselor, has not heard back  She did go to the emergency room, as recommended by behavioral health, due to increased anxiety  There, she was referred to a counselor  She started a few weeks ago, has seen them twice  She feels it is a start    She does get anxious and would experience panic attacks when she thinks about driving and work  She has submitted her disability papers  She would like to resume physical therapy for her leg and knee  She is waiting for Ortho to call regarding her knee injections  She gives herself a B12 injection monthly, has been taking her vitamin D daily  The following portions of the patient's history were reviewed and updated as appropriate: allergies, current medications, past medical history, past social history and problem list     Review of Systems   Constitutional: Negative for activity change and appetite change  Respiratory: Negative for cough  Cardiovascular: Negative for chest pain and palpitations  Gastrointestinal: Negative for constipation  Genitourinary: Negative for dysuria  Musculoskeletal: Positive for arthralgias  Negative for back pain and gait problem  Neurological: Negative for dizziness and weakness  Psychiatric/Behavioral: Negative for behavioral problems, confusion, decreased concentration, dysphoric mood, self-injury, sleep disturbance and suicidal ideas  The patient is nervous/anxious  Objective:      /80   Pulse 78   Temp 98 4 °F (36 9 °C)   Resp 18   Ht 5' 6" (1 676 m)   Wt 66 1 kg (145 lb 12 8 oz)   SpO2 96%   BMI 23 53 kg/m²          Physical Exam   Constitutional: She is oriented to person, place, and time  She appears well-developed and well-nourished  HENT:   Head: Normocephalic and atraumatic  Eyes: Pupils are equal, round, and reactive to light  Cardiovascular: Normal rate, regular rhythm and normal heart sounds  Pulmonary/Chest: Effort normal and breath sounds normal  She has no wheezes  Abdominal: Soft  Bowel sounds are normal    Musculoskeletal: She exhibits no edema  Neurological: She is alert and oriented to person, place, and time  Skin: Skin is warm  Psychiatric: She has a normal mood and affect  Her behavior is normal    Nursing note and vitals reviewed  Lab &/or imaging results reviewed with patient

## 2019-12-12 NOTE — TELEPHONE ENCOUNTER
Patient left a message on the ortho voicemail stating that she has not heard from anyone in reference to RS the appointments we canceled  I was unable to get the patient scheduled in a time frame she desired  I forwarded her request to Margarita Ponce via email for assistance in timely scheduling

## 2020-01-01 NOTE — ASSESSMENT & PLAN NOTE
- OR on 1/18 with ortho This note was copied from the mother's chart.  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       Past Surgical History:   Procedure Laterality Date   •  section, low transverse      Indication: triplets   •  section, low transverse      Indication: elective RLTCS   • Colposcopy,loop electrd cervix excis        OB History    Para Term  AB Living   3 3 2 1 0 5   SAB TAB Ectopic Molar Multiple Live Births   0 0 0 0 1 5      Current Facility-Administered Medications   Medication Dose Route Frequency Provider Last Rate Last Dose   • naLOXone (NARCAN) injection 0.1 mg  0.1 mg Intravenous PRN Bill Radford MD       • measles-mumps-rubella vaccine 0.5 mL  0.5 mL Subcutaneous Once Varsha Rainey MD       • varicella virus (VARIVAX) live vaccine 0.5 mL  0.5 mL Subcutaneous Once Varsha Rainey MD       • diphtheria-pertussis (acellular)-tetanus (BOOSTRIX) 5-2.5-18.5 LF-MCG/0.5 vaccine 0.5 mL  0.5 mL Intramuscular Once Varsha Rainey MD       • oxytocin (PITOCIN) 30 Units in sodium chloride 0.9% 500 mL  0-334 mL/hr Intravenous Continuous Varsha Rainey MD   Stopped at 20 1930   • benzocaine/menthol (DERMOPLAST) 20-0.5 % topical spray 1 spray  1 spray Topical Q1H PRN Varsha Rainey MD       • hydroCORTisone (ANUSOL-HC) suppository 25 mg  25 mg Rectal Q8H PRN Varsha Rainey MD       • simethicone (MYLICON) tablet 125 mg  125 mg Oral 4x Daily PRN Varsha Rainey MD       • calcium carbonate (TUMS) chewable tablet 500 mg  500 mg Oral Q4H PRN Varsha Rainey MD       • ondansetron (ZOFRAN) injection 4 mg  4 mg Intravenous  Q12H PRN Varsha Rainey MD       • prochlorperazine (COMPAZINE) tablet 5 mg  5 mg Oral Q4H PRN Varsha Rainey MD       • prochlorperazine (COMPAZINE) injection 5 mg  5 mg Intravenous Q4H PRN Varsha Rainey MD       • docusate sodium-sennosides (SENOKOT S) 50-8.6 MG 2 tablet  2 tablet Oral Daily PRN Varsha Rainey MD       • lactated ringers infusion   Intravenous Continuous Varsha Rainey MD   Stopped at 20 0400   • ketorolac injection 30 mg  30 mg Intravenous 4 times per day Varsha Rainey MD   30 mg at 20 2212    Followed by   • ibuprofen (MOTRIN) tablet 600 mg  600 mg Oral 4 times per day Varsha Rainey MD       • acetaminophen (TYLENOL) tablet 1,000 mg  1,000 mg Oral 3 times per day Douglas Servin MD   1,000 mg at 20 0558   • oxyCODONE (IMM REL) (ROXICODONE) tablet 5 mg  5 mg Oral Q4H PRN Douglas Servin MD             P - Knowledge deficit r/t breastfeeding    I- Met with dyad on rounds. Mother reports  breast fed x1 yesterday and formula feeding during the night related latch difficulties.  Mother states  fussy at the breast.  States  formula feeding ~ 10 - 15 ml.  Offered assistance to attempt to breast feed and parents state prefer to not wake  at this time and request formula to bedside which was provided.  Discussed  typical  behavior,   stressed 8-12 feeding/pumping sessions in 24 hours with no time limits while breastfeeding, breast milk production, supply/demand, frequency/duration, pumping for stimulation, complement / supplement feeding and monitoring  output. Encouraged  rooming in and to call for assistance as needed.  Discussed lactation assistance available and to call for assistance / assessment of latch and as questions arise. Education, provided - encouraged to call  with any questions/concerns/need for assistance.   Questions answered. Cassie CELESTE updated.    E- Mother verbalized understanding of plan of care and  instructions.    Plan - Will continue to follow as needed.

## 2020-01-08 ENCOUNTER — EVALUATION (OUTPATIENT)
Dept: PHYSICAL THERAPY | Facility: CLINIC | Age: 45
End: 2020-01-08
Payer: COMMERCIAL

## 2020-01-08 DIAGNOSIS — M17.31 POST-TRAUMATIC OSTEOARTHRITIS OF RIGHT KNEE: ICD-10-CM

## 2020-01-08 DIAGNOSIS — M79.604 LEG PAIN, RIGHT: ICD-10-CM

## 2020-01-08 PROCEDURE — 97162 PT EVAL MOD COMPLEX 30 MIN: CPT | Performed by: PHYSICAL THERAPIST
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
## 2020-01-10 ENCOUNTER — OFFICE VISIT (OUTPATIENT)
Dept: PHYSICAL THERAPY | Facility: CLINIC | Age: 45
End: 2020-01-10
Payer: COMMERCIAL

## 2020-01-10 ENCOUNTER — OFFICE VISIT (OUTPATIENT)
Dept: OBGYN CLINIC | Facility: MEDICAL CENTER | Age: 45
End: 2020-01-10
Payer: COMMERCIAL

## 2020-01-10 VITALS
BODY MASS INDEX: 23.53 KG/M2 | SYSTOLIC BLOOD PRESSURE: 124 MMHG | DIASTOLIC BLOOD PRESSURE: 86 MMHG | HEART RATE: 76 BPM | WEIGHT: 146.4 LBS | HEIGHT: 66 IN

## 2020-01-10 DIAGNOSIS — M17.31 POST-TRAUMATIC OSTEOARTHRITIS OF RIGHT KNEE: Primary | ICD-10-CM

## 2020-01-10 DIAGNOSIS — G89.29 CHRONIC PAIN OF RIGHT KNEE: ICD-10-CM

## 2020-01-10 DIAGNOSIS — M25.561 CHRONIC PAIN OF RIGHT KNEE: ICD-10-CM

## 2020-01-10 DIAGNOSIS — M17.31 POST-TRAUMATIC OSTEOARTHRITIS OF RIGHT KNEE: ICD-10-CM

## 2020-01-10 DIAGNOSIS — M79.604 LEG PAIN, RIGHT: Primary | ICD-10-CM

## 2020-01-10 DIAGNOSIS — M25.571 PAIN, JOINT, ANKLE AND FOOT, RIGHT: ICD-10-CM

## 2020-01-10 PROCEDURE — 97140 MANUAL THERAPY 1/> REGIONS: CPT

## 2020-01-10 PROCEDURE — 97112 NEUROMUSCULAR REEDUCATION: CPT

## 2020-01-10 PROCEDURE — 99213 OFFICE O/P EST LOW 20 MIN: CPT | Performed by: ORTHOPAEDIC SURGERY

## 2020-01-10 PROCEDURE — 20610 DRAIN/INJ JOINT/BURSA W/O US: CPT | Performed by: ORTHOPAEDIC SURGERY

## 2020-01-10 PROCEDURE — 97110 THERAPEUTIC EXERCISES: CPT

## 2020-01-10 RX ORDER — HYALURONATE SODIUM 10 MG/ML
20 SYRINGE (ML) INTRAARTICULAR
Status: COMPLETED | OUTPATIENT
Start: 2020-01-10 | End: 2020-01-10

## 2020-01-10 RX ADMIN — Medication 20 MG: at 15:23

## 2020-01-10 NOTE — PROGRESS NOTES
Daily Note     Today's date: 1/10/2020  Patient name: Murray Salcedo  : 1975  MRN: [id number]  Referring provider: Tamy Lubin MD  Dx:   Encounter Diagnosis     ICD-10-CM    1  Leg pain, right M79 604    2  Post-traumatic osteoarthritis of right knee M17 31                   Subjective: Patient states she felt the tape take the pressure of her knee since last visit  She is going for injections this afternoon  She states she has about 7-8/10 pain every morning  Objective: See treatment diary below      Assessment: Continued with outlined program  Moderate muscle fatigue with strengthening exercises  No exacerbating pain symptoms  Updated HEP  Plan: Progress treatment as tolerated         Diagnosis: R knee OA/PFS, R ankle impingement   Precautions: -   Manuals 1/8 1/10      STJ/TCJ mobs        Cande taping CMF Defer, resume nv                      Exercise Diary        Bike  5 min               Gastroc S/Soleous S  :20x5      HS S  :20x5      kickers                4-way SLR  0# 3x10      CKC TKE  :03x20 13#       Lateral step-downs  6in 2x10       Step-ups  8in 2x10       Bridge with HS curls        X-walks  GTB 20'x4      SL ER  RTB 2x10       lunges                                                                Modalities             CP PRN

## 2020-01-10 NOTE — PROGRESS NOTES
Assessment:  1  Post-traumatic osteoarthritis of right knee  Large joint arthrocentesis: R knee    Ambulatory referral to Physical Therapy   2  Chronic pain of right knee  Large joint arthrocentesis: R knee    Ambulatory referral to Physical Therapy   3  Pain, joint, ankle and foot, right  Ambulatory referral to Physical Therapy       Plan:  The patient was provided with 1st Right knee Euflexxa injection  She should continue with physical therapy with right ankle added  She should follow up in one week for 2nd Euflexxa injection  To do next visit:  Return in about 1 week (around 1/17/2020)  The above stated was discussed in layman's terms and the patient expressed understanding  All questions were answered to the patient's satisfaction  Scribe Attestation    I,:   Tanya Mcwilliams am acting as a scribe while in the presence of the attending physician :        I,:   Davis Hannon MD personally performed the services described in this documentation    as scribed in my presence :              Subjective:   Zoraida Carnes is a 38 y o  female who presents for 1st right knee Euflexxa injection  She has history for right tibia ORIF and painful hardware removal   Today she complains of generalized and lateral right knee pain and right ankle pain and stiffness  She rates her pain at 7/10  Weight bearing and walking aggravates  Rest alleviates  She currently uses Meloxicam with benefit  She does participate in physical therapy          Review of systems negative unless otherwise specified in HPI    Past Medical History:   Diagnosis Date    Anemia     Arthritis     Depression     Edema     Fatigue     Vitamin B12 deficiency     Vitamin D deficiency        Past Surgical History:   Procedure Laterality Date    KNEE ARTHROSCOPY Bilateral     KNEE SURGERY      ORIF TIBIA FRACTURE Right 1/18/2018    Procedure: OPEN REDUCTION W/ INTERNAL FIXATION (ORIF) TIBIA;  Surgeon: aDvis Hannon MD; Location: [facility];  Service: Orthopedics    ORIF TIBIAL PLATEAU Right 8/11/2452    Procedure: OPEN REDUCTION W/ INTERNAL FIXATION (ORIF) TIBIAL PLATEAU;  Surgeon: Daniel Roper MD;  [facility];  Service: Orthopedics    [place] REMOVAL DEEP IMPLANT Right 8/2/2018    Procedure: REMOVAL HARDWARE TIBIA;  Surgeon: Daniel Roper MD;  Location: [facility];  Service: Orthopedics    TUBAL LIGATION      US GUIDED BREAST BIOPSY LEFT COMPLETE Left 10/24/2019       Family History   Problem Relation Age of Onset    Cancer Mother         either cervical or ovarian    Heart disease Mother         heart surgery    Heart attack Father         stent    Diabetes Father     Hypertension Father     Cancer Father 62        stomach cancer    Arthritis Family     Stomach cancer Family     Ovarian cancer Family     Rheum arthritis Daughter     Lupus Maternal Grandmother     Lupus Cousin     No Known Problems Maternal Grandfather     No Known Problems Paternal Grandmother     No Known Problems Paternal Grandfather     No Known Problems Daughter     No Known Problems Maternal Aunt     No Known Problems Maternal Aunt     No Known Problems Maternal Aunt     No Known Problems Paternal Aunt     Alcohol abuse Neg Hx     Depression Neg Hx     Drug abuse Neg Hx     Substance Abuse Neg Hx     Mental illness Neg Hx        Social History     Occupational History    Occupation: Teacher in [place]   Tobacco Use    Smoking status: Never Smoker    Smokeless tobacco: Never Used   Substance and Sexual Activity    Alcohol use: No     Comment: social drink sporatic    Drug use: No    Sexual activity: Yes         Current Outpatient Medications:     acetaminophen (TYLENOL) 325 mg tablet, 650 mg every 6 hours for mild pain, Disp: 30 tablet, Rfl: 0    amitriptyline (ELAVIL) 50 mg tablet, Take 1 tablet PO qHS for a week then take 2 tablets qHS  , Disp: 90 tablet, Rfl: 1    busPIRone (BUSPAR) 5 mg tablet, Take 1 tablet (5 mg total) by mouth 2 (two) times a day as needed (anxiety), Disp: 60 tablet, Rfl: 1    cyanocobalamin 1,000 mcg/mL, Inject 1 mL (1,000 mcg total) into a muscle every 30 (thirty) days, Disp: 10 mL, Rfl: 1    ergocalciferol (VITAMIN D2) 50,000 units, Take 1 capsule (50,000 Units total) by mouth once a week, Disp: 12 capsule, Rfl: 0    gabapentin (NEURONTIN) 100 mg capsule, Take 1 capsule (100 mg total) by mouth 3 (three) times a day, Disp: 90 capsule, Rfl: 0    meloxicam (MOBIC) 15 mg tablet, TAKE ONE TABLET BY MOUTH EVERY DAY, Disp: 30 tablet, Rfl: 1    nabumetone (RELAFEN) 750 mg tablet, Take 1 tablet (750 mg total) by mouth 2 (two) times a day, Disp: 60 tablet, Rfl: 1    naproxen (NAPROSYN) 375 mg tablet, Take 250 mg by mouth 2 (two) times a day with meals, Disp: , Rfl:     nitrofurantoin (MACROBID) 100 mg capsule, Take 1 capsule (100 mg total) by mouth 2 (two) times a day, Disp: 10 capsule, Rfl: 0    oxyCODONE (ROXICODONE) 5 mg immediate release tablet, Take 1 tablet (5 mg total) by mouth every 8 (eight) hours as needed for severe painMax Daily Amount: 15 mg, Disp: 60 tablet, Rfl: 0    SYRINGE-NEEDLE, DISP, 3 ML 25G X 1" 3 ML MISC, by Does not apply route every 30 (thirty) days, Disp: 50 each, Rfl: 0    No Known Allergies         Vitals:    01/10/20 1504   BP: 124/86   Pulse: 76       Objective:  Physical exam  · General: Awake, Alert, Oriented  · Eyes: Pupils equal, round and reactive to light  · Heart: regular rate and rhythm  · Lungs: No audible wheezing  · Abdomen: soft                    Ortho Exam   Right knee:  Well anterolateral healed incision   Mild valgus alignment   No erythema or ecchymosis  No effusion or swelling  Normal strength  Good ROM with crepitus   Calf compartments soft and supple  Sensation intact  Toes are warm sensate and mobile    Right ankle:  Slight restriction of dorsiflexion  Good eversion and inversion        Diagnostics, reviewed and taken today if performed as documented:    None performed     Procedures, if performed today:    Large joint arthrocentesis: R knee  Date/Time: 1/10/2020 3:23 PM  Consent given by: patient  Site marked: site marked  Timeout: Immediately prior to procedure a time out was called to verify the correct patient, procedure, equipment, support staff and site/side marked as required   Supporting Documentation  Indications: pain   Procedure Details  Location: knee - R knee  Preparation: Patient was prepped and draped in the usual sterile fashion  Needle size: 22 G  Ultrasound guidance: no  Approach: anterolateral  Medications administered: 20 mg Sodium Hyaluronate 20 MG/2ML    Patient tolerance: patient tolerated the procedure well with no immediate complications  Dressing:  Sterile dressing applied           Portions of the record may have been created with voice recognition software  Occasional wrong word or "sound a like" substitutions may have occurred due to the inherent limitations of voice recognition software  Read the chart carefully and recognize, using context, where substitutions have occurred

## 2020-01-15 ENCOUNTER — OFFICE VISIT (OUTPATIENT)
Dept: PHYSICAL THERAPY | Facility: CLINIC | Age: 45
End: 2020-01-15
Payer: COMMERCIAL

## 2020-01-15 DIAGNOSIS — M17.31 POST-TRAUMATIC OSTEOARTHRITIS OF RIGHT KNEE: ICD-10-CM

## 2020-01-15 DIAGNOSIS — M79.604 LEG PAIN, RIGHT: Primary | ICD-10-CM

## 2020-01-15 PROCEDURE — 97110 THERAPEUTIC EXERCISES: CPT | Performed by: PHYSICAL THERAPIST

## 2020-01-15 PROCEDURE — 97112 NEUROMUSCULAR REEDUCATION: CPT | Performed by: PHYSICAL THERAPIST

## 2020-01-15 PROCEDURE — 97140 MANUAL THERAPY 1/> REGIONS: CPT | Performed by: PHYSICAL THERAPIST

## 2020-01-15 NOTE — PROGRESS NOTES
Daily Note     Today's date: 1/15/2020  Patient name: Savita Hawkins  : 1975  MRN: [id number]  Referring provider: Charlie Castellon MD  Dx:   Encounter Diagnosis     ICD-10-CM    1  Leg pain, right M79 604    2  Post-traumatic osteoarthritis of right knee M17 31                   Subjective: Patient states notes she feels "status quo" today  Objective: See treatment diary below      Assessment: Continued with outlined program  PROM improving with manual PT  Plan: Progress treatment as tolerated         Diagnosis: R knee OA/PFS, R ankle impingement   Precautions: -   Manuals 1/8 1/10 1/15     STJ/TCJ mobs        Cande taping CMF Defer, resume nv def                     Exercise Diary        Bike  5 min  TM 5 min             Gastroc S/Soleous S  :20x5 :20x5     HS S  :20x5 :20x5     kickers                4-way SLR  0# 3x10 0# 3x12     CKC TKE  :03x20 13#  :03,2x10, 15#     Lateral step-downs  6in 2x10  6'' 2x10     Step-ups  8in 2x10  8'' 2x10     Bridge with HS curls        X-walks  GTB 20'x4 GTB 20'x4     SL ER  RTB 2x10  GTB 2x10     lunges                                                                Modalities             CP PRN

## 2020-01-16 ENCOUNTER — OFFICE VISIT (OUTPATIENT)
Dept: PHYSICAL THERAPY | Facility: CLINIC | Age: 45
End: 2020-01-16
Payer: COMMERCIAL

## 2020-01-16 DIAGNOSIS — M79.604 LEG PAIN, RIGHT: Primary | ICD-10-CM

## 2020-01-16 DIAGNOSIS — M17.31 POST-TRAUMATIC OSTEOARTHRITIS OF RIGHT KNEE: ICD-10-CM

## 2020-01-16 PROCEDURE — 97140 MANUAL THERAPY 1/> REGIONS: CPT | Performed by: PHYSICAL THERAPIST

## 2020-01-16 PROCEDURE — 97110 THERAPEUTIC EXERCISES: CPT

## 2020-01-16 PROCEDURE — 97112 NEUROMUSCULAR REEDUCATION: CPT

## 2020-01-16 NOTE — PROGRESS NOTES
Daily Note     Today's date: 2020  Patient name: Murray Salcedo  : 1975  MRN: [id number]  Referring provider: Tamy Lubin MD  Dx:   Encounter Diagnosis     ICD-10-CM    1  Leg pain, right M79 604    2  Post-traumatic osteoarthritis of right knee M17 31                   Subjective: Patient states "I feel about the same, nothing has changed " She states the tape takes some of the pressure off, so she likes this and feels it is helpful  She goes for her second shot tomorrow  Objective: See treatment diary below      Assessment: Continued with outlined program  She demonstrates improved tolerance to strengthening exercises following Espinal taping technique  No exacerbating pain symptoms  Plan: Progress treatment as tolerated         Diagnosis: R knee OA/PFS, R ankle impingement   Precautions: -   Manuals 1/8 1/10 1/15 1/16    STJ/TCJ mobs    CMF    McConnel taping CMF Defer, resume nv def KLS                    Exercise Diary        Bike  5 min  TM 5 min TM 5 min             Gastroc S/Soleous S  :20x5 :20x5 :20x5    HS S  :20x5 :20x5 :20x5    kickers                4-way SLR  0# 3x10 0# 3x12 0# 3x12     CKC TKE  :03x20 13#  :03,2x10, 15# 15# 2x10    Lateral step-downs  6in 2x10  6'' 2x10 8in 2x10    Step-ups  8in 2x10  8'' 2x10 8in 2x10     Bridge with HS curls    Bridges :03x20     X-walks  GTB 20'x4 GTB 20'x4 GTB 20'x4    SL ER  RTB 2x10  GTB 2x10 GTB 2x10     lunges                                                                Modalities             CP PRN

## 2020-01-17 ENCOUNTER — OFFICE VISIT (OUTPATIENT)
Dept: OBGYN CLINIC | Facility: MEDICAL CENTER | Age: 45
End: 2020-01-17
Payer: COMMERCIAL

## 2020-01-17 VITALS
WEIGHT: 146 LBS | DIASTOLIC BLOOD PRESSURE: 88 MMHG | HEART RATE: 92 BPM | HEIGHT: 66 IN | SYSTOLIC BLOOD PRESSURE: 124 MMHG | BODY MASS INDEX: 23.46 KG/M2

## 2020-01-17 DIAGNOSIS — M25.561 CHRONIC PAIN OF RIGHT KNEE: ICD-10-CM

## 2020-01-17 DIAGNOSIS — G89.29 CHRONIC PAIN OF RIGHT KNEE: ICD-10-CM

## 2020-01-17 DIAGNOSIS — M17.31 POST-TRAUMATIC OSTEOARTHRITIS OF RIGHT KNEE: Primary | ICD-10-CM

## 2020-01-17 PROCEDURE — 20610 DRAIN/INJ JOINT/BURSA W/O US: CPT | Performed by: ORTHOPAEDIC SURGERY

## 2020-01-17 RX ORDER — HYALURONATE SODIUM 10 MG/ML
20 SYRINGE (ML) INTRAARTICULAR
Status: COMPLETED | OUTPATIENT
Start: 2020-01-17 | End: 2020-01-17

## 2020-01-17 RX ADMIN — Medication 20 MG: at 15:28

## 2020-01-17 NOTE — PROGRESS NOTES
Assessment:  1  Post-traumatic osteoarthritis of right knee     2  Chronic pain of right knee         Plan:  The patient was provided with 2nd right knee Euflexxa injection  She should follow up in one week for 3rd Euflexxa injection  To do next visit:  Return in about 1 week (around 1/24/2020)  The above stated was discussed in layman's terms and the patient expressed understanding  All questions were answered to the patient's satisfaction  Scribe Attestation    I,:   Enrrique Aiken am acting as a scribe while in the presence of the attending physician :        I,:   Mery Zaidi MD personally performed the services described in this documentation    as scribed in my presence :              Subjective:   Bonita Byrd is a 38 y o  female who presents for 2nd Right knee Euflexxa injection  She has history of right tibial ORIF and painful hardware removal   Today she complains of generalized and lateral right knee pain and right ankle pain and stiffness  She rates her pain at 7/10  Weight bearing and walking aggravates  Rest alleviates  She does use Meloxicam with benefit  She does participate in physical therapy for the right ankle and knee with benefit          Review of systems negative unless otherwise specified in HPI    Past Medical History:   Diagnosis Date    Anemia     Arthritis     Depression     Edema     Fatigue     Vitamin B12 deficiency     Vitamin D deficiency        Past Surgical History:   Procedure Laterality Date    KNEE ARTHROSCOPY Bilateral     KNEE SURGERY      ORIF TIBIA FRACTURE Right 1/18/2018    Procedure: OPEN REDUCTION W/ INTERNAL FIXATION (ORIF) TIBIA;  Surgeon: Mery Zaidi MD;  [facility];  Service: Orthopedics    ORIF TIBIAL PLATEAU Right 7/20/8286    Procedure: OPEN REDUCTION W/ INTERNAL FIXATION (ORIF) TIBIAL PLATEAU;  Surgeon: Mery Zaidi MD;  [facility];  Service: Orthopedics    AL REMOVAL DEEP IMPLANT Right 8/2/2018    Procedure: REMOVAL HARDWARE TIBIA;  Surgeon: Michael Lombardi MD;  [facility];  Service: Orthopedics    TUBAL LIGATION      US GUIDED BREAST BIOPSY LEFT COMPLETE Left 10/24/2019       Family History   Problem Relation Age of Onset    Cancer Mother         either cervical or ovarian    Heart disease Mother         heart surgery    Heart attack Father         stent    Diabetes Father     Hypertension Father     Cancer Father 62        stomach cancer    Arthritis Family     Stomach cancer Family     Ovarian cancer Family     Rheum arthritis Daughter     Lupus Maternal Grandmother     Lupus Cousin     No Known Problems Maternal Grandfather     No Known Problems Paternal Grandmother     No Known Problems Paternal Grandfather     No Known Problems Daughter     No Known Problems Maternal Aunt     No Known Problems Maternal Aunt     No Known Problems Maternal Aunt     No Known Problems Paternal Aunt     Alcohol abuse Neg Hx     Depression Neg Hx     Drug abuse Neg Hx     Substance Abuse Neg Hx     Mental illness Neg Hx        Social History     Occupational History    Occupation: Teacher in [employer]   Tobacco Use    Smoking status: Never Smoker    Smokeless tobacco: Never Used   Substance and Sexual Activity    Alcohol use: No     Comment: social drink sporatic    Drug use: No    Sexual activity: Yes         Current Outpatient Medications:     acetaminophen (TYLENOL) 325 mg tablet, 650 mg every 6 hours for mild pain, Disp: 30 tablet, Rfl: 0    amitriptyline (ELAVIL) 50 mg tablet, Take 1 tablet PO qHS for a week then take 2 tablets qHS  , Disp: 90 tablet, Rfl: 1    busPIRone (BUSPAR) 5 mg tablet, Take 1 tablet (5 mg total) by mouth 2 (two) times a day as needed (anxiety), Disp: 60 tablet, Rfl: 1    cyanocobalamin 1,000 mcg/mL, Inject 1 mL (1,000 mcg total) into a muscle every 30 (thirty) days, Disp: 10 mL, Rfl: 1    ergocalciferol (VITAMIN D2) 50,000 units, Take 1 capsule (50,000 Units total) by mouth once a week, Disp: 12 capsule, Rfl: 0    gabapentin (NEURONTIN) 100 mg capsule, Take 1 capsule (100 mg total) by mouth 3 (three) times a day, Disp: 90 capsule, Rfl: 0    meloxicam (MOBIC) 15 mg tablet, TAKE ONE TABLET BY MOUTH EVERY DAY, Disp: 30 tablet, Rfl: 1    nabumetone (RELAFEN) 750 mg tablet, Take 1 tablet (750 mg total) by mouth 2 (two) times a day, Disp: 60 tablet, Rfl: 1    naproxen (NAPROSYN) 375 mg tablet, Take 250 mg by mouth 2 (two) times a day with meals, Disp: , Rfl:     nitrofurantoin (MACROBID) 100 mg capsule, Take 1 capsule (100 mg total) by mouth 2 (two) times a day, Disp: 10 capsule, Rfl: 0    oxyCODONE (ROXICODONE) 5 mg immediate release tablet, Take 1 tablet (5 mg total) by mouth every 8 (eight) hours as needed for severe painMax Daily Amount: 15 mg, Disp: 60 tablet, Rfl: 0    SYRINGE-NEEDLE, DISP, 3 ML 25G X 1" 3 ML MISC, by Does not apply route every 30 (thirty) days, Disp: 50 each, Rfl: 0    No Known Allergies         Vitals:    01/17/20 1518   BP: 124/88   Pulse: 92       Objective:  Physical exam  · General: Awake, Alert, Oriented  · Eyes: Pupils equal, round and reactive to light  · Heart: regular rate and rhythm  · Lungs: No audible wheezing  · Abdomen: soft                    Ortho Exam   Right knee:  Well healed anterolateral incision  Mild varus alignment  No erythema or ecchymosis  No effusion or swelling  Normal strength  Good ROM with crepitus   Calf compartments soft and supple  Sensation intact  Toes are warm sensate and mobile      Diagnostics, reviewed and taken today if performed as documented:    None performed      Procedures, if performed today:    Large joint arthrocentesis: R knee  Date/Time: 1/17/2020 3:28 PM  Consent given by: patient  Site marked: site marked  Timeout: Immediately prior to procedure a time out was called to verify the correct patient, procedure, equipment, support staff and site/side marked as required   Supporting Documentation  Indications: pain   Procedure Details  Location: knee - R knee  Preparation: Patient was prepped and draped in the usual sterile fashion  Needle size: 22 G  Ultrasound guidance: no  Approach: anteromedial  Medications administered: 20 mg Sodium Hyaluronate 20 MG/2ML    Patient tolerance: patient tolerated the procedure well with no immediate complications  Dressing:  Sterile dressing applied          Portions of the record may have been created with voice recognition software  Occasional wrong word or "sound a like" substitutions may have occurred due to the inherent limitations of voice recognition software  Read the chart carefully and recognize, using context, where substitutions have occurred

## 2020-01-22 ENCOUNTER — APPOINTMENT (OUTPATIENT)
Dept: PHYSICAL THERAPY | Facility: CLINIC | Age: 45
End: 2020-01-22
Payer: COMMERCIAL

## 2020-01-24 ENCOUNTER — OFFICE VISIT (OUTPATIENT)
Dept: PHYSICAL THERAPY | Facility: CLINIC | Age: 45
End: 2020-01-24
Payer: COMMERCIAL

## 2020-01-24 ENCOUNTER — OFFICE VISIT (OUTPATIENT)
Dept: OBGYN CLINIC | Facility: MEDICAL CENTER | Age: 45
End: 2020-01-24
Payer: COMMERCIAL

## 2020-01-24 VITALS
SYSTOLIC BLOOD PRESSURE: 118 MMHG | WEIGHT: 146 LBS | HEIGHT: 66 IN | HEART RATE: 101 BPM | BODY MASS INDEX: 23.46 KG/M2 | DIASTOLIC BLOOD PRESSURE: 80 MMHG

## 2020-01-24 DIAGNOSIS — M79.604 LEG PAIN, RIGHT: Primary | ICD-10-CM

## 2020-01-24 DIAGNOSIS — M25.561 CHRONIC PAIN OF RIGHT KNEE: ICD-10-CM

## 2020-01-24 DIAGNOSIS — M17.31 POST-TRAUMATIC OSTEOARTHRITIS OF RIGHT KNEE: Primary | ICD-10-CM

## 2020-01-24 DIAGNOSIS — M17.31 POST-TRAUMATIC OSTEOARTHRITIS OF RIGHT KNEE: ICD-10-CM

## 2020-01-24 DIAGNOSIS — G89.29 CHRONIC PAIN OF RIGHT KNEE: ICD-10-CM

## 2020-01-24 PROCEDURE — 20610 DRAIN/INJ JOINT/BURSA W/O US: CPT | Performed by: ORTHOPAEDIC SURGERY

## 2020-01-24 PROCEDURE — 97112 NEUROMUSCULAR REEDUCATION: CPT | Performed by: PHYSICAL THERAPIST

## 2020-01-24 PROCEDURE — 97140 MANUAL THERAPY 1/> REGIONS: CPT | Performed by: PHYSICAL THERAPIST

## 2020-01-24 PROCEDURE — 97110 THERAPEUTIC EXERCISES: CPT | Performed by: PHYSICAL THERAPIST

## 2020-01-24 RX ORDER — HYALURONATE SODIUM 10 MG/ML
20 SYRINGE (ML) INTRAARTICULAR
Status: COMPLETED | OUTPATIENT
Start: 2020-01-24 | End: 2020-01-24

## 2020-01-24 RX ADMIN — Medication 20 MG: at 15:19

## 2020-01-24 NOTE — PROGRESS NOTES
Assessment:  1  Post-traumatic osteoarthritis of right knee     2  Chronic pain of right knee         Plan:  The patient was provided with 3rd right knee Euflexxa injection  She should follow up in 3 months  To do next visit:  Return in about 3 months (around 4/24/2020)  The above stated was discussed in layman's terms and the patient expressed understanding  All questions were answered to the patient's satisfaction  Scribe Attestation    I,:   Beryl Verduzco am acting as a scribe while in the presence of the attending physician :        I,:   Shaun Nicole MD personally performed the services described in this documentation    as scribed in my presence :              Subjective:   Massiel Allison is a 38 y o  female who presents for 3rd right knee Euflexxa injection  She has history of right tibial ORIF and painful hardware removal   Today she complains of generalized and lateral righ tknee pain and right ankle pain and stiffness  Weight bearing and walking aggravates  Rest alleviates  She does use Meloxicam with benefit  She does participate in physical therapy for right ankle and knee with benefit          Review of systems negative unless otherwise specified in HPI    Past Medical History:   Diagnosis Date    Anemia     Arthritis     Depression     Edema     Fatigue     Vitamin B12 deficiency     Vitamin D deficiency        Past Surgical History:   Procedure Laterality Date    KNEE ARTHROSCOPY Bilateral     KNEE SURGERY      ORIF TIBIA FRACTURE Right 1/18/2018    Procedure: OPEN REDUCTION W/ INTERNAL FIXATION (ORIF) TIBIA;  Surgeon: Shaun Nicole MD;  [facility];  Service: Orthopedics    ORIF TIBIAL PLATEAU Right 0/90/5360    Procedure: OPEN REDUCTION W/ INTERNAL FIXATION (ORIF) TIBIAL PLATEAU;  Surgeon: Shaun Nicole MD;  [facility];  Service: Orthopedics    NE REMOVAL DEEP IMPLANT Right 8/2/2018    Procedure: REMOVAL HARDWARE TIBIA;  Surgeon: Agata Traylor MD;  [facility];  Service: Orthopedics    TUBAL LIGATION      US GUIDED BREAST BIOPSY LEFT COMPLETE Left 10/24/2019       Family History   Problem Relation Age of Onset    Cancer Mother         either cervical or ovarian    Heart disease Mother         heart surgery    Heart attack Father         stent    Diabetes Father     Hypertension Father     Cancer Father 62        stomach cancer    Arthritis Family     Stomach cancer Family     Ovarian cancer Family     Rheum arthritis Daughter     Lupus Maternal Grandmother     Lupus Cousin     No Known Problems Maternal Grandfather     No Known Problems Paternal Grandmother     No Known Problems Paternal Grandfather     No Known Problems Daughter     No Known Problems Maternal Aunt     No Known Problems Maternal Aunt     No Known Problems Maternal Aunt     No Known Problems Paternal Aunt     Alcohol abuse Neg Hx     Depression Neg Hx     Drug abuse Neg Hx     Substance Abuse Neg Hx     Mental illness Neg Hx        Social History     Occupational History    Occupation: Teacher in [place]   Tobacco Use    Smoking status: Never Smoker    Smokeless tobacco: Never Used   Substance and Sexual Activity    Alcohol use: No     Comment: social drink sporatic    Drug use: No    Sexual activity: Yes         Current Outpatient Medications:     acetaminophen (TYLENOL) 325 mg tablet, 650 mg every 6 hours for mild pain, Disp: 30 tablet, Rfl: 0    amitriptyline (ELAVIL) 50 mg tablet, Take 1 tablet PO qHS for a week then take 2 tablets qHS  , Disp: 90 tablet, Rfl: 1    busPIRone (BUSPAR) 5 mg tablet, Take 1 tablet (5 mg total) by mouth 2 (two) times a day as needed (anxiety), Disp: 60 tablet, Rfl: 1    cyanocobalamin 1,000 mcg/mL, Inject 1 mL (1,000 mcg total) into a muscle every 30 (thirty) days, Disp: 10 mL, Rfl: 1    ergocalciferol (VITAMIN D2) 50,000 units, Take 1 capsule (50,000 Units total) by mouth once a week, Disp: 12 capsule, Rfl: 0    gabapentin (NEURONTIN) 100 mg capsule, Take 1 capsule (100 mg total) by mouth 3 (three) times a day, Disp: 90 capsule, Rfl: 0    meloxicam (MOBIC) 15 mg tablet, TAKE ONE TABLET BY MOUTH EVERY DAY, Disp: 30 tablet, Rfl: 1    nabumetone (RELAFEN) 750 mg tablet, Take 1 tablet (750 mg total) by mouth 2 (two) times a day, Disp: 60 tablet, Rfl: 1    naproxen (NAPROSYN) 375 mg tablet, Take 250 mg by mouth 2 (two) times a day with meals, Disp: , Rfl:     nitrofurantoin (MACROBID) 100 mg capsule, Take 1 capsule (100 mg total) by mouth 2 (two) times a day, Disp: 10 capsule, Rfl: 0    oxyCODONE (ROXICODONE) 5 mg immediate release tablet, Take 1 tablet (5 mg total) by mouth every 8 (eight) hours as needed for severe painMax Daily Amount: 15 mg, Disp: 60 tablet, Rfl: 0    SYRINGE-NEEDLE, DISP, 3 ML 25G X 1" 3 ML MISC, by Does not apply route every 30 (thirty) days, Disp: 50 each, Rfl: 0    No Known Allergies         Vitals:    01/24/20 1453   BP: 118/80   Pulse: 101       Objective:  Physical exam  · General: Awake, Alert, Oriented  · Eyes: Pupils equal, round and reactive to light  · Heart: regular rate and rhythm  · Lungs: No audible wheezing  · Abdomen: soft                    Ortho Exam   Right knee:  Well healed anterolateral scar  Mild varus alignment  No erythema or ecchymosis  No effusion or swelling  Normal strength  Good ROM with crepitus   Calf compartments soft and supple  Sensation intact  Toes are warm sensate and mobile        Diagnostics, reviewed and taken today if performed as documented:    None performed    Procedures, if performed today:    Large joint arthrocentesis: R knee  Date/Time: 1/24/2020 3:19 PM  Consent given by: patient  Site marked: site marked  Timeout: Immediately prior to procedure a time out was called to verify the correct patient, procedure, equipment, support staff and site/side marked as required   Supporting Documentation  Indications: pain   Procedure Details  Location: knee - R knee  Preparation: Patient was prepped and draped in the usual sterile fashion  Needle size: 22 G  Ultrasound guidance: no  Approach: anterolateral  Medications administered: 20 mg Sodium Hyaluronate 20 MG/2ML    Patient tolerance: patient tolerated the procedure well with no immediate complications  Dressing:  Sterile dressing applied            Portions of the record may have been created with voice recognition software  Occasional wrong word or "sound a like" substitutions may have occurred due to the inherent limitations of voice recognition software  Read the chart carefully and recognize, using context, where substitutions have occurred

## 2020-01-24 NOTE — PROGRESS NOTES
Daily Note     Today's date: 2020  Patient name: Savita Hawkins  : 1975  MRN: [id number]  Referring provider: Charlie Castellon MD  Dx:   Encounter Diagnosis     ICD-10-CM    1  Leg pain, right M79 604    2  Post-traumatic osteoarthritis of right knee M17 31               Subjective: No change in status  Going for her third knee injection today which is the second series of 3  She feels that the taping helps but is deferring today secondary injections  Objective: See treatment diary below      Assessment: Continued with outlined program   Getting nerve stretch with supine HS/calf stretch  Numbness in toes after stays about 15-20 minutes  Driving and prolonged sitting makes it worse  She notes that doing any task for more than 15 minutes will make her  Hypomobility of lumbar spine notes with radicular symptoms during palpation of upper lumbar spine  Note that prone press ups after hamstring stretch dissipates her foot pain  Lumbar mobs and psoas work also reduced radicular symptoms  Pt  Will note any long term benefit or problems from lumbar work today  Proceed as appropriate  Plan: Progress treatment as tolerated         Diagnosis: R knee OA/PFS, R ankle impingement   Precautions: -   Manuals 1/8 1/10 1/15 1/16 1/24   STJ/TCJ mobs    CMF MW   McConnel taping CMF Defer, resume nv def KLS Deferred - injection today   Prone Grade III lumbar PA mobs     MW   STM right psoas with right leg lefts     MW ~5 min   Exercise Diary        Bike  5 min  TM 5 min TM 5 min  TM 5 min           Gastroc S/Soleous S  :20x5 :20x5 :20x5 :20x5   HS S  :20x5 :20x5 :20x5 20"x5   kickers                4-way SLR  0# 3x10 0# 3x12 0# 3x12  0#3x12   CKC TKE  :03x20 13#  :03,2x10, 15# 15# 2x10 15#2x10   Lateral step-downs  6in 2x10  6'' 2x10 8in 2x10 8in 2x10   Step-ups  8in 2x10  8'' 2x10 8in 2x10  8in 2x10   Bridge with HS curls    Bridges :03x20  Bridges  "03x20   X-walks  GTB 20'x4 GTB 20'x4 GTB 20'x4 GTB20'x4   SL ER  RTB 2x10  GTB 2x10 GTB 2x10  GTB 2x10   lunges                       Prone press ups after HS stretch         :05 x10                               Modalities             CP PRN

## 2020-01-27 ENCOUNTER — OFFICE VISIT (OUTPATIENT)
Dept: PHYSICAL THERAPY | Facility: CLINIC | Age: 45
End: 2020-01-27
Payer: COMMERCIAL

## 2020-01-27 DIAGNOSIS — M79.604 LEG PAIN, RIGHT: Primary | ICD-10-CM

## 2020-01-27 DIAGNOSIS — M17.31 POST-TRAUMATIC OSTEOARTHRITIS OF RIGHT KNEE: ICD-10-CM

## 2020-01-27 PROCEDURE — 97140 MANUAL THERAPY 1/> REGIONS: CPT | Performed by: PHYSICAL THERAPIST

## 2020-01-27 PROCEDURE — 97112 NEUROMUSCULAR REEDUCATION: CPT

## 2020-01-27 PROCEDURE — 97110 THERAPEUTIC EXERCISES: CPT

## 2020-01-27 NOTE — PROGRESS NOTES
Daily Note     Today's date: 2020  Patient name: Osvaldo Hall  : 1975  MRN: [id number]  Referring provider: Shanique Walls MD  Dx:   Encounter Diagnosis     ICD-10-CM    1  Leg pain, right M79 604    2  Post-traumatic osteoarthritis of right knee M17 31               Subjective: Pt reports she had the third injection on Friday  She states it was very painful while receiving the injection with no changes in knee sx's noted  She reports no changes in sx's after last PT session with lumbar work  Objective: See treatment diary below      Assessment: Pt tolerated treatment session well  Mobs performed by PT CF  Pt continues to experience numbness/tingling down RLE throughout exercise  Pt noted improved knee sx's post MC tapping with decreased numbing present  Pt would benefit from continued PT, progress as able  Plan: Progress treatment as tolerated       Pt 1:1 with PTA JW 7:00-7:45  IEP 7:45-7:55     Diagnosis: R knee OA/PFS, R ankle impingement    Precautions: -    Manuals 1/8 1/10 1/15 1/16 1/24 1/27   STJ/TCJ mobs    CMF MW CMF   McConnel taping CMF Defer, resume nv def KLS Deferred - injection today KLS   Prone Grade III lumbar PA mobs     MW    STM right psoas with right leg lefts     MW ~5 min    Exercise Diary         Bike  5 min  TM 5 min TM 5 min  TM 5 min 5 min            Gastroc S/Soleous S  :20x5 :20x5 :20x5 :20x5 :20x5   HS S  :20x5 :20x5 :20x5 20"x5 :20x5   kickers                  4-way SLR  0# 3x10 0# 3x12 0# 3x12  0#3x12 0# 3x12   CKC TKE  :03x20 13#  :03,2x10, 15# 15# 2x10 15#2x10 15# 2x10    Lateral step-downs  6in 2x10  6'' 2x10 8in 2x10 8in 2x10 8in 2x10   Step-ups  8in 2x10  8'' 2x10 8in 2x10  8in 2x10 8in 2x10   Bridge with HS curls    Bridges :03x20  Bridges  "03x20 Bridges  "03x20   X-walks  GTB 20'x4 GTB 20'x4 GTB 20'x4 GTB20'x4 GTB 20'x4   SL ER  RTB 2x10  GTB 2x10 GTB 2x10  GTB 2x10 GTB 2x10   lunges                         Prone press ups after HS stretch         :05 x10 :05x10                                 Modalities              CP PRN

## 2020-01-31 ENCOUNTER — OFFICE VISIT (OUTPATIENT)
Dept: PHYSICAL THERAPY | Facility: CLINIC | Age: 45
End: 2020-01-31
Payer: COMMERCIAL

## 2020-01-31 DIAGNOSIS — M17.31 POST-TRAUMATIC OSTEOARTHRITIS OF RIGHT KNEE: ICD-10-CM

## 2020-01-31 DIAGNOSIS — M79.604 LEG PAIN, RIGHT: Primary | ICD-10-CM

## 2020-01-31 PROCEDURE — 97110 THERAPEUTIC EXERCISES: CPT

## 2020-01-31 PROCEDURE — 97140 MANUAL THERAPY 1/> REGIONS: CPT | Performed by: PHYSICAL THERAPIST

## 2020-01-31 PROCEDURE — 97112 NEUROMUSCULAR REEDUCATION: CPT

## 2020-01-31 NOTE — PROGRESS NOTES
Daily Note     Today's date: 2020  Patient name: Jazzy Segura  : 1975  MRN: [id number]  Referring provider: Ming De Anda MD  Dx:   Encounter Diagnosis     ICD-10-CM    1  Leg pain, right M79 604    2  Post-traumatic osteoarthritis of right knee M17 31                   Subjective: Patient states, "it feels about the same, it still feels really tight"  She states she is not sure what happened with the last injection, but she feels it created the tightness and when given, it shot right down her leg  Objective: See treatment diary below      Assessment:Continued with outlined program  Patient exhibits no signs of distress throughout exercises, moderate muscle fatigue with strengthening  Patient reports discomfort with ankle mobilizations performed by PT, improved slowly  Plan: Progress treatment as tolerated         Diagnosis: R knee OA/PFS, R ankle impingement    Precautions: -    Manuals 1/31  1/15 1/16 1/24 1/27   STJ/TCJ mobs CMF   CMF MW CMF   McConnel taping KLS  def KLS Deferred - injection today KLS   Prone Grade III lumbar PA mobs     MW    STM right psoas with right leg lefts     MW ~5 min    Exercise Diary         Bike TM 5 min   TM 5 min TM 5 min  TM 5 min 5 min            Gastroc S/Soleous S :20x5  :20x5 :20x5 :20x5 :20x5   HS S :20x5  :20x5 :20x5 20"x5 :20x5   kickers                  4-way SLR 0# 3x12  0# 3x12 0# 3x12  0#3x12 0# 3x12   CKC TKE 15# 2x10  :03,2x10, 15# 15# 2x10 15#2x10 15# 2x10    Lateral step-downs 8in 2x10   6'' 2x10 8in 2x10 8in 2x10 8in 2x10   Step-ups 8in 2x10  8'' 2x10 8in 2x10  8in 2x10 8in 2x10   Bridge with HS curls Bridges :03x20   Bridges :03x20  Bridges  "03x20 Bridges  "03x20   X-walks BTB 20'x4  GTB 20'x4 GTB 20'x4 GTB20'x4 GTB 20'x4   SL ER GTB 2x10  GTB 2x10 GTB 2x10  GTB 2x10 GTB 2x10   lunges Blue disc 10x each                      Prone press ups after HS stretch        :05 x10 :05x10                               Modalities            CP PRN

## 2020-02-07 ENCOUNTER — OFFICE VISIT (OUTPATIENT)
Dept: INTERNAL MEDICINE CLINIC | Facility: CLINIC | Age: 45
End: 2020-02-07
Payer: COMMERCIAL

## 2020-02-07 VITALS
DIASTOLIC BLOOD PRESSURE: 84 MMHG | RESPIRATION RATE: 18 BRPM | HEIGHT: 66 IN | TEMPERATURE: 98.7 F | SYSTOLIC BLOOD PRESSURE: 110 MMHG | HEART RATE: 76 BPM | WEIGHT: 143.8 LBS | BODY MASS INDEX: 23.11 KG/M2

## 2020-02-07 DIAGNOSIS — F41.9 ANXIETY: ICD-10-CM

## 2020-02-07 DIAGNOSIS — M79.604 LEG PAIN, RIGHT: ICD-10-CM

## 2020-02-07 DIAGNOSIS — M17.31 POST-TRAUMATIC OSTEOARTHRITIS OF RIGHT KNEE: ICD-10-CM

## 2020-02-07 DIAGNOSIS — E53.8 VITAMIN B12 DEFICIENCY: ICD-10-CM

## 2020-02-07 DIAGNOSIS — F33.1 MODERATE EPISODE OF RECURRENT MAJOR DEPRESSIVE DISORDER (HCC): Primary | ICD-10-CM

## 2020-02-07 DIAGNOSIS — E55.9 VITAMIN D DEFICIENCY: ICD-10-CM

## 2020-02-07 DIAGNOSIS — G57.01 PIRIFORMIS SYNDROME OF RIGHT SIDE: ICD-10-CM

## 2020-02-07 PROCEDURE — 3008F BODY MASS INDEX DOCD: CPT | Performed by: INTERNAL MEDICINE

## 2020-02-07 PROCEDURE — 1036F TOBACCO NON-USER: CPT | Performed by: INTERNAL MEDICINE

## 2020-02-07 PROCEDURE — 99214 OFFICE O/P EST MOD 30 MIN: CPT | Performed by: INTERNAL MEDICINE

## 2020-02-07 RX ORDER — AMITRIPTYLINE HYDROCHLORIDE 50 MG/1
TABLET, FILM COATED ORAL
Qty: 90 TABLET | Refills: 1 | Status: SHIPPED | OUTPATIENT
Start: 2020-02-07 | End: 2020-10-16

## 2020-02-07 RX ORDER — ACETAMINOPHEN 160 MG
2000 TABLET,DISINTEGRATING ORAL DAILY
Qty: 90 CAPSULE | Refills: 1 | Status: SHIPPED | OUTPATIENT
Start: 2020-02-07 | End: 2020-10-16 | Stop reason: SDUPTHER

## 2020-02-07 RX ORDER — GABAPENTIN 100 MG/1
100 CAPSULE ORAL 2 TIMES DAILY
Qty: 60 CAPSULE | Refills: 2 | Status: SHIPPED | OUTPATIENT
Start: 2020-02-07 | End: 2020-06-12 | Stop reason: SDUPTHER

## 2020-02-07 RX ORDER — BUSPIRONE HYDROCHLORIDE 5 MG/1
5 TABLET ORAL 2 TIMES DAILY PRN
Qty: 60 TABLET | Refills: 1 | Status: SHIPPED | OUTPATIENT
Start: 2020-02-07 | End: 2020-10-16

## 2020-02-07 NOTE — PATIENT INSTRUCTIONS
Restart gabapentin bid  You may take melatonin at bedtime, start at 3 mg  You can increase this up to 10 mg

## 2020-02-07 NOTE — PROGRESS NOTES
Assessment/Plan:    Post-traumatic osteoarthritis of right knee  S/p injections  Restart gabapentin bid  She takes meloxicam prn only  Follow up with Ortho  Anxiety  Stable, issues with insomnia  Continue amitriptyline daily, may try melatonin qHS  Continue with buspirone prn  Gabapentin may help with sleep  S/P ORIF (open reduction internal fixation) fracture  Continue physical therapy  Vitamin B12 deficiency  Recheck levels, on monthly injections  Vitamin D deficiency  Takes D3 daily  Diagnoses and all orders for this visit:    Moderate episode of recurrent major depressive disorder (HCC)  -     amitriptyline (ELAVIL) 50 mg tablet; Take 1 tablet PO qHS for a week then take 2 tablets qHS  Leg pain, right  -     amitriptyline (ELAVIL) 50 mg tablet; Take 1 tablet PO qHS for a week then take 2 tablets qHS  Anxiety  -     busPIRone (BUSPAR) 5 mg tablet; Take 1 tablet (5 mg total) by mouth 2 (two) times a day as needed (anxiety)    Piriformis syndrome of right side  -     gabapentin (NEURONTIN) 100 mg capsule; Take 1 capsule (100 mg total) by mouth 2 (two) times a day    Vitamin D deficiency  -     Cholecalciferol (VITAMIN D3) 50 MCG (2000 UT) capsule; Take 1 capsule (2,000 Units total) by mouth daily    Post-traumatic osteoarthritis of right knee    Vitamin B12 deficiency      Follow up in 4 months or as needed  Subjective:      Patient ID: Sara Rangel is a 38 y o  female  Shaunice is feeling alright  She retired from her previous job, awaiting for disability  She is now working part time 12 hours a week  She is able to drive locally, rarely goes on the highway  She reports anxiety has been all right  She has not needed to take BuSpar often, about once a week only  She is having issues with sleep  She is able to fall asleep but wakes up few hours later and is unable to go back to sleep  She has not tried any over-the-counter medications    She does take amitriptyline every day  She reports her anxiety is much better since she started working  She is helping her son, who is a start football player, look for colleges  She continues to experience right knee discomfort  She has received injections, not sure if it is helping  She restarted therapy for her knee as well  She would like to start taking gabapentin again, since it helps with the pain  She takes meloxicam as needed only  She has been doing her B12 injections at home, taking her supplements regularly  The following portions of the patient's history were reviewed and updated as appropriate: allergies, current medications, past medical history, past social history and problem list     Review of Systems   Constitutional: Negative for activity change, appetite change and fatigue  HENT: Negative for ear pain  Eyes: Negative for visual disturbance  Respiratory: Negative for cough and shortness of breath  Cardiovascular: Negative for chest pain and leg swelling  Gastrointestinal: Negative for abdominal pain, constipation and diarrhea  Genitourinary: Negative for dysuria and frequency  Musculoskeletal: Positive for arthralgias  Negative for gait problem and myalgias  Skin: Negative for rash and wound  Neurological: Negative for dizziness, weakness and headaches  Psychiatric/Behavioral: Positive for sleep disturbance  The patient is nervous/anxious  Objective:      /84   Pulse 76   Temp 98 7 °F (37 1 °C) (Oral)   Resp 18   Ht 5' 6" (1 676 m)   Wt 65 2 kg (143 lb 12 8 oz)   BMI 23 21 kg/m²          Physical Exam   Constitutional: She is oriented to person, place, and time  She appears well-developed and well-nourished  HENT:   Head: Normocephalic and atraumatic  Eyes: Pupils are equal, round, and reactive to light  Cardiovascular: Normal rate, regular rhythm and normal heart sounds     Pulmonary/Chest: Effort normal and breath sounds normal  She has no wheezes  Abdominal: Soft  Bowel sounds are normal    Musculoskeletal: She exhibits no edema  Neurological: She is alert and oriented to person, place, and time  Skin: Skin is warm  No rash noted  Psychiatric: She has a normal mood and affect  Her behavior is normal    Nursing note and vitals reviewed

## 2020-02-07 NOTE — ASSESSMENT & PLAN NOTE
Stable, issues with insomnia  Continue amitriptyline daily, may try melatonin qHS  Continue with buspirone prn  Gabapentin may help with sleep

## 2020-02-11 ENCOUNTER — TELEPHONE (OUTPATIENT)
Dept: INTERNAL MEDICINE CLINIC | Facility: CLINIC | Age: 45
End: 2020-02-11

## 2020-02-19 ENCOUNTER — TRANSCRIBE ORDERS (OUTPATIENT)
Dept: LAB | Facility: CLINIC | Age: 45
End: 2020-02-19

## 2020-02-19 ENCOUNTER — APPOINTMENT (OUTPATIENT)
Dept: LAB | Facility: CLINIC | Age: 45
End: 2020-02-19
Payer: COMMERCIAL

## 2020-02-19 ENCOUNTER — TELEPHONE (OUTPATIENT)
Dept: INTERNAL MEDICINE CLINIC | Facility: CLINIC | Age: 45
End: 2020-02-19

## 2020-02-19 DIAGNOSIS — E55.9 VITAMIN D DEFICIENCY: ICD-10-CM

## 2020-02-19 DIAGNOSIS — E55.9 VITAMIN D DEFICIENCY: Primary | ICD-10-CM

## 2020-02-19 DIAGNOSIS — E53.8 VITAMIN B12 DEFICIENCY: ICD-10-CM

## 2020-02-19 DIAGNOSIS — F33.1 MODERATE EPISODE OF RECURRENT MAJOR DEPRESSIVE DISORDER (HCC): ICD-10-CM

## 2020-02-19 LAB
25(OH)D3 SERPL-MCNC: 18.2 NG/ML (ref 30–100)
ANION GAP SERPL CALCULATED.3IONS-SCNC: 9 MMOL/L (ref 4–13)
BUN SERPL-MCNC: 9 MG/DL (ref 5–25)
CALCIUM SERPL-MCNC: 8.8 MG/DL (ref 8.3–10.1)
CHLORIDE SERPL-SCNC: 106 MMOL/L (ref 100–108)
CO2 SERPL-SCNC: 26 MMOL/L (ref 21–32)
CREAT SERPL-MCNC: 0.75 MG/DL (ref 0.6–1.3)
GFR SERPL CREATININE-BSD FRML MDRD: 98 ML/MIN/1.73SQ M
GLUCOSE SERPL-MCNC: 90 MG/DL (ref 65–140)
POTASSIUM SERPL-SCNC: 3.6 MMOL/L (ref 3.5–5.3)
SODIUM SERPL-SCNC: 141 MMOL/L (ref 136–145)
TSH SERPL DL<=0.05 MIU/L-ACNC: 1.64 UIU/ML (ref 0.36–3.74)
VIT B12 SERPL-MCNC: 324 PG/ML (ref 100–900)

## 2020-02-19 PROCEDURE — 82306 VITAMIN D 25 HYDROXY: CPT

## 2020-02-19 PROCEDURE — 82607 VITAMIN B-12: CPT

## 2020-02-19 PROCEDURE — 80048 BASIC METABOLIC PNL TOTAL CA: CPT

## 2020-02-19 PROCEDURE — 36415 COLL VENOUS BLD VENIPUNCTURE: CPT

## 2020-02-19 PROCEDURE — 84443 ASSAY THYROID STIM HORMONE: CPT

## 2020-02-19 RX ORDER — ERGOCALCIFEROL 1.25 MG/1
50000 CAPSULE ORAL WEEKLY
Qty: 12 CAPSULE | Refills: 0 | Status: SHIPPED | OUTPATIENT
Start: 2020-02-19 | End: 2020-10-16 | Stop reason: SDUPTHER

## 2020-02-19 NOTE — TELEPHONE ENCOUNTER
Lab results:    Continue monthly B12 injections  You should be taking oral supplements also, 1000 mcg  Vitamin D levels came back very low  Start high dose weekly D2, sent to pharmacy  Once completed (12 weeks), restart vitamin D3 2000 units daily  Rest of labs ok

## 2020-02-19 NOTE — PROGRESS NOTES
PT Discharge    Today's date: 2020  Patient name: Maurilio Hassan  : 1975  MRN: [id number]  Referring provider: Kacie Leigh MD  Dx:   Encounter Diagnosis     ICD-10-CM    1  Leg pain, right M79 604    2   Post-traumatic osteoarthritis of right knee M17 31        Start Time: 745  Stop Time: 45  Total time in clinic (min): 60 minutes    Assessment/Plan    Subjective    Objective           Pt never returned to PT

## 2020-03-25 ENCOUNTER — TELEMEDICINE (OUTPATIENT)
Dept: INTERNAL MEDICINE CLINIC | Facility: CLINIC | Age: 45
End: 2020-03-25
Payer: COMMERCIAL

## 2020-03-25 ENCOUNTER — TELEPHONE (OUTPATIENT)
Dept: INTERNAL MEDICINE CLINIC | Facility: CLINIC | Age: 45
End: 2020-03-25

## 2020-03-25 DIAGNOSIS — L02.411 ABSCESS OF AXILLA, RIGHT: Primary | ICD-10-CM

## 2020-03-25 PROCEDURE — 99213 OFFICE O/P EST LOW 20 MIN: CPT | Performed by: INTERNAL MEDICINE

## 2020-03-25 RX ORDER — CEPHALEXIN 500 MG/1
500 CAPSULE ORAL EVERY 6 HOURS SCHEDULED
Qty: 40 CAPSULE | Refills: 0 | Status: SHIPPED | OUTPATIENT
Start: 2020-03-25 | End: 2020-04-04

## 2020-03-25 NOTE — PROGRESS NOTES
Virtual Regular Visit    Problem List Items Addressed This Visit     None      Visit Diagnoses     Abscess of axilla, right    -  Primary    Start antibiotic, apply warm moist compress x 10 minutes several times a day  Wash area with soap and water if with bleeding/ discharge  Relevant Medications    cephalexin (KEFLEX) 500 mg capsule        Do not use deodorant until wound healed  Recommend to avoid anti perspirant  May take ibuprofen prn for pain  Reason for visit is pain and swelling in right arm pit  Encounter provider Karyn Duncan MD    Provider located at [address] [place] [id number]      Recent Visits  No visits were found meeting these conditions  Showing recent visits within past 7 days and meeting all other requirements     Today's Visits  Date Type Provider Dept   03/25/20 [address], [address] [facility]   03/25/20 Telephone Karyn Duncan, [facility]   Showing today's visits and meeting all other requirements     Future Appointments  Date Type Provider Dept   03/25/20 Telemedicine Karyn Duncan MD [facility]   Showing future appointments within next 150 days and meeting all other requirements        After connecting through [employer], the patient was identified by name and date of birth  Ronn Orozco [facility] was informed that this is a telemedicine visit and that the visit is being conducted through [employer] and patient was informed that this is not a secure, HIPAA-complaint platform  she agrees to proceed  which may not be secure and therefore, might not be HIPAA-compliant  My office door was closed  No one else was in the room  She acknowledged consent and understanding of privacy and security of the video platform   The patient has agreed to participate and understands they can discontinue the visit at any time     Subjective  Ronn COLON Byron Pulido is a 38 y o  female complains of pain in her right arm pit  She reports that she switch deodorant from [facility] to [facility] about a week ago  She started to notice a mass on her right armpit area since yesterday  Area is very painful to touch, she feels it is growing in size  She has a difficult time lifting her arm overhead due to pain  She denies any bleeding or discharge in the area  She denies any fever or chills  She applied heat last night, difficult to put it on since she has a difficult time moving her arm  No other symptoms  She has not taken any oral medication  Past Medical History:   Diagnosis Date    Anemia     Arthritis     Depression     Edema     Fatigue     Vitamin B12 deficiency     Vitamin D deficiency        Past Surgical History:   Procedure Laterality Date    KNEE ARTHROSCOPY Bilateral     KNEE SURGERY      ORIF TIBIA FRACTURE Right 1/18/2018    Procedure: OPEN REDUCTION W/ INTERNAL FIXATION (ORIF) TIBIA;  Surgeon: Alber Hernandez MD;  [facility];  Service: Orthopedics    ORIF TIBIAL PLATEAU Right 3/32/9400    Procedure: OPEN REDUCTION W/ INTERNAL FIXATION (ORIF) TIBIAL PLATEAU;  Surgeon: Alber Hernandez MD;  [facility];  Service: Orthopedics    [place] REMOVAL DEEP IMPLANT Right 8/2/2018    Procedure: REMOVAL HARDWARE TIBIA;  Surgeon: Alber Hernandez MD;  [facility];  Service: Orthopedics    TUBAL LIGATION      US GUIDED BREAST BIOPSY LEFT COMPLETE Left 10/24/2019       Current Outpatient Medications   Medication Sig Dispense Refill    acetaminophen (TYLENOL) 325 mg tablet 650 mg every 6 hours for mild pain 30 tablet 0    amitriptyline (ELAVIL) 50 mg tablet Take 1 tablet PO qHS for a week then take 2 tablets qHS  (Patient taking differently: 100 mg Take 1 tablet PO qHS for a week then take 2 tablets qHS  ) 90 tablet 1    busPIRone (BUSPAR) 5 mg tablet Take 1 tablet (5 mg total) by mouth 2 (two) times a day as needed (anxiety) 60 tablet 1    cyanocobalamin 1,000 mcg/mL Inject 1 mL (1,000 mcg total) into a muscle every 30 (thirty) days 10 mL 1    ergocalciferol (VITAMIN D2) 50,000 units Take 1 capsule (50,000 Units total) by mouth once a week 12 capsule 0    gabapentin (NEURONTIN) 100 mg capsule Take 1 capsule (100 mg total) by mouth 2 (two) times a day (Patient taking differently: Take 100 mg by mouth 2 (two) times a day as needed ) 60 capsule 2    meloxicam (MOBIC) 15 mg tablet TAKE ONE TABLET BY MOUTH EVERY DAY 30 tablet 1    SYRINGE-NEEDLE, DISP, 3 ML 25G X 1" 3 ML MISC by Does not apply route every 30 (thirty) days 50 each 0    cephalexin (KEFLEX) 500 mg capsule Take 1 capsule (500 mg total) by mouth every 6 (six) hours for 10 days 40 capsule 0    Cholecalciferol (VITAMIN D3) 50 MCG (2000 UT) capsule Take 1 capsule (2,000 Units total) by mouth daily (Patient not taking: Reported on 3/25/2020) 90 capsule 1     No current facility-administered medications for this visit  No Known Allergies    Review of Systems   Constitutional: Negative for fever  Respiratory: Negative for shortness of breath  Cardiovascular: Negative for chest pain  Skin: Positive for wound  Negative for color change  Psychiatric/Behavioral: Positive for sleep disturbance  Physical Exam   Constitutional: She is oriented to person, place, and time  She appears well-developed  HENT:   Head: Normocephalic  Pulmonary/Chest: Breath sounds normal    Neurological: She is alert and oriented to person, place, and time  Skin: Lesion noted  No rash noted  Psychiatric: She has a normal mood and affect   Her behavior is normal

## 2020-03-25 NOTE — TELEPHONE ENCOUNTER
Patient states she has lump under Right Arm pit  It is hard and tender , not red  Noticed it a few days ago has got bigger and more painful      She tried using a warm compress on it - it did not help  She tried putting cortisone cream on it -that did not help

## 2020-04-29 ENCOUNTER — OFFICE VISIT (OUTPATIENT)
Dept: OBGYN CLINIC | Facility: HOSPITAL | Age: 45
End: 2020-04-29
Payer: COMMERCIAL

## 2020-04-29 VITALS
HEART RATE: 96 BPM | WEIGHT: 148 LBS | HEIGHT: 66 IN | BODY MASS INDEX: 23.78 KG/M2 | DIASTOLIC BLOOD PRESSURE: 82 MMHG | SYSTOLIC BLOOD PRESSURE: 134 MMHG

## 2020-04-29 DIAGNOSIS — M25.561 CHRONIC PAIN OF RIGHT KNEE: ICD-10-CM

## 2020-04-29 DIAGNOSIS — G89.29 CHRONIC PAIN OF RIGHT KNEE: ICD-10-CM

## 2020-04-29 DIAGNOSIS — M17.31 POST-TRAUMATIC OSTEOARTHRITIS OF RIGHT KNEE: Primary | ICD-10-CM

## 2020-04-29 PROCEDURE — 1036F TOBACCO NON-USER: CPT | Performed by: ORTHOPAEDIC SURGERY

## 2020-04-29 PROCEDURE — 3008F BODY MASS INDEX DOCD: CPT | Performed by: ORTHOPAEDIC SURGERY

## 2020-04-29 PROCEDURE — 20610 DRAIN/INJ JOINT/BURSA W/O US: CPT | Performed by: ORTHOPAEDIC SURGERY

## 2020-04-29 PROCEDURE — 99213 OFFICE O/P EST LOW 20 MIN: CPT | Performed by: ORTHOPAEDIC SURGERY

## 2020-04-29 RX ORDER — BETAMETHASONE SODIUM PHOSPHATE AND BETAMETHASONE ACETATE 3; 3 MG/ML; MG/ML
12 INJECTION, SUSPENSION INTRA-ARTICULAR; INTRALESIONAL; INTRAMUSCULAR; SOFT TISSUE
Status: COMPLETED | OUTPATIENT
Start: 2020-04-29 | End: 2020-04-29

## 2020-04-29 RX ORDER — BUPIVACAINE HYDROCHLORIDE 2.5 MG/ML
2 INJECTION, SOLUTION INFILTRATION; PERINEURAL
Status: COMPLETED | OUTPATIENT
Start: 2020-04-29 | End: 2020-04-29

## 2020-04-29 RX ORDER — LIDOCAINE HYDROCHLORIDE 10 MG/ML
2 INJECTION, SOLUTION INFILTRATION; PERINEURAL
Status: COMPLETED | OUTPATIENT
Start: 2020-04-29 | End: 2020-04-29

## 2020-04-29 RX ADMIN — LIDOCAINE HYDROCHLORIDE 2 ML: 10 INJECTION, SOLUTION INFILTRATION; PERINEURAL at 14:34

## 2020-04-29 RX ADMIN — BUPIVACAINE HYDROCHLORIDE 2 ML: 2.5 INJECTION, SOLUTION INFILTRATION; PERINEURAL at 14:34

## 2020-04-29 RX ADMIN — BETAMETHASONE SODIUM PHOSPHATE AND BETAMETHASONE ACETATE 12 MG: 3; 3 INJECTION, SUSPENSION INTRA-ARTICULAR; INTRALESIONAL; INTRAMUSCULAR; SOFT TISSUE at 14:34

## 2020-06-09 ENCOUNTER — DOCTOR'S OFFICE (OUTPATIENT)
Dept: URBAN - METROPOLITAN AREA CLINIC 137 | Facility: CLINIC | Age: 45
Setting detail: OPHTHALMOLOGY
End: 2020-06-09
Payer: COMMERCIAL

## 2020-06-09 DIAGNOSIS — H52.13: ICD-10-CM

## 2020-06-09 DIAGNOSIS — H52.223: ICD-10-CM

## 2020-06-09 PROBLEM — H40.003 GLAUCOMA SUSPECT; BOTH EYES: Status: ACTIVE | Noted: 2020-06-09

## 2020-06-09 PROCEDURE — 92014 COMPRE OPH EXAM EST PT 1/>: CPT | Performed by: OPTOMETRIST

## 2020-06-09 PROCEDURE — 92310 CONTACT LENS FITTING OU: CPT | Performed by: OPTOMETRIST

## 2020-06-09 ASSESSMENT — REFRACTION_MANIFEST
OS_SPHERE: -4.00
OD_CYLINDER: -1.25
OS_VA1: 20/20
OD_VA1: 20/20
OD_VA1: 20/20
OD_SPHERE: -3.50
OS_AXIS: 140
OS_AXIS: 160
OS_VA1: 20/20-
OS_CYLINDER: -1.00
OU_VA: 20/20
OD_CYLINDER: -0.75
OD_AXIS: 170
OD_AXIS: 180
OS_SPHERE: -3.50
OS_CYLINDER: -0.75
OD_SPHERE: -4.00

## 2020-06-09 ASSESSMENT — REFRACTION_AUTOREFRACTION
OS_CYLINDER: -0.75
OD_SPHERE: -3.75
OD_CYLINDER: -1.50
OS_SPHERE: -3.50
OS_AXIS: 162
OD_AXIS: 3

## 2020-06-09 ASSESSMENT — CONFRONTATIONAL VISUAL FIELD TEST (CVF)
OS_FINDINGS: FULL
OD_FINDINGS: FULL

## 2020-06-09 ASSESSMENT — SPHEQUIV_DERIVED
OD_SPHEQUIV: -3.875
OS_SPHEQUIV: -3.875
OS_SPHEQUIV: -4.5
OD_SPHEQUIV: -4.625
OS_SPHEQUIV: -3.875
OD_SPHEQUIV: -4.5

## 2020-06-09 ASSESSMENT — VISUAL ACUITY
OD_BCVA: 20/30-2
OS_BCVA: 20/25-1

## 2020-06-12 ENCOUNTER — OFFICE VISIT (OUTPATIENT)
Dept: INTERNAL MEDICINE CLINIC | Facility: CLINIC | Age: 45
End: 2020-06-12
Payer: COMMERCIAL

## 2020-06-12 VITALS
WEIGHT: 147 LBS | OXYGEN SATURATION: 98 % | TEMPERATURE: 98.7 F | DIASTOLIC BLOOD PRESSURE: 72 MMHG | BODY MASS INDEX: 23.63 KG/M2 | SYSTOLIC BLOOD PRESSURE: 112 MMHG | HEART RATE: 99 BPM | HEIGHT: 66 IN

## 2020-06-12 DIAGNOSIS — E55.9 VITAMIN D DEFICIENCY: ICD-10-CM

## 2020-06-12 DIAGNOSIS — F41.9 ANXIETY: ICD-10-CM

## 2020-06-12 DIAGNOSIS — Z13.220 SCREENING, LIPID: ICD-10-CM

## 2020-06-12 DIAGNOSIS — G57.01 PIRIFORMIS SYNDROME OF RIGHT SIDE: ICD-10-CM

## 2020-06-12 DIAGNOSIS — F33.1 MODERATE EPISODE OF RECURRENT MAJOR DEPRESSIVE DISORDER (HCC): ICD-10-CM

## 2020-06-12 DIAGNOSIS — Z98.890 S/P ORIF (OPEN REDUCTION INTERNAL FIXATION) FRACTURE: ICD-10-CM

## 2020-06-12 DIAGNOSIS — H92.02 LEFT EAR PAIN: ICD-10-CM

## 2020-06-12 DIAGNOSIS — Z00.00 HEALTH MAINTENANCE EXAMINATION: Primary | ICD-10-CM

## 2020-06-12 DIAGNOSIS — E53.8 VITAMIN B12 DEFICIENCY: ICD-10-CM

## 2020-06-12 DIAGNOSIS — Z87.81 S/P ORIF (OPEN REDUCTION INTERNAL FIXATION) FRACTURE: ICD-10-CM

## 2020-06-12 DIAGNOSIS — Z00.00 LABORATORY EXAMINATION ORDERED AS PART OF A ROUTINE GENERAL MEDICAL EXAMINATION: ICD-10-CM

## 2020-06-12 PROBLEM — M25.561 ACUTE PAIN OF RIGHT KNEE: Status: RESOLVED | Noted: 2018-06-22 | Resolved: 2020-06-12

## 2020-06-12 PROCEDURE — 99396 PREV VISIT EST AGE 40-64: CPT | Performed by: INTERNAL MEDICINE

## 2020-06-12 RX ORDER — GABAPENTIN 100 MG/1
CAPSULE ORAL
Qty: 60 CAPSULE | Refills: 5 | Status: SHIPPED | OUTPATIENT
Start: 2020-06-12 | End: 2021-04-05

## 2020-06-12 RX ORDER — CYANOCOBALAMIN 1000 UG/ML
1000 INJECTION INTRAMUSCULAR; SUBCUTANEOUS
Qty: 10 ML | Refills: 1 | Status: SHIPPED | OUTPATIENT
Start: 2020-06-12 | End: 2020-10-16 | Stop reason: SDUPTHER

## 2020-06-12 RX ORDER — AMOXICILLIN 500 MG/1
500 CAPSULE ORAL EVERY 8 HOURS SCHEDULED
Qty: 15 CAPSULE | Refills: 0 | Status: SHIPPED | OUTPATIENT
Start: 2020-06-12 | End: 2020-06-17

## 2020-07-16 ENCOUNTER — OPTICAL OFFICE (OUTPATIENT)
Dept: URBAN - METROPOLITAN AREA CLINIC 146 | Facility: CLINIC | Age: 45
Setting detail: OPHTHALMOLOGY
End: 2020-07-16
Payer: COMMERCIAL

## 2020-07-16 DIAGNOSIS — H52.13: ICD-10-CM

## 2020-07-16 PROCEDURE — S0500 DISPOS CONT LENS: HCPCS | Performed by: OPTOMETRIST

## 2020-07-28 ENCOUNTER — OFFICE VISIT (OUTPATIENT)
Dept: OBGYN CLINIC | Facility: HOSPITAL | Age: 45
End: 2020-07-28
Payer: COMMERCIAL

## 2020-07-28 VITALS
HEIGHT: 66 IN | SYSTOLIC BLOOD PRESSURE: 131 MMHG | WEIGHT: 140.8 LBS | BODY MASS INDEX: 22.63 KG/M2 | DIASTOLIC BLOOD PRESSURE: 88 MMHG | HEART RATE: 112 BPM

## 2020-07-28 DIAGNOSIS — M17.31 POST-TRAUMATIC OSTEOARTHRITIS OF RIGHT KNEE: Primary | ICD-10-CM

## 2020-07-28 PROCEDURE — 99213 OFFICE O/P EST LOW 20 MIN: CPT | Performed by: ORTHOPAEDIC SURGERY

## 2020-07-28 PROCEDURE — 3008F BODY MASS INDEX DOCD: CPT | Performed by: ORTHOPAEDIC SURGERY

## 2020-07-28 PROCEDURE — 1036F TOBACCO NON-USER: CPT | Performed by: ORTHOPAEDIC SURGERY

## 2020-07-28 RX ORDER — NABUMETONE 750 MG/1
750 TABLET, FILM COATED ORAL 2 TIMES DAILY
Qty: 60 TABLET | Refills: 1 | Status: SHIPPED | OUTPATIENT
Start: 2020-07-28 | End: 2020-10-23 | Stop reason: SDUPTHER

## 2020-07-28 NOTE — PROGRESS NOTES
Assessment:  1  Post-traumatic osteoarthritis of right knee  nabumetone (RELAFEN) 750 mg tablet       Plan:  The patient was prescribed Nabumetone 750mg  She should follow up in 3 months  Patient was educated on maintaining a healthy lifestyle with proper weight management and physician recommended home exercise program/routine for regular fitness  She should follow up in 4 weeks  To do next visit:  Return in about 4 weeks (around 8/25/2020)  The above stated was discussed in layman's terms and the patient expressed understanding  All questions were answered to the patient's satisfaction  Scribe Attestation    I,:   Shekhar Mathews am acting as a scribe while in the presence of the attending physician :        I,:   Carolina Ward MD personally performed the services described in this documentation    as scribed in my presence :              Subjective:   Loni Mendez is a 41 y o  female who presents for follow up of right knee  She is s/p right knee steroid injection, 4/29/2020  Today she complains of right generalized knee pain  Increased activity aggravates while rest alleviates    She has history of right tibial ORIF and painful hardware removal         Review of systems negative unless otherwise specified in HPI    Past Medical History:   Diagnosis Date    Anemia     Arthritis     Depression     Edema     Fatigue     Vitamin B12 deficiency     Vitamin D deficiency        Past Surgical History:   Procedure Laterality Date    KNEE ARTHROSCOPY Bilateral     KNEE SURGERY      ORIF TIBIA FRACTURE Right 1/18/2018    Procedure: OPEN REDUCTION W/ INTERNAL FIXATION (ORIF) TIBIA;  Surgeon: Carolina Ward MD;  [facility];  Service: Orthopedics    ORIF TIBIAL PLATEAU Right 5/62/6279    Procedure: OPEN REDUCTION W/ INTERNAL FIXATION (ORIF) TIBIAL PLATEAU;  Surgeon: Carolina Ward MD;  [facility];  Service: Orthopedics    [place] REMOVAL DEEP IMPLANT Right 8/2/2018    Procedure: REMOVAL HARDWARE TIBIA;  Surgeon: Carolina Ward MD;  [facility];  Service: Orthopedics    TUBAL LIGATION      US GUIDED BREAST BIOPSY LEFT COMPLETE Left 10/24/2019       Family History   Problem Relation Age of Onset    Cancer Mother         either cervical or ovarian    Heart disease Mother         heart surgery    Heart attack Father         stent    Diabetes Father     Hypertension Father     Cancer Father 62        stomach cancer    Arthritis Family     Stomach cancer Family     Ovarian cancer Family     Rheum arthritis Daughter     Lupus Maternal Grandmother     Lupus Cousin     No Known Problems Maternal Grandfather     No Known Problems Paternal Grandmother     No Known Problems Paternal Grandfather     No Known Problems Daughter     No Known Problems Maternal Aunt     No Known Problems Maternal Aunt     No Known Problems Maternal Aunt     No Known Problems Paternal Aunt     Alcohol abuse Neg Hx     Depression Neg Hx     Drug abuse Neg Hx     Substance Abuse Neg Hx     Mental illness Neg Hx        Social History     Occupational History    Occupation: Teacher in [place]   Tobacco Use    Smoking status: Never Smoker    Smokeless tobacco: Never Used   Substance and Sexual Activity    Alcohol use: No     Comment: social drink sporatic    Drug use: No    Sexual activity: Yes         Current Outpatient Medications:     acetaminophen (TYLENOL) 325 mg tablet, 650 mg every 6 hours for mild pain, Disp: 30 tablet, Rfl: 0    amitriptyline (ELAVIL) 50 mg tablet, Take 1 tablet PO qHS for a week then take 2 tablets qHS  (Patient taking differently: 100 mg Take 1 tablet PO qHS for a week then take 2 tablets qHS  ), Disp: 90 tablet, Rfl: 1    busPIRone (BUSPAR) 5 mg tablet, Take 1 tablet (5 mg total) by mouth 2 (two) times a day as needed (anxiety), Disp: 60 tablet, Rfl: 1    Cholecalciferol (VITAMIN D3) 50 MCG (2000 UT) capsule, Take 1 capsule (2,000 Units total) by mouth daily (Patient not taking: Reported on 3/25/2020), Disp: 90 capsule, Rfl: 1    cyanocobalamin 1,000 mcg/mL, Inject 1 mL (1,000 mcg total) into a muscle every 30 (thirty) days, Disp: 10 mL, Rfl: 1    ergocalciferol (VITAMIN D2) 50,000 units, Take 1 capsule (50,000 Units total) by mouth once a week, Disp: 12 capsule, Rfl: 0    gabapentin (NEURONTIN) 100 mg capsule, Take 1 cap daily, may take additional dose prn , Disp: 60 capsule, Rfl: 5    meloxicam (MOBIC) 15 mg tablet, TAKE ONE TABLET BY MOUTH EVERY DAY, Disp: 30 tablet, Rfl: 1    SYRINGE-NEEDLE, DISP, 3 ML 25G X 1" 3 ML MISC, by Does not apply route every 30 (thirty) days, Disp: 50 each, Rfl: 0    No Known Allergies         Vitals:    07/28/20 1339   BP: 131/88   Pulse: (!) 112       Objective:  Physical exam  · General: Awake, Alert, Oriented  · Eyes: Pupils equal, round and reactive to light  · Heart: regular rate and rhythm  · Lungs: No audible wheezing  · Abdomen: soft                    Ortho Exam   Right knee:  Well healed anterolateral scar  Mild varus alignment  TTP lateral joint line  No erythema or ecchymosis  No effusion or swelling  Normal strength  Good ROM   Calf compartments soft and supple  Sensation intact  Toes are warm sensate and mobile      Diagnostics, reviewed and taken today if performed as documented:    None performed     Procedures, if performed today:    Procedures    None performed      Portions of the record may have been created with voice recognition software  Occasional wrong word or "sound a like" substitutions may have occurred due to the inherent limitations of voice recognition software  Read the chart carefully and recognize, using context, where substitutions have occurred

## 2020-08-28 ENCOUNTER — OFFICE VISIT (OUTPATIENT)
Dept: OBGYN CLINIC | Facility: MEDICAL CENTER | Age: 45
End: 2020-08-28
Payer: COMMERCIAL

## 2020-08-28 VITALS
WEIGHT: 138 LBS | HEIGHT: 66 IN | HEART RATE: 82 BPM | BODY MASS INDEX: 22.18 KG/M2 | DIASTOLIC BLOOD PRESSURE: 86 MMHG | SYSTOLIC BLOOD PRESSURE: 122 MMHG

## 2020-08-28 DIAGNOSIS — M17.31 POST-TRAUMATIC OSTEOARTHRITIS OF RIGHT KNEE: Primary | ICD-10-CM

## 2020-08-28 PROCEDURE — 1036F TOBACCO NON-USER: CPT | Performed by: ORTHOPAEDIC SURGERY

## 2020-08-28 PROCEDURE — 3008F BODY MASS INDEX DOCD: CPT | Performed by: ORTHOPAEDIC SURGERY

## 2020-08-28 PROCEDURE — 99212 OFFICE O/P EST SF 10 MIN: CPT | Performed by: ORTHOPAEDIC SURGERY

## 2020-08-28 NOTE — PROGRESS NOTES
Assessment:  1  Post-traumatic osteoarthritis of right knee  Injection procedure prior authorization       Plan:    The patient is doing well with use of Nabumetone 750mg  She does find benefit from past visco-supplement and Euflexxa is ordered  She should follow up in about 6 weeks for visco injections x3  To do next visit:  Return in about 6 weeks (around 10/9/2020)  The above stated was discussed in layman's terms and the patient expressed understanding  All questions were answered to the patient's satisfaction  Scribe Attestation    I,:   Robert King am acting as a scribe while in the presence of the attending physician :        I,:   Roberto Yuan MD personally performed the services described in this documentation    as scribed in my presence :              Subjective:   Benjamin Gonzales is a 41 y o  female who presents for follow up of right knee  She is doing well  Today she complains of decreased right generalized knee pain  Increased activity aggravates while rest alleviates  She has history of right tibial ORIF and painful hardware removal   She does find benefit from past visco-supplement injections          Review of systems negative unless otherwise specified in HPI    Past Medical History:   Diagnosis Date    Anemia     Arthritis     Depression     Edema     Fatigue     Vitamin B12 deficiency     Vitamin D deficiency        Past Surgical History:   Procedure Laterality Date    KNEE ARTHROSCOPY Bilateral     KNEE SURGERY      ORIF TIBIA FRACTURE Right 1/18/2018    Procedure: OPEN REDUCTION W/ INTERNAL FIXATION (ORIF) TIBIA;  Surgeon: Roberto Yuan MD;  [facility];  Service: Orthopedics    ORIF TIBIAL PLATEAU Right 9/19/9146    Procedure: OPEN REDUCTION W/ INTERNAL FIXATION (ORIF) TIBIAL PLATEAU;  Surgeon: Roberto Yuan MD;  [facility];  Service: Orthopedics    [place] REMOVAL DEEP IMPLANT Right 8/2/2018    Procedure: REMOVAL HARDWARE TIBIA; Surgeon: Toan Frey MD;  [facility];  Service: Orthopedics    TUBAL LIGATION      US GUIDED BREAST BIOPSY LEFT COMPLETE Left 10/24/2019       Family History   Problem Relation Age of Onset    Cancer Mother         either cervical or ovarian    Heart disease Mother         heart surgery    Heart attack Father         stent    Diabetes Father     Hypertension Father     Cancer Father 62        stomach cancer    Arthritis Family     Stomach cancer Family     Ovarian cancer Family     Rheum arthritis Daughter     Lupus Maternal Grandmother     Lupus Cousin     No Known Problems Maternal Grandfather     No Known Problems Paternal Grandmother     No Known Problems Paternal Grandfather     No Known Problems Daughter     No Known Problems Maternal Aunt     No Known Problems Maternal Aunt     No Known Problems Maternal Aunt     No Known Problems Paternal Aunt     Alcohol abuse Neg Hx     Depression Neg Hx     Drug abuse Neg Hx     Substance Abuse Neg Hx     Mental illness Neg Hx        Social History     Occupational History    Occupation: Teacher in [place]   Tobacco Use    Smoking status: Never Smoker    Smokeless tobacco: Never Used   Substance and Sexual Activity    Alcohol use: No     Comment: social drink sporatic    Drug use: No    Sexual activity: Yes         Current Outpatient Medications:     acetaminophen (TYLENOL) 325 mg tablet, 650 mg every 6 hours for mild pain, Disp: 30 tablet, Rfl: 0    amitriptyline (ELAVIL) 50 mg tablet, Take 1 tablet PO qHS for a week then take 2 tablets qHS  (Patient taking differently: 100 mg Take 1 tablet PO qHS for a week then take 2 tablets qHS  ), Disp: 90 tablet, Rfl: 1    busPIRone (BUSPAR) 5 mg tablet, Take 1 tablet (5 mg total) by mouth 2 (two) times a day as needed (anxiety), Disp: 60 tablet, Rfl: 1    Cholecalciferol (VITAMIN D3) 50 MCG (2000 UT) capsule, Take 1 capsule (2,000 Units total) by mouth daily, Disp: 90 capsule, Rfl: 1    cyanocobalamin 1,000 mcg/mL, Inject 1 mL (1,000 mcg total) into a muscle every 30 (thirty) days, Disp: 10 mL, Rfl: 1    ergocalciferol (VITAMIN D2) 50,000 units, Take 1 capsule (50,000 Units total) by mouth once a week, Disp: 12 capsule, Rfl: 0    gabapentin (NEURONTIN) 100 mg capsule, Take 1 cap daily, may take additional dose prn , Disp: 60 capsule, Rfl: 5    meloxicam (MOBIC) 15 mg tablet, TAKE ONE TABLET BY MOUTH EVERY DAY, Disp: 30 tablet, Rfl: 1    nabumetone (RELAFEN) 750 mg tablet, Take 1 tablet (750 mg total) by mouth 2 (two) times a day As needed for pain, Disp: 60 tablet, Rfl: 1    SYRINGE-NEEDLE, DISP, 3 ML 25G X 1" 3 ML MISC, by Does not apply route every 30 (thirty) days, Disp: 50 each, Rfl: 0    No Known Allergies         Vitals:    08/28/20 0859   BP: 122/86   Pulse: 82       Objective:  Physical exam  · General: Awake, Alert, Oriented  · Eyes: Pupils equal, round and reactive to light  · Heart: regular rate and rhythm  · Lungs: No audible wheezing  · Abdomen: soft                    Ortho Exam   Right knee:  Well healed anterolateral scar  Mild varus alignment  TTP lateral joint line  No erythema or ecchymosis  No effusion or swelling  Normal strength  Good ROM   Calf compartments soft and supple  Sensation intact  Toes are warm sensate and mobile      Diagnostics, reviewed and taken today if performed as documented:    None performed     Procedures, if performed today:    Procedures    None performed      Portions of the record may have been created with voice recognition software  Occasional wrong word or "sound a like" substitutions may have occurred due to the inherent limitations of voice recognition software  Read the chart carefully and recognize, using context, where substitutions have occurred

## 2020-10-02 ENCOUNTER — HOSPITAL ENCOUNTER (OUTPATIENT)
Dept: RADIOLOGY | Age: 45
Discharge: HOME | End: 2020-10-02
Payer: COMMERCIAL

## 2020-10-02 VITALS — BODY MASS INDEX: 22.18 KG/M2 | WEIGHT: 138 LBS | HEIGHT: 66 IN

## 2020-10-02 DIAGNOSIS — Z12.31 ENCOUNTER FOR SCREENING MAMMOGRAM FOR MALIGNANT NEOPLASM OF BREAST: ICD-10-CM

## 2020-10-02 PROCEDURE — 77067 SCR MAMMO BI INCL CAD: CPT

## 2020-10-02 PROCEDURE — 77063 BREAST TOMOSYNTHESIS BI: CPT

## 2020-10-16 ENCOUNTER — OFFICE VISIT (OUTPATIENT)
Dept: INTERNAL MEDICINE CLINIC | Facility: CLINIC | Age: 45
End: 2020-10-16
Payer: COMMERCIAL

## 2020-10-16 ENCOUNTER — APPOINTMENT (OUTPATIENT)
Dept: LAB | Facility: CLINIC | Age: 45
End: 2020-10-16
Payer: COMMERCIAL

## 2020-10-16 ENCOUNTER — TELEPHONE (OUTPATIENT)
Dept: INTERNAL MEDICINE CLINIC | Facility: CLINIC | Age: 45
End: 2020-10-16

## 2020-10-16 VITALS
WEIGHT: 138 LBS | BODY MASS INDEX: 22.18 KG/M2 | HEIGHT: 66 IN | TEMPERATURE: 96.9 F | OXYGEN SATURATION: 98 % | DIASTOLIC BLOOD PRESSURE: 70 MMHG | SYSTOLIC BLOOD PRESSURE: 110 MMHG | HEART RATE: 105 BPM

## 2020-10-16 DIAGNOSIS — Z01.419 WELL WOMAN EXAM: ICD-10-CM

## 2020-10-16 DIAGNOSIS — E55.9 VITAMIN D DEFICIENCY: ICD-10-CM

## 2020-10-16 DIAGNOSIS — M17.31 POST-TRAUMATIC OSTEOARTHRITIS OF RIGHT KNEE: ICD-10-CM

## 2020-10-16 DIAGNOSIS — Z13.220 SCREENING, LIPID: ICD-10-CM

## 2020-10-16 DIAGNOSIS — Z00.00 LABORATORY EXAMINATION ORDERED AS PART OF A ROUTINE GENERAL MEDICAL EXAMINATION: ICD-10-CM

## 2020-10-16 DIAGNOSIS — F33.1 MODERATE EPISODE OF RECURRENT MAJOR DEPRESSIVE DISORDER (HCC): ICD-10-CM

## 2020-10-16 DIAGNOSIS — E53.8 VITAMIN B12 DEFICIENCY: ICD-10-CM

## 2020-10-16 DIAGNOSIS — E53.8 VITAMIN B12 DEFICIENCY: Primary | ICD-10-CM

## 2020-10-16 DIAGNOSIS — F41.9 ANXIETY: Primary | ICD-10-CM

## 2020-10-16 DIAGNOSIS — M79.604 LEG PAIN, RIGHT: ICD-10-CM

## 2020-10-16 LAB
25(OH)D3 SERPL-MCNC: 15 NG/ML (ref 30–100)
ALBUMIN SERPL BCP-MCNC: 3.9 G/DL (ref 3.5–5)
ALP SERPL-CCNC: 76 U/L (ref 46–116)
ALT SERPL W P-5'-P-CCNC: 14 U/L (ref 12–78)
ANION GAP SERPL CALCULATED.3IONS-SCNC: 6 MMOL/L (ref 4–13)
AST SERPL W P-5'-P-CCNC: 10 U/L (ref 5–45)
BASOPHILS # BLD AUTO: 0.02 THOUSANDS/ΜL (ref 0–0.1)
BASOPHILS NFR BLD AUTO: 1 % (ref 0–1)
BILIRUB SERPL-MCNC: 0.75 MG/DL (ref 0.2–1)
BUN SERPL-MCNC: 5 MG/DL (ref 5–25)
CALCIUM SERPL-MCNC: 9.1 MG/DL (ref 8.3–10.1)
CHLORIDE SERPL-SCNC: 105 MMOL/L (ref 100–108)
CHOLEST SERPL-MCNC: 150 MG/DL (ref 50–200)
CO2 SERPL-SCNC: 28 MMOL/L (ref 21–32)
CREAT SERPL-MCNC: 0.72 MG/DL (ref 0.6–1.3)
EOSINOPHIL # BLD AUTO: 0.07 THOUSAND/ΜL (ref 0–0.61)
EOSINOPHIL NFR BLD AUTO: 2 % (ref 0–6)
ERYTHROCYTE [DISTWIDTH] IN BLOOD BY AUTOMATED COUNT: 13.7 % (ref 11.6–15.1)
GFR SERPL CREATININE-BSD FRML MDRD: 102 ML/MIN/1.73SQ M
GLUCOSE P FAST SERPL-MCNC: 81 MG/DL (ref 65–99)
HCT VFR BLD AUTO: 47 % (ref 34.8–46.1)
HDLC SERPL-MCNC: 63 MG/DL
HGB BLD-MCNC: 14.6 G/DL (ref 11.5–15.4)
IMM GRANULOCYTES # BLD AUTO: 0.01 THOUSAND/UL (ref 0–0.2)
IMM GRANULOCYTES NFR BLD AUTO: 0 % (ref 0–2)
LDLC SERPL CALC-MCNC: 77 MG/DL (ref 0–100)
LYMPHOCYTES # BLD AUTO: 1.94 THOUSANDS/ΜL (ref 0.6–4.47)
LYMPHOCYTES NFR BLD AUTO: 45 % (ref 14–44)
MCH RBC QN AUTO: 28.3 PG (ref 26.8–34.3)
MCHC RBC AUTO-ENTMCNC: 31.1 G/DL (ref 31.4–37.4)
MCV RBC AUTO: 91 FL (ref 82–98)
MONOCYTES # BLD AUTO: 0.31 THOUSAND/ΜL (ref 0.17–1.22)
MONOCYTES NFR BLD AUTO: 7 % (ref 4–12)
NEUTROPHILS # BLD AUTO: 2.01 THOUSANDS/ΜL (ref 1.85–7.62)
NEUTS SEG NFR BLD AUTO: 45 % (ref 43–75)
NONHDLC SERPL-MCNC: 87 MG/DL
NRBC BLD AUTO-RTO: 0 /100 WBCS
PLATELET # BLD AUTO: 227 THOUSANDS/UL (ref 149–390)
PMV BLD AUTO: 11.7 FL (ref 8.9–12.7)
POTASSIUM SERPL-SCNC: 3.9 MMOL/L (ref 3.5–5.3)
PROT SERPL-MCNC: 8.5 G/DL (ref 6.4–8.2)
RBC # BLD AUTO: 5.16 MILLION/UL (ref 3.81–5.12)
SODIUM SERPL-SCNC: 139 MMOL/L (ref 136–145)
TRIGL SERPL-MCNC: 51 MG/DL
TSH SERPL DL<=0.05 MIU/L-ACNC: 1.26 UIU/ML (ref 0.36–3.74)
VIT B12 SERPL-MCNC: 323 PG/ML (ref 100–900)
WBC # BLD AUTO: 4.36 THOUSAND/UL (ref 4.31–10.16)

## 2020-10-16 PROCEDURE — 1036F TOBACCO NON-USER: CPT | Performed by: INTERNAL MEDICINE

## 2020-10-16 PROCEDURE — 80061 LIPID PANEL: CPT

## 2020-10-16 PROCEDURE — 82607 VITAMIN B-12: CPT

## 2020-10-16 PROCEDURE — 85025 COMPLETE CBC W/AUTO DIFF WBC: CPT

## 2020-10-16 PROCEDURE — 99214 OFFICE O/P EST MOD 30 MIN: CPT | Performed by: INTERNAL MEDICINE

## 2020-10-16 PROCEDURE — 82306 VITAMIN D 25 HYDROXY: CPT

## 2020-10-16 PROCEDURE — 80053 COMPREHEN METABOLIC PANEL: CPT

## 2020-10-16 PROCEDURE — 84443 ASSAY THYROID STIM HORMONE: CPT

## 2020-10-16 PROCEDURE — 3725F SCREEN DEPRESSION PERFORMED: CPT | Performed by: INTERNAL MEDICINE

## 2020-10-16 PROCEDURE — 36415 COLL VENOUS BLD VENIPUNCTURE: CPT

## 2020-10-16 RX ORDER — ERGOCALCIFEROL 1.25 MG/1
50000 CAPSULE ORAL WEEKLY
Qty: 12 CAPSULE | Refills: 0 | Status: SHIPPED | OUTPATIENT
Start: 2020-10-16 | End: 2021-02-19 | Stop reason: ALTCHOICE

## 2020-10-16 RX ORDER — LANOLIN ALCOHOL/MO/W.PET/CERES
1000 CREAM (GRAM) TOPICAL DAILY
Qty: 90 TABLET | Refills: 1 | Status: SHIPPED | OUTPATIENT
Start: 2020-10-16 | End: 2021-06-09 | Stop reason: SDUPTHER

## 2020-10-16 RX ORDER — BUSPIRONE HYDROCHLORIDE 5 MG/1
5 TABLET ORAL 2 TIMES DAILY
Qty: 60 TABLET | Refills: 5 | Status: SHIPPED | OUTPATIENT
Start: 2020-10-16 | End: 2021-10-29 | Stop reason: SDUPTHER

## 2020-10-16 RX ORDER — AMITRIPTYLINE HYDROCHLORIDE 150 MG/1
150 TABLET, FILM COATED ORAL
Qty: 90 TABLET | Refills: 1 | Status: SHIPPED | OUTPATIENT
Start: 2020-10-16 | End: 2021-10-29 | Stop reason: SDUPTHER

## 2020-10-16 RX ORDER — CYANOCOBALAMIN 1000 UG/ML
1000 INJECTION INTRAMUSCULAR; SUBCUTANEOUS
Qty: 10 ML | Refills: 1 | Status: SHIPPED | OUTPATIENT
Start: 2020-10-16 | End: 2021-02-19 | Stop reason: SDUPTHER

## 2020-10-16 RX ORDER — ACETAMINOPHEN 160 MG
2000 TABLET,DISINTEGRATING ORAL DAILY
Qty: 90 CAPSULE | Refills: 1 | Status: SHIPPED | OUTPATIENT
Start: 2020-10-16 | End: 2021-02-19 | Stop reason: SDUPTHER

## 2020-10-23 ENCOUNTER — OFFICE VISIT (OUTPATIENT)
Dept: OBGYN CLINIC | Facility: MEDICAL CENTER | Age: 45
End: 2020-10-23
Payer: COMMERCIAL

## 2020-10-23 VITALS
TEMPERATURE: 97.1 F | WEIGHT: 136 LBS | BODY MASS INDEX: 21.86 KG/M2 | DIASTOLIC BLOOD PRESSURE: 80 MMHG | HEIGHT: 66 IN | HEART RATE: 77 BPM | SYSTOLIC BLOOD PRESSURE: 124 MMHG

## 2020-10-23 DIAGNOSIS — M25.561 CHRONIC PAIN OF RIGHT KNEE: ICD-10-CM

## 2020-10-23 DIAGNOSIS — M17.31 POST-TRAUMATIC OSTEOARTHRITIS OF RIGHT KNEE: Primary | ICD-10-CM

## 2020-10-23 DIAGNOSIS — G89.29 CHRONIC PAIN OF RIGHT KNEE: ICD-10-CM

## 2020-10-23 PROCEDURE — 20610 DRAIN/INJ JOINT/BURSA W/O US: CPT | Performed by: ORTHOPAEDIC SURGERY

## 2020-10-23 RX ORDER — NABUMETONE 750 MG/1
750 TABLET, FILM COATED ORAL 2 TIMES DAILY
Qty: 60 TABLET | Refills: 1 | Status: SHIPPED | OUTPATIENT
Start: 2020-10-23 | End: 2021-04-05

## 2020-10-23 RX ORDER — HYALURONATE SODIUM 10 MG/ML
20 SYRINGE (ML) INTRAARTICULAR
Status: COMPLETED | OUTPATIENT
Start: 2020-10-23 | End: 2020-10-23

## 2020-10-23 RX ADMIN — Medication 20 MG: at 10:25

## 2020-10-30 ENCOUNTER — OFFICE VISIT (OUTPATIENT)
Dept: OBGYN CLINIC | Facility: MEDICAL CENTER | Age: 45
End: 2020-10-30
Payer: COMMERCIAL

## 2020-10-30 VITALS
BODY MASS INDEX: 22.14 KG/M2 | WEIGHT: 137.8 LBS | SYSTOLIC BLOOD PRESSURE: 121 MMHG | HEIGHT: 66 IN | DIASTOLIC BLOOD PRESSURE: 79 MMHG | HEART RATE: 80 BPM | RESPIRATION RATE: 18 BRPM | TEMPERATURE: 96.6 F

## 2020-10-30 DIAGNOSIS — M22.2X1 PATELLOFEMORAL DISORDER OF RIGHT KNEE: ICD-10-CM

## 2020-10-30 DIAGNOSIS — M25.561 RIGHT KNEE PAIN, UNSPECIFIED CHRONICITY: ICD-10-CM

## 2020-10-30 DIAGNOSIS — M17.31 POST-TRAUMATIC OSTEOARTHRITIS OF RIGHT KNEE: Primary | ICD-10-CM

## 2020-10-30 PROCEDURE — 20610 DRAIN/INJ JOINT/BURSA W/O US: CPT | Performed by: ORTHOPAEDIC SURGERY

## 2020-10-30 RX ORDER — HYALURONATE SODIUM 10 MG/ML
20 SYRINGE (ML) INTRAARTICULAR
Status: COMPLETED | OUTPATIENT
Start: 2020-10-30 | End: 2020-10-30

## 2020-10-30 RX ADMIN — Medication 20 MG: at 10:16

## 2020-11-06 ENCOUNTER — OFFICE VISIT (OUTPATIENT)
Dept: OBGYN CLINIC | Facility: MEDICAL CENTER | Age: 45
End: 2020-11-06
Payer: COMMERCIAL

## 2020-11-06 VITALS
SYSTOLIC BLOOD PRESSURE: 130 MMHG | BODY MASS INDEX: 22.4 KG/M2 | HEART RATE: 96 BPM | TEMPERATURE: 96.8 F | RESPIRATION RATE: 18 BRPM | HEIGHT: 66 IN | DIASTOLIC BLOOD PRESSURE: 79 MMHG | WEIGHT: 139.4 LBS

## 2020-11-06 DIAGNOSIS — G89.29 CHRONIC PAIN OF RIGHT KNEE: ICD-10-CM

## 2020-11-06 DIAGNOSIS — M25.561 CHRONIC PAIN OF RIGHT KNEE: ICD-10-CM

## 2020-11-06 DIAGNOSIS — M17.31 POST-TRAUMATIC OSTEOARTHRITIS OF RIGHT KNEE: Primary | ICD-10-CM

## 2020-11-06 PROCEDURE — 3008F BODY MASS INDEX DOCD: CPT | Performed by: INTERNAL MEDICINE

## 2020-11-06 PROCEDURE — 20610 DRAIN/INJ JOINT/BURSA W/O US: CPT | Performed by: ORTHOPAEDIC SURGERY

## 2020-11-06 RX ORDER — HYALURONATE SODIUM 10 MG/ML
20 SYRINGE (ML) INTRAARTICULAR
Status: COMPLETED | OUTPATIENT
Start: 2020-11-06 | End: 2020-11-06

## 2020-11-06 RX ADMIN — Medication 20 MG: at 10:05

## 2021-02-05 ENCOUNTER — OFFICE VISIT (OUTPATIENT)
Dept: OBGYN CLINIC | Facility: MEDICAL CENTER | Age: 46
End: 2021-02-05
Payer: COMMERCIAL

## 2021-02-05 VITALS
HEIGHT: 66 IN | HEART RATE: 79 BPM | SYSTOLIC BLOOD PRESSURE: 101 MMHG | DIASTOLIC BLOOD PRESSURE: 68 MMHG | BODY MASS INDEX: 22.34 KG/M2 | WEIGHT: 139 LBS

## 2021-02-05 DIAGNOSIS — G89.29 CHRONIC PAIN OF RIGHT KNEE: ICD-10-CM

## 2021-02-05 DIAGNOSIS — M25.561 CHRONIC PAIN OF RIGHT KNEE: ICD-10-CM

## 2021-02-05 DIAGNOSIS — M17.31 POST-TRAUMATIC OSTEOARTHRITIS OF RIGHT KNEE: Primary | ICD-10-CM

## 2021-02-05 PROCEDURE — 3008F BODY MASS INDEX DOCD: CPT | Performed by: INTERNAL MEDICINE

## 2021-02-05 PROCEDURE — 99213 OFFICE O/P EST LOW 20 MIN: CPT | Performed by: ORTHOPAEDIC SURGERY

## 2021-02-05 PROCEDURE — 20610 DRAIN/INJ JOINT/BURSA W/O US: CPT | Performed by: ORTHOPAEDIC SURGERY

## 2021-02-05 RX ORDER — LIDOCAINE HYDROCHLORIDE 10 MG/ML
2 INJECTION, SOLUTION INFILTRATION; PERINEURAL
Status: COMPLETED | OUTPATIENT
Start: 2021-02-05 | End: 2021-02-05

## 2021-02-05 RX ORDER — BETAMETHASONE SODIUM PHOSPHATE AND BETAMETHASONE ACETATE 3; 3 MG/ML; MG/ML
12 INJECTION, SUSPENSION INTRA-ARTICULAR; INTRALESIONAL; INTRAMUSCULAR; SOFT TISSUE
Status: COMPLETED | OUTPATIENT
Start: 2021-02-05 | End: 2021-02-05

## 2021-02-05 RX ORDER — BUPIVACAINE HYDROCHLORIDE 2.5 MG/ML
2 INJECTION, SOLUTION INFILTRATION; PERINEURAL
Status: COMPLETED | OUTPATIENT
Start: 2021-02-05 | End: 2021-02-05

## 2021-02-05 RX ADMIN — LIDOCAINE HYDROCHLORIDE 2 ML: 10 INJECTION, SOLUTION INFILTRATION; PERINEURAL at 09:58

## 2021-02-05 RX ADMIN — BETAMETHASONE SODIUM PHOSPHATE AND BETAMETHASONE ACETATE 12 MG: 3; 3 INJECTION, SUSPENSION INTRA-ARTICULAR; INTRALESIONAL; INTRAMUSCULAR; SOFT TISSUE at 09:58

## 2021-02-05 RX ADMIN — BUPIVACAINE HYDROCHLORIDE 2 ML: 2.5 INJECTION, SOLUTION INFILTRATION; PERINEURAL at 09:58

## 2021-02-05 NOTE — PROGRESS NOTES
Assessment:  1  Post-traumatic osteoarthritis of right knee  Injection procedure prior authorization   2  Chronic pain of right knee  Injection procedure prior authorization       Plan:  The patient was provided with right knee steroid injection  The patient tolerated the procedure well  Right knee visco-supplement was ordered as this had provided significant decrease of pain, inflammation and crepitus with last application  The patient should follow up in 3 months  To do next visit:  Return in about 3 months (around 5/5/2021) for for right knee visco x3   The above stated was discussed in layman's terms and the patient expressed understanding  All questions were answered to the patient's satisfaction  Scribe Attestation    I,:  Isabel You am acting as a scribe while in the presence of the attending physician :       I,:  Michael Lombardi MD personally performed the services described in this documentation    as scribed in my presence :             Subjective:   Franky Valle is a 41 y o  female who presents for follow up of right knee  She is s/p right knee Euflexa injections with benefit of less pain, inflammation and crepitation, 11/2021  Today she complains of right knee tightness along with right leg tingling and numbness from low back to anterior shin  The knee can swell as the day progresses  She does use nabumetone 750mg 1-2x/day with some benefit          Review of systems negative unless otherwise specified in HPI    Past Medical History:   Diagnosis Date    Anemia     Arthritis     Depression     Edema     Fatigue     Vitamin B12 deficiency     Vitamin D deficiency        Past Surgical History:   Procedure Laterality Date    KNEE ARTHROSCOPY Bilateral     KNEE SURGERY      ORIF TIBIA FRACTURE Right 1/18/2018    Procedure: OPEN REDUCTION W/ INTERNAL FIXATION (ORIF) TIBIA;  Surgeon: Michael Lombardi MD;  [facility];  Service: Orthopedics    ORIF TIBIAL PLATEAU Right 1/18/2018    Procedure: OPEN REDUCTION W/ INTERNAL FIXATION (ORIF) TIBIAL PLATEAU;  Surgeon: Sachi Garvin MD;  [facility];  Service: Orthopedics    PA REMOVAL DEEP IMPLANT Right 8/2/2018    Procedure: REMOVAL HARDWARE TIBIA;  Surgeon: Sachi Garvin MD;  [facility];  Service: Orthopedics    TUBAL LIGATION      US GUIDED BREAST BIOPSY LEFT COMPLETE Left 10/24/2019       Family History   Problem Relation Age of Onset    Cancer Mother         either cervical or ovarian    Heart disease Mother         heart surgery    Heart attack Father         stent    Diabetes Father     Hypertension Father     Cancer Father 62        stomach cancer    Arthritis Family     Stomach cancer Family     Ovarian cancer Family     Rheum arthritis Daughter     Lupus Maternal Grandmother     Lupus Cousin     No Known Problems Maternal Grandfather     No Known Problems Paternal Grandmother     No Known Problems Paternal Grandfather     No Known Problems Daughter     No Known Problems Maternal Aunt     No Known Problems Maternal Aunt     No Known Problems Maternal Aunt     No Known Problems Paternal Aunt     Alcohol abuse Neg Hx     Depression Neg Hx     Drug abuse Neg Hx     Substance Abuse Neg Hx     Mental illness Neg Hx        Social History     Occupational History    Occupation: Teacher in [place]   Tobacco Use    Smoking status: Never Smoker    Smokeless tobacco: Never Used   Substance and Sexual Activity    Alcohol use: No     Comment: social drink sporatic    Drug use: No    Sexual activity: Yes         Current Outpatient Medications:     acetaminophen (TYLENOL) 325 mg tablet, 650 mg every 6 hours for mild pain, Disp: 30 tablet, Rfl: 0    amitriptyline (ELAVIL) 150 MG tablet, Take 1 tablet (150 mg total) by mouth daily at bedtime, Disp: 90 tablet, Rfl: 1    busPIRone (BUSPAR) 5 mg tablet, Take 1 tablet (5 mg total) by mouth 2 (two) times a day, Disp: 60 tablet, Rfl: 5   Cholecalciferol (Vitamin D3) 50 MCG (2000 UT) capsule, Take 1 capsule (2,000 Units total) by mouth daily, Disp: 90 capsule, Rfl: 1    cyanocobalamin 1,000 mcg/mL, Inject 1 mL (1,000 mcg total) into a muscle every 30 (thirty) days, Disp: 10 mL, Rfl: 1    ergocalciferol (VITAMIN D2) 50,000 units, Take 1 capsule (50,000 Units total) by mouth once a week, Disp: 12 capsule, Rfl: 0    gabapentin (NEURONTIN) 100 mg capsule, Take 1 cap daily, may take additional dose prn , Disp: 60 capsule, Rfl: 5    meloxicam (MOBIC) 15 mg tablet, TAKE ONE TABLET BY MOUTH EVERY DAY, Disp: 30 tablet, Rfl: 1    nabumetone (RELAFEN) 750 mg tablet, Take 1 tablet (750 mg total) by mouth 2 (two) times a day As needed for pain, Disp: 60 tablet, Rfl: 1    SYRINGE-NEEDLE, DISP, 3 ML 25G X 1" 3 ML MISC, by Does not apply route every 30 (thirty) days, Disp: 50 each, Rfl: 0    vitamin B-12 (VITAMIN B-12) 1,000 mcg tablet, Take 1 tablet (1,000 mcg total) by mouth daily, Disp: 90 tablet, Rfl: 1    No Known Allergies         Vitals:    02/05/21 0927   BP: 101/68   Pulse: 79       Objective:  Physical exam  · General: Awake, Alert, Oriented  · Eyes: Pupils equal, round and reactive to light  · Heart: regular rate and rhythm  · Lungs: No audible wheezing  · Abdomen: soft                    Ortho Exam   Right knee:  Well healed anterior incision  Mild varus alignment  No erythema or ecchymosis  No effusion or swelling  Normal strength  Good ROM   Calf compartments soft and supple  Sensation intact  Toes are warm sensate and mobile        Diagnostics, reviewed and taken today if performed as documented:    None performed     Procedures, if performed today:    Large joint arthrocentesis: R knee  Universal Protocol:  Consent: Verbal consent obtained    Risks and benefits: risks, benefits and alternatives were discussed  Consent given by: patient  Time out: Immediately prior to procedure a "time out" was called to verify the correct patient, procedure, equipment, support staff and site/side marked as required  Timeout called at: 2/5/2021 9:56 AM   Patient understanding: patient states understanding of the procedure being performed  Site marked: the operative site was marked  Patient identity confirmed: verbally with patient    Supporting Documentation  Indications: pain   Procedure Details  Location: knee - R knee  Preparation: Patient was prepped and draped in the usual sterile fashion  Needle size: 22 G  Ultrasound guidance: no  Approach: anterolateral  Medications administered: 12 mg betamethasone acetate-betamethasone sodium phosphate 6 (3-3) mg/mL; 2 mL bupivacaine 0 25 %; 2 mL lidocaine 1 %    Patient tolerance: patient tolerated the procedure well with no immediate complications  Dressing:  Sterile dressing applied            Portions of the record may have been created with voice recognition software  Occasional wrong word or "sound a like" substitutions may have occurred due to the inherent limitations of voice recognition software  Read the chart carefully and recognize, using context, where substitutions have occurred

## 2021-02-19 ENCOUNTER — TELEMEDICINE (OUTPATIENT)
Dept: INTERNAL MEDICINE CLINIC | Facility: CLINIC | Age: 46
End: 2021-02-19
Payer: COMMERCIAL

## 2021-02-19 DIAGNOSIS — E55.9 VITAMIN D DEFICIENCY: ICD-10-CM

## 2021-02-19 DIAGNOSIS — E53.8 VITAMIN B12 DEFICIENCY: ICD-10-CM

## 2021-02-19 DIAGNOSIS — F33.1 MODERATE EPISODE OF RECURRENT MAJOR DEPRESSIVE DISORDER (HCC): ICD-10-CM

## 2021-02-19 DIAGNOSIS — Z71.9 HEALTH COUNSELING: ICD-10-CM

## 2021-02-19 DIAGNOSIS — M17.31 POST-TRAUMATIC OSTEOARTHRITIS OF RIGHT KNEE: Primary | ICD-10-CM

## 2021-02-19 DIAGNOSIS — F41.9 ANXIETY: ICD-10-CM

## 2021-02-19 PROBLEM — S82.101A CLOSED FRACTURE OF RIGHT PROXIMAL TIBIA: Status: RESOLVED | Noted: 2018-01-17 | Resolved: 2021-02-19

## 2021-02-19 PROBLEM — M25.561 RIGHT KNEE PAIN: Status: RESOLVED | Noted: 2018-09-14 | Resolved: 2021-02-19

## 2021-02-19 PROBLEM — T84.84XA PAINFUL ORTHOPAEDIC HARDWARE (HCC): Status: RESOLVED | Noted: 2018-06-22 | Resolved: 2021-02-19

## 2021-02-19 PROBLEM — S82.241D CLOSED DISPLACED SPIRAL FRACTURE OF SHAFT OF RIGHT TIBIA WITH ROUTINE HEALING: Status: RESOLVED | Noted: 2018-03-13 | Resolved: 2021-02-19

## 2021-02-19 PROBLEM — S82.839A: Status: RESOLVED | Noted: 2018-01-18 | Resolved: 2021-02-19

## 2021-02-19 PROBLEM — M25.571 PAIN, JOINT, ANKLE AND FOOT, RIGHT: Status: RESOLVED | Noted: 2019-05-16 | Resolved: 2021-02-19

## 2021-02-19 PROBLEM — G57.01 PIRIFORMIS SYNDROME OF RIGHT SIDE: Status: RESOLVED | Noted: 2019-04-12 | Resolved: 2021-02-19

## 2021-02-19 PROBLEM — M79.604 LEG PAIN, RIGHT: Status: RESOLVED | Noted: 2018-05-11 | Resolved: 2021-02-19

## 2021-02-19 PROCEDURE — 1036F TOBACCO NON-USER: CPT | Performed by: INTERNAL MEDICINE

## 2021-02-19 PROCEDURE — 99214 OFFICE O/P EST MOD 30 MIN: CPT | Performed by: INTERNAL MEDICINE

## 2021-02-19 RX ORDER — ACETAMINOPHEN 160 MG
2000 TABLET,DISINTEGRATING ORAL DAILY
Qty: 90 CAPSULE | Refills: 1 | Status: SHIPPED | OUTPATIENT
Start: 2021-02-19 | End: 2021-10-29 | Stop reason: SDUPTHER

## 2021-02-19 RX ORDER — CYANOCOBALAMIN 1000 UG/ML
1000 INJECTION INTRAMUSCULAR; SUBCUTANEOUS
Qty: 10 ML | Refills: 1 | Status: SHIPPED | OUTPATIENT
Start: 2021-02-19 | End: 2021-06-25 | Stop reason: SDUPTHER

## 2021-02-19 NOTE — PROGRESS NOTES
Virtual Regular Visit      Assessment/Plan:    Problem List Items Addressed This Visit        Musculoskeletal and Integument    Post-traumatic osteoarthritis of right knee - Primary     Minimal improvement with injections  Sees Ortho  Takes gabapentin qHS  May take nabumetone during the day prn  Other    Anxiety     Doing well, discharged from her counselor  Rare use of buspirone  Moderate episode of recurrent major depressive disorder (HCC)     Stable, on amitriptyline  Relevant Orders    CBC and differential    Comprehensive metabolic panel    Vitamin B12 deficiency     Continue monthly injections  Relevant Orders    CBC and differential    Vitamin B12    Vitamin D deficiency     Takes D3 daily  Relevant Medications    Cholecalciferol (Vitamin D3) 50 MCG (2000 UT) capsule    cyanocobalamin 1,000 mcg/mL    Other Relevant Orders    Vitamin D 25 hydroxy      Other Visit Diagnoses     Health counseling        PAPs scheduled, mammogram updated  Recommend COVID vaccine, able to get it at work  Follow up in 4 months or as needed  Reason for visit is   Chief Complaint   Patient presents with    Virtual Regular Visit        Encounter provider Sanaz Coy MD    Provider located at [address] [place] [id number]      Recent Visits  No visits were found meeting these conditions  Showing recent visits within past 7 days and meeting all other requirements     Today's Visits  Date Type Provider Dept   02/19/21 Telemedicine Sanaz Coy, [facility]   Showing today's visits and meeting all other requirements     Future Appointments  No visits were found meeting these conditions  Showing future appointments within next 150 days and meeting all other requirements        The patient was identified by name and date of birth   Ana De Santiago was informed that this is a telemedicine visit and that the visit is being conducted through [facility] and patient was informed that this is a secure, HIPAA-compliant platform  She agrees to proceed     My office door was closed  No one else was in the room  She acknowledged consent and understanding of privacy and security of the video platform  The patient has agreed to participate and understands they can discontinue the visit at any time  Patient is aware this is a billable service  Helen Toribio is a 41 y o  female f/u   She has been doing well  She reports work has been good feels that she adjusted well  She has bad days sometimes when she is on her feet for too long, mostly does not have a long drive ahead of her  She is afraid to take any medication during the day due to possible sedation  She was given a new medication by her Ortho, usually takes all her medications at night  She has been receiving injections for her knee, reports it has not really helped with the pain  She continues to experience pain all the time, has not been pain free  She reports anxiety has been good, she was discharged by her counselor before the holidays  She recalls taking her buspirone over 3 months ago           Past Medical History:   Diagnosis Date    Anemia     Arthritis     Depression     Edema     Fatigue     Vitamin B12 deficiency     Vitamin D deficiency        Past Surgical History:   Procedure Laterality Date    KNEE ARTHROSCOPY Bilateral     KNEE SURGERY      ORIF TIBIA FRACTURE Right 1/18/2018    Procedure: OPEN REDUCTION W/ INTERNAL FIXATION (ORIF) TIBIA;  Surgeon: Gauri Martini MD;  [facility];  Service: Orthopedics    ORIF TIBIAL PLATEAU Right 5/13/4251    Procedure: OPEN REDUCTION W/ INTERNAL FIXATION (ORIF) TIBIAL PLATEAU;  Surgeon: Gauri Martini MD;  [facility];  Service: Orthopedics    MN REMOVAL DEEP IMPLANT Right 8/2/2018    Procedure: REMOVAL HARDWARE TIBIA;  Surgeon: Laura Laureano MD;  [facility];  Service: Orthopedics    TUBAL LIGATION      US GUIDED BREAST BIOPSY LEFT COMPLETE Left 10/24/2019       Current Outpatient Medications   Medication Sig Dispense Refill    acetaminophen (TYLENOL) 325 mg tablet 650 mg every 6 hours for mild pain 30 tablet 0    amitriptyline (ELAVIL) 150 MG tablet Take 1 tablet (150 mg total) by mouth daily at bedtime 90 tablet 1    busPIRone (BUSPAR) 5 mg tablet Take 1 tablet (5 mg total) by mouth 2 (two) times a day 60 tablet 5    Cholecalciferol (Vitamin D3) 50 MCG (2000 UT) capsule Take 1 capsule (2,000 Units total) by mouth daily 90 capsule 1    cyanocobalamin 1,000 mcg/mL Inject 1 mL (1,000 mcg total) into a muscle every 30 (thirty) days 10 mL 1    gabapentin (NEURONTIN) 100 mg capsule Take 1 cap daily, may take additional dose prn  60 capsule 5    meloxicam (MOBIC) 15 mg tablet TAKE ONE TABLET BY MOUTH EVERY DAY 30 tablet 1    nabumetone (RELAFEN) 750 mg tablet Take 1 tablet (750 mg total) by mouth 2 (two) times a day As needed for pain 60 tablet 1    SYRINGE-NEEDLE, DISP, 3 ML 25G X 1" 3 ML MISC by Does not apply route every 30 (thirty) days 50 each 0    vitamin B-12 (VITAMIN B-12) 1,000 mcg tablet Take 1 tablet (1,000 mcg total) by mouth daily 90 tablet 1     No current facility-administered medications for this visit  No Known Allergies    Review of Systems   Constitutional: Negative for appetite change and fatigue  HENT: Negative for congestion, ear pain and postnasal drip  Eyes: Negative for visual disturbance  Respiratory: Negative for cough and shortness of breath  Cardiovascular: Negative for chest pain and leg swelling  Gastrointestinal: Negative for abdominal pain, constipation and diarrhea  Genitourinary: Negative for dysuria, frequency and urgency  Musculoskeletal: Positive for arthralgias  Negative for gait problem, joint swelling and myalgias     Skin: Negative for rash and wound  Neurological: Negative for dizziness, numbness and headaches  Hematological: Does not bruise/bleed easily  Psychiatric/Behavioral: Negative for confusion  The patient is not nervous/anxious  Video Exam    There were no vitals filed for this visit  Physical Exam  Constitutional:       General: She is not in acute distress  Appearance: Normal appearance  She is not ill-appearing  HENT:      Head: Normocephalic and atraumatic  Eyes:      Pupils: Pupils are equal, round, and reactive to light  Pulmonary:      Effort: Pulmonary effort is normal  No respiratory distress  Neurological:      General: No focal deficit present  Mental Status: She is alert and oriented to person, place, and time  Psychiatric:         Mood and Affect: Mood normal          Behavior: Behavior normal           I spent 11 minutes directly with the patient during this visit      Castillo Benjamin acknowledges that she has consented to an online visit or consultation  She understands that the online visit is based solely on information provided by her, and that, in the absence of a face-to-face physical evaluation by the physician, the diagnosis she receives is both limited and provisional in terms of accuracy and completeness  This is not intended to replace a full medical face-to-face evaluation by the physician  Ana De Santiago understands and accepts these terms  Labs & imaging results reviewed with patient

## 2021-02-19 NOTE — ASSESSMENT & PLAN NOTE
Minimal improvement with injections  Sees Ortho  Takes gabapentin qHS  May take nabumetone during the day prn

## 2021-03-19 ENCOUNTER — HOSPITAL ENCOUNTER (EMERGENCY)
Facility: HOSPITAL | Age: 46
Discharge: HOME | End: 2021-03-20
Attending: EMERGENCY MEDICINE | Admitting: EMERGENCY MEDICINE
Payer: COMMERCIAL

## 2021-03-19 ENCOUNTER — APPOINTMENT (EMERGENCY)
Dept: CT IMAGING | Facility: HOSPITAL | Age: 46
End: 2021-03-19
Payer: COMMERCIAL

## 2021-03-19 VITALS
DIASTOLIC BLOOD PRESSURE: 76 MMHG | SYSTOLIC BLOOD PRESSURE: 141 MMHG | HEART RATE: 94 BPM | RESPIRATION RATE: 18 BRPM | OXYGEN SATURATION: 100 % | TEMPERATURE: 98.6 F

## 2021-03-19 DIAGNOSIS — N30.90 CYSTITIS: ICD-10-CM

## 2021-03-19 DIAGNOSIS — R10.9 ABDOMINAL PAIN: Primary | ICD-10-CM

## 2021-03-19 LAB
ALBUMIN SERPL BCP-MCNC: 3 G/DL (ref 3.5–5)
ALP SERPL-CCNC: 78 U/L (ref 46–116)
ALT SERPL W P-5'-P-CCNC: 27 U/L (ref 12–78)
ANION GAP SERPL CALCULATED.3IONS-SCNC: 8 MMOL/L (ref 4–13)
AST SERPL W P-5'-P-CCNC: 30 U/L (ref 5–45)
BASOPHILS # BLD AUTO: 0.01 THOUSANDS/ΜL (ref 0–0.1)
BASOPHILS NFR BLD AUTO: 0 % (ref 0–1)
BILIRUB SERPL-MCNC: 0.27 MG/DL (ref 0.2–1)
BILIRUB UR QL STRIP: NEGATIVE
BUN SERPL-MCNC: 7 MG/DL (ref 5–25)
CALCIUM ALBUM COR SERPL-MCNC: 9.5 MG/DL (ref 8.3–10.1)
CALCIUM SERPL-MCNC: 8.7 MG/DL (ref 8.3–10.1)
CHLORIDE SERPL-SCNC: 105 MMOL/L (ref 100–108)
CLARITY UR: CLEAR
CO2 SERPL-SCNC: 28 MMOL/L (ref 21–32)
COLOR UR: YELLOW
CREAT SERPL-MCNC: 0.73 MG/DL (ref 0.6–1.3)
EOSINOPHIL # BLD AUTO: 0.06 THOUSAND/ΜL (ref 0–0.61)
EOSINOPHIL NFR BLD AUTO: 1 % (ref 0–6)
ERYTHROCYTE [DISTWIDTH] IN BLOOD BY AUTOMATED COUNT: 13 % (ref 11.6–15.1)
GFR SERPL CREATININE-BSD FRML MDRD: 100 ML/MIN/1.73SQ M
GLUCOSE SERPL-MCNC: 123 MG/DL (ref 65–140)
GLUCOSE UR STRIP-MCNC: NEGATIVE MG/DL
HCT VFR BLD AUTO: 36.7 % (ref 34.8–46.1)
HGB BLD-MCNC: 11.7 G/DL (ref 11.5–15.4)
HGB UR QL STRIP.AUTO: ABNORMAL
IMM GRANULOCYTES # BLD AUTO: 0.02 THOUSAND/UL (ref 0–0.2)
IMM GRANULOCYTES NFR BLD AUTO: 0 % (ref 0–2)
KETONES UR STRIP-MCNC: NEGATIVE MG/DL
LEUKOCYTE ESTERASE UR QL STRIP: NEGATIVE
LYMPHOCYTES # BLD AUTO: 1.35 THOUSANDS/ΜL (ref 0.6–4.47)
LYMPHOCYTES NFR BLD AUTO: 21 % (ref 14–44)
MCH RBC QN AUTO: 28.8 PG (ref 26.8–34.3)
MCHC RBC AUTO-ENTMCNC: 31.9 G/DL (ref 31.4–37.4)
MCV RBC AUTO: 90 FL (ref 82–98)
MONOCYTES # BLD AUTO: 0.51 THOUSAND/ΜL (ref 0.17–1.22)
MONOCYTES NFR BLD AUTO: 8 % (ref 4–12)
NEUTROPHILS # BLD AUTO: 4.43 THOUSANDS/ΜL (ref 1.85–7.62)
NEUTS SEG NFR BLD AUTO: 70 % (ref 43–75)
NITRITE UR QL STRIP: NEGATIVE
NRBC BLD AUTO-RTO: 0 /100 WBCS
PH UR STRIP.AUTO: 7 [PH] (ref 4.5–8)
PLATELET # BLD AUTO: 180 THOUSANDS/UL (ref 149–390)
PMV BLD AUTO: 11.6 FL (ref 8.9–12.7)
POTASSIUM SERPL-SCNC: 3.9 MMOL/L (ref 3.5–5.3)
PROT SERPL-MCNC: 6.1 G/DL (ref 6.4–8.2)
PROT UR STRIP-MCNC: NEGATIVE MG/DL
RBC # BLD AUTO: 4.06 MILLION/UL (ref 3.81–5.12)
SODIUM SERPL-SCNC: 141 MMOL/L (ref 136–145)
SP GR UR STRIP.AUTO: 1.01 (ref 1–1.03)
UROBILINOGEN UR QL STRIP.AUTO: 0.2 E.U./DL
WBC # BLD AUTO: 6.38 THOUSAND/UL (ref 4.31–10.16)

## 2021-03-19 PROCEDURE — 36415 COLL VENOUS BLD VENIPUNCTURE: CPT | Performed by: EMERGENCY MEDICINE

## 2021-03-19 PROCEDURE — 74177 CT ABD & PELVIS W/CONTRAST: CPT

## 2021-03-19 PROCEDURE — 99284 EMERGENCY DEPT VISIT MOD MDM: CPT | Performed by: EMERGENCY MEDICINE

## 2021-03-19 PROCEDURE — 80053 COMPREHEN METABOLIC PANEL: CPT | Performed by: EMERGENCY MEDICINE

## 2021-03-19 PROCEDURE — 85025 COMPLETE CBC W/AUTO DIFF WBC: CPT | Performed by: EMERGENCY MEDICINE

## 2021-03-19 PROCEDURE — 87086 URINE CULTURE/COLONY COUNT: CPT | Performed by: EMERGENCY MEDICINE

## 2021-03-19 PROCEDURE — 87147 CULTURE TYPE IMMUNOLOGIC: CPT | Performed by: EMERGENCY MEDICINE

## 2021-03-19 PROCEDURE — G1004 CDSM NDSC: HCPCS

## 2021-03-19 PROCEDURE — 81001 URINALYSIS AUTO W/SCOPE: CPT

## 2021-03-19 PROCEDURE — 99284 EMERGENCY DEPT VISIT MOD MDM: CPT

## 2021-03-19 RX ORDER — SULFAMETHOXAZOLE AND TRIMETHOPRIM 800; 160 MG/1; MG/1
1 TABLET ORAL 2 TIMES DAILY
Qty: 6 TABLET | Refills: 0 | Status: SHIPPED | OUTPATIENT
Start: 2021-03-19 | End: 2021-03-22

## 2021-03-19 RX ORDER — SULFAMETHOXAZOLE AND TRIMETHOPRIM 800; 160 MG/1; MG/1
1 TABLET ORAL ONCE
Status: COMPLETED | OUTPATIENT
Start: 2021-03-19 | End: 2021-03-20

## 2021-03-19 RX ADMIN — IOHEXOL 100 ML: 350 INJECTION, SOLUTION INTRAVENOUS at 22:24

## 2021-03-20 LAB
BACTERIA UR QL AUTO: NORMAL /HPF
NON-SQ EPI CELLS URNS QL MICRO: NORMAL /HPF
RBC #/AREA URNS AUTO: NORMAL /HPF
WBC #/AREA URNS AUTO: NORMAL /HPF

## 2021-03-20 RX ADMIN — SULFAMETHOXAZOLE AND TRIMETHOPRIM 1 TABLET: 800; 160 TABLET ORAL at 00:01

## 2021-03-20 NOTE — ED PROVIDER NOTES
History  Chief Complaint   Patient presents with    Abdominal Pain     patient presents by EMS from home due to having a BM this evening and then approx 1 hour later started with burning/cramping in abdomen hx of polyps  burning sensation gone pain still in abd/lower back area     41 y o  female presents with chief complaint of lower abdominal pain  Patient reports earlier tonight she developed a burning suprapubic abdominal pain shortly after having a bowel movement  Patient reports today she ate a chicken gyro and had a shamrock shake but didn't have any significant symptoms until a few hours later  No urinary complaints  No fevers or chills  History provided by:  Patient   used: No    Abdominal Pain  Pain location:  LLQ, RLQ and suprapubic  Pain quality: aching and burning    Pain radiates to:  Back  Pain severity:  Moderate  Onset quality:  Gradual  Duration:  4 hours  Timing:  Intermittent  Progression:  Waxing and waning  Chronicity:  New  Relieved by:  Nothing  Worsened by:  Nothing  Ineffective treatments:  None tried  Associated symptoms: no chest pain, no chills, no diarrhea, no dysuria, no fever, no nausea, no shortness of breath and no vomiting        Prior to Admission Medications   Prescriptions Last Dose Informant Patient Reported? Taking?    Cholecalciferol (Vitamin D3) 50 MCG (2000 UT) capsule   No No   Sig: Take 1 capsule (2,000 Units total) by mouth daily   SYRINGE-NEEDLE, DISP, 3 ML 25G X 1" 3 ML MISC  Self No No   Sig: by Does not apply route every 30 (thirty) days   acetaminophen (TYLENOL) 325 mg tablet  Self No No   Si mg every 6 hours for mild pain   amitriptyline (ELAVIL) 150 MG tablet  Self No No   Sig: Take 1 tablet (150 mg total) by mouth daily at bedtime   busPIRone (BUSPAR) 5 mg tablet  Self No No   Sig: Take 1 tablet (5 mg total) by mouth 2 (two) times a day   cyanocobalamin 1,000 mcg/mL   No No   Sig: Inject 1 mL (1,000 mcg total) into a muscle every 30 (thirty) days   gabapentin (NEURONTIN) 100 mg capsule  Self No No   Sig: Take 1 cap daily, may take additional dose prn    meloxicam (MOBIC) 15 mg tablet  Self No No   Sig: TAKE ONE TABLET BY MOUTH EVERY DAY   nabumetone (RELAFEN) 750 mg tablet  Self No No   Sig: Take 1 tablet (750 mg total) by mouth 2 (two) times a day As needed for pain   vitamin B-12 (VITAMIN B-12) 1,000 mcg tablet  Self No No   Sig: Take 1 tablet (1,000 mcg total) by mouth daily      Facility-Administered Medications: None       Past Medical History:   Diagnosis Date    Anemia     Arthritis     Depression     Edema     Fatigue     Vitamin B12 deficiency     Vitamin D deficiency        Past Surgical History:   Procedure Laterality Date    KNEE ARTHROSCOPY Bilateral     KNEE SURGERY      ORIF TIBIA FRACTURE Right 1/18/2018    Procedure: OPEN REDUCTION W/ INTERNAL FIXATION (ORIF) TIBIA;  Surgeon: Michael Lombardi MD;  [facility];  Service: Orthopedics    ORIF TIBIAL PLATEAU Right 7/91/6798    Procedure: OPEN REDUCTION W/ INTERNAL FIXATION (ORIF) TIBIAL PLATEAU;  Surgeon: Michael Lombardi MD;  [facility];  Service: Orthopedics    NE REMOVAL DEEP IMPLANT Right 8/2/2018    Procedure: REMOVAL HARDWARE TIBIA;  Surgeon: Michael Lombardi MD;  [facility];  Service: Orthopedics    TUBAL LIGATION      US GUIDED BREAST BIOPSY LEFT COMPLETE Left 10/24/2019       Family History   Problem Relation Age of Onset    Cancer Mother         either cervical or ovarian    Heart disease Mother         heart surgery    Heart attack Father         stent    Diabetes Father     Hypertension Father     Cancer Father 62        stomach cancer    Arthritis Family     Stomach cancer Family     Ovarian cancer Family     Rheum arthritis Daughter     Lupus Maternal Grandmother     Lupus Cousin     No Known Problems Maternal Grandfather     No Known Problems Paternal Grandmother     No Known Problems Paternal Grandfather     No Known Problems Daughter     No Known Problems Maternal Aunt     No Known Problems Maternal Aunt     No Known Problems Maternal Aunt     No Known Problems Paternal Aunt     Alcohol abuse Neg Hx     Depression Neg Hx     Drug abuse Neg Hx     Substance Abuse Neg Hx     Mental illness Neg Hx      I have reviewed and agree with the history as documented  E-Cigarette/Vaping    E-Cigarette Use Never User      E-Cigarette/Vaping Substances    Nicotine No     THC No     CBD No     Flavoring No     Other No     Unknown No      Social History     Tobacco Use    Smoking status: Never Smoker    Smokeless tobacco: Never Used   Substance Use Topics    Alcohol use: No     Comment: social drink sporatic    Drug use: No       Review of Systems   Constitutional: Negative for chills, diaphoresis and fever  Respiratory: Negative for shortness of breath  Cardiovascular: Negative for chest pain and palpitations  Gastrointestinal: Positive for abdominal pain  Negative for diarrhea, nausea and vomiting  Genitourinary: Negative for dysuria and frequency  Musculoskeletal: Positive for back pain  Skin: Negative for rash  All other systems reviewed and are negative  Physical Exam  Physical Exam  Vitals signs and nursing note reviewed  Constitutional:       General: She is in acute distress  Appearance: She is well-developed  HENT:      Head: Normocephalic and atraumatic  Eyes:      Pupils: Pupils are equal, round, and reactive to light  Neck:      Musculoskeletal: Normal range of motion  Vascular: No JVD  Cardiovascular:      Rate and Rhythm: Normal rate and regular rhythm  Heart sounds: Normal heart sounds  No murmur  No friction rub  No gallop  Pulmonary:      Effort: Pulmonary effort is normal  No respiratory distress  Breath sounds: Normal breath sounds  No wheezing or rales  Chest:      Chest wall: No tenderness     Abdominal:      Tenderness: There is abdominal tenderness (mild) in the right lower quadrant, suprapubic area and left lower quadrant  Musculoskeletal: Normal range of motion  General: No tenderness  Skin:     General: Skin is warm and dry  Neurological:      General: No focal deficit present  Mental Status: She is alert and oriented to person, place, and time  Psychiatric:         Behavior: Behavior normal          Thought Content:  Thought content normal          Judgment: Judgment normal          Vital Signs  ED Triage Vitals [03/19/21 2059]   Temperature Pulse Respirations Blood Pressure SpO2   98 6 °F (37 °C) 94 18 141/76 100 %      Temp Source Heart Rate Source Patient Position - Orthostatic VS BP Location FiO2 (%)   Oral Monitor Lying Right arm --      Pain Score       --           Vitals:    03/19/21 2059   BP: 141/76   Pulse: 94   Patient Position - Orthostatic VS: Lying         Visual Acuity      ED Medications  Medications   sulfamethoxazole-trimethoprim (BACTRIM DS) 800-160 mg per tablet 1 tablet (has no administration in time range)   iohexol (OMNIPAQUE) 350 MG/ML injection (MULTI-DOSE) 100 mL (100 mL Intravenous Given 3/19/21 2224)       Diagnostic Studies  Results Reviewed     Procedure Component Value Units Date/Time    Urine culture [[id number]]     Lab Status: No result Specimen: Urine, Clean Catch     Urine Macroscopic, POC [[id number]]  (Abnormal) Collected: 03/19/21 2331    Lab Status: Final result Specimen: Urine Updated: 03/19/21 2333     Color, UA Yellow     Clarity, UA Clear     pH, UA 7 0     Leukocytes, UA Negative     Nitrite, UA Negative     Protein, UA Negative mg/dl      Glucose, UA Negative mg/dl      Ketones, UA Negative mg/dl      Urobilinogen, UA 0 2 E U /dl      Bilirubin, UA Negative     Blood, UA Small     Specific [place], UA 1 015    Narrative:      CLINITEK RESULT    Urine Microscopic [[id number]] Collected: 03/19/21 2331    Lab Status: No result Specimen: Urine     Comprehensive metabolic panel [[id number]]  (Abnormal) Collected: 03/19/21 2151    Lab Status: Final result Specimen: Blood from Arm, Right Updated: 03/19/21 2210     Sodium 141 mmol/L      Potassium 3 9 mmol/L      Chloride 105 mmol/L      CO2 28 mmol/L      ANION GAP 8 mmol/L      BUN 7 mg/dL      Creatinine 0 73 mg/dL      Glucose 123 mg/dL      Calcium 8 7 mg/dL      Corrected Calcium 9 5 mg/dL      AST 30 U/L      ALT 27 U/L      Alkaline Phosphatase 78 U/L      Total Protein 6 1 g/dL      Albumin 3 0 g/dL      Total Bilirubin 0 27 mg/dL      eGFR 100 ml/min/1 73sq m     Narrative:      National Kidney Disease Foundation guidelines for Chronic Kidney Disease (CKD):     Stage 1 with normal or high GFR (GFR > 90 mL/min/1 73 square meters)    Stage 2 Mild CKD (GFR = 60-89 mL/min/1 73 square meters)    Stage 3A Moderate CKD (GFR = 45-59 mL/min/1 73 square meters)    Stage 3B Moderate CKD (GFR = 30-44 mL/min/1 73 square meters)    Stage 4 Severe CKD (GFR = 15-29 mL/min/1 73 square meters)    Stage 5 End Stage CKD (GFR <15 mL/min/1 73 square meters)  Note: GFR calculation is accurate only with a steady state creatinine    CBC and differential [[id number]] Collected: 03/19/21 2151    Lab Status: Final result Specimen: Blood from Arm, Right Updated: 03/19/21 2200     WBC 6 38 Thousand/uL      RBC 4 06 Million/uL      Hemoglobin 11 7 g/dL      Hematocrit 36 7 %      MCV 90 fL      MCH 28 8 pg      MCHC 31 9 g/dL      RDW 13 0 %      MPV 11 6 fL      Platelets 161 Thousands/uL      nRBC 0 /100 WBCs      Neutrophils Relative 70 %      Immat GRANS % 0 %      Lymphocytes Relative 21 %      Monocytes Relative 8 %      Eosinophils Relative 1 %      Basophils Relative 0 %      Neutrophils Absolute 4 43 Thousands/µL      Immature Grans Absolute 0 02 Thousand/uL      Lymphocytes Absolute 1 35 Thousands/µL      Monocytes Absolute 0 51 Thousand/µL      Eosinophils Absolute 0 06 Thousand/µL      Basophils Absolute 0 01 Thousands/µL CT abdomen pelvis with contrast   Final Result by Angie Hathaway MD (03/19 0523)      Bladder wall thickening and pericystic inflammatory change which may represent cystitis  Correlate with urinalysis  Workstation performed: [id number]                    Procedures  Procedures         ED Course                                           MDM  Number of Diagnoses or Management Options  Abdominal pain: new and requires workup  Cystitis: new and requires workup  Diagnosis management comments: Background: 41 y o  female presents with chief complaint of lower abdominal pain  Differential: appendicitis, diverticulitis, mesenteric adenitis, ovarian cyst, cystitis, pyelonephritis, ureterolithiasis, constipation, colitis, gastric ulcer, mesenteric ischemia, perforated viscous, non specific abdominal pain    Plan: cbc, cmp, urinalysis, ct scan, symptom control          Amount and/or Complexity of Data Reviewed  Clinical lab tests: ordered and reviewed  Tests in the radiology section of CPT®: ordered and reviewed    Risk of Complications, Morbidity, and/or Mortality  Presenting problems: high  Diagnostic procedures: high  Management options: high    Patient Progress  Patient progress: stable      Disposition  Final diagnoses:   Abdominal pain   Cystitis     Time reflects when diagnosis was documented in both MDM as applicable and the Disposition within this note     Time User Action Codes Description Comment    3/19/2021 11:37 PM Yany SHIRLEY Add [R10 9] Abdominal pain     3/19/2021 11:37 PM Esha Quinteros Add [N30 90] Cystitis       ED Disposition     ED Disposition Condition Date/Time Comment    Discharge Stable Fri Mar 19, 2021 11:37 PM Marisel Given discharge to home/self care              Follow-up Information     Follow up With Specialties Details Why Contact Info    Glo Rodgers MD Internal Medicine Schedule an appointment as soon as possible for a visit in 1 week  [address] [facility] [address]  [phone]            Patient's Medications   Discharge Prescriptions    SULFAMETHOXAZOLE-TRIMETHOPRIM (BACTRIM DS) 800-160 MG PER TABLET    Take 1 tablet by mouth 2 (two) times a day for 3 days       Start Date: 3/19/2021 End Date: 3/22/2021       Order Dose: 1 tablet       Quantity: 6 tablet    Refills: 0     No discharge procedures on file      PDMP Review     None          ED Provider  Electronically Signed by           Eddie Camilo MD  03/19/21 6406

## 2021-03-21 LAB
BACTERIA UR CULT: ABNORMAL
BACTERIA UR CULT: ABNORMAL

## 2021-04-02 ENCOUNTER — ANNUAL EXAM (OUTPATIENT)
Dept: OBGYN CLINIC | Facility: CLINIC | Age: 46
End: 2021-04-02
Payer: COMMERCIAL

## 2021-04-02 VITALS
WEIGHT: 143 LBS | DIASTOLIC BLOOD PRESSURE: 78 MMHG | BODY MASS INDEX: 22.44 KG/M2 | HEIGHT: 67 IN | SYSTOLIC BLOOD PRESSURE: 124 MMHG

## 2021-04-02 DIAGNOSIS — Z01.419 ROUTINE GYNECOLOGICAL EXAMINATION: Primary | ICD-10-CM

## 2021-04-02 DIAGNOSIS — Z12.31 ENCOUNTER FOR SCREENING MAMMOGRAM FOR MALIGNANT NEOPLASM OF BREAST: ICD-10-CM

## 2021-04-02 PROCEDURE — 1036F TOBACCO NON-USER: CPT | Performed by: PHYSICIAN ASSISTANT

## 2021-04-02 PROCEDURE — 0503F POSTPARTUM CARE VISIT: CPT | Performed by: PHYSICIAN ASSISTANT

## 2021-04-02 PROCEDURE — 3008F BODY MASS INDEX DOCD: CPT | Performed by: PHYSICIAN ASSISTANT

## 2021-04-02 PROCEDURE — 99396 PREV VISIT EST AGE 40-64: CPT | Performed by: PHYSICIAN ASSISTANT

## 2021-04-02 PROCEDURE — G0145 SCR C/V CYTO,THINLAYER,RESCR: HCPCS | Performed by: PHYSICIAN ASSISTANT

## 2021-04-02 RX ORDER — HYALURONATE SODIUM 20 MG/2 ML
SYRINGE (ML) INTRAARTICULAR
COMMUNITY
Start: 2021-02-09

## 2021-04-05 PROBLEM — Z01.419 ROUTINE GYNECOLOGICAL EXAMINATION: Status: ACTIVE | Noted: 2021-04-05

## 2021-04-05 NOTE — PROGRESS NOTES
Assessment/Plan   Problem List Items Addressed This Visit        Other    Routine gynecological examination - Primary     Pap guidelines reviewed  Pap with reflex done today per patient request    Reviewed cystitis on CT scan with negative urine culture and burning feeling  Reviewed possibility of interstitial cystitis  Reviewed cystitis diet and recommendation to follow up with urology  Script for mammogram given  Return to office for annual or as needed  Relevant Orders    Liquid-based pap, screening      Other Visit Diagnoses     Encounter for screening mammogram for malignant neoplasm of breast              Subjective:     Patient ID: Aaron Lindsay is a 41 y o  y o  female  HPI  40 yo seen for annual exam  Hx of uterine ablation in 2015  Menses lighter but still monthly  Reports seen at ER for burning in "stomach", has follow up with Dr Keith Mccollum for endoscopy  Patient reports burning is in lower pelvis  Denies vaginal discharge, odor, STD concerns  CT scan done on 3/19/2021 showed bladder wall thickening and pericystic inflammatory changes possible cystitis  Urinalysis showed small amount of blood, culture <10,000 CFU of GBS  Denies other urinary symptoms  Last pap: 10/30/2019 NILM (-)HRHPV  Last mammogram: 10/2/2020 BIRADS 2: Benign  The following portions of the patient's history were reviewed and updated as appropriate:   She  has a past medical history of Anemia, Arthritis, Depression, Edema, Fatigue, Vitamin B12 deficiency, and Vitamin D deficiency    She   Patient Active Problem List    Diagnosis Date Noted    Routine gynecological examination 04/05/2021    Well woman exam 10/29/2019    Anxiety 08/27/2019    Human papilloma virus infection 08/27/2019    Papanicolaou smear of cervix with atypical squamous cells cannot exclude high grade squamous intraepithelial lesion (ASC-H) 08/27/2019    Arthritis of right knee 04/19/2019    Right lumbar radiculopathy     Post-traumatic osteoarthritis of right knee 04/12/2019    Moderate episode of recurrent major depressive disorder ([address] ) 03/15/2019    Patellofemoral disorder of right knee 09/14/2018    S/P ORIF (open reduction internal fixation) fracture 06/22/2018    Asymptomatic PVCs 03/27/2018    Ventricular bigeminy 01/18/2018    Vitamin B12 deficiency 10/21/2015    Vitamin D deficiency 10/21/2015     She  has a past surgical history that includes ORIF TIBIAL PLATEAU (Right, 6/67/4532); ORIF tibia fracture (Right, 1/18/2018); Knee surgery; Knee arthroscopy (Bilateral); Tubal ligation; pr removal deep implant (Right, 8/2/2018); US guided breast biopsy left complete (Left, 10/24/2019); and Endometrial ablation  Her family history includes Arthritis in her family; Cancer in her mother; Cancer (age of onset: 62) in her father; Diabetes in her father; Heart attack in her father; Heart disease in her mother; Hypertension in her father; Lupus in her cousin and maternal grandmother; No Known Problems in her daughter, maternal aunt, maternal aunt, maternal aunt, maternal grandfather, paternal aunt, paternal grandfather, and paternal grandmother; Ovarian cancer in her family; Rheum arthritis in her daughter; Stomach cancer in her family  She  reports that she has never smoked  She has never used smokeless tobacco  She reports current alcohol use  She reports that she does not use drugs    Current Outpatient Medications   Medication Sig Dispense Refill    acetaminophen (TYLENOL) 325 mg tablet 650 mg every 6 hours for mild pain 30 tablet 0    amitriptyline (ELAVIL) 150 MG tablet Take 1 tablet (150 mg total) by mouth daily at bedtime 90 tablet 1    busPIRone (BUSPAR) 5 mg tablet Take 1 tablet (5 mg total) by mouth 2 (two) times a day 60 tablet 5    Cholecalciferol (Vitamin D3) 50 MCG (2000 UT) capsule Take 1 capsule (2,000 Units total) by mouth daily 90 capsule 1    cyanocobalamin 1,000 mcg/mL Inject 1 mL (1,000 mcg total) into a muscle every 30 (thirty) days 10 mL 1    Euflexxa 20 MG/2ML SOSY       SYRINGE-NEEDLE, DISP, 3 ML 25G X 1" 3 ML MISC by Does not apply route every 30 (thirty) days 50 each 0    vitamin B-12 (VITAMIN B-12) 1,000 mcg tablet Take 1 tablet (1,000 mcg total) by mouth daily 90 tablet 1     No current facility-administered medications for this visit  She has No Known Allergies       Menstrual History:  OB History        6    Para   3    Term   0       3    AB   3    Living   3       SAB        TAB   3    Ectopic        Multiple        Live Births   3           Obstetric Comments   Surgical VIp x 3  2 early deliveries, one w cerclage placed during final pregnancy            Patient's last menstrual period was 2021 (exact date)  Review of Systems   Constitutional: Negative for fatigue, fever and unexpected weight change  HENT: Negative for dental problem and sinus pressure  Eyes: Negative for visual disturbance  Respiratory: Negative for cough, shortness of breath and wheezing  Cardiovascular: Negative for chest pain  Gastrointestinal: Negative for abdominal pain, blood in stool, constipation, diarrhea, nausea and vomiting  Endocrine: Negative for polydipsia  Genitourinary: Negative for difficulty urinating, dyspareunia, dysuria, frequency, hematuria, pelvic pain and urgency  Musculoskeletal: Negative for arthralgias and back pain  Neurological: Negative for dizziness, seizures, light-headedness and headaches  Psychiatric/Behavioral: Negative for suicidal ideas  The patient is not nervous/anxious  Objective:  Vitals:    21 0903   BP: 124/78   BP Location: Left arm   Patient Position: Sitting   Cuff Size: Standard   Weight: 64 9 kg (143 lb)   Height: 5' 6 5" (1 689 m)      Physical Exam  Constitutional:       Appearance: Normal appearance  She is well-developed  Genitourinary:      Pelvic exam was performed with patient supine        Vulva, urethra, bladder, vagina, uterus and rectum normal       No vulval condylomata, lesion, tenderness, ulcerations, Bartholin's cyst or rash noted  No signs of labial injury  No labial fusion  No inguinal adenopathy present in the right or left side  No urethral prolapse, pain, swelling, tenderness, caruncle, mass or diverticulum present  Bladder is not distended or tender  No signs of injury or lesions in the vagina  No vaginal discharge, erythema, tenderness or bleeding  No cervical motion tenderness, discharge or lesion  Uterus is not enlarged, tender, irregular or mobile  No uterine mass detected  No right or left adnexal mass present  Right adnexa not tender or full  Left adnexa not tender or full  Rectum:      Guaiac result negative  No rectal mass, anal fissure, tenderness, external hemorrhoid, internal hemorrhoid or abnormal anal tone  HENT:      Head: Normocephalic and atraumatic  Neck:      Thyroid: No thyromegaly  Cardiovascular:      Rate and Rhythm: Normal rate and regular rhythm  Heart sounds: Normal heart sounds  No murmur  No friction rub  No gallop  Pulmonary:      Effort: Pulmonary effort is normal  No respiratory distress  Breath sounds: Normal breath sounds  No wheezing  Chest:      Breasts: Breasts are symmetrical          Right: Normal  No swelling, bleeding, inverted nipple, mass, nipple discharge, skin change or tenderness  Left: Normal  No swelling, bleeding, inverted nipple, mass, nipple discharge, skin change or tenderness  Abdominal:      General: There is no distension  Palpations: Abdomen is soft  There is no mass  Tenderness: There is no abdominal tenderness  There is no guarding or rebound  Hernia: No hernia is present  Lymphadenopathy:      Cervical: No cervical adenopathy  Upper Body:      Right upper body: No supraclavicular, axillary or pectoral adenopathy        Left upper body: No supraclavicular, axillary or pectoral adenopathy  Lower Body: No right inguinal adenopathy  No left inguinal adenopathy  Neurological:      Mental Status: She is alert and oriented to person, place, and time  Skin:     General: Skin is warm and dry     Psychiatric:         Behavior: Behavior normal

## 2021-04-05 NOTE — ASSESSMENT & PLAN NOTE
Pap guidelines reviewed  Pap with reflex done today per patient request    Reviewed cystitis on CT scan with negative urine culture and burning feeling  Reviewed possibility of interstitial cystitis  Reviewed cystitis diet and recommendation to follow up with urology  Script for mammogram given  Return to office for annual or as needed

## 2021-04-08 ENCOUNTER — TELEPHONE (OUTPATIENT)
Dept: GASTROENTEROLOGY | Facility: AMBULATORY SURGERY CENTER | Age: 46
End: 2021-04-08

## 2021-04-08 NOTE — TELEPHONE ENCOUNTER
Patient states she is in [place] on vacation and has gotten pink eye in her right eye and wants to know if you can call something in       Stared yesterday  Haydee Danielle yesterday but worse when she woke up this morning   Discharge and painful, still red     [address] is geovani added to list if we call something in Mastoid Interpolation Flap Text: A decision was made to reconstruct the defect utilizing an interpolation axial flap and a staged reconstruction.  A telfa template was made of the defect.  This telfa template was then used to outline the mastoid interpolation flap.  The donor area for the pedicle flap was then injected with anesthesia.  The flap was excised through the skin and subcutaneous tissue down to the layer of the underlying musculature.  The pedicle flap was carefully excised within this deep plane to maintain its blood supply.  The edges of the donor site were undermined.   The donor site was closed in a primary fashion.  The pedicle was then rotated into position and sutured.  Once the tube was sutured into place, adequate blood supply was confirmed with blanching and refill.  The pedicle was then wrapped with xeroform gauze and dressed appropriately with a telfa and gauze bandage to ensure continued blood supply and protect the attached pedicle.

## 2021-04-10 LAB
LAB AP GYN PRIMARY INTERPRETATION: NORMAL
LAB AP LMP: NORMAL
Lab: NORMAL

## 2021-04-22 ENCOUNTER — ANESTHESIA EVENT (OUTPATIENT)
Dept: GASTROENTEROLOGY | Facility: HOSPITAL | Age: 46
End: 2021-04-22

## 2021-04-22 PROBLEM — Z98.51 S/P TUBAL LIGATION: Status: ACTIVE | Noted: 2021-04-22

## 2021-04-22 NOTE — ANESTHESIA PREPROCEDURE EVALUATION
Procedure:  EGD    Relevant Problems   ANESTHESIA   (+) PONV (postoperative nausea and vomiting)      CARDIO   (+) Asymptomatic PVCs   (+) Ventricular bigeminy      MUSCULOSKELETAL   (+) Arthritis of right knee   (+) Post-traumatic osteoarthritis of right knee      NEURO/PSYCH   (+) Anxiety   (+) Moderate episode of recurrent major depressive disorder (HCC)   (+) S/P ORIF (open reduction internal fixation) fracture      Other   (+) S/P tubal ligation        Physical Exam    Airway    Mallampati score: II  TM Distance: >3 FB  Neck ROM: full     Dental       Cardiovascular  Rhythm: regular, Rate: normal,     Pulmonary  Breath sounds clear to auscultation,     Other Findings        Anesthesia Plan  ASA Score- 2     Anesthesia Type- IV sedation with anesthesia with ASA Monitors  Additional Monitors:   Airway Plan:           Plan Factors-    Chart reviewed  Patient is not a current smoker  Induction- intravenous  Postoperative Plan-     Informed Consent- Anesthetic plan and risks discussed with patient  I personally reviewed this patient with the CRNA  Discussed and agreed on the Anesthesia Plan with the CRNA  Jian Allen

## 2021-04-23 ENCOUNTER — HOSPITAL ENCOUNTER (OUTPATIENT)
Dept: GASTROENTEROLOGY | Facility: HOSPITAL | Age: 46
Setting detail: OUTPATIENT SURGERY
Discharge: HOME | End: 2021-04-23
Attending: INTERNAL MEDICINE | Admitting: INTERNAL MEDICINE
Payer: COMMERCIAL

## 2021-04-23 ENCOUNTER — ANESTHESIA (OUTPATIENT)
Dept: GASTROENTEROLOGY | Facility: HOSPITAL | Age: 46
End: 2021-04-23

## 2021-04-23 VITALS
HEIGHT: 66 IN | TEMPERATURE: 98 F | BODY MASS INDEX: 22.98 KG/M2 | SYSTOLIC BLOOD PRESSURE: 120 MMHG | WEIGHT: 143 LBS | HEART RATE: 68 BPM | RESPIRATION RATE: 18 BRPM | OXYGEN SATURATION: 100 % | DIASTOLIC BLOOD PRESSURE: 58 MMHG

## 2021-04-23 DIAGNOSIS — K31.7 POLYP OF STOMACH AND DUODENUM: ICD-10-CM

## 2021-04-23 DIAGNOSIS — K29.70 GASTRITIS, PRESENCE OF BLEEDING UNSPECIFIED, UNSPECIFIED CHRONICITY, UNSPECIFIED GASTRITIS TYPE: ICD-10-CM

## 2021-04-23 DIAGNOSIS — Z80.0 FAMILY HISTORY OF STOMACH CANCER: ICD-10-CM

## 2021-04-23 PROBLEM — R11.2 PONV (POSTOPERATIVE NAUSEA AND VOMITING): Status: ACTIVE | Noted: 2021-04-23

## 2021-04-23 PROBLEM — Z98.890 PONV (POSTOPERATIVE NAUSEA AND VOMITING): Status: ACTIVE | Noted: 2021-04-23

## 2021-04-23 PROBLEM — R10.9 ABDOMINAL PAIN: Status: ACTIVE | Noted: 2021-04-23

## 2021-04-23 LAB
EXT PREGNANCY TEST URINE: NEGATIVE
EXT. CONTROL: NORMAL

## 2021-04-23 PROCEDURE — 88305 TISSUE EXAM BY PATHOLOGIST: CPT | Performed by: PATHOLOGY

## 2021-04-23 PROCEDURE — 88313 SPECIAL STAINS GROUP 2: CPT | Performed by: PATHOLOGY

## 2021-04-23 PROCEDURE — 81025 URINE PREGNANCY TEST: CPT | Performed by: ANESTHESIOLOGY

## 2021-04-23 PROCEDURE — 43251 EGD REMOVE LESION SNARE: CPT | Performed by: INTERNAL MEDICINE

## 2021-04-23 PROCEDURE — 43239 EGD BIOPSY SINGLE/MULTIPLE: CPT | Performed by: INTERNAL MEDICINE

## 2021-04-23 PROCEDURE — 88342 IMHCHEM/IMCYTCHM 1ST ANTB: CPT | Performed by: PATHOLOGY

## 2021-04-23 RX ORDER — SODIUM CHLORIDE 9 MG/ML
INJECTION, SOLUTION INTRAVENOUS CONTINUOUS PRN
Status: DISCONTINUED | OUTPATIENT
Start: 2021-04-23 | End: 2021-04-23

## 2021-04-23 RX ORDER — PROPOFOL 10 MG/ML
INJECTION, EMULSION INTRAVENOUS AS NEEDED
Status: DISCONTINUED | OUTPATIENT
Start: 2021-04-23 | End: 2021-04-23

## 2021-04-23 RX ORDER — SODIUM CHLORIDE, SODIUM LACTATE, POTASSIUM CHLORIDE, CALCIUM CHLORIDE 600; 310; 30; 20 MG/100ML; MG/100ML; MG/100ML; MG/100ML
75 INJECTION, SOLUTION INTRAVENOUS CONTINUOUS
Status: DISCONTINUED | OUTPATIENT
Start: 2021-04-23 | End: 2021-04-27 | Stop reason: HOSPADM

## 2021-04-23 RX ORDER — GLYCOPYRROLATE 0.2 MG/ML
INJECTION INTRAMUSCULAR; INTRAVENOUS AS NEEDED
Status: DISCONTINUED | OUTPATIENT
Start: 2021-04-23 | End: 2021-04-23

## 2021-04-23 RX ORDER — SODIUM CHLORIDE, SODIUM LACTATE, POTASSIUM CHLORIDE, CALCIUM CHLORIDE 600; 310; 30; 20 MG/100ML; MG/100ML; MG/100ML; MG/100ML
125 INJECTION, SOLUTION INTRAVENOUS CONTINUOUS
Status: CANCELLED | OUTPATIENT
Start: 2021-04-23

## 2021-04-23 RX ORDER — SODIUM CHLORIDE, SODIUM LACTATE, POTASSIUM CHLORIDE, CALCIUM CHLORIDE 600; 310; 30; 20 MG/100ML; MG/100ML; MG/100ML; MG/100ML
20 INJECTION, SOLUTION INTRAVENOUS CONTINUOUS
Status: CANCELLED | OUTPATIENT
Start: 2021-04-23

## 2021-04-23 RX ADMIN — PROPOFOL 200 MG: 10 INJECTION, EMULSION INTRAVENOUS at 09:33

## 2021-04-23 RX ADMIN — PROPOFOL 50 MG: 10 INJECTION, EMULSION INTRAVENOUS at 09:37

## 2021-04-23 RX ADMIN — SODIUM CHLORIDE: 0.9 INJECTION, SOLUTION INTRAVENOUS at 09:27

## 2021-04-23 RX ADMIN — GLYCOPYRROLATE 0.2 MG: 0.2 INJECTION, SOLUTION INTRAMUSCULAR; INTRAVENOUS at 09:33

## 2021-04-23 RX ADMIN — PROPOFOL 50 MG: 10 INJECTION, EMULSION INTRAVENOUS at 09:35

## 2021-04-23 NOTE — ANESTHESIA POSTPROCEDURE EVALUATION
Post-Op Assessment Note    CV Status:  Stable  Pain Score: 0    Pain management: adequate     Mental Status:  Sleepy   Hydration Status:  Stable   PONV Controlled:  None   Airway Patency:  Patent      Post Op Vitals Reviewed: Yes      Staff: Anesthesiologist, CRNA   Comments: vss        No complications documented      BP      Temp      Pulse     Resp      SpO2

## 2021-04-23 NOTE — H&P
History and Physical - [facility]  Rad Mejias 41 y o  female MRN: [id number]                  HPI: Rad Mejias is a 41y o  year old female who presents for upper endoscopy      REVIEW OF SYSTEMS: Per the HPI, and otherwise unremarkable      Historical Information   Past Medical History:   Diagnosis Date    Anemia     Arthritis     Depression     Edema     Fatigue     Vitamin B12 deficiency     Vitamin D deficiency      Past Surgical History:   Procedure Laterality Date    ENDOMETRIAL ABLATION      KNEE ARTHROSCOPY Bilateral     KNEE SURGERY      ORIF TIBIA FRACTURE Right 1/18/2018    Procedure: OPEN REDUCTION W/ INTERNAL FIXATION (ORIF) TIBIA;  Surgeon: Mic Diaz MD;  [facility];  Service: Orthopedics    ORIF TIBIAL PLATEAU Right 7/38/3808    Procedure: OPEN REDUCTION W/ INTERNAL FIXATION (ORIF) TIBIAL PLATEAU;  Surgeon: Mic Diaz MD;  [facility];  Service: Orthopedics    AK REMOVAL DEEP IMPLANT Right 8/2/2018    Procedure: REMOVAL HARDWARE TIBIA;  Surgeon: Mic Diaz MD;  [facility];  Service: Orthopedics    TUBAL LIGATION      US GUIDED BREAST BIOPSY LEFT COMPLETE Left 10/24/2019     Social History   Social History     Substance and Sexual Activity   Alcohol Use Yes    Frequency: Monthly or less    Comment: social drink sporatic     Social History     Substance and Sexual Activity   Drug Use No     Social History     Tobacco Use   Smoking Status Never Smoker   Smokeless Tobacco Never Used     Family History   Problem Relation Age of Onset    Cancer Mother         either cervical or ovarian    Heart disease Mother         heart surgery    Heart attack Father         stent    Diabetes Father     Hypertension Father     Cancer Father 62        stomach cancer    Arthritis Family     Stomach cancer Family     Ovarian cancer Family     Rheum arthritis Daughter     Lupus Maternal Grandmother     Lupus Cousin     No Known Problems Maternal Grandfather     No Known Problems Paternal Grandmother     No Known Problems Paternal Grandfather     No Known Problems Daughter     No Known Problems Maternal Aunt     No Known Problems Maternal Aunt     No Known Problems Maternal Aunt     No Known Problems Paternal Aunt     Alcohol abuse Neg Hx     Depression Neg Hx     Drug abuse Neg Hx     Substance Abuse Neg Hx     Mental illness Neg Hx        Meds/Allergies       Current Outpatient Medications:     amitriptyline (ELAVIL) 150 MG tablet    busPIRone (BUSPAR) 5 mg tablet    Cholecalciferol (Vitamin D3) 50 MCG (2000 UT) capsule    cyanocobalamin 1,000 mcg/mL    SYRINGE-NEEDLE, DISP, 3 ML 25G X 1" 3 ML MISC    acetaminophen (TYLENOL) 325 mg tablet    Euflexxa 20 MG/2ML SOSY    vitamin B-12 (VITAMIN B-12) 1,000 mcg tablet    Current Facility-Administered Medications:     lactated ringers infusion, 75 mL/hr, Intravenous, Continuous    No Known Allergies    Objective     /70   Pulse 87   Temp 98 2 °F (36 8 °C) (Temporal)   Resp 18   Ht 5' 6" (1 676 m)   Wt 64 9 kg (143 lb)   LMP 04/19/2021   SpO2 100%   BMI 23 08 kg/m²       PHYSICAL EXAM    Gen: NAD  CV: RRR  CHEST: Clear  ABD: soft, NT/ND  EXT: no edema      ASSESSMENT/PLAN:  This is a 41y o  year old female here for upper endoscopy, and she is stable and optimized for her procedure

## 2021-04-23 NOTE — DISCHARGE INSTRUCTIONS
Upper Endoscopy   WHAT YOU NEED TO KNOW:   An upper endoscopy is also called an upper gastrointestinal (GI) endoscopy, or an esophagogastroduodenoscopy (EGD)  You may feel bloated, gassy, or have some abdominal discomfort after your procedure  Your throat may be sore for 24 to 36 hours  You may burp or pass gas from air that is still inside your body  DISCHARGE INSTRUCTIONS:   Call 911 for any of the following:   · You have sudden chest pain or trouble breathing  Seek care immediately if:   · You feel dizzy or faint  · You have trouble swallowing  · Your bowel movements are very dark or black  · Your abdomen is hard and firm and you have severe pain  · You vomit blood  Contact your healthcare provider if:   · You feel full or bloated and cannot burp or pass gas  · You have not had a bowel movement for 3 days after your procedure  · You have neck pain  · You have a fever or chills  · You have nausea or are vomiting  · You have a rash or hives  · You have questions or concerns about your endoscopy  Relieve a sore throat:  Suck on throat lozenges or crushed ice  Gargle with a small amount of warm salt water  Mix 1 teaspoon of salt and 1 cup of warm water to make salt water  Relieve gas and discomfort from bloating:  Lie on your right side with a heating pad on your abdomen  Take short walks to help pass gas  Eat small meals until bloating is relieved  Rest after your procedure: You have been given medicine to relax you  Do not  drive or make important decisions until the day after your procedure  Return to your normal activity as directed  You can usually return to work the day after your procedure  Follow up with your healthcare provider as directed:  Write down your questions so you remember to ask them during your visits     © 2017 [address] is for End User's use only and may not be sold, redistributed or otherwise used for commercial purposes  All illustrations and images included in CareNotes® are the copyrighted property of [employer] D [employer]  or Augie Alaniz  The above information is an  only  It is not intended as medical advice for individual conditions or treatments  Talk to your doctor, nurse or pharmacist before following any medical regimen to see if it is safe and effective for you

## 2021-04-30 NOTE — RESULT ENCOUNTER NOTE
Please inform patient that there biopsies were benign    Repeat EGD in 3 years in schedule colonoscopy sometime this year

## 2021-05-03 ENCOUNTER — TELEPHONE (OUTPATIENT)
Dept: GASTROENTEROLOGY | Facility: CLINIC | Age: 46
End: 2021-05-03

## 2021-05-03 NOTE — TELEPHONE ENCOUNTER
Left message for patient to call and schedule colonoscopy with Dr Washington Chino sometime this year  I will call patient again in 1 wk if she doesn't return call  However, will be asking patient to check insurance regarding covering her colonoscopy at her age  She will have to check before scheduling unless she is having an issue or family hx of colon cancer

## 2021-05-03 NOTE — TELEPHONE ENCOUNTER
----- Message from Simran Manzano LPN sent at 6/4/2169  8:01 AM EDT -----  Patient aware  Educated  egd 3 years  Schedule colon  Please call patient to schedule colon with Dr Yasemin Rios   Thank you

## 2021-05-10 ENCOUNTER — PREP FOR PROCEDURE (OUTPATIENT)
Dept: GASTROENTEROLOGY | Facility: CLINIC | Age: 46
End: 2021-05-10

## 2021-05-10 DIAGNOSIS — R10.84 GENERALIZED ABDOMINAL PAIN: Primary | ICD-10-CM

## 2021-05-21 ENCOUNTER — OFFICE VISIT (OUTPATIENT)
Dept: OBGYN CLINIC | Facility: MEDICAL CENTER | Age: 46
End: 2021-05-21
Payer: COMMERCIAL

## 2021-05-21 VITALS
SYSTOLIC BLOOD PRESSURE: 108 MMHG | HEIGHT: 66 IN | BODY MASS INDEX: 22.98 KG/M2 | DIASTOLIC BLOOD PRESSURE: 76 MMHG | WEIGHT: 143 LBS | HEART RATE: 76 BPM

## 2021-05-21 DIAGNOSIS — M25.561 CHRONIC PAIN OF RIGHT KNEE: ICD-10-CM

## 2021-05-21 DIAGNOSIS — M17.31 POST-TRAUMATIC OSTEOARTHRITIS OF RIGHT KNEE: Primary | ICD-10-CM

## 2021-05-21 DIAGNOSIS — G89.29 CHRONIC PAIN OF RIGHT KNEE: ICD-10-CM

## 2021-05-21 PROCEDURE — 20610 DRAIN/INJ JOINT/BURSA W/O US: CPT | Performed by: ORTHOPAEDIC SURGERY

## 2021-05-21 RX ORDER — NABUMETONE 750 MG/1
750 TABLET, FILM COATED ORAL 2 TIMES DAILY
Qty: 60 TABLET | Refills: 1 | Status: SHIPPED | OUTPATIENT
Start: 2021-05-21 | End: 2021-10-29 | Stop reason: SDUPTHER

## 2021-05-21 RX ORDER — HYALURONATE SODIUM 10 MG/ML
20 SYRINGE (ML) INTRAARTICULAR
Status: COMPLETED | OUTPATIENT
Start: 2021-05-21 | End: 2021-05-21

## 2021-05-21 RX ADMIN — Medication 20 MG: at 10:26

## 2021-05-21 NOTE — PROGRESS NOTES
41 y o female  Presents for evaluation  She has return to weight-bearing pain in the right knee  She has pain at the level right knee joint, the pain is made worse bearing weight, pain increases with increased activities    Occurs despite use of anti-inflammatory medications on a regular basis    Review of Systems  Review of systems negative unless otherwise specified in HPI    Past Medical History  Past Medical History:   Diagnosis Date    Anemia     Arthritis     Depression     Edema     Fatigue     Vitamin B12 deficiency     Vitamin D deficiency        Past Surgical History  Past Surgical History:   Procedure Laterality Date    ENDOMETRIAL ABLATION      KNEE ARTHROSCOPY Bilateral     KNEE SURGERY      ORIF TIBIA FRACTURE Right 1/18/2018    Procedure: OPEN REDUCTION W/ INTERNAL FIXATION (ORIF) TIBIA;  Surgeon: Vinh Duron MD;  [facility];  Service: Orthopedics    ORIF TIBIAL PLATEAU Right 3/52/6654    Procedure: OPEN REDUCTION W/ INTERNAL FIXATION (ORIF) TIBIAL PLATEAU;  Surgeon: Vinh Duron MD;  [facility];  Service: Orthopedics    [place] REMOVAL DEEP IMPLANT Right 8/2/2018    Procedure: REMOVAL HARDWARE TIBIA;  Surgeon: Vinh Duron MD;  [facility];  Service: Orthopedics    TUBAL LIGATION      US GUIDED BREAST BIOPSY LEFT COMPLETE Left 10/24/2019       Current Medications  Current Outpatient Medications on File Prior to Visit   Medication Sig Dispense Refill    acetaminophen (TYLENOL) 325 mg tablet 650 mg every 6 hours for mild pain 30 tablet 0    amitriptyline (ELAVIL) 150 MG tablet Take 1 tablet (150 mg total) by mouth daily at bedtime 90 tablet 1    busPIRone (BUSPAR) 5 mg tablet Take 1 tablet (5 mg total) by mouth 2 (two) times a day 60 tablet 5    Cholecalciferol (Vitamin D3) 50 MCG (2000 UT) capsule Take 1 capsule (2,000 Units total) by mouth daily 90 capsule 1    cyanocobalamin 1,000 mcg/mL Inject 1 mL (1,000 mcg total) into a muscle every 30 (thirty) days 10 mL 1    Euflexxa 20 MG/2ML SOSY       SYRINGE-NEEDLE, DISP, 3 ML 25G X 1" 3 ML MISC by Does not apply route every 30 (thirty) days 50 each 0    vitamin B-12 (VITAMIN B-12) 1,000 mcg tablet Take 1 tablet (1,000 mcg total) by mouth daily 90 tablet 1     No current facility-administered medications on file prior to visit  Recent Labs [facility])  0   Lab Value Date/Time    HCT 36 7 03/19/2021 2151    HCT 43 6 10/17/2015 1109    HGB 11 7 03/19/2021 2151    HGB 14 4 10/17/2015 1109    WBC 6 38 03/19/2021 2151    WBC 4 24 (L) 10/17/2015 1109    INR 1 04 01/27/2019 1844    ESR 6 05/16/2019 1706    CRP <3 0 05/16/2019 1706    GLUCOSE 121 01/17/2018 2311    GLUCOSE 85 10/17/2015 1109         Physical exam  · General: Awake, Alert, Oriented  · Eyes: Pupils equal, round and reactive to light  · Heart: regular rate and rhythm  · Lungs: No audible wheezing  · Abdomen: soft   examination finds right knee in valgus  She has a healed anterolateral scar  She has good extension good flexion  There is patellofemoral and tibial femoral crepitation flexion extension  Calf compartments on the right lower extremity soft and supple  Toes on the right foot are warm, sensate, and mobile    Imaging   no new x-rays accompany her    Procedure   injection of viscosupplementation provided for the right knee joint  It is documented below      Large joint arthrocentesis: R knee  Universal Protocol:  Consent given by: patient    Supporting Documentation  Indications: pain   Procedure Details  Location: knee - R knee  Needle size: 22 G  Ultrasound guidance: no  Medications administered: 20 mg Sodium Hyaluronate 20 MG/2ML    Patient tolerance: patient tolerated the procedure well with no immediate complications  Dressing:  Sterile dressing applied            Assessment/Plan:   41 y  o female  Who has return of symptomatic posttraumatic arthritis the right knee    All diagnoses were discussed the at 05/06/21 1028 New Bag at 05/06/21 1028    0.9 % sodium chloride infusion  25 mL Intravenous PRN Magalys Butterfield MD        ceFAZolin (ANCEF) 2000 mg in dextrose 3 % 50 mL IVPB (duplex)  2,000 mg Intravenous On Call to [address] [place] [address] MD Juan        sodium chloride flush 0.9 % injection 10 mL  10 mL Intravenous 2 times per day Magalys Butterfield MD        sodium chloride flush 0.9 % injection 10 mL  10 mL Intravenous PRN Magalys Butterfield MD           Allergies: Allergies   Allergen Reactions    Other      Pt admits that she says she may be allergic to erthyromycin-or zithromycin and caused skin reactions     Sulfa Antibiotics Rash     swelling       Problem List:    Patient Active Problem List   Diagnosis Code    Type 2 diabetes mellitus with complication, without long-term current use of insulin ([facility]) E11.8    Vitamin D deficiency E55.9    B12 deficiency E53.8    Peripheral neuropathy G62.9       Past Medical History:        Diagnosis Date    Asthma     Cancer ([facility]     non hodgkin lymphoma    COPD (chronic obstructive pulmonary disease) ([facility]     Fibromyalgia     [facility]arcotic abuse ([facility]     Type 2 diabetes mellitus with complication, without long-term current use of insulin ([facility] 10/23/2018       Past Surgical History:        Procedure Laterality Date    ABDOMEN SURGERY      BLADDER REPAIR      vaginal ap and surgery bladder sling    MANDIBLE SURGERY      tmj surgery    WISDOM TOOTH EXTRACTION         Social History:    Social History     Tobacco Use    Smoking status: Current Every Day Smoker     Packs/day: 0.50     Types: Cigarettes    Smokeless tobacco: Never Used    Tobacco comment: trying to quit   Substance Use Topics    Alcohol use:  No                                Ready to quit: Not Answered  Counseling given: Not Answered  Comment: trying to quit      Vital Signs (Current):   Vitals:    05/06/21 1000 05/06/21 1015   BP:  (!) 140/74   Pulse:  80   Resp:  18   Temp:  97.7 °F (36.5 patient  Treatment options including risks, benefits, alternatives were discussed in detail  Injections of viscosupplementation indicated  They are advised, except, administers outlined above    Office follow-up in 1 week time is necessary for ongoing viscosupplementation of the right knee °C)   TempSrc:  Temporal   SpO2:  98%   Weight: 181 lb (82.1 kg)    Height: 5' 7\" (1.702 m)                                               BP Readings from Last 3 Encounters:   05/06/21 (!) 140/74   05/05/21 130/61   04/28/21 124/77       NPO Status: Time of last liquid consumption: 0910                        Time of last solid consumption: 0345                        Date of last liquid consumption: 05/06/21                        Date of last solid food consumption: 05/06/21    BMI:   Wt Readings from Last 3 Encounters:   05/06/21 181 lb (82.1 kg)   05/05/21 181 lb (82.1 kg)   04/28/21 180 lb (81.6 kg)     Body mass index is 28.35 kg/m². CBC:   Lab Results   Component Value Date    WBC 4.2 05/05/2021    RBC 4.30 05/05/2021    HGB 11.7 05/05/2021    HCT 36.9 05/05/2021    MCV 85.8 05/05/2021    RDW 13.8 05/05/2021     05/05/2021       CMP:   Lab Results   Component Value Date     05/05/2021    K 4.0 05/05/2021     05/05/2021    CO2 25 05/05/2021    BUN 22 05/05/2021    CREATININE 1.0 05/05/2021    GFRAA >60 05/05/2021    LABGLOM 58 05/05/2021    GLUCOSE 88 05/05/2021    PROT 8.2 03/09/2019    CALCIUM 8.7 05/05/2021    BILITOT 0.3 03/09/2019    ALKPHOS 106 03/09/2019    AST 14 03/09/2019    ALT 17 03/09/2019       POC Tests: No results for input(s): POCGLU, POCNA, POCK, POCCL, POCBUN, POCHEMO, POCHCT in the last 72 hours.     Coags: No results found for: PROTIME, INR, APTT    HCG (If Applicable): No results found for: PREGTESTUR, PREGSERUM, HCG, HCGQUANT     ABGs: No results found for: PHART, PO2ART, MSO0VXI, UFC6HYH, BEART, L2MNIANV     Type & Screen (If Applicable):  No results found for: LABABO, LABRH    Drug/Infectious Status (If Applicable):  No results found for: HIV, HEPCAB    COVID-19 Screening (If Applicable):   Lab Results   Component Value Date    COVID19 Not Detected 04/29/2021           Anesthesia Evaluation    Airway: Mallampati: II  TM distance: >3 FB   Neck ROM: full  Mouth opening: > = 3 FB Dental: normal exam         Pulmonary: breath sounds clear to auscultation  (+) COPD:  asthma: current smoker          Patient smoked on day of surgery. Cardiovascular:Negative CV ROS            Rhythm: regular  Rate: normal                    Neuro/Psych:   (+) neuromuscular disease:,             GI/Hepatic/Renal:             Endo/Other:    (+) DiabetesType II DM, , malignancy/cancer. Abdominal:   (+) obese,     Abdomen: soft. Vascular:                                        Anesthesia Plan      MAC     ASA 2     (Narcotic abuse. Does not want Ketamine or versed.)  Induction: intravenous. Anesthetic plan and risks discussed with patient. Plan discussed with CRNA.                   Mindy White MD   5/6/2021

## 2021-05-28 ENCOUNTER — OFFICE VISIT (OUTPATIENT)
Dept: OBGYN CLINIC | Facility: MEDICAL CENTER | Age: 46
End: 2021-05-28
Payer: COMMERCIAL

## 2021-05-28 VITALS
BODY MASS INDEX: 22.5 KG/M2 | SYSTOLIC BLOOD PRESSURE: 123 MMHG | HEART RATE: 93 BPM | DIASTOLIC BLOOD PRESSURE: 83 MMHG | WEIGHT: 140 LBS | HEIGHT: 66 IN

## 2021-05-28 DIAGNOSIS — G89.29 CHRONIC PAIN OF RIGHT KNEE: ICD-10-CM

## 2021-05-28 DIAGNOSIS — M17.31 POST-TRAUMATIC OSTEOARTHRITIS OF RIGHT KNEE: Primary | ICD-10-CM

## 2021-05-28 DIAGNOSIS — M25.561 CHRONIC PAIN OF RIGHT KNEE: ICD-10-CM

## 2021-05-28 PROCEDURE — 20610 DRAIN/INJ JOINT/BURSA W/O US: CPT | Performed by: ORTHOPAEDIC SURGERY

## 2021-05-28 RX ORDER — HYALURONATE SODIUM 10 MG/ML
20 SYRINGE (ML) INTRAARTICULAR
Status: COMPLETED | OUTPATIENT
Start: 2021-05-28 | End: 2021-05-28

## 2021-05-28 RX ADMIN — Medication 20 MG: at 10:12

## 2021-05-28 NOTE — PROGRESS NOTES
Assessment:  1  Post-traumatic osteoarthritis of right knee     2  Chronic pain of right knee         Plan:  The patient was provided with 2nd right knee Euflexxa injection  The patient tolerated the procedure well  The patient should follow up in one week for 3rd right knee Euflexxa  To do next visit:  Return in about 1 week (around 6/4/2021) for re-check and Euflexxa #3 right knee  The above stated was discussed in layman's terms and the patient expressed understanding  All questions were answered to the patient's satisfaction  Scribe Attestation    I,:  Yoly Buchanan am acting as a scribe while in the presence of the attending physician :       I,:  Denise Loja MD personally performed the services described in this documentation    as scribed in my presence :             Subjective:   Rafael Pardo is a 41 y o  female who presents for 2nd right knee Euflexxa injection  Today she complains of increased generalized right knee pain following dancing last week  Prolonged weight bearing and repetitive bending aggravates while rest alleviates  She does use otc creams and Nabumetone with benefit            Review of systems negative unless otherwise specified in HPI    Past Medical History:   Diagnosis Date    Anemia     Arthritis     Depression     Edema     Fatigue     Vitamin B12 deficiency     Vitamin D deficiency        Past Surgical History:   Procedure Laterality Date    ENDOMETRIAL ABLATION      KNEE ARTHROSCOPY Bilateral     KNEE SURGERY      ORIF TIBIA FRACTURE Right 1/18/2018    Procedure: OPEN REDUCTION W/ INTERNAL FIXATION (ORIF) TIBIA;  Surgeon: Denise Loja MD;  [facility];  Service: Orthopedics    ORIF TIBIAL PLATEAU Right 8/90/4713    Procedure: OPEN REDUCTION W/ INTERNAL FIXATION (ORIF) TIBIAL PLATEAU;  Surgeon: Denise Loja MD;  [facility];  Service: Orthopedics    [place] REMOVAL DEEP IMPLANT Right 8/2/2018    Procedure: REMOVAL HARDWARE TIBIA;   Surgeon: Sonya Allison MD;  [facility];  Service: Orthopedics    TUBAL LIGATION      US GUIDED BREAST BIOPSY LEFT COMPLETE Left 10/24/2019       Family History   Problem Relation Age of Onset    Cancer Mother         either cervical or ovarian    Heart disease Mother         heart surgery    Heart attack Father         stent    Diabetes Father     Hypertension Father     Cancer Father 62        stomach cancer    Arthritis Family     Stomach cancer Family     Ovarian cancer Family     Rheum arthritis Daughter     Lupus Maternal Grandmother     Lupus Cousin     No Known Problems Maternal Grandfather     No Known Problems Paternal Grandmother     No Known Problems Paternal Grandfather     No Known Problems Daughter     No Known Problems Maternal Aunt     No Known Problems Maternal Aunt     No Known Problems Maternal Aunt     No Known Problems Paternal Aunt     Alcohol abuse Neg Hx     Depression Neg Hx     Drug abuse Neg Hx     Substance Abuse Neg Hx     Mental illness Neg Hx        Social History     Occupational History    Occupation: Teacher in [place]   Tobacco Use    Smoking status: Never Smoker    Smokeless tobacco: Never Used   Substance and Sexual Activity    Alcohol use: Yes     Frequency: Monthly or less     Comment: social drink sporatic    Drug use: No    Sexual activity: Not Currently         Current Outpatient Medications:     acetaminophen (TYLENOL) 325 mg tablet, 650 mg every 6 hours for mild pain, Disp: 30 tablet, Rfl: 0    amitriptyline (ELAVIL) 150 MG tablet, Take 1 tablet (150 mg total) by mouth daily at bedtime, Disp: 90 tablet, Rfl: 1    busPIRone (BUSPAR) 5 mg tablet, Take 1 tablet (5 mg total) by mouth 2 (two) times a day, Disp: 60 tablet, Rfl: 5    Cholecalciferol (Vitamin D3) 50 MCG (2000 UT) capsule, Take 1 capsule (2,000 Units total) by mouth daily, Disp: 90 capsule, Rfl: 1    cyanocobalamin 1,000 mcg/mL, Inject 1 mL (1,000 mcg total) into a muscle every 30 (thirty) days, Disp: 10 mL, Rfl: 1    Euflexxa 20 MG/2ML SOSY, , Disp: , Rfl:     nabumetone (RELAFEN) 750 mg tablet, Take 1 tablet (750 mg total) by mouth 2 (two) times a day, Disp: 60 tablet, Rfl: 1    SYRINGE-NEEDLE, DISP, 3 ML 25G X 1" 3 ML MISC, by Does not apply route every 30 (thirty) days, Disp: 50 each, Rfl: 0    vitamin B-12 (VITAMIN B-12) 1,000 mcg tablet, Take 1 tablet (1,000 mcg total) by mouth daily, Disp: 90 tablet, Rfl: 1    No Known Allergies         Vitals:    05/28/21 1000   BP: 123/83   Pulse: 93       Objective:  Physical exam  · General: Awake, Alert, Oriented  · Eyes: Pupils equal, round and reactive to light  · Heart: regular rate and rhythm  · Lungs: No audible wheezing  · Abdomen: soft                    Ortho Exam  Right knee:  Valgus alignment  Well healed anterior scar  No erythema or ecchymosis  No effusion or swelling  Normal strength  Good ROM with crepitus   Calf compartments soft and supple  Sensation intact  Toes are warm sensate and mobile        Diagnostics, reviewed and taken today if performed as documented:    None performed    Procedures, if performed today:    Large joint arthrocentesis: R knee  Universal Protocol:  Consent: Verbal consent obtained  Risks and benefits: risks, benefits and alternatives were discussed  Consent given by: patient  Time out: Immediately prior to procedure a "time out" was called to verify the correct patient, procedure, equipment, support staff and site/side marked as required    Timeout called at: 5/28/2021 10:11 AM   Patient understanding: patient states understanding of the procedure being performed  Patient identity confirmed: verbally with patient    Supporting Documentation  Indications: pain   Procedure Details  Location: knee - R knee  Preparation: Patient was prepped and draped in the usual sterile fashion  Needle size: 22 G  Ultrasound guidance: no  Approach: anterolateral  Medications administered: 20 mg Sodium Hyaluronate 20 MG/2ML    Patient tolerance: patient tolerated the procedure well with no immediate complications  Dressing:  Sterile dressing applied            Portions of the record may have been created with voice recognition software  Occasional wrong word or "sound a like" substitutions may have occurred due to the inherent limitations of voice recognition software  Read the chart carefully and recognize, using context, where substitutions have occurred

## 2021-06-04 ENCOUNTER — OFFICE VISIT (OUTPATIENT)
Dept: OBGYN CLINIC | Facility: MEDICAL CENTER | Age: 46
End: 2021-06-04
Payer: COMMERCIAL

## 2021-06-04 VITALS
DIASTOLIC BLOOD PRESSURE: 88 MMHG | HEART RATE: 90 BPM | WEIGHT: 138 LBS | SYSTOLIC BLOOD PRESSURE: 137 MMHG | HEIGHT: 66 IN | BODY MASS INDEX: 22.18 KG/M2

## 2021-06-04 DIAGNOSIS — M17.31 POST-TRAUMATIC OSTEOARTHRITIS OF RIGHT KNEE: Primary | ICD-10-CM

## 2021-06-04 DIAGNOSIS — M22.2X1 PATELLOFEMORAL DISORDER OF RIGHT KNEE: ICD-10-CM

## 2021-06-04 DIAGNOSIS — M25.561 CHRONIC PAIN OF RIGHT KNEE: ICD-10-CM

## 2021-06-04 DIAGNOSIS — G89.29 CHRONIC PAIN OF RIGHT KNEE: ICD-10-CM

## 2021-06-04 PROCEDURE — 3008F BODY MASS INDEX DOCD: CPT | Performed by: PHYSICIAN ASSISTANT

## 2021-06-04 PROCEDURE — 20610 DRAIN/INJ JOINT/BURSA W/O US: CPT | Performed by: PHYSICIAN ASSISTANT

## 2021-06-04 RX ORDER — HYALURONATE SODIUM 10 MG/ML
20 SYRINGE (ML) INTRAARTICULAR
Status: COMPLETED | OUTPATIENT
Start: 2021-06-04 | End: 2021-06-04

## 2021-06-04 RX ADMIN — Medication 20 MG: at 09:47

## 2021-06-04 NOTE — PROGRESS NOTES
Subjective;     41year-old patient well known to me   she is undergoing 3 shot series of Euflexxa for her right knee  today's injection will complete the series      Past Medical History:   Diagnosis Date    Anemia     Arthritis     Depression     Edema     Fatigue     Vitamin B12 deficiency     Vitamin D deficiency        Past Surgical History:   Procedure Laterality Date    ENDOMETRIAL ABLATION      KNEE ARTHROSCOPY Bilateral     KNEE SURGERY      ORIF TIBIA FRACTURE Right 1/18/2018    Procedure: OPEN REDUCTION W/ INTERNAL FIXATION (ORIF) TIBIA;  Surgeon: Yvonne Mata MD;  [facility];  Service: Orthopedics    ORIF TIBIAL PLATEAU Right 6/94/6509    Procedure: OPEN REDUCTION W/ INTERNAL FIXATION (ORIF) TIBIAL PLATEAU;  Surgeon: Yvonne Mata MD;  [facility];  Service: Orthopedics    WI REMOVAL DEEP IMPLANT Right 8/2/2018    Procedure: REMOVAL HARDWARE TIBIA;  Surgeon: Yvonne Mata MD;  [facility];  Service: Orthopedics    TUBAL LIGATION      US GUIDED BREAST BIOPSY LEFT COMPLETE Left 10/24/2019       Family History   Problem Relation Age of Onset    Cancer Mother         either cervical or ovarian    Heart disease Mother         heart surgery    Heart attack Father         stent    Diabetes Father     Hypertension Father     Cancer Father 62        stomach cancer    Arthritis Family     Stomach cancer Family     Ovarian cancer Family     Rheum arthritis Daughter     Lupus Maternal Grandmother     Lupus Cousin     No Known Problems Maternal Grandfather     No Known Problems Paternal Grandmother     No Known Problems Paternal Grandfather     No Known Problems Daughter     No Known Problems Maternal Aunt     No Known Problems Maternal Aunt     No Known Problems Maternal Aunt     No Known Problems Paternal Aunt     Alcohol abuse Neg Hx     Depression Neg Hx     Drug abuse Neg Hx     Substance Abuse Neg Hx     Mental illness Neg Hx Social History     Tobacco Use    Smoking status: Never Smoker    Smokeless tobacco: Never Used   Substance Use Topics    Alcohol use: Yes     Frequency: Monthly or less     Comment: social drink sporatic    Drug use: No      exam;     there are numerous incisions that are well healed over her pretibial area proximally and distally  Her right knee offer safe administration for medication  Right knee has no inherent warmth to palpation or significant fluid distension or swelling    Large joint arthrocentesis: R knee  Procedure Details  Location: knee - R knee  Needle size: 22 G (RP/27G)  Medications administered: 20 mg Sodium Hyaluronate 20 MG/2ML  Specialty Pharmacy Supplied: received medications from pharmacy  Patient tolerance: patient tolerated the procedure well with no immediate complications  Dressing:  Sterile dressing applied       impression;   Right knee osteoarthritis,- posttraumatic   right knee pain     plan;     she received her 3rd and final injection of Euflexxa       we can see her in 3 additional months in follow-up     it was my privilege to attend to her in care and at her expressed permission

## 2021-06-08 ENCOUNTER — TELEPHONE (OUTPATIENT)
Dept: INTERNAL MEDICINE CLINIC | Facility: CLINIC | Age: 46
End: 2021-06-08

## 2021-06-08 ENCOUNTER — APPOINTMENT (OUTPATIENT)
Dept: LAB | Facility: CLINIC | Age: 46
End: 2021-06-08
Payer: COMMERCIAL

## 2021-06-08 DIAGNOSIS — E55.9 VITAMIN D DEFICIENCY: ICD-10-CM

## 2021-06-08 DIAGNOSIS — F33.1 MODERATE EPISODE OF RECURRENT MAJOR DEPRESSIVE DISORDER (HCC): ICD-10-CM

## 2021-06-08 DIAGNOSIS — R39.9 URINARY SYMPTOM OR SIGN: Primary | ICD-10-CM

## 2021-06-08 DIAGNOSIS — E53.8 VITAMIN B12 DEFICIENCY: ICD-10-CM

## 2021-06-08 LAB
25(OH)D3 SERPL-MCNC: 13.2 NG/ML (ref 30–100)
ALBUMIN SERPL BCP-MCNC: 3.8 G/DL (ref 3.5–5)
ALP SERPL-CCNC: 83 U/L (ref 46–116)
ALT SERPL W P-5'-P-CCNC: 50 U/L (ref 12–78)
AMORPH URATE CRY URNS QL MICRO: NORMAL /HPF
ANION GAP SERPL CALCULATED.3IONS-SCNC: 8 MMOL/L (ref 4–13)
AST SERPL W P-5'-P-CCNC: 14 U/L (ref 5–45)
BACTERIA UR QL AUTO: NORMAL /HPF
BASOPHILS # BLD AUTO: 0.02 THOUSANDS/ΜL (ref 0–0.1)
BASOPHILS NFR BLD AUTO: 0 % (ref 0–1)
BILIRUB SERPL-MCNC: 0.56 MG/DL (ref 0.2–1)
BILIRUB UR QL STRIP: NEGATIVE
BUN SERPL-MCNC: 6 MG/DL (ref 5–25)
CALCIUM SERPL-MCNC: 9.4 MG/DL (ref 8.3–10.1)
CHLORIDE SERPL-SCNC: 105 MMOL/L (ref 100–108)
CLARITY UR: CLEAR
CO2 SERPL-SCNC: 27 MMOL/L (ref 21–32)
COLOR UR: YELLOW
CREAT SERPL-MCNC: 0.85 MG/DL (ref 0.6–1.3)
EOSINOPHIL # BLD AUTO: 0.04 THOUSAND/ΜL (ref 0–0.61)
EOSINOPHIL NFR BLD AUTO: 1 % (ref 0–6)
ERYTHROCYTE [DISTWIDTH] IN BLOOD BY AUTOMATED COUNT: 13 % (ref 11.6–15.1)
GFR SERPL CREATININE-BSD FRML MDRD: 84 ML/MIN/1.73SQ M
GLUCOSE SERPL-MCNC: 102 MG/DL (ref 65–140)
GLUCOSE UR STRIP-MCNC: NEGATIVE MG/DL
HCT VFR BLD AUTO: 46.4 % (ref 34.8–46.1)
HGB BLD-MCNC: 14.7 G/DL (ref 11.5–15.4)
HGB UR QL STRIP.AUTO: ABNORMAL
IMM GRANULOCYTES # BLD AUTO: 0.03 THOUSAND/UL (ref 0–0.2)
IMM GRANULOCYTES NFR BLD AUTO: 0 % (ref 0–2)
KETONES UR STRIP-MCNC: NEGATIVE MG/DL
LEUKOCYTE ESTERASE UR QL STRIP: NEGATIVE
LYMPHOCYTES # BLD AUTO: 1.75 THOUSANDS/ΜL (ref 0.6–4.47)
LYMPHOCYTES NFR BLD AUTO: 21 % (ref 14–44)
MCH RBC QN AUTO: 28.7 PG (ref 26.8–34.3)
MCHC RBC AUTO-ENTMCNC: 31.7 G/DL (ref 31.4–37.4)
MCV RBC AUTO: 91 FL (ref 82–98)
MONOCYTES # BLD AUTO: 0.59 THOUSAND/ΜL (ref 0.17–1.22)
MONOCYTES NFR BLD AUTO: 7 % (ref 4–12)
NEUTROPHILS # BLD AUTO: 5.89 THOUSANDS/ΜL (ref 1.85–7.62)
NEUTS SEG NFR BLD AUTO: 71 % (ref 43–75)
NITRITE UR QL STRIP: NEGATIVE
NON-SQ EPI CELLS URNS QL MICRO: NORMAL /HPF
NRBC BLD AUTO-RTO: 0 /100 WBCS
PH UR STRIP.AUTO: 6.5 [PH]
PLATELET # BLD AUTO: 268 THOUSANDS/UL (ref 149–390)
PMV BLD AUTO: 11.2 FL (ref 8.9–12.7)
POTASSIUM SERPL-SCNC: 3.5 MMOL/L (ref 3.5–5.3)
PROT SERPL-MCNC: 7.8 G/DL (ref 6.4–8.2)
PROT UR STRIP-MCNC: NEGATIVE MG/DL
RBC # BLD AUTO: 5.12 MILLION/UL (ref 3.81–5.12)
RBC #/AREA URNS AUTO: NORMAL /HPF
SODIUM SERPL-SCNC: 140 MMOL/L (ref 136–145)
SP GR UR STRIP.AUTO: <=1.005 (ref 1–1.03)
UROBILINOGEN UR QL STRIP.AUTO: 0.2 E.U./DL
VIT B12 SERPL-MCNC: 189 PG/ML (ref 100–900)
WBC # BLD AUTO: 8.32 THOUSAND/UL (ref 4.31–10.16)
WBC #/AREA URNS AUTO: NORMAL /HPF

## 2021-06-08 PROCEDURE — 85025 COMPLETE CBC W/AUTO DIFF WBC: CPT

## 2021-06-08 PROCEDURE — 82306 VITAMIN D 25 HYDROXY: CPT

## 2021-06-08 PROCEDURE — 81001 URINALYSIS AUTO W/SCOPE: CPT

## 2021-06-08 PROCEDURE — 36415 COLL VENOUS BLD VENIPUNCTURE: CPT

## 2021-06-08 PROCEDURE — 82607 VITAMIN B-12: CPT

## 2021-06-08 PROCEDURE — 80053 COMPREHEN METABOLIC PANEL: CPT

## 2021-06-08 NOTE — TELEPHONE ENCOUNTER
Pt  was seen in the Er in April for burning sensation in her stomach  They adv'd her to see a Urologist because they thought her bladder was inflamed  She has not seen Urology  She did see GI and had an endoscopy done-Dr Landon Dakin, and on 6/28 she has a colonoscopy sched'd  They told her that the endoscopy was fine  Pt  asking if she should see Dr Cj Cruz because she still has the same burning in her stomach with cramping

## 2021-06-08 NOTE — TELEPHONE ENCOUNTER
Spoke w/ pt  No urinary symptoms  Does have an odor when she urinates  She would like to do a urinalysis-goes to  lab  She did try Tums  She doesn't drink coffee, does eat spicy food occasionally and a lot of tomato sauce with pasta  Do not need to call pt  Back unless something else to add  She will go to a  lab to do UA

## 2021-06-08 NOTE — TELEPHONE ENCOUNTER
Are you having any urinary symptoms? I can recheck your urine  Have you tried any over the counter medication like Pepcid (famotidine)? Do you drink a lot of coffee or black tea? Spicy foods, tomatoes?

## 2021-06-09 ENCOUNTER — TELEPHONE (OUTPATIENT)
Dept: INTERNAL MEDICINE CLINIC | Facility: CLINIC | Age: 46
End: 2021-06-09

## 2021-06-09 DIAGNOSIS — E53.8 VITAMIN B12 DEFICIENCY: ICD-10-CM

## 2021-06-09 DIAGNOSIS — E55.9 VITAMIN D DEFICIENCY: Primary | ICD-10-CM

## 2021-06-09 DIAGNOSIS — R31.29 MICROSCOPIC HEMATURIA: ICD-10-CM

## 2021-06-09 RX ORDER — ERGOCALCIFEROL 1.25 MG/1
50000 CAPSULE ORAL WEEKLY
Qty: 12 CAPSULE | Refills: 0 | Status: SHIPPED | OUTPATIENT
Start: 2021-06-09 | End: 2021-10-29 | Stop reason: ALTCHOICE

## 2021-06-09 RX ORDER — LANOLIN ALCOHOL/MO/W.PET/CERES
1000 CREAM (GRAM) TOPICAL DAILY
Qty: 90 TABLET | Refills: 1 | Status: SHIPPED | OUTPATIENT
Start: 2021-06-09 | End: 2021-10-29 | Stop reason: SDUPTHER

## 2021-06-09 RX ORDER — CYANOCOBALAMIN 1000 UG/ML
INJECTION INTRAMUSCULAR; SUBCUTANEOUS
Qty: 30 ML | Refills: 0 | Status: SHIPPED | OUTPATIENT
Start: 2021-06-09

## 2021-06-09 NOTE — TELEPHONE ENCOUNTER
Patient notified  OKAY to refer to urology  She needs a new script for vit B12 for the weekly injections

## 2021-06-09 NOTE — TELEPHONE ENCOUNTER
Urine showed small amount of blood, I car refer you to Urology  Vitamin B12 remains low, please do your injections once a week for a month then once a month again  Vitamin D levels came back very low  Recommend to start high dose weekly D2, sent to pharmacy  Once completed (12 weeks), start vitamin D3 2000 units daily  You can take Pepcid famotidine twice a day for the next week then as needed      The rest of your labs were normal

## 2021-06-21 NOTE — PROGRESS NOTES
6/22/2021      Chief Complaint   Patient presents with    New Patient Visit     Assessment and Plan    41 y o  female new patient    1  Abnormal urinalysis  - UA micro from 03/19/2021 and 06/08/2021 revealed only 0-1 RBCs which is not clinically significant   - Urine dip today shows only trace blood  Will send out for UA micro, if negative no need to proceed with hematuria work-up  - CT from March 2021 unremarkable other than cystitis which was treated with course of Bactrim  - Recommend proper hydration to prevent UTIs  2  Abdominal pain and cramping  - Patient scheduled for colonoscopy next week  - Recommend minimizing food irritants such as spicy foods, caffeine, alcohol, etc    - Additionally recommend f/u with GYN for pelvic pain and frequent yeast infections  - If UA micro normal, f/u PRN  History of Present Illness  Diane Huynh is a 41 y o  female here for new patient evaluation of abnormal urinalysis and abdominal pain  Patient was seen in the ED on 03/19/2021 with lower abdominal and suprapubic pain  CT abdomen and pelvis from 03/19/2021 revealed findings of cystitis  No other concerning urologic findings  Patient was treated with course of Bactrim for UTI  Urine testing was then repeated on 06/08/2021 which revealed 0-1 rbc's  Patient denies any urinary symptoms such as dysuria, frequency, urgency, flank pain, incontinence, or gross hematuria  She does describe intermittent lower abdominal pain, primarily on the right side with associated abdominal cramping  Also describes tenesmus  States symptoms are exacerbated by spicy foods  Recently had negative endoscopy and will be proceeding with colonoscopy next week  She denies any history of urologic issues or prior  manipulation  She denies any family history of  malignancy  She denies ever smoking  She denies history of recurrent urinary tract infections  She denies any relationship of abdominal pain to urination  Patient also admits to pelvic pain and reports history of heavy menstrual cycles which was previously treated with D&C  Recent CT did reveal myomatous uterus with retroverted uterus and large exophytic fibroid at the uterine fundus occupying the presacral space and measuring 3 9 cm  Urine dip shows trace blood  Negative nitrites and leukocytes  PVR=0 mL    Review of Systems   Constitutional: Negative for chills and fever  Respiratory: Negative for shortness of breath  Cardiovascular: Negative for chest pain  Gastrointestinal: Positive for abdominal pain  Negative for abdominal distention, blood in stool, constipation, diarrhea, nausea and vomiting  Genitourinary: Positive for pelvic pain and vaginal discharge (frequent yeast infections)  Negative for decreased urine volume, difficulty urinating, dyspareunia, dysuria, flank pain, frequency, genital sores, hematuria, menstrual problem, urgency, vaginal bleeding and vaginal pain  Skin: Negative for rash  Allergic/Immunologic: Negative for immunocompromised state  Neurological: Negative for dizziness  Hematological: Does not bruise/bleed easily         Past Medical History  Past Medical History:   Diagnosis Date    Anemia     Arthritis     Depression     Edema     Fatigue     Vitamin B12 deficiency     Vitamin D deficiency        Past Social History  Past Surgical History:   Procedure Laterality Date    ENDOMETRIAL ABLATION      KNEE ARTHROSCOPY Bilateral     KNEE SURGERY      ORIF TIBIA FRACTURE Right 1/18/2018    Procedure: OPEN REDUCTION W/ INTERNAL FIXATION (ORIF) TIBIA;  Surgeon: Rickey Jorge MD;  [facility];  Service: Orthopedics    ORIF TIBIAL PLATEAU Right 9/58/6059    Procedure: OPEN REDUCTION W/ INTERNAL FIXATION (ORIF) TIBIAL PLATEAU;  Surgeon: Rickey Jorge MD;  [facility];  Service: Orthopedics    NE REMOVAL DEEP IMPLANT Right 8/2/2018    Procedure: REMOVAL HARDWARE TIBIA;  Surgeon: Rickey Jorge MD;  Location:  [facility];  Service: Orthopedics    TUBAL LIGATION      US GUIDED BREAST BIOPSY LEFT COMPLETE Left 10/24/2019     Social History     Tobacco Use   Smoking Status Never Smoker   Smokeless Tobacco Never Used       Past Family History  Family History   Problem Relation Age of Onset    Cancer Mother         either cervical or ovarian    Heart disease Mother         heart surgery    Heart attack Father         stent    Diabetes Father     Hypertension Father     Cancer Father 62        stomach cancer    Arthritis Family     Stomach cancer Family     Ovarian cancer Family     Rheum arthritis Daughter     Lupus Maternal Grandmother     Lupus Cousin     No Known Problems Maternal Grandfather     No Known Problems Paternal Grandmother     No Known Problems Paternal Grandfather     No Known Problems Daughter     No Known Problems Maternal Aunt     No Known Problems Maternal Aunt     No Known Problems Maternal Aunt     No Known Problems Paternal Aunt     Alcohol abuse Neg Hx     Depression Neg Hx     Drug abuse Neg Hx     Substance Abuse Neg Hx     Mental illness Neg Hx        Past Social history  Social History     Socioeconomic History    Marital status:      Spouse name: Not on file    Number of children: 1    Years of education: Not on file    Highest education level: Not on file   Occupational History    Occupation: Teacher in [place]   Tobacco Use    Smoking status: Never Smoker    Smokeless tobacco: Never Used   Vaping Use    Vaping Use: Never used   Substance and Sexual Activity    Alcohol use: Yes     Comment: social drink sporatic    Drug use: No    Sexual activity: Not Currently   Other Topics Concern    Not on file   Social History Narrative    Single    Daughter, grand daughter and son lives with her    Works part time- 3 days in the field, 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     Social Determinants of Health     Financial Resource Strain:     Difficulty of Paying Living Expenses:    Food Insecurity:     Worried About Running Out of Food in the Last Year:     [address] in the Last Year:    Transportation Needs:     Lack of Transportation (Medical):      Lack of Transportation (Non-Medical):    Physical Activity:     Days of Exercise per Week:     Minutes of Exercise per Session:    Stress:     Feeling of Stress :    Social Connections:     Frequency of Communication with Friends and Family:     Frequency of Social Gatherings with Friends and Family:     Attends Confucianism Services:     Active Member of Clubs or Organizations:     Attends Club or Organization Meetings:     Marital Status:    Intimate Partner Violence:     Fear of Current or Ex-Partner:     Emotionally Abused:     Physically Abused:     Sexually Abused:        Current Medications  Current Outpatient Medications   Medication Sig Dispense Refill    acetaminophen (TYLENOL) 325 mg tablet 650 mg every 6 hours for mild pain 30 tablet 0    amitriptyline (ELAVIL) 150 MG tablet Take 1 tablet (150 mg total) by mouth daily at bedtime 90 tablet 1    busPIRone (BUSPAR) 5 mg tablet Take 1 tablet (5 mg total) by mouth 2 (two) times a day 60 tablet 5    Cholecalciferol (Vitamin D3) 50 MCG (2000 UT) capsule Take 1 capsule (2,000 Units total) by mouth daily 90 capsule 1    cyanocobalamin 1,000 mcg/mL Inject 1 mL (1,000 mcg total) into a muscle every 30 (thirty) days 10 mL 1    cyanocobalamin 1,000 mcg/mL Inject 1 ml IM weekly x 4 weeks then once a month 30 mL 0    ergocalciferol (VITAMIN D2) 50,000 units Take 1 capsule (50,000 Units total) by mouth once a week 12 capsule 0    Euflexxa 20 MG/2ML SOSY       SYRINGE-NEEDLE, DISP, 3 ML 25G X 1" 3 ML MISC by Does not apply route every 30 (thirty) days 50 each 0    vitamin B-12 (VITAMIN B-12) 1,000 mcg tablet Take 1 tablet (1,000 mcg total) by mouth daily 90 tablet 1    nabumetone (RELAFEN) 750 mg tablet Take 1 tablet (750 mg total) by mouth 2 (two) times a day 60 tablet 1     No current facility-administered medications for this visit  Allergies  No Known Allergies      The following portions of the patient's history were reviewed and updated as appropriate: allergies, current medications, past medical history, past social history, past surgical history and problem list       Vitals  Vitals:    06/22/21 0945   BP: 124/64   BP Location: Left arm   Patient Position: Sitting   Cuff Size: Adult   Pulse: 88   Weight: 64 4 kg (142 lb)   Height: 5' 6" (1 676 m)           Physical Exam  Physical Exam  Constitutional:       Appearance: Normal appearance  She is normal weight  HENT:      Head: Normocephalic and atraumatic  Right Ear: External ear normal       Left Ear: External ear normal    Eyes:      General: No scleral icterus  Conjunctiva/sclera: Conjunctivae normal    Cardiovascular:      Pulses: Normal pulses  Pulmonary:      Effort: Pulmonary effort is normal    Abdominal:      General: Abdomen is flat  There is no distension  Palpations: Abdomen is soft  There is no mass  Tenderness: There is abdominal tenderness (diffusely to lower abdomen, greater on right)  There is no right CVA tenderness, left CVA tenderness, guarding or rebound  Hernia: No hernia is present  Musculoskeletal:         General: Normal range of motion  Cervical back: Normal range of motion  Neurological:      General: No focal deficit present  Mental Status: She is alert and oriented to person, place, and time  Psychiatric:         Mood and Affect: Mood normal          Behavior: Behavior normal          Thought Content:  Thought content normal          Judgment: Judgment normal            Results  Recent Results (from the past 1 hour(s))   POCT urine dip    Collection Time: 06/22/21  9:49 AM   Result Value Ref Range    LEUKOCYTE ESTERASE,UA -     NITRITE,UA -     SL AMB POCT UROBILINOGEN -     POCT URINE PROTEIN -      PH,UA 6 5     BLOOD,UA trace SPECIFIC GRAVITY,UA 1 020     KETONES,UA -     BILIRUBIN,UA -     GLUCOSE, UA -      COLOR,UA yellow     CLARITY,UA clear    POCT Measure PVR    Collection Time: 06/22/21  9:58 AM   Result Value Ref Range    POST-VOID RESIDUAL VOLUME, ML POC 0 mL   ]  No results found for: PSA  Lab Results   Component Value Date    GLUCOSE 121 01/17/2018    CALCIUM 9 4 06/08/2021     10/17/2015    K 3 5 06/08/2021    CO2 27 06/08/2021     06/08/2021    BUN 6 06/08/2021    CREATININE 0 85 06/08/2021     Lab Results   Component Value Date    WBC 8 32 06/08/2021    HGB 14 7 06/08/2021    HCT 46 4 (H) 06/08/2021    MCV 91 06/08/2021     06/08/2021           Orders  Orders Placed This Encounter   Procedures    POCT Measure PVR    POCT urine dip       Rashida Pierre PA-C

## 2021-06-22 ENCOUNTER — CONSULT (OUTPATIENT)
Dept: UROLOGY | Facility: CLINIC | Age: 46
End: 2021-06-22
Payer: COMMERCIAL

## 2021-06-22 VITALS
DIASTOLIC BLOOD PRESSURE: 64 MMHG | HEART RATE: 88 BPM | WEIGHT: 142 LBS | BODY MASS INDEX: 22.82 KG/M2 | SYSTOLIC BLOOD PRESSURE: 124 MMHG | HEIGHT: 66 IN

## 2021-06-22 DIAGNOSIS — R31.29 MICROSCOPIC HEMATURIA: ICD-10-CM

## 2021-06-22 LAB
BACTERIA UR QL AUTO: ABNORMAL /HPF
BILIRUB UR QL STRIP: NEGATIVE
CLARITY UR: ABNORMAL
COLOR UR: YELLOW
GLUCOSE UR STRIP-MCNC: NEGATIVE MG/DL
HGB UR QL STRIP.AUTO: ABNORMAL
KETONES UR STRIP-MCNC: NEGATIVE MG/DL
LEUKOCYTE ESTERASE UR QL STRIP: NEGATIVE
NITRITE UR QL STRIP: NEGATIVE
NON-SQ EPI CELLS URNS QL MICRO: ABNORMAL /HPF
PH UR STRIP.AUTO: 6.5 [PH]
POST-VOID RESIDUAL VOLUME, ML POC: 0 ML
PROT UR STRIP-MCNC: NEGATIVE MG/DL
RBC #/AREA URNS AUTO: ABNORMAL /HPF
SL AMB  POCT GLUCOSE, UA: NORMAL
SL AMB LEUKOCYTE ESTERASE,UA: NORMAL
SL AMB POCT BILIRUBIN,UA: NORMAL
SL AMB POCT BLOOD,UA: NORMAL
SL AMB POCT CLARITY,UA: CLEAR
SL AMB POCT COLOR,UA: YELLOW
SL AMB POCT KETONES,UA: NORMAL
SL AMB POCT NITRITE,UA: NORMAL
SL AMB POCT PH,UA: 6.5
SL AMB POCT SPECIFIC GRAVITY,UA: 1.02
SL AMB POCT URINE PROTEIN: NORMAL
SL AMB POCT UROBILINOGEN: NORMAL
SP GR UR STRIP.AUTO: 1.02 (ref 1–1.03)
UROBILINOGEN UR QL STRIP.AUTO: 1 E.U./DL
WBC #/AREA URNS AUTO: ABNORMAL /HPF

## 2021-06-22 PROCEDURE — 99203 OFFICE O/P NEW LOW 30 MIN: CPT | Performed by: PHYSICIAN ASSISTANT

## 2021-06-22 PROCEDURE — 81002 URINALYSIS NONAUTO W/O SCOPE: CPT | Performed by: PHYSICIAN ASSISTANT

## 2021-06-22 PROCEDURE — 81001 URINALYSIS AUTO W/SCOPE: CPT | Performed by: PHYSICIAN ASSISTANT

## 2021-06-22 PROCEDURE — 51798 US URINE CAPACITY MEASURE: CPT | Performed by: PHYSICIAN ASSISTANT

## 2021-06-25 ENCOUNTER — OFFICE VISIT (OUTPATIENT)
Dept: INTERNAL MEDICINE CLINIC | Facility: CLINIC | Age: 46
End: 2021-06-25
Payer: COMMERCIAL

## 2021-06-25 ENCOUNTER — TELEPHONE (OUTPATIENT)
Dept: GASTROENTEROLOGY | Facility: HOSPITAL | Age: 46
End: 2021-06-25

## 2021-06-25 VITALS
OXYGEN SATURATION: 100 % | SYSTOLIC BLOOD PRESSURE: 112 MMHG | HEART RATE: 78 BPM | TEMPERATURE: 97.7 F | HEIGHT: 66 IN | DIASTOLIC BLOOD PRESSURE: 70 MMHG | BODY MASS INDEX: 22.18 KG/M2 | WEIGHT: 138 LBS

## 2021-06-25 DIAGNOSIS — E55.9 VITAMIN D DEFICIENCY: ICD-10-CM

## 2021-06-25 DIAGNOSIS — Z71.9 HEALTH COUNSELING: ICD-10-CM

## 2021-06-25 DIAGNOSIS — J06.0 SORE THROAT AND LARYNGITIS: ICD-10-CM

## 2021-06-25 DIAGNOSIS — F33.1 MODERATE EPISODE OF RECURRENT MAJOR DEPRESSIVE DISORDER (HCC): ICD-10-CM

## 2021-06-25 DIAGNOSIS — K80.20 GALLSTONES: ICD-10-CM

## 2021-06-25 DIAGNOSIS — R10.13 EPIGASTRIC PAIN: Primary | ICD-10-CM

## 2021-06-25 DIAGNOSIS — E53.8 VITAMIN B12 DEFICIENCY: ICD-10-CM

## 2021-06-25 DIAGNOSIS — F41.9 ANXIETY: ICD-10-CM

## 2021-06-25 PROBLEM — R11.2 PONV (POSTOPERATIVE NAUSEA AND VOMITING): Status: RESOLVED | Noted: 2021-04-23 | Resolved: 2021-06-25

## 2021-06-25 PROBLEM — Z98.890 PONV (POSTOPERATIVE NAUSEA AND VOMITING): Status: RESOLVED | Noted: 2021-04-23 | Resolved: 2021-06-25

## 2021-06-25 PROCEDURE — 99214 OFFICE O/P EST MOD 30 MIN: CPT | Performed by: INTERNAL MEDICINE

## 2021-06-25 RX ORDER — CYANOCOBALAMIN 1000 UG/ML
1000 INJECTION INTRAMUSCULAR; SUBCUTANEOUS
Qty: 10 ML | Refills: 1 | Status: SHIPPED | OUTPATIENT
Start: 2021-06-25 | End: 2021-10-29 | Stop reason: SDUPTHER

## 2021-06-25 NOTE — ASSESSMENT & PLAN NOTE
Suspect abdominal pain may be due to gallstones  Reviewed recent CT, EGD  She has seen Urology, colonoscopy scheduled  Recommend low fat diet, take famotidine instead of Tums prn

## 2021-06-25 NOTE — PROGRESS NOTES
Assessment/Plan:    Abdominal pain  Suspect abdominal pain may be due to gallstones  Reviewed recent CT, EGD  She has seen Urology, colonoscopy scheduled  Recommend low fat diet, take famotidine instead of Tums prn  Gallstones  Seen on CT  Recommend to see surgery, ultrasound ordered  Anxiety  Stable  Vitamin B12 deficiency  Started B12 injections weekly x 4 then resume monthly dosing  Vitamin D deficiency  Started on weekly replacement  Post-traumatic osteoarthritis of right knee  Stable, sees Ortho  On gabapentin qHS  Moderate episode of recurrent major depressive disorder (HCC)  Stable, on amitriptyline  Diagnoses and all orders for this visit:    Epigastric pain  -     US right upper quadrant; Future    Gallstones  -     US right upper quadrant; Future    Sore throat and laryngitis  Comments:  Continue symptomatic treatment, monitor for fevers  Recommend to be off work today to rest voice  Vitamin D deficiency  -     cyanocobalamin 1,000 mcg/mL; Inject 1 mL (1,000 mcg total) into a muscle every 30 (thirty) days  -     Vitamin D 25 hydroxy; Future    Vitamin B12 deficiency  -     Vitamin B12; Future    Anxiety  -     TSH, 3rd generation with Free T4 reflex; Future    Moderate episode of recurrent major depressive disorder [facility])    Health counseling  Comments:  Recommend COVID vaccine  Mammogram scheduled  Follow up in 4 months or as needed  Subjective:      Patient ID: Tessie Kevin is a 41 y o  female here for a physical     She reports ongoing intermittent abdominal pain  Pain can be cramping and debilitating  She has occasional reflux symptoms, would take Tums as needed  She has not needed it recently  She had an episode vomiting of few weeks ago  She was in the ER earlier this year, was told she had inflamed bladder  She was treated with Bactrim for cystitis  She saw Urology recently, had trace blood in her urine which was not thought to be significant  She had an EGD recently, which was essentially normal   She does have a colonoscopy scheduled  She also saw her gynecologist recently  She is asking about gallstones, her daughter had similar symptoms  She was not aware of having gallstones on her CT scan earlier this year  She complains of a scratchy throat which started about 2 days ago  She worked yesterday and woke up today with a hoarse voice  She denies any sore throat, nasal congestion, cough, ear pain or other symptoms  She is starting to go back to work, 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  She has been drinking tea, took over-the-counter decongestant earlier today  The following portions of the patient's history were reviewed and updated as appropriate: allergies, current medications, past medical history, past social history and problem list     Review of Systems   Constitutional: Negative for appetite change and fatigue  HENT: Negative for congestion, ear pain and postnasal drip  Eyes: Negative for visual disturbance  Respiratory: Negative for cough and shortness of breath  Cardiovascular: Negative for chest pain and leg swelling  Gastrointestinal: Positive for abdominal pain and nausea  Negative for constipation and diarrhea  Genitourinary: Negative for difficulty urinating, dysuria, flank pain, frequency, hematuria and urgency  Musculoskeletal: Positive for arthralgias  Negative for gait problem and myalgias  Skin: Negative for rash and wound  Neurological: Negative for dizziness, numbness and headaches  Psychiatric/Behavioral: Negative for confusion and dysphoric mood  The patient is not nervous/anxious  Objective:      /70   Pulse 78   Temp 97 7 °F (36 5 °C)   Ht 5' 6" (1 676 m)   Wt 62 6 kg (138 lb)   SpO2 100%   BMI 22 27 kg/m²          Physical Exam  Vitals and nursing note reviewed  Constitutional:       General: She is not in acute distress  Appearance: She is well-developed     HENT: Head: Normocephalic and atraumatic  Mouth/Throat:      Mouth: Mucous membranes are moist       Pharynx: Oropharynx is clear  No pharyngeal swelling, oropharyngeal exudate, posterior oropharyngeal erythema or uvula swelling  Tonsils: No tonsillar exudate  Eyes:      Pupils: Pupils are equal, round, and reactive to light  Cardiovascular:      Rate and Rhythm: Normal rate and regular rhythm  Heart sounds: Normal heart sounds  Pulmonary:      Effort: Pulmonary effort is normal       Breath sounds: Normal breath sounds  No wheezing  Abdominal:      General: Bowel sounds are normal       Palpations: Abdomen is soft  Musculoskeletal:         General: No swelling  Skin:     General: Skin is warm  Findings: No rash  Neurological:      General: No focal deficit present  Mental Status: She is alert and oriented to person, place, and time  Psychiatric:         Mood and Affect: Mood normal          Behavior: Behavior normal            Labs & imaging results reviewed with patient

## 2021-06-25 NOTE — PATIENT INSTRUCTIONS
What you can do to manage or prevent gallstones:   · Eat a variety of healthy foods  This may help you have more energy and heal faster  Healthy foods include fruits, vegetables, whole-grain breads, low-fat dairy products, beans, lean meat, and fish  Ask if you need to be on a special diet  Try to eat regular meals during the day  This will help your gallbladder empty  · Exercise as directed  Talk to your healthcare provider about the best exercise plan for you  Exercise can help you lose weight and improve your health  · Manage your weight  If you are overweight, it is important to reach a healthy weight  You will need to lose weight slowly because rapid weight loss can increase your risk for gallstones  Talk to your healthcare provider about your weight  He or she can help you create a safe weight loss plan if you need to lose weight

## 2021-06-26 ENCOUNTER — HOSPITAL ENCOUNTER (OUTPATIENT)
Dept: ULTRASOUND IMAGING | Facility: HOSPITAL | Age: 46
Discharge: HOME | End: 2021-06-26
Payer: COMMERCIAL

## 2021-06-26 DIAGNOSIS — K80.20 GALLSTONES: ICD-10-CM

## 2021-06-26 DIAGNOSIS — R10.13 EPIGASTRIC PAIN: ICD-10-CM

## 2021-06-26 PROCEDURE — 76705 ECHO EXAM OF ABDOMEN: CPT

## 2021-06-28 ENCOUNTER — PREP FOR PROCEDURE (OUTPATIENT)
Dept: GASTROENTEROLOGY | Facility: CLINIC | Age: 46
End: 2021-06-28

## 2021-06-28 ENCOUNTER — ANESTHESIA EVENT (OUTPATIENT)
Dept: GASTROENTEROLOGY | Facility: HOSPITAL | Age: 46
End: 2021-06-28

## 2021-06-28 ENCOUNTER — ANESTHESIA (OUTPATIENT)
Dept: GASTROENTEROLOGY | Facility: HOSPITAL | Age: 46
End: 2021-06-28

## 2021-06-28 ENCOUNTER — HOSPITAL ENCOUNTER (OUTPATIENT)
Dept: GASTROENTEROLOGY | Facility: HOSPITAL | Age: 46
Setting detail: OUTPATIENT SURGERY
Discharge: HOME | End: 2021-06-28
Attending: INTERNAL MEDICINE

## 2021-06-28 DIAGNOSIS — R10.84 GENERALIZED ABDOMINAL PAIN: ICD-10-CM

## 2021-06-28 DIAGNOSIS — R10.84 GENERALIZED ABDOMINAL PAIN: Primary | ICD-10-CM

## 2021-06-28 RX ORDER — SODIUM CHLORIDE, SODIUM LACTATE, POTASSIUM CHLORIDE, CALCIUM CHLORIDE 600; 310; 30; 20 MG/100ML; MG/100ML; MG/100ML; MG/100ML
125 INJECTION, SOLUTION INTRAVENOUS CONTINUOUS
Status: DISCONTINUED | OUTPATIENT
Start: 2021-06-28 | End: 2021-07-02 | Stop reason: HOSPADM

## 2021-06-28 NOTE — ANESTHESIA PREPROCEDURE EVALUATION
Procedure:  COLONOSCOPY    Relevant Problems   CARDIO   (+) Asymptomatic PVCs   (+) Ventricular bigeminy      MUSCULOSKELETAL   (+) Arthritis of right knee   (+) Post-traumatic osteoarthritis of right knee      NEURO/PSYCH   (+) Anxiety   (+) Moderate episode of recurrent major depressive disorder (HCC)   (+) S/P ORIF (open reduction internal fixation) fracture        Physical Exam    Airway    Mallampati score: II  TM Distance: >3 FB  Neck ROM: full     Dental   No notable dental hx     Cardiovascular  Rhythm: regular, Rate: normal, Cardiovascular exam normal    Pulmonary  Pulmonary exam normal Breath sounds clear to auscultation,     Other Findings        Anesthesia Plan  ASA Score- 2     Anesthesia Type- IV sedation with anesthesia with ASA Monitors  Additional Monitors:   Airway Plan:           Plan Factors-Exercise tolerance (METS): >4 METS  Chart reviewed  Patient is not a current smoker  Patient instructed to abstain from smoking on day of procedure  Patient did not smoke on day of surgery  There is medical exclusion for perioperative obstructive sleep apnea risk education  Induction- intravenous  Postoperative Plan-     Informed Consent- Anesthetic plan and risks discussed with patient  I personally reviewed this patient with the CRNA  Discussed and agreed on the Anesthesia Plan with the CRNA  Nimisha Tejada

## 2021-07-12 ENCOUNTER — PREP FOR PROCEDURE (OUTPATIENT)
Dept: SURGERY | Facility: CLINIC | Age: 46
End: 2021-07-12

## 2021-07-12 ENCOUNTER — CONSULT (OUTPATIENT)
Dept: SURGERY | Facility: CLINIC | Age: 46
End: 2021-07-12
Payer: COMMERCIAL

## 2021-07-12 VITALS — WEIGHT: 140 LBS | HEIGHT: 67 IN | BODY MASS INDEX: 21.97 KG/M2

## 2021-07-12 DIAGNOSIS — K80.10 CHRONIC CALCULOUS CHOLECYSTITIS: Primary | ICD-10-CM

## 2021-07-12 PROCEDURE — 1036F TOBACCO NON-USER: CPT | Performed by: SURGERY

## 2021-07-12 PROCEDURE — 3008F BODY MASS INDEX DOCD: CPT | Performed by: SURGERY

## 2021-07-12 PROCEDURE — 99242 OFF/OP CONSLTJ NEW/EST SF 20: CPT | Performed by: SURGERY

## 2021-07-12 NOTE — PROGRESS NOTES
Assessment/Plan:  Patient presents with symptomatic cholelithiasis  For over 1 year she has had episodic epigastric abdominal pain  This radiates into her chest   She does not consume a fatty diet  She has had occasional nausea and vomiting  She has been seen at the ER at the time of pain in reportedly has right upper quadrant discomfort at that time  Imaging, laboratory studies and notes reviewed  I recommended laparoscopic cholecystectomy  Risks and benefits explained in detail  She is agreeable  Diagnoses and all orders for this visit:    Chronic calculous cholecystitis        Subjective:      Patient ID: Julianne Hess is a 41 y o  female  Patient presents for gallbladder consult  States she has had episodes of epigastric pain for one year  Ultrasound RUQ 6/26/2021   IMPRESSION:   Cholelithiasis    Abdominal Pain  The current episode started more than 1 year ago  The problem occurs intermittently  The problem has been unchanged  The pain is located in the epigastric region  The quality of the pain is burning and cramping  The abdominal pain radiates to the back and chest  Associated symptoms include diarrhea, nausea and vomiting  Nothing aggravates the pain  The pain is relieved by nothing  She has tried antacids for the symptoms  The treatment provided mild relief  Prior diagnostic workup includes ultrasound  Her past medical history is significant for gallstones  The following portions of the patient's history were reviewed and updated as appropriate:     She  has a past medical history of Anemia, Arthritis, Depression, Edema, Fatigue, Vitamin B12 deficiency, and Vitamin D deficiency  She  has a past surgical history that includes ORIF TIBIAL PLATEAU (Right, 7/37/6280); ORIF tibia fracture (Right, 1/18/2018); Knee surgery; Knee arthroscopy (Bilateral);  Tubal ligation; pr removal deep implant (Right, 8/2/2018); US guided breast biopsy left complete (Left, 10/24/2019); and Endometrial ablation  Her family history includes Arthritis in her family; Cancer in her mother; Cancer (age of onset: 62) in her father; Diabetes in her father; Heart attack in her father; Heart disease in her mother; Hypertension in her father; Lupus in her cousin and maternal grandmother; No Known Problems in her daughter, maternal aunt, maternal aunt, maternal aunt, maternal grandfather, paternal aunt, paternal grandfather, and paternal grandmother; Ovarian cancer in her family; Rheum arthritis in her daughter; Stomach cancer in her family  She  reports that she has never smoked  She has never used smokeless tobacco  She reports current alcohol use  She reports that she does not use drugs  Current Outpatient Medications   Medication Sig Dispense Refill    acetaminophen (TYLENOL) 325 mg tablet 650 mg every 6 hours for mild pain 30 tablet 0    amitriptyline (ELAVIL) 150 MG tablet Take 1 tablet (150 mg total) by mouth daily at bedtime 90 tablet 1    busPIRone (BUSPAR) 5 mg tablet Take 1 tablet (5 mg total) by mouth 2 (two) times a day 60 tablet 5    Cholecalciferol (Vitamin D3) 50 MCG (2000 UT) capsule Take 1 capsule (2,000 Units total) by mouth daily 90 capsule 1    cyanocobalamin 1,000 mcg/mL Inject 1 ml IM weekly x 4 weeks then once a month 30 mL 0    cyanocobalamin 1,000 mcg/mL Inject 1 mL (1,000 mcg total) into a muscle every 30 (thirty) days 10 mL 1    ergocalciferol (VITAMIN D2) 50,000 units Take 1 capsule (50,000 Units total) by mouth once a week 12 capsule 0    Euflexxa 20 MG/2ML SOSY       nabumetone (RELAFEN) 750 mg tablet Take 1 tablet (750 mg total) by mouth 2 (two) times a day 60 tablet 1    SYRINGE-NEEDLE, DISP, 3 ML 25G X 1" 3 ML MISC by Does not apply route every 30 (thirty) days 50 each 0    vitamin B-12 (VITAMIN B-12) 1,000 mcg tablet Take 1 tablet (1,000 mcg total) by mouth daily 90 tablet 1     No current facility-administered medications for this visit       She has No Known Allergies       Review of Systems   Constitutional: Negative  Negative for activity change  HENT: Negative  Eyes: Negative  Respiratory: Negative  Cardiovascular: Positive for leg swelling  Gastrointestinal: Positive for abdominal pain, diarrhea, nausea and vomiting  Endocrine: Negative  Genitourinary: Negative  Musculoskeletal: Negative  Skin: Negative  Allergic/Immunologic: Negative  Neurological: Negative  Psychiatric/Behavioral: Negative for agitation, behavioral problems and confusion  The patient is not nervous/anxious  All other systems reviewed and are negative  Objective:      Ht 5' 7" (1 702 m)   Wt 63 5 kg (140 lb)   LMP 06/24/2021   BMI 21 93 kg/m²          Physical Exam  Constitutional:       Appearance: Normal appearance  She is well-developed  HENT:      Head: Normocephalic and atraumatic  Nose: Nose normal    Eyes:      Extraocular Movements: Extraocular movements intact  Conjunctiva/sclera: Conjunctivae normal    Cardiovascular:      Rate and Rhythm: Normal rate  Heart sounds: Normal heart sounds  Pulmonary:      Effort: Pulmonary effort is normal    Abdominal:      General: Abdomen is flat  Musculoskeletal:         General: Normal range of motion  Cervical back: Normal range of motion  Skin:     General: Skin is warm and dry  Comments: Extremities are normal   Neurological:      Mental Status: She is alert and oriented to person, place, and time     Psychiatric:         Mood and Affect: Mood normal

## 2021-08-02 ENCOUNTER — TELEPHONE (OUTPATIENT)
Dept: GASTROENTEROLOGY | Facility: HOSPITAL | Age: 46
End: 2021-08-02

## 2021-08-02 ENCOUNTER — ANESTHESIA (OUTPATIENT)
Dept: ANESTHESIOLOGY | Facility: HOSPITAL | Age: 46
End: 2021-08-02

## 2021-08-02 ENCOUNTER — ANESTHESIA EVENT (OUTPATIENT)
Dept: ANESTHESIOLOGY | Facility: HOSPITAL | Age: 46
End: 2021-08-02

## 2021-08-03 ENCOUNTER — ANESTHESIA (OUTPATIENT)
Dept: GASTROENTEROLOGY | Facility: HOSPITAL | Age: 46
End: 2021-08-03

## 2021-08-03 ENCOUNTER — NURSE TRIAGE (OUTPATIENT)
Dept: OTHER | Facility: OTHER | Age: 46
End: 2021-08-03

## 2021-08-03 ENCOUNTER — HOSPITAL ENCOUNTER (OUTPATIENT)
Dept: GASTROENTEROLOGY | Facility: HOSPITAL | Age: 46
Setting detail: OUTPATIENT SURGERY
Discharge: HOME | End: 2021-08-03
Attending: INTERNAL MEDICINE
Payer: COMMERCIAL

## 2021-08-03 ENCOUNTER — ANESTHESIA EVENT (OUTPATIENT)
Dept: GASTROENTEROLOGY | Facility: HOSPITAL | Age: 46
End: 2021-08-03

## 2021-08-03 VITALS
TEMPERATURE: 97.6 F | DIASTOLIC BLOOD PRESSURE: 68 MMHG | SYSTOLIC BLOOD PRESSURE: 133 MMHG | BODY MASS INDEX: 20.88 KG/M2 | OXYGEN SATURATION: 100 % | HEART RATE: 74 BPM | WEIGHT: 133 LBS | HEIGHT: 67 IN | RESPIRATION RATE: 13 BRPM

## 2021-08-03 DIAGNOSIS — R10.84 GENERALIZED ABDOMINAL PAIN: ICD-10-CM

## 2021-08-03 LAB
EXT PREGNANCY TEST URINE: NEGATIVE
EXT. CONTROL: NORMAL

## 2021-08-03 PROCEDURE — 88305 TISSUE EXAM BY PATHOLOGIST: CPT | Performed by: PATHOLOGY

## 2021-08-03 PROCEDURE — 45380 COLONOSCOPY AND BIOPSY: CPT | Performed by: INTERNAL MEDICINE

## 2021-08-03 PROCEDURE — 81025 URINE PREGNANCY TEST: CPT | Performed by: STUDENT IN AN ORGANIZED HEALTH CARE EDUCATION/TRAINING PROGRAM

## 2021-08-03 RX ORDER — LIDOCAINE HYDROCHLORIDE 10 MG/ML
INJECTION, SOLUTION EPIDURAL; INFILTRATION; INTRACAUDAL; PERINEURAL AS NEEDED
Status: DISCONTINUED | OUTPATIENT
Start: 2021-08-03 | End: 2021-08-03

## 2021-08-03 RX ORDER — PROPOFOL 10 MG/ML
INJECTION, EMULSION INTRAVENOUS AS NEEDED
Status: DISCONTINUED | OUTPATIENT
Start: 2021-08-03 | End: 2021-08-03

## 2021-08-03 RX ORDER — SODIUM CHLORIDE, SODIUM LACTATE, POTASSIUM CHLORIDE, CALCIUM CHLORIDE 600; 310; 30; 20 MG/100ML; MG/100ML; MG/100ML; MG/100ML
INJECTION, SOLUTION INTRAVENOUS CONTINUOUS PRN
Status: DISCONTINUED | OUTPATIENT
Start: 2021-08-03 | End: 2021-08-03

## 2021-08-03 RX ADMIN — SODIUM CHLORIDE, SODIUM LACTATE, POTASSIUM CHLORIDE, AND CALCIUM CHLORIDE: .6; .31; .03; .02 INJECTION, SOLUTION INTRAVENOUS at 08:44

## 2021-08-03 RX ADMIN — PROPOFOL 20 MG: 10 INJECTION, EMULSION INTRAVENOUS at 08:56

## 2021-08-03 RX ADMIN — PROPOFOL 10 MG: 10 INJECTION, EMULSION INTRAVENOUS at 09:07

## 2021-08-03 RX ADMIN — PROPOFOL 20 MG: 10 INJECTION, EMULSION INTRAVENOUS at 08:57

## 2021-08-03 RX ADMIN — PROPOFOL 100 MG: 10 INJECTION, EMULSION INTRAVENOUS at 08:55

## 2021-08-03 RX ADMIN — PROPOFOL 20 MG: 10 INJECTION, EMULSION INTRAVENOUS at 09:01

## 2021-08-03 RX ADMIN — PROPOFOL 20 MG: 10 INJECTION, EMULSION INTRAVENOUS at 09:04

## 2021-08-03 RX ADMIN — LIDOCAINE HYDROCHLORIDE 50 MG: 10 INJECTION, SOLUTION EPIDURAL; INFILTRATION; INTRACAUDAL; PERINEURAL at 08:55

## 2021-08-03 RX ADMIN — PROPOFOL 20 MG: 10 INJECTION, EMULSION INTRAVENOUS at 08:59

## 2021-08-03 NOTE — ANESTHESIA POSTPROCEDURE EVALUATION
Post-Op Assessment Note    CV Status:  Stable    Pain management: adequate     Mental Status:  Alert and awake   Hydration Status:  Euvolemic   PONV Controlled:  Controlled   Airway Patency:  Patent      Post Op Vitals Reviewed: Yes      Staff: CRNA         No complications documented      BP   105/55   Temp     Pulse  78   Resp   24   SpO2   98

## 2021-08-03 NOTE — H&P
[facility] and Physical -  [facility]  Jenny Ames 40 y o  female MRN: [id number]                  HPI: Jenny Ames is a 40y o  year old female who presents for colonoscopy history of abdominal pain and history of some anemia which is resolved also B12 deficiency      REVIEW OF SYSTEMS: Per the HPI, and otherwise unremarkable      Historical Information   Past Medical History:   Diagnosis Date    Anemia     Arthritis     Depression     Edema     Fatigue     Vitamin B12 deficiency     Vitamin D deficiency      Past Surgical History:   Procedure Laterality Date    ENDOMETRIAL ABLATION      KNEE ARTHROSCOPY Bilateral     KNEE SURGERY      ORIF TIBIA FRACTURE Right 1/18/2018    Procedure: OPEN REDUCTION W/ INTERNAL FIXATION (ORIF) TIBIA;  Surgeon: Anderson Timmons MD;  [facility];  Service: Orthopedics    ORIF TIBIAL PLATEAU Right 9/70/3590    Procedure: OPEN REDUCTION W/ INTERNAL FIXATION (ORIF) TIBIAL PLATEAU;  Surgeon: Anderson Timmons MD;  [facility];  Service: Orthopedics    AK REMOVAL DEEP IMPLANT Right 8/2/2018    Procedure: REMOVAL HARDWARE TIBIA;  Surgeon: Anderson Timmons MD;  [facility];  Service: Orthopedics    TUBAL LIGATION      US GUIDED BREAST BIOPSY LEFT COMPLETE Left 10/24/2019     Social History   Social History     Substance and Sexual Activity   Alcohol Use Yes    Comment: social drink sporatic     Social History     Substance and Sexual Activity   Drug Use No     Social History     Tobacco Use   Smoking Status Never Smoker   Smokeless Tobacco Never Used     Family History   Problem Relation Age of Onset    Cancer Mother         either cervical or ovarian    Heart disease Mother         heart surgery    Heart attack Father         stent    Diabetes Father     Hypertension Father     Cancer Father 62        stomach cancer    Arthritis Family     Stomach cancer Family     Ovarian cancer Family     Rheum arthritis Daughter  Lupus Maternal Grandmother     Lupus Cousin     No Known Problems Maternal Grandfather     No Known Problems Paternal Grandmother     No Known Problems Paternal Grandfather     No Known Problems Daughter     No Known Problems Maternal Aunt     No Known Problems Maternal Aunt     No Known Problems Maternal Aunt     No Known Problems Paternal Aunt     Alcohol abuse Neg Hx     Depression Neg Hx     Drug abuse Neg Hx     Substance Abuse Neg Hx     Mental illness Neg Hx        Meds/Allergies       Current Outpatient Medications:     amitriptyline (ELAVIL) 150 MG tablet    busPIRone (BUSPAR) 5 mg tablet    Cholecalciferol (Vitamin D3) 50 MCG (2000 UT) capsule    cyanocobalamin 1,000 mcg/mL    cyanocobalamin 1,000 mcg/mL    ergocalciferol (VITAMIN D2) 50,000 units    vitamin B-12 (VITAMIN B-12) 1,000 mcg tablet    acetaminophen (TYLENOL) 325 mg tablet    Euflexxa 20 MG/2ML SOSY    nabumetone (RELAFEN) 750 mg tablet    SYRINGE-NEEDLE, DISP, 3 ML 25G X 1" 3 ML MISC  No current facility-administered medications for this encounter  Facility-Administered Medications Ordered in Other Encounters:     lactated ringers infusion, , Intravenous, Continuous PRN, New Bag at 08/03/21 0844    No Known Allergies    Objective     /66   Pulse 78   Temp (!) 97 4 °F (36 3 °C) (Temporal)   Resp 20   Ht 5' 7" (1 702 m)   Wt 60 3 kg (133 lb)   LMP 07/21/2021   SpO2 100%   BMI 20 83 kg/m²       PHYSICAL EXAM    Gen: NAD  Head: NCAT  CV: RRR  CHEST: Clear  ABD: soft, NT/ND  EXT: no edema      ASSESSMENT/PLAN:  This is a 40y o  year old female here for colonoscopy, and she is stable and optimized for her procedure

## 2021-08-03 NOTE — H&P (VIEW-ONLY)
History and Physical - [facility]  Trinidad Sutherland 40 y o  female MRN: [id number]                  HPI: Trinidad Sutherland is a 40y o  year old female who presents for colonoscopy history of abdominal pain and history of some anemia which is resolved also B12 deficiency      REVIEW OF SYSTEMS: Per the HPI, and otherwise unremarkable      Historical Information   Past Medical History:   Diagnosis Date    Anemia     Arthritis     Depression     Edema     Fatigue     Vitamin B12 deficiency     Vitamin D deficiency      Past Surgical History:   Procedure Laterality Date    ENDOMETRIAL ABLATION      KNEE ARTHROSCOPY Bilateral     KNEE SURGERY      ORIF TIBIA FRACTURE Right 1/18/2018    Procedure: OPEN REDUCTION W/ INTERNAL FIXATION (ORIF) TIBIA;  Surgeon: Timmy Elder MD;  [facility];  Service: Orthopedics    ORIF TIBIAL PLATEAU Right 2/11/0061    Procedure: OPEN REDUCTION W/ INTERNAL FIXATION (ORIF) TIBIAL PLATEAU;  Surgeon: Timmy Elder MD;  [facility];  Service: Orthopedics    NE REMOVAL DEEP IMPLANT Right 8/2/2018    Procedure: REMOVAL HARDWARE TIBIA;  Surgeon: Timmy Elder MD;  [facility];  Service: Orthopedics    TUBAL LIGATION      US GUIDED BREAST BIOPSY LEFT COMPLETE Left 10/24/2019     Social History   Social History     Substance and Sexual Activity   Alcohol Use Yes    Comment: social drink sporatic     Social History     Substance and Sexual Activity   Drug Use No     Social History     Tobacco Use   Smoking Status Never Smoker   Smokeless Tobacco Never Used     Family History   Problem Relation Age of Onset    Cancer Mother         either cervical or ovarian    Heart disease Mother         heart surgery    Heart attack Father         stent    Diabetes Father     Hypertension Father     Cancer Father 62        stomach cancer    Arthritis Family     Stomach cancer Family     Ovarian cancer Family     Rheum arthritis Daughter  Lupus Maternal Grandmother     Lupus Cousin     No Known Problems Maternal Grandfather     No Known Problems Paternal Grandmother     No Known Problems Paternal Grandfather     No Known Problems Daughter     No Known Problems Maternal Aunt     No Known Problems Maternal Aunt     No Known Problems Maternal Aunt     No Known Problems Paternal Aunt     Alcohol abuse Neg Hx     Depression Neg Hx     Drug abuse Neg Hx     Substance Abuse Neg Hx     Mental illness Neg Hx        Meds/Allergies       Current Outpatient Medications:     amitriptyline (ELAVIL) 150 MG tablet    busPIRone (BUSPAR) 5 mg tablet    Cholecalciferol (Vitamin D3) 50 MCG (2000 UT) capsule    cyanocobalamin 1,000 mcg/mL    cyanocobalamin 1,000 mcg/mL    ergocalciferol (VITAMIN D2) 50,000 units    vitamin B-12 (VITAMIN B-12) 1,000 mcg tablet    acetaminophen (TYLENOL) 325 mg tablet    Euflexxa 20 MG/2ML SOSY    nabumetone (RELAFEN) 750 mg tablet    SYRINGE-NEEDLE, DISP, 3 ML 25G X 1" 3 ML MISC  No current facility-administered medications for this encounter  Facility-Administered Medications Ordered in Other Encounters:     lactated ringers infusion, , Intravenous, Continuous PRN, New Bag at 08/03/21 0844    No Known Allergies    Objective     /66   Pulse 78   Temp (!) 97 4 °F (36 3 °C) (Temporal)   Resp 20   Ht 5' 7" (1 702 m)   Wt 60 3 kg (133 lb)   LMP 07/21/2021   SpO2 100%   BMI 20 83 kg/m²       PHYSICAL EXAM    Gen: NAD  Head: NCAT  CV: RRR  CHEST: Clear  ABD: soft, NT/ND  EXT: no edema      ASSESSMENT/PLAN:  This is a 40y o  year old female here for colonoscopy, and she is stable and optimized for her procedure

## 2021-08-03 NOTE — DISCHARGE INSTRUCTIONS
Colorectal Polyps   WHAT YOU NEED TO KNOW:   Colorectal polyps are small growths of tissue in the lining of the colon and rectum  Most polyps are hyperplastic polyps and are usually benign (noncancerous)  Certain types of polyps, called adenomatous polyps, may turn into cancer  DISCHARGE INSTRUCTIONS:   Follow up with your healthcare provider or gastroenterologist as directed: You may need to return for more tests, such as another colonoscopy  Write down your questions so you remember to ask them during your visits  Reduce your risk for colorectal polyps:   · Eat a variety of healthy foods:  Healthy foods include fruit, vegetables, whole-grain breads, low-fat dairy products, beans, lean meat, and fish  Ask if you need to be on a special diet  · Maintain a healthy weight:  Ask your healthcare provider if you need to lose weight and how much you need to lose  Ask for help with a weight loss program     · Exercise:  Begin to exercise slowly and do more as you get stronger  Talk with your healthcare provider before you start an exercise program      · Limit alcohol:  Your risk for polyps increases the more you drink  · Do not smoke: If you smoke, it is never too late to quit  Ask for information about how to stop  For support and more information:   · Chung Mckeon ([facility])  [address], [place] [address]  Phone: 3- [phone]  Web Address: www digestive  niddk nih gov    Contact your healthcare provider or gastroenterologist if:   · You have a fever  · You have chills, a cough, or feel weak and achy  · You have abdominal pain that does not go away or gets worse after you take medicine  · Your abdomen is swollen  · You are losing weight without trying  · You have questions or concerns about your condition or care  Seek care immediately or call 911 if:   · You have sudden shortness of breath       · You have a fast heart rate, fast breathing, or are too dizzy to stand up  · You have severe abdominal pain  · You see blood in your bowel movement  © Copyright [address] Information is for End User's use only and may not be sold, redistributed or otherwise used for commercial purposes  All illustrations and images included in CareNotes® are the copyrighted property of A D A M , Inc  or [facility] Eduardo Aracelis   The above information is an  only  It is not intended as medical advice for individual conditions or treatments  Talk to your doctor, nurse or pharmacist before following any medical regimen to see if it is safe and effective for you  Diverticulosis   WHAT YOU NEED TO KNOW:   Diverticulosis is a condition that causes small pockets called diverticula to form in your intestine  These pockets make it difficult for bowel movements to pass through your digestive system  DISCHARGE INSTRUCTIONS:   Seek care immediately if:   · You have severe pain on the left side of your lower abdomen  · Your bowel movements are bright or dark red  Contact your healthcare provider if:   · You have a fever and chills  · You feel dizzy or lightheaded  · You have nausea, or you are vomiting  · You have a change in your bowel movements  · You have questions or concerns about your condition or care  Medicines:   · Medicines  to soften your bowel movements may be given  You may also need medicines to treat symptoms such as bloating and pain  · Take your medicine as directed  Contact your healthcare provider if you think your medicine is not helping or if you have side effects  Tell him or her if you are allergic to any medicine  Keep a list of the medicines, vitamins, and herbs you take  Include the amounts, and when and why you take them  Bring the list or the pill bottles to follow-up visits  Carry your medicine list with you in case of an emergency  Self-care:   The goal of treatment is to manage any symptoms you have and prevent other problems such as diverticulitis  Diverticulitis is swelling or infection of the diverticula  Your healthcare provider may recommend any of the following:  · Eat a variety of high-fiber foods  High-fiber foods help you have regular bowel movements  High-fiber foods include cooked beans, fruits, vegetables, and some cereals  Most adults need 25 to 35 grams of fiber each day  Your healthcare provider may recommend that you have more  Ask your healthcare provider how much fiber you need  Increase fiber slowly  You may have abdominal discomfort, bloating, and gas if you add fiber to your diet too quickly  You may need to take a fiber supplement if you are not getting enough fiber from food  · Drink liquids as directed  You may need to drink 2 to 3 liters (8 to 12 cups) of liquids every day  Ask your healthcare provider how much liquid to drink each day and which liquids are best for you  · Apply heat  on your abdomen for 20 to 30 minutes every 2 hours for as many days as directed  Heat helps decrease pain and muscle spasms  Help prevent diverticulitis or other symptoms: The following may help decrease your risk for diverticulitis or symptoms, such as bleeding  Talk to your provider about these or other things you can do to prevent problems that may occur with diverticulosis  · Exercise regularly  Ask your healthcare provider about the best exercise plan for you  Exercise can help you have regular bowel movements  Get 30 minutes of exercise on most days of the week  · Maintain a healthy weight  Ask your healthcare provider how much you should weigh  Ask him or her to help you create a weight loss plan if you are overweight  · Do not smoke  Nicotine and other chemicals in cigarettes increase your risk for diverticulitis  Ask your healthcare provider for information if you currently smoke and need help to quit   E-cigarettes or smokeless tobacco still contain nicotine  Talk to your healthcare provider before you use these products  · Ask your healthcare provider if it is safe to take NSAIDs  NSAIDs may increase your risk of diverticulitis  Follow up with your healthcare provider as directed:  Write down your questions so you remember to ask them during your visits  © Copyright [employer] 2021 Information is for End User's use only and may not be sold, redistributed or otherwise used for commercial purposes  All illustrations and images included in CareNotes® are the copyrighted property of A D A M , Inc  or Jacqueline Hsu   The above information is an  only  It is not intended as medical advice for individual conditions or treatments  Talk to your doctor, nurse or pharmacist before following any medical regimen to see if it is safe and effective for you  Upper Endoscopy   WHAT YOU NEED TO KNOW:   An upper endoscopy is also called an upper gastrointestinal (GI) endoscopy, or an esophagogastroduodenoscopy (EGD)  You may feel bloated, gassy, or have some abdominal discomfort after your procedure  Your throat may be sore for 24 to 36 hours  You may burp or pass gas from air that is still inside your body  DISCHARGE INSTRUCTIONS:   Call 911 for any of the following:   · You have sudden chest pain or trouble breathing  Seek care immediately if:   · You feel dizzy or faint  · You have trouble swallowing  · Your bowel movements are very dark or black  · Your abdomen is hard and firm and you have severe pain  · You vomit blood  Contact your healthcare provider if:   · You feel full or bloated and cannot burp or pass gas  · You have not had a bowel movement for 3 days after your procedure  · You have neck pain  · You have a fever or chills  · You have nausea or are vomiting  · You have a rash or hives  · You have questions or concerns about your endoscopy    Relieve a sore throat:  Suck on throat lozenges or crushed ice  Gargle with a small amount of warm salt water  Mix 1 teaspoon of salt and 1 cup of warm water to make salt water  Relieve gas and discomfort from bloating:  Lie on your right side with a heating pad on your abdomen  Take short walks to help pass gas  Eat small meals until bloating is relieved  Rest after your procedure: You have been given medicine to relax you  Do not  drive or make important decisions until the day after your procedure  Return to your normal activity as directed  You can usually return to work the day after your procedure  Follow up with your healthcare provider as directed:  Write down your questions so you remember to ask them during your visits  © 2017 [address] is for End User's use only and may not be sold, redistributed or otherwise used for commercial purposes  All illustrations and images included in CareNotes® are the copyrighted property of A D A M , Inc  or Augie Alaniz  The above information is an  only  It is not intended as medical advice for individual conditions or treatments  Talk to your doctor, nurse or pharmacist before following any medical regimen to see if it is safe and effective for you  Colonoscopy   WHAT YOU NEED TO KNOW:   A colonoscopy is a procedure to examine the inside of your colon (intestine) with a scope  Polyps or tissue growths may have been removed during your colonoscopy  It is normal to feel bloated and to have some abdominal discomfort  You should be passing gas  If you have hemorrhoids or you had polyps removed, you may have a small amount of bleeding  DISCHARGE INSTRUCTIONS:   Seek care immediately if:   · You have a large amount of bright red blood in your bowel movements  · Your abdomen is hard and firm and you have severe pain  · You have sudden trouble breathing  Contact your healthcare provider if:   · You develop a rash or hives      · You have a fever within 24 hours of your procedure       · You have not had a bowel movement for 3 days after your procedure  · You have questions or concerns about your condition or care  Activity:   · Do not lift, strain, or run  for 3 days after your procedure  · Rest after your procedure  You have been given medicine to relax you  Do not  drive or make important decisions until the day after your procedure  Return to your normal activity as directed  · Relieve gas and discomfort from bloating  by lying on your right side with a heating pad on your abdomen  You may need to take short walks to help the gas move out  Eat small meals until bloating is relieved  If you had polyps removed: For 7 days after your procedure:  · Do not  take aspirin  · Do not  go on long car rides  Follow up with your healthcare provider as directed:  Write down your questions so you remember to ask them during your visits  © 2017 [address] is for End User's use only and may not be sold, redistributed or otherwise used for commercial purposes  All illustrations and images included in CareNotes® are the copyrighted property of A D A M , Inc  or Augie Alaniz  The above information is an  only  It is not intended as medical advice for individual conditions or treatments  Talk to your doctor, nurse or pharmacist before following any medical regimen to see if it is safe and effective for you

## 2021-08-03 NOTE — ANESTHESIA PREPROCEDURE EVALUATION
Procedure:  PRE-OP ONLY    EF 70%, PASP 40    Holter with Bigeminy and benign PVCs    Relevant Problems   CARDIO   (+) Asymptomatic PVCs   (+) Ventricular bigeminy      MUSCULOSKELETAL   (+) Arthritis of right knee   (+) Post-traumatic osteoarthritis of right knee      NEURO/PSYCH   (+) Anxiety   (+) Moderate episode of recurrent major depressive disorder (HCC)   (+) S/P ORIF (open reduction internal fixation) fracture             Anesthesia Plan  ASA Score- 3     Anesthesia Type- IV sedation with anesthesia with ASA Monitors  Additional Monitors:   Airway Plan:     Comment: Discussed risks/benefits, including medication reactions, awareness, aspiration, and serious/life threatening complications  Plan to maintain native airway with IVGA, monitored with EtCO2  Plan Factors-Exercise tolerance (METS): >4 METS  Patient summary reviewed  Patient instructed to abstain from smoking on day of procedure  Patient did not smoke on day of surgery  Induction- intravenous  Postoperative Plan-     Informed Consent- Anesthetic plan and risks discussed with patient  I personally reviewed this patient with the CRNA  Discussed and agreed on the Anesthesia Plan with the ARNAUD Cordon

## 2021-08-03 NOTE — ANESTHESIA PREPROCEDURE EVALUATION
Procedure:  COLONOSCOPY    Relevant Problems   CARDIO   (+) Asymptomatic PVCs   (+) Ventricular bigeminy      MUSCULOSKELETAL   (+) Arthritis of right knee   (+) Post-traumatic osteoarthritis of right knee      NEURO/PSYCH   (+) Anxiety   (+) Moderate episode of recurrent major depressive disorder (HCC)   (+) S/P ORIF (open reduction internal fixation) fracture        Physical Exam    Airway    Mallampati score: II  TM Distance: >3 FB  Neck ROM: full     Dental       Cardiovascular  Rhythm: regular, Rate: normal, Cardiovascular exam normal    Pulmonary  Pulmonary exam normal Breath sounds clear to auscultation,     Other Findings  Several missing molars      Anesthesia Plan  ASA Score- 2     Anesthesia Type- IV sedation with anesthesia with ASA Monitors  Additional Monitors:   Airway Plan:     Comment: Discussed risks/benefits, including medication reactions, awareness, aspiration, and serious/life threatening complications  Plan to maintain native airway with IVGA, monitored with EtCO2  Plan Factors-Exercise tolerance (METS): >4 METS  Patient summary reviewed  Patient is not a current smoker  Patient instructed to abstain from smoking on day of procedure  Patient did not smoke on day of surgery  Induction- intravenous  Postoperative Plan-     Informed Consent- Anesthetic plan and risks discussed with patient  I personally reviewed this patient with the CRNA  Discussed and agreed on the Anesthesia Plan with the CRNA  Mike Pedro

## 2021-08-04 ENCOUNTER — TELEPHONE (OUTPATIENT)
Dept: INTERNAL MEDICINE CLINIC | Facility: CLINIC | Age: 46
End: 2021-08-04

## 2021-08-04 DIAGNOSIS — R39.9 URINARY SYMPTOM OR SIGN: Primary | ICD-10-CM

## 2021-08-04 NOTE — TELEPHONE ENCOUNTER
Pt water is turned off and needs her form filled out so that she can have her water turned back on, She was made aware that the form could not be filled out  tonight afterhours  She hung up and declined to discuss her medical issue any further  Please call her tomorrow regarding her form

## 2021-08-04 NOTE — TELEPHONE ENCOUNTER
She thinks she has a UTI or yeast infection  She had a colonoscopy done yesterday  Symptoms started yesterday  When she urinates its burns  She is very itchy and irritated  No abdominal pain  No fever  No frequency, although she does drink a lot of water and goes a lot any way so she can't really differentiate  She is also having a milky vaginal discharge  She wants to get tested for a UTI

## 2021-08-04 NOTE — TELEPHONE ENCOUNTER
Regarding: GI - Issue with colonoscopy (housing situation)  ----- Message from Jane Dodd sent at 8/3/2021  8:29 PM EDT -----  "I had a colonoscopy today and I am still releasing bodily fluids  My water is getting shut off tonight unless I have a medical emergency form filled out stating that I had a colonoscopy and give them a reason for my water to stay on  How can I get this taken care of?"    Explained to the patient that a provider wouldn't be able to fill out documentation until tomorrow  She insisted on speaking to someone about the situation because she is still releasing bodily fluids  Let her know I could put in a request of a nurse to call her back to see if there was anything that could be done due to her persistence

## 2021-08-04 NOTE — TELEPHONE ENCOUNTER
Answer Assessment - Initial Assessment Questions  1  DIARRHEA SEVERITY: "How bad is the diarrhea?" "How many extra stools have you had in the past 24 hours than normal?"     - NO DIARRHEA (SCALE 0)    - MILD (SCALE 1-3): Few loose or mushy BMs; increase of 1-3 stools over normal daily number of stools; mild increase in ostomy output  -  MODERATE (SCALE 4-7): Increase of 4-6 stools daily over normal; moderate increase in ostomy output  * SEVERE (SCALE 8-10; OR 'WORST POSSIBLE'): Increase of 7 or more stools daily over normal; moderate increase in ostomy output; incontinence  She is going to the bathroom several time and needs her water turned on  2  ONSET: "When did the diarrhea begin?"       today  3  BM CONSISTENCY: "How loose or watery is the diarrhea?"       diarrhea  4  VOMITING: "Are you also vomiting?" If Yes, ask: "How many times in the past 24 hours?"         5  ABDOMINAL PAIN: "Are you having any abdominal pain?" If Yes, ask: "What does it feel like?" (e g , crampy, dull, intermittent, constant)         6  ABDOMINAL PAIN SEVERITY: If present, ask: "How bad is the pain?"  (e g , Scale 1-10; mild, moderate, or severe)    - MILD (1-3): doesn't interfere with normal activities, abdomen soft and not tender to touch     - MODERATE (4-7): interferes with normal activities or awakens from sleep, tender to touch     - SEVERE (8-10): excruciating pain, doubled over, unable to do any normal activities          7  ORAL INTAKE: If vomiting, "Have you been able to drink liquids?" "How much fluids have you had in the past 24 hours?"        8  HYDRATION: "Any signs of dehydration?" (e g , dry mouth [not just dry lips], too weak to stand, dizziness, new weight loss) "When did you last urinate?"        9  EXPOSURE: "Have you traveled to a foreign country recently?" "Have you been exposed to anyone with diarrhea?" "Could you have eaten any food that was spoiled?"        10   ANTIBIOTIC USE: "Are you taking antibiotics now or have you taken antibiotics in the past 2 months?"          11  OTHER SYMPTOMS: "Do you have any other symptoms?" (e g , fever, blood in stool)    Answer Assessment - Initial Assessment Questions  1  REASON FOR CALL or QUESTION: "What is your reason for calling today?" or "How can I best help you?" or "What question do you have that I can help answer?"      I need my paper filled out so that my water can be turned back on      Protocols used: DIARRHEA-ADULT-, INFORMATION ONLY CALL - NO TRIAGE-ADULT-

## 2021-08-09 NOTE — PRE-PROCEDURE INSTRUCTIONS
Pre-Surgery Instructions:   Medication Instructions    amitriptyline (ELAVIL) 150 MG tablet Take per normal schedule     busPIRone (BUSPAR) 5 mg tablet Instructed to take as needed DOS    Cholecalciferol (Vitamin D3) 50 MCG (2000 UT) capsule Instructed to avoid all ASA/NSAIDs and OTC Vit/Supp from now until after surgery  Tylenol ok prn    cyanocobalamin 1,000 mcg/mL Instructed to avoid all ASA/NSAIDs and OTC Vit/Supp from now until after surgery  Tylenol ok prn    cyanocobalamin 1,000 mcg/mL Instructed to avoid all ASA/NSAIDs and OTC Vit/Supp from now until after surgery  Tylenol ok prn    ergocalciferol (VITAMIN D2) 50,000 units Instructed to avoid all ASA/NSAIDs and OTC Vit/Supp from now until after surgery  Tylenol ok prn    Euflexxa 20 MG/2ML SOSY Take per normal schedule     nabumetone (RELAFEN) 750 mg tablet Take per normal schedule     vitamin B-12 (VITAMIN B-12) 1,000 mcg tablet Instructed to avoid all ASA/NSAIDs and OTC Vit/Supp from now until after surgery  Tylenol ok prn    Pre op,medications and showering instructions reviewed-Patient has hibiclens  Pt states she takes all meds on an as needed basis at night and hasn't really been taking any of them     Instructed to avoid all ASA/NSAIDs and OTC Vit/Supp from now until after surgery  Tylenol ok prn  Pt  Verbalized an understanding of all instructions reviewed and offers no concerns at this time

## 2021-08-10 NOTE — DISCHARGE INSTRUCTIONS
Please call the office when you leave to schedule an appointment for 2 weeks  This can be a virtual / telephone consultation or it can be in person  The choice is yours  Please call [phone]   [address], [address], [facility] [place], [address]  Off of [address] between [facility] and [facility]  Activity:    May lift 10 lb as many times as desired the 1st week,       20 lb in 2 weeks,       30 lb in 3 weeks  Walking is encouraged  Normal daily activities including climbing steps are okay  Do not engage in strenuous activity ( sit-ups or crutches) or contact sports for 4-6 weeks post-operatively    Return to Work:   Okay to return to work when you feel well if you desire  Diet:   You may return to your normal healthy diet  Wound Care: Your wound is closed with dissolvable stitches and glue  It is okay to shower  Wash incision gently with soap and water and pat dry  Do not soak incisions in bath water or swim for two weeks  Do not apply any creams or ointments  Pain Medication:   Please take as directed if needed  May use Advil or Motrin in addition  Recall, the pain medicine and anesthesia is associated with constipation  No driving while taking narcotic pain medications  Other: It is normal to developed a healing ridge / firm incision after surgery  This is your body making scar tissue  It is a good sign  Constipation is very common after general anesthesia  Please use milk of magnesia as needed in order to help prevent constipation  It is normal to get bruising after surgery  If you have questions after discharge please call the office      If you have increased pain, fever >101 5, increased drainage, redness or a bad smell at your surgery site, please call us immediately or come directly to the Emergency Room

## 2021-08-11 ENCOUNTER — ANESTHESIA EVENT (OUTPATIENT)
Dept: PERIOP | Facility: HOSPITAL | Age: 46
End: 2021-08-11
Payer: COMMERCIAL

## 2021-08-12 ENCOUNTER — ANESTHESIA (OUTPATIENT)
Dept: PERIOP | Facility: HOSPITAL | Age: 46
End: 2021-08-12
Payer: COMMERCIAL

## 2021-08-12 ENCOUNTER — HOSPITAL ENCOUNTER (OUTPATIENT)
Facility: HOSPITAL | Age: 46
Setting detail: OUTPATIENT SURGERY
Discharge: HOME | End: 2021-08-12
Attending: SURGERY | Admitting: SURGERY
Payer: COMMERCIAL

## 2021-08-12 VITALS
OXYGEN SATURATION: 99 % | TEMPERATURE: 98 F | RESPIRATION RATE: 16 BRPM | DIASTOLIC BLOOD PRESSURE: 56 MMHG | WEIGHT: 133 LBS | HEIGHT: 67 IN | BODY MASS INDEX: 20.88 KG/M2 | SYSTOLIC BLOOD PRESSURE: 108 MMHG | HEART RATE: 91 BPM

## 2021-08-12 DIAGNOSIS — K80.10 CHRONIC CALCULOUS CHOLECYSTITIS: Primary | ICD-10-CM

## 2021-08-12 PROCEDURE — 47562 LAPAROSCOPIC CHOLECYSTECTOMY: CPT | Performed by: SURGERY

## 2021-08-12 PROCEDURE — 88304 TISSUE EXAM BY PATHOLOGIST: CPT | Performed by: PATHOLOGY

## 2021-08-12 RX ORDER — SODIUM CHLORIDE, SODIUM LACTATE, POTASSIUM CHLORIDE, CALCIUM CHLORIDE 600; 310; 30; 20 MG/100ML; MG/100ML; MG/100ML; MG/100ML
125 INJECTION, SOLUTION INTRAVENOUS CONTINUOUS
Status: DISCONTINUED | OUTPATIENT
Start: 2021-08-12 | End: 2021-08-12 | Stop reason: HOSPADM

## 2021-08-12 RX ORDER — ACETAMINOPHEN 325 MG/1
650 TABLET ORAL EVERY 4 HOURS PRN
Status: DISCONTINUED | OUTPATIENT
Start: 2021-08-12 | End: 2021-08-12 | Stop reason: HOSPADM

## 2021-08-12 RX ORDER — ONDANSETRON 2 MG/ML
4 INJECTION INTRAMUSCULAR; INTRAVENOUS ONCE AS NEEDED
Status: DISCONTINUED | OUTPATIENT
Start: 2021-08-12 | End: 2021-08-12 | Stop reason: HOSPADM

## 2021-08-12 RX ORDER — BUPIVACAINE HYDROCHLORIDE 2.5 MG/ML
INJECTION, SOLUTION EPIDURAL; INFILTRATION; INTRACAUDAL AS NEEDED
Status: DISCONTINUED | OUTPATIENT
Start: 2021-08-12 | End: 2021-08-12 | Stop reason: HOSPADM

## 2021-08-12 RX ORDER — HYDROMORPHONE HCL/PF 1 MG/ML
0.5 SYRINGE (ML) INJECTION
Status: DISCONTINUED | OUTPATIENT
Start: 2021-08-12 | End: 2021-08-12 | Stop reason: HOSPADM

## 2021-08-12 RX ORDER — GLYCOPYRROLATE 0.2 MG/ML
INJECTION INTRAMUSCULAR; INTRAVENOUS AS NEEDED
Status: DISCONTINUED | OUTPATIENT
Start: 2021-08-12 | End: 2021-08-12

## 2021-08-12 RX ORDER — CEFAZOLIN SODIUM 1 G/50ML
1000 SOLUTION INTRAVENOUS ONCE
Status: COMPLETED | OUTPATIENT
Start: 2021-08-12 | End: 2021-08-12

## 2021-08-12 RX ORDER — NEOSTIGMINE METHYLSULFATE 1 MG/ML
INJECTION INTRAVENOUS AS NEEDED
Status: DISCONTINUED | OUTPATIENT
Start: 2021-08-12 | End: 2021-08-12

## 2021-08-12 RX ORDER — PROMETHAZINE HYDROCHLORIDE 25 MG/ML
25 INJECTION, SOLUTION INTRAMUSCULAR; INTRAVENOUS ONCE AS NEEDED
Status: DISCONTINUED | OUTPATIENT
Start: 2021-08-12 | End: 2021-08-12 | Stop reason: HOSPADM

## 2021-08-12 RX ORDER — LIDOCAINE HYDROCHLORIDE 10 MG/ML
0.5 INJECTION, SOLUTION EPIDURAL; INFILTRATION; INTRACAUDAL; PERINEURAL ONCE AS NEEDED
Status: DISCONTINUED | OUTPATIENT
Start: 2021-08-12 | End: 2021-08-12 | Stop reason: HOSPADM

## 2021-08-12 RX ORDER — FENTANYL CITRATE/PF 50 MCG/ML
25 SYRINGE (ML) INJECTION
Status: DISCONTINUED | OUTPATIENT
Start: 2021-08-12 | End: 2021-08-12 | Stop reason: HOSPADM

## 2021-08-12 RX ORDER — ONDANSETRON 2 MG/ML
INJECTION INTRAMUSCULAR; INTRAVENOUS AS NEEDED
Status: DISCONTINUED | OUTPATIENT
Start: 2021-08-12 | End: 2021-08-12

## 2021-08-12 RX ORDER — ROCURONIUM BROMIDE 10 MG/ML
INJECTION, SOLUTION INTRAVENOUS AS NEEDED
Status: DISCONTINUED | OUTPATIENT
Start: 2021-08-12 | End: 2021-08-12

## 2021-08-12 RX ORDER — MAGNESIUM HYDROXIDE 1200 MG/15ML
LIQUID ORAL AS NEEDED
Status: DISCONTINUED | OUTPATIENT
Start: 2021-08-12 | End: 2021-08-12 | Stop reason: HOSPADM

## 2021-08-12 RX ORDER — ONDANSETRON 2 MG/ML
4 INJECTION INTRAMUSCULAR; INTRAVENOUS EVERY 4 HOURS PRN
Status: DISCONTINUED | OUTPATIENT
Start: 2021-08-12 | End: 2021-08-12 | Stop reason: HOSPADM

## 2021-08-12 RX ORDER — LIDOCAINE HYDROCHLORIDE 10 MG/ML
INJECTION, SOLUTION EPIDURAL; INFILTRATION; INTRACAUDAL; PERINEURAL AS NEEDED
Status: DISCONTINUED | OUTPATIENT
Start: 2021-08-12 | End: 2021-08-12

## 2021-08-12 RX ORDER — KETOROLAC TROMETHAMINE 30 MG/ML
INJECTION, SOLUTION INTRAMUSCULAR; INTRAVENOUS AS NEEDED
Status: DISCONTINUED | OUTPATIENT
Start: 2021-08-12 | End: 2021-08-12

## 2021-08-12 RX ORDER — FENTANYL CITRATE 50 UG/ML
INJECTION, SOLUTION INTRAMUSCULAR; INTRAVENOUS AS NEEDED
Status: DISCONTINUED | OUTPATIENT
Start: 2021-08-12 | End: 2021-08-12

## 2021-08-12 RX ORDER — HYDROCODONE BITARTRATE AND ACETAMINOPHEN 5; 325 MG/1; MG/1
1 TABLET ORAL EVERY 6 HOURS PRN
Status: DISCONTINUED | OUTPATIENT
Start: 2021-08-12 | End: 2021-08-12 | Stop reason: HOSPADM

## 2021-08-12 RX ORDER — OXYCODONE HYDROCHLORIDE AND ACETAMINOPHEN 5; 325 MG/1; MG/1
1 TABLET ORAL EVERY 4 HOURS PRN
Qty: 10 TABLET | Refills: 0 | Status: SHIPPED | OUTPATIENT
Start: 2021-08-12

## 2021-08-12 RX ORDER — HYDROMORPHONE HCL/PF 1 MG/ML
0.4 SYRINGE (ML) INJECTION
Status: DISCONTINUED | OUTPATIENT
Start: 2021-08-12 | End: 2021-08-12 | Stop reason: HOSPADM

## 2021-08-12 RX ORDER — PROPOFOL 10 MG/ML
INJECTION, EMULSION INTRAVENOUS AS NEEDED
Status: DISCONTINUED | OUTPATIENT
Start: 2021-08-12 | End: 2021-08-12

## 2021-08-12 RX ORDER — DEXAMETHASONE SODIUM PHOSPHATE 10 MG/ML
INJECTION, SOLUTION INTRAMUSCULAR; INTRAVENOUS AS NEEDED
Status: DISCONTINUED | OUTPATIENT
Start: 2021-08-12 | End: 2021-08-12

## 2021-08-12 RX ADMIN — NEOSTIGMINE METHYLSULFATE 4 MG: 1 INJECTION INTRAVENOUS at 09:10

## 2021-08-12 RX ADMIN — FENTANYL CITRATE 50 MCG: 50 INJECTION, SOLUTION INTRAMUSCULAR; INTRAVENOUS at 09:10

## 2021-08-12 RX ADMIN — CEFAZOLIN SODIUM 1000 MG: 1 SOLUTION INTRAVENOUS at 07:15

## 2021-08-12 RX ADMIN — FENTANYL CITRATE 100 MCG: 50 INJECTION, SOLUTION INTRAMUSCULAR; INTRAVENOUS at 07:46

## 2021-08-12 RX ADMIN — PROPOFOL 150 MG: 10 INJECTION, EMULSION INTRAVENOUS at 07:46

## 2021-08-12 RX ADMIN — HYDROCODONE BITARTRATE AND ACETAMINOPHEN 1 TABLET: 5; 325 TABLET ORAL at 11:36

## 2021-08-12 RX ADMIN — LIDOCAINE HYDROCHLORIDE 50 MG: 10 INJECTION, SOLUTION EPIDURAL; INFILTRATION; INTRACAUDAL at 07:46

## 2021-08-12 RX ADMIN — PROPOFOL 50 MG: 10 INJECTION, EMULSION INTRAVENOUS at 09:07

## 2021-08-12 RX ADMIN — FENTANYL CITRATE 50 MCG: 50 INJECTION, SOLUTION INTRAMUSCULAR; INTRAVENOUS at 08:46

## 2021-08-12 RX ADMIN — SODIUM CHLORIDE, SODIUM LACTATE, POTASSIUM CHLORIDE, AND CALCIUM CHLORIDE: .6; .31; .03; .02 INJECTION, SOLUTION INTRAVENOUS at 07:07

## 2021-08-12 RX ADMIN — ONDANSETRON 4 MG: 2 INJECTION INTRAMUSCULAR; INTRAVENOUS at 08:48

## 2021-08-12 RX ADMIN — FENTANYL CITRATE 25 MCG: 50 INJECTION INTRAMUSCULAR; INTRAVENOUS at 09:54

## 2021-08-12 RX ADMIN — KETOROLAC TROMETHAMINE 15 MG: 30 INJECTION, SOLUTION INTRAMUSCULAR at 08:47

## 2021-08-12 RX ADMIN — ROCURONIUM BROMIDE 50 MG: 10 SOLUTION INTRAVENOUS at 07:47

## 2021-08-12 RX ADMIN — GLYCOPYRROLATE 0.4 MG: 0.2 INJECTION, SOLUTION INTRAMUSCULAR; INTRAVENOUS at 09:08

## 2021-08-12 RX ADMIN — DEXAMETHASONE SODIUM PHOSPHATE 4 MG: 10 INJECTION, SOLUTION INTRAMUSCULAR; INTRAVENOUS at 08:06

## 2021-08-12 NOTE — ANESTHESIA PREPROCEDURE EVALUATION
Procedure:  LAPAROSCOPIC CHOLECYSTECTOMY (N/A Abdomen)    Relevant Problems   CARDIO   (+) Asymptomatic PVCs   (+) Ventricular bigeminy      MUSCULOSKELETAL   (+) Arthritis of right knee   (+) Post-traumatic osteoarthritis of right knee      NEURO/PSYCH   (+) Anxiety   (+) Moderate episode of recurrent major depressive disorder (HCC)   (+) S/P ORIF (open reduction internal fixation) fracture      Other   (+) Abdominal pain   (+) Chronic calculous cholecystitis   (+) Gallstones   (+) Patellofemoral disorder of right knee   (+) Right lumbar radiculopathy   (+) S/P tubal ligation      Lab Results   Component Value Date     10/17/2015    SODIUM 140 06/08/2021    K 3 5 06/08/2021     06/08/2021    CO2 27 06/08/2021    ANIONGAP 9 10/17/2015    AGAP 8 06/08/2021    BUN 6 06/08/2021    CREATININE 0 85 06/08/2021    GLUC 102 06/08/2021    GLUF 81 10/16/2020    CALCIUM 9 4 06/08/2021    AST 14 06/08/2021    ALT 50 06/08/2021    ALKPHOS 83 06/08/2021    PROT 8 0 10/17/2015    TP 7 8 06/08/2021    BILITOT 0 85 10/17/2015    TBILI 0 56 06/08/2021    EGFR 84 06/08/2021     Lab Results   Component Value Date    WBC 8 32 06/08/2021    HGB 14 7 06/08/2021    HCT 46 4 (H) 06/08/2021    MCV 91 06/08/2021     06/08/2021                  Anesthesia Plan  ASA Score- 2     Anesthesia Type- general with ASA Monitors  Additional Monitors:   Airway Plan: ETT  Plan Factors-Exercise tolerance (METS): >4 METS  Chart reviewed  Existing labs reviewed  Patient is not a current smoker  Patient did not smoke on day of surgery  Induction- intravenous  Postoperative Plan- Plan for postoperative opioid use  Planned trial extubation    Informed Consent- Anesthetic plan and risks discussed with patient

## 2021-08-12 NOTE — INTERVAL H&P NOTE
H&P reviewed  After examining the patient I find no changes in the patients condition since the H&P had been written    Cholecystitis-  for lap nadia    extrem nl     SEe 7/12 note    See   Vitals:    08/12/21 0709   BP: 129/77   Pulse: 83   Resp: 20   Temp: (!) 97 3 °F (36 3 °C)   SpO2: 100%

## 2021-08-12 NOTE — ANESTHESIA POSTPROCEDURE EVALUATION
Post-Op Assessment Note    CV Status:  Stable    Pain management: adequate     Mental Status:  Alert and awake   Hydration Status:  Euvolemic   PONV Controlled:  Controlled   Airway Patency:  Patent      Post Op Vitals Reviewed: Yes            No complications documented      /63 (08/12/21 0935)    Temp (P) 97 8 °F (36 6 °C) (08/12/21 0930)    Pulse 66 (08/12/21 0935)   Resp 18 (08/12/21 0935)    SpO2 100 % (08/12/21 0935)

## 2021-08-12 NOTE — OP NOTE
OPERATIVE REPORT  PATIENT NAME: Collin Nunn    :  1975  MRN: [id number]  Pt Location: AN OR ROOM 03    SURGERY DATE: 2021    Surgeon(s) and Role:     * Maninder Montana MD - Primary     * Brian Shepherd DO - Assisting    Preop Diagnosis:  Chronic calculous cholecystitis [K80 10]    Post-Op Diagnosis Codes:     * Chronic calculous cholecystitis [R72 03] umbilical hernia    Procedure(s) (LRB):  LAPAROSCOPIC CHOLECYSTECTOMY, UMBILICAL HERNIA REPAIR (N/A) without mesh   (2 port technique)    Specimen(s):  ID Type Source Tests Collected by Time Destination   1 :  Tissue Gallbladder TISSUE EXAM Maninder Montana MD 2021  8:06 AM        Estimated Blood Loss:   Minimal    Drains:  * No LDAs found *    Anesthesia Type:   General    Operative Indications:  Chronic calculous cholecystitis [K80 10]    Independent, nonsmoker, ASA 2, wound class 2, BMI 21, weight 133, height 67  (+) Asymptomatic PVCs   (+) Ventricular bigeminy       MUSCULOSKELETAL   (+) Arthritis of right knee   (+) Post-traumatic osteoarthritis of right knee       NEURO/PSYCH   (+) Anxiety   (+) Moderate episode of recurrent major depressive disorder (HCC)   (+) S/P ORIF (open reduction internal fixation) fracture       Other   (+) Abdominal pain   (+) Chronic calculous cholecystitis   (+) Gallstones   (+) Patellofemoral disorder of right knee   (+) Right lumbar radiculopathy   (+) S/P tubal ligation           Complications:   None    Procedure and Technique:  Collin Nunn is a 40 y o  female was brought into the operative suite and identified visually and by arm band  The patient was placed in the supine position  Careful attention towards positioning of extremities was completed  After sterile prep and drape a timeout was completed  Athrombic pumps in place  Antibiotics provided  After instillation of local analgesia an incision was made at the umbilicus   With blunt dissection the peritoneum was identified    This was pulled upward using Kocher clamps  An incision was made  Hemostat was used to bluntly puncture the peritoneum  Intra-abdominal location was verified  A trocar was then inserted  CO2 was then insufflated with a back pressure of 15  After Marcaine instillation at each additional site, additional trochars are placed under direct vision at the umbilicus and at the epigastric region the O2 port technique    Laparoscopic visualization revealed a normal liver and normal stomach  No excess peritoneal fluid  The gallbladder was pulled with traction inferiorly, and the liver was pushed superior  A dome down technique was completed using electrocautery  This technique carried down to the level of Alanis's pouch  Careful attention was made towards location of the right hepatic artery, cystic artery, common bile duct, right hepatic duct and the cystic duct  Careful attention was made towards the critical anatomy at this region  The cystic duct was identified inserting into the base of the gallbladder  Cystic artery was identified also inserting into the base of the gallbladder  The cystic duct was then clipped ×3 and divided  The cystic artery was clipped ×3 and divided  The gallbladder was then removed from the liver bed with additional electrocautery       The area was copiously irrigated  Hemostasis was assured  The gallbladder was then removed through the umbilical incision  Fascia was closed with 0 Vicryl suture  The subcutaneous components were irrigated  The subcuticular incisions were then closed  Histacryl was then applied  The patient was awakened from general anesthesia and transferred to the recovery room in stable condition  Sponge and instrument count correct ×2  A postoperative deep briefing was completed       I was present for the entire procedure    Patient Disposition:  PACU     SIGNATURE: Cynthia Jacobs MD  DATE: August 12, 2021  TIME: 9:11 AM

## 2021-08-18 ENCOUNTER — APPOINTMENT (OUTPATIENT)
Dept: LAB | Facility: CLINIC | Age: 46
End: 2021-08-18
Payer: COMMERCIAL

## 2021-08-18 LAB
BACTERIA UR QL AUTO: NORMAL /HPF
BILIRUB UR QL STRIP: NEGATIVE
CLARITY UR: CLEAR
COLOR UR: ABNORMAL
GLUCOSE UR STRIP-MCNC: NEGATIVE MG/DL
HGB UR QL STRIP.AUTO: ABNORMAL
KETONES UR STRIP-MCNC: NEGATIVE MG/DL
LEUKOCYTE ESTERASE UR QL STRIP: NEGATIVE
NITRITE UR QL STRIP: NEGATIVE
NON-SQ EPI CELLS URNS QL MICRO: NORMAL /HPF
PH UR STRIP.AUTO: 5.5 [PH]
PROT UR STRIP-MCNC: NEGATIVE MG/DL
RBC #/AREA URNS AUTO: NORMAL /HPF
SP GR UR STRIP.AUTO: 1.01 (ref 1–1.03)
UROBILINOGEN UR QL STRIP.AUTO: 0.2 E.U./DL
WBC #/AREA URNS AUTO: NORMAL /HPF

## 2021-08-18 PROCEDURE — 81001 URINALYSIS AUTO W/SCOPE: CPT | Performed by: INTERNAL MEDICINE

## 2021-10-08 ENCOUNTER — HOSPITAL ENCOUNTER (OUTPATIENT)
Dept: RADIOLOGY | Age: 46
Discharge: HOME | End: 2021-10-08
Payer: COMMERCIAL

## 2021-10-08 VITALS — HEIGHT: 67 IN | WEIGHT: 133 LBS | BODY MASS INDEX: 20.88 KG/M2

## 2021-10-08 DIAGNOSIS — Z12.31 ENCOUNTER FOR SCREENING MAMMOGRAM FOR MALIGNANT NEOPLASM OF BREAST: ICD-10-CM

## 2021-10-08 PROCEDURE — 77063 BREAST TOMOSYNTHESIS BI: CPT

## 2021-10-08 PROCEDURE — 77067 SCR MAMMO BI INCL CAD: CPT

## 2021-10-29 ENCOUNTER — OFFICE VISIT (OUTPATIENT)
Dept: INTERNAL MEDICINE CLINIC | Facility: CLINIC | Age: 46
End: 2021-10-29
Payer: COMMERCIAL

## 2021-10-29 VITALS
SYSTOLIC BLOOD PRESSURE: 118 MMHG | BODY MASS INDEX: 20.4 KG/M2 | WEIGHT: 130 LBS | TEMPERATURE: 96.6 F | OXYGEN SATURATION: 98 % | HEIGHT: 67 IN | HEART RATE: 108 BPM | DIASTOLIC BLOOD PRESSURE: 76 MMHG

## 2021-10-29 DIAGNOSIS — G89.29 CHRONIC PAIN OF RIGHT KNEE: ICD-10-CM

## 2021-10-29 DIAGNOSIS — F33.1 MODERATE EPISODE OF RECURRENT MAJOR DEPRESSIVE DISORDER (HCC): ICD-10-CM

## 2021-10-29 DIAGNOSIS — M79.604 LEG PAIN, RIGHT: ICD-10-CM

## 2021-10-29 DIAGNOSIS — E55.9 VITAMIN D DEFICIENCY: ICD-10-CM

## 2021-10-29 DIAGNOSIS — M25.561 CHRONIC PAIN OF RIGHT KNEE: ICD-10-CM

## 2021-10-29 DIAGNOSIS — M17.31 POST-TRAUMATIC OSTEOARTHRITIS OF RIGHT KNEE: ICD-10-CM

## 2021-10-29 DIAGNOSIS — E53.8 VITAMIN B12 DEFICIENCY: ICD-10-CM

## 2021-10-29 DIAGNOSIS — Z00.00 HEALTH MAINTENANCE EXAMINATION: Primary | ICD-10-CM

## 2021-10-29 DIAGNOSIS — F41.9 ANXIETY: ICD-10-CM

## 2021-10-29 PROBLEM — K80.10 CHRONIC CALCULOUS CHOLECYSTITIS: Status: RESOLVED | Noted: 2021-07-12 | Resolved: 2021-10-29

## 2021-10-29 PROCEDURE — 99396 PREV VISIT EST AGE 40-64: CPT | Performed by: INTERNAL MEDICINE

## 2021-10-29 RX ORDER — LANOLIN ALCOHOL/MO/W.PET/CERES
1000 CREAM (GRAM) TOPICAL DAILY
Qty: 90 TABLET | Refills: 1 | Status: SHIPPED | OUTPATIENT
Start: 2021-10-29

## 2021-10-29 RX ORDER — ACETAMINOPHEN 160 MG
2000 TABLET,DISINTEGRATING ORAL DAILY
Qty: 90 CAPSULE | Refills: 1 | Status: SHIPPED | OUTPATIENT
Start: 2021-10-29

## 2021-10-29 RX ORDER — ERGOCALCIFEROL 1.25 MG/1
50000 CAPSULE ORAL WEEKLY
Qty: 12 CAPSULE | Refills: 0 | Status: CANCELLED | OUTPATIENT
Start: 2021-10-29

## 2021-10-29 RX ORDER — BUSPIRONE HYDROCHLORIDE 5 MG/1
5 TABLET ORAL 2 TIMES DAILY
Qty: 60 TABLET | Refills: 5 | Status: SHIPPED | OUTPATIENT
Start: 2021-10-29 | End: 2022-04-20 | Stop reason: SDUPTHER

## 2021-10-29 RX ORDER — CYANOCOBALAMIN 1000 UG/ML
1000 INJECTION INTRAMUSCULAR; SUBCUTANEOUS
Qty: 10 ML | Refills: 1 | Status: SHIPPED | OUTPATIENT
Start: 2021-10-29 | End: 2022-04-08 | Stop reason: SDUPTHER

## 2021-10-29 RX ORDER — AMITRIPTYLINE HYDROCHLORIDE 150 MG/1
150 TABLET, FILM COATED ORAL
Qty: 90 TABLET | Refills: 1 | Status: SHIPPED | OUTPATIENT
Start: 2021-10-29 | End: 2022-04-20 | Stop reason: SDUPTHER

## 2021-10-29 RX ORDER — HYALURONATE SODIUM 20 MG/2 ML
SYRINGE (ML) INTRAARTICULAR
Status: CANCELLED | OUTPATIENT
Start: 2021-10-29

## 2021-10-29 RX ORDER — NABUMETONE 750 MG/1
750 TABLET, FILM COATED ORAL 2 TIMES DAILY
Qty: 60 TABLET | Refills: 0 | Status: SHIPPED | OUTPATIENT
Start: 2021-10-29 | End: 2021-11-28

## 2021-11-29 ENCOUNTER — TELEPHONE (OUTPATIENT)
Dept: OBGYN CLINIC | Facility: CLINIC | Age: 46
End: 2021-11-29

## 2021-11-30 ENCOUNTER — OFFICE VISIT (OUTPATIENT)
Dept: OBGYN CLINIC | Facility: CLINIC | Age: 46
End: 2021-11-30
Payer: COMMERCIAL

## 2021-11-30 VITALS
BODY MASS INDEX: 19.93 KG/M2 | HEIGHT: 67 IN | DIASTOLIC BLOOD PRESSURE: 80 MMHG | SYSTOLIC BLOOD PRESSURE: 128 MMHG | WEIGHT: 127 LBS

## 2021-11-30 DIAGNOSIS — N76.0 ACUTE VAGINITIS: ICD-10-CM

## 2021-11-30 DIAGNOSIS — N94.10 DYSPAREUNIA IN FEMALE: ICD-10-CM

## 2021-11-30 DIAGNOSIS — N89.8 VAGINAL ODOR: ICD-10-CM

## 2021-11-30 DIAGNOSIS — Z11.3 SCREENING EXAMINATION FOR STD (SEXUALLY TRANSMITTED DISEASE): ICD-10-CM

## 2021-11-30 DIAGNOSIS — N76.0 BACTERIAL VAGINOSIS: ICD-10-CM

## 2021-11-30 DIAGNOSIS — B96.89 BACTERIAL VAGINOSIS: ICD-10-CM

## 2021-11-30 DIAGNOSIS — R10.2 PELVIC PAIN: Primary | ICD-10-CM

## 2021-11-30 LAB
BV WHIFF TEST VAG QL: ABNORMAL
CLUE CELLS SPEC QL WET PREP: ABNORMAL
PH SMN: 7 [PH]
SL AMB POCT WET MOUNT: ABNORMAL
T VAGINALIS VAG QL WET PREP: ABNORMAL
YEAST VAG QL WET PREP: ABNORMAL

## 2021-11-30 PROCEDURE — 87077 CULTURE AEROBIC IDENTIFY: CPT | Performed by: PHYSICIAN ASSISTANT

## 2021-11-30 PROCEDURE — 87491 CHLMYD TRACH DNA AMP PROBE: CPT | Performed by: PHYSICIAN ASSISTANT

## 2021-11-30 PROCEDURE — 99214 OFFICE O/P EST MOD 30 MIN: CPT | Performed by: PHYSICIAN ASSISTANT

## 2021-11-30 PROCEDURE — 87210 SMEAR WET MOUNT SALINE/INK: CPT | Performed by: PHYSICIAN ASSISTANT

## 2021-11-30 PROCEDURE — 87070 CULTURE OTHR SPECIMN AEROBIC: CPT | Performed by: PHYSICIAN ASSISTANT

## 2021-11-30 PROCEDURE — 87591 N.GONORRHOEAE DNA AMP PROB: CPT | Performed by: PHYSICIAN ASSISTANT

## 2021-11-30 RX ORDER — METRONIDAZOLE 500 MG/1
500 TABLET ORAL EVERY 12 HOURS SCHEDULED
Qty: 14 TABLET | Refills: 0 | Status: SHIPPED | OUTPATIENT
Start: 2021-11-30 | End: 2021-12-07

## 2021-12-01 LAB
C TRACH DNA SPEC QL NAA+PROBE: NEGATIVE
N GONORRHOEA DNA SPEC QL NAA+PROBE: NEGATIVE

## 2021-12-03 LAB
BACTERIA GENITAL AEROBE CULT: ABNORMAL
BACTERIA GENITAL AEROBE CULT: ABNORMAL

## 2021-12-18 ENCOUNTER — HOSPITAL ENCOUNTER (OUTPATIENT)
Dept: ULTRASOUND IMAGING | Facility: HOSPITAL | Age: 46
Discharge: HOME | End: 2021-12-18
Payer: COMMERCIAL

## 2021-12-18 DIAGNOSIS — N94.10 DYSPAREUNIA IN FEMALE: ICD-10-CM

## 2021-12-18 DIAGNOSIS — R10.2 PELVIC PAIN: ICD-10-CM

## 2021-12-18 PROCEDURE — 76830 TRANSVAGINAL US NON-OB: CPT

## 2021-12-18 PROCEDURE — 76856 US EXAM PELVIC COMPLETE: CPT

## 2021-12-30 DIAGNOSIS — B37.3 YEAST VAGINITIS: Primary | ICD-10-CM

## 2021-12-30 RX ORDER — FLUCONAZOLE 150 MG/1
150 TABLET ORAL EVERY OTHER DAY
Qty: 2 TABLET | Refills: 0 | Status: SHIPPED | OUTPATIENT
Start: 2021-12-30 | End: 2022-01-02

## 2022-02-16 DIAGNOSIS — B96.89 BACTERIAL VAGINOSIS: ICD-10-CM

## 2022-02-16 DIAGNOSIS — B37.9 ANTIBIOTIC-INDUCED YEAST INFECTION: Primary | ICD-10-CM

## 2022-02-16 DIAGNOSIS — T36.95XA ANTIBIOTIC-INDUCED YEAST INFECTION: Primary | ICD-10-CM

## 2022-02-16 DIAGNOSIS — N76.0 BACTERIAL VAGINOSIS: ICD-10-CM

## 2022-02-16 RX ORDER — METRONIDAZOLE 500 MG/1
500 TABLET ORAL EVERY 12 HOURS SCHEDULED
Qty: 14 TABLET | Refills: 0 | Status: SHIPPED | OUTPATIENT
Start: 2022-02-16 | End: 2022-02-23

## 2022-02-16 RX ORDER — FLUCONAZOLE 150 MG/1
150 TABLET ORAL ONCE
Qty: 1 TABLET | Refills: 0 | Status: SHIPPED | OUTPATIENT
Start: 2022-02-16 | End: 2022-02-16

## 2022-02-16 NOTE — TELEPHONE ENCOUNTER
Spoke with patient, treated for BV in November  Patient states having a thick milky discharge and strong fishy odor  No irritation, no itching  Patient requesting abx and also a diflucan as probiotics do not work for her with yeast after antibiotics  Aware will send  If has again, should come in to be sure we are treating with correct abx

## 2022-04-05 ENCOUNTER — TELEPHONE (OUTPATIENT)
Dept: OBGYN CLINIC | Facility: CLINIC | Age: 47
End: 2022-04-05

## 2022-04-05 NOTE — TELEPHONE ENCOUNTER
Spoke with patient, took a neg upt at home (P[facility] test) and would like blood work  Will order TFTs, hcg, FSH and LH  Patient will use [facility] lab and to call for results once completed  Aware other than pregnancy, could be age, could be hormones off, etc  Will review with provider when results return and if needs apt will schedule at that time

## 2022-04-08 ENCOUNTER — TELEPHONE (OUTPATIENT)
Dept: OBGYN CLINIC | Facility: CLINIC | Age: 47
End: 2022-04-08

## 2022-04-08 ENCOUNTER — APPOINTMENT (OUTPATIENT)
Dept: LAB | Facility: CLINIC | Age: 47
End: 2022-04-08
Payer: COMMERCIAL

## 2022-04-08 DIAGNOSIS — E53.8 VITAMIN B12 DEFICIENCY: ICD-10-CM

## 2022-04-08 DIAGNOSIS — E55.9 VITAMIN D DEFICIENCY: ICD-10-CM

## 2022-04-08 DIAGNOSIS — F41.9 ANXIETY: ICD-10-CM

## 2022-04-08 DIAGNOSIS — N91.3 PRIMARY OLIGOMENORRHEA: ICD-10-CM

## 2022-04-08 DIAGNOSIS — Z13.29 SCREENING FOR THYROID DISORDER: ICD-10-CM

## 2022-04-08 LAB
25(OH)D3 SERPL-MCNC: 22.8 NG/ML (ref 30–100)
ANION GAP SERPL CALCULATED.3IONS-SCNC: 3 MMOL/L (ref 4–13)
B-HCG SERPL-ACNC: <2 MIU/ML
BASOPHILS # BLD AUTO: 0.02 THOUSANDS/ΜL (ref 0–0.1)
BASOPHILS NFR BLD AUTO: 1 % (ref 0–1)
BUN SERPL-MCNC: 7 MG/DL (ref 5–25)
CALCIUM SERPL-MCNC: 9 MG/DL (ref 8.3–10.1)
CHLORIDE SERPL-SCNC: 108 MMOL/L (ref 100–108)
CO2 SERPL-SCNC: 29 MMOL/L (ref 21–32)
CREAT SERPL-MCNC: 0.75 MG/DL (ref 0.6–1.3)
EOSINOPHIL # BLD AUTO: 0.09 THOUSAND/ΜL (ref 0–0.61)
EOSINOPHIL NFR BLD AUTO: 3 % (ref 0–6)
ERYTHROCYTE [DISTWIDTH] IN BLOOD BY AUTOMATED COUNT: 13.6 % (ref 11.6–15.1)
FSH SERPL-ACNC: 49.7 MIU/ML
GFR SERPL CREATININE-BSD FRML MDRD: 96 ML/MIN/1.73SQ M
GLUCOSE SERPL-MCNC: 92 MG/DL (ref 65–140)
HCT VFR BLD AUTO: 42.8 % (ref 34.8–46.1)
HGB BLD-MCNC: 13.3 G/DL (ref 11.5–15.4)
IMM GRANULOCYTES # BLD AUTO: 0 THOUSAND/UL (ref 0–0.2)
IMM GRANULOCYTES NFR BLD AUTO: 0 % (ref 0–2)
LH SERPL-ACNC: 37.2 MIU/ML
LYMPHOCYTES # BLD AUTO: 1.29 THOUSANDS/ΜL (ref 0.6–4.47)
LYMPHOCYTES NFR BLD AUTO: 39 % (ref 14–44)
MCH RBC QN AUTO: 28.5 PG (ref 26.8–34.3)
MCHC RBC AUTO-ENTMCNC: 31.1 G/DL (ref 31.4–37.4)
MCV RBC AUTO: 92 FL (ref 82–98)
MONOCYTES # BLD AUTO: 0.28 THOUSAND/ΜL (ref 0.17–1.22)
MONOCYTES NFR BLD AUTO: 9 % (ref 4–12)
NEUTROPHILS # BLD AUTO: 1.61 THOUSANDS/ΜL (ref 1.85–7.62)
NEUTS SEG NFR BLD AUTO: 48 % (ref 43–75)
NRBC BLD AUTO-RTO: 0 /100 WBCS
PLATELET # BLD AUTO: 195 THOUSANDS/UL (ref 149–390)
PMV BLD AUTO: 12 FL (ref 8.9–12.7)
POTASSIUM SERPL-SCNC: 3.6 MMOL/L (ref 3.5–5.3)
RBC # BLD AUTO: 4.66 MILLION/UL (ref 3.81–5.12)
SODIUM SERPL-SCNC: 140 MMOL/L (ref 136–145)
T4 FREE SERPL-MCNC: 0.83 NG/DL (ref 0.76–1.46)
TSH SERPL DL<=0.05 MIU/L-ACNC: 1.37 UIU/ML (ref 0.45–4.5)
VIT B12 SERPL-MCNC: 575 PG/ML (ref 100–900)
WBC # BLD AUTO: 3.29 THOUSAND/UL (ref 4.31–10.16)

## 2022-04-08 PROCEDURE — 84439 ASSAY OF FREE THYROXINE: CPT

## 2022-04-08 PROCEDURE — 83002 ASSAY OF GONADOTROPIN (LH): CPT

## 2022-04-08 PROCEDURE — 84702 CHORIONIC GONADOTROPIN TEST: CPT

## 2022-04-08 PROCEDURE — 84443 ASSAY THYROID STIM HORMONE: CPT

## 2022-04-08 PROCEDURE — 82607 VITAMIN B-12: CPT

## 2022-04-08 PROCEDURE — 80048 BASIC METABOLIC PNL TOTAL CA: CPT | Performed by: INTERNAL MEDICINE

## 2022-04-08 PROCEDURE — 85025 COMPLETE CBC W/AUTO DIFF WBC: CPT | Performed by: INTERNAL MEDICINE

## 2022-04-08 PROCEDURE — 83001 ASSAY OF GONADOTROPIN (FSH): CPT

## 2022-04-08 PROCEDURE — 82306 VITAMIN D 25 HYDROXY: CPT

## 2022-04-08 PROCEDURE — 36415 COLL VENOUS BLD VENIPUNCTURE: CPT | Performed by: INTERNAL MEDICINE

## 2022-04-08 RX ORDER — CYANOCOBALAMIN 1000 UG/ML
1000 INJECTION INTRAMUSCULAR; SUBCUTANEOUS
Qty: 10 ML | Refills: 1 | Status: SHIPPED | OUTPATIENT
Start: 2022-04-08 | End: 2022-04-20 | Stop reason: SDUPTHER

## 2022-04-08 NOTE — TELEPHONE ENCOUNTER
Lab results:    Vitamin B12 improved, continue monthly injections a,d daily supplement  Continue daily vitamin D3  White count a bit low, we will monitor this  The rest of your labs were normal     Please schedule f/u appt

## 2022-04-20 ENCOUNTER — OFFICE VISIT (OUTPATIENT)
Dept: INTERNAL MEDICINE CLINIC | Facility: CLINIC | Age: 47
End: 2022-04-20
Payer: COMMERCIAL

## 2022-04-20 VITALS
HEIGHT: 67 IN | BODY MASS INDEX: 20.4 KG/M2 | OXYGEN SATURATION: 100 % | TEMPERATURE: 97.6 F | DIASTOLIC BLOOD PRESSURE: 72 MMHG | SYSTOLIC BLOOD PRESSURE: 118 MMHG | WEIGHT: 130 LBS | HEART RATE: 78 BPM

## 2022-04-20 DIAGNOSIS — E55.9 VITAMIN D DEFICIENCY: ICD-10-CM

## 2022-04-20 DIAGNOSIS — M79.604 LEG PAIN, RIGHT: ICD-10-CM

## 2022-04-20 DIAGNOSIS — Z71.9 HEALTH COUNSELING: ICD-10-CM

## 2022-04-20 DIAGNOSIS — E53.8 VITAMIN B12 DEFICIENCY: ICD-10-CM

## 2022-04-20 DIAGNOSIS — F33.1 MODERATE EPISODE OF RECURRENT MAJOR DEPRESSIVE DISORDER (HCC): ICD-10-CM

## 2022-04-20 DIAGNOSIS — F41.9 ANXIETY: ICD-10-CM

## 2022-04-20 DIAGNOSIS — B34.9 VIRAL INFECTION, UNSPECIFIED: ICD-10-CM

## 2022-04-20 DIAGNOSIS — H10.31 ACUTE CONJUNCTIVITIS OF RIGHT EYE, UNSPECIFIED ACUTE CONJUNCTIVITIS TYPE: Primary | ICD-10-CM

## 2022-04-20 DIAGNOSIS — M17.31 POST-TRAUMATIC OSTEOARTHRITIS OF RIGHT KNEE: ICD-10-CM

## 2022-04-20 PROBLEM — R10.2 PELVIC PAIN: Status: RESOLVED | Noted: 2021-11-30 | Resolved: 2022-04-20

## 2022-04-20 PROBLEM — N92.6 MENSTRUAL PERIODS IRREGULAR: Status: ACTIVE | Noted: 2022-04-20

## 2022-04-20 PROBLEM — K80.20 GALLSTONES: Status: RESOLVED | Noted: 2021-06-25 | Resolved: 2022-04-20

## 2022-04-20 PROBLEM — N76.0 BACTERIAL VAGINOSIS: Status: RESOLVED | Noted: 2021-11-30 | Resolved: 2022-04-20

## 2022-04-20 PROBLEM — R10.9 ABDOMINAL PAIN: Status: RESOLVED | Noted: 2021-04-23 | Resolved: 2022-04-20

## 2022-04-20 PROBLEM — B96.89 BACTERIAL VAGINOSIS: Status: RESOLVED | Noted: 2021-11-30 | Resolved: 2022-04-20

## 2022-04-20 PROCEDURE — 87636 SARSCOV2 & INF A&B AMP PRB: CPT | Performed by: INTERNAL MEDICINE

## 2022-04-20 PROCEDURE — 99214 OFFICE O/P EST MOD 30 MIN: CPT | Performed by: INTERNAL MEDICINE

## 2022-04-20 RX ORDER — AMITRIPTYLINE HYDROCHLORIDE 150 MG/1
150 TABLET, FILM COATED ORAL
Qty: 90 TABLET | Refills: 1 | Status: SHIPPED | OUTPATIENT
Start: 2022-04-20

## 2022-04-20 RX ORDER — BUSPIRONE HYDROCHLORIDE 5 MG/1
5 TABLET ORAL 2 TIMES DAILY
Qty: 60 TABLET | Refills: 5 | Status: SHIPPED | OUTPATIENT
Start: 2022-04-20

## 2022-04-20 RX ORDER — CYANOCOBALAMIN 1000 UG/ML
1000 INJECTION INTRAMUSCULAR; SUBCUTANEOUS
Qty: 10 ML | Refills: 1 | Status: SHIPPED | OUTPATIENT
Start: 2022-04-20

## 2022-04-20 RX ORDER — FLUCONAZOLE 150 MG/1
TABLET ORAL
COMMUNITY
Start: 2022-02-16

## 2022-04-20 NOTE — PATIENT INSTRUCTIONS
Conjunctivitis   WHAT YOU NEED TO KNOW:   Conjunctivitis, or pink eye, is inflammation of your conjunctiva  The conjunctiva is a thin tissue that covers the front of your eye and the back of your eyelids  The conjunctiva helps protect your eye and keep it moist  Conjunctivitis may be caused by bacteria, allergies, or a virus  If your conjunctivitis is caused by bacteria, it may get better on its own in about 7 days  Viral conjunctivitis can last up to 3 weeks  DISCHARGE INSTRUCTIONS:   Return to the emergency department if:   · You have worsening eye pain  · The swelling in your eye gets worse, even after treatment  · Your vision suddenly becomes worse or you cannot see at all  Contact your healthcare provider if:   · You develop a fever and ear pain  · You have tiny bumps or spots of blood on your eye  · You have questions or concerns about your condition or care  Manage your symptoms:   · Apply a cool compress  Wet a washcloth with cold water and place it on your eye  This will help decrease itching and irritation  · Do not wear contact lenses  They can irritate your eye  Throw away the pair you are using and ask when you can wear them again  Use a new pair of lenses when your healthcare provider says it is okay  · Avoid irritants  Stay away from smoke filled areas  Shield your eyes from wind and sun  · Flush your eye  You may need to flush your eye with saline to help decrease your symptoms  Ask for more information on how to flush your eye  Medicines:  Treatment depends on what is causing your conjunctivitis  You may be given any of the following:  · Allergy medicine  helps decrease itchy, red, swollen eyes caused by allergies  It may be given as a pill, eye drops, or nasal spray  · Antibiotics  may be needed if your conjunctivitis is caused by bacteria  This medicine may be given as a pill, eye drops, or eye ointment  · Take your medicine as directed    Contact your healthcare provider if you think your medicine is not helping or if you have side effects  Tell him or her if you are allergic to any medicine  Keep a list of the medicines, vitamins, and herbs you take  Include the amounts, and when and why you take them  Bring the list or the pill bottles to follow-up visits  Carry your medicine list with you in case of an emergency  Prevent the spread of conjunctivitis:   · Wash your hands with soap and water often  Wash your hands before and after you touch your eyes  Also wash your hands before you prepare or eat food and after you use the bathroom or change a diaper  · Avoid allergens  Try to avoid the things that cause your allergies, such as pets, dust, or grass  · Avoid contact with others  Do not share towels or washcloths  Try to stay away from others as much as possible  Ask when you can return to work or school  · Throw away eye makeup  The bacteria that caused your conjunctivitis can stay in eye makeup  Throw away mascara and other eye makeup  © Copyright [employer] 2022 Information is for End User's use only and may not be sold, redistributed or otherwise used for commercial purposes  All illustrations and images included in CareNotes® are the copyrighted property of A D A M , Inc  or [facility]su   The above information is an  only  It is not intended as medical advice for individual conditions or treatments  Talk to your doctor, nurse or pharmacist before following any medical regimen to see if it is safe and effective for you

## 2022-04-20 NOTE — LETTER
April 20, 2022     Patient: Loli Chahal  YOB: 1975  Date of Visit: 4/20/2022      To Whom it May Concern:    Loli Chahal is under my professional care  Ronn was seen in my office on 4/20/2022  Please excuse her from work from 4/19 to 4/20/22  Aldo Pacheco may return to work on 4/21/2022  If you have any questions or concerns, please don't hesitate to call           Sincerely,          Mya Sevilla MD        CC: No Recipients

## 2022-04-20 NOTE — PROGRESS NOTES
Assessment/Plan:    Anxiety  Stable, on amitriptyline  Moderate episode of recurrent major depressive disorder (HCC)  Stable, as above  Post-traumatic osteoarthritis of right knee  More frequent neuropathy symptoms recently  Discussed adding gabapentin  Vitamin B12 deficiency  Taking supplements regularly  Continue monthly injections  Vitamin D deficiency  Taking D3 daily  Menstrual periods irregular  Saw gynecology, premenopausal        Diagnoses and all orders for this visit:    Acute conjunctivitis of right eye, unspecified acute conjunctivitis type  Comments:  Suspect viral  Instructed not to wear contact lenses, rinse with cool or warm water, avoid rubbing/ manipulation  Vitamin B12 deficiency    Vitamin D deficiency  -     SYRINGE-NEEDLE, DISP, 3 ML 25G X 1" 3 ML MISC; Use every 30 (thirty) days  -     cyanocobalamin 1,000 mcg/mL; Inject 1 mL (1,000 mcg total) into a muscle every 30 (thirty) days    Moderate episode of recurrent major depressive disorder (HCC)  -     amitriptyline (ELAVIL) 150 MG tablet; Take 1 tablet (150 mg total) by mouth daily at bedtime    Leg pain, right  -     amitriptyline (ELAVIL) 150 MG tablet; Take 1 tablet (150 mg total) by mouth daily at bedtime    Anxiety  -     busPIRone (BUSPAR) 5 mg tablet; Take 1 tablet (5 mg total) by mouth 2 (two) times a day    Viral infection, unspecified  -     Covid/Flu- Office Collect    Post-traumatic osteoarthritis of right knee    Health counseling  Comments:  Recommend COVID vaccine  Other orders  -     fluconazole (DIFLUCAN) 150 mg tablet; TAKE 1 TABLET ONCE FOR 1 DOSE      Follow up in 6 months or as needed  Subjective:      Patient ID: Jean Claude Knowles is a 40 y o  female c/o pink eye  She woke up yesterday with her right eye shut with crusty discharge  She reports no pain, I was slightly red  Denies any blurring of vision  She had a sore throat the day before which has since resolved    She did a home COVID test yesterday was negative  This morning, she had minimal right eye discharge  She did not go to work yesterday and today  She did wear contact lenses today  Taking Advil as needed, denies any fever or chills  No known sick contacts  She complains of more frequent numbness on her right leg  She feels pain is about the same  She started taking all her vitamin supplements at night since she forgets during the day  She continues to do her monthly B12 injections  She is avoiding spicy food since it causes loose stools  Denies any abdominal pain or reflux symptoms  The following portions of the patient's history were reviewed and updated as appropriate: allergies, current medications, past medical history, past social history and problem list     Review of Systems   Constitutional: Negative for appetite change, fatigue and fever  HENT: Positive for sore throat  Negative for congestion, ear pain and postnasal drip  Eyes: Positive for discharge and redness  Negative for visual disturbance  Respiratory: Negative for cough and shortness of breath  Cardiovascular: Negative for chest pain  Gastrointestinal: Negative for abdominal pain, constipation and diarrhea  Genitourinary: Negative for dysuria and frequency  Musculoskeletal: Positive for arthralgias  Negative for joint swelling and myalgias  Skin: Negative for rash and wound  Neurological: Positive for numbness  Negative for dizziness and headaches  Psychiatric/Behavioral: Negative for confusion  The patient is not nervous/anxious  Objective:      /72   Pulse 78   Temp 97 6 °F (36 4 °C)   Ht 5' 7" (1 702 m)   Wt 59 kg (130 lb)   SpO2 100%   BMI 20 36 kg/m²          Physical Exam  Vitals and nursing note reviewed  Constitutional:       General: She is not in acute distress  Appearance: She is well-developed  HENT:      Head: Normocephalic and atraumatic     Eyes:      General: Lids are normal  Right eye: No discharge or hordeolum  Left eye: No discharge or hordeolum  Extraocular Movements: Extraocular movements intact  Conjunctiva/sclera:      Right eye: Right conjunctiva is injected  No exudate or hemorrhage  Left eye: Left conjunctiva is not injected  No exudate  Pupils: Pupils are equal, round, and reactive to light  Cardiovascular:      Rate and Rhythm: Normal rate and regular rhythm  Heart sounds: Normal heart sounds  Pulmonary:      Effort: Pulmonary effort is normal       Breath sounds: Normal breath sounds  No wheezing  Abdominal:      General: Bowel sounds are normal       Palpations: Abdomen is soft  Musculoskeletal:         General: No swelling  Right lower leg: No edema  Left lower leg: No edema  Skin:     General: Skin is warm  Findings: No rash  Neurological:      General: No focal deficit present  Mental Status: She is alert and oriented to person, place, and time  Psychiatric:         Mood and Affect: Mood and affect normal  Mood is not anxious or depressed  Behavior: Behavior normal            Lab results reviewed with patient

## 2022-04-21 ENCOUNTER — TELEPHONE (OUTPATIENT)
Dept: INTERNAL MEDICINE CLINIC | Facility: CLINIC | Age: 47
End: 2022-04-21

## 2022-04-21 LAB
FLUAV RNA RESP QL NAA+PROBE: NEGATIVE
FLUBV RNA RESP QL NAA+PROBE: NEGATIVE
SARS-COV-2 RNA RESP QL NAA+PROBE: NEGATIVE

## 2022-04-21 NOTE — TELEPHONE ENCOUNTER
You tested negative for COVID/flu  You may return to work if your eye is not bothering you anymore  Please get the COVID vaccine if you haven't done so already

## 2022-05-05 ENCOUNTER — CLINICAL SUPPORT (OUTPATIENT)
Dept: INTERNAL MEDICINE CLINIC | Facility: CLINIC | Age: 47
End: 2022-05-05
Payer: COMMERCIAL

## 2022-05-05 DIAGNOSIS — Z11.59 ENCOUNTER FOR SCREENING FOR OTHER VIRAL DISEASES: Primary | ICD-10-CM

## 2022-05-05 PROCEDURE — U0003 INFECTIOUS AGENT DETECTION BY NUCLEIC ACID (DNA OR RNA); SEVERE ACUTE RESPIRATORY SYNDROME CORONAVIRUS 2 (SARS-COV-2) (CORONAVIRUS DISEASE [COVID-19]), AMPLIFIED PROBE TECHNIQUE, MAKING USE OF HIGH THROUGHPUT TECHNOLOGIES AS DESCRIBED BY CMS-2020-01-R: HCPCS | Performed by: INTERNAL MEDICINE

## 2022-05-05 PROCEDURE — 99211 OFF/OP EST MAY X REQ PHY/QHP: CPT

## 2022-05-05 PROCEDURE — U0005 INFEC AGEN DETEC AMPLI PROBE: HCPCS | Performed by: INTERNAL MEDICINE

## 2022-05-06 LAB — SARS-COV-2 RNA RESP QL NAA+PROBE: NEGATIVE

## 2022-08-01 ENCOUNTER — HOSPITAL ENCOUNTER (OUTPATIENT)
Dept: RADIOLOGY | Facility: HOSPITAL | Age: 47
Discharge: HOME | End: 2022-08-01
Attending: PODIATRIST
Payer: COMMERCIAL

## 2022-08-01 ENCOUNTER — OFFICE VISIT (OUTPATIENT)
Dept: PODIATRY | Facility: CLINIC | Age: 47
End: 2022-08-01
Payer: COMMERCIAL

## 2022-08-01 VITALS
HEIGHT: 67 IN | WEIGHT: 136 LBS | BODY MASS INDEX: 21.35 KG/M2 | SYSTOLIC BLOOD PRESSURE: 118 MMHG | DIASTOLIC BLOOD PRESSURE: 68 MMHG | HEART RATE: 69 BPM | OXYGEN SATURATION: 98 %

## 2022-08-01 DIAGNOSIS — M21.42 PES PLANUS OF BOTH FEET: ICD-10-CM

## 2022-08-01 DIAGNOSIS — M21.41 PES PLANUS OF BOTH FEET: ICD-10-CM

## 2022-08-01 DIAGNOSIS — M79.671 RIGHT FOOT PAIN: ICD-10-CM

## 2022-08-01 DIAGNOSIS — M77.41 METATARSALGIA OF RIGHT FOOT: Primary | ICD-10-CM

## 2022-08-01 PROCEDURE — 73630 X-RAY EXAM OF FOOT: CPT

## 2022-08-01 PROCEDURE — 99243 OFF/OP CNSLTJ NEW/EST LOW 30: CPT | Performed by: PODIATRIST
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
## 2022-08-08 DIAGNOSIS — B96.89 BACTERIAL VAGINITIS: Primary | ICD-10-CM

## 2022-08-08 DIAGNOSIS — N76.0 BACTERIAL VAGINITIS: Primary | ICD-10-CM

## 2022-08-08 RX ORDER — METRONIDAZOLE 500 MG/1
500 TABLET ORAL EVERY 12 HOURS SCHEDULED
Qty: 14 TABLET | Refills: 0 | Status: SHIPPED | OUTPATIENT
Start: 2022-08-08 | End: 2022-08-15

## 2022-08-08 NOTE — TELEPHONE ENCOUNTER
Patient called and stated that she thinks she has a yeast infection and she wants medication called in  Patient stated that her symptoms started a week ago, vaginal itching, small amount of discharge, no change in color of discharge, abnormal odor, denies pain or discomfort     Using monistat but is not helping, used the cream

## 2022-08-08 NOTE — TELEPHONE ENCOUNTER
Reviewed with patient  States has discharge, slight fishy odor and irritation  Advised coconut oil for irritation and will send in abx  Patient aware to be aware of sweaty clothes or wet bathing suits as can contribute to reoccurrence  Patient states she has been in wet suits a lot lately  Will call for apt if reoccurs again

## 2022-09-02 ENCOUNTER — OFFICE VISIT (OUTPATIENT)
Dept: PODIATRY | Facility: CLINIC | Age: 47
End: 2022-09-02
Payer: COMMERCIAL

## 2022-09-02 VITALS
SYSTOLIC BLOOD PRESSURE: 120 MMHG | BODY MASS INDEX: 21.35 KG/M2 | HEIGHT: 67 IN | WEIGHT: 136 LBS | DIASTOLIC BLOOD PRESSURE: 78 MMHG | HEART RATE: 64 BPM | OXYGEN SATURATION: 100 %

## 2022-09-02 DIAGNOSIS — M77.41 METATARSALGIA OF RIGHT FOOT: Primary | ICD-10-CM

## 2022-09-02 DIAGNOSIS — M21.6X1 EQUINUS DEFORMITY OF RIGHT FOOT: ICD-10-CM

## 2022-09-02 PROCEDURE — 99212 OFFICE O/P EST SF 10 MIN: CPT | Performed by: PODIATRIST

## 2022-09-02 NOTE — PROGRESS NOTES
Assessment/Plan:     Her Powerstep insoles were modified with metatarsal pads bilaterally  Her posterior night splint was evaluated  She will continue use of the posterior night splint without the dorsiflexory wedge in the front of the device as tolerated  Continue alpha lipoic acid therapy  Encourage follow-up with orthopedics for her right knee pain  She will follow-up with me in 6-8 weeks  Diagnoses and all orders for this visit:    Metatarsalgia of right foot    Equinus deformity of right foot          Subjective:     Patient ID: Benny Melendez is a 56 y o  female  The patient presents today for follow-up of right foot pain  She was unable to use her posterior night splint as was very uncomfortable for her  She has brought with her today for my evaluation  She does note some improvement in her foot pain with use of the Powerstep insoles  She does note increasing right knee pain and is considering follow-up with orthopedics for this  Review of Systems   Constitutional: Negative for chills and fever  HENT: Negative for ear pain and sore throat  Eyes: Negative for pain and visual disturbance  Respiratory: Negative for cough and shortness of breath  Cardiovascular: Negative for chest pain and palpitations  Gastrointestinal: Negative for abdominal pain and vomiting  Genitourinary: Negative for dysuria and hematuria  Musculoskeletal: Negative for arthralgias and back pain  Skin: Negative for color change and rash  Neurological: Negative for seizures and syncope  All other systems reviewed and are negative  Objective:     Physical Exam  Constitutional:       Appearance: Normal appearance  HENT:      Head: Normocephalic and atraumatic  Nose: Nose normal    Cardiovascular:      Pulses:           Dorsalis pedis pulses are 2+ on the right side  Posterior tibial pulses are 2+ on the right side     Pulmonary:      Effort: Pulmonary effort is normal  Musculoskeletal:        Feet:    Feet:      Comments: There is equinus of the right lower extremity with decreased dorsiflexor range of motion of the right ankle; this contributes to increased plantar forefoot pressures resulting in her metatarsalgia; there is no erythema nor ecchymosis to the right forefoot, no edema no calor was noted  Skin:     General: Skin is warm  Capillary Refill: Capillary refill takes less than 2 seconds  Neurological:      General: No focal deficit present  Mental Status: She is alert and oriented to person, place, and time  Psychiatric:         Mood and Affect: Mood normal          Behavior: Behavior normal          Thought Content:  Thought content normal

## 2022-10-12 PROBLEM — Z01.419 ROUTINE GYNECOLOGICAL EXAMINATION: Status: RESOLVED | Noted: 2021-04-05 | Resolved: 2022-10-12

## 2022-11-04 ENCOUNTER — OFFICE VISIT (OUTPATIENT)
Dept: PODIATRY | Facility: CLINIC | Age: 47
End: 2022-11-04

## 2022-11-04 VITALS
SYSTOLIC BLOOD PRESSURE: 133 MMHG | DIASTOLIC BLOOD PRESSURE: 97 MMHG | HEIGHT: 67 IN | HEART RATE: 67 BPM | OXYGEN SATURATION: 99 % | WEIGHT: 145.2 LBS | BODY MASS INDEX: 22.79 KG/M2

## 2022-11-04 DIAGNOSIS — M77.42 METATARSALGIA OF BOTH FEET: ICD-10-CM

## 2022-11-04 DIAGNOSIS — M25.561 CHRONIC PAIN OF RIGHT KNEE: Primary | ICD-10-CM

## 2022-11-04 DIAGNOSIS — M77.41 METATARSALGIA OF BOTH FEET: ICD-10-CM

## 2022-11-04 DIAGNOSIS — G89.29 CHRONIC PAIN OF RIGHT KNEE: Primary | ICD-10-CM

## 2022-11-05 NOTE — PROGRESS NOTES
Assessment/Plan:     The patient's clinical examination is consistent with persistent bilateral forefoot metatarsalgia and possible neuritic pain bilaterally  Recommend continued use of arch supports with metatarsal pads for offloading of the forefoot bilaterally  The patient would like to follow-up with pain medicine which she had previously seen in the past   A referral was made with Dr Fide Gibbs, whom she had seen several years ago  She would also benefit from follow-up with Orthopedic surgery for re-evaluation of her painful right arthritic knee  At this point, the patient is unlikely to benefit from any further podiatric intervention  She will follow-up with me on as needed basis  Diagnoses and all orders for this visit:    Chronic pain of right knee  -     Ambulatory Referral to Pain Management; Future    Metatarsalgia of both feet          Subjective:     Patient ID: Denzel Lucero is a 56 y o  female  The patient presents today for follow-up of neuritic pain to the balls of both feet  She could not tolerate the metatarsal pads as she felt that they were to distal on her orthotic  She still notes persistent lower extremity pain more so on the right  She also notes significant right knee pain  She notes that her symptoms are largely unchanged since her last visit about 2 months ago  She states that she would like to be referred back to pain medicine  She is also considering re-consultation with Orthopedics right knee pain        PAST MEDICAL HISTORY:  Past Medical History:   Diagnosis Date   • Abnormal Pap smear of cervix    • Anemia    • Arthritis    • Depression    • Edema    • Fatigue    • Kidney stone    • Vitamin B12 deficiency    • Vitamin D deficiency        PAST SURGICAL HISTORY:  Past Surgical History:   Procedure Laterality Date   • BREAST BIOPSY  10/24/19   • ENDOMETRIAL ABLATION     • KNEE ARTHROSCOPY Bilateral    • KNEE SURGERY     • ORIF TIBIA FRACTURE Right 1/18/2018 Procedure: OPEN REDUCTION W/ INTERNAL FIXATION (ORIF) TIBIA;  Surgeon: Siddharth Ross MD;  [facility]: [facility];  Service: Orthopedics   • ORIF TIBIAL PLATEAU Right 4/72/3464    Procedure: OPEN REDUCTION W/ INTERNAL FIXATION (ORIF) TIBIAL PLATEAU;  Surgeon: Siddharth Ross MD;  Location[facility];  Service: Orthopedics   • W[place] LAP,CHOLECYSTECTOMY N/A 8/12/2021    Procedure: LAPAROSCOPIC CHOLECYSTECTOMY, UMBILICAL HERNIA REPAIR;  Surgeon: Lisa Martini MD;  Location[facility];  Service: General   • W[place] REMOVAL DEEP IMPLANT Right 8/2/2018    Procedure: REMOVAL HARDWARE TIBIA;  Surgeon: Siddharth Ross MD;  Location[facility];  Service: Orthopedics   • TUBAL LIGATION     • US GUIDED BREAST BIOPSY LEFT COMPLETE Left 10/24/2019        ALLERGIES:  Patient has no known allergies      MEDICATIONS:  Current Outpatient Medications   Medication Sig Dispense Refill   • acetaminophen (TYLENOL) 325 mg tablet 650 mg every 6 hours for mild pain 30 tablet 0   • amitriptyline (ELAVIL) 150 MG tablet Take 1 tablet (150 mg total) by mouth daily at bedtime 90 tablet 1   • busPIRone (BUSPAR) 5 mg tablet Take 1 tablet (5 mg total) by mouth 2 (two) times a day 60 tablet 5   • Cholecalciferol (Vitamin D3) 50 MCG (2000 UT) capsule Take 1 capsule (2,000 Units total) by mouth daily 90 capsule 1   • cyanocobalamin 1,000 mcg/mL Inject 1 ml IM weekly x 4 weeks then once a month 30 mL 0   • cyanocobalamin 1,000 mcg/mL Inject 1 mL (1,000 mcg total) into a muscle every 30 (thirty) days 10 mL 1   • Euflexxa 20 MG/2ML SOSY      • fluconazole (DIFLUCAN) 150 mg tablet TAKE 1 TABLET ONCE FOR 1 DOSE     • SYRINGE-NEEDLE, DISP, 3 ML 25G X 1" 3 ML MISC Use every 30 (thirty) days 50 each 0   • vitamin B-12 (VITAMIN B-12) 1,000 mcg tablet Take 1 tablet (1,000 mcg total) by mouth daily 90 tablet 1   • nabumetone (RELAFEN) 750 mg tablet Take 1 tablet (750 mg total) by mouth 2 (two) times a day 60 tablet 0   • oxyCODONE-acetaminophen (PERCOCET) 5-325 mg per tablet Take 1 tablet by mouth every 4 (four) hours as needed for moderate pain for up to 10 dosesMax Daily Amount: 6 tablets (Patient not taking: No sig reported) 10 tablet 0     No current facility-administered medications for this visit  SOCIAL HISTORY:  Social History     Socioeconomic History   • Marital status:      Spouse name: None   • Number of children: 1   • Years of education: None   • Highest education level: None   Occupational History   • Occupation: Teacher in [place]   Tobacco Use   • Smoking status: Never Smoker   • Smokeless tobacco: Never Used   Vaping Use   • Vaping Use: Never used   Substance and Sexual Activity   • Alcohol use: Yes     Alcohol/week: 0 0 standard drinks     Comment: Occassional   • Drug use: No   • Sexual activity: Yes     Partners: Male     Birth control/protection: Condom Male, None     Comment: Have had unprotected   Other Topics Concern   • None   Social History Narrative    Single    Daughter, grand daughter and son lives with her    Works part time- 3 days in the field, 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     Social Determinants of Health     Financial Resource Strain: Not on file   Food Insecurity: Not on file   Transportation Needs: Not on file   Physical Activity: Not on file   Stress: Not on file   Social Connections: Not on file   Intimate Partner Violence: Not on file   Housing Stability: Not on file        Review of Systems   Constitutional: Negative for chills and fever  HENT: Negative for ear pain and sore throat  Eyes: Negative for pain and visual disturbance  Respiratory: Negative for cough and shortness of breath  Cardiovascular: Negative for chest pain and palpitations  Gastrointestinal: Negative for abdominal pain and vomiting  Genitourinary: Negative for dysuria and hematuria  Musculoskeletal: Negative for arthralgias and back pain  Skin: Negative for color change and rash  Neurological: Negative for seizures and syncope     Psychiatric/Behavioral: Negative  All other systems reviewed and are negative  Objective:     Physical Exam  Constitutional:       Appearance: Normal appearance  HENT:      Head: Normocephalic and atraumatic  Nose: Nose normal    Cardiovascular:      Pulses:           Dorsalis pedis pulses are 2+ on the right side and 2+ on the left side  Posterior tibial pulses are 2+ on the right side and 2+ on the left side  Pulmonary:      Effort: Pulmonary effort is normal    Feet:      Comments: There is diffuse tenderness to palpation to the plantar aspect of the patient's forefoot bilaterally consistent with a forefoot neuritis and metatarsalgia; there is no erythema nor edema no calor nor ecchymosis noted to either foot; range of motion of the midfoot and hindfoot joints is grossly within normal limits; there is some decrease in dorsiflexor range of motion of the ankle joint consistent with a tight gastrocsoleus apparatus  Skin:     General: Skin is warm  Capillary Refill: Capillary refill takes less than 2 seconds  Neurological:      General: No focal deficit present  Mental Status: She is alert and oriented to person, place, and time  Psychiatric:         Mood and Affect: Mood normal          Behavior: Behavior normal          Thought Content:  Thought content normal

## 2022-12-10 ENCOUNTER — APPOINTMENT (EMERGENCY)
Dept: RADIOLOGY | Facility: HOSPITAL | Age: 47
End: 2022-12-10

## 2022-12-10 ENCOUNTER — HOSPITAL ENCOUNTER (EMERGENCY)
Facility: HOSPITAL | Age: 47
Discharge: HOME | End: 2022-12-10
Attending: EMERGENCY MEDICINE

## 2022-12-10 VITALS
OXYGEN SATURATION: 100 % | HEART RATE: 84 BPM | WEIGHT: 142 LBS | RESPIRATION RATE: 18 BRPM | SYSTOLIC BLOOD PRESSURE: 139 MMHG | TEMPERATURE: 98.3 F | DIASTOLIC BLOOD PRESSURE: 85 MMHG | BODY MASS INDEX: 22.29 KG/M2 | HEIGHT: 67 IN

## 2022-12-10 DIAGNOSIS — M25.562 ACUTE PAIN OF LEFT KNEE: Primary | ICD-10-CM

## 2022-12-10 RX ORDER — ACETAMINOPHEN 325 MG/1
650 TABLET ORAL ONCE
Status: COMPLETED | OUTPATIENT
Start: 2022-12-10 | End: 2022-12-10

## 2022-12-10 RX ORDER — IBUPROFEN 400 MG/1
400 TABLET ORAL ONCE
Status: COMPLETED | OUTPATIENT
Start: 2022-12-10 | End: 2022-12-10

## 2022-12-10 RX ORDER — NAPROXEN 250 MG/1
500 TABLET ORAL 2 TIMES DAILY WITH MEALS
Qty: 120 TABLET | Refills: 0 | Status: SHIPPED | OUTPATIENT
Start: 2022-12-10 | End: 2022-12-15

## 2022-12-10 RX ADMIN — IBUPROFEN 400 MG: 400 TABLET, FILM COATED ORAL at 03:02

## 2022-12-10 RX ADMIN — ACETAMINOPHEN 650 MG: 325 TABLET ORAL at 03:02

## 2022-12-10 NOTE — ED PROVIDER NOTES
History  Chief Complaint   Patient presents with   • Leg Pain     Patient reports falling up stairs at 1730 yesterday and bending left leg backwards  Reports lower leg pain  Denies taking any pain medication PTA       History provided by:  Patient   used: No    Leg Pain  Location:  Knee  Time since incident:  8 hours  Injury: yes    Mechanism of injury: fall    Fall:     Fall occurred:  Down stairs    Impact surface:  Hard floor    Point of impact:  Knees    Entrapped after fall: no    Knee location:  L knee  Pain details:     Quality:  Sharp    Radiates to:  Does not radiate    Severity:  Moderate    Onset quality:  Gradual    Timing:  Constant  Chronicity:  New  Dislocation: no    Foreign body present:  No foreign bodies  Prior injury to area:  No  Relieved by:  Nothing  Worsened by:  Nothing  Ineffective treatments:  None tried  Associated symptoms: no back pain, no decreased ROM, no muscle weakness, no neck pain, no numbness, no stiffness and no swelling        Prior to Admission Medications   Prescriptions Last Dose Informant Patient Reported? Taking?    Cholecalciferol (Vitamin D3) 50 MCG (2000 UT) capsule   No No   Sig: Take 1 capsule (2,000 Units total) by mouth daily   Euflexxa 20 MG/2ML SOSY   Yes No   SYRINGE-NEEDLE, DISP, 3 ML 25G X 1" 3 ML MISC   No No   Sig: Use every 30 (thirty) days   acetaminophen (TYLENOL) 325 mg tablet   No No   Si mg every 6 hours for mild pain   amitriptyline (ELAVIL) 150 MG tablet   No No   Sig: Take 1 tablet (150 mg total) by mouth daily at bedtime   busPIRone (BUSPAR) 5 mg tablet   No No   Sig: Take 1 tablet (5 mg total) by mouth 2 (two) times a day   cyanocobalamin 1,000 mcg/mL   No No   Sig: Inject 1 ml IM weekly x 4 weeks then once a month   cyanocobalamin 1,000 mcg/mL   No No   Sig: Inject 1 mL (1,000 mcg total) into a muscle every 30 (thirty) days   fluconazole (DIFLUCAN) 150 mg tablet   Yes No   Sig: TAKE 1 TABLET ONCE FOR 1 DOSE   nabumetone (RELAFEN) 750 mg tablet   No No   Sig: Take 1 tablet (750 mg total) by mouth 2 (two) times a day   oxyCODONE-acetaminophen (PERCOCET) 5-325 mg per tablet   No No   Sig: Take 1 tablet by mouth every 4 (four) hours as needed for moderate pain for up to 10 dosesMax Daily Amount: 6 tablets   Patient not taking: No sig reported   vitamin B-12 (VITAMIN B-12) 1,000 mcg tablet   No No   Sig: Take 1 tablet (1,000 mcg total) by mouth daily      Facility-Administered Medications: None       Past Medical History:   Diagnosis Date   • Abnormal Pap smear of cervix    • Anemia    • Arthritis    • Depression    • Edema    • Fatigue    • Kidney stone    • Vitamin B12 deficiency    • Vitamin D deficiency        Past Surgical History:   Procedure Laterality Date   • BREAST BIOPSY  10/24/19   • ENDOMETRIAL ABLATION     • KNEE ARTHROSCOPY Bilateral    • KNEE SURGERY     • ORIF TIBIA FRACTURE Right 1/18/2018    Procedure: OPEN REDUCTION W/ INTERNAL FIXATION (ORIF) TIBIA;  Surgeon: Ayaan Pacheco MD;  [facility];  Service: Orthopedics   • ORIF TIBIAL PLATEAU Right 8/72/7923    Procedure: OPEN REDUCTION W/ INTERNAL FIXATION (ORIF) TIBIAL PLATEAU;  Surgeon: Ayaan Pacheco MD;  [facility];  Service: Orthopedics   • MT LAP,CHOLECYSTECTOMY N/A 8/12/2021    Procedure: LAPAROSCOPIC CHOLECYSTECTOMY, UMBILICAL HERNIA REPAIR;  Surgeon: Shannan Henson MD;  [facility];  Service: General   • MT REMOVAL DEEP IMPLANT Right 8/2/2018    Procedure: REMOVAL HARDWARE TIBIA;  Surgeon: Ayaan Pacheco MD;  [facility];  Service: Orthopedics   • TUBAL LIGATION     • US GUIDED BREAST BIOPSY LEFT COMPLETE Left 10/24/2019       Family History   Problem Relation Age of Onset   • Cancer Mother         either cervical or ovarian   • Heart disease Mother         heart surgery   • Heart attack Father         stent   • Diabetes Father    • Hypertension Father    • Cancer Father 62        stomach cancer   • Arthritis Family    • Stomach cancer Family    • Ovarian cancer Family    • Rheum arthritis Daughter    • Lupus Maternal Grandmother    • Lupus Cousin    • No Known Problems Maternal Grandfather    • No Known Problems Paternal Grandmother    • No Known Problems Paternal Grandfather    • No Known Problems Daughter    • No Known Problems Maternal Aunt    • No Known Problems Maternal Aunt    • No Known Problems Maternal Aunt    • No Known Problems Paternal Aunt    • Alcohol abuse Neg Hx    • Depression Neg Hx    • Drug abuse Neg Hx    • Substance Abuse Neg Hx    • Mental illness Neg Hx      I have reviewed and agree with the history as documented  E-Cigarette/Vaping   • E-Cigarette Use Never User      E-Cigarette/Vaping Substances     Social History     Tobacco Use   • Smoking status: Never   • Smokeless tobacco: Never   Vaping Use   • Vaping Use: Never used   Substance Use Topics   • Alcohol use: Yes     Alcohol/week: 0 0 standard drinks     Comment: Occassional   • Drug use: No       Review of Systems   Respiratory: Negative for shortness of breath  Cardiovascular: Negative for chest pain  Gastrointestinal: Negative for abdominal pain  Genitourinary: Negative for flank pain  Musculoskeletal: Negative for back pain, neck pain and stiffness  Neurological: Negative for headaches  All other systems reviewed and are negative  Physical Exam  Physical Exam  Vitals and nursing note reviewed  Constitutional:       General: She is not in acute distress  Appearance: She is well-developed  HENT:      Head: Normocephalic and atraumatic  Mouth/Throat:      Mouth: Mucous membranes are moist    Eyes:      Conjunctiva/sclera: Conjunctivae normal    Cardiovascular:      Rate and Rhythm: Normal rate and regular rhythm  Heart sounds: No murmur heard  Pulmonary:      Effort: Pulmonary effort is normal  No respiratory distress  Breath sounds: Normal breath sounds  Abdominal:      Palpations: Abdomen is soft  Tenderness: There is no abdominal tenderness  Musculoskeletal:         General: No swelling  Cervical back: Neck supple  Right knee: Normal       Left knee: Bony tenderness present  No swelling, deformity, effusion or ecchymosis  Tenderness present over the patellar tendon  No medial joint line or lateral joint line tenderness  No LCL laxity, MCL laxity, ACL laxity or PCL laxity  Normal meniscus and normal patellar mobility  Normal pulse  Skin:     General: Skin is warm and dry  Capillary Refill: Capillary refill takes less than 2 seconds  Neurological:      General: No focal deficit present  Mental Status: She is alert and oriented to person, place, and time  Mental status is at baseline  Sensory: No sensory deficit  Motor: No weakness     Psychiatric:         Mood and Affect: Mood normal          Vital Signs  ED Triage Vitals [12/10/22 0145]   Temperature Pulse Respirations Blood Pressure SpO2   98 3 °F (36 8 °C) 84 18 139/85 100 %      Temp Source Heart Rate Source Patient Position - Orthostatic VS BP Location FiO2 (%)   Oral -- Sitting Left arm --      Pain Score       8           Vitals:    12/10/22 0145   BP: 139/85   Pulse: 84   Patient Position - Orthostatic VS: Sitting         Visual Acuity      ED Medications  Medications   ibuprofen (MOTRIN) tablet 400 mg (has no administration in time range)   acetaminophen (TYLENOL) tablet 650 mg (has no administration in time range)       Diagnostic Studies  Results Reviewed     None                 XR knee 4+ views LEFT    (Results Pending)              Procedures  Procedures         ED Course                                             MDM  Number of Diagnoses or Management Options     Amount and/or Complexity of Data Reviewed  Tests in the radiology section of CPT®: ordered and reviewed  Review and summarize past medical records: yes    Risk of Complications, Morbidity, and/or Mortality  Presenting problems: moderate  Diagnostic procedures: low  Management options: low  General comments: This is a 50-year-old female who presents to the emergency department with acute onset of left knee pain after a fall on some stairs  Vital signs are within normal limits  Knee is examined through full range of motion and demonstrates no laxity or obvious acute ligamentous injury  Has a bounding popliteal posterior tibialis and dorsalis pedis pulse and she is currently not showing signs of acute vascular injury  She does not have a large effusion or laceration to the skin  She does have some significant pain on movement yet her x-rays do not show any acute dislocation or fracture  Plan is to place her in a knee immobilizer, provide her with anti-inflammatory medications and supportive care  Patient states that she has an orthopedist that she uses for care and is instructed to follow-up with him/her in about a week for symptoms have not improved  She is also told to return to the emergency department anytime for any new or worsening issues  All questions were answered and patient states that she understands and agrees with plan prior to discharge  Patient Progress  Patient progress: improved      Disposition  Final diagnoses:   Acute pain of left knee     Time reflects when diagnosis was documented in both MDM as applicable and the Disposition within this note     Time User Action Codes Description Comment    12/10/2022  2:47 AM Catrachito Simpson Add [R16 025] Acute pain of left knee       ED Disposition     ED Disposition   Discharge    Condition   Stable    Date/Time   Sat Dec 10, 2022  2:47 AM    Jazmyne Pearl discharge to home/self care                 Follow-up Information     Follow up With Specialties Details Why Contact Info    your orthopedic surgeon  Call in 3 days            Patient's Medications   Discharge Prescriptions    NAPROXEN (NAPROSYN) 250 MG TABLET    Take 2 tablets (500 mg total) by mouth 2 (two) times a day with meals       Start Date: 12/10/2022End Date: 1/9/2023       Order Dose: 500 mg       Quantity: 120 tablet    Refills: 0       No discharge procedures on file      PDMP Review     None          ED Provider  Electronically Signed by           Xin Cornelius MD  12/10/22 0900

## 2022-12-10 NOTE — DISCHARGE INSTRUCTIONS
Take medications as directed only  Follow up with ortho as discussed  Return to the ED at any time for any new or worsening issues

## 2022-12-15 ENCOUNTER — TELEPHONE (OUTPATIENT)
Dept: PSYCHIATRY | Facility: CLINIC | Age: 47
End: 2022-12-15

## 2022-12-15 ENCOUNTER — CONSULT (OUTPATIENT)
Dept: PAIN MEDICINE | Facility: CLINIC | Age: 47
End: 2022-12-15

## 2022-12-15 VITALS
WEIGHT: 150 LBS | HEART RATE: 76 BPM | DIASTOLIC BLOOD PRESSURE: 93 MMHG | SYSTOLIC BLOOD PRESSURE: 134 MMHG | BODY MASS INDEX: 23.49 KG/M2

## 2022-12-15 DIAGNOSIS — Z87.81 S/P ORIF (OPEN REDUCTION INTERNAL FIXATION) FRACTURE: ICD-10-CM

## 2022-12-15 DIAGNOSIS — G89.29 CHRONIC PAIN OF RIGHT KNEE: ICD-10-CM

## 2022-12-15 DIAGNOSIS — M54.16 RIGHT LUMBAR RADICULOPATHY: ICD-10-CM

## 2022-12-15 DIAGNOSIS — M22.2X1 PATELLOFEMORAL DISORDER OF RIGHT KNEE: ICD-10-CM

## 2022-12-15 DIAGNOSIS — M25.561 CHRONIC PAIN OF RIGHT KNEE: ICD-10-CM

## 2022-12-15 DIAGNOSIS — M17.31 POST-TRAUMATIC OSTEOARTHRITIS OF RIGHT KNEE: ICD-10-CM

## 2022-12-15 DIAGNOSIS — Z98.890 S/P ORIF (OPEN REDUCTION INTERNAL FIXATION) FRACTURE: ICD-10-CM

## 2022-12-15 DIAGNOSIS — G89.4 CHRONIC PAIN SYNDROME: Primary | ICD-10-CM

## 2022-12-15 RX ORDER — DICLOFENAC SODIUM 75 MG/1
75 TABLET, DELAYED RELEASE ORAL 2 TIMES DAILY
Qty: 60 TABLET | Refills: 1 | Status: SHIPPED | OUTPATIENT
Start: 2022-12-15

## 2022-12-15 NOTE — TELEPHONE ENCOUNTER
Called patient regarding referral  She is needing a one time eval for a surgery [facility]  Sending [employer] to Shelly beth

## 2022-12-15 NOTE — PROGRESS NOTES
Assessment  1  Chronic pain syndrome    2  Chronic pain of right knee    3  Right lumbar radiculopathy    4  Patellofemoral disorder of right knee    5  Post-traumatic osteoarthritis of right knee    6  S/P ORIF (open reduction internal fixation) fracture        Plan  Ms Krish Ignacio is a pleasant 71-year-old female who presents for evaluation regarding chronic right lower extremity pain with worsening back pain, leg swelling, decreased range of motion and difficulty performing activities of daily living and worsening quality of life  She is exhausted conservative and interventional procedures including steroid injections, physical therapy, home exercises and medication management  At this time    I do believe the patient may be a good candidate for a spinal cord stimulator for chronic pain  I reviewed the nature of the neurostimulation in detail, discussed the role of the trial as well as the permanent implantation  The technology as well as the approach was demonstrated using models and additional literature was provided  Plan:  #1 we will obtain the requisite psychological authorization/clearance to rule out any significant psychological comorbidity that would prevent the patient from benefiting  #2 [address] STENOS[address] THAT WOULD PREVENT LEAD PASSAGE AND TO RULE OUT PATHOLOGY THAT [facility]  #3 we will make arrangements for the patient to participate in an patient education session as well  Complete risks and benefits including bleeding, infection, tissue reaction, allergic reaction, nerve injury, paralysis, CSF leak/spinal headache were reviewed  My impressions and treatment recommendations were discussed in detail with the patient who verbalized understanding and had no further questions  Discharge instructions were provided   I personally saw and examined the patient and I agree with the above discussed plan of care     Orders Placed This Encounter   Procedures   • MRI thoracic spine without contrast     Standing Status:   Future     Standing Expiration Date:   12/15/2026     Scheduling Instructions: There is no preparation for this test  Please leave your jewelry and valuables at home, wedding rings are the exception  All patients will be required to change into a hospital gown and pants  Street clothes are not permitted in the MRI  Magnetic nail polish must be removed prior to arrival for your test  Please bring your insurance cards, a form of photo ID and a list of your medications with you  Arrive 15 minutes prior to your appointment time in order to register  Please bring any prior CT or MRI studies of this area that were not performed at a [facility]  To schedule this appointment, please contact Central Scheduling at [phone]  Prior to your appointment, please make sure you complete the MRI Screening Form when you e-Check in for your appointment  This will be available starting 7 days before your appointment in [address]  You may receive an e-mail with an activation code if you do not have a [employer] account  If you do not have access to a device, we will complete your screening at your appointment  Order Specific Question:   What is the patient's sedation requirement? Answer:   No Sedation     Order Specific Question:   Is the patient pregnant? Answer:   No     Order Specific Question:   Release to patient through [employer]     Answer:   Immediate     Order Specific Question:   Is order priority selected as STAT?      Answer:   No     Order Specific Question:   Reason for Exam (FREE TEXT)     Answer:   Plan for spinal cord stim   • Ambulatory Referral to Psychiatry     Standing Status:   Future     Standing Expiration Date:   12/15/2023     Referral Priority:   Routine     Referral Type:   Consult - AMB     Referral Reason:   Specialty Services Required     Requested Specialty:   Psychiatry     Number of Visits Requested:   1     Expiration Date:   12/15/2023     New Medications Ordered This Visit   Medications   • diclofenac (VOLTAREN) 75 mg EC tablet     Sig: Take 1 tablet (75 mg total) by mouth 2 (two) times a day     Dispense:  60 tablet     Refill:  1       History of Present Illness    Arturo Campos is a 56 y o  female presents [facility] for evaluation regarding ongoing chronic right knee pain with worsening back pain and radiating symptoms into the foot  She was last seen greater than 3 years ago regarding the right tibial plateau and tibial shaft fracture status post surgical repair pain  Today patient reports 7 out of 10 pain that is described as a constant throbbing, shooting, aching, stabbing sensation that is present throughout the day and night  Presents for evaluation    I have personally reviewed and/or updated the patient's past medical history, past surgical history, family history, social history, current medications, allergies, and vital signs today  Review of Systems   Constitutional: Negative for fever and unexpected weight change  HENT: Negative for trouble swallowing  Eyes: Negative for visual disturbance  Respiratory: Negative for shortness of breath and wheezing  Cardiovascular: Negative for chest pain and palpitations  Gastrointestinal: Negative for constipation, diarrhea, nausea and vomiting  Endocrine: Negative for cold intolerance, heat intolerance and polydipsia  Genitourinary: Negative for difficulty urinating and frequency  Musculoskeletal: Positive for back pain, gait problem and joint swelling  Negative for arthralgias and myalgias  Skin: Negative for rash  Neurological: Positive for weakness and numbness  Negative for dizziness, seizures, syncope and headaches  Hematological: Does not bruise/bleed easily  Psychiatric/Behavioral: Negative for dysphoric mood     All other systems reviewed and are negative        Patient Active Problem List   Diagnosis   • Ventricular bigeminy   • Vitamin B12 deficiency   • Vitamin D deficiency   • Asymptomatic PVCs   • S/P ORIF (open reduction internal fixation) fracture   • Patellofemoral disorder of right knee   • Moderate episode of recurrent major depressive disorder (HCC)   • Post-traumatic osteoarthritis of right knee   • Right lumbar radiculopathy   • Arthritis of right knee   • Anxiety   • Human papilloma virus infection   • Papanicolaou smear of cervix with atypical squamous cells cannot exclude high grade squamous intraepithelial lesion (ASC-H)   • Well woman exam   • S/P tubal ligation   • Gastric polyps   • Microscopic hematuria   • Menstrual periods irregular       Past Medical History:   Diagnosis Date   • Abnormal Pap smear of cervix    • Anemia    • Arthritis    • Depression    • Edema    • Fatigue    • Kidney stone    • Vitamin B12 deficiency    • Vitamin D deficiency        Past Surgical History:   Procedure Laterality Date   • BREAST BIOPSY  10/24/19   • ENDOMETRIAL ABLATION     • KNEE ARTHROSCOPY Bilateral    • KNEE SURGERY     • ORIF TIBIA FRACTURE Right 1/18/2018    Procedure: OPEN REDUCTION W/ INTERNAL FIXATION (ORIF) TIBIA;  Surgeon: Moses Ferrer MD;  [facility];  Service: Orthopedics   • ORIF TIBIAL PLATEAU Right 5/82/2302    Procedure: OPEN REDUCTION W/ INTERNAL FIXATION (ORIF) TIBIAL PLATEAU;  Surgeon: Moses Ferrer MD;  [facility];  Service: Orthopedics   • FL LAP,CHOLECYSTECTOMY N/A 8/12/2021    Procedure: LAPAROSCOPIC CHOLECYSTECTOMY, UMBILICAL HERNIA REPAIR;  Surgeon: Natacha Garcia MD;  [facility];  Service: General   • FL REMOVAL DEEP IMPLANT Right 8/2/2018    Procedure: REMOVAL HARDWARE TIBIA;  Surgeon: Moses Ferrer MD;  [facility];  Service: Orthopedics   • TUBAL LIGATION     • US GUIDED BREAST BIOPSY LEFT COMPLETE Left 10/24/2019       Family History   Problem Relation Age of Onset   • Cancer Mother         either cervical or ovarian   • Heart disease Mother         heart surgery   • Heart attack Father         stent   • Diabetes Father    • Hypertension Father    • Cancer Father 62        stomach cancer   • Arthritis Family    • Stomach cancer Family    • Ovarian cancer Family    • Rheum arthritis Daughter    • Lupus Maternal Grandmother    • Lupus Cousin    • No Known Problems Maternal Grandfather    • No Known Problems Paternal Grandmother    • No Known Problems Paternal Grandfather    • No Known Problems Daughter    • No Known Problems Maternal Aunt    • No Known Problems Maternal Aunt    • No Known Problems Maternal Aunt    • No Known Problems Paternal Aunt    • Alcohol abuse Neg Hx    • Depression Neg Hx    • Drug abuse Neg Hx    • Substance Abuse Neg Hx    • Mental illness Neg Hx        Social History     Occupational History   • Occupation: Teacher in [place]   Tobacco Use   • Smoking status: Never   • Smokeless tobacco: Never   Vaping Use   • Vaping Use: Never used   Substance and Sexual Activity   • Alcohol use:  Yes     Alcohol/week: 0 0 standard drinks     Comment: Occassional   • Drug use: No   • Sexual activity: Yes     Partners: Male     Birth control/protection: Condom Male, None     Comment: Have had unprotected       Current Outpatient Medications on File Prior to Visit   Medication Sig   • acetaminophen (TYLENOL) 325 mg tablet 650 mg every 6 hours for mild pain   • amitriptyline (ELAVIL) 150 MG tablet Take 1 tablet (150 mg total) by mouth daily at bedtime   • busPIRone (BUSPAR) 5 mg tablet Take 1 tablet (5 mg total) by mouth 2 (two) times a day   • Cholecalciferol (Vitamin D3) 50 MCG (2000 UT) capsule Take 1 capsule (2,000 Units total) by mouth daily   • cyanocobalamin 1,000 mcg/mL Inject 1 ml IM weekly x 4 weeks then once a month   • cyanocobalamin 1,000 mcg/mL Inject 1 mL (1,000 mcg total) into a muscle every 30 (thirty) days   • Euflexxa 20 MG/2ML SOSY    • fluconazole (DIFLUCAN) 150 mg tablet TAKE 1 TABLET ONCE FOR 1 DOSE   • SYRINGE-NEEDLE, DISP, 3 ML 25G X 1" 3 ML MISC Use every 30 (thirty) days   • vitamin B-12 (VITAMIN B-12) 1,000 mcg tablet Take 1 tablet (1,000 mcg total) by mouth daily   • [DISCONTINUED] nabumetone (RELAFEN) 750 mg tablet Take 1 tablet (750 mg total) by mouth 2 (two) times a day   • [DISCONTINUED] naproxen (Naprosyn) 250 mg tablet Take 2 tablets (500 mg total) by mouth 2 (two) times a day with meals   • [DISCONTINUED] oxyCODONE-acetaminophen (PERCOCET) 5-325 mg per tablet Take 1 tablet by mouth every 4 (four) hours as needed for moderate pain for up to 10 dosesMax Daily Amount: 6 tablets (Patient not taking: No sig reported)     No current facility-administered medications on file prior to visit  No Known Allergies    Physical Exam    /93   Pulse 76   Wt 68 kg (150 lb)   LMP 12/04/2022 (Approximate)   BMI 23 49 kg/m²     Constitutional: normal, well developed, well nourished, alert, in no distress and non-toxic and no overt pain behavior    Eyes: anicteric  HEENT: grossly intact  Neck: supple, symmetric, trachea midline and no masses   Pulmonary:even and unlabored  Cardiovascular:No edema or pitting edema present  Skin:Normal without rashes or lesions and well hydrated  Psychiatric:Mood and affect appropriate  Neurologic:Cranial Nerves II-XII grossly intact  Musculoskeletal:antalgic, tenderness to palpation right side lumbar paraspinals, right medial and lateral joint line knee, visible swelling suprapatellar, decreased active and passive range of motion in full flexion and extension of right knee and ankle dorsiflexion, visible incisional scars clean dry and intact, MMT 5-5 bilateral lower extremities, hyperesthesia right knee    Imaging

## 2022-12-23 ENCOUNTER — HOSPITAL ENCOUNTER (OUTPATIENT)
Dept: RADIOLOGY | Age: 47
Discharge: HOME | End: 2022-12-23

## 2022-12-23 VITALS — BODY MASS INDEX: 23.54 KG/M2 | HEIGHT: 67 IN | WEIGHT: 150 LBS

## 2022-12-23 DIAGNOSIS — Z12.31 ENCOUNTER FOR SCREENING MAMMOGRAM FOR MALIGNANT NEOPLASM OF BREAST: ICD-10-CM

## 2023-01-01 ENCOUNTER — TELEPHONE (OUTPATIENT)
Dept: INTERNAL MEDICINE CLINIC | Facility: CLINIC | Age: 48
End: 2023-01-01

## 2023-01-01 DIAGNOSIS — R92.8 ABNORMAL MAMMOGRAM OF LEFT BREAST: Primary | ICD-10-CM

## 2023-01-04 ENCOUNTER — HOSPITAL ENCOUNTER (OUTPATIENT)
Dept: ULTRASOUND IMAGING | Facility: CLINIC | Age: 48
Discharge: HOME | End: 2023-01-04

## 2023-01-04 DIAGNOSIS — R92.8 ABNORMAL MAMMOGRAM OF LEFT BREAST: ICD-10-CM

## 2023-01-05 ENCOUNTER — HOSPITAL ENCOUNTER (OUTPATIENT)
Dept: MRI IMAGING | Facility: HOSPITAL | Age: 48
Discharge: HOME | End: 2023-01-05
Attending: PHYSICAL MEDICINE & REHABILITATION

## 2023-01-05 DIAGNOSIS — M54.16 RIGHT LUMBAR RADICULOPATHY: ICD-10-CM

## 2023-01-05 DIAGNOSIS — Z98.890 S/P ORIF (OPEN REDUCTION INTERNAL FIXATION) FRACTURE: ICD-10-CM

## 2023-01-05 DIAGNOSIS — Z87.81 S/P ORIF (OPEN REDUCTION INTERNAL FIXATION) FRACTURE: ICD-10-CM

## 2023-01-05 DIAGNOSIS — M17.31 POST-TRAUMATIC OSTEOARTHRITIS OF RIGHT KNEE: ICD-10-CM

## 2023-01-05 DIAGNOSIS — G89.4 CHRONIC PAIN SYNDROME: ICD-10-CM

## 2023-01-05 DIAGNOSIS — G89.29 CHRONIC PAIN OF RIGHT KNEE: ICD-10-CM

## 2023-01-05 DIAGNOSIS — M22.2X1 PATELLOFEMORAL DISORDER OF RIGHT KNEE: ICD-10-CM

## 2023-01-05 DIAGNOSIS — M25.561 CHRONIC PAIN OF RIGHT KNEE: ICD-10-CM

## 2023-01-24 ENCOUNTER — TELEPHONE (OUTPATIENT)
Dept: PAIN MEDICINE | Facility: CLINIC | Age: 48
End: 2023-01-24

## 2023-01-24 NOTE — TELEPHONE ENCOUNTER
----- Message from Van Obrien DO sent at 1/9/2023 10:14 AM EST -----  Normal MRI thoracic spine okay to proceed with stim plans  Thank you  ----- Message -----  From: Navdeep, Radiology Results In  Sent: 1/8/2023   9:34 AM EST  To: Van Obrien DO

## 2023-02-03 ENCOUNTER — TELEMEDICINE (OUTPATIENT)
Dept: BEHAVIORAL/MENTAL HEALTH CLINIC | Facility: CLINIC | Age: 48
End: 2023-02-03

## 2023-02-03 DIAGNOSIS — F33.1 MODERATE EPISODE OF RECURRENT MAJOR DEPRESSIVE DISORDER (HCC): Primary | ICD-10-CM
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
## 2023-02-03 NOTE — PATIENT INSTRUCTIONS
Continue to take all medications as prescribed  Attend all scheduled medical appointments    Follow up with therapist     If you are experiencing a mental health emergency, please call 911 for emergent care, or one of the following numbers for someone to talk to 24 hours a day, 7 days a week:  [facility] Intervention:  [address] Crisis Intervention: [address] [facility] Crisis Intervention: [phone]  [address]:  Text [place] to [phone]  [place]'s support and referral hotline: 127.580.4088  Suicide Prevention Lifeline:  714.733.7132

## 2023-02-10 ENCOUNTER — TELEMEDICINE (OUTPATIENT)
Dept: BEHAVIORAL/MENTAL HEALTH CLINIC | Facility: CLINIC | Age: 48
End: 2023-02-10

## 2023-02-10 DIAGNOSIS — F33.1 MODERATE EPISODE OF RECURRENT MAJOR DEPRESSIVE DISORDER (HCC): Primary | ICD-10-CM
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
## 2023-02-10 NOTE — PATIENT INSTRUCTIONS
Continue to take all medications as prescribed  Attend all scheduled medical appointments    Follow up with therapist     If you are experiencing a mental health emergency, please call 911 for emergent care, or one of the following numbers for someone to talk to 24 hours a day, 7 days a week:  [facility] Intervention:  [address] Crisis Intervention: [address] [facility] Crisis Intervention: [phone]  [address]:  Text [place] to 792864  [place]'s support and referral hotline: 352.523.7303  Suicide Prevention Lifeline:  298.545.6014

## 2023-02-16 ENCOUNTER — TRANSCRIBE ORDERS (OUTPATIENT)
Dept: PAIN MEDICINE | Facility: CLINIC | Age: 48
End: 2023-02-16

## 2023-02-27 ENCOUNTER — TELEMEDICINE (OUTPATIENT)
Dept: BEHAVIORAL/MENTAL HEALTH CLINIC | Facility: CLINIC | Age: 48
End: 2023-02-27

## 2023-02-27 DIAGNOSIS — F33.1 MODERATE EPISODE OF RECURRENT MAJOR DEPRESSIVE DISORDER (HCC): Primary | ICD-10-CM

## 2023-02-27 NOTE — PSYCH
Virtual Regular Visit    Verification of patient location:    Patient is located in the following state in which I hold an active license [place]      Assessment/Plan:    Problem List Items Addressed This Visit        Other    Moderate episode of recurrent major depressive disorder ([facility] [address] ) - Primary          Reason for visit is   Chief Complaint   Patient presents with   • Virtual Regular Visit        Encounter provider Brendon Pfeiffer    Provider located at [address] [facility]  [address] [facility] [place] [address]  [place] [facility] [address]  [phone]      Recent Visits  No visits were found meeting these conditions  Showing recent visits within past 7 days and meeting all other requirements  Today's Visits  Date Type Provider Dept   02/27/23 Telemedicine Brendon Pfeiffer [facility] [place]   Showing today's visits and meeting all other requirements  Future Appointments  No visits were found meeting these conditions  Showing future appointments within next 150 days and meeting all other requirements       The patient was identified by name and date of birth  Ivett Baron was informed that this is a telemedicine visit and that the visit is being conducted throughthe [employer] platform  She agrees to proceed  My office door was closed  No one else was in the room  She acknowledged consent and understanding of privacy and security of the video platform  The patient has agreed to participate and understands they can discontinue the visit at any time  Patient is aware this is a billable service       Past Medical History:   Diagnosis Date   • Abnormal Pap smear of cervix    • Anemia    • Arthritis    • Depression    • Edema    • Fatigue    • Kidney stone    • Vitamin B12 deficiency    • Vitamin D deficiency        Past Surgical History:   Procedure Laterality Date   • BREAST BIOPSY  10/24/19   • ENDOMETRIAL ABLATION     • KNEE ARTHROSCOPY Bilateral    • KNEE SURGERY     • ORIF TIBIA FRACTURE Right 1/18/2018    Procedure: OPEN REDUCTION W/ INTERNAL FIXATION (ORIF) TIBIA;  Surgeon: Hemalatha Baker MD;  [facility];  Service: Orthopedics   • ORIF TIBIAL PLATEAU Right 8/96/7600    Procedure: OPEN REDUCTION W/ INTERNAL FIXATION (ORIF) TIBIAL PLATEAU;  Surgeon: Hemalatha Baker MD;  [facility];  Service: Orthopedics   • UT LAPAROSCOPY SURG CHOLECYSTECTOMY N/A 8/12/2021    Procedure: LAPAROSCOPIC CHOLECYSTECTOMY, UMBILICAL HERNIA REPAIR;  Surgeon: Yesica Coffman MD;  Location: [facility] Main OR;  Service: General   • UT REMOVAL IMPLANT DEEP Right 8/2/2018    Procedure: REMOVAL HARDWARE TIBIA;  Surgeon: Hemalatha Baker MD;  [facility];  Service: Orthopedics   • TUBAL LIGATION     • US GUIDED BREAST BIOPSY LEFT COMPLETE Left 10/24/2019       Current Outpatient Medications   Medication Sig Dispense Refill   • acetaminophen (TYLENOL) 325 mg tablet 650 mg every 6 hours for mild pain 30 tablet 0   • amitriptyline (ELAVIL) 150 MG tablet Take 1 tablet (150 mg total) by mouth daily at bedtime 90 tablet 0   • busPIRone (BUSPAR) 5 mg tablet Take 1 tablet (5 mg total) by mouth 2 (two) times a day 60 tablet 5   • Cholecalciferol (Vitamin D3) 50 MCG (2000 UT) capsule Take 1 capsule (2,000 Units total) by mouth daily 90 capsule 0   • cyanocobalamin 1,000 mcg/mL Inject 1 ml IM weekly x 4 weeks then once a month 30 mL 0   • cyanocobalamin 1,000 mcg/mL Inject 1 mL (1,000 mcg total) into a muscle every 30 (thirty) days 10 mL 0   • diclofenac (VOLTAREN) 75 mg EC tablet Take 1 tablet (75 mg total) by mouth 2 (two) times a day 60 tablet 1   • Euflexxa 20 MG/2ML SOSY      • fluconazole (DIFLUCAN) 150 mg tablet TAKE 1 TABLET ONCE FOR 1 DOSE     • SYRINGE-NEEDLE, DISP, 3 ML 25G X 1" 3 ML MISC Use every 30 (thirty) days 50 each 0   • vitamin B-12 (VITAMIN B-12) 1,000 mcg tablet Take 1 tablet (1,000 mcg total) by mouth daily 90 tablet 0     No current facility-administered medications for this visit  Behavioral Health Psychotherapy Progress Note    Psychotherapy Provided: Individual Psychotherapy     1  Moderate episode of recurrent major depressive disorder (HCC)              DATA: All Ureña is a 56year old female presenting to this follow up session for the supportive treatment of depression  Pt was originally seen for scs implant eval     Stressed relationship with MO, this is historically true as well  Her mo does not speak to pt's older dtr  She describes her Mo as manipulative and entitled  She has one bro who is 4 years younger than pt and lives in [place]  She blames the distance with her bro on her Mo for driving a wedge between them  Her parents recently moved to [facility], they previously lived "around the corner "  She relates this has improved their relationship  Pt shared that her MO struggles with control issues  Pt shared that her oldest dtr has conflicts with her MO and pt gets "drug in the middle " She shared that her mo gives her gifts and then expects loyalty and obedience in return  Reviewed hx of depression that began when she was 6years old ad mo made her participate in beauty pageants  She did not like the spot light and Mo was not supportive of her leaving pageant scene  Pt became more quiet, isolative and in the back ground  She is still that way now  She recalls hiding and pretending to run away  Discussed relationship between depression and pain  Pt does acknowledge some relationship between mood and pain  Began education re mind body connection and CBT/mindfulness  During this session, this clinician used the following therapeutic modalities: CBT and supportive psychotherapy  Substance Abuse was not addressed during this session   If the client is diagnosed with a co-occurring substance use disorder, please indicate any changes in the frequency or amount of use: NA  Stage of change for addressing substance use diagnoses: No substance use/Not applicable    ASSESSMENT:  Carleen Erickson presents with a Depressed mood  her affect is Blunted, which is congruent, with her mood and the content of the session  The client has not made progress on their goals  Carleen Erickson presents with a minimal risk of suicide, minimal risk of self-harm, and minimal risk of harm to others  For any risk assessment that surpasses a "low" rating, a safety plan must be developed  A safety plan was indicated: no  If yes, describe in detail NA    PLAN: Between sessions, Carleen Erickson will begin daily mindfuness practce  At the next session, the therapist will use Cognitive Behavioral Therapy, Mindfulness-based Strategies and Supportive Psychotherapy to address depression        Visit start and stop times:    02/27/23  Start Time: 0800  Stop Time: 3002  Total Visit Time: 47 minutes

## 2023-02-27 NOTE — PATIENT INSTRUCTIONS
Continue to take all medications as prescribed  Attend all scheduled medical appointments    Follow up with therapist     If you are experiencing a mental health emergency, please call 911 for emergent care, or one of the following numbers for someone to talk to 24 hours a day, 7 days a week:  [facility] Intervention:  [address] Crisis Intervention: [address] [facility] Crisis Intervention: [phone]  [address]:  Text [place] to 558237  [place]'s support and referral hotline: 905.712.5637  Suicide Prevention Lifeline:  601.774.6337

## 2023-03-13 ENCOUNTER — TELEMEDICINE (OUTPATIENT)
Dept: BEHAVIORAL/MENTAL HEALTH CLINIC | Facility: CLINIC | Age: 48
End: 2023-03-13

## 2023-03-13 DIAGNOSIS — F33.1 MODERATE EPISODE OF RECURRENT MAJOR DEPRESSIVE DISORDER (HCC): Primary | ICD-10-CM

## 2023-03-13 NOTE — PATIENT INSTRUCTIONS
Continue to take all medications as prescribed  Attend all scheduled medical appointments    Follow up with therapist     If you are experiencing a mental health emergency, please call 911 for emergent care, or one of the following numbers for someone to talk to 24 hours a day, 7 days a week:  [facility] Intervention:  [address] Crisis Intervention: [address] [facility] Crisis Intervention: 741.590.4908  [address]:  Text [place] to [phone]  [place]'s support and referral hotline: 694.519.4590  Suicide Prevention Lifeline:  577.391.5256

## 2023-03-13 NOTE — PSYCH
Virtual Regular Visit    Verification of patient location:    Patient is located in the following state in which I hold an active license [place]      Assessment/Plan:    Problem List Items Addressed This Visit        Other    Moderate episode of recurrent major depressive disorder ([facility] [address] ) - Primary          Reason for visit is   Chief Complaint   Patient presents with   • Virtual Regular Visit        Encounter provider Daryl Herrera    Provider located at [address] [facility]  [address] [facility] [address]  [place] [facility] [address]  [phone]      Recent Visits  No visits were found meeting these conditions  Showing recent visits within past 7 days and meeting all other requirements  Today's Visits  Date Type Provider Dept   03/13/23 Telemedicine Daryl Herrera [facility] [place]   Showing today's visits and meeting all other requirements  Future Appointments  No visits were found meeting these conditions  Showing future appointments within next 150 days and meeting all other requirements       The patient was identified by name and date of birth  Ryley Tejeda was informed that this is a telemedicine visit and that the visit is being conducted throughthe [employer] platform  She agrees to proceed     My office door was closed  No one else was in the room  She acknowledged consent and understanding of privacy and security of the video platform  The patient has agreed to participate and understands they can discontinue the visit at any time  Patient is aware this is a billable service  Charma Moritz is a 56 y o  female presenting to this virtual session for the supportive treatment of depression  Con'reece to explore hx of depression and coping strategies for same  Pt shared that from 8280-4016 she was not depressed and believes this was due to the newness of having family    She was divorces in 2014  Her most typical strategies included isolation  She did spend one weekend every qtr with friend in [place]   She tried to pamper herself thru self care (getting hair done)  She did also engage in shopping binges (she was able to pay bills)  She was formally dx in [id number] with depression and anxiety and was offered medication            Past Medical History:   Diagnosis Date   • Abnormal Pap smear of cervix    • Anemia    • Arthritis    • Depression    • Edema    • Fatigue    • Kidney stone    • Vitamin B12 deficiency    • Vitamin D deficiency        Past Surgical History:   Procedure Laterality Date   • BREAST BIOPSY  10/24/19   • ENDOMETRIAL ABLATION     • KNEE ARTHROSCOPY Bilateral    • KNEE SURGERY     • ORIF TIBIA FRACTURE Right 1/18/2018    Procedure: OPEN REDUCTION W/ INTERNAL FIXATION (ORIF) TIBIA;  Surgeon: Catalina Canas MD;  [facility];  Service: Orthopedics   • ORIF TIBIAL PLATEAU Right 5/85/1232    Procedure: OPEN REDUCTION W/ INTERNAL FIXATION (ORIF) TIBIAL PLATEAU;  Surgeon: Catalina Canas MD;  [facility];  Service: Orthopedics   • AZ LAPAROSCOPY SURG CHOLECYSTECTOMY N/A 8/12/2021    Procedure: LAPAROSCOPIC CHOLECYSTECTOMY, UMBILICAL HERNIA REPAIR;  Surgeon: Fransisca Morataya MD;  [facility];  Service: General   • AZ REMOVAL IMPLANT DEEP Right 8/2/2018    Procedure: REMOVAL HARDWARE TIBIA;  Surgeon: Catalina Canas MD;  [facility];  Service: Orthopedics   • TUBAL LIGATION     • US GUIDED BREAST BIOPSY LEFT COMPLETE Left 10/24/2019       Current Outpatient Medications   Medication Sig Dispense Refill   • acetaminophen (TYLENOL) 325 mg tablet 650 mg every 6 hours for mild pain 30 tablet 0   • amitriptyline (ELAVIL) 150 MG tablet Take 1 tablet (150 mg total) by mouth daily at bedtime 90 tablet 0   • busPIRone (BUSPAR) 5 mg tablet Take 1 tablet (5 mg total) by mouth 2 (two) times a day 60 tablet 5   • Cholecalciferol (Vitamin D3) 50 MCG (2000 UT) capsule Take 1 capsule (2,000 Units total) by mouth daily 90 capsule 0   • cyanocobalamin 1,000 mcg/mL Inject 1 ml IM weekly x 4 weeks then once a month 30 mL 0   • cyanocobalamin 1,000 mcg/mL Inject 1 mL (1,000 mcg total) into a muscle every 30 (thirty) days 10 mL 0   • diclofenac (VOLTAREN) 75 mg EC tablet Take 1 tablet (75 mg total) by mouth 2 (two) times a day 60 tablet 1   • Euflexxa 20 MG/2ML SOSY      • fluconazole (DIFLUCAN) 150 mg tablet TAKE 1 TABLET ONCE FOR 1 DOSE     • SYRINGE-NEEDLE, DISP, 3 ML 25G X 1" 3 ML MISC Use every 30 (thirty) days 50 each 0   • vitamin B-12 (VITAMIN B-12) 1,000 mcg tablet Take 1 tablet (1,000 mcg total) by mouth daily 90 tablet 0     No current facility-administered medications for this visit  Behavioral Health Psychotherapy Progress Note    Psychotherapy Provided: Individual Psychotherapy     1  Moderate episode of recurrent major depressive disorder (HCC)          DATA: Delon Shaw is a 56 y o  female presenting to this virtual session for the supportive treatment of depression  Con'reece to explore hx of depression and coping strategies for same  Pt shared that from 7955-6771 she was not depressed and believes this was due to the newness of having family  She was divorces in 2014  Her most typical strategies included isolation  She did spend one weekend every qtr with friend in [place]   She tried to pamper herself thru self care (getting hair done)  She did also engage in shopping binges (she was able to pay bills)  She was formally dx in [id number] with depression and anxiety and was offered medication  She did not view this med as helpful  She tried to surround self with positive people, going for walks and spending more time with children and grand children  She relates current depression also has some roots in financial concerns  There are currently 4 adults and 7 children living in the home   Pt's 2 adult dtrs and adult God child live with her due to their financial constrictions  She is practicing mindfulness and is planning a cruise in May with friend  She participates in [employer]  She has been going to lunch by herself to feel more independent and give time to self  She questions whether some of these activities provide escape from every day life  During this session, this clinician used the following therapeutic modalities: Cognitive Behavioral Therapy, Mindfulness-based Strategies and Supportive Psychotherapy    Substance Abuse was not addressed during this session  If the client is diagnosed with a co-occurring substance use disorder, please indicate any changes in the frequency or amount of use: NA  Stage of change for addressing substance use diagnoses: No substance use/Not applicable    ASSESSMENT:  Delon Shaw presents with a Depressed mood  her affect is Blunted, which is congruent, with her mood and the content of the session  The client has made progress on their goals  Delon Shaw presents with a minimal risk of suicide, minimal risk of self-harm, and minimal risk of harm to others  For any risk assessment that surpasses a "low" rating, a safety plan must be developed  A safety plan was indicated: no  If yes, describe in detail NA    PLAN: Between sessions, Delon Shaw will continue to practice mindfulness practices and add daily journal practice  At the next session, the therapist will use Cognitive Behavioral Therapy, Mindfulness-based Strategies and Supportive Psychotherapy to address depression      Visit start and stop times:    03/13/23  Start Time: 0805  Stop Time: 8826  Total Visit Time: 51 minutes

## 2023-03-17 ENCOUNTER — APPOINTMENT (OUTPATIENT)
Dept: LAB | Facility: CLINIC | Age: 48
End: 2023-03-17

## 2023-03-17 ENCOUNTER — OFFICE VISIT (OUTPATIENT)
Dept: INTERNAL MEDICINE CLINIC | Facility: CLINIC | Age: 48
End: 2023-03-17

## 2023-03-17 VITALS
HEART RATE: 62 BPM | HEIGHT: 67 IN | BODY MASS INDEX: 24.01 KG/M2 | WEIGHT: 153 LBS | DIASTOLIC BLOOD PRESSURE: 82 MMHG | OXYGEN SATURATION: 99 % | TEMPERATURE: 97.5 F | SYSTOLIC BLOOD PRESSURE: 130 MMHG

## 2023-03-17 DIAGNOSIS — F41.9 ANXIETY: ICD-10-CM

## 2023-03-17 DIAGNOSIS — E55.9 VITAMIN D DEFICIENCY: ICD-10-CM

## 2023-03-17 DIAGNOSIS — Z00.00 HEALTH MAINTENANCE EXAMINATION: Primary | ICD-10-CM

## 2023-03-17 DIAGNOSIS — F33.1 MODERATE EPISODE OF RECURRENT MAJOR DEPRESSIVE DISORDER (HCC): ICD-10-CM

## 2023-03-17 DIAGNOSIS — M17.31 POST-TRAUMATIC OSTEOARTHRITIS OF RIGHT KNEE: ICD-10-CM

## 2023-03-17 DIAGNOSIS — Z00.00 LABORATORY EXAMINATION ORDERED AS PART OF A ROUTINE GENERAL MEDICAL EXAMINATION: ICD-10-CM

## 2023-03-17 DIAGNOSIS — R19.5 LOOSE STOOLS: ICD-10-CM

## 2023-03-17 DIAGNOSIS — E53.8 VITAMIN B12 DEFICIENCY: ICD-10-CM

## 2023-03-17 PROBLEM — G89.4 CHRONIC PAIN SYNDROME: Status: ACTIVE | Noted: 2023-03-17

## 2023-03-17 LAB
25(OH)D3 SERPL-MCNC: 10.9 NG/ML (ref 30–100)
ALBUMIN SERPL BCP-MCNC: 4.1 G/DL (ref 3.5–5)
ALP SERPL-CCNC: 82 U/L (ref 34–104)
ALT SERPL W P-5'-P-CCNC: 15 U/L (ref 7–52)
ANION GAP SERPL CALCULATED.3IONS-SCNC: 5 MMOL/L (ref 4–13)
AST SERPL W P-5'-P-CCNC: 15 U/L (ref 13–39)
BASOPHILS # BLD AUTO: 0.02 THOUSANDS/ÂΜL (ref 0–0.1)
BASOPHILS NFR BLD AUTO: 0 % (ref 0–1)
BILIRUB SERPL-MCNC: 0.45 MG/DL (ref 0.2–1)
BUN SERPL-MCNC: 9 MG/DL (ref 5–25)
CALCIUM SERPL-MCNC: 9.2 MG/DL (ref 8.4–10.2)
CHLORIDE SERPL-SCNC: 104 MMOL/L (ref 96–108)
CO2 SERPL-SCNC: 28 MMOL/L (ref 21–32)
CREAT SERPL-MCNC: 0.69 MG/DL (ref 0.6–1.3)
EOSINOPHIL # BLD AUTO: 0.06 THOUSAND/ÂΜL (ref 0–0.61)
EOSINOPHIL NFR BLD AUTO: 1 % (ref 0–6)
ERYTHROCYTE [DISTWIDTH] IN BLOOD BY AUTOMATED COUNT: 13.8 % (ref 11.6–15.1)
GFR SERPL CREATININE-BSD FRML MDRD: 104 ML/MIN/1.73SQ M
GLUCOSE SERPL-MCNC: 79 MG/DL (ref 65–140)
HCT VFR BLD AUTO: 42.2 % (ref 34.8–46.1)
HGB BLD-MCNC: 13.8 G/DL (ref 11.5–15.4)
IMM GRANULOCYTES # BLD AUTO: 0.01 THOUSAND/UL (ref 0–0.2)
IMM GRANULOCYTES NFR BLD AUTO: 0 % (ref 0–2)
LYMPHOCYTES # BLD AUTO: 2.34 THOUSANDS/ÂΜL (ref 0.6–4.47)
LYMPHOCYTES NFR BLD AUTO: 50 % (ref 14–44)
MCH RBC QN AUTO: 29.8 PG (ref 26.8–34.3)
MCHC RBC AUTO-ENTMCNC: 32.7 G/DL (ref 31.4–37.4)
MCV RBC AUTO: 91 FL (ref 82–98)
MONOCYTES # BLD AUTO: 0.39 THOUSAND/ÂΜL (ref 0.17–1.22)
MONOCYTES NFR BLD AUTO: 8 % (ref 4–12)
NEUTROPHILS # BLD AUTO: 1.98 THOUSANDS/ÂΜL (ref 1.85–7.62)
NEUTS SEG NFR BLD AUTO: 41 % (ref 43–75)
NRBC BLD AUTO-RTO: 0 /100 WBCS
PLATELET # BLD AUTO: 216 THOUSANDS/UL (ref 149–390)
PMV BLD AUTO: 11.8 FL (ref 8.9–12.7)
POTASSIUM SERPL-SCNC: 3.9 MMOL/L (ref 3.5–5.3)
PROT SERPL-MCNC: 7.6 G/DL (ref 6.4–8.4)
RBC # BLD AUTO: 4.63 MILLION/UL (ref 3.81–5.12)
SODIUM SERPL-SCNC: 137 MMOL/L (ref 135–147)
TSH SERPL DL<=0.05 MIU/L-ACNC: 1.53 UIU/ML (ref 0.45–4.5)
VIT B12 SERPL-MCNC: 271 PG/ML (ref 100–900)
WBC # BLD AUTO: 4.8 THOUSAND/UL (ref 4.31–10.16)

## 2023-03-17 RX ORDER — ACETAMINOPHEN 160 MG
2000 TABLET,DISINTEGRATING ORAL DAILY
Qty: 90 CAPSULE | Refills: 1 | Status: SHIPPED | OUTPATIENT
Start: 2023-03-17

## 2023-03-17 RX ORDER — LANOLIN ALCOHOL/MO/W.PET/CERES
1000 CREAM (GRAM) TOPICAL DAILY
Qty: 90 TABLET | Refills: 1 | Status: SHIPPED | OUTPATIENT
Start: 2023-03-17 | End: 2023-03-20

## 2023-03-17 RX ORDER — CYANOCOBALAMIN 1000 UG/ML
1000 INJECTION, SOLUTION INTRAMUSCULAR; SUBCUTANEOUS
Qty: 10 ML | Refills: 0 | Status: SHIPPED | OUTPATIENT
Start: 2023-03-17 | End: 2023-03-20

## 2023-03-17 RX ORDER — BUSPIRONE HYDROCHLORIDE 5 MG/1
5 TABLET ORAL 2 TIMES DAILY
Qty: 60 TABLET | Refills: 1 | Status: SHIPPED | OUTPATIENT
Start: 2023-03-17

## 2023-03-17 RX ORDER — AMITRIPTYLINE HYDROCHLORIDE 150 MG/1
150 TABLET, FILM COATED ORAL
Qty: 90 TABLET | Refills: 1 | Status: SHIPPED | OUTPATIENT
Start: 2023-03-17

## 2023-03-17 NOTE — PROGRESS NOTES
Assessment/Plan: Moderate episode of recurrent major depressive disorder (HCC)  Taking amitriptyline prn only  Started seeing therapist regularly  Anxiety  As above  Chronic pain syndrome  Saw pain  SC stimulator trial, procedure scheduled  Vitamin B12 deficiency  Taking oral B12 daily, injections monthly  Vitamin D deficiency  Taking daily D3  Post-traumatic osteoarthritis of right knee  As above  Diagnoses and all orders for this visit:    Health maintenance examination  Comments:  Updated  Laboratory examination ordered as part of a routine general medical examination  -     CBC and differential  -     Comprehensive metabolic panel  -     TSH, 3rd generation with Free T4 reflex  -     Vitamin B12  -     Vitamin D 25 hydroxy    Moderate episode of recurrent major depressive disorder (HCC)  -     Comprehensive metabolic panel  -     TSH, 3rd generation with Free T4 reflex  -     amitriptyline (ELAVIL) 150 MG tablet; Take 1 tablet (150 mg total) by mouth daily at bedtime    Vitamin B12 deficiency  -     CBC and differential  -     Vitamin B12  -     vitamin B-12 (VITAMIN B-12) 1,000 mcg tablet; Take 1 tablet (1,000 mcg total) by mouth daily    Vitamin D deficiency  -     Vitamin D 25 hydroxy  -     cyanocobalamin 1,000 mcg/mL; Inject 1 mL (1,000 mcg total) into a muscle every 30 (thirty) days  -     Cholecalciferol (Vitamin D3) 50 MCG (2000 UT) capsule; Take 1 capsule (2,000 Units total) by mouth daily    Post-traumatic osteoarthritis of right knee    Anxiety  -     busPIRone (BUSPAR) 5 mg tablet; Take 1 tablet (5 mg total) by mouth 2 (two) times a day    Loose stools  Comments:  May be gluten sensitive, monitor diet  Follow up in 6 months or as needed  Subjective:      Patient ID: Ivett Baron is a 56 y o  female here for a physical     She reports her leg and knee pain has been getting worse for the past few months    She reports her right foot has been throbbing more frequently, has seen a foot doctor  She went to psychiatry for her clearance for her stimulator  She saw a therapist and is still seeing them  She does not take amitriptyline regularly, just as needed at night  She has been very stressed the last few months  She now has 14 people living in her home  She has her friend's family (4) living with her also  She got  recently  She is worried about finances  She reports loose stools after eating a bagel with whipped cream  She reports it occurs also with spicy foods  She is not sure if she has become lactose intolerant  The following portions of the patient's history were reviewed and updated as appropriate: allergies, current medications, past medical history, past social history and problem list     Review of Systems   Constitutional: Negative for activity change, appetite change and fatigue  HENT: Negative for congestion, ear pain and postnasal drip  Eyes: Negative for visual disturbance  Respiratory: Negative for cough and shortness of breath  Cardiovascular: Negative for chest pain and leg swelling  Gastrointestinal: Negative for abdominal pain, constipation and diarrhea  Genitourinary: Negative for dysuria and frequency  Musculoskeletal: Positive for arthralgias and gait problem  Negative for myalgias  Skin: Negative for rash and wound  Neurological: Positive for weakness  Negative for dizziness, numbness and headaches  Psychiatric/Behavioral: Positive for dysphoric mood and sleep disturbance  Negative for confusion  The patient is nervous/anxious  Objective:      /82   Pulse 62   Temp 97 5 °F (36 4 °C)   Ht 5' 7" (1 702 m)   Wt 69 4 kg (153 lb)   SpO2 99%   BMI 23 96 kg/m²          Physical Exam  Vitals and nursing note reviewed  Constitutional:       General: She is not in acute distress  Appearance: She is well-developed  HENT:      Head: Normocephalic and atraumatic        Right Ear: External ear normal       Left Ear: External ear normal       Nose: Nose normal    Eyes:      Pupils: Pupils are equal, round, and reactive to light  Cardiovascular:      Rate and Rhythm: Normal rate and regular rhythm  Heart sounds: Normal heart sounds  Pulmonary:      Effort: Pulmonary effort is normal       Breath sounds: Normal breath sounds  No wheezing  Abdominal:      General: Bowel sounds are normal       Palpations: Abdomen is soft  Musculoskeletal:         General: No swelling  Right lower leg: No edema  Left lower leg: No edema  Skin:     General: Skin is warm  Findings: No rash  Neurological:      General: No focal deficit present  Mental Status: She is alert and oriented to person, place, and time  Psychiatric:         Mood and Affect: Mood and affect normal  Mood is not anxious or depressed           Behavior: Behavior normal

## 2023-03-19 DIAGNOSIS — E55.9 VITAMIN D DEFICIENCY: Primary | ICD-10-CM

## 2023-03-19 RX ORDER — ERGOCALCIFEROL 1.25 MG/1
50000 CAPSULE ORAL WEEKLY
Qty: 12 CAPSULE | Refills: 0 | Status: SHIPPED | OUTPATIENT
Start: 2023-03-19

## 2023-03-19 NOTE — TELEPHONE ENCOUNTER
Lab results:    Vitamin D levels came back very low  Recommend to start high dose weekly D2, sent to pharmacy  Once completed (12 weeks), start vitamin D3 2000 units daily  Your vitamin B12 was also low, restart daily oral supplement  Are you doing your injections monthly? If not, please restart  If you are, please do injections every 2 weeks x 4 times then resume monthly injections      The rest of your labs were within normal

## 2023-03-20 ENCOUNTER — ANNUAL EXAM (OUTPATIENT)
Dept: OBGYN CLINIC | Facility: CLINIC | Age: 48
End: 2023-03-20

## 2023-03-20 VITALS
WEIGHT: 151 LBS | HEIGHT: 67 IN | DIASTOLIC BLOOD PRESSURE: 80 MMHG | BODY MASS INDEX: 23.7 KG/M2 | SYSTOLIC BLOOD PRESSURE: 130 MMHG

## 2023-03-20 DIAGNOSIS — Z12.31 ENCOUNTER FOR SCREENING MAMMOGRAM FOR MALIGNANT NEOPLASM OF BREAST: ICD-10-CM

## 2023-03-20 DIAGNOSIS — Z01.419 ENCOUNTER FOR GYNECOLOGICAL EXAMINATION (GENERAL) (ROUTINE) WITHOUT ABNORMAL FINDINGS: Primary | ICD-10-CM

## 2023-03-20 DIAGNOSIS — N89.8 VAGINAL ODOR: ICD-10-CM

## 2023-03-20 DIAGNOSIS — Z11.3 SCREENING FOR STD (SEXUALLY TRANSMITTED DISEASE): ICD-10-CM

## 2023-03-20 DIAGNOSIS — B37.31 YEAST VAGINITIS: ICD-10-CM

## 2023-03-20 DIAGNOSIS — D25.9 UTERINE LEIOMYOMA, UNSPECIFIED LOCATION: ICD-10-CM

## 2023-03-20 PROBLEM — B97.7 HUMAN PAPILLOMA VIRUS INFECTION: Status: RESOLVED | Noted: 2019-08-27 | Resolved: 2023-03-20

## 2023-03-20 RX ORDER — CYANOCOBALAMIN 1000 UG/ML
1000 INJECTION, SOLUTION INTRAMUSCULAR; SUBCUTANEOUS
Qty: 10 ML | Refills: 0 | Status: SHIPPED | OUTPATIENT
Start: 2023-03-20

## 2023-03-20 RX ORDER — FLUCONAZOLE 150 MG/1
TABLET ORAL
Qty: 2 TABLET | Refills: 0 | Status: SHIPPED | OUTPATIENT
Start: 2023-03-20 | End: 2023-03-22

## 2023-03-20 NOTE — PROGRESS NOTES
Assessment/Plan   Diagnoses and all orders for this visit:    Encounter for gynecological examination (general) (routine) without abnormal findings  -     Liquid-based pap, screening  The current ASCCP guidelines were reviewed  Patient's last pap was 4/2/21 - WNL and therefore, a pap with HPV cotesting is not indicated at this time  I emphasized the importance of an annual pelvic and breast exam  Patient opts to have a pap done today  Screening for STD (sexually transmitted disease)  -     Hepatitis B surface antigen; Future  -     Hepatitis C antibody; Future  -     HIV 1/2 AG/AB w Reflex [facility] for 2 yr old and above; Future  -     RPR-Syphilis Screening (Total Syphilis IGG/IGM); Future  -     Chlamydia/GC amplified DNA by PCR  Encourage safe sexual practices; STI testing - C/G collected and STI labs ordered    Encounter for screening mammogram for malignant neoplasm of breast  -     Mammo screening bilateral w 3d & cad; Future  A yearly mammogram is recommended for breast cancer screening starting at age 36;    Uterine leiomyoma, unspecified location  -     US pelvis complete w transvaginal; Future  Will plan follow-up on uterine fibroids, especially with increased pelvic pressure    Yeast vaginitis  -     Genital Comprehensive Culture  -     fluconazole (DIFLUCAN) 150 mg tablet; 1 tab po day 1, skip day 2, 1 tab po day 3  Vaginal odor  -     Genital Comprehensive Culture  Reviewed signs and symptoms of yeast infection and treatment sent to pharmacy  Genital culture obtained as well - will adjust plan if needed based on results  Discussion  I have discussed the importance of monthly self-breast exams, exercise and healthy diet as well as adequate intake of calcium and vitamin D  Encourage MVI q day and r/kassie importance of folic acid;  Encourage 30-40 min weight bearing exercise most days of week  Contraception - BTL  In addition, colon cancer screening with a colonoscopy is recommended starting at age 36-53 and reviewed benefits - she is currently up to date with screening  All questions have been answered to her satisfaction  RTO for APE or sooner if needed    Subjective     HPI   Tang Hathaway is a 56 y o  female who presents for annual well woman exam    LMP - 12/1/22; Periods are irreg for the past year - starting 2/2022 - after didn't get period until July - then came July, Aug, Sept, nothing until Dec and now nothing since; s/p 2015 uterine ablation; 4/2022 FSH/LH elevated consistent with perimenopause; they last 7 days; No excessive bleeding; No intermenstrual bleeding or spotting; Cramps are tolerable  History uterine fibroids - feels like they are getting bigger - feeling more pressure (ej during IC) and stomach bigger; No vulvar itch/burn; No vaginal itch/burn; No abn discharge; (+) odor - feels like has BV; Reports urine cloudy; No urinary sx - burning/pain/frequency/hematuria  (+) SBEs - no breast masses, asymmetry, nipple discharge or bleeding, changes in skin of breast, or breast tenderness bilaterally  No abd/pelvic pain or HAs; No menopausal symptoms: No hot flashes/night sweats, problems with intercourse, vaginal dryness; sleeping well;   Pt is sexually active in a mutually monog/ sexual relationship; No issues with intercourse; She requests sti/hiv/hep testing; Feels safe at home  Current contraception: BTL  (+) PCP for routine Bw/care; Last Pap - 4/2/21 - WNL; 10/30/19 - WNL (-) HRHPV type 16/18 neg  History of abnormal Pap smear: yes  Last mammo - 12/23/22 - left breast mass; right breast Birads 1 neg; Type D density; 1/4/23 Left breast ultrasound - left breast cyst Birads 2 benign - back to bilateral screening mammogram  History of abnormal mammogram: yes  Last colonoscopy - 8/2021 - WNL and follow-up in about 3 years  Last STI screen - 11/30/21 - C/G neg    Review of Systems   Constitutional: Negative for activity change, fatigue, fever and unexpected weight change     HENT: Negative for congestion, dental problem, sinus pressure and sinus pain  Eyes: Negative for visual disturbance  Respiratory: Negative for cough, shortness of breath and wheezing  Cardiovascular: Negative for chest pain and leg swelling  Gastrointestinal: Negative for abdominal distention, abdominal pain, blood in stool, constipation, diarrhea, nausea and vomiting  Endocrine: Negative for polydipsia  Genitourinary: Negative for difficulty urinating, dyspareunia, dysuria, frequency, hematuria, menstrual problem, pelvic pain, urgency, vaginal bleeding, vaginal discharge and vaginal pain  Musculoskeletal: Negative for arthralgias and back pain  Allergic/Immunologic: Negative for environmental allergies  Neurological: Negative for dizziness, seizures and headaches  Psychiatric/Behavioral: Negative for dysphoric mood and sleep disturbance  The patient is not nervous/anxious          The following portions of the patient's history were reviewed and updated as appropriate: allergies, current medications, past family history, past medical history, past social history, past surgical history and problem list          OB History        9    Para   6    Term   3       3    AB   3    Living   3       SAB        IAB   3    Ectopic        Multiple        Live Births   3           Obstetric Comments   Surgical VIp x 3  2 early deliveries, one w cerclage placed during final pregnancy             Past Medical History:   Diagnosis Date   • Abnormal Pap smear of cervix    • Anemia    • Arthritis    • Depression    • Edema    • Fatigue    • Kidney stone    • Vitamin B12 deficiency    • Vitamin D deficiency        Past Surgical History:   Procedure Laterality Date   • BREAST BIOPSY  10/24/2019   • CHOLECYSTECTOMY     • ENDOMETRIAL ABLATION     • KNEE ARTHROSCOPY Bilateral    • KNEE SURGERY     • ORIF TIBIA FRACTURE Right 2018    Procedure: OPEN REDUCTION W/ INTERNAL FIXATION (ORIF) TIBIA;  Surgeon: Yefri Sandhu Jaylen August MD;  [facility];  Service: Orthopedics   • ORIF TIBIAL PLATEAU Right 58/09/2375    Procedure: OPEN REDUCTION W/ INTERNAL FIXATION (ORIF) TIBIAL PLATEAU;  Surgeon: Lynne Jackson MD;  [facility];  Service: Orthopedics   • SC LAPAROSCOPY SURG CHOLECYSTECTOMY N/A 08/12/2021    Procedure: LAPAROSCOPIC CHOLECYSTECTOMY, UMBILICAL HERNIA REPAIR;  Surgeon: Vishnu Melo MD;  [facility];  Service: General   • SC REMOVAL IMPLANT DEEP Right 08/02/2018    Procedure: REMOVAL HARDWARE TIBIA;  Surgeon: Lynne Jackson MD;  [facility];  Service: Orthopedics   • TUBAL LIGATION     • US GUIDED BREAST BIOPSY LEFT COMPLETE Left 10/24/2019       Family History   Problem Relation Age of Onset   • Cancer Mother         either cervical or ovarian   • Heart disease Mother         heart surgery   • Heart attack Father         stent   • Diabetes Father    • Hypertension Father    • Cancer Father         Stomach and Lung Cancer   • Rheum arthritis Daughter    • No Known Problems Daughter    • Lupus Maternal Grandmother    • No Known Problems Maternal Grandfather    • No Known Problems Paternal Grandmother    • No Known Problems Paternal Grandfather    • No Known Problems Maternal Aunt    • No Known Problems Maternal Aunt    • No Known Problems Maternal Aunt    • No Known Problems Paternal Aunt    • Lupus Cousin    • Arthritis Family    • Stomach cancer Family    • Ovarian cancer Family    • No Known Problems Son    • Alcohol abuse Neg Hx    • Depression Neg Hx    • Drug abuse Neg Hx    • Substance Abuse Neg Hx    • Mental illness Neg Hx        Social History     Socioeconomic History   • Marital status:      Spouse name: Not on file   • Number of children: 1   • Years of education: Not on file   • Highest education level: Not on file   Occupational History   • Occupation: Teacher in [place]   Tobacco Use   • Smoking status: Never   • Smokeless tobacco: Never   Vaping Use   • Vaping Use: Never used   Substance and Sexual Activity   • Alcohol use: Yes     Comment: Occassional   • Drug use: No   • Sexual activity: Yes     Partners: Male     Birth control/protection: Condom Male, None   Other Topics Concern   • Not on file   Social History Narrative    Single    Daughter, grand daughter and son lives with her    Works part time- 3 days in the field, 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     Social Determinants of Health     Financial Resource Strain: Not on file   Food Insecurity: Not on file   Transportation Needs: Not on file   Physical Activity: Not on file   Stress: Not on file   Social Connections: Not on file   Intimate Partner Violence: Not on file   Housing Stability: Not on file         Current Outpatient Medications:   •  acetaminophen (TYLENOL) 325 mg tablet, 650 mg every 6 hours for mild pain, Disp: 30 tablet, Rfl: 0  •  amitriptyline (ELAVIL) 150 MG tablet, Take 1 tablet (150 mg total) by mouth daily at bedtime, Disp: 90 tablet, Rfl: 1  •  busPIRone (BUSPAR) 5 mg tablet, Take 1 tablet (5 mg total) by mouth 2 (two) times a day, Disp: 60 tablet, Rfl: 1  •  Cholecalciferol (Vitamin D3) 50 MCG (2000 UT) capsule, Take 1 capsule (2,000 Units total) by mouth daily, Disp: 90 capsule, Rfl: 1  •  cyanocobalamin 1,000 mcg/mL, Inject 1 mL (1,000 mcg total) into a muscle every 30 (thirty) days, Disp: 10 mL, Rfl: 0  •  ergocalciferol (VITAMIN D2) 50,000 units, Take 1 capsule (50,000 Units total) by mouth once a week, Disp: 12 capsule, Rfl: 0  •  SYRINGE-NEEDLE, DISP, 3 ML 25G X 1" 3 ML MISC, Use every 30 (thirty) days, Disp: 50 each, Rfl: 0  •  vitamin B-12 (VITAMIN B-12) 1,000 mcg tablet, Take 1 tablet (1,000 mcg total) by mouth daily, Disp: 90 tablet, Rfl: 1  •  diclofenac (VOLTAREN) 75 mg EC tablet, Take 1 tablet (75 mg total) by mouth 2 (two) times a day (Patient not taking: Reported on 3/20/2023), Disp: 60 tablet, Rfl: 1    No Known Allergies    Objective   Vitals:    03/20/23 0750   BP: 130/80   BP Location: Left arm   Patient Position: Sitting   Cuff Size: Standard   Weight: 68 5 kg (151 lb)   Height: 5' 7" (1 702 m)     Physical Exam  Vitals reviewed  Constitutional:       General: She is awake  She is not in acute distress  Appearance: Normal appearance  She is well-developed and well-groomed  She is not ill-appearing, toxic-appearing or diaphoretic  HENT:      Head: Normocephalic and atraumatic  Eyes:      Conjunctiva/sclera: Conjunctivae normal    Neck:      Thyroid: No thyroid mass, thyromegaly or thyroid tenderness  Cardiovascular:      Rate and Rhythm: Normal rate and regular rhythm  Heart sounds: Normal heart sounds  No murmur heard  Pulmonary:      Effort: Pulmonary effort is normal  No tachypnea, bradypnea or respiratory distress  Breath sounds: Normal breath sounds  No stridor or decreased air movement  No wheezing  Chest:   Breasts:     Breasts are symmetrical       Right: Normal  No swelling, bleeding, inverted nipple, mass, nipple discharge, skin change or tenderness  Left: Normal  No swelling, bleeding, inverted nipple, mass, nipple discharge, skin change or tenderness  Abdominal:      General: There is no distension  Palpations: Abdomen is soft  There is no hepatomegaly, splenomegaly or mass  Tenderness: There is no abdominal tenderness  Hernia: No hernia is present  There is no hernia in the left inguinal area or right inguinal area  Genitourinary:     General: Normal vulva  Exam position: Supine  Pubic Area: No rash or pubic lice  Labia:         Right: No rash, tenderness, lesion or injury  Left: No rash, tenderness, lesion or injury  Urethra: No prolapse, urethral pain, urethral swelling or urethral lesion  Vagina: No signs of injury and foreign body  Vaginal discharge (moderate amount of thick white clumpy vaginal discharge consistent with yeast vaginitis) present  No erythema, tenderness, bleeding, lesions or prolapsed vaginal walls  Cervix: No cervical motion tenderness, discharge, friability, lesion, erythema or cervical bleeding  Uterus: Not deviated, not enlarged, not fixed, not tender and no uterine prolapse  Adnexa:         Right: No mass, tenderness or fullness  Left: No mass, tenderness or fullness  Rectum: Normal  Guaiac result negative  No mass, tenderness, anal fissure, external hemorrhoid or internal hemorrhoid  Normal anal tone  Lymphadenopathy:      Cervical: No cervical adenopathy  Upper Body:      Right upper body: No supraclavicular or axillary adenopathy  Left upper body: No supraclavicular or axillary adenopathy  Lower Body: No right inguinal adenopathy  No left inguinal adenopathy  Skin:     General: Skin is warm and dry  Neurological:      Mental Status: She is alert and oriented to person, place, and time  Psychiatric:         Mood and Affect: Mood and affect normal          Speech: Speech normal          Behavior: Behavior normal  Behavior is cooperative  Thought Content:  Thought content normal          Judgment: Judgment normal

## 2023-03-21 LAB
C TRACH DNA SPEC QL NAA+PROBE: NEGATIVE
N GONORRHOEA DNA SPEC QL NAA+PROBE: NEGATIVE

## 2023-03-23 DIAGNOSIS — B96.89 BACTERIAL VAGINOSIS: Primary | ICD-10-CM

## 2023-03-23 DIAGNOSIS — N76.0 BACTERIAL VAGINOSIS: Primary | ICD-10-CM

## 2023-03-23 LAB
BACTERIA GENITAL AEROBE CULT: ABNORMAL

## 2023-03-23 RX ORDER — METRONIDAZOLE 500 MG/1
500 TABLET ORAL EVERY 12 HOURS SCHEDULED
Qty: 14 TABLET | Refills: 0 | Status: SHIPPED | OUTPATIENT
Start: 2023-03-23 | End: 2023-03-31

## 2023-03-26 LAB
LAB AP GYN PRIMARY INTERPRETATION: NORMAL
LAB AP LMP: NORMAL
Lab: NORMAL
PATH INTERP SPEC-IMP: NORMAL

## 2023-03-27 ENCOUNTER — TELEMEDICINE (OUTPATIENT)
Dept: BEHAVIORAL/MENTAL HEALTH CLINIC | Facility: CLINIC | Age: 48
End: 2023-03-27

## 2023-03-27 DIAGNOSIS — F33.1 MODERATE EPISODE OF RECURRENT MAJOR DEPRESSIVE DISORDER (HCC): Primary | ICD-10-CM

## 2023-03-27 NOTE — PATIENT INSTRUCTIONS
Continue to take all medications as prescribed  Attend all scheduled medical appointments    Follow up with therapist     If you are experiencing a mental health emergency, please call 911 for emergent care, or one of the following numbers for someone to talk to 24 hours a day, 7 days a week:  [facility] Intervention:  [address] Crisis Intervention: [address] [facility] Crisis Intervention: [phone]  [address]:  Text PA to [phone]  PA's support and referral hotline: 315.680.2305  Suicide Prevention Lifeline:  728.527.8046
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
## 2023-03-28 ENCOUNTER — TELEPHONE (OUTPATIENT)
Dept: PAIN MEDICINE | Facility: CLINIC | Age: 48
End: 2023-03-28

## 2023-03-30 DIAGNOSIS — Z79.899 ENCOUNTER FOR LONG-TERM (CURRENT) USE OF OTHER MEDICATIONS: ICD-10-CM

## 2023-03-30 DIAGNOSIS — M17.31 POST-TRAUMATIC OSTEOARTHRITIS OF RIGHT KNEE: ICD-10-CM

## 2023-03-30 DIAGNOSIS — G89.4 CHRONIC PAIN SYNDROME: Primary | ICD-10-CM

## 2023-03-30 DIAGNOSIS — Z79.01 CHRONIC ANTICOAGULATION: ICD-10-CM

## 2023-03-30 DIAGNOSIS — G89.29 CHRONIC LOW BACK PAIN, UNSPECIFIED BACK PAIN LATERALITY, UNSPECIFIED WHETHER SCIATICA PRESENT: ICD-10-CM

## 2023-03-30 DIAGNOSIS — M54.50 CHRONIC LOW BACK PAIN, UNSPECIFIED BACK PAIN LATERALITY, UNSPECIFIED WHETHER SCIATICA PRESENT: ICD-10-CM

## 2023-03-30 DIAGNOSIS — M54.16 LUMBAR RADICULOPATHY: ICD-10-CM

## 2023-03-31 ENCOUNTER — OFFICE VISIT (OUTPATIENT)
Dept: PAIN MEDICINE | Facility: CLINIC | Age: 48
End: 2023-03-31

## 2023-03-31 ENCOUNTER — APPOINTMENT (OUTPATIENT)
Dept: LAB | Facility: CLINIC | Age: 48
End: 2023-03-31

## 2023-03-31 VITALS
HEART RATE: 85 BPM | RESPIRATION RATE: 16 BRPM | SYSTOLIC BLOOD PRESSURE: 131 MMHG | DIASTOLIC BLOOD PRESSURE: 93 MMHG | WEIGHT: 155 LBS | HEIGHT: 67 IN | BODY MASS INDEX: 24.33 KG/M2

## 2023-03-31 DIAGNOSIS — Z79.01 CHRONIC ANTICOAGULATION: ICD-10-CM

## 2023-03-31 DIAGNOSIS — G89.29 CHRONIC LOW BACK PAIN, UNSPECIFIED BACK PAIN LATERALITY, UNSPECIFIED WHETHER SCIATICA PRESENT: ICD-10-CM

## 2023-03-31 DIAGNOSIS — G89.4 CHRONIC PAIN SYNDROME: Primary | ICD-10-CM

## 2023-03-31 DIAGNOSIS — G89.4 CHRONIC PAIN SYNDROME: ICD-10-CM

## 2023-03-31 DIAGNOSIS — Z11.3 SCREENING FOR STD (SEXUALLY TRANSMITTED DISEASE): ICD-10-CM

## 2023-03-31 DIAGNOSIS — M54.50 CHRONIC LOW BACK PAIN, UNSPECIFIED BACK PAIN LATERALITY, UNSPECIFIED WHETHER SCIATICA PRESENT: ICD-10-CM

## 2023-03-31 DIAGNOSIS — M17.31 POST-TRAUMATIC OSTEOARTHRITIS OF RIGHT KNEE: ICD-10-CM

## 2023-03-31 DIAGNOSIS — M54.16 LUMBAR RADICULOPATHY: ICD-10-CM

## 2023-03-31 DIAGNOSIS — Z79.899 ENCOUNTER FOR LONG-TERM (CURRENT) USE OF OTHER MEDICATIONS: ICD-10-CM

## 2023-03-31 LAB
ALBUMIN SERPL BCP-MCNC: 4.4 G/DL (ref 3.5–5)
ALP SERPL-CCNC: 76 U/L (ref 34–104)
ALT SERPL W P-5'-P-CCNC: 13 U/L (ref 7–52)
ANION GAP SERPL CALCULATED.3IONS-SCNC: 5 MMOL/L (ref 4–13)
APTT PPP: 34 SECONDS (ref 23–37)
AST SERPL W P-5'-P-CCNC: 16 U/L (ref 13–39)
BILIRUB SERPL-MCNC: 0.48 MG/DL (ref 0.2–1)
BUN SERPL-MCNC: 8 MG/DL (ref 5–25)
CALCIUM SERPL-MCNC: 9.3 MG/DL (ref 8.4–10.2)
CHLORIDE SERPL-SCNC: 105 MMOL/L (ref 96–108)
CO2 SERPL-SCNC: 29 MMOL/L (ref 21–32)
CREAT SERPL-MCNC: 0.68 MG/DL (ref 0.6–1.3)
ERYTHROCYTE [DISTWIDTH] IN BLOOD BY AUTOMATED COUNT: 14 % (ref 11.6–15.1)
GFR SERPL CREATININE-BSD FRML MDRD: 105 ML/MIN/1.73SQ M
GLUCOSE SERPL-MCNC: 66 MG/DL (ref 65–140)
HBV SURFACE AG SER QL: NORMAL
HCT VFR BLD AUTO: 46.7 % (ref 34.8–46.1)
HCV AB SER QL: NORMAL
HGB BLD-MCNC: 14.5 G/DL (ref 11.5–15.4)
HIV 1+2 AB+HIV1 P24 AG SERPL QL IA: NORMAL
HIV 2 AB SERPL QL IA: NORMAL
HIV1 AB SERPL QL IA: NORMAL
HIV1 P24 AG SERPL QL IA: NORMAL
INR PPP: 1.03 (ref 0.84–1.19)
MCH RBC QN AUTO: 28.7 PG (ref 26.8–34.3)
MCHC RBC AUTO-ENTMCNC: 31 G/DL (ref 31.4–37.4)
MCV RBC AUTO: 93 FL (ref 82–98)
PLATELET # BLD AUTO: 209 THOUSANDS/UL (ref 149–390)
PMV BLD AUTO: 11.8 FL (ref 8.9–12.7)
POTASSIUM SERPL-SCNC: 3.4 MMOL/L (ref 3.5–5.3)
PROT SERPL-MCNC: 7.9 G/DL (ref 6.4–8.4)
PROTHROMBIN TIME: 13.8 SECONDS (ref 11.6–14.5)
RBC # BLD AUTO: 5.05 MILLION/UL (ref 3.81–5.12)
SODIUM SERPL-SCNC: 139 MMOL/L (ref 135–147)
TREPONEMA PALLIDUM IGG+IGM AB [PRESENCE] IN SERUM OR PLASMA BY IMMUNOASSAY: NORMAL
WBC # BLD AUTO: 3.58 THOUSAND/UL (ref 4.31–10.16)

## 2023-03-31 NOTE — PROGRESS NOTES
Assessment    1  Chronic pain syndrome        2  Lumbar radiculopathy            Plan  The spinal cord stimulator trial was discussed in depth with the patient  The patient was advised that a stimulator lead will be placed percutaneously through an epidural needle, with intra-op stimulation done to confirm appropriate lead placement  The lead will then be connected to an external generator and secured to the skin  The patient was advised that an industry clinical specialist will be present for the trial, program the stimulator and explain how to use it  During the trial, the patient was instructed to perform their activities of daily living, but limit twisting and bending motions, and no lifting greater than 10 pounds  The patient was advised to refrain from tub baths, showers, swimming, and hot tubs during the trial  The patient will return to the office for trial evaluation and lead removal on Monday, 4/17/2023 at 7:15 AM  At that time, if the trial is successful, the patient will be referred to neurosurgery for permanent spinal cord stimulator placement  Pre-procedure instructions were reviewed with the patient  The patient was given a prescription for blood work to be done by Monday, 4/3/2023  Complete risks and benefits including bleeding, infection, headache, tissue reaction, nerve injury and allergic reaction were discussed  The approach was demonstrated using models and literature was provided  The patient was advised that they would receive pre-procedure antibiotics and a prescription to take during the week of the trial     The patient will next follow- up on Monday, 4/10/2023 at 3 PM for the stimulator trial     No orders of the defined types were placed in this encounter  History of Present Illness  The patient is a 56 y o  female scheduled for an Med[employer] spinal cord stimulator trial to treat chronic pain from low back pain and lumbar intervertebral disc disorder with radiculopathy     The current pain pattern includes bilateral low back pain and pain that radiates into bilateral lower extremities, left worse than right  The patient's goals for the trial include the ability to sit and stand with reduction of her pain, the ability to walk a distance without pain, the ability to stand and cook a meal without having to sit down and improvement with her sleep  Pain Assessment Measures   Numeric Rating Scale 8   Oswestry Disability Index Score/Neck Disability Index Score 60   PROMIS-29   - Physical Function 11   - Anxiety 9   - Depression 8   - Fatigue 12   - Sleep Disturbance 11   - Ability to Participate in Social Roles And Activities 12   - Pain Interference 15     I have personally reviewed and/or updated the patient's past medical history, past surgical history, family history, social history, current medications, allergies, and vital signs today  Review of Systems   Respiratory: Negative for shortness of breath  Cardiovascular: Negative for chest pain  Gastrointestinal: Negative for constipation, diarrhea, nausea and vomiting  Musculoskeletal: Positive for back pain and gait problem  Negative for arthralgias, joint swelling and myalgias  Skin: Negative for rash  Neurological: Negative for dizziness, seizures and weakness  All other systems reviewed and are negative         Past Medical History:   Diagnosis Date   • Abnormal Pap smear of cervix    • Anemia    • Arthritis    • Depression    • Edema    • Fatigue    • Kidney stone    • Vitamin B12 deficiency    • Vitamin D deficiency        Past Surgical History:   Procedure Laterality Date   • BREAST BIOPSY  10/24/2019   • CHOLECYSTECTOMY     • ENDOMETRIAL ABLATION     • KNEE ARTHROSCOPY Bilateral    • KNEE SURGERY     • ORIF TIBIA FRACTURE Right 01/18/2018    Procedure: OPEN REDUCTION W/ INTERNAL FIXATION (ORIF) TIBIA;  Surgeon: Leatha Guerrero MD;  [facility];  Service: Orthopedics   • ORIF TIBIAL PLATEAU Right 01/18/2018    Procedure: OPEN REDUCTION W/ INTERNAL FIXATION (ORIF) TIBIAL PLATEAU;  Surgeon: Emory Denver, MD;  [facility];  Service: Orthopedics   • OK LAPAROSCOPY SURG CHOLECYSTECTOMY N/A 08/12/2021    Procedure: LAPAROSCOPIC CHOLECYSTECTOMY, UMBILICAL HERNIA REPAIR;  Surgeon: Vincent Wilson MD;  [facility];  Service: General   • OK REMOVAL IMPLANT DEEP Right 08/02/2018    Procedure: REMOVAL HARDWARE TIBIA;  Surgeon: Emory Denver, MD;  [facility];  Service: Orthopedics   • TUBAL LIGATION     • US GUIDED BREAST BIOPSY LEFT COMPLETE Left 10/24/2019       Family History   Problem Relation Age of Onset   • Cancer Mother         either cervical or ovarian   • Heart disease Mother         heart surgery   • Heart attack Father         stent   • Diabetes Father    • Hypertension Father    • Cancer Father         Stomach and Lung Cancer   • Rheum arthritis Daughter    • No Known Problems Daughter    • Lupus Maternal Grandmother    • No Known Problems Maternal Grandfather    • No Known Problems Paternal Grandmother    • No Known Problems Paternal Grandfather    • No Known Problems Maternal Aunt    • No Known Problems Maternal Aunt    • No Known Problems Maternal Aunt    • No Known Problems Paternal Aunt    • Lupus Cousin    • Arthritis Family    • Stomach cancer Family    • Ovarian cancer Family    • No Known Problems Son    • Alcohol abuse Neg Hx    • Depression Neg Hx    • Drug abuse Neg Hx    • Substance Abuse Neg Hx    • Mental illness Neg Hx        Social History     Occupational History   • Occupation: Teacher in [place]   Tobacco Use   • Smoking status: Never   • Smokeless tobacco: Never   Vaping Use   • Vaping Use: Never used   Substance and Sexual Activity   • Alcohol use: Yes     Comment: Occassional   • Drug use: No   • Sexual activity: Yes     Partners: Male     Birth control/protection: Condom Male, Female Sterilization         Current Outpatient Medications:   •  acetaminophen "(TYLENOL) 325 mg tablet, 650 mg every 6 hours for mild pain, Disp: 30 tablet, Rfl: 0  •  amitriptyline (ELAVIL) 150 MG tablet, Take 1 tablet (150 mg total) by mouth daily at bedtime, Disp: 90 tablet, Rfl: 1  •  busPIRone (BUSPAR) 5 mg tablet, Take 1 tablet (5 mg total) by mouth 2 (two) times a day, Disp: 60 tablet, Rfl: 1  •  Cholecalciferol (Vitamin D3) 50 MCG (2000 UT) capsule, Take 1 capsule (2,000 Units total) by mouth daily, Disp: 90 capsule, Rfl: 1  •  cyanocobalamin 1,000 mcg/mL, Inject 1 mL (1,000 mcg total) into a muscle every 14 (fourteen) days x4 then resume monthly dosing, Disp: 10 mL, Rfl: 0  •  ergocalciferol (VITAMIN D2) 50,000 units, Take 1 capsule (50,000 Units total) by mouth once a week, Disp: 12 capsule, Rfl: 0  •  metroNIDAZOLE (FLAGYL) 500 mg tablet, Take 1 tablet (500 mg total) by mouth every 12 (twelve) hours for 7 days, Disp: 14 tablet, Rfl: 0  •  SYRINGE-NEEDLE, DISP, 3 ML 25G X 1\" 3 ML MISC, Use every 30 (thirty) days, Disp: 50 each, Rfl: 0  •  diclofenac (VOLTAREN) 75 mg EC tablet, Take 1 tablet (75 mg total) by mouth 2 (two) times a day (Patient not taking: Reported on 3/20/2023), Disp: 60 tablet, Rfl: 1    No Known Allergies    Physical Exam    /93   Pulse 85   Resp 16   Ht 5' 7\" (1 702 m)   Wt 70 3 kg (155 lb)   BMI 24 28 kg/m²     Constitutional:normal, well developed, well nourished, alert, in no distress and non-toxic and no overt pain behavior  Eyes:anicteric  HEENT:grossly intact  Neck:supple, symmetric, trachea midline and no masses   Pulmonary:even and unlabored  Lungs clear to auscultation  Cardiovascular:No edema or pitting edema present    Heart rate regular  Skin:Normal without rashes or lesions and well hydrated  Psychiatric:Mood and affect appropriate  Neurologic:Cranial Nerves II-XII grossly intact  Musculoskeletal:normal   Gastrointestinal: Bowel sounds positive in all 4 quadrants    Imaging          "

## 2023-04-02 LAB
EST. AVERAGE GLUCOSE BLD GHB EST-MCNC: 100 MG/DL
HBA1C MFR BLD: 5.1 %

## 2023-04-06 ENCOUNTER — TRANSCRIBE ORDERS (OUTPATIENT)
Dept: PAIN MEDICINE | Facility: CLINIC | Age: 48
End: 2023-04-06

## 2023-04-10 DIAGNOSIS — Z96.89 SPINAL CORD STIMULATOR STATUS: Primary | ICD-10-CM

## 2023-04-10 PROBLEM — G89.29 CHRONIC PAIN OF RIGHT KNEE: Status: ACTIVE | Noted: 2018-06-22

## 2023-04-10 RX ORDER — CEPHALEXIN 500 MG/1
500 CAPSULE ORAL EVERY 12 HOURS SCHEDULED
Qty: 14 CAPSULE | Refills: 0 | Status: SHIPPED | OUTPATIENT
Start: 2023-04-10 | End: 2023-04-13

## 2023-04-11 ENCOUNTER — TELEPHONE (OUTPATIENT)
Dept: PAIN MEDICINE | Facility: CLINIC | Age: 48
End: 2023-04-11

## 2023-04-11 NOTE — TELEPHONE ENCOUNTER
I am rescheduling pts SCS trial, however pt has limited availability in May  I offered her Anastacia Baum 05/08 for the procedure, but she goes away on Sat 05/13, and Victorino Morel is not here on Fri 05/12- she is in [facility]  Next available date that pt could do the procedure appt would be on Mon 05/22, but again Victorino Morel is not in on Fri 05/26, and Mon 05/29 is Memorial Day   Is pt able to come in on Tues 05/30 for the removal? Or would that be too long for a trial?

## 2023-04-13 DIAGNOSIS — Z96.89 SPINAL CORD STIMULATOR STATUS: Primary | ICD-10-CM

## 2023-04-13 RX ORDER — CEPHALEXIN 500 MG/1
500 CAPSULE ORAL EVERY 12 HOURS SCHEDULED
Qty: 16 CAPSULE | Refills: 0 | Status: SHIPPED | OUTPATIENT
Start: 2023-04-13 | End: 2023-04-21

## 2023-04-13 NOTE — TELEPHONE ENCOUNTER
Please advise patient 8-day course of Keflex has been sent to pharmacy  She will take 1 tab at night Sunday, April 30 and then Monday, May 1 in the morning as preop prophylaxis and then continue with Keflex for 7 days  Thank you

## 2023-04-13 NOTE — TELEPHONE ENCOUNTER
Per KW, rescheduled SCS trial to [facility] 05/01/23 arriving at 3pm, lead removal is Mon 05/08/23 at 6:45am with Nohemi Vidal  Pt denies blood thinners and NSAIDs (does not take celebrex which is on med list)  Lab work completed previously, does not need to repeat  Went over pre procedure instructions, NPO 1 hr prior, if sick or on abx needs to call to rs, wear loose, comf clothing- no buttons/zippers, needs   Pt verbalized understanding  Pt's SCS trial on 04/10 was aborted, needs oral antibiotics ordered  Please send task to clinical to go over those instructions w pt

## 2023-04-22 ENCOUNTER — HOSPITAL ENCOUNTER (OUTPATIENT)
Dept: ULTRASOUND IMAGING | Facility: HOSPITAL | Age: 48
Discharge: HOME | End: 2023-04-22

## 2023-04-22 DIAGNOSIS — D25.9 UTERINE LEIOMYOMA, UNSPECIFIED LOCATION: ICD-10-CM

## 2023-04-24 ENCOUNTER — TELEMEDICINE (OUTPATIENT)
Dept: BEHAVIORAL/MENTAL HEALTH CLINIC | Facility: CLINIC | Age: 48
End: 2023-04-24

## 2023-04-24 DIAGNOSIS — F33.1 MODERATE EPISODE OF RECURRENT MAJOR DEPRESSIVE DISORDER (HCC): Primary | ICD-10-CM

## 2023-04-24 NOTE — PATIENT INSTRUCTIONS
Continue to take all medications as prescribed  Attend all scheduled medical appointments    Follow up with therapist     If you are experiencing a mental health emergency, please call 911 for emergent care, or one of the following numbers for someone to talk to 24 hours a day, 7 days a week:  [facility] Intervention:  [address] Crisis Intervention: [address] [facility] Crisis Intervention: 164-127-1577  [address]:  Text PA to 864963  PA's support and referral hotline: 100.685.4838  Suicide Prevention Lifeline:  120.123.6999

## 2023-04-24 NOTE — PSYCH
Virtual Regular Visit    Verification of patient location:    Patient is located at Home in the following state in which I hold an active license [place]      Assessment/Plan:    Problem List Items Addressed This Visit        Other    Moderate episode of recurrent major depressive disorder ([facility] [address] ) - Primary          Reason for visit is   Chief Complaint   Patient presents with   • Virtual Regular Visit        Encounter provider Colin Fountain    Provider located at [address] [facility]  [address] [facility] [address]  [place] [address] [address]  [phone]      Recent Visits  No visits were found meeting these conditions  Showing recent visits within past 7 days and meeting all other requirements  Today's Visits  Date Type Provider Dept   04/24/23 Telemedicine Colin Fountain [facility] [place]   Showing today's visits and meeting all other requirements  Future Appointments  No visits were found meeting these conditions  Showing future appointments within next 150 days and meeting all other requirements       The patient was identified by name and date of birth  Abdelrahman Mchugh was informed that this is a telemedicine visit and that the visit is being conducted throughthe [employer] platform  She agrees to proceed     My office door was closed  No one else was in the room  She acknowledged consent and understanding of privacy and security of the video platform  The patient has agreed to participate and understands they can discontinue the visit at any time  Patient is aware this is a billable service  Rhesa Homans is a 56 y o  female presenting to this virtual session for the supportive treatment of depression  Pt shared ambivalence re upcoming SCS implant  She was not able to begin trial process last week as tech was not able to draw blood    Pt admits that she did not drink enough water due to travel from parents home  Discussed possible self sabotage  Pt had been visiting parents as fa has been diagnosed with lung cancer, advancing from stage 2 to 3 in a few months  He has hx of stomach cancer and pt is concerned he will not survive another fernando  Fam is planning cruise in May          Past Medical History:   Diagnosis Date   • Abnormal Pap smear of cervix    • Anemia    • Arthritis    • Depression    • Edema    • Fatigue    • Kidney stone    • Vitamin B12 deficiency    • Vitamin D deficiency        Past Surgical History:   Procedure Laterality Date   • BREAST BIOPSY  10/24/2019   • CHOLECYSTECTOMY     • ENDOMETRIAL ABLATION     • KNEE ARTHROSCOPY Bilateral    • KNEE SURGERY     • ORIF TIBIA FRACTURE Right 01/18/2018    Procedure: OPEN REDUCTION W/ INTERNAL FIXATION (ORIF) TIBIA;  Surgeon: Marek Bar MD;  [facility];  Service: Orthopedics   • ORIF TIBIAL PLATEAU Right 94/15/0373    Procedure: OPEN REDUCTION W/ INTERNAL FIXATION (ORIF) TIBIAL PLATEAU;  Surgeon: Marek Bar MD;  [facility];  Service: Orthopedics   • IA LAPAROSCOPY SURG CHOLECYSTECTOMY N/A 08/12/2021    Procedure: LAPAROSCOPIC CHOLECYSTECTOMY, UMBILICAL HERNIA REPAIR;  Surgeon: Brandon Luke MD;  [facility];  Service: General   • IA REMOVAL IMPLANT DEEP Right 08/02/2018    Procedure: REMOVAL HARDWARE TIBIA;  Surgeon: Marek Bar MD;  [facility];  Service: Orthopedics   • TUBAL LIGATION     • US GUIDED BREAST BIOPSY LEFT COMPLETE Left 10/24/2019       Current Outpatient Medications   Medication Sig Dispense Refill   • acetaminophen (TYLENOL) 325 mg tablet 650 mg every 6 hours for mild pain 30 tablet 0   • amitriptyline (ELAVIL) 150 MG tablet Take 1 tablet (150 mg total) by mouth daily at bedtime 90 tablet 1   • busPIRone (BUSPAR) 5 mg tablet Take 1 tablet (5 mg total) by mouth 2 (two) times a day 60 tablet 1   • Cholecalciferol (Vitamin D3) 50 MCG (2000 UT) capsule Take 1 "capsule (2,000 Units total) by mouth daily 90 capsule 1   • cyanocobalamin 1,000 mcg/mL Inject 1 mL (1,000 mcg total) into a muscle every 14 (fourteen) days x4 then resume monthly dosing 10 mL 0   • diclofenac (VOLTAREN) 75 mg EC tablet Take 1 tablet (75 mg total) by mouth 2 (two) times a day (Patient not taking: Reported on 3/20/2023) 60 tablet 1   • ergocalciferol (VITAMIN D2) 50,000 units Take 1 capsule (50,000 Units total) by mouth once a week 12 capsule 0   • SYRINGE-NEEDLE, DISP, 3 ML 25G X 1\" 3 ML MISC Use every 30 (thirty) days 50 each 0     No current facility-administered medications for this visit  Behavioral Health Psychotherapy Progress Note    Psychotherapy Provided: Individual Psychotherapy     1  Moderate episode of recurrent major depressive disorder (HCC)          DATA: Tabatha Magana is a 56 y o  female presenting to this virtual session for the supportive treatment of depression  Pt shared ambivalence re upcoming SCS implant  She was not able to begin trial process last week as tech was not able to draw blood  Pt admits that she did not drink enough water due to travel from parents home  Discussed possible self sabotage  Pt had been visiting parents as fa has been diagnosed with lung cancer, advancing from stage 2 to 3 in a few months  He has hx of stomach cancer and pt is concerned he will not survive another fernando  [facility] is planning cruise in May  Pt shared concern that Mo will not be able to care for self in absence of Fa  Pt did complete drama triangle and determined she is victim with Mo and rescuer with children  She notes these roles play out in other relationships  She notes SO and Mo are similar  She notes that she can become aggressive verbally/perpetrator when she becomes overwhelmed  She did call [facility] meeting yesterday and practiced assertiveness with her needs including financial responsibilities of maintaining home with 7 adults in home        During this session, this " "clinician used the following therapeutic modalities: Cognitive Behavioral Therapy, Mindfulness-based Strategies and Supportive Psychotherapy    Substance Abuse was not addressed during this session  If the client is diagnosed with a co-occurring substance use disorder, please indicate any changes in the frequency or amount of use: NA  Stage of change for addressing substance use diagnoses: No substance use/Not applicable    ASSESSMENT:  Christopher Morataya presents with a Dysthymic and anxious mood  her affect is Normal range and intensity, which is congruent, with her mood and the content of the session  The client has made progress on their goals  Christopher Morataya presents with a minimal risk of suicide, minimal risk of self-harm, and minimal risk of harm to others  For any risk assessment that surpasses a \"low\" rating, a safety plan must be developed  A safety plan was indicated: no  If yes, describe in detail NA    PLAN: Between sessions, Christopher Morataya will complete \"Meeting Our Fam Needs  \"    At the next session, the therapist will use Cognitive Behavioral Therapy, Mindfulness-based Strategies and Supportive Psychotherapy to address depression       Visit start and stop times:    04/24/23  Start Time: 0807  Stop Time: 3191  Total Visit Time: 52 minutes    "

## 2023-05-01 ENCOUNTER — HOSPITAL ENCOUNTER (OUTPATIENT)
Dept: RADIOLOGY | Facility: HOSPITAL | Age: 48
Discharge: HOME | End: 2023-05-01
Attending: PHYSICAL MEDICINE & REHABILITATION

## 2023-05-01 ENCOUNTER — TELEPHONE (OUTPATIENT)
Dept: RADIOLOGY | Facility: CLINIC | Age: 48
End: 2023-05-01

## 2023-05-01 ENCOUNTER — HOSPITAL ENCOUNTER (OUTPATIENT)
Dept: RADIOLOGY | Facility: CLINIC | Age: 48
Discharge: HOME | End: 2023-05-01

## 2023-05-01 VITALS
HEART RATE: 72 BPM | TEMPERATURE: 99 F | OXYGEN SATURATION: 98 % | SYSTOLIC BLOOD PRESSURE: 145 MMHG | RESPIRATION RATE: 18 BRPM | DIASTOLIC BLOOD PRESSURE: 74 MMHG

## 2023-05-01 DIAGNOSIS — Z96.89 SPINAL CORD STIMULATOR STATUS: ICD-10-CM

## 2023-05-01 DIAGNOSIS — G89.29 CHRONIC PAIN OF RIGHT KNEE: ICD-10-CM

## 2023-05-01 DIAGNOSIS — M54.16 LUMBAR RADICULOPATHY: ICD-10-CM

## 2023-05-01 DIAGNOSIS — Z96.89 SPINAL CORD STIMULATOR STATUS: Primary | ICD-10-CM

## 2023-05-01 DIAGNOSIS — M25.561 CHRONIC PAIN OF RIGHT KNEE: ICD-10-CM

## 2023-05-01 DIAGNOSIS — G89.4 CHRONIC PAIN SYNDROME: ICD-10-CM

## 2023-05-01 DIAGNOSIS — M17.31 POST-TRAUMATIC OSTEOARTHRITIS OF RIGHT KNEE: ICD-10-CM

## 2023-05-01 NOTE — H&P
History of Present Illness:  The patient is a 56 y o  female who presents with complaints of low back pain    Past Medical History:   Diagnosis Date    Abnormal Pap smear of cervix     Anemia     Arthritis     Depression     Edema     Fatigue     Kidney stone     Vitamin B12 deficiency     Vitamin D deficiency        Past Surgical History:   Procedure Laterality Date    BREAST BIOPSY  10/24/2019    CHOLECYSTECTOMY      ENDOMETRIAL ABLATION      KNEE ARTHROSCOPY Bilateral     KNEE SURGERY      ORIF TIBIA FRACTURE Right 01/18/2018    Procedure: OPEN REDUCTION W/ INTERNAL FIXATION (ORIF) TIBIA;  Surgeon: Jasmina Mayo MD;  [facility];  Service: Orthopedics    ORIF TIBIAL PLATEAU Right 45/58/0499    Procedure: OPEN REDUCTION W/ INTERNAL FIXATION (ORIF) TIBIAL PLATEAU;  Surgeon: Jasmina Mayo MD;  [facility];  Service: Orthopedics    SD LAPAROSCOPY SURG CHOLECYSTECTOMY N/A 08/12/2021    Procedure: LAPAROSCOPIC CHOLECYSTECTOMY, UMBILICAL HERNIA REPAIR;  Surgeon: Elvin Shay MD;  [facility];  Service: General    SD REMOVAL IMPLANT DEEP Right 08/02/2018    Procedure: REMOVAL HARDWARE TIBIA;  Surgeon: Jasmina Mayo MD;  [facility];  Service: Orthopedics    TUBAL LIGATION      US GUIDED BREAST BIOPSY LEFT COMPLETE Left 10/24/2019         Current Outpatient Medications:     acetaminophen (TYLENOL) 325 mg tablet, 650 mg every 6 hours for mild pain, Disp: 30 tablet, Rfl: 0    amitriptyline (ELAVIL) 150 MG tablet, Take 1 tablet (150 mg total) by mouth daily at bedtime, Disp: 90 tablet, Rfl: 1    busPIRone (BUSPAR) 5 mg tablet, Take 1 tablet (5 mg total) by mouth 2 (two) times a day, Disp: 60 tablet, Rfl: 1    Cholecalciferol (Vitamin D3) 50 MCG (2000 UT) capsule, Take 1 capsule (2,000 Units total) by mouth daily, Disp: 90 capsule, Rfl: 1    cyanocobalamin 1,000 mcg/mL, Inject 1 mL (1,000 mcg total) into a muscle every 14 (fourteen) days x4 then resume monthly "dosing, Disp: 10 mL, Rfl: 0    diclofenac (VOLTAREN) 75 mg EC tablet, Take 1 tablet (75 mg total) by mouth 2 (two) times a day (Patient not taking: Reported on 3/20/2023), Disp: 60 tablet, Rfl: 1    ergocalciferol (VITAMIN D2) 50,000 units, Take 1 capsule (50,000 Units total) by mouth once a week, Disp: 12 capsule, Rfl: 0    SYRINGE-NEEDLE, DISP, 3 ML 25G X 1\" 3 ML MISC, Use every 30 (thirty) days, Disp: 50 each, Rfl: 0    Current Facility-Administered Medications:     lidocaine-epinephrine (XYLOCAINE-MPF/EPINEPHRINE) 1%-1:200,000 injection 5 mL, 5 mL, Infiltration, Once, Claude Malone, DO    No Known Allergies    Physical Exam:   Vitals:    05/01/23 1531   BP: 141/83   Pulse: 72   Resp: 18   Temp: 99 °F (37 2 °C)   SpO2: 98%     General: Awake, Alert, Oriented x 3, Mood and affect appropriate  Respiratory: Respirations even and unlabored  Cardiovascular: Peripheral pulses intact; no edema  Musculoskeletal Exam: Tenderness palpation bilateral lumbar paraspinals    ASA Score: 2    Patient/Chart Verification  Patient ID Verified: Verbal  ID Band Applied: No  Consents Confirmed: Procedural, To be obtained in the Pre-Procedure area  H&P( within 30 days) Verified: To be obtained in the Pre-Procedure area  Interval H&P(within 24 hr) Complete (required for Outpatients and Surgery Admit only): To be obtained in the Pre-Procedure area  Allergies Reviewed: Yes  Anticoag/NSAID held?: NA  Currently on antibiotics?: Yes (8 day course of Keflex prescr by Viry Chambers for prophylactic)  Pregnancy denied?: NA    Assessment:   1  Chronic pain syndrome    2  Chronic pain of right knee    3  Lumbar radiculopathy    4   Post-traumatic osteoarthritis of right knee        Plan: [employer] SCS TRIAL  "

## 2023-05-01 NOTE — DISCHARGE INSTR - LAB
"SPINE AND PAIN: SPINAL CORD STIMULATION/PERIPHERAL NERVE STIMULATION TRIAL/ PERMANENT IMPLANT DISCHARGE INSTRUCTIONS    ACTIVITY RESTRICTIONS DURING TRIAL PERIOD  Do not drive with the SCS \"on\"  Do not bend or twist at the waist   Do not lift anything that weighs over 5 pounds  When you lie down, \"log roll\" (keep spine aligned) to change positions  Do not sleep on your stomach  Limit stair climbing and strenuous activity  CARE OF THE INJECTION SITE  CHECK THE DRESSING DAILY  It should intact  Notify the [facility] if you have any of the following: redness, drainage, swelling, chills or fever over 100°F   Do not change dressing, only reinforce edges if necessary  Keep the dressing dry  Do not shower, (sponge baths only) until evaluated in office  SPECIAL INSTRUCTIONS  Take your temperature daily  Follow-up for lead removal scheduled for 5/8 at 7 am with Malka Ganser  The  box is expensive  DO NOT drop or get wet  Do not pull on the cable or leads  Do not disconnect the cable and lead  Do activities that normally cause you to have pain and try different  settings  Obtain post procedure x-ray shortly after procedure if ordered by physician  MEDICATIONS  Continue to take all routine medications  Our office may have instructed you to hold some medications  Please continue to hold all NSAIDS    Take antiobiotic as prescribed:    If you have any problems specifically related to your procedure, please call our office at [phone]  Problems not related to your procedure should be directed at your primary care physician  Contact the company representative with any questions regarding settings or operation of the external  box  As no general anesthesia was used in today's procedure, you should not experience any side effects related to anesthesia      "

## 2023-05-01 NOTE — TELEPHONE ENCOUNTER
Federico 5/2 s/p [employer] SCS trial with KW at [facility] removal 5/8 at 7 am with Dearl   Pt already took PO abx prior to SCS trial, per LISA pt does not need to take post op abx

## 2023-05-02 NOTE — TELEPHONE ENCOUNTER
S/w pt who states that the throbbing in her leg has improved since SCS lead placed and she has not made any adjustments yet as per rep recommendation  Pt is having soreness at procedure site and Tylenol is helping the pain  Pt will return to work tomorrow  Pt denies signs of infection or fever  Confirmed lead removal appt

## 2023-05-03 ENCOUNTER — TRANSCRIBE ORDERS (OUTPATIENT)
Dept: PAIN MEDICINE | Facility: CLINIC | Age: 48
End: 2023-05-03

## 2023-05-08 ENCOUNTER — OFFICE VISIT (OUTPATIENT)
Dept: PAIN MEDICINE | Facility: CLINIC | Age: 48
End: 2023-05-08

## 2023-05-08 ENCOUNTER — HOSPITAL ENCOUNTER (OUTPATIENT)
Dept: RADIOLOGY | Facility: HOSPITAL | Age: 48
Discharge: HOME | End: 2023-05-08

## 2023-05-08 ENCOUNTER — TELEMEDICINE (OUTPATIENT)
Dept: BEHAVIORAL/MENTAL HEALTH CLINIC | Facility: CLINIC | Age: 48
End: 2023-05-08

## 2023-05-08 VITALS
BODY MASS INDEX: 23.86 KG/M2 | SYSTOLIC BLOOD PRESSURE: 113 MMHG | DIASTOLIC BLOOD PRESSURE: 78 MMHG | HEART RATE: 84 BPM | RESPIRATION RATE: 18 BRPM | HEIGHT: 67 IN | WEIGHT: 152 LBS

## 2023-05-08 DIAGNOSIS — M25.561 CHRONIC PAIN OF RIGHT KNEE: ICD-10-CM

## 2023-05-08 DIAGNOSIS — F33.1 MODERATE EPISODE OF RECURRENT MAJOR DEPRESSIVE DISORDER (HCC): Primary | ICD-10-CM

## 2023-05-08 DIAGNOSIS — G89.4 CHRONIC PAIN SYNDROME: Primary | ICD-10-CM

## 2023-05-08 DIAGNOSIS — G89.29 CHRONIC RIGHT-SIDED LOW BACK PAIN WITH RIGHT-SIDED SCIATICA: ICD-10-CM

## 2023-05-08 DIAGNOSIS — G89.29 CHRONIC PAIN OF RIGHT KNEE: ICD-10-CM

## 2023-05-08 DIAGNOSIS — M54.41 CHRONIC RIGHT-SIDED LOW BACK PAIN WITH RIGHT-SIDED SCIATICA: ICD-10-CM

## 2023-05-08 NOTE — PROGRESS NOTES
Assessment:  No diagnosis found  Plan:  ***    {Oral Swab Statement:75624}    {Opioid Statement:88490}    {UDS Statement:98179}    {PDMP Statement:37100}    {Pain Management Procedure Statement:52508}    My impressions and treatment recommendations were discussed in detail with the patient who verbalized understanding and had no further questions  Discharge instructions were provided  I personally saw and examined the patient and I agree with the above discussed plan of care  No orders of the defined types were placed in this encounter  No orders of the defined types were placed in this encounter  History of Present Illness:  Jose Jones is a 56 y o  female who presents for a follow up office visit in regards to No chief complaint on file      The patient’s current symptoms include ***    {SPA Pain Assessment:34472}    {Opioid Contract EHIR:83162}    I have personally reviewed and/or updated the patient's past medical history, past surgical history, family history, social history, current medications, allergies, and vital signs today  Review of Systems   Respiratory: Negative for shortness of breath  Cardiovascular: Negative for chest pain  Gastrointestinal: Negative for constipation, diarrhea, nausea and vomiting  Musculoskeletal: Positive for back pain, gait problem and joint swelling  Negative for arthralgias and myalgias  BLE Pain   Skin: Negative for rash  Neurological: Negative for dizziness, seizures and weakness  All other systems reviewed and are negative        Patient Active Problem List   Diagnosis   • Ventricular bigeminy   • Vitamin B12 deficiency   • Vitamin D deficiency   • Asymptomatic PVCs   • S/P ORIF (open reduction internal fixation) fracture   • Chronic pain of right knee   • Patellofemoral disorder of right knee   • Moderate episode of recurrent major depressive disorder (HCC)   • Post-traumatic osteoarthritis of right knee   • Lumbar radiculopathy   • Arthritis of right knee   • Anxiety   • Papanicolaou smear of cervix with atypical squamous cells cannot exclude high grade squamous intraepithelial lesion (ASC-H)   • Well woman exam   • S/P tubal ligation   • Gastric polyps   • Microscopic hematuria   • Menstrual periods irregular   • Chronic pain syndrome   • Uterine leiomyoma       Past Medical History:   Diagnosis Date   • Abnormal Pap smear of cervix    • Anemia    • Arthritis    • Depression    • Edema    • Fatigue    • Kidney stone    • Vitamin B12 deficiency    • Vitamin D deficiency        Past Surgical History:   Procedure Laterality Date   • BREAST BIOPSY  10/24/2019   • CHOLECYSTECTOMY     • ENDOMETRIAL ABLATION     • KNEE ARTHROSCOPY Bilateral    • KNEE SURGERY     • ORIF TIBIA FRACTURE Right 01/18/2018    Procedure: OPEN REDUCTION W/ INTERNAL FIXATION (ORIF) TIBIA;  Surgeon: Evie Davis MD;  [facility];  Service: Orthopedics   • ORIF TIBIAL PLATEAU Right 03/50/4348    Procedure: OPEN REDUCTION W/ INTERNAL FIXATION (ORIF) TIBIAL PLATEAU;  Surgeon: Evie Davis MD;  [facility];  Service: Orthopedics   • MD LAPAROSCOPY SURG CHOLECYSTECTOMY N/A 08/12/2021    Procedure: Pedrito De La Rosa, UMBILICAL HERNIA REPAIR;  Surgeon: Valerie Sanford MD;  [facility];  Service: General   • MD REMOVAL IMPLANT DEEP Right 08/02/2018    Procedure: REMOVAL HARDWARE TIBIA;  Surgeon: Evie Davis MD;  [facility];  Service: Orthopedics   • TUBAL LIGATION     • US GUIDED BREAST BIOPSY LEFT COMPLETE Left 10/24/2019       Family History   Problem Relation Age of Onset   • Cancer Mother         either cervical or ovarian   • Heart disease Mother         heart surgery   • Heart attack Father         stent   • Diabetes Father    • Hypertension Father    • Cancer Father         Stomach and Lung Cancer   • Rheum arthritis Daughter    • No Known Problems Daughter    • Lupus Maternal Grandmother    • No Known Problems Maternal "Grandfather    • No Known Problems Paternal Grandmother    • No Known Problems Paternal Grandfather    • No Known Problems Maternal Aunt    • No Known Problems Maternal Aunt    • No Known Problems Maternal Aunt    • No Known Problems Paternal Aunt    • Lupus Cousin    • Arthritis Family    • Stomach cancer Family    • Ovarian cancer Family    • No Known Problems Son    • Alcohol abuse Neg Hx    • Depression Neg Hx    • Drug abuse Neg Hx    • Substance Abuse Neg Hx    • Mental illness Neg Hx        Social History     Occupational History   • Occupation: Teacher in [place]   Tobacco Use   • Smoking status: Never   • Smokeless tobacco: Never   Vaping Use   • Vaping Use: Never used   Substance and Sexual Activity   • Alcohol use: Yes     Comment: Occassional   • Drug use: No   • Sexual activity: Yes     Partners: Male     Birth control/protection: Condom Male, Female Sterilization       Current Outpatient Medications on File Prior to Visit   Medication Sig   • acetaminophen (TYLENOL) 325 mg tablet 650 mg every 6 hours for mild pain   • amitriptyline (ELAVIL) 150 MG tablet Take 1 tablet (150 mg total) by mouth daily at bedtime   • busPIRone (BUSPAR) 5 mg tablet Take 1 tablet (5 mg total) by mouth 2 (two) times a day   • Cholecalciferol (Vitamin D3) 50 MCG (2000 UT) capsule Take 1 capsule (2,000 Units total) by mouth daily   • cyanocobalamin 1,000 mcg/mL Inject 1 mL (1,000 mcg total) into a muscle every 14 (fourteen) days x4 then resume monthly dosing   • diclofenac (VOLTAREN) 75 mg EC tablet Take 1 tablet (75 mg total) by mouth 2 (two) times a day (Patient not taking: Reported on 3/20/2023)   • ergocalciferol (VITAMIN D2) 50,000 units Take 1 capsule (50,000 Units total) by mouth once a week   • SYRINGE-NEEDLE, DISP, 3 ML 25G X 1\" 3 ML MISC Use every 30 (thirty) days     No current facility-administered medications on file prior to visit         No Known Allergies    Physical Exam:    There were no vitals taken for this " "visit  Constitutional:{General Appearance:54206::\"normal, well developed, well nourished, alert, in no distress and non-toxic and no overt pain behavior  \"}  Eyes:{Sclera:58045::\"anicteric\"}  HEENT:{Hearin::\"grossly intact\"}  Neck:{Neck:18639::\"supple, symmetric, trachea midline and no masses \"}  Pulmonary:{Respiratory effort:30014::\"even and unlabored\"}  Cardiovascular:{Examination of Extremities:18027::\"No edema or pitting edema present\"}  Skin:{Skin and Subcutaneous tissues:02610::\"Normal without rashes or lesions and well hydrated\"}  Psychiatric:{Mood and Affect:06608::\"Mood and affect appropriate\"}  Neurologic:{Cranial Nerves:79774::\"Cranial Nerves II-XII grossly intact\"}  Musculoskeletal:{[place]:67762::\"normal\"}    Imaging    "

## 2023-05-08 NOTE — PROGRESS NOTES
Assessment  1  Chronic pain syndrome        2  Chronic pain of right knee  XR knee 3 vw right non injury      3  Chronic right-sided low back pain with right-sided sciatica            Plan  The patient had a unsuccessful [employer] spinal cord stimulator trial   She states she did not receive much relief following the spinal cord stimulator trial and does not want to proceed with permanent implantation  The patient is reporting low back pain and pain into her right lower extremity and right knee pain  I will x-ray her right knee to evaluate for any pathology causing her right knee pain  I instructed patient I will call once I receive the results  The patient was advised to complete their antibiotics course  Orders Placed This Encounter   Procedures   • XR knee 3 vw right non injury     Standing Status:   Future     Number of Occurrences:   1     Standing Expiration Date:   5/8/2027     Scheduling Instructions:      Bring along any outside films relating to this procedure  Order Specific Question:   Is the patient pregnant? Answer:   No       History of Present Illness    The patient is a 56 y o  female status post Medtr[employer] spinal cord stimulator percutaneous lead placement on 5/1/2023  The patient's reported an overall reduction in pain of 20 to 30% during the trial   The patient reported no real improvement in her pain with activity  Pain Assessment Measures   Numeric Rating Scale 8   Oswestry Disability Index Score/Neck Disability Index Score 58   PROMIS-29   - Physical Function 12   - Anxiety 15   - Depression 9   - Fatigue 15   - Sleep Disturbance 15   - Ability to Participate in Social Roles And Activities 11   - Pain Interference 15     Review of Systems   Respiratory: Negative for shortness of breath  Cardiovascular: Negative for chest pain  Gastrointestinal: Negative for constipation, diarrhea, nausea and vomiting     Musculoskeletal: Positive for back pain, gait problem and joint swelling  Negative for arthralgias and myalgias  BLE Pain   Skin: Negative for rash  Neurological: Negative for dizziness, seizures and weakness  All other systems reviewed and are negative        Patient Active Problem List   Diagnosis   • Ventricular bigeminy   • Vitamin B12 deficiency   • Vitamin D deficiency   • Asymptomatic PVCs   • S/P ORIF (open reduction internal fixation) fracture   • Chronic pain of right knee   • Patellofemoral disorder of right knee   • Moderate episode of recurrent major depressive disorder (HCC)   • Post-traumatic osteoarthritis of right knee   • Lumbar radiculopathy   • Arthritis of right knee   • Anxiety   • Papanicolaou smear of cervix with atypical squamous cells cannot exclude high grade squamous intraepithelial lesion (ASC-H)   • Well woman exam   • S/P tubal ligation   • Gastric polyps   • Microscopic hematuria   • Menstrual periods irregular   • Chronic pain syndrome   • Uterine leiomyoma        Past Medical History:   Diagnosis Date   • Abnormal Pap smear of cervix    • Anemia    • Arthritis    • Depression    • Edema    • Fatigue    • Kidney stone    • Vitamin B12 deficiency    • Vitamin D deficiency        Past Surgical History:   Procedure Laterality Date   • BREAST BIOPSY  10/24/2019   • CHOLECYSTECTOMY     • ENDOMETRIAL ABLATION     • KNEE ARTHROSCOPY Bilateral    • KNEE SURGERY     • ORIF TIBIA FRACTURE Right 01/18/2018    Procedure: OPEN REDUCTION W/ INTERNAL FIXATION (ORIF) TIBIA;  Surgeon: Evie Davis MD;  [facility];  Service: Orthopedics   • ORIF TIBIAL PLATEAU Right 83/22/7046    Procedure: OPEN REDUCTION W/ INTERNAL FIXATION (ORIF) TIBIAL PLATEAU;  Surgeon: Evie Davis MD;  [facility];  Service: Orthopedics   • ID LAPAROSCOPY SURG CHOLECYSTECTOMY N/A 08/12/2021    Procedure: LAPAROSCOPIC CHOLECYSTECTOMY, UMBILICAL HERNIA REPAIR;  Surgeon: Valerie Sanford MD;  [facility];  Service: General   • ID REMOVAL IMPLANT DEEP Right 08/02/2018    Procedure: REMOVAL HARDWARE TIBIA;  Surgeon: Jerome Sheffield MD;  [facility];  Service: Orthopedics   • TUBAL LIGATION     • US GUIDED BREAST BIOPSY LEFT COMPLETE Left 10/24/2019       Family History   Problem Relation Age of Onset   • Cancer Mother         either cervical or ovarian   • Heart disease Mother         heart surgery   • Heart attack Father         stent   • Diabetes Father    • Hypertension Father    • Cancer Father         Stomach and Lung Cancer   • Rheum arthritis Daughter    • No Known Problems Daughter    • Lupus Maternal Grandmother    • No Known Problems Maternal Grandfather    • No Known Problems Paternal Grandmother    • No Known Problems Paternal Grandfather    • No Known Problems Maternal Aunt    • No Known Problems Maternal Aunt    • No Known Problems Maternal Aunt    • No Known Problems Paternal Aunt    • Lupus Cousin    • Arthritis Family    • Stomach cancer Family    • Ovarian cancer Family    • No Known Problems Son    • Alcohol abuse Neg Hx    • Depression Neg Hx    • Drug abuse Neg Hx    • Substance Abuse Neg Hx    • Mental illness Neg Hx        Social History     Occupational History   • Occupation: Teacher in [place]   Tobacco Use   • Smoking status: Never   • Smokeless tobacco: Never   Vaping Use   • Vaping Use: Never used   Substance and Sexual Activity   • Alcohol use: Yes     Comment: Occassional   • Drug use: No   • Sexual activity: Yes     Partners: Male     Birth control/protection: Condom Male, Female Sterilization         Current Outpatient Medications:   •  acetaminophen (TYLENOL) 325 mg tablet, 650 mg every 6 hours for mild pain, Disp: 30 tablet, Rfl: 0  •  amitriptyline (ELAVIL) 150 MG tablet, Take 1 tablet (150 mg total) by mouth daily at bedtime, Disp: 90 tablet, Rfl: 1  •  busPIRone (BUSPAR) 5 mg tablet, Take 1 tablet (5 mg total) by mouth 2 (two) times a day, Disp: 60 tablet, Rfl: 1  •  Cholecalciferol (Vitamin D3) 50 MCG (2000 UT) "capsule, Take 1 capsule (2,000 Units total) by mouth daily, Disp: 90 capsule, Rfl: 1  •  cyanocobalamin 1,000 mcg/mL, Inject 1 mL (1,000 mcg total) into a muscle every 14 (fourteen) days x4 then resume monthly dosing, Disp: 10 mL, Rfl: 0  •  ergocalciferol (VITAMIN D2) 50,000 units, Take 1 capsule (50,000 Units total) by mouth once a week, Disp: 12 capsule, Rfl: 0  •  SYRINGE-NEEDLE, DISP, 3 ML 25G X 1\" 3 ML MISC, Use every 30 (thirty) days, Disp: 50 each, Rfl: 0  •  diclofenac (VOLTAREN) 75 mg EC tablet, Take 1 tablet (75 mg total) by mouth 2 (two) times a day (Patient not taking: Reported on 3/20/2023), Disp: 60 tablet, Rfl: 1    No Known Allergies      Physical Exam    /78   Pulse 84   Resp 18   Ht 5' 7\" (1 702 m)   Wt 68 9 kg (152 lb)   BMI 23 81 kg/m²     Thoracic Spine:  Spinal cord stimulator lead removed with tip intact; no erythema, tenderness or signs of infection; area cleansed with alcohol and bandage placed  "

## 2023-05-08 NOTE — PSYCH
Virtual Regular Visit    Verification of patient location:    Patient is located at Home in the following state in which I hold an active license [place]      Assessment/Plan:    Problem List Items Addressed This Visit        Other    Moderate episode of recurrent major depressive disorder ([facility] [address] ) - Primary            Reason for visit is   Chief Complaint   Patient presents with   • Virtual Regular Visit        Encounter provider Marni Browning    Provider located at [address] [facility]  [address] [facility] [address]  [facility] Suzan Johnson [place] [address] [address]  [phone]      Recent Visits  No visits were found meeting these conditions  Showing recent visits within past 7 days and meeting all other requirements  Today's Visits  Date Type Provider Dept   05/08/23 Telemedicine Marni Browning [facility]   Showing today's visits and meeting all other requirements  Future Appointments  No visits were found meeting these conditions  Showing future appointments within next 150 days and meeting all other requirements       The patient was identified by name and date of birth  Nolberto Console was informed that this is a telemedicine visit and that the visit is being conducted throughthe flaveit platform  She agrees to proceed     My office door was closed  No one else was in the room  She acknowledged consent and understanding of privacy and security of the video platform  The patient has agreed to participate and understands they can discontinue the visit at any time  Patient is aware this is a billable service       Past Medical History:   Diagnosis Date   • Abnormal Pap smear of cervix    • Anemia    • Arthritis    • Depression    • Edema    • Fatigue    • Kidney stone    • Vitamin B12 deficiency    • Vitamin D deficiency        Past Surgical History:   Procedure Laterality Date   • BREAST BIOPSY  10/24/2019   • "CHOLECYSTECTOMY     • ENDOMETRIAL ABLATION     • KNEE ARTHROSCOPY Bilateral    • KNEE SURGERY     • ORIF TIBIA FRACTURE Right 01/18/2018    Procedure: OPEN REDUCTION W/ INTERNAL FIXATION (ORIF) TIBIA;  Surgeon: Augustine Mora MD;  [facility];  Service: Orthopedics   • ORIF TIBIAL PLATEAU Right 20/50/3023    Procedure: OPEN REDUCTION W/ INTERNAL FIXATION (ORIF) TIBIAL PLATEAU;  Surgeon: Augustine Mora MD;  [facility];  Service: Orthopedics   • AZ LAPAROSCOPY SURG CHOLECYSTECTOMY N/A 08/12/2021    Procedure: LAPAROSCOPIC CHOLECYSTECTOMY, UMBILICAL HERNIA REPAIR;  Surgeon: Bin Mcconnell MD;  [facility];  Service: General   • AZ REMOVAL IMPLANT DEEP Right 08/02/2018    Procedure: REMOVAL HARDWARE TIBIA;  Surgeon: Augustine Mora MD;  [facility];  Service: Orthopedics   • TUBAL LIGATION     • US GUIDED BREAST BIOPSY LEFT COMPLETE Left 10/24/2019       Current Outpatient Medications   Medication Sig Dispense Refill   • acetaminophen (TYLENOL) 325 mg tablet 650 mg every 6 hours for mild pain 30 tablet 0   • amitriptyline (ELAVIL) 150 MG tablet Take 1 tablet (150 mg total) by mouth daily at bedtime 90 tablet 1   • busPIRone (BUSPAR) 5 mg tablet Take 1 tablet (5 mg total) by mouth 2 (two) times a day 60 tablet 1   • Cholecalciferol (Vitamin D3) 50 MCG (2000 UT) capsule Take 1 capsule (2,000 Units total) by mouth daily 90 capsule 1   • cyanocobalamin 1,000 mcg/mL Inject 1 mL (1,000 mcg total) into a muscle every 14 (fourteen) days x4 then resume monthly dosing 10 mL 0   • diclofenac (VOLTAREN) 75 mg EC tablet Take 1 tablet (75 mg total) by mouth 2 (two) times a day (Patient not taking: Reported on 3/20/2023) 60 tablet 1   • ergocalciferol (VITAMIN D2) 50,000 units Take 1 capsule (50,000 Units total) by mouth once a week 12 capsule 0   • SYRINGE-NEEDLE, DISP, 3 ML 25G X 1\" 3 ML MISC Use every 30 (thirty) days 50 each 0     No current facility-administered medications for this visit   " "    Behavioral Health Psychotherapy Progress Note    Psychotherapy Provided: Individual Psychotherapy     1  Moderate episode of recurrent major depressive disorder (HCC)            DATA: Brenda Martinez is a 56 y o  female presenting to this virtual session for the supportive treatment of depression  Pt indicates she has not received the expected relief from the scs implant trial   The medical team recommends xray of knee to determine any other possible problems related to her knee  She indicates she is [place], I guess, disappointing  \"  She feels some level of hope for relief as a knee replacement is an option  She has previously pursued permanent disability and was denied  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, such as being an advocate  Pt began discussion of [facility] dynamics this past week with [facility] members however has not received feedback from members  During this session, this clinician used the following therapeutic modalities: Cognitive Behavioral Therapy, Mindfulness-based Strategies and Supportive Psychotherapy    Substance Abuse was not addressed during this session  If the client is diagnosed with a co-occurring substance use disorder, please indicate any changes in the frequency or amount of use: NA  Stage of change for addressing substance use diagnoses: No substance use/Not applicable    ASSESSMENT:  Brenda Martinez presents with a Dysthymic mood her affect is Normal range and intensity, which is congruent, with her mood and the content of the session  Pt seems to struggle with rigid thinking and rejects new info/ideas  The client has not made progress on their goals  Brenda Martinez presents with a minimal risk of suicide, minimal risk of self-harm, and minimal risk of harm to others  For any risk assessment that surpasses a \"low\" rating, a safety plan must be developed      A safety plan was indicated: no  If yes, describe in detail " NA    PLAN: Between sessions, Kay Handing will review info re boundaries/setting boundaries  At the next session, the therapist will use Cognitive Behavioral Therapy, Mindfulness-based Strategies and Supportive Psychotherapy to address asserting needs/setting boundaries      Visit start and stop times:    05/08/23  Start Time: 0825  Stop Time: 6882  Total Visit Time: 27 minutes

## 2023-05-10 ENCOUNTER — TELEPHONE (OUTPATIENT)
Age: 48
End: 2023-05-10

## 2023-05-10 ENCOUNTER — TELEPHONE (OUTPATIENT)
Dept: PAIN MEDICINE | Facility: CLINIC | Age: 48
End: 2023-05-10

## 2023-05-10 DIAGNOSIS — M17.11 ARTHRITIS OF RIGHT KNEE: Primary | ICD-10-CM

## 2023-05-10 NOTE — TELEPHONE ENCOUNTER
Caller: Ronn PETERSON    Doctor/Office: Suma BROWN     Call regarding :  [facility] results     Call was transferred to: Nurse

## 2023-05-10 NOTE — TELEPHONE ENCOUNTER
----- [facility] sent at 5/10/2023 11:14 AM EDT -----  Please call patient with x-ray of right knee showing severe lateral and moderate patellofemoral compartment osteoarthritis  She is likely a surgical candidate, therefore would recommend her being evaluated by an orthopedic surgeon  If she does not want to proceed with Ortho eval first, can try right intra-articular knee injection

## 2023-05-10 NOTE — TELEPHONE ENCOUNTER
S/W pt and advised of the same, pt would like orthopedic surgery referral placed in chart  Pt appreciative of call

## 2023-05-22 ENCOUNTER — TELEMEDICINE (OUTPATIENT)
Dept: BEHAVIORAL/MENTAL HEALTH CLINIC | Facility: CLINIC | Age: 48
End: 2023-05-22

## 2023-05-22 DIAGNOSIS — F33.1 MODERATE EPISODE OF RECURRENT MAJOR DEPRESSIVE DISORDER (HCC): Primary | ICD-10-CM

## 2023-05-22 NOTE — PATIENT INSTRUCTIONS
Continue to take all medications as prescribed  Attend all scheduled medical appointments    Follow up with therapist     If you are experiencing a mental health emergency, please call 911 for emergent care, or one of the following numbers for someone to talk to 24 hours a day, 7 days a week:  [facility] Intervention:  [address] Crisis Intervention: [address] [facility] Crisis Intervention: [phone]  [address]:  Text [place] to 348430  [place]'s support and referral hotline: 720.945.3498  Suicide Prevention Lifeline:  849.288.8784

## 2023-05-22 NOTE — BH TREATMENT PLAN
"Outpatient Behavioral Health Psychotherapy Treatment Plan    Naomy Baer  1975     Date of Initial Psychotherapy Assessment:    Date of Current Treatment Plan: 05/22/23  Treatment Plan Target Date: 11/21/2023  Treatment Plan Expiration Date: 11/21/2023    Diagnosis:   1  Moderate episode of recurrent major depressive disorder (HCC)            Area(s) of Need: Boundaries     Long Term Goal 1 (in the client's own words): Be able to stick to what I know I need to do for myself    Target Date for completion: 11/21/2023     Anticipated therapeutic modalities: CBT, Mindfulness Strategies, Supportive therapy     People identified to complete this goal: Ronn      Objective 1: (identify the means of measuring success in meeting the objective): Develop understanding of assertiveness techniques and use techniques in everyday life, especially with family members      Objective 2: (identify the means of measuring success in meeting the objective): Completing all homework as assigned to promote self awareness      I am currently under the care of a [facility] psychiatric provider: no    My [facility] psychiatric provider is: NA    I am currently taking psychiatric medications: No, not taking as prescribed, takes buspar as \"rescue\" to help with sleep and relaxation; prescribed as daily  Encouraged pt to contact provider re administration and offered education re importance of taking medication as prescribed  I feel that I will be ready for discharge from mental health care when I reach the following (measurable goal/objective): I am making decisions without need for support on a consistent basis  For children and adults who have a legal guardian:   Has there been any change to custody orders and/or guardianship status? NA  If yes, attach updated documentation      Behavioral Health Treatment Plan [facility]: Diagnosis and Treatment Plan explained to [address] acknowledges an understanding of " their diagnosis  Anival Coughlin agrees to this treatment plan      I have been offered a copy of this Treatment Plan  yes

## 2023-05-30 ENCOUNTER — OFFICE VISIT (OUTPATIENT)
Dept: OBGYN CLINIC | Facility: CLINIC | Age: 48
End: 2023-05-30

## 2023-05-30 VITALS
HEIGHT: 67 IN | WEIGHT: 158 LBS | SYSTOLIC BLOOD PRESSURE: 122 MMHG | DIASTOLIC BLOOD PRESSURE: 80 MMHG | BODY MASS INDEX: 24.8 KG/M2

## 2023-05-30 DIAGNOSIS — R10.2 PELVIC PAIN: Primary | ICD-10-CM

## 2023-05-30 NOTE — PROGRESS NOTES
Rosario Farley is a 56 y o  G6, P3 female here for a problem visit  Patient had recent ultrasound showing fibroids are slightly increased they are in the fundus and posterior fundus  Patient has had some irregular bleeding with menses less frequent also some cramping and some deep thrust dyspareunia  Menses are sometimes monthly sometimes several months between menses no significant hot flashes or night sweats  We reviewed that patient is likely in perimenopause she has a history of ablation and that after menopause that bleeding will cease and fibroids will usually no longer grow and eventually shrink  Patient sometimes has cramping after coitus with sometimes bleeding  Patient has found certain positions are better for intercourse patient also has some interstitial cystitis and some IBS  We discussed risks and benefits of hysterectomy and what would be improved and what may not be improved  On exam more of patient's discomfort is in the ligaments and suprapubic as well as posterior vaginal   Most significant pain is suprapubic, some pain is cervical and uterine related to intercourse not as much with exam      Gynecologic History  No LMP recorded  Contraception: tubal ligation  Last Pap: 2023  Results were: normal  Last mammogram: 2022  Results were:  After additional imaging cyst noted continue routine screening    Obstetric History  OB History    Para Term  AB Living   6 3 0 3 3 3   SAB IAB Ectopic Multiple Live Births     3     3      # Outcome Date GA Lbr Kyler/2nd Weight Sex Delivery Anes PTL Lv   6   34w0d   M    AVIS   5   34w0d   F Vag-Spont   AVIS   4   32w0d   F Vag-Spont   AVIS      Birth Comments: vanishing twin   3 IAB            2 IAB            1 IAB               Obstetric Comments   Surgical VIp x 3   2 early deliveries, one w cerclage placed during final pregnancy     Past Medical History:   Diagnosis Date   • "Abnormal Pap smear of cervix    • Anemia    • Arthritis    • Depression    • Edema    • Fatigue    • Kidney stone    • Vitamin B12 deficiency    • Vitamin D deficiency      Past Surgical History:   Procedure Laterality Date   • BREAST BIOPSY  10/24/2019   • CHOLECYSTECTOMY     • ENDOMETRIAL ABLATION     • KNEE ARTHROSCOPY Bilateral    • KNEE SURGERY     • ORIF TIBIA FRACTURE Right 01/18/2018    Procedure: OPEN REDUCTION W/ INTERNAL FIXATION (ORIF) TIBIA;  Surgeon: Gogo West MD;  [facility];  Service: Orthopedics   • ORIF TIBIAL PLATEAU Right 27/53/5969    Procedure: OPEN REDUCTION W/ INTERNAL FIXATION (ORIF) TIBIAL PLATEAU;  Surgeon: Gogo West MD;  [facility];  Service: Orthopedics   • GA LAPAROSCOPY SURG CHOLECYSTECTOMY N/A 08/12/2021    Procedure: LAPAROSCOPIC CHOLECYSTECTOMY, UMBILICAL HERNIA REPAIR;  Surgeon: Einar Mohs, MD;  [facility];  Service: General   • GA REMOVAL IMPLANT DEEP Right 08/02/2018    Procedure: REMOVAL HARDWARE TIBIA;  Surgeon: Gogo West MD;  [facility];  Service: Orthopedics   • TUBAL LIGATION     • US GUIDED BREAST BIOPSY LEFT COMPLETE Left 10/24/2019     Current Outpatient Medications   Medication Instructions   • acetaminophen (TYLENOL) 325 mg tablet 650 mg every 6 hours for mild pain   • amitriptyline (ELAVIL) 150 mg, Oral, Daily at bedtime   • busPIRone (BUSPAR) 5 mg, Oral, 2 times daily   • cyanocobalamin 1,000 mcg, Intramuscular, Every 14 days, x4 then resume monthly dosing   • diclofenac (VOLTAREN) 75 mg, Oral, 2 times daily   • ergocalciferol (VITAMIN D2) 50,000 Units, Oral, Weekly   • SYRINGE-NEEDLE, DISP, 3 ML 25G X 1\" 3 ML MISC Does not apply, Every 30 days   • Vitamin D3 2,000 Units, Oral, Daily     No Known Allergies  Social History     Tobacco Use   • Smoking status: Never   • Smokeless tobacco: Never   Vaping Use   • Vaping Use: Never used   Substance Use Topics   • Alcohol use: Yes     Comment: Occassional   • Drug " "use: No         Review of Systems  Review of Systems   Constitutional: Negative  Negative for chills, fatigue, fever and unexpected weight change  HENT: Negative  Negative for dental problem, sinus pressure and sinus pain  Eyes: Negative  Negative for visual disturbance  Respiratory: Negative  Negative for cough, shortness of breath and wheezing  Cardiovascular: Negative  Negative for chest pain and leg swelling  Gastrointestinal: Negative  Negative for constipation, diarrhea, nausea and vomiting  Genitourinary: Negative for menstrual problem, pelvic pain and urgency  Irregular periods  Fibroids  Painful intercourse   Musculoskeletal: Positive for back pain and joint swelling  Allergic/Immunologic: Negative  Negative for environmental allergies  Neurological: Negative  Negative for dizziness and headaches  Objective     /80 (BP Location: Left arm, Patient Position: Sitting, Cuff Size: Standard)   Ht 5' 7\" (1 702 m)   Wt 71 7 kg (158 lb)   LMP 12/01/2022   BMI 24 75 kg/m²   General appearance: alert and oriented, in no acute distress  Pelvic: external genitalia normal, vagina normal without discharge, uterus normal size, shape, and consistency, no cervical motion tenderness, no adnexal masses or tenderness and Based on exam patient has some discomfort around cervix and uterus with intercourse more discomfort suprapubically and also some posterior vaginal discomfort on deep palpation  Extremities: extremities normal, warm and well-perfused; no cyanosis, clubbing, or edema      Assessment  56year-old G6, P3 with irregular bleeding perimenopausal and with some small fibroids as well as some deep thrust dyspareunia as well as interstitial cystitis and IBS     Plan  Reviewed with patient more of her symptoms are in her bladder and bowel areas    Reviewed probiotics for bowel such as Chobani and considering food diary due to identified triggers for interstitial cystitis and " bowel symptoms  We reviewed slowly stretching bladder by slowly delaying voids using Azo for urine as needed for symptoms when she is sure there is no infection    We also discussed using different positions taking time and lubrication for intercourse to call with any concerns and otherwise return for follow-up with annual

## 2023-06-05 ENCOUNTER — TELEMEDICINE (OUTPATIENT)
Dept: BEHAVIORAL/MENTAL HEALTH CLINIC | Facility: CLINIC | Age: 48
End: 2023-06-05
Payer: COMMERCIAL

## 2023-06-05 DIAGNOSIS — F33.1 MODERATE EPISODE OF RECURRENT MAJOR DEPRESSIVE DISORDER (HCC): Primary | ICD-10-CM

## 2023-06-05 PROCEDURE — 90834 PSYTX W PT 45 MINUTES: CPT
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
## 2023-07-03 ENCOUNTER — TELEMEDICINE (OUTPATIENT)
Dept: BEHAVIORAL/MENTAL HEALTH CLINIC | Facility: CLINIC | Age: 48
End: 2023-07-03
Payer: COMMERCIAL

## 2023-07-03 DIAGNOSIS — F33.1 MODERATE EPISODE OF RECURRENT MAJOR DEPRESSIVE DISORDER (HCC): Primary | ICD-10-CM

## 2023-07-03 PROCEDURE — 90837 PSYTX W PT 60 MINUTES: CPT
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
## 2023-07-31 ENCOUNTER — TELEMEDICINE (OUTPATIENT)
Dept: BEHAVIORAL/MENTAL HEALTH CLINIC | Facility: CLINIC | Age: 48
End: 2023-07-31
Payer: COMMERCIAL

## 2023-07-31 DIAGNOSIS — F33.1 MODERATE EPISODE OF RECURRENT MAJOR DEPRESSIVE DISORDER (HCC): Primary | ICD-10-CM

## 2023-07-31 PROCEDURE — 90834 PSYTX W PT 45 MINUTES: CPT

## 2023-07-31 NOTE — TELEPHONE ENCOUNTER
For informational purposes only. Not to replace the advice of your health care provider. Copyright   2003,  [employer] [facility]. All rights reserved. Clinically reviewed by Carmen Hebert MD. [facility] [id number] - REV .  Preparing for Your Surgery  Getting started  A nurse will call you to review your health history and instructions. They will give you an arrival time based on your scheduled surgery time. Please be ready to share:  Your doctor's clinic name and phone number  Your medical, surgical, and anesthesia history  A list of allergies and sensitivities  A list of medicines, including herbal treatments and over-the-counter drugs  Whether the patient has a legal guardian (ask how to send us the papers in advance)  Please tell us if you're pregnant--or if there's any chance you might be pregnant. Some surgeries may injure a fetus (unborn baby), so they require a pregnancy test. Surgeries that are safe for a fetus don't always need a test, and you can choose whether to have one.   If you have a child who's having surgery, please ask for a copy of Preparing for Your Child's Surgery.    Preparing for surgery  Within 10 to 30 days of surgery: Have a pre-op exam (sometimes called an H&P, or History and Physical). This can be done at a clinic or pre-operative center.  If you're having a , you may not need this exam. Talk to your care team.  At your pre-op exam, talk to your care team about all medicines you take. If you need to stop any medicines before surgery, ask when to start taking them again.  We do this for your safety. Many medicines can make you bleed too much during surgery. Some change how well surgery (anesthesia) drugs work.  Call your insurance company to let them know you're having surgery. (If you don't have insurance, call [phone].)  Call your clinic if there's any change in your health. This includes signs of a cold or flu (sore throat, runny nose, cough, rash, fever). It also  Patient would like Oxycodone and Naproxen script  Patient will have daughter  rx for Oxycodone  Please contact patient when available  [phone]    FYI will keep appointment for next week  includes a scrape or scratch near the surgery site.  If you have questions on the day of surgery, call your hospital or surgery center.  Eating and drinking guidelines  For your safety: Unless your surgeon tells you otherwise, follow the guidelines below.  Eat and drink as usual until 8 hours before you arrive for surgery. After that, no food or milk.  Drink clear liquids until 2 hours before you arrive. These are liquids you can see through, like water, Gatorade, and Propel Water. They also include plain black coffee and tea (no cream or milk), candy, and breath mints. You can spit out gum when you arrive.  If you drink alcohol: Stop drinking it the night before surgery.  If your care team tells you to take medicine on the morning of surgery, it's okay to take it with a sip of water.  Preventing infection  Shower or bathe the night before and morning of your surgery. Follow the instructions your clinic gave you. (If no instructions, use regular soap.)  Don't shave or clip hair near your surgery site. We'll remove the hair if needed.  Don't smoke or vape the morning of surgery. You may chew nicotine gum up to 2 hours before surgery. A nicotine patch is okay.  Note: Some surgeries require you to completely quit smoking and nicotine. Check with your surgeon.  Your care team will make every effort to keep you safe from infection. We will:  Clean our hands often with soap and water (or an alcohol-based hand rub).  Clean the skin at your surgery site with a special soap that kills germs.  Give you a special gown to keep you warm. (Cold raises the risk of infection.)  Wear special hair covers, masks, gowns and gloves during surgery.  Give antibiotic medicine, if prescribed. Not all surgeries need antibiotics.  What to bring on the day of surgery  Photo ID and insurance card  Copy of your health care directive, if you have one  Glasses and hearing aids (bring cases)  You can't wear contacts during surgery  Inhaler and eye  drops, if you use them (tell us about these when you arrive)  CPAP machine or breathing device, if you use them  A few personal items, if spending the night  If you have . . .  A pacemaker, ICD (cardiac defibrillator) or other implant: Bring the ID card.  An implanted stimulator: Bring the remote control.  A legal guardian: Bring a copy of the certified (court-stamped) guardianship papers.  Please remove any jewelry, including body piercings. Leave jewelry and other valuables at home.  If you're going home the day of surgery  You must have a responsible adult drive you home. They should stay with you overnight as well.  If you don't have someone to stay with you, and you aren't safe to go home alone, we may keep you overnight. Insurance often won't pay for this.  After surgery  If it's hard to control your pain or you need more pain medicine, please call your surgeon's office.  Questions?   If you have any questions for your care team, list them here: _________________________________________________________________________________________________________________________________________________________________________ ____________________________________ ____________________________________ ____________________________________    How to Take Your Medication Before Surgery  - Take all of your medications before surgery as usual

## 2023-07-31 NOTE — PSYCH
Virtual Regular Visit    Verification of patient location:    Patient is located at Home in the following state in which I hold an active license [place]      Assessment/Plan:    Problem List Items Addressed This Visit        Other    Moderate episode of recurrent major depressive disorder ([address]) - Primary            Reason for visit is   Chief Complaint   Patient presents with   • Virtual Regular Visit        Encounter provider Jerry Vale    Provider located at [address] [place] [address]  [phone]      Recent Visits  No visits were found meeting these conditions. Showing recent visits within past 7 days and meeting all other requirements  Today's Visits  Date Type Provider Dept   07/31/23 Telemedicine Jerry Vale [facility] [place]   Showing today's visits and meeting all other requirements  Future Appointments  No visits were found meeting these conditions. Showing future appointments within next 150 days and meeting all other requirements       The patient was identified by name and date of birth. Luke Dias was informed that this is a telemedicine visit and that the visit is being conducted throughthe [employer] platform. She agrees to proceed. .  My office door was closed. No one else was in the room. She acknowledged consent and understanding of privacy and security of the video platform. The patient has agreed to participate and understands they can discontinue the visit at any time. Patient is aware this is a billable service.        Past Medical History:   Diagnosis Date   • Abnormal Pap smear of cervix    • Anemia    • Arthritis    • Depression    • Edema    • Fatigue    • Kidney stone    • Vitamin B12 deficiency    • Vitamin D deficiency        Past Surgical History:   Procedure Laterality Date   • BREAST BIOPSY  10/24/2019 • CHOLECYSTECTOMY     • ENDOMETRIAL ABLATION     • KNEE ARTHROSCOPY Bilateral    • KNEE SURGERY     • ORIF TIBIA FRACTURE Right 01/18/2018    Procedure: OPEN REDUCTION W/ INTERNAL FIXATION (ORIF) TIBIA;  Surgeon: Tatyana Silva MD;  [facility];  Service: Orthopedics   • ORIF TIBIAL PLATEAU Right 78/60/8606    Procedure: OPEN REDUCTION W/ INTERNAL FIXATION (ORIF) TIBIAL PLATEAU;  Surgeon: Tatyana Silva MD;  [facility];  Service: Orthopedics   • RI LAPAROSCOPY SURG CHOLECYSTECTOMY N/A 08/12/2021    Procedure: LAPAROSCOPIC CHOLECYSTECTOMY, UMBILICAL HERNIA REPAIR;  Surgeon: Susan Clements MD;  [facility];  Service: General   • RI REMOVAL IMPLANT DEEP Right 08/02/2018    Procedure: REMOVAL HARDWARE TIBIA;  Surgeon: Tatyana Silva MD;  [facility];  Service: Orthopedics   • TUBAL LIGATION     • US GUIDED BREAST BIOPSY LEFT COMPLETE Left 10/24/2019       Current Outpatient Medications   Medication Sig Dispense Refill   • acetaminophen (TYLENOL) 325 mg tablet 650 mg every 6 hours for mild pain 30 tablet 0   • amitriptyline (ELAVIL) 150 MG tablet Take 1 tablet (150 mg total) by mouth daily at bedtime 90 tablet 1   • busPIRone (BUSPAR) 5 mg tablet Take 1 tablet (5 mg total) by mouth 2 (two) times a day 60 tablet 1   • Cholecalciferol (Vitamin D3) 50 MCG (2000 UT) capsule Take 1 capsule (2,000 Units total) by mouth daily 90 capsule 1   • cyanocobalamin 1,000 mcg/mL Inject 1 mL (1,000 mcg total) into a muscle every 14 (fourteen) days x4 then resume monthly dosing 10 mL 0   • diclofenac (VOLTAREN) 75 mg EC tablet Take 1 tablet (75 mg total) by mouth 2 (two) times a day (Patient not taking: Reported on 3/20/2023) 60 tablet 1   • ergocalciferol (VITAMIN D2) 50,000 units Take 1 capsule (50,000 Units total) by mouth once a week 12 capsule 0   • SYRINGE-NEEDLE, DISP, 3 ML 25G X 1" 3 ML MISC Use every 30 (thirty) days 50 each 0     No current facility-administered medications for this visit. Behavioral Health Psychotherapy Progress Note    Psychotherapy Provided: Individual Psychotherapy     1. Moderate episode of recurrent major depressive disorder ([address])            Goals addressed in session: Goal 1     DATA: Filiberto Jerome is a 53 y.o. female  . presenting to this fu session for the supportive treatment of depression. Pt shared feeling exhausted as she has been traveling to fa's home to assist with his treatments. She relates that there has been conflict bw her children during which pt feels as though she needs to mediate. She relates feeling successful with same. She did complete her homework partially as related to financial and  responsibilities. She relates belief that interpersonal relationships have improved as result of having these conversations and expectations. Her older dtr conts to struggle with depression and pt shared concerns re   how same impacts her ability to contribute financially. Pt shared willingness to allow dtr to barter her services for financial compensation. Offered education re adult psychiatric services including php. Pt shared she has stopped taking her daily medication however does self admin prn meds in the evening. Offered education re importance of making commitment re medication. Her fam did have a surprise dinner for her birthday followed by trip to [place]. Pt did not attend beach outing as spouse did not want to go to [place]. Explored same and pt admits she did not want to go to [place] and realizes it is easier to blame spouse than to say no to dtrs. During this session, this clinician used the following therapeutic modalities: Cognitive Behavioral Therapy, Mindfulness-based Strategies and Supportive Psychotherapy    Substance Abuse was not addressed during this session.  If the client is diagnosed with a co-occurring substance use disorder, please indicate any changes in the frequency or amount of use: NA. Stage of change for addressing substance use diagnoses: No substance use/Not applicable    ASSESSMENT:  Abel Sierra presents with a Dysthymic mood. her affect is Normal range and intensity, which is congruent, with her mood and the content of the session. She appears to adopt victim role to meet needs. Primary relationship seems to be with children vs spouse. The client has made progress on their goals. Abel Sierra presents with a minimal risk of suicide, minimal risk of self-harm, and minimal risk of harm to others. For any risk assessment that surpasses a "low" rating, a safety plan must be developed. A safety plan was indicated: no  If yes, describe in detail NA    PLAN: Between sessions, Abel Sierra will cont to practice assertiveness techniques. At the next session, the therapist will use Cognitive Behavioral Therapy, Mindfulness-based Strategies and Supportive Psychotherapy to address depression. Behavioral Health Treatment Plan and Discharge Planning: Abel Sierra is aware of and agrees to continue to work on their treatment plan. They have identified and are working toward their discharge goals.  yes    Visit start and stop times:    07/31/23  Start Time: 0802  Stop Time: 9454  Total Visit Time: 50 minutes

## 2023-08-14 ENCOUNTER — TELEMEDICINE (OUTPATIENT)
Dept: BEHAVIORAL/MENTAL HEALTH CLINIC | Facility: CLINIC | Age: 48
End: 2023-08-14
Payer: COMMERCIAL

## 2023-08-14 DIAGNOSIS — F33.1 MODERATE EPISODE OF RECURRENT MAJOR DEPRESSIVE DISORDER (HCC): Primary | ICD-10-CM

## 2023-08-14 PROCEDURE — 90832 PSYTX W PT 30 MINUTES: CPT

## 2023-08-14 NOTE — PSYCH
Virtual Regular Visit    Verification of patient location:    Patient is located at Home in the following state in which I hold an active license [place]      Assessment/Plan:    Problem List Items Addressed This Visit        Other    Moderate episode of recurrent major depressive disorder ([address]) - Primary          Reason for visit is   Chief Complaint   Patient presents with   • Virtual Regular Visit        Encounter provider Refugio Martínez    Provider located at [address] [place] [address]  [phone]      Recent Visits  No visits were found meeting these conditions. Showing recent visits within past 7 days and meeting all other requirements  Today's Visits  Date Type Provider Dept   08/14/23 Telemedicine Refugio Martínez [facility] [place]   Showing today's visits and meeting all other requirements  Future Appointments  No visits were found meeting these conditions. Showing future appointments within next 150 days and meeting all other requirements       The patient was identified by name and date of birth. Geno Pardo was informed that this is a telemedicine visit and that the visit is being conducted throughthe [employer] platform. She agrees to proceed. .  My office door was closed. No one else was in the room. She acknowledged consent and understanding of privacy and security of the video platform. The patient has agreed to participate and understands they can discontinue the visit at any time. Patient is aware this is a billable service.        Past Medical History:   Diagnosis Date   • Abnormal Pap smear of cervix    • Anemia    • Arthritis    • Depression    • Edema    • Fatigue    • Kidney stone    • Vitamin B12 deficiency    • Vitamin D deficiency        Past Surgical History:   Procedure Laterality Date   • BREAST BIOPSY  10/24/2019   • CHOLECYSTECTOMY     • ENDOMETRIAL ABLATION     • KNEE ARTHROSCOPY Bilateral    • KNEE SURGERY     • ORIF TIBIA FRACTURE Right 01/18/2018    Procedure: OPEN REDUCTION W/ INTERNAL FIXATION (ORIF) TIBIA;  Surgeon: Sterling Cole MD;  [facility];  Service: Orthopedics   • ORIF TIBIAL PLATEAU Right 73/72/3574    Procedure: OPEN REDUCTION W/ INTERNAL FIXATION (ORIF) TIBIAL PLATEAU;  Surgeon: Sterling Cole MD;  [facility];  Service: Orthopedics   • [place] LAPAROSCOPY SURG CHOLECYSTECTOMY N/A 08/12/2021    Procedure: LAPAROSCOPIC CHOLECYSTECTOMY, UMBILICAL HERNIA REPAIR;  Surgeon: Nettie Zepeda MD;  [facility];  Service: General   • [place] REMOVAL IMPLANT DEEP Right 08/02/2018    Procedure: REMOVAL HARDWARE TIBIA;  Surgeon: Sterling Cole MD;  [facility];  Service: Orthopedics   • TUBAL LIGATION     • US GUIDED BREAST BIOPSY LEFT COMPLETE Left 10/24/2019       Current Outpatient Medications   Medication Sig Dispense Refill   • acetaminophen (TYLENOL) 325 mg tablet 650 mg every 6 hours for mild pain 30 tablet 0   • amitriptyline (ELAVIL) 150 MG tablet Take 1 tablet (150 mg total) by mouth daily at bedtime 90 tablet 1   • busPIRone (BUSPAR) 5 mg tablet Take 1 tablet (5 mg total) by mouth 2 (two) times a day 60 tablet 1   • Cholecalciferol (Vitamin D3) 50 MCG (2000 UT) capsule Take 1 capsule (2,000 Units total) by mouth daily 90 capsule 1   • cyanocobalamin 1,000 mcg/mL Inject 1 mL (1,000 mcg total) into a muscle every 14 (fourteen) days x4 then resume monthly dosing 10 mL 0   • diclofenac (VOLTAREN) 75 mg EC tablet Take 1 tablet (75 mg total) by mouth 2 (two) times a day (Patient not taking: Reported on 3/20/2023) 60 tablet 1   • ergocalciferol (VITAMIN D2) 50,000 units Take 1 capsule (50,000 Units total) by mouth once a week 12 capsule 0   • SYRINGE-NEEDLE, DISP, 3 ML 25G X 1" 3 ML MISC Use every 30 (thirty) days 50 each 0     No current facility-administered medications for this visit. Behavioral Health Psychotherapy Progress Note    Psychotherapy Provided: Individual Psychotherapy     1. Moderate episode of recurrent major depressive disorder ([address])            Goals addressed in session: Goal 1     DATA: Chhaya Tierney is a 53 y.o. female presenting to this virtual session for the supportive treatment of depression. Pt initially joined into virtual session while driving with adult dtr. Agreed to r/s sx 20 minutes after original time to allow pt time to gain privacy and focus. Pt shared things are "going really good" as dtr is currently attended PHP, is now on medication and relates grps are helpful. Pt shared feeling relieved that dtr is getting help and is positioning herself to be a better parent. Pt believes that dtr may now understand pt's struggle with establishing and maintaining boundaries. Dtr will be starting work at home job on 9/14 and will be able to contribute financially. She reports that all household members are contributing financially/in kind as agreed upon. She relates that her next area to address is her spouse who "doesn't get it."  She relates that he does not understand or support her relationship with adult children. She relates need to establish better boundaries with spouse who expects her "give in." Pt shared that spouse has 2 adult chilren who do not live with him/pt and his expectations/boundaries are very different from pt's. Pt and spouse do not have any biological children together. Explored differences in parenting and expectations of SO. She indicates that spouse does not like to compromise. She states she "used to entertain the drama, but not anymore." She admits that role of wife is the last on her list of priorities. She shared many conflicts with spouse and that she does not have the energy to to "figure it out" ej as she is leaving to stay with fa following fa's surgery.  She bleieves spouse is looking for MO figure as he was raised by

## 2023-08-28 ENCOUNTER — TELEMEDICINE (OUTPATIENT)
Dept: BEHAVIORAL/MENTAL HEALTH CLINIC | Facility: CLINIC | Age: 48
End: 2023-08-28
Payer: COMMERCIAL

## 2023-08-28 DIAGNOSIS — F33.1 MODERATE EPISODE OF RECURRENT MAJOR DEPRESSIVE DISORDER (HCC): Primary | ICD-10-CM

## 2023-08-28 PROCEDURE — 90834 PSYTX W PT 45 MINUTES: CPT

## 2023-08-28 NOTE — PSYCH
Virtual Regular Visit    Verification of patient location:    Patient is located at Home in the following state in which I hold an active license [place]      Assessment/Plan:    Problem List Items Addressed This Visit        Other    Moderate episode of recurrent major depressive disorder ([address]) - Primary          Reason for visit is   Chief Complaint   Patient presents with   • Virtual Regular Visit        Encounter provider Adalid Cartwright    Provider located at [address] [place] [address] Road  [phone]      Recent Visits  No visits were found meeting these conditions. Showing recent visits within past 7 days and meeting all other requirements  Today's Visits  Date Type Provider Dept   08/28/23 Telemedicine Adalid Cartwright [facility] [place]   Showing today's visits and meeting all other requirements  Future Appointments  No visits were found meeting these conditions. Showing future appointments within next 150 days and meeting all other requirements       The patient was identified by name and date of birth. Lisbet Sierra was informed that this is a telemedicine visit and that the visit is being conducted throughthe [employer] platform. She agrees to proceed. .  My office door was closed. No one else was in the room. She acknowledged consent and understanding of privacy and security of the video platform. The patient has agreed to participate and understands they can discontinue the visit at any time. Patient is aware this is a billable service.      Past Medical History:   Diagnosis Date   • Abnormal Pap smear of cervix    • Anemia    • Arthritis    • Depression    • Edema    • Fatigue    • Kidney stone    • Vitamin B12 deficiency    • Vitamin D deficiency        Past Surgical History:   Procedure Laterality Date   • BREAST BIOPSY  10/24/2019   • CHOLECYSTECTOMY     • ENDOMETRIAL ABLATION     • KNEE ARTHROSCOPY Bilateral    • KNEE SURGERY     • ORIF TIBIA FRACTURE Right 01/18/2018    Procedure: OPEN REDUCTION W/ INTERNAL FIXATION (ORIF) TIBIA;  Surgeon: Sergey Doss MD;  [facility];  Service: Orthopedics   • ORIF TIBIAL PLATEAU Right 20/93/0888    Procedure: OPEN REDUCTION W/ INTERNAL FIXATION (ORIF) TIBIAL PLATEAU;  Surgeon: Sergey Doss MD;  [facility];  Service: Orthopedics   • SD LAPAROSCOPY SURG CHOLECYSTECTOMY N/A 08/12/2021    Procedure: LAPAROSCOPIC CHOLECYSTECTOMY, UMBILICAL HERNIA REPAIR;  Surgeon: Luis Miguel Eason MD;  [facility];  Service: General   • SD REMOVAL IMPLANT DEEP Right 08/02/2018    Procedure: REMOVAL HARDWARE TIBIA;  Surgeon: Sergey Doss MD;  [facility];  Service: Orthopedics   • TUBAL LIGATION     • US GUIDED BREAST BIOPSY LEFT COMPLETE Left 10/24/2019       Current Outpatient Medications   Medication Sig Dispense Refill   • acetaminophen (TYLENOL) 325 mg tablet 650 mg every 6 hours for mild pain 30 tablet 0   • amitriptyline (ELAVIL) 150 MG tablet Take 1 tablet (150 mg total) by mouth daily at bedtime 90 tablet 1   • busPIRone (BUSPAR) 5 mg tablet Take 1 tablet (5 mg total) by mouth 2 (two) times a day 60 tablet 1   • Cholecalciferol (Vitamin D3) 50 MCG (2000 UT) capsule Take 1 capsule (2,000 Units total) by mouth daily 90 capsule 1   • cyanocobalamin 1,000 mcg/mL Inject 1 mL (1,000 mcg total) into a muscle every 14 (fourteen) days x4 then resume monthly dosing 10 mL 0   • diclofenac (VOLTAREN) 75 mg EC tablet Take 1 tablet (75 mg total) by mouth 2 (two) times a day (Patient not taking: Reported on 3/20/2023) 60 tablet 1   • ergocalciferol (VITAMIN D2) 50,000 units Take 1 capsule (50,000 Units total) by mouth once a week 12 capsule 0   • SYRINGE-NEEDLE, DISP, 3 ML 25G X 1" 3 ML MISC Use every 30 (thirty) days 50 each 0     No current facility-administered medications for this visit. Behavioral Health Psychotherapy Progress Note    Psychotherapy Provided: Individual Psychotherapy     1. Moderate episode of recurrent major depressive disorder ([address])            Goals addressed in session: Goal 1     DATA: Alex Aguilar is a 53 y.o. female presenting to this virtual session for the supportive treatment of depression. She relates increased anxiety re fa's upcoming surgery. Next sx r/s as she will be in [place] tending to [address]. This is interfering with sleep and causing stress with SO. Se relates feeling unsupported by SO who is not demonstrating compassion, empathy or offering additional assistance with household mgmt. (finances, cleaning). Pt negates willingness to utilize assertiveness due to inability to tolerate being told no. She relates wish for others (SO) to note her subtle cues that ask for help. Explored underlying power dynamics and decision making process with SO. Encouraged consideration and dialogue re couples counseling qnd journal before bed to assist with sleep. During this session, this clinician used the following therapeutic modalities: Cognitive Behavioral Therapy, Mindfulness-based Strategies and Supportive Psychotherapy    Substance Abuse was not addressed during this session. If the client is diagnosed with a co-occurring substance use disorder, please indicate any changes in the frequency or amount of use: NA. Stage of change for addressing substance use diagnoses: No substance use/Not applicable    ASSESSMENT:  Alex Aguilar presents with a Dysthymic mood. her affect is Normal range and intensity, which is congruent, with her mood and the content of the session. The client has made progress on their goals. Alex Aguilar presents with a low risk of suicide, low risk of self-harm, and low risk of harm to others. For any risk assessment that surpasses a "low" rating, a safety plan must be developed.     A safety plan was indicated: no  If yes, describe in detail NA    PLAN: Between sessions, Maria Antonia Roberto will journal daily and discuss couples counseling with spouse. . At the next session, the therapist will use Cognitive Behavioral Therapy, Mindfulness-based Strategies and Supportive Psychotherapy to address depression. Behavioral Health Treatment Plan and Discharge Planning: Maria Antonia Roberto is aware of and agrees to continue to work on their treatment plan. They have identified and are working toward their discharge goals.  yes    Visit start and stop times:    08/28/23  Start Time: 0803  Stop Time: 8418  Total Visit Time: 51 minutes

## 2023-09-18 ENCOUNTER — TELEMEDICINE (OUTPATIENT)
Dept: BEHAVIORAL/MENTAL HEALTH CLINIC | Facility: CLINIC | Age: 48
End: 2023-09-18
Payer: COMMERCIAL

## 2023-09-18 DIAGNOSIS — F33.1 MODERATE EPISODE OF RECURRENT MAJOR DEPRESSIVE DISORDER (HCC): Primary | ICD-10-CM

## 2023-09-18 PROCEDURE — 90837 PSYTX W PT 60 MINUTES: CPT
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
## 2023-09-25 ENCOUNTER — TELEMEDICINE (OUTPATIENT)
Dept: BEHAVIORAL/MENTAL HEALTH CLINIC | Facility: CLINIC | Age: 48
End: 2023-09-25
Payer: COMMERCIAL

## 2023-09-25 DIAGNOSIS — F33.1 MODERATE EPISODE OF RECURRENT MAJOR DEPRESSIVE DISORDER (HCC): Primary | ICD-10-CM

## 2023-09-25 PROCEDURE — 90837 PSYTX W PT 60 MINUTES: CPT

## 2023-09-25 NOTE — PSYCH
Virtual Regular Visit    Verification of patient location:    Patient is located at Home in the following state in which I hold an active license [place]      Assessment/Plan:    Problem List Items Addressed This Visit        Other    Moderate episode of recurrent major depressive disorder ([address]) - Primary          Reason for visit is   Chief Complaint   Patient presents with   • Virtual Regular Visit        Encounter provider Leah Olson    Provider located at [address] [id number] [phone]      Recent Visits  Date Type Provider Dept   09/18/23 Telemedicine Leah Olson [facility] [place]   Showing recent visits within past 7 days and meeting all other requirements  Today's Visits  Date Type Provider Dept   09/25/23 Telemedicine Leah Olson [facility] [place]   Showing today's visits and meeting all other requirements  Future Appointments  No visits were found meeting these conditions. Showing future appointments within next 150 days and meeting all other requirements       The patient was identified by name and date of birth. Wilma Long was informed that this is a telemedicine visit and that the visit is being conducted throughthe [employer] platform. She agrees to proceed. .  My office door was closed. No one else was in the room. She acknowledged consent and understanding of privacy and security of the video platform. The patient has agreed to participate and understands they can discontinue the visit at any time. Patient is aware this is a billable service.      Past Medical History:   Diagnosis Date   • Abnormal Pap smear of cervix    • Anemia    • Arthritis    • Depression    • Edema    • Fatigue    • Kidney stone    • Vitamin B12 deficiency    • Vitamin D deficiency        Past Surgical History:   Procedure Laterality Date   • BREAST BIOPSY  10/24/2019   • CHOLECYSTECTOMY     • ENDOMETRIAL ABLATION     • KNEE ARTHROSCOPY Bilateral    • KNEE SURGERY     • ORIF TIBIA FRACTURE Right 01/18/2018    Procedure: OPEN REDUCTION W/ INTERNAL FIXATION (ORIF) TIBIA;  Surgeon: Marielle Campos MD;  [facility];  Service: Orthopedics   • ORIF TIBIAL PLATEAU Right 97/70/6367    Procedure: OPEN REDUCTION W/ INTERNAL FIXATION (ORIF) TIBIAL PLATEAU;  Surgeon: Marielle Campos MD;  [facility];  Service: Orthopedics   • DC LAPAROSCOPY SURG CHOLECYSTECTOMY N/A 08/12/2021    Procedure: LAPAROSCOPIC CHOLECYSTECTOMY, UMBILICAL HERNIA REPAIR;  Surgeon: Demetrius Ledesma MD;  [facility];  Service: General   • DC REMOVAL IMPLANT DEEP Right 08/02/2018    Procedure: REMOVAL HARDWARE TIBIA;  Surgeon: Marielle Campos MD;  [facility];  Service: Orthopedics   • TUBAL LIGATION     • US GUIDED BREAST BIOPSY LEFT COMPLETE Left 10/24/2019       Current Outpatient Medications   Medication Sig Dispense Refill   • acetaminophen (TYLENOL) 325 mg tablet 650 mg every 6 hours for mild pain 30 tablet 0   • amitriptyline (ELAVIL) 150 MG tablet Take 1 tablet (150 mg total) by mouth daily at bedtime 90 tablet 1   • busPIRone (BUSPAR) 5 mg tablet Take 1 tablet (5 mg total) by mouth 2 (two) times a day 60 tablet 1   • Cholecalciferol (Vitamin D3) 50 MCG (2000 UT) capsule Take 1 capsule (2,000 Units total) by mouth daily 90 capsule 1   • cyanocobalamin 1,000 mcg/mL Inject 1 mL (1,000 mcg total) into a muscle every 14 (fourteen) days x4 then resume monthly dosing 10 mL 0   • diclofenac (VOLTAREN) 75 mg EC tablet Take 1 tablet (75 mg total) by mouth 2 (two) times a day (Patient not taking: Reported on 3/20/2023) 60 tablet 1   • ergocalciferol (VITAMIN D2) 50,000 units Take 1 capsule (50,000 Units total) by mouth once a week 12 capsule 0   • SYRINGE-NEEDLE, DISP, 3 ML 25G X 1" 3 ML MISC Use every 30 (thirty) days 50 each 0     No current facility-administered medications for this visit. Behavioral Health Psychotherapy Progress Note    Psychotherapy Provided: Individual Psychotherapy     1. Moderate episode of recurrent major depressive disorder ([address])            Goals addressed in session: Goal 1     DATA: Claudia Earl is a 53 y.o. female presenting to this virtual session for the supportive treatment of depression with anxiety. Pt has been ill since last Tuesday evening and shared feeling exhausted. Continued review of relationship dynamics and letting go some power and control to partner. Pt con'ts in a parenting role to spouse. Pt shared feeling relationship with spouse is stagnating as they are both procrastinators. Pt shared becoming "explosively angry" when spouse does not listen and she has to raise her voice to get her needs met. Offered education re personalization of other's behaviors and separation from same. During this session, this clinician used the following therapeutic modalities: Cognitive Behavioral Therapy, Mindfulness-based Strategies and Supportive Psychotherapy    Substance Abuse was not addressed during this session. If the client is diagnosed with a co-occurring substance use disorder, please indicate any changes in the frequency or amount of use: NA. Stage of change for addressing substance use diagnoses: No substance use/Not applicable    ASSESSMENT:  Claudia Earl presents with a Dysthymic mood. her affect is Normal range and intensity, which is congruent, with her mood and the content of the session. The client has made progress on their goals. Claudia Earl presents with a low risk of suicide, low risk of self-harm, and low risk of harm to others. For any risk assessment that surpasses a "low" rating, a safety plan must be developed.     A safety plan was indicated: no  If yes, describe in detail NA    PLAN: Between sessions, Claudia Earl will complete crisis plan sent via [address] [place] [address] . At the next session, the therapist will use Cognitive Behavioral Therapy, Mindfulness-based Strategies and Supportive Psychotherapy to address depression and anxiety. Behavioral Health Treatment Plan and Discharge Planning: All Akila is aware of and agrees to continue to work on their treatment plan. They have identified and are working toward their discharge goals.  yes    Visit start and stop times:    09/25/23  Start Time: 0903  Stop Time: 1006  Total Visit Time: 63 minutes

## 2023-10-09 ENCOUNTER — TELEMEDICINE (OUTPATIENT)
Dept: BEHAVIORAL/MENTAL HEALTH CLINIC | Facility: CLINIC | Age: 48
End: 2023-10-09
Payer: COMMERCIAL

## 2023-10-09 DIAGNOSIS — F33.1 MODERATE EPISODE OF RECURRENT MAJOR DEPRESSIVE DISORDER (HCC): Primary | ICD-10-CM

## 2023-10-09 PROCEDURE — 90837 PSYTX W PT 60 MINUTES: CPT
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
## 2023-10-23 ENCOUNTER — TELEMEDICINE (OUTPATIENT)
Dept: BEHAVIORAL/MENTAL HEALTH CLINIC | Facility: CLINIC | Age: 48
End: 2023-10-23
Payer: COMMERCIAL

## 2023-10-23 DIAGNOSIS — F33.1 MODERATE EPISODE OF RECURRENT MAJOR DEPRESSIVE DISORDER (HCC): Primary | ICD-10-CM

## 2023-10-23 PROCEDURE — 90837 PSYTX W PT 60 MINUTES: CPT

## 2023-10-23 NOTE — PSYCH
Virtual Regular Visit    Verification of patient location:    Patient is located at Home in the following state in which I hold an active license [place]      Assessment/Plan:    Problem List Items Addressed This Visit          Other    Moderate episode of recurrent major depressive disorder ([address]) - Primary          Reason for visit is   Chief Complaint   Patient presents with    Virtual Regular Visit          Encounter provider Bel Munguia    Provider located at [address] [facility]  [address] [address] [address] [facility] [address]  [phone]      Recent Visits  No visits were found meeting these conditions. Showing recent visits within past 7 days and meeting all other requirements  Today's Visits  Date Type Provider Dept   10/23/23 Telemedicine Bel Munguia [facility] [place]   Showing today's visits and meeting all other requirements  Future Appointments  No visits were found meeting these conditions. Showing future appointments within next 150 days and meeting all other requirements       The patient was identified by name and date of birth. Domi Abraham was informed that this is a telemedicine visit and that the visit is being conducted throughthe [employer] platform. She agrees to proceed. .  My office door was closed. No one else was in the room. She acknowledged consent and understanding of privacy and security of the video platform. The patient has agreed to participate and understands they can discontinue the visit at any time. Patient is aware this is a billable service.      Past Medical History:   Diagnosis Date    Abnormal Pap smear of cervix     Anemia     Arthritis     Depression     Edema     Fatigue     Kidney stone     Vitamin B12 deficiency     Vitamin D deficiency        Past Surgical History:   Procedure Laterality Date    BREAST BIOPSY  10/24/2019 CHOLECYSTECTOMY      ENDOMETRIAL ABLATION      KNEE ARTHROSCOPY Bilateral     KNEE SURGERY      ORIF TIBIA FRACTURE Right 01/18/2018    Procedure: OPEN REDUCTION W/ INTERNAL FIXATION (ORIF) TIBIA;  Surgeon: Ramya Doan MD;  [facility];  Service: Orthopedics    ORIF TIBIAL PLATEAU Right 64/56/2639    Procedure: OPEN REDUCTION W/ INTERNAL FIXATION (ORIF) TIBIAL PLATEAU;  Surgeon: Ramya Doan MD;  [facility];  Service: Orthopedics    [place] LAPAROSCOPY SURG CHOLECYSTECTOMY N/A 08/12/2021    Procedure: LAPAROSCOPIC CHOLECYSTECTOMY, UMBILICAL HERNIA REPAIR;  Surgeon: Suze Hammer MD;  [facility];  Service: General    [place] REMOVAL IMPLANT DEEP Right 08/02/2018    Procedure: REMOVAL HARDWARE TIBIA;  Surgeon: Ramya Doan MD;  [facility];  Service: Orthopedics    TUBAL LIGATION      US GUIDED BREAST BIOPSY LEFT COMPLETE Left 10/24/2019       Current Outpatient Medications   Medication Sig Dispense Refill    acetaminophen (TYLENOL) 325 mg tablet 650 mg every 6 hours for mild pain 30 tablet 0    amitriptyline (ELAVIL) 150 MG tablet Take 1 tablet (150 mg total) by mouth daily at bedtime 90 tablet 1    busPIRone (BUSPAR) 5 mg tablet Take 1 tablet (5 mg total) by mouth 2 (two) times a day 60 tablet 1    Cholecalciferol (Vitamin D3) 50 MCG (2000 UT) capsule Take 1 capsule (2,000 Units total) by mouth daily 90 capsule 1    cyanocobalamin 1,000 mcg/mL Inject 1 mL (1,000 mcg total) into a muscle every 14 (fourteen) days x4 then resume monthly dosing 10 mL 0    diclofenac (VOLTAREN) 75 mg EC tablet Take 1 tablet (75 mg total) by mouth 2 (two) times a day (Patient not taking: Reported on 3/20/2023) 60 tablet 1    ergocalciferol (VITAMIN D2) 50,000 units Take 1 capsule (50,000 Units total) by mouth once a week 12 capsule 0    SYRINGE-NEEDLE, DISP, 3 ML 25G X 1" 3 ML MISC Use every 30 (thirty) days 50 each 0     No current facility-administered medications for this visit.         No Known [facility]  Behavioral Health Psychotherapy Progress Note    Psychotherapy Provided: Individual Psychotherapy     1. Moderate episode of recurrent major depressive disorder ([address])            Goals addressed in session: Goal 1     DATA: Frannie Glasgow is a 53 y.o. female presenting to this virtual session for the supportive treatment of depression. Pt shared stressors of last two weeks that center upon dtr and god dtr/housing. She notes all members have a plan for moving out however pt is unsure if plans will materialize due to financial constraints. Reviewed roles and responsibilities of household members including dynamic that reinforces victim role of select members. Assisted with boundary setting related to childcare, bills, adult life style. Discussed overall decrease in household chaos due to improved communication. Offered praised and encouragement re flexibility and adaptability. Pt did complete crisis plan, will review next session. During this session, this clinician used the following therapeutic modalities: Cognitive Behavioral Therapy, Mindfulness-based Strategies, and Supportive Psychotherapy    Substance Abuse was not addressed during this session. If the client is diagnosed with a co-occurring substance use disorder, please indicate any changes in the frequency or amount of use: NA. Stage of change for addressing substance use diagnoses: No substance use/Not applicable    ASSESSMENT:  Frannie Glasgow presents with a irritable and low mood,  her affect is Constricted, which is congruent, with her mood and the content of the session. The client has made progress on their goals. Frannie Glasgow presents with a low risk of suicide, low risk of self-harm, and low risk of harm to others. For any risk assessment that surpasses a "low" rating, a safety plan must be developed.     A safety plan was indicated: no  If yes, describe in detail NA    PLAN: Between sessions, Frannie Glasgow will communicate boundaries with dtr. At the next session, the therapist will review crisis plan and use Cognitive Behavioral Therapy, Mindfulness-based Strategies, and Supportive Psychotherapy to address depression. Behavioral Health Treatment Plan and Discharge Planning: Ermias Chun is aware of and agrees to continue to work on their treatment plan. They have identified and are working toward their discharge goals.  yes    Visit start and stop times:    10/23/23  Start Time: 0903  Stop Time: 1000  Total Visit Time: 57 minutes

## 2023-11-02 ENCOUNTER — OFFICE VISIT (OUTPATIENT)
Dept: INTERNAL MEDICINE CLINIC | Facility: CLINIC | Age: 48
End: 2023-11-02
Payer: COMMERCIAL

## 2023-11-02 VITALS
SYSTOLIC BLOOD PRESSURE: 132 MMHG | HEIGHT: 67 IN | WEIGHT: 153 LBS | BODY MASS INDEX: 24.01 KG/M2 | DIASTOLIC BLOOD PRESSURE: 84 MMHG | TEMPERATURE: 96.3 F

## 2023-11-02 DIAGNOSIS — M79.671 RIGHT FOOT PAIN: ICD-10-CM

## 2023-11-02 DIAGNOSIS — F33.1 MODERATE EPISODE OF RECURRENT MAJOR DEPRESSIVE DISORDER (HCC): Primary | ICD-10-CM

## 2023-11-02 DIAGNOSIS — M17.31 POST-TRAUMATIC OSTEOARTHRITIS OF RIGHT KNEE: ICD-10-CM

## 2023-11-02 DIAGNOSIS — Z00.00 LABORATORY EXAMINATION ORDERED AS PART OF A ROUTINE GENERAL MEDICAL EXAMINATION: ICD-10-CM

## 2023-11-02 DIAGNOSIS — E55.9 VITAMIN D DEFICIENCY: ICD-10-CM

## 2023-11-02 DIAGNOSIS — E53.8 VITAMIN B12 DEFICIENCY: ICD-10-CM

## 2023-11-02 DIAGNOSIS — F41.9 ANXIETY: ICD-10-CM

## 2023-11-02 PROBLEM — Z01.419 WELL WOMAN EXAM: Status: RESOLVED | Noted: 2019-10-29 | Resolved: 2023-11-02

## 2023-11-02 PROCEDURE — 99214 OFFICE O/P EST MOD 30 MIN: CPT | Performed by: INTERNAL MEDICINE

## 2023-11-02 NOTE — PATIENT INSTRUCTIONS
Core Strengthening Exercises   AMBULATORY CARE:   What you need to know about core strengthening exercises: Your core includes the muscles of your lower back, hip, pelvis, and abdomen. Core strengthening exercises help heal and strengthen these muscles. This helps prevent another injury, and keeps your pelvis, spine, and hips in the correct position. Call your doctor or physical therapist if:   You have sharp or worsening pain during exercise or at rest.    You have questions or concerns about your shoulder exercises. Safety tips:  Talk to your healthcare provider before you start an exercise program. A physical therapist can teach you how to do core strengthening exercises safely. Do the exercises on a mat or firm surface. A firm surface will support your spine and prevent low back pain. Do not do these exercises on a bed. Move slowly and smoothly. Avoid fast or jerky motions. Stop if you feel pain. You may feel some discomfort at first, but you should not feel pain. Tell your provider or physical therapist if you have pain while you exercise. Regular exercise will help decrease your discomfort over time. Breathe normally during core exercises. Do not hold your breath. This may cause an increase in blood pressure and prevent muscle strengthening. Your healthcare provider will tell you when to inhale and exhale during the exercise. Begin all of your exercises with abdominal bracing. Abdominal bracing helps warm up your core muscles. You can also practice abdominal bracing throughout the day. Lie on your back with your knees bent and feet flat on the floor. Place your arms in a relaxed position beside your body. Tighten your abdominal muscles. Pull your belly button in and up toward your spine. Hold for 5 seconds. Relax your muscles. Repeat 10 times. Core strengthening exercises: Your healthcare provider will tell you how often to do these exercises.  The provider will also tell you how many repetitions of each exercise you should do. Hold each exercise for 5 seconds or as directed. As you get stronger, increase your hold to 10 to 15 seconds. You can do some of these exercises on a stability ball, or with a weight. Ask your healthcare provider how to use a stability ball or weight for these exercises:  Bridging:  Lie on your back with your knees bent and feet flat on the floor. Rest your arms at your side. Tighten your buttocks, and then lift your hips 1 inch off the floor. Hold for 5 seconds. When you can do this exercise without pain for 10 seconds, increase the distance you lift your hips. A good goal is to be able to lift your hips so that your shoulders, hips, and knees are in a straight line. Dead bug:  Lie on your back with your knees bent and feet flat on the floor. Place your arms in a relaxed position beside your body. Begin with abdominal bracing. Next, raise one leg, keeping your knee bent. Hold for 5 seconds. Repeat with the other leg. When you can do this exercise without pain for 10 to 15 seconds, you may raise one straight leg and hold. Repeat with the other leg. Quadruped:  Place your hands and knees on the floor. Keep your wrists directly below your shoulders and your knees directly below your hips. Pull your belly button in toward your spine. Do not flatten or arch your back. Tighten your abdominal muscles below your belly button. Hold for 5 seconds. When you can do this exercise without pain for 10 to 15 seconds, you may extend one arm and hold. Repeat on the other side. Side bridge exercises:      Standing side bridge:  Stand next to a wall and extend one arm toward the wall. Place your palm flat on the wall with your fingers pointing upward. Begin with abdominal bracing. Next, without moving your feet, slowly bend your arm to 90 degrees. Hold for 5 seconds. Repeat on the other side.  When you can do this exercise without pain for 10 to 15 seconds, you may do the bent leg side bridge on the floor. Bent leg side bridge:  Lie on one side with your legs, hips, and shoulders in a straight line. Prop yourself up onto your forearm so your elbow is directly below your shoulder. Bend your knees back to 90 degrees. Begin with abdominal bracing. Next, lift your hips and balance yourself on your forearm and knees. Hold for 5 seconds. Repeat on the other side. When you can do this exercise without pain for 10 to 15 seconds, you may do the straight leg side bridge on the floor. Straight leg side bridge:  Lie on one side with your legs, hips, and shoulders in a straight line. Prop yourself up onto your forearm so your elbow is directly below your shoulder. Begin with abdominal bracing. Lift your hips off the floor and balance yourself on your forearm and the outside of your flexed foot. Do not let your ankle bend sideways. Hold for 5 seconds. Repeat on the other side. When you can do this exercise without pain for 10 to 15 seconds, ask your healthcare provider for more advanced exercises. Superman:  Lie on your stomach. Extend your arms forward on the floor. Tighten your abdominal muscles and lift your right hand and left leg off the floor. Hold this position. Slowly return to the starting position. Tighten your abdominal muscles and lift your left hand and right leg off the floor. Hold this position. Slowly return to the starting position. Clam:  Lie on your side with your knees bent. Put your bottom arm under your head to keep your neck in line. Put your top hand on your hip to keep your pelvis from moving. Put your heels together, and keep them together during this exercise. Slowly raise your top knee toward the ceiling. Then lower your leg so your knees are together. Repeat this exercise 10 times. Then switch sides and do the exercise 10 times with the other leg.          Curl up:  Lie on your back with your knees bent and feet flat on the floor. Place your hands, palms down, underneath your lower back. Next, with your elbows on the floor, lift your shoulders and chest 2 to 3 inches off the floor. Keep your head in line with your shoulders. Hold this position. Slowly return to the starting position. Straight leg raises:  Lie on your back with one leg straight. Bend the other knee and place your foot flat on the floor. Tighten your abdominal muscles. Keep your leg straight and slowly lift it straight up 6 to 12 inches off the floor. Hold this position. Lower your leg slowly. Do as many repetitions as directed on this side. Repeat with the other leg. Plank:  Lie on your stomach. Bend your elbows and place your forearms flat on the floor. Lift your chest, stomach, and knees off the floor. Make sure your elbows are below your shoulders. Your body should be in a straight line. Do not let your hips or lower back sink to the ground. Squeeze your abdominal muscles together and hold for 15 seconds. To make this exercise harder, hold for 30 seconds or lift 1 leg at a time. Bicycles:  Lie on your back. Bend both knees and bring them toward your chest. Your calves should be parallel to the floor. Place the palms of your hands on the back of your head. Straighten your right leg and keep it lifted 2 inches off the floor. Raise your head and shoulders off the floor and twist towards your left. Keep your head and shoulders lifted. Bend your right knee while you straighten your left leg. Keep your left leg 2 inches off the floor. Twist your head and chest towards the left leg. Continue to straighten 1 leg at a time and twist.       Follow up with your doctor or physical therapist as directed:  Write down your questions so you remember to ask them during your visits. © Copyright [employer] 2023 Information is for End User's use only and may not be sold, redistributed or otherwise used for commercial purposes.   The above information is an  only. It is not intended as medical advice for individual conditions or treatments. Talk to your doctor, nurse or pharmacist before following any medical regimen to see if it is safe and effective for you.

## 2023-11-02 NOTE — PROGRESS NOTES
Assessment/Plan:    Chronic pain syndrome  Minimal improvement with SC stimulator. Moderate episode of recurrent major depressive disorder [facility])  Seeing therapist.  Encouraged to take amitriptyline 150 mg daily. Anxiety  As above. Post-traumatic osteoarthritis of right knee  Reviewed recent x-ray which showed severe arthritis. Follow up with Ortho. Vitamin B12 deficiency  Continue monthly injections. Vitamin D deficiency  Taking D3 occasionally. Menstrual periods irregular  Pelvic pain has improved. Recommend pelvic exercises. Saw gynecology. Diagnoses and all orders for this visit:    Moderate episode of recurrent major depressive disorder ([address])    Anxiety    Right foot pain  Comments:  Encouraged to use orthotics again, follow up with podiatry. Post-traumatic osteoarthritis of right knee    Vitamin B12 deficiency  -     CBC and differential  -     Vitamin B12    Vitamin D deficiency  -     Vitamin D 25 hydroxy    Laboratory examination ordered as part of a routine general medical examination  -     CBC and differential  -     Comprehensive metabolic panel  -     Lipid panel  -     Vitamin B12  -     Vitamin D 25 hydroxy      Follow up in 6 months or as needed. Subjective:      Patient ID: Pieter Marmolejo is a 53 y.o. female here for a follow up. She complains of right foot pain, did see the foot doctor. She reports the pad made the pain worse, did wear it for a few weeks. She has ongoing right knee pain, recent x-ray showed severe arthritis. She is not ready for surgery. She does not take her amitriptyline and vitamins regularly, takes it when she remembers. She has an alarm set in her phone but she is rarely home when it reminds her. She has been seeing a therapist regularly. She reports pelvic pain has improved.       The following portions of the patient's history were reviewed and updated as appropriate: allergies, current medications, past medical history, past social history, and problem list.    Review of Systems   Constitutional:  Negative for appetite change and fatigue. HENT:  Negative for congestion and ear pain. Eyes:  Negative for visual disturbance. Respiratory:  Negative for cough and shortness of breath. Cardiovascular:  Negative for chest pain and leg swelling. Gastrointestinal:  Negative for abdominal pain, constipation and diarrhea. Genitourinary:  Negative for dysuria, frequency and urgency. Musculoskeletal:  Positive for arthralgias. Negative for myalgias. Skin:  Negative for rash and wound. Neurological:  Negative for dizziness, numbness and headaches. Hematological:  Does not bruise/bleed easily. Psychiatric/Behavioral:  Positive for dysphoric mood. Negative for confusion. The patient is not nervous/anxious. Objective:      /84   Temp (!) 96.3 °F (35.7 °C)   Ht 5' 7" (1.702 m)   Wt 69.4 kg (153 lb)   BMI 23.96 kg/m²          Physical Exam  Vitals and nursing note reviewed. Constitutional:       General: She is not in acute distress. Appearance: She is well-developed. HENT:      Head: Normocephalic and atraumatic. Eyes:      Pupils: Pupils are equal, round, and reactive to light. Cardiovascular:      Rate and Rhythm: Normal rate and regular rhythm. Heart sounds: Normal heart sounds. Pulmonary:      Effort: Pulmonary effort is normal.      Breath sounds: Normal breath sounds. No wheezing. Abdominal:      General: Bowel sounds are normal.      Palpations: Abdomen is soft. Musculoskeletal:         General: No swelling. Right lower leg: No edema. Left lower leg: No edema. Skin:     General: Skin is warm. Findings: No rash. Neurological:      General: No focal deficit present. Mental Status: She is alert and oriented to person, place, and time. Psychiatric:         Mood and Affect: Mood and affect normal. Mood is not anxious or depressed.          Behavior: Behavior normal.             Labs & imaging results reviewed with patient.

## 2023-11-06 ENCOUNTER — TELEMEDICINE (OUTPATIENT)
Dept: BEHAVIORAL/MENTAL HEALTH CLINIC | Facility: CLINIC | Age: 48
End: 2023-11-06
Payer: COMMERCIAL

## 2023-11-06 DIAGNOSIS — F33.1 MODERATE EPISODE OF RECURRENT MAJOR DEPRESSIVE DISORDER (HCC): Primary | ICD-10-CM

## 2023-11-06 PROCEDURE — 90837 PSYTX W PT 60 MINUTES: CPT

## 2023-11-06 NOTE — PSYCH
Virtual Regular Visit    Verification of patient location:    Patient is located at Home in the following state in which I hold an active license [place]      Assessment/Plan:    Problem List Items Addressed This Visit          Other    Moderate episode of recurrent major depressive disorder ([address]) - Primary        Reason for visit is   Chief Complaint   Patient presents with    Virtual Regular Visit          Encounter provider Ruth Mccain    Provider located at [address] [place] [address]  [phone]      Recent Visits  Date Type Provider Dept   11/02/23 Office Visit Savanna De León MD [facility]   Showing recent visits within past 7 days and meeting all other requirements  Today's Visits  Date Type Provider Dept   11/06/23 Telemedicine Ruth Mccain [facility] [place]   Showing today's visits and meeting all other requirements  Future Appointments  No visits were found meeting these conditions. Showing future appointments within next 150 days and meeting all other requirements       The patient was identified by name and date of birth. Jose Augustine was informed that this is a telemedicine visit and that the visit is being conducted throughthe [employer] platform. She agrees to proceed. .  My office door was closed. No one else was in the room. She acknowledged consent and understanding of privacy and security of the video platform. The patient has agreed to participate and understands they can discontinue the visit at any time. Patient is aware this is a billable service.      Past Medical History:   Diagnosis Date    Abnormal Pap smear of cervix     Anemia     Arthritis     Depression     Edema     Fatigue     Kidney stone     Vitamin B12 deficiency     Vitamin D deficiency        Past Surgical History:   Procedure Laterality Date    BREAST BIOPSY  10/24/2019    CHOLECYSTECTOMY      ENDOMETRIAL ABLATION      KNEE ARTHROSCOPY Bilateral     KNEE SURGERY      ORIF TIBIA FRACTURE Right 01/18/2018    Procedure: OPEN REDUCTION W/ INTERNAL FIXATION (ORIF) TIBIA;  Surgeon: Quenten Bamberger, MD;  [facility];  Service: Orthopedics    ORIF TIBIAL PLATEAU Right 59/01/9086    Procedure: OPEN REDUCTION W/ INTERNAL FIXATION (ORIF) TIBIAL PLATEAU;  Surgeon: Quenten Bamberger, MD;  [facility];  Service: Orthopedics    IN LAPAROSCOPY SURG CHOLECYSTECTOMY N/A 08/12/2021    Procedure: LAPAROSCOPIC CHOLECYSTECTOMY, UMBILICAL HERNIA REPAIR;  Surgeon: Noé Fernandez MD;  [facility];  Service: General    IN REMOVAL IMPLANT DEEP Right 08/02/2018    Procedure: REMOVAL HARDWARE TIBIA;  Surgeon: Quenten Bamberger, MD;  [facility];  Service: Orthopedics    TUBAL LIGATION      US GUIDED BREAST BIOPSY LEFT COMPLETE Left 10/24/2019       Current Outpatient Medications   Medication Sig Dispense Refill    acetaminophen (TYLENOL) 325 mg tablet 650 mg every 6 hours for mild pain 30 tablet 0    amitriptyline (ELAVIL) 150 MG tablet Take 1 tablet (150 mg total) by mouth daily at bedtime 90 tablet 1    busPIRone (BUSPAR) 5 mg tablet Take 1 tablet (5 mg total) by mouth 2 (two) times a day 60 tablet 1    Cholecalciferol (Vitamin D3) 50 MCG (2000 UT) capsule Take 1 capsule (2,000 Units total) by mouth daily 90 capsule 1    cyanocobalamin 1,000 mcg/mL Inject 1 mL (1,000 mcg total) into a muscle every 14 (fourteen) days x4 then resume monthly dosing 10 mL 0    diclofenac (VOLTAREN) 75 mg EC tablet Take 1 tablet (75 mg total) by mouth 2 (two) times a day 60 tablet 1    ergocalciferol (VITAMIN D2) 50,000 units Take 1 capsule (50,000 Units total) by mouth once a week 12 capsule 0    SYRINGE-NEEDLE, DISP, 3 ML 25G X 1" 3 ML MISC Use every 30 (thirty) days 50 each 0     No current facility-administered medications for this visit.      Behavioral Health Psychotherapy Progress Note    Psychotherapy Provided: Individual Psychotherapy     1. Moderate episode of recurrent major depressive disorder ([address])            Goals addressed in session: Goal 1     DATA: All Wilburn is a 53 y.o. female  presenting to this virtual session for the supportive treatment of depression. Pt shared that all adult members of household are employed and contributing to household finances. She notes that spouse has been more helpful with household responsibilities. Spouse conts to seek accolades/praise from pt, at times waking her up to show her his accomplishments. She admits she forced him into make changes via taking her wedding ring off and refusing to put it on until her/they get into counseling. Explored cognitive distortions related to power struggles. She notes changes she made in his 3 year absence, indicates she is no longer "as accommodating," and holds him accountable, having stronger/more defined boundaries. Offered education re criminal thinking and identified patterns. Pt denies s/I thoughts, admits that she is tempted to escape responsibilities thru running towards hazards. Reviewed crisis plan/support people. She is not taking buspar as prescribed, conts to take as rescue medication. She agrees to set alarm and leave pills on sink and take each night before bed. During this session, this clinician used the following therapeutic modalities: Cognitive Behavioral Therapy, Mindfulness-based Strategies, and Supportive Psychotherapy    Substance Abuse was not addressed during this session. If the client is diagnosed with a co-occurring substance use disorder, please indicate any changes in the frequency or amount of use: NA. Stage of change for addressing substance use diagnoses: No substance use/Not applicable    ASSESSMENT:  All Wilburn presents with a Euthymic/ normal mood.      her affect is Normal range and intensity, which is congruent, with her mood and the content of the session. The client has made progress on their goals. Shar Elena presents with a low risk of suicide, low risk of self-harm, and low risk of harm to others. For any risk assessment that surpasses a "low" rating, a safety plan must be developed. A safety plan was indicated: no  If yes, describe in detail NA    PLAN: Between sessions, Shar Elena will research criminal thinking patterns and consider new/revised goals for new tx plan. At the next session, the therapist will use Cognitive Behavioral Therapy, Motivational Interviewing, and Supportive Psychotherapy to address depression. Behavioral Health Treatment Plan and Discharge Planning: Shar Elena is aware of and agrees to continue to work on their treatment plan. They have identified and are working toward their discharge goals.  yes    Visit start and stop times:    11/06/23  Start Time: 0858  Stop Time: 1007  Total Visit Time: 69 minutes

## 2023-11-09 ENCOUNTER — TELEPHONE (OUTPATIENT)
Dept: PSYCHIATRY | Facility: CLINIC | Age: 48
End: 2023-11-09

## 2023-11-09 NOTE — TELEPHONE ENCOUNTER
Left voicemail informing patient of the Virtual Psych Encounter form needing to be signed as a requirement from [employer] for billing purposes. Patient can access form via [employer] and sign electronically. Please make patient aware this form must be signed for each virtual visit as a requirement to continue virtual visits with provider.

## 2023-11-20 ENCOUNTER — TELEMEDICINE (OUTPATIENT)
Dept: BEHAVIORAL/MENTAL HEALTH CLINIC | Facility: CLINIC | Age: 48
End: 2023-11-20
Payer: COMMERCIAL

## 2023-11-20 DIAGNOSIS — F33.1 MODERATE EPISODE OF RECURRENT MAJOR DEPRESSIVE DISORDER (HCC): Primary | ICD-10-CM

## 2023-11-20 PROCEDURE — 90834 PSYTX W PT 45 MINUTES: CPT

## 2023-11-20 NOTE — BH TREATMENT PLAN
Outpatient Behavioral Health Psychotherapy Treatment Plan      Pawan Marr  1975     Date of Initial Psychotherapy Assessment: 02/03/23  Date of Current Treatment Plan: 11/20/23  Treatment Plan Target Date: 5/19/2024  Treatment Plan Expiration Date: 5/19/2024    Diagnosis:   1. Moderate episode of recurrent major depressive disorder (HCC)        Area(s) of Need: Boundaries, mild depression    Long Term Goal 1 (in the client's own words): Be able to stick to what I know I need to do for myself     Target Date for completion: 5/19/2024     Anticipated therapeutic modalities: CBT, mindfulness interventions and supportive therapy     People identified to complete this goal: Pawan Marr and Luh MARR     Objective 1: (identify the means of measuring success in meeting the objective): Develop deeper understanding of assertiveness techniques and use techniques in everyday life, especially with family members especially during times of conflict      Objective 2: (identify the means of measuring success in meeting the objective): Completing all homework as assigned to promote self awareness     Objective 3:  Decrease in PHQ 9 scores     I am currently under the care of a [facility] psychiatric provider: no    My [facility] psychiatric provider is: NA    I am currently taking psychiatric medications: Yes, as prescribed    I feel that I will be ready for discharge from mental health care when I reach the following (measurable goal/objective): I am making decisions without need for support on a consistent basis. For children and adults who have a legal guardian:   Has there been any change to custody orders and/or guardianship status? NA. If yes, attach updated documentation. Behavioral Health Treatment Plan [facility]: Diagnosis and Treatment Plan explained to [address] [place] [address] Dr acknowledges an understanding of their diagnosis. Pawan Marr agrees to this treatment plan.     I have been offered a copy of this Treatment Plan.  Yes via [employer]

## 2023-11-20 NOTE — PSYCH
Virtual Regular Visit    Verification of patient location:    Patient is located at Home in the following state in which I hold an active license [place]      Assessment/Plan:    Problem List Items Addressed This Visit          Other    Moderate episode of recurrent major depressive disorder ([address]) - Primary        Reason for visit is   Chief Complaint   Patient presents with    Virtual Regular Visit          Encounter provider Kevin Kent    Provider located at [address] [address] [facility] [address]  [phone]      Recent Visits  No visits were found meeting these conditions. Showing recent visits within past 7 days and meeting all other requirements  Today's Visits  Date Type Provider Dept   11/20/23 Telemedicine Kevin Kent [facility] [place]   Showing today's visits and meeting all other requirements  Future Appointments  No visits were found meeting these conditions. Showing future appointments within next 150 days and meeting all other requirements       The patient was identified by name and date of birth. Amador Valencia was informed that this is a telemedicine visit and that the visit is being conducted throughthe [employer] platform. She agrees to proceed. .  My office door was closed. No one else was in the room. She acknowledged consent and understanding of privacy and security of the video platform. The patient has agreed to participate and understands they can discontinue the visit at any time. Patient is aware this is a billable service.      Past Medical History:   Diagnosis Date    Abnormal Pap smear of cervix     Anemia     Arthritis     Depression     Edema     Fatigue     Kidney stone     Vitamin B12 deficiency     Vitamin D deficiency        Past Surgical History:   Procedure Laterality Date    BREAST BIOPSY  10/24/2019 CHOLECYSTECTOMY      ENDOMETRIAL ABLATION      KNEE ARTHROSCOPY Bilateral     KNEE SURGERY      ORIF TIBIA FRACTURE Right 01/18/2018    Procedure: OPEN REDUCTION W/ INTERNAL FIXATION (ORIF) TIBIA;  Surgeon: Adali Rodriguez MD;  [facility];  Service: Orthopedics    ORIF TIBIAL PLATEAU Right 82/01/2215    Procedure: OPEN REDUCTION W/ INTERNAL FIXATION (ORIF) TIBIAL PLATEAU;  Surgeon: Adali Rodriguez MD;  [facility];  Service: Orthopedics    TN LAPAROSCOPY SURG CHOLECYSTECTOMY N/A 08/12/2021    Procedure: LAPAROSCOPIC CHOLECYSTECTOMY, UMBILICAL HERNIA REPAIR;  Surgeon: Marlyn Celis MD;  [facility];  Service: General    TN REMOVAL IMPLANT DEEP Right 08/02/2018    Procedure: REMOVAL HARDWARE TIBIA;  Surgeon: Adali Rodriguez MD;  [facility];  Service: Orthopedics    TUBAL LIGATION      US GUIDED BREAST BIOPSY LEFT COMPLETE Left 10/24/2019       Current Outpatient Medications   Medication Sig Dispense Refill    acetaminophen (TYLENOL) 325 mg tablet 650 mg every 6 hours for mild pain 30 tablet 0    amitriptyline (ELAVIL) 150 MG tablet Take 1 tablet (150 mg total) by mouth daily at bedtime 90 tablet 1    busPIRone (BUSPAR) 5 mg tablet Take 1 tablet (5 mg total) by mouth 2 (two) times a day 60 tablet 1    Cholecalciferol (Vitamin D3) 50 MCG (2000 UT) capsule Take 1 capsule (2,000 Units total) by mouth daily 90 capsule 1    cyanocobalamin 1,000 mcg/mL Inject 1 mL (1,000 mcg total) into a muscle every 14 (fourteen) days x4 then resume monthly dosing 10 mL 0    diclofenac (VOLTAREN) 75 mg EC tablet Take 1 tablet (75 mg total) by mouth 2 (two) times a day 60 tablet 1    ergocalciferol (VITAMIN D2) 50,000 units Take 1 capsule (50,000 Units total) by mouth once a week 12 capsule 0    SYRINGE-NEEDLE, DISP, 3 ML 25G X 1" 3 ML MISC Use every 30 (thirty) days 50 each 0     No current facility-administered medications for this visit.       Behavioral Health Psychotherapy Progress Note    Psychotherapy Provided: Individual Psychotherapy     1. Moderate episode of recurrent major depressive disorder ([address])            Goals addressed in session: Goal 1     DATA: Ignacio Bowen is a 53 y.o. female presenting to this virtual session for the supportive treatment of depression. Pt shared that son's mood is improving re his injury and inability to play football. Her fa's health has stabilized and parents will be visiting for the upcoming [address]. She relates that oldest dtr moved out due financial conflicts re roles and responsibilities. This occurred following confrontation and pt belief that dtr is jealous. Pt is worried about her 5year old grandson (dtr's son). Dtr has been call pt's [place] everyday and this is source of contention. Pt shared belief dtr is looking for someone to enable her and for easy way out. Pt shared that dtr has moved out several time however this time pt does not believe she will allow dtr to move back in with her. She would allow grson to live with her but could not take care of younger children. Pt identifies feelings of anger and disappointment towards dtr, is managing same and communicating anger assertively. She notes lack of spitefulness. Reviewed progress pt has made re boundary setting. She notes ongoing need for conted improvement. Pt indicates that spouse is not supportive of pt during this time. Other children are more supportive and helpful. She has begun taking meds more consistently and reports some physical side effects that are improving with daily use. research criminal thinking patterns and consider new/revised goals for new tx plan. During this session, this clinician used the following therapeutic modalities: Cognitive Behavioral Therapy, Mindfulness-based Strategies, and Supportive Psychotherapy    Substance Abuse was not addressed during this session.  If the client is diagnosed with a co-occurring substance use disorder, please indicate any changes in the frequency or amount of use: NA. Stage of change for addressing substance use diagnoses: No substance use/Not applicable    ASSESSMENT:  Annabel Kapoor presents with a Dysthymic mood. her affect is Normal range and intensity, which is congruent, with her mood and the content of the session. The client has made progress on their goals. Annabel Kapoor presents with a low risk of suicide, low risk of self-harm, and low risk of harm to others. For any risk assessment that surpasses a "low" rating, a safety plan must be developed. A safety plan was indicated: no  If yes, describe in detail NA    PLAN: Between sessions, Annabel Kapoor will review and sign tx plan via [employer] and cont to practice assertiveness skills. At the next session, the therapist will use Cognitive Behavioral Therapy, Mindfulness-based Strategies, and Supportive Psychotherapy to address depression. Behavioral Health Treatment Plan and Discharge Planning: Annabel Kapoor is aware of and agrees to continue to work on their treatment plan. They have identified and are working toward their discharge goals.  yes    Visit start and stop times:    11/20/23  Start Time: 0904  Stop Time: 9813  Total Visit Time: 45 minutes

## 2023-12-04 ENCOUNTER — TELEMEDICINE (OUTPATIENT)
Dept: BEHAVIORAL/MENTAL HEALTH CLINIC | Facility: CLINIC | Age: 48
End: 2023-12-04
Payer: COMMERCIAL

## 2023-12-04 DIAGNOSIS — F33.1 MODERATE EPISODE OF RECURRENT MAJOR DEPRESSIVE DISORDER (HCC): Primary | ICD-10-CM

## 2023-12-04 PROCEDURE — 90837 PSYTX W PT 60 MINUTES: CPT

## 2023-12-04 NOTE — PSYCH
Virtual Regular Visit    Verification of patient location:    Patient is located at Home in the following state in which I hold an active license [place]      Assessment/Plan:    Problem List Items Addressed This Visit          Other    Moderate episode of recurrent major depressive disorder ([address]) - Primary       Goals addressed in session: Goal 1          Reason for visit is   Chief Complaint   Patient presents with    Virtual Regular Visit          Encounter provider Mara Gutierrez    Provider located at [address] [place] [address]  [phone]      Recent Visits  No visits were found meeting these conditions. Showing recent visits within past 7 days and meeting all other requirements  Today's Visits  Date Type Provider Dept   12/04/23 Telemedicine Mara Gutierrez [facility]   Showing today's visits and meeting all other requirements  Future Appointments  No visits were found meeting these conditions. Showing future appointments within next 150 days and meeting all other requirements       The patient was identified by name and date of birth. Pieter Marmolejo was informed that this is a telemedicine visit and that the visit is being conducted throughthe Personally platform. She agrees to proceed. .  My office door was closed. No one else was in the room. She acknowledged consent and understanding of privacy and security of the video platform. The patient has agreed to participate and understands they can discontinue the visit at any time. Patient is aware this is a billable service.      Past Medical History:   Diagnosis Date    Abnormal Pap smear of cervix     Anemia     Arthritis     Depression     Edema     Fatigue     Kidney stone     Vitamin B12 deficiency     Vitamin D deficiency        Past Surgical History:   Procedure Laterality Date BREAST BIOPSY  10/24/2019    CHOLECYSTECTOMY      ENDOMETRIAL ABLATION      KNEE ARTHROSCOPY Bilateral     KNEE SURGERY      ORIF TIBIA FRACTURE Right 01/18/2018    Procedure: OPEN REDUCTION W/ INTERNAL FIXATION (ORIF) TIBIA;  Surgeon: Natalie Chavez MD;  [facility];  Service: Orthopedics    ORIF TIBIAL PLATEAU Right 44/85/2210    Procedure: OPEN REDUCTION W/ INTERNAL FIXATION (ORIF) TIBIAL PLATEAU;  Surgeon: Natalie Chavez MD;  [facility];  Service: Orthopedics    [place] LAPAROSCOPY SURG CHOLECYSTECTOMY N/A 08/12/2021    Procedure: LAPAROSCOPIC CHOLECYSTECTOMY, UMBILICAL HERNIA REPAIR;  Surgeon: Chico Pan MD;  [facility];  Service: General    [place] REMOVAL IMPLANT DEEP Right 08/02/2018    Procedure: REMOVAL HARDWARE TIBIA;  Surgeon: Natalie Chavez MD;  [facility];  Service: Orthopedics    TUBAL LIGATION      US GUIDED BREAST BIOPSY LEFT COMPLETE Left 10/24/2019       Current Outpatient Medications   Medication Sig Dispense Refill    acetaminophen (TYLENOL) 325 mg tablet 650 mg every 6 hours for mild pain 30 tablet 0    amitriptyline (ELAVIL) 150 MG tablet Take 1 tablet (150 mg total) by mouth daily at bedtime 90 tablet 1    busPIRone (BUSPAR) 5 mg tablet Take 1 tablet (5 mg total) by mouth 2 (two) times a day 60 tablet 1    Cholecalciferol (Vitamin D3) 50 MCG (2000 UT) capsule Take 1 capsule (2,000 Units total) by mouth daily 90 capsule 1    cyanocobalamin 1,000 mcg/mL Inject 1 mL (1,000 mcg total) into a muscle every 14 (fourteen) days x4 then resume monthly dosing 10 mL 0    diclofenac (VOLTAREN) 75 mg EC tablet Take 1 tablet (75 mg total) by mouth 2 (two) times a day 60 tablet 1    ergocalciferol (VITAMIN D2) 50,000 units Take 1 capsule (50,000 Units total) by mouth once a week 12 capsule 0    SYRINGE-NEEDLE, DISP, 3 ML 25G X 1" 3 ML MISC Use every 30 (thirty) days 50 each 0     No current facility-administered medications for this visit.      Behavioral Health Psychotherapy Progress Note    Psychotherapy Provided: Individual Psychotherapy     1. Moderate episode of recurrent major depressive disorder ([address])            Goals addressed in session: Goal 1     DATA: Nemesio Rodriguez is a 53 y.o. female presenting to this virtual follow up session for the supportive treatment of depression. Pt shared conflict that occurred with MO over Tgiving holiday. She was able to utilize coping skills to get through immediate incident, she feels good about not escalating conflict however remains angry with MO due to historical pattern of dynamics in which Mo does not respect pt boundaries and acts in hypocritical manner. Offered praise and encouragement re progress with managing conflicts. Explored additional boundary setting and mechanisms for same. During this session, this clinician used the following therapeutic modalities: Cognitive Behavioral Therapy, Mindfulness-based Strategies, and Supportive Psychotherapy    Substance Abuse was not addressed during this session. If the client is diagnosed with a co-occurring substance use disorder, please indicate any changes in the frequency or amount of use: NA. Stage of change for addressing substance use diagnoses: No substance use/Not applicable    ASSESSMENT:  Nemesio Rodriguez presents with a Dysthymic mood. her affect is Normal range and intensity, which is congruent, with her mood and the content of the session. The client has made progress on their goals. Nemesio Rodriguez presents with a low risk of suicide, low risk of self-harm, and low risk of harm to others. For any risk assessment that surpasses a "low" rating, a safety plan must be developed. A safety plan was indicated: no  If yes, describe in detail NA    PLAN: Between sessions, Nemesio Rodriguez will consider similarities b/w self and Mo.   At the next session, the therapist will use Cognitive Behavioral Therapy, Mindfulness-based Strategies, and Supportive Psychotherapy to address depression. Behavioral Health Treatment Plan and Discharge Planning: Jose Davide is aware of and agrees to continue to work on their treatment plan. They have identified and are working toward their discharge goals.  yes    Visit start and stop times:    12/04/23  Start Time: 0900  Stop Time: 0958  Total Visit Time: 58 minutes

## 2023-12-18 ENCOUNTER — TELEMEDICINE (OUTPATIENT)
Dept: BEHAVIORAL/MENTAL HEALTH CLINIC | Facility: CLINIC | Age: 48
End: 2023-12-18
Payer: COMMERCIAL

## 2023-12-18 DIAGNOSIS — F33.1 MODERATE EPISODE OF RECURRENT MAJOR DEPRESSIVE DISORDER (HCC): Primary | ICD-10-CM

## 2023-12-18 PROCEDURE — 90837 PSYTX W PT 60 MINUTES: CPT
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
## 2024-01-10 ENCOUNTER — TELEPHONE (OUTPATIENT)
Dept: OBGYN CLINIC | Facility: CLINIC | Age: 49
End: 2024-01-10

## 2024-01-10 NOTE — TELEPHONE ENCOUNTER
Patient called, left message on answering machine, requesting STD testing as partner informed her to be tested.

## 2024-01-11 NOTE — PROGRESS NOTES
Assessment/Plan:     Problem List Items Addressed This Visit    None  Visit Diagnoses       STD exposure    -  Primary    Relevant Orders    Chlamydia/GC amplified DNA by PCR    Molecular Vaginal Panel            STI testing ordered today, including gonorrhea, chlamydia, and trichomonas with vaginitis panel to r/o candida and Gardnerella vaginalis.   2. Reviewed safe sex practices.  3. Will treat based on test results. Patient agreeable to plan.    Subjective:      Patient ID: Ronn Prather is a 48 y.o. female.    Patient presents to the office with concerns of abnormal vaginal discharge, irritation, and odor. She initially believed that it was BV and treated it with OTC 'Honey Pot'. She stopped treatment on Monday because her partner disclosed that he tested positive for chlamydia. She desires STI and vaginitis testing.        The following portions of the patient's history were reviewed and updated as appropriate: She  has a past medical history of Abnormal Pap smear of cervix, Anemia, Arthritis, Depression, Edema, Fatigue, Kidney stone, Vitamin B12 deficiency, and Vitamin D deficiency.  She   Patient Active Problem List    Diagnosis Date Noted    Uterine leiomyoma 03/20/2023    Chronic pain syndrome 03/17/2023    Menstrual periods irregular 04/20/2022    Microscopic hematuria 06/09/2021    Gastric polyps 04/23/2021    S/P tubal ligation 04/22/2021    Anxiety 08/27/2019    Papanicolaou smear of cervix with atypical squamous cells cannot exclude high grade squamous intraepithelial lesion (ASC-H) 08/27/2019    Arthritis of right knee 04/19/2019    Lumbar radiculopathy     Post-traumatic osteoarthritis of right knee 04/12/2019    Moderate episode of recurrent major depressive disorder (HCC) 03/15/2019    Patellofemoral disorder of right knee 09/14/2018    S/P ORIF (open reduction internal fixation) fracture 06/22/2018    Chronic pain of right knee 06/22/2018    Asymptomatic PVCs 03/27/2018    Ventricular bigeminy  01/18/2018    Vitamin B12 deficiency 10/21/2015    Vitamin D deficiency 10/21/2015     She  has a past surgical history that includes ORIF TIBIAL PLATEAU (Right, 01/18/2018); ORIF tibia fracture (Right, 01/18/2018); Knee surgery; Knee arthroscopy (Bilateral); Tubal ligation; pr removal implant deep (Right, 08/02/2018); US guided breast biopsy left complete (Left, 10/24/2019); Endometrial ablation; pr laparoscopy surg cholecystectomy (N/A, 08/12/2021); Breast biopsy (10/24/2019); and Cholecystectomy.  Her family history includes Arthritis in her family; Cancer in her mother; Cancer (age of onset: 58) in her father; Diabetes in her father; Heart attack in her father; Heart disease in her mother; Hypertension in her father; Lupus in her cousin and maternal grandmother; No Known Problems in her daughter, maternal aunt, maternal aunt, maternal aunt, maternal grandfather, paternal aunt, paternal grandfather, paternal grandmother, and son; Ovarian cancer in her family; Rheum arthritis in her daughter; Stomach cancer in her family.  She  reports that she has never smoked. She has never used smokeless tobacco. She reports current alcohol use. She reports that she does not use drugs.  Current Outpatient Medications   Medication Sig Dispense Refill    acetaminophen (TYLENOL) 325 mg tablet 650 mg every 6 hours for mild pain 30 tablet 0    amitriptyline (ELAVIL) 150 MG tablet Take 1 tablet (150 mg total) by mouth daily at bedtime 90 tablet 1    busPIRone (BUSPAR) 5 mg tablet Take 1 tablet (5 mg total) by mouth 2 (two) times a day 60 tablet 1    Cholecalciferol (Vitamin D3) 50 MCG (2000 UT) capsule Take 1 capsule (2,000 Units total) by mouth daily 90 capsule 1    cyanocobalamin 1,000 mcg/mL Inject 1 mL (1,000 mcg total) into a muscle every 14 (fourteen) days x4 then resume monthly dosing 10 mL 0    ergocalciferol (VITAMIN D2) 50,000 units Take 1 capsule (50,000 Units total) by mouth once a week 12 capsule 0    SYRINGE-NEEDLE,  "DISP, 3 ML 25G X 1\" 3 ML MISC Use every 30 (thirty) days 50 each 0    diclofenac (VOLTAREN) 75 mg EC tablet Take 1 tablet (75 mg total) by mouth 2 (two) times a day (Patient not taking: Reported on 1/12/2024) 60 tablet 1     No current facility-administered medications for this visit.     Current Outpatient Medications on File Prior to Visit   Medication Sig    acetaminophen (TYLENOL) 325 mg tablet 650 mg every 6 hours for mild pain    amitriptyline (ELAVIL) 150 MG tablet Take 1 tablet (150 mg total) by mouth daily at bedtime    busPIRone (BUSPAR) 5 mg tablet Take 1 tablet (5 mg total) by mouth 2 (two) times a day    Cholecalciferol (Vitamin D3) 50 MCG (2000 UT) capsule Take 1 capsule (2,000 Units total) by mouth daily    cyanocobalamin 1,000 mcg/mL Inject 1 mL (1,000 mcg total) into a muscle every 14 (fourteen) days x4 then resume monthly dosing    ergocalciferol (VITAMIN D2) 50,000 units Take 1 capsule (50,000 Units total) by mouth once a week    SYRINGE-NEEDLE, DISP, 3 ML 25G X 1\" 3 ML MISC Use every 30 (thirty) days    diclofenac (VOLTAREN) 75 mg EC tablet Take 1 tablet (75 mg total) by mouth 2 (two) times a day (Patient not taking: Reported on 1/12/2024)     No current facility-administered medications on file prior to visit.     She has No Known Allergies..    Review of Systems   Constitutional:  Negative for chills and fever.   Genitourinary:  Positive for vaginal discharge. Negative for dyspareunia, flank pain, frequency, pelvic pain and urgency.         Objective:      /80 (BP Location: Left arm, Patient Position: Sitting, Cuff Size: Standard)   Ht 5' 7\" (1.702 m)   Wt 68.9 kg (152 lb)   LMP 12/04/2022 (Exact Date)   BMI 23.81 kg/m²          Physical Exam  Vitals and nursing note reviewed.   Constitutional:       General: She is not in acute distress.     Appearance: Normal appearance.   HENT:      Head: Normocephalic.   Eyes:      Conjunctiva/sclera: Conjunctivae normal.   Pulmonary:      Effort: " Pulmonary effort is normal.   Abdominal:      General: Abdomen is flat.      Palpations: Abdomen is soft.   Genitourinary:     General: Normal vulva.      Exam position: Lithotomy position.      Pubic Area: No rash or pubic lice.       Labia:         Right: No rash or tenderness.         Left: No rash or tenderness.       Urethra: No urethral pain.      Vagina: Vaginal discharge present.      Cervix: Normal.      Comments: Minimal amount of milky white discharge noted in posterior vaginal vault.   Musculoskeletal:         General: Normal range of motion.      Cervical back: Normal range of motion.   Lymphadenopathy:      Lower Body: No right inguinal adenopathy. No left inguinal adenopathy.   Skin:     General: Skin is warm and dry.   Neurological:      Mental Status: She is alert and oriented to person, place, and time.   Psychiatric:         Mood and Affect: Mood normal.         Behavior: Behavior normal.         Thought Content: Thought content normal.         Judgment: Judgment normal.

## 2024-01-12 ENCOUNTER — OFFICE VISIT (OUTPATIENT)
Dept: OBGYN CLINIC | Facility: CLINIC | Age: 49
End: 2024-01-12
Payer: COMMERCIAL

## 2024-01-12 VITALS
SYSTOLIC BLOOD PRESSURE: 112 MMHG | WEIGHT: 152 LBS | BODY MASS INDEX: 23.86 KG/M2 | DIASTOLIC BLOOD PRESSURE: 80 MMHG | HEIGHT: 67 IN

## 2024-01-12 DIAGNOSIS — Z20.2 STD EXPOSURE: Primary | ICD-10-CM

## 2024-01-12 PROCEDURE — 87591 N.GONORRHOEAE DNA AMP PROB: CPT

## 2024-01-12 PROCEDURE — 87491 CHLMYD TRACH DNA AMP PROBE: CPT

## 2024-01-12 PROCEDURE — 81514 NFCT DS BV&VAGINITIS DNA ALG: CPT

## 2024-01-12 PROCEDURE — 99213 OFFICE O/P EST LOW 20 MIN: CPT

## 2024-01-13 LAB
C GLABRATA DNA VAG QL NAA+PROBE: NEGATIVE
C KRUSEI DNA VAG QL NAA+PROBE: NEGATIVE
C TRACH DNA SPEC QL NAA+PROBE: NEGATIVE
CANDIDA SP 6 PNL VAG NAA+PROBE: POSITIVE
N GONORRHOEA DNA SPEC QL NAA+PROBE: NEGATIVE
T VAGINALIS DNA VAG QL NAA+PROBE: NEGATIVE
VAGINOSIS/ITIS DNA PNL VAG PROBE+SIG AMP: POSITIVE

## 2024-01-14 DIAGNOSIS — B37.9 CANDIDA ALBICANS INFECTION: ICD-10-CM

## 2024-01-14 DIAGNOSIS — N76.0 BV (BACTERIAL VAGINOSIS): Primary | ICD-10-CM

## 2024-01-14 DIAGNOSIS — B96.89 BV (BACTERIAL VAGINOSIS): Primary | ICD-10-CM

## 2024-01-14 RX ORDER — METRONIDAZOLE 500 MG/1
500 TABLET ORAL EVERY 12 HOURS SCHEDULED
Qty: 14 TABLET | Refills: 0 | Status: SHIPPED | OUTPATIENT
Start: 2024-01-14 | End: 2024-01-21

## 2024-01-14 RX ORDER — FLUCONAZOLE 150 MG/1
150 TABLET ORAL ONCE
Qty: 1 TABLET | Refills: 0 | Status: SHIPPED | OUTPATIENT
Start: 2024-01-14 | End: 2024-01-14

## 2024-01-16 DIAGNOSIS — Z20.2 STD EXPOSURE: Primary | ICD-10-CM

## 2024-01-17 DIAGNOSIS — E55.9 VITAMIN D DEFICIENCY: ICD-10-CM

## 2024-01-17 RX ORDER — ACETAMINOPHEN 160 MG
2000 TABLET,DISINTEGRATING ORAL DAILY
Qty: 90 CAPSULE | Refills: 1 | Status: SHIPPED | OUTPATIENT
Start: 2024-01-17

## 2024-01-17 RX ORDER — ERGOCALCIFEROL 1.25 MG/1
50000 CAPSULE ORAL WEEKLY
Qty: 12 CAPSULE | Refills: 0 | Status: SHIPPED | OUTPATIENT
Start: 2024-01-17

## 2024-01-19 ENCOUNTER — HOSPITAL ENCOUNTER (OUTPATIENT)
Dept: RADIOLOGY | Age: 49
Discharge: HOME | End: 2024-01-19
Payer: COMMERCIAL

## 2024-01-19 VITALS — BODY MASS INDEX: 23.86 KG/M2 | HEIGHT: 67 IN | WEIGHT: 152 LBS

## 2024-01-19 DIAGNOSIS — Z12.31 ENCOUNTER FOR SCREENING MAMMOGRAM FOR MALIGNANT NEOPLASM OF BREAST: ICD-10-CM

## 2024-01-19 PROCEDURE — 77067 SCR MAMMO BI INCL CAD: CPT

## 2024-01-19 PROCEDURE — 77063 BREAST TOMOSYNTHESIS BI: CPT

## 2024-01-25 ENCOUNTER — TELEMEDICINE (OUTPATIENT)
Dept: BEHAVIORAL/MENTAL HEALTH CLINIC | Facility: CLINIC | Age: 49
End: 2024-01-25
Payer: COMMERCIAL

## 2024-01-25 DIAGNOSIS — F33.1 MODERATE EPISODE OF RECURRENT MAJOR DEPRESSIVE DISORDER (HCC): Primary | ICD-10-CM

## 2024-01-25 PROCEDURE — 90837 PSYTX W PT 60 MINUTES: CPT

## 2024-01-25 NOTE — PSYCH
Virtual Regular Visit    Verification of patient location:    Patient is located at Home in the following state in which I hold an active license [place]      Assessment/Plan:    Problem List Items Addressed This Visit          Other    Moderate episode of recurrent major depressive disorder (HCC) - Primary        Reason for visit is   Chief Complaint   Patient presents with    Virtual Regular Visit          Encounter provider Sruthi Fisher    Provider located at [address] [place] [address] [phone]      Recent Visits  No visits were found meeting these conditions.  Showing recent visits within past 7 days and meeting all other requirements  Today's Visits  Date Type Provider Dept   01/25/24 Telemedicine Sruthi Fisher [facility]   Showing today's visits and meeting all other requirements  Future Appointments  No visits were found meeting these conditions.  Showing future appointments within next 150 days and meeting all other requirements       The patient was identified by name and date of birth. Ronn Prather was informed that this is a telemedicine visit and that the visit is being conducted throughthe [employer] platform. She agrees to proceed..  My office door was closed. No one else was in the room.  She acknowledged consent and understanding of privacy and security of the video platform. The patient has agreed to participate and understands they can discontinue the visit at any time.    Patient is aware this is a billable service.       Past Medical History:   Diagnosis Date    Abnormal Pap smear of cervix     Anemia     Arthritis     Depression     Edema     Fatigue     Kidney stone     Vitamin B12 deficiency     Vitamin D deficiency        Past Surgical History:   Procedure Laterality Date    BREAST BIOPSY  10/24/2019     "CHOLECYSTECTOMY      ENDOMETRIAL ABLATION      KNEE ARTHROSCOPY Bilateral     KNEE SURGERY      ORIF TIBIA FRACTURE Right 01/18/2018    Procedure: OPEN REDUCTION W/ INTERNAL FIXATION (ORIF) TIBIA;  Surgeon: Konstantin Todd MD;  [facility];  Service: Orthopedics    ORIF TIBIAL PLATEAU Right 01/18/2018    Procedure: OPEN REDUCTION W/ INTERNAL FIXATION (ORIF) TIBIAL PLATEAU;  Surgeon: Konstantin Todd MD;  [facility];  Service: Orthopedics    GA LAPAROSCOPY SURG CHOLECYSTECTOMY N/A 08/12/2021    Procedure: LAPAROSCOPIC CHOLECYSTECTOMY, UMBILICAL HERNIA REPAIR;  Surgeon: Mariusz Soto MD;  [facility];  Service: General    [place] REMOVAL IMPLANT DEEP Right 08/02/2018    Procedure: REMOVAL HARDWARE TIBIA;  Surgeon: Konstantin Todd MD;  [facility];  Service: Orthopedics    TUBAL LIGATION      US GUIDED BREAST BIOPSY LEFT COMPLETE Left 10/24/2019       Current Outpatient Medications   Medication Sig Dispense Refill    acetaminophen (TYLENOL) 325 mg tablet 650 mg every 6 hours for mild pain 30 tablet 0    amitriptyline (ELAVIL) 150 MG tablet Take 1 tablet (150 mg total) by mouth daily at bedtime 90 tablet 1    busPIRone (BUSPAR) 5 mg tablet Take 1 tablet (5 mg total) by mouth 2 (two) times a day 60 tablet 1    Cholecalciferol (Vitamin D3) 50 MCG (2000 UT) capsule TAKE ONE CAPSULE BY MOUTH EVERY DAY 90 capsule 1    cyanocobalamin 1,000 mcg/mL Inject 1 mL (1,000 mcg total) into a muscle every 14 (fourteen) days x4 then resume monthly dosing 10 mL 0    diclofenac (VOLTAREN) 75 mg EC tablet Take 1 tablet (75 mg total) by mouth 2 (two) times a day (Patient not taking: Reported on 1/12/2024) 60 tablet 1    ergocalciferol (VITAMIN D2) 50,000 units TAKE ONE CAPSULE BY MOUTH ONCE A WEEK 12 capsule 0    SYRINGE-NEEDLE, DISP, 3 ML 25G X 1\" 3 ML MISC Use every 30 (thirty) days 50 each 0     No current facility-administered medications for this visit.      Behavioral Health Psychotherapy Progress " "Note    Psychotherapy Provided: Individual Psychotherapy     1. Moderate episode of recurrent major depressive disorder (HCC)            Goals addressed in session: Goal 1     DATA: Ronn Prather is a 48 y.o. female presenting to this virtual session for the supportive treatment of depression.  Pt shared feelings of indignation and anger towards spouse re double standards and potential boundary violations. Explored power struggles with spouse and use of effective communication to decrease same.  Pt shared feeling spouse is unsupportive  and that she does not have support anywhere in life.  Spouse took her ring however con't to wear his.  She relates that when she and current spouse were getting , her middle dtr and bro called marriage official and cancelled marriage.  She has never confirmed that she  current spouse with her children.  Her cousin is the only person that know pt is .  Pt believes they were  for parole and that he did not love her at the time, although believes he does love her now.  Pt indicates she is no longer in love with him, but could fall back in love with him.      During this session, this clinician used the following therapeutic modalities: Cognitive Behavioral Therapy, Mindfulness-based Strategies, and Supportive Psychotherapy    Substance Abuse was not addressed during this session. If the client is diagnosed with a co-occurring substance use disorder, please indicate any changes in the frequency or amount of use: NA. Stage of change for addressing substance use diagnoses: No substance use/Not applicable    ASSESSMENT:  Ronn Prather presents with a Angry mood.     her affect is Constricted, which is congruent, with her mood and the content of the session. The client has made progress on their goals.    Ronn Prather presents with a low risk of suicide, low risk of self-harm, and low risk of harm to others.    For any risk assessment that surpasses a \"low\" " "rating, a safety plan must be developed.    A safety plan was indicated: no  If yes, describe in detail NA    PLAN: Between sessions, Ronn Prather will practice \"I feel when you\" communication with spouse. At the next session, the therapist will use Cognitive Behavioral Therapy, Mindfulness-based Strategies, and Supportive Psychotherapy to address depression.    Behavioral Health Treatment Plan and Discharge Planning: Ronn Prather is aware of and agrees to continue to work on their treatment plan. They have identified and are working toward their discharge goals. yes    Visit start and stop times:    01/25/24  Start Time: 0817  Stop Time: 0915  Total Visit Time: 58 minutes    "

## 2024-01-30 ENCOUNTER — TELEPHONE (OUTPATIENT)
Dept: PSYCHIATRY | Facility: CLINIC | Age: 49
End: 2024-01-30

## 2024-02-15 ENCOUNTER — TELEMEDICINE (OUTPATIENT)
Dept: BEHAVIORAL/MENTAL HEALTH CLINIC | Facility: CLINIC | Age: 49
End: 2024-02-15

## 2024-02-15 DIAGNOSIS — F33.1 MODERATE EPISODE OF RECURRENT MAJOR DEPRESSIVE DISORDER (HCC): Primary | ICD-10-CM

## 2024-02-15 NOTE — PSYCH
Virtual Regular Visit    Verification of patient location:    Patient is located at Home in the following state in which I hold an active license [place]      Assessment/Plan:    Problem List Items Addressed This Visit          Other    Moderate episode of recurrent major depressive disorder (HCC) - Primary        Reason for visit is   Chief Complaint   Patient presents with    Virtual Regular Visit          Encounter provider Sruthi Fisher    Provider located at [address] [place] [address] [phone]      Recent Visits  No visits were found meeting these conditions.  Showing recent visits within past 7 days and meeting all other requirements  Today's Visits  Date Type Provider Dept   02/15/24 Telemedicine Sruthi Fisher [facility]   Showing today's visits and meeting all other requirements  Future Appointments  No visits were found meeting these conditions.  Showing future appointments within next 150 days and meeting all other requirements       The patient was identified by name and date of birth. Ronn Prather was informed that this is a telemedicine visit and that the visit is being conducted throughthe [facility] platform. She agrees to proceed..  My office door was closed. No one else was in the room.  She acknowledged consent and understanding of privacy and security of the video platform. The patient has agreed to participate and understands they can discontinue the visit at any time.    Patient is aware this is a billable service.     Past Medical History:   Diagnosis Date    Abnormal Pap smear of cervix     Anemia     Arthritis     Depression     Edema     Fatigue     Kidney stone     Vitamin B12 deficiency     Vitamin D deficiency        Past Surgical History:   Procedure Laterality Date    BREAST BIOPSY  10/24/2019     "CHOLECYSTECTOMY      ENDOMETRIAL ABLATION      KNEE ARTHROSCOPY Bilateral     KNEE SURGERY      ORIF TIBIA FRACTURE Right 01/18/2018    Procedure: OPEN REDUCTION W/ INTERNAL FIXATION (ORIF) TIBIA;  Surgeon: Konstantin Todd MD;  [facility];  Service: Orthopedics    ORIF TIBIAL PLATEAU Right 01/18/2018    Procedure: OPEN REDUCTION W/ INTERNAL FIXATION (ORIF) TIBIAL PLATEAU;  Surgeon: Konstantin Todd MD;  [facility];  Service: Orthopedics    [place] LAPAROSCOPY SURG CHOLECYSTECTOMY N/A 08/12/2021    Procedure: LAPAROSCOPIC CHOLECYSTECTOMY, UMBILICAL HERNIA REPAIR;  Surgeon: Mariusz Soto MD;  [facility];  Service: General    [place] REMOVAL IMPLANT DEEP Right 08/02/2018    Procedure: REMOVAL HARDWARE TIBIA;  Surgeon: Konstantin Todd MD;  [facility];  Service: Orthopedics    TUBAL LIGATION      US GUIDED BREAST BIOPSY LEFT COMPLETE Left 10/24/2019       Current Outpatient Medications   Medication Sig Dispense Refill    acetaminophen (TYLENOL) 325 mg tablet 650 mg every 6 hours for mild pain 30 tablet 0    amitriptyline (ELAVIL) 150 MG tablet Take 1 tablet (150 mg total) by mouth daily at bedtime 90 tablet 1    busPIRone (BUSPAR) 5 mg tablet Take 1 tablet (5 mg total) by mouth 2 (two) times a day 60 tablet 1    Cholecalciferol (Vitamin D3) 50 MCG (2000 UT) capsule TAKE ONE CAPSULE BY MOUTH EVERY DAY 90 capsule 1    cyanocobalamin 1,000 mcg/mL Inject 1 mL (1,000 mcg total) into a muscle every 14 (fourteen) days x4 then resume monthly dosing 10 mL 0    diclofenac (VOLTAREN) 75 mg EC tablet Take 1 tablet (75 mg total) by mouth 2 (two) times a day (Patient not taking: Reported on 1/12/2024) 60 tablet 1    ergocalciferol (VITAMIN D2) 50,000 units TAKE ONE CAPSULE BY MOUTH ONCE A WEEK 12 capsule 0    SYRINGE-NEEDLE, DISP, 3 ML 25G X 1\" 3 ML MISC Use every 30 (thirty) days 50 each 0     No current facility-administered medications for this visit.      Behavioral Health Psychotherapy Progress " "Note    Psychotherapy Provided: Individual Psychotherapy     1. Moderate episode of recurrent major depressive disorder (HCC)            Goals addressed in session: Goal 1     DATA: Ronn Prather is a 48 y.o. female presenting to this virtual session for the supportive treatment of depression.  Pt recounted incidents in which she utilized effective communication techniques reviewed last session.  She notes some success, however notes that others (spouse) has not changed consistently.  Reviewed goals of effective communication.  Pt voiced understanding and belief that spouse is not capable of meeting her needs in a mature manner.  Discussed tolerating ambiguity, double standards and making plans without spouse.  Explored past relationship dynamics including 20 plus year first marriage and similarities with current spouse.      During this session, this clinician used the following therapeutic modalities: Cognitive Behavioral Therapy, Mindfulness-based Strategies, and Supportive Psychotherapy    Substance Abuse was not addressed during this session. If the client is diagnosed with a co-occurring substance use disorder, please indicate any changes in the frequency or amount of use: NA. Stage of change for addressing substance use diagnoses: No substance use/Not applicable    ASSESSMENT:  Ronn Prather presents with a Dysthymic mood.     her affect is Normal range and intensity, which is congruent, with her mood and the content of the session. The client has made progress on their goals.    Ronn Prather presents with a low risk of suicide, low risk of self-harm, and low risk of harm to others.    For any risk assessment that surpasses a \"low\" rating, a safety plan must be developed.    A safety plan was indicated: no  If yes, describe in detail NA    PLAN: Between sessions, Ronn Prather will identify needs that are met from relationship and her unwillingness to make a decision re future of relationship. At the " next session, the therapist will use Cognitive Behavioral Therapy, Mindfulness-based Strategies, and Supportive Psychotherapy to address depression.    Behavioral Health Treatment Plan and Discharge Planning: [place] Prather is aware of and agrees to continue to work on their treatment plan. They have identified and are working toward their discharge goals. yes    Visit start and stop times:    02/15/24  Start Time: 0817  Stop Time: 0913  Total Visit Time: 56 minutes

## 2024-02-16 ENCOUNTER — HOSPITAL ENCOUNTER (OUTPATIENT)
Dept: MAMMOGRAPHY | Facility: CLINIC | Age: 49
Discharge: HOME | End: 2024-02-16
Payer: COMMERCIAL

## 2024-02-16 ENCOUNTER — HOSPITAL ENCOUNTER (OUTPATIENT)
Dept: ULTRASOUND IMAGING | Facility: CLINIC | Age: 49
Discharge: HOME | End: 2024-02-16
Payer: COMMERCIAL

## 2024-02-16 VITALS — HEIGHT: 67 IN | WEIGHT: 152 LBS | BODY MASS INDEX: 23.86 KG/M2

## 2024-02-16 DIAGNOSIS — R92.8 ABNORMAL MAMMOGRAM: ICD-10-CM

## 2024-02-16 PROCEDURE — 77065 DX MAMMO INCL CAD UNI: CPT

## 2024-02-16 PROCEDURE — G0279 TOMOSYNTHESIS, MAMMO: HCPCS

## 2024-02-16 PROCEDURE — 76642 ULTRASOUND BREAST LIMITED: CPT

## 2024-02-28 ENCOUNTER — TELEPHONE (OUTPATIENT)
Dept: OBGYN CLINIC | Facility: CLINIC | Age: 49
End: 2024-02-28

## 2024-02-29 ENCOUNTER — TELEMEDICINE (OUTPATIENT)
Dept: BEHAVIORAL/MENTAL HEALTH CLINIC | Facility: CLINIC | Age: 49
End: 2024-02-29
Payer: COMMERCIAL

## 2024-02-29 DIAGNOSIS — F33.1 MODERATE EPISODE OF RECURRENT MAJOR DEPRESSIVE DISORDER (HCC): Primary | ICD-10-CM

## 2024-02-29 PROCEDURE — 90834 PSYTX W PT 45 MINUTES: CPT
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
## 2024-03-14 ENCOUNTER — TELEMEDICINE (OUTPATIENT)
Dept: BEHAVIORAL/MENTAL HEALTH CLINIC | Facility: CLINIC | Age: 49
End: 2024-03-14
Payer: COMMERCIAL

## 2024-03-14 DIAGNOSIS — F33.1 MODERATE EPISODE OF RECURRENT MAJOR DEPRESSIVE DISORDER (HCC): Primary | ICD-10-CM

## 2024-03-14 PROCEDURE — 90832 PSYTX W PT 30 MINUTES: CPT

## 2024-03-14 NOTE — PSYCH
Virtual Regular Visit    Verification of patient location:    Patient is located at Home in the following state in which I hold an active license [place]      Assessment/Plan:    Problem List Items Addressed This Visit          Behavioral Health    Moderate episode of recurrent major depressive disorder (HCC) - Primary        Reason for visit is   Chief Complaint   Patient presents with    Virtual Regular Visit          Encounter provider Sruthi Fisher    Provider located at [address] [place] [address] [phone]      Recent Visits  No visits were found meeting these conditions.  Showing recent visits within past 7 days and meeting all other requirements  Today's Visits  Date Type Provider Dept   03/14/24 Telemedicine Sruthi [facility]   Showing today's visits and meeting all other requirements  Future Appointments  No visits were found meeting these conditions.  Showing future appointments within next 150 days and meeting all other requirements       The patient was identified by name and date of birth. Ronn Prather was informed that this is a telemedicine visit and that the visit is being conducted throughthe [employer] platform. She agrees to proceed..  My office door was closed. No one else was in the room.  She acknowledged consent and understanding of privacy and security of the video platform. The patient has agreed to participate and understands they can discontinue the visit at any time.    Patient is aware this is a billable service.     Past Medical History:   Diagnosis Date    Abnormal Pap smear of cervix     Anemia     Arthritis     Depression     Edema     Fatigue     Kidney stone     Vitamin B12 deficiency     Vitamin D deficiency        Past Surgical History:   Procedure Laterality Date    BREAST BIOPSY  10/24/2019  "   CHOLECYSTECTOMY      ENDOMETRIAL ABLATION      KNEE ARTHROSCOPY Bilateral     KNEE SURGERY      ORIF TIBIA FRACTURE Right 01/18/2018    Procedure: OPEN REDUCTION W/ INTERNAL FIXATION (ORIF) TIBIA;  Surgeon: Konstantin Todd MD;  [facility];  Service: Orthopedics    ORIF TIBIAL PLATEAU Right 01/18/2018    Procedure: OPEN REDUCTION W/ INTERNAL FIXATION (ORIF) TIBIAL PLATEAU;  Surgeon: Konstantin Todd MD;  [facility];  Service: Orthopedics    MA LAPAROSCOPY SURG CHOLECYSTECTOMY N/A 08/12/2021    Procedure: LAPAROSCOPIC CHOLECYSTECTOMY, UMBILICAL HERNIA REPAIR;  Surgeon: Mariusz Soto MD;  [facility];  Service: General    MA REMOVAL IMPLANT DEEP Right 08/02/2018    Procedure: REMOVAL HARDWARE TIBIA;  Surgeon: Konstantin Todd MD;  [facility];  Service: Orthopedics    TUBAL LIGATION      US GUIDED BREAST BIOPSY LEFT COMPLETE Left 10/24/2019       Current Outpatient Medications   Medication Sig Dispense Refill    acetaminophen (TYLENOL) 325 mg tablet 650 mg every 6 hours for mild pain 30 tablet 0    amitriptyline (ELAVIL) 150 MG tablet Take 1 tablet (150 mg total) by mouth daily at bedtime 90 tablet 1    busPIRone (BUSPAR) 5 mg tablet Take 1 tablet (5 mg total) by mouth 2 (two) times a day 60 tablet 1    Cholecalciferol (Vitamin D3) 50 MCG (2000 UT) capsule TAKE ONE CAPSULE BY MOUTH EVERY DAY 90 capsule 1    cyanocobalamin 1,000 mcg/mL Inject 1 mL (1,000 mcg total) into a muscle every 14 (fourteen) days x4 then resume monthly dosing 10 mL 0    diclofenac (VOLTAREN) 75 mg EC tablet Take 1 tablet (75 mg total) by mouth 2 (two) times a day (Patient not taking: Reported on 1/12/2024) 60 tablet 1    ergocalciferol (VITAMIN D2) 50,000 units TAKE ONE CAPSULE BY MOUTH ONCE A WEEK 12 capsule 0    SYRINGE-NEEDLE, DISP, 3 ML 25G X 1\" 3 ML MISC Use every 30 (thirty) days 50 each 0     No current facility-administered medications for this visit.     Behavioral Health Psychotherapy Progress " "Note    Psychotherapy Provided: Individual Psychotherapy     1. Moderate episode of recurrent major depressive disorder (HCC)            Goals addressed in session: Goal 1     DATA: Ronn Prather is a 48 y.o. female presenting to this  virtual session for the supportive treatment of depression.  She notes she is feeling better and believes improved mood is due to setting boundaries with fam members.  She is practicing allowing \"others to clean up their own messes,\" offering advice/support only when asked.  She admits she initially feels guilty when she is assertive and sets boundaries.  She is able to remain resolute in her decisions.  She believes she is letting guilt go but is unsure about same.  She does inquire if resolution has been achieved and this assists with letting go guilt.  Encouraged challenging guilty thoughts/cog distortions.  Dtr is in process of losing housing and pt is allowing dtr to \"figure it out\" with BF.  She is stressed re giving houseguest 10 day notice of need to leave due to nonpayment.  Pt shared instances of engaging in passive aggressive communication vs assertive/effective comm.  Processed same as continuation of avoidance.     During this session, this clinician used the following therapeutic modalities: Cognitive Behavioral Therapy, Mindfulness-based Strategies, and Supportive Psychotherapy    Substance Abuse was not addressed during this session. If the client is diagnosed with a co-occurring substance use disorder, please indicate any changes in the frequency or amount of use: NA. Stage of change for addressing substance use diagnoses: No substance use/Not applicable    ASSESSMENT:  Ronn Prather presents with a Euthymic/ normal mood.     her affect is Normal range and intensity, which is congruent, with her mood and the content of the session. The client has made progress on their goals.    Ronn Prather presents with a low risk of suicide, low risk of self-harm, and low " "risk of harm to others.    For any risk assessment that surpasses a \"low\" rating, a safety plan must be developed.    A safety plan was indicated: no  If yes, describe in detail NA    PLAN: Between sessions, Ronn Prather will cont to test cog distortions and maintain healthy boundaries. At the next session, the therapist will use Cognitive Behavioral Therapy, Mindfulness-based Strategies, and Supportive Psychotherapy to address depression.    Behavioral Health Treatment Plan and Discharge Planning: Ronn Prather is aware of and agrees to continue to work on their treatment plan. They have identified and are working toward their discharge goals. yes    Visit start and stop times:    03/14/24  Start Time: 0819  Stop Time: 0852  Total Visit Time: 33 minutes        "

## 2024-03-15 ENCOUNTER — NURSE TRIAGE (OUTPATIENT)
Age: 49
End: 2024-03-15

## 2024-03-15 DIAGNOSIS — R82.90 CLOUDY URINE: Primary | ICD-10-CM

## 2024-03-15 NOTE — TELEPHONE ENCOUNTER
"Ronn has been having cloudy urine with odor x one week.  She denies vaginal symptoms. Denies urinary frequency, pain with urination or pelvic pain.   Offered appointment for today or next week. She would like to provide ua sample to lab. If negative for UTI, she is in agreement to schedule appt for eval.  Understands if symptoms increase over weekend including fever, painful urination, pelvic or flank pain to present to urgent care.   Confirmed [facility] and [facility] on file.  UA and UA C/S orders pended.  Please notify patient when orders have been signed.          Reason for Disposition   All other urine symptoms     Offered appointment. Patient prefers to provide urine sample to lab prior to visit    Answer Assessment - Initial Assessment Questions  1. SYMPTOM: \"What's the main symptom you're concerned about?\" (e.g., frequency, incontinence)      Cloudy urine wtih odor  2. ONSET: \"When did the  symptom  start?\"      One week ago  3. PAIN: \"Is there any pain?\" If Yes, ask: \"How bad is it?\" (Scale: 1-10; mild, moderate, severe)      Denies pelvic pain or pain with urination  4. CAUSE: \"What do you think is causing the symptoms?\"      Urinary tract infection  5. OTHER SYMPTOMS: \"Do you have any other symptoms?\" (e.g., fever, flank pain, blood in urine, pain with urination)      Denies vaginal discharge or vaginal odor  6. PREGNANCY: \"Is there any chance you are pregnant?\" \"When was your last menstrual period?\"      Post menopausal    Protocols used: Urinary Symptoms-ADULT-[place]    "

## 2024-03-15 NOTE — TELEPHONE ENCOUNTER
Labs sent. When you guys are pending orders, I cannot see them to sign off.    Also- she is not pregnant. If she is having urinary complaints, her PCP can provide her care.

## 2024-03-16 ENCOUNTER — APPOINTMENT (OUTPATIENT)
Dept: LAB | Facility: CLINIC | Age: 49
End: 2024-03-16
Payer: COMMERCIAL

## 2024-03-16 DIAGNOSIS — R82.90 CLOUDY URINE: ICD-10-CM

## 2024-03-16 DIAGNOSIS — Z20.2 STD EXPOSURE: ICD-10-CM

## 2024-03-16 LAB
25(OH)D3 SERPL-MCNC: 18.6 NG/ML (ref 30–100)
ALBUMIN SERPL BCP-MCNC: 4.1 G/DL (ref 3.5–5)
ALP SERPL-CCNC: 76 U/L (ref 34–104)
ALT SERPL W P-5'-P-CCNC: 11 U/L (ref 7–52)
ANION GAP SERPL CALCULATED.3IONS-SCNC: 5 MMOL/L (ref 4–13)
AST SERPL W P-5'-P-CCNC: 14 U/L (ref 13–39)
BACTERIA UR QL AUTO: ABNORMAL /HPF
BASOPHILS # BLD AUTO: 0.01 THOUSANDS/ÂΜL (ref 0–0.1)
BASOPHILS NFR BLD AUTO: 0 % (ref 0–1)
BILIRUB SERPL-MCNC: 0.53 MG/DL (ref 0.2–1)
BILIRUB UR QL STRIP: NEGATIVE
BUN SERPL-MCNC: 10 MG/DL (ref 5–25)
CALCIUM SERPL-MCNC: 9.4 MG/DL (ref 8.4–10.2)
CHLORIDE SERPL-SCNC: 105 MMOL/L (ref 96–108)
CHOLEST SERPL-MCNC: 177 MG/DL
CLARITY UR: ABNORMAL
CO2 SERPL-SCNC: 29 MMOL/L (ref 21–32)
COLOR UR: ABNORMAL
CREAT SERPL-MCNC: 0.7 MG/DL (ref 0.6–1.3)
EOSINOPHIL # BLD AUTO: 0.03 THOUSAND/ÂΜL (ref 0–0.61)
EOSINOPHIL NFR BLD AUTO: 1 % (ref 0–6)
ERYTHROCYTE [DISTWIDTH] IN BLOOD BY AUTOMATED COUNT: 13.2 % (ref 11.6–15.1)
GFR SERPL CREATININE-BSD FRML MDRD: 103 ML/MIN/1.73SQ M
GLUCOSE P FAST SERPL-MCNC: 109 MG/DL (ref 65–99)
GLUCOSE UR STRIP-MCNC: NEGATIVE MG/DL
HCT VFR BLD AUTO: 44.5 % (ref 34.8–46.1)
HDLC SERPL-MCNC: 63 MG/DL
HGB BLD-MCNC: 14.1 G/DL (ref 11.5–15.4)
HGB UR QL STRIP.AUTO: ABNORMAL
IMM GRANULOCYTES # BLD AUTO: 0.01 THOUSAND/UL (ref 0–0.2)
IMM GRANULOCYTES NFR BLD AUTO: 0 % (ref 0–2)
KETONES UR STRIP-MCNC: NEGATIVE MG/DL
LDLC SERPL CALC-MCNC: 101 MG/DL (ref 0–100)
LEUKOCYTE ESTERASE UR QL STRIP: NEGATIVE
LYMPHOCYTES # BLD AUTO: 1.55 THOUSANDS/ÂΜL (ref 0.6–4.47)
LYMPHOCYTES NFR BLD AUTO: 44 % (ref 14–44)
MCH RBC QN AUTO: 28.5 PG (ref 26.8–34.3)
MCHC RBC AUTO-ENTMCNC: 31.7 G/DL (ref 31.4–37.4)
MCV RBC AUTO: 90 FL (ref 82–98)
MONOCYTES # BLD AUTO: 0.22 THOUSAND/ÂΜL (ref 0.17–1.22)
MONOCYTES NFR BLD AUTO: 6 % (ref 4–12)
MUCOUS THREADS UR QL AUTO: ABNORMAL
NEUTROPHILS # BLD AUTO: 1.71 THOUSANDS/ÂΜL (ref 1.85–7.62)
NEUTS SEG NFR BLD AUTO: 49 % (ref 43–75)
NITRITE UR QL STRIP: POSITIVE
NON-SQ EPI CELLS URNS QL MICRO: ABNORMAL /HPF
NONHDLC SERPL-MCNC: 114 MG/DL
NRBC BLD AUTO-RTO: 0 /100 WBCS
PH UR STRIP.AUTO: 7 [PH]
PLATELET # BLD AUTO: 221 THOUSANDS/UL (ref 149–390)
PMV BLD AUTO: 11.5 FL (ref 8.9–12.7)
POTASSIUM SERPL-SCNC: 4.1 MMOL/L (ref 3.5–5.3)
PROT SERPL-MCNC: 7.3 G/DL (ref 6.4–8.4)
PROT UR STRIP-MCNC: ABNORMAL MG/DL
RBC # BLD AUTO: 4.94 MILLION/UL (ref 3.81–5.12)
RBC #/AREA URNS AUTO: ABNORMAL /HPF
SODIUM SERPL-SCNC: 139 MMOL/L (ref 135–147)
SP GR UR STRIP.AUTO: 1.02 (ref 1–1.03)
TRIGL SERPL-MCNC: 63 MG/DL
UROBILINOGEN UR STRIP-ACNC: <2 MG/DL
VIT B12 SERPL-MCNC: 198 PG/ML (ref 180–914)
WBC # BLD AUTO: 3.53 THOUSAND/UL (ref 4.31–10.16)
WBC #/AREA URNS AUTO: ABNORMAL /HPF

## 2024-03-16 PROCEDURE — 86803 HEPATITIS C AB TEST: CPT

## 2024-03-16 PROCEDURE — 87186 SC STD MICRODIL/AGAR DIL: CPT

## 2024-03-16 PROCEDURE — 36415 COLL VENOUS BLD VENIPUNCTURE: CPT

## 2024-03-16 PROCEDURE — 87077 CULTURE AEROBIC IDENTIFY: CPT

## 2024-03-16 PROCEDURE — 87086 URINE CULTURE/COLONY COUNT: CPT

## 2024-03-16 PROCEDURE — 87389 HIV-1 AG W/HIV-1&-2 AB AG IA: CPT

## 2024-03-16 PROCEDURE — 87340 HEPATITIS B SURFACE AG IA: CPT

## 2024-03-16 PROCEDURE — 81001 URINALYSIS AUTO W/SCOPE: CPT

## 2024-03-16 PROCEDURE — 86780 TREPONEMA PALLIDUM: CPT

## 2024-03-17 DIAGNOSIS — N39.0 URINARY TRACT INFECTION WITHOUT HEMATURIA, SITE UNSPECIFIED: Primary | ICD-10-CM

## 2024-03-17 LAB
BACTERIA UR CULT: ABNORMAL
HBV SURFACE AG SER QL: NORMAL
HCV AB SER QL: NORMAL
HIV 1+2 AB+HIV1 P24 AG SERPL QL IA: NORMAL
HIV 2 AB SERPL QL IA: NORMAL
HIV1 AB SERPL QL IA: NORMAL
HIV1 P24 AG SERPL QL IA: NORMAL
TREPONEMA PALLIDUM IGG+IGM AB [PRESENCE] IN SERUM OR PLASMA BY IMMUNOASSAY: NORMAL

## 2024-03-17 RX ORDER — NITROFURANTOIN 25; 75 MG/1; MG/1
100 CAPSULE ORAL 2 TIMES DAILY
Qty: 10 CAPSULE | Refills: 0 | Status: SHIPPED | OUTPATIENT
Start: 2024-03-17 | End: 2024-03-22

## 2024-03-18 LAB — BACTERIA UR CULT: ABNORMAL

## 2024-03-21 DIAGNOSIS — E55.9 VITAMIN D DEFICIENCY: ICD-10-CM

## 2024-03-21 DIAGNOSIS — F33.1 MODERATE EPISODE OF RECURRENT MAJOR DEPRESSIVE DISORDER (HCC): ICD-10-CM

## 2024-03-22 RX ORDER — ACETAMINOPHEN 160 MG
2000 TABLET,DISINTEGRATING ORAL DAILY
Qty: 90 CAPSULE | Refills: 1 | Status: SHIPPED | OUTPATIENT
Start: 2024-03-22

## 2024-03-22 RX ORDER — ERGOCALCIFEROL 1.25 MG/1
50000 CAPSULE ORAL WEEKLY
Qty: 12 CAPSULE | Refills: 0 | OUTPATIENT
Start: 2024-03-22

## 2024-03-22 RX ORDER — AMITRIPTYLINE HYDROCHLORIDE 150 MG/1
150 TABLET ORAL
Qty: 90 TABLET | Refills: 1 | Status: SHIPPED | OUTPATIENT
Start: 2024-03-22

## 2024-03-25 ENCOUNTER — TELEPHONE (OUTPATIENT)
Dept: PSYCHIATRY | Facility: CLINIC | Age: 49
End: 2024-03-25

## 2024-03-25 DIAGNOSIS — E55.9 VITAMIN D DEFICIENCY: ICD-10-CM

## 2024-03-26 RX ORDER — CYANOCOBALAMIN 1000 UG/ML
1000 INJECTION, SOLUTION INTRAMUSCULAR; SUBCUTANEOUS
Qty: 10 ML | Refills: 0 | Status: SHIPPED | OUTPATIENT
Start: 2024-03-26

## 2024-04-11 ENCOUNTER — TELEMEDICINE (OUTPATIENT)
Dept: BEHAVIORAL/MENTAL HEALTH CLINIC | Facility: CLINIC | Age: 49
End: 2024-04-11

## 2024-04-11 DIAGNOSIS — F33.1 MODERATE EPISODE OF RECURRENT MAJOR DEPRESSIVE DISORDER (HCC): Primary | ICD-10-CM

## 2024-04-11 NOTE — PSYCH
Virtual Regular Visit    Verification of patient location:    Patient is located at Home in the following state in which I hold an active license [place]      Assessment/Plan:    Problem List Items Addressed This Visit          Behavioral Health    Moderate episode of recurrent major depressive disorder (HCC) - Primary        Reason for visit is   Chief Complaint   Patient presents with    Virtual Regular Visit          Encounter provider Sruthi Fisher    Provider located at [address] [place] [address] [phone]      Recent Visits  No visits were found meeting these conditions.  Showing recent visits within past 7 days and meeting all other requirements  Today's Visits  Date Type Provider Dept   04/11/24 Telemedicine Sruthi Fisher [facility]   Showing today's visits and meeting all other requirements  Future Appointments  No visits were found meeting these conditions.  Showing future appointments within next 150 days and meeting all other requirements       The patient was identified by name and date of birth. Ronn Prather was informed that this is a telemedicine visit and that the visit is being conducted throughthe [employer] platform. She agrees to proceed..  My office door was closed. No one else was in the room.  She acknowledged consent and understanding of privacy and security of the video platform. The patient has agreed to participate and understands they can discontinue the visit at any time.    Patient is aware this is a billable service.     Past Medical History:   Diagnosis Date    Abnormal Pap smear of cervix     Anemia     Arthritis     Depression     Edema     Fatigue     Kidney stone     Vitamin B12 deficiency     Vitamin D deficiency        Past Surgical History:   Procedure Laterality Date    BREAST BIOPSY  10/24/2019  "   CHOLECYSTECTOMY      ENDOMETRIAL ABLATION      KNEE ARTHROSCOPY Bilateral     KNEE SURGERY      ORIF TIBIA FRACTURE Right 01/18/2018    Procedure: OPEN REDUCTION W/ INTERNAL FIXATION (ORIF) TIBIA;  Surgeon: Konstantin Todd MD;  [facility];  Service: Orthopedics    ORIF TIBIAL PLATEAU Right 01/18/2018    Procedure: OPEN REDUCTION W/ INTERNAL FIXATION (ORIF) TIBIAL PLATEAU;  Surgeon: Konstantin Todd MD;  [facility];  Service: Orthopedics    [place] LAPAROSCOPY SURG CHOLECYSTECTOMY N/A 08/12/2021    Procedure: LAPAROSCOPIC CHOLECYSTECTOMY, UMBILICAL HERNIA REPAIR;  Surgeon: Mariusz Soto MD;  [facility];  Service: General    [place] REMOVAL IMPLANT DEEP Right 08/02/2018    Procedure: REMOVAL HARDWARE TIBIA;  Surgeon: Konstantin Todd MD;  [facility];  Service: Orthopedics    TUBAL LIGATION      US GUIDED BREAST BIOPSY LEFT COMPLETE Left 10/24/2019       Current Outpatient Medications   Medication Sig Dispense Refill    acetaminophen (TYLENOL) 325 mg tablet 650 mg every 6 hours for mild pain 30 tablet 0    amitriptyline (ELAVIL) 150 MG tablet TAKE ONE TABLET BY MOUTH AT BEDTIME 90 tablet 1    busPIRone (BUSPAR) 5 mg tablet Take 1 tablet (5 mg total) by mouth 2 (two) times a day 60 tablet 1    Cholecalciferol (Vitamin D3) 50 MCG (2000 UT) CAPS Take 1 capsule (2,000 Units total) by mouth daily 90 capsule 1    cyanocobalamin 1,000 mcg/mL Inject 1 mL (1,000 mcg total) into a muscle every 30 (thirty) days x4 then resume monthly dosing 10 mL 0    diclofenac (VOLTAREN) 75 mg EC tablet Take 1 tablet (75 mg total) by mouth 2 (two) times a day (Patient not taking: Reported on 1/12/2024) 60 tablet 1    ergocalciferol (VITAMIN D2) 50,000 units TAKE ONE CAPSULE BY MOUTH ONCE A WEEK 12 capsule 0    SYRINGE-NEEDLE, DISP, 3 ML 25G X 1\" 3 ML MISC Use every 30 (thirty) days 50 each 0     No current facility-administered medications for this visit.      Behavioral Health Psychotherapy Progress " Note    Psychotherapy Provided: Individual Psychotherapy     1. Moderate episode of recurrent major depressive disorder (HCC)            Goals addressed in session: Goal 1     DATA: Ronn Prather is a 48 y.o. female presenting to this virtual session for the supportive treatment of depression. Pt explained reason for cancelling last session as she was out of state.  She and dtr/gr went to [facility] for Spring Break.  She has [facility] planned in July for birthday with friends, spouse is noncommittal re his attendance at [facility]. Pt shared that she conts to make progress with setting boundaries with dtrs and other loved ones.  She notes it is easier when she thinks of the benefits her boundaries will provide to dtrs.  She shared that she and spouse are making effort to communicate with one another. She believes they are making progress, explored barriers and set backs/roles and responsibilities.  Reviewed control issues with MO and parallels in their parenting.  Conted to encourage assertive communication and boundaries.  She shared that she and MO are no longer speaker and her Fa is also not speaking to her.  She relates feeling as though their relationship is a revolving door.  Reviewed her expectations of relationship with parents.  She notes that blow ups occur most frequently during large family functions and that MO wants to be included in grps/events that are not appropriate.  Discussed this writer's upcoming transition and pt believes she will be ready for d/c at time of transition.      During this session, this clinician used the following therapeutic modalities: Cognitive Behavioral Therapy, Mindfulness-based Strategies, and Supportive Psychotherapy    Substance Abuse was not addressed during this session. If the client is diagnosed with a co-occurring substance use disorder, please indicate any changes in the frequency or amount of use: NA. Stage of change for addressing substance use diagnoses: No substance  "use/Not applicable    ASSESSMENT:  Ronn Prather presents with a Euthymic/ normal mood.     her affect is Normal range and intensity, which is congruent, with her mood and the content of the session. The client has made progress on their goals.    Ronn Prather presents with a low risk of suicide, low risk of self-harm, and low risk of harm to others.    For any risk assessment that surpasses a \"low\" rating, a safety plan must be developed.    A safety plan was indicated: no  If yes, describe in detail NA    PLAN: Between sessions, Ronn Prather will identify relapse prevention strategies/recovery plan.   At the next session, the therapist will use Cognitive Behavioral Therapy, Mindfulness-based Strategies, and Supportive Psychotherapy to address depression.    Behavioral Health Treatment Plan and Discharge Planning: Ronn Prather is aware of and agrees to continue to work on their treatment plan. They have identified and are working toward their discharge goals. yes    Visit start and stop times:    04/11/24  Start Time: 0817  Stop Time: 0909  Total Visit Time: 52 minutes      "

## 2024-04-30 NOTE — PATIENT INSTRUCTIONS
Trial of OTC arch supports to offload the right forefoot  Posterior night splint recommended for passive stretching of the Achilles tendon and plantar fascia  No

## 2024-05-03 ENCOUNTER — OFFICE VISIT (OUTPATIENT)
Dept: INTERNAL MEDICINE CLINIC | Facility: CLINIC | Age: 49
End: 2024-05-03
Payer: COMMERCIAL

## 2024-05-03 VITALS
BODY MASS INDEX: 24.39 KG/M2 | DIASTOLIC BLOOD PRESSURE: 88 MMHG | TEMPERATURE: 96.9 F | OXYGEN SATURATION: 99 % | HEART RATE: 57 BPM | WEIGHT: 155.4 LBS | SYSTOLIC BLOOD PRESSURE: 134 MMHG | HEIGHT: 67 IN

## 2024-05-03 DIAGNOSIS — E55.9 VITAMIN D DEFICIENCY: ICD-10-CM

## 2024-05-03 DIAGNOSIS — M79.642 BILATERAL HAND PAIN: ICD-10-CM

## 2024-05-03 DIAGNOSIS — M79.641 BILATERAL HAND PAIN: ICD-10-CM

## 2024-05-03 DIAGNOSIS — D72.818 OTHER DECREASED WHITE BLOOD CELL (WBC) COUNT: ICD-10-CM

## 2024-05-03 DIAGNOSIS — Z00.00 HEALTH MAINTENANCE EXAMINATION: ICD-10-CM

## 2024-05-03 DIAGNOSIS — R73.01 ABNORMAL FASTING GLUCOSE: ICD-10-CM

## 2024-05-03 DIAGNOSIS — E78.49 OTHER HYPERLIPIDEMIA: ICD-10-CM

## 2024-05-03 DIAGNOSIS — F33.1 MODERATE EPISODE OF RECURRENT MAJOR DEPRESSIVE DISORDER (HCC): ICD-10-CM

## 2024-05-03 DIAGNOSIS — M17.31 POST-TRAUMATIC OSTEOARTHRITIS OF RIGHT KNEE: ICD-10-CM

## 2024-05-03 DIAGNOSIS — E53.8 VITAMIN B12 DEFICIENCY: ICD-10-CM

## 2024-05-03 DIAGNOSIS — G89.4 CHRONIC PAIN SYNDROME: Primary | ICD-10-CM

## 2024-05-03 PROBLEM — M25.561 CHRONIC PAIN OF RIGHT KNEE: Status: RESOLVED | Noted: 2018-06-22 | Resolved: 2024-05-03

## 2024-05-03 PROBLEM — G89.29 CHRONIC PAIN OF RIGHT KNEE: Status: RESOLVED | Noted: 2018-06-22 | Resolved: 2024-05-03

## 2024-05-03 PROBLEM — D72.819 LEUKOPENIA: Status: ACTIVE | Noted: 2024-05-03

## 2024-05-03 PROBLEM — M17.11 ARTHRITIS OF RIGHT KNEE: Status: RESOLVED | Noted: 2019-04-19 | Resolved: 2024-05-03

## 2024-05-03 PROBLEM — M22.2X1 PATELLOFEMORAL DISORDER OF RIGHT KNEE: Status: RESOLVED | Noted: 2018-09-14 | Resolved: 2024-05-03

## 2024-05-03 PROCEDURE — 99214 OFFICE O/P EST MOD 30 MIN: CPT | Performed by: INTERNAL MEDICINE

## 2024-05-03 PROCEDURE — 99396 PREV VISIT EST AGE 40-64: CPT | Performed by: INTERNAL MEDICINE

## 2024-05-03 RX ORDER — CYANOCOBALAMIN 1000 UG/ML
1000 INJECTION, SOLUTION INTRAMUSCULAR; SUBCUTANEOUS
Qty: 10 ML | Refills: 1 | Status: SHIPPED | OUTPATIENT
Start: 2024-05-03

## 2024-05-03 NOTE — PROGRESS NOTES
Assessment/Plan:    Chronic pain syndrome  She did not get SC stimulator since minimal improvement with trial.    Post-traumatic osteoarthritis of right knee  No change.    Other hyperlipidemia  LDL slightly elevated.    Vitamin B12 deficiency  Reminded to do B12 injections regularly.    Vitamin D deficiency  Taking D3.    Moderate episode of recurrent major depressive disorder (HCC)  Stable, on amitriptyline 150 mg qHS.  Taking buspirone qHS only.  Sees psych, therapist.    Leukopenia  Monitor CBC.     Diagnoses and all orders for this visit:    Chronic pain syndrome    Post-traumatic osteoarthritis of right knee    Other hyperlipidemia  -     Comprehensive metabolic panel; Future  -     Lipid panel; Future  -     TSH, 3rd generation with Free T4 reflex; Future    Vitamin B12 deficiency  -     Vitamin B12; Future    Vitamin D deficiency  -     Vitamin D 25 hydroxy; Future  -     cyanocobalamin 1,000 mcg/mL; Inject 1 mL (1,000 mcg total) into a muscle every 30 (thirty) days    Moderate episode of recurrent major depressive disorder (HCC)    Other decreased white blood cell (WBC) count  -     CBC and differential; Future    Abnormal fasting glucose  -     Hemoglobin A1C; Future    Bilateral hand pain  -     KELTON Screen w/ Reflex to Titer/Pattern; Future  -     C-reactive protein  -     Cyclic citrul peptide antibody, IgG  -     RF Screen w/ Reflex to Titer  -     XR hand 3+ vw left; Future  -     XR hand 3+ vw right; Future    Health maintenance examination  Comments:  Updated.      Follow up in 6 months or as needed.      Subjective:      Patient ID: Ronn Prather is a 48 y.o. female here for a follow up.      She complains of more frequent right hand pain in the past month.  She denies any swelling, just feels tight and sore. She is right handed. She has some symptoms with her left hand, not as bad. No known injury.  She has not been taking any medication for it.    She remains stressed at home. She currently has  "14 people living with her right now.  It varies every few months.  She continues to see psychiatry and her therapist.  Her therapist is leaving and she plans to look for another one.    She did not pursue the spinal cord stimulators and she did not experience significant pain relief.    She is requesting a renewal for handicap placard since she lost it. It does not renew until next year.      The following portions of the patient's history were reviewed and updated as appropriate: allergies, current medications, past medical history, past social history, and problem list.    Review of Systems   Constitutional:  Negative for appetite change and fatigue.   HENT:  Negative for congestion, ear pain and postnasal drip.    Eyes:  Negative for visual disturbance.   Respiratory:  Negative for cough and shortness of breath.    Cardiovascular:  Negative for chest pain and leg swelling.   Gastrointestinal:  Negative for abdominal pain, constipation and diarrhea.   Genitourinary:  Negative for dysuria, frequency and urgency.   Musculoskeletal:  Negative for arthralgias and myalgias.   Skin:  Negative for rash and wound.   Neurological:  Negative for dizziness, numbness and headaches.   Hematological:  Does not bruise/bleed easily.   Psychiatric/Behavioral:  Negative for confusion. The patient is not nervous/anxious.          Objective:      /88   Pulse 57   Temp (!) 96.9 °F (36.1 °C)   Ht 5' 7\" (1.702 m)   Wt 70.5 kg (155 lb 6.4 oz)   LMP 12/04/2022 (Exact Date)   SpO2 99%   BMI 24.34 kg/m²          Physical Exam  Vitals and nursing note reviewed.   Constitutional:       General: She is not in acute distress.     Appearance: She is well-developed.   HENT:      Head: Normocephalic and atraumatic.      Mouth/Throat:      Mouth: Mucous membranes are moist.   Eyes:      Pupils: Pupils are equal, round, and reactive to light.   Cardiovascular:      Rate and Rhythm: Normal rate and regular rhythm.      Heart sounds: " Normal heart sounds.   Pulmonary:      Effort: Pulmonary effort is normal.      Breath sounds: Normal breath sounds. No wheezing.   Abdominal:      General: Bowel sounds are normal.      Palpations: Abdomen is soft.   Musculoskeletal:         General: No swelling.      Right lower leg: No edema.      Left lower leg: No edema.   Skin:     General: Skin is warm.      Findings: No rash.   Neurological:      General: No focal deficit present.      Mental Status: She is alert and oriented to person, place, and time.   Psychiatric:         Mood and Affect: Mood and affect normal. Mood is not anxious or depressed.         Behavior: Behavior normal.           Labs & imaging results reviewed with patient.    Depression Screening Follow-up Plan: Patient's depression screening was positive with a PHQ-2 score of . Their PHQ-9 score was 5. Continue regular follow-up with their psychologist/therapist/psychiatrist who is managing their mental health condition(s).

## 2024-05-09 ENCOUNTER — TELEMEDICINE (OUTPATIENT)
Dept: BEHAVIORAL/MENTAL HEALTH CLINIC | Facility: CLINIC | Age: 49
End: 2024-05-09

## 2024-05-09 DIAGNOSIS — F33.41 DEPRESSION, MAJOR, RECURRENT, IN PARTIAL REMISSION (HCC): Primary | ICD-10-CM

## 2024-05-09 NOTE — PSYCH
Virtual Regular Visit    Verification of patient location:    Patient is located at Home in the following state in which I hold an active license [place]      Assessment/Plan:    Problem List Items Addressed This Visit    None  Visit Diagnoses       Depression, major, recurrent, in partial remission (HCC)    -  Primary             Reason for visit is   Chief Complaint   Patient presents with    Virtual Regular Visit          Encounter provider Sruthi Fisher      Recent Visits  Date Type Provider Dept   05/03/24 Office Visit Krysta Watson MD [facility]   Showing recent visits within past 7 days and meeting all other requirements  Today's Visits  Date Type Provider Dept   05/09/24 Telemedicine Sruthi Fisher [facility]   Showing today's visits and meeting all other requirements  Future Appointments  No visits were found meeting these conditions.  Showing future appointments within next 150 days and meeting all other requirements       The patient was identified by name and date of birth. Ronn Prather was informed that this is a telemedicine visit and that the visit is being conducted throughthe [employer] platform. She agrees to proceed..  My office door was closed. No one else was in the room.  She acknowledged consent and understanding of privacy and security of the video platform. The patient has agreed to participate and understands they can discontinue the visit at any time.    Patient is aware this is a billable service.     Past Medical History:   Diagnosis Date    Abnormal Pap smear of cervix     Anemia     Arthritis     Depression     Edema     Fatigue     Kidney stone     Vitamin B12 deficiency     Vitamin D deficiency        Past Surgical History:   Procedure Laterality Date    BREAST BIOPSY  10/24/2019    CHOLECYSTECTOMY      ENDOMETRIAL ABLATION      KNEE ARTHROSCOPY Bilateral     KNEE SURGERY      ORIF TIBIA FRACTURE Right 01/18/2018  "   Procedure: OPEN REDUCTION W/ INTERNAL FIXATION (ORIF) TIBIA;  Surgeon: Konstantin Todd MD;  [facility];  Service: Orthopedics    ORIF TIBIAL PLATEAU Right 01/18/2018    Procedure: OPEN REDUCTION W/ INTERNAL FIXATION (ORIF) TIBIAL PLATEAU;  Surgeon: Konstantin Todd MD;  [facility];  Service: Orthopedics    MA LAPAROSCOPY SURG CHOLECYSTECTOMY N/A 08/12/2021    Procedure: LAPAROSCOPIC CHOLECYSTECTOMY, UMBILICAL HERNIA REPAIR;  Surgeon: Mariusz Soto MD;  [facility];  Service: General    MA REMOVAL IMPLANT DEEP Right 08/02/2018    Procedure: REMOVAL HARDWARE TIBIA;  Surgeon: Konstantin Todd MD;  [facility];  Service: Orthopedics    TUBAL LIGATION      US GUIDED BREAST BIOPSY LEFT COMPLETE Left 10/24/2019       Current Outpatient Medications   Medication Sig Dispense Refill    acetaminophen (TYLENOL) 325 mg tablet 650 mg every 6 hours for mild pain 30 tablet 0    amitriptyline (ELAVIL) 150 MG tablet TAKE ONE TABLET BY MOUTH AT BEDTIME 90 tablet 1    busPIRone (BUSPAR) 5 mg tablet Take 1 tablet (5 mg total) by mouth 2 (two) times a day 60 tablet 1    Cholecalciferol (Vitamin D3) 50 MCG (2000 UT) CAPS Take 1 capsule (2,000 Units total) by mouth daily 90 capsule 1    cyanocobalamin 1,000 mcg/mL Inject 1 mL (1,000 mcg total) into a muscle every 30 (thirty) days 10 mL 1    SYRINGE-NEEDLE, DISP, 3 ML 25G X 1\" 3 ML MISC Use every 30 (thirty) days 50 each 0     No current facility-administered medications for this visit.        No Known Allergies  Behavioral Health Psychotherapy Progress Note    Psychotherapy Provided: Individual Psychotherapy     1. Depression, major, recurrent, in partial remission (HCC)            Goals addressed in session: Goal 1     DATA: Ronn Prather is a 48 y.o. female  presenting to this virtual discharge session, she had been seen for treatment of depression.  Pt arrived late due to connection issues. She relates that she had a dr appt and dr advised her to " "cont with therapy due to concerns of regression.  She notes concerns with increased forgetfulness, losing focus quickly, easily distracted, \"all over the place.\"  She requests to cont with therapy indicating that her anxiety increased when she did not have someone to discuss stressors with.  She shared that relationships within home are becoming more stressful with untruthfulness in communication.and boundary violations  She has struggled with maintaining her own boundaries and using assertiveness techniques. She has created divorce papers and has asked spouse to sign same.  He does not respond.  He conts to not contribute to family/household functioning.  She does not believe he wants to be a .  She has contacted his mo and told her she needs to take him.      During this session, this clinician used the following therapeutic modalities: Cognitive Behavioral Therapy, Mindfulness-based Strategies, and Supportive Psychotherapy    Substance Abuse was not addressed during this session. If the client is diagnosed with a co-occurring substance use disorder, please indicate any changes in the frequency or amount of use: NA. Stage of change for addressing substance use diagnoses: No substance use/Not applicable    ASSESSMENT:  Ronn Prather presents with a Anxious and Dysthymic mood.     her affect is Constricted, which is congruent, with her mood and the content of the session. She appears to interject herself into other adult relationships and justifies same.  Although it is recognized that there are cultural difference in boundaries, pt's distress remains elevated.  The client has not made progress on their goals.     Ronn Prather presents with a low risk of suicide, low risk of self-harm, and low risk of harm to others.    For any risk assessment that surpasses a \"low\" rating, a safety plan must be developed.    A safety plan was indicated: no  If yes, describe in detail NA    PLAN: Between sessions, pt will " practice self calming skills. At the next session, the therapist will use Cognitive Behavioral Therapy, Mindfulness-based Strategies, and Supportive Psychotherapy to address depresson.    Behavioral Health Treatment Plan and Discharge Planning: Pt is aware of and agrees to continue to work on their treatment plan. They have identified and are working toward their discharge goals. yes    05/09/24  Visit start and stop times:    05/09/24  Start Time: 0822  Stop Time: 0916  Total Visit Time: 54 minutes

## 2024-05-09 NOTE — BH TREATMENT PLAN
Outpatient Behavioral Health Psychotherapy Treatment Plan    Ronn Prather  1975     Date of Initial Psychotherapy Assessment: 02/03/23  Date of Current Treatment Plan: 5/9/23  Treatment Plan Target Date: 11/8/2024  Treatment Plan Expiration Date: 11/8/2024     Diagnosis:   1. Moderate episode of recurrent major depressive disorder (HCC)          Area(s) of Need: Boundaries, mild depression, interpersonal relationships     Long Term Goal 1 (in the client's own words): Be able to stick to what I know I need to do for myself      Target Date for completion: 11/8/2024             Anticipated therapeutic modalities: CBT, mindfulness interventions and supportive therapy             People identified to complete this goal: Ronn Prather and Sruthi STAHL            Objective 1: (identify the means of measuring success in meeting the objective): Practice assertiveness techniques and use techniques in everyday life, especially with family members and especially during times of conflict                    Objective 2: (identify the means of measuring success in meeting the objective):  Completing all homework as assigned to promote self awareness      Objective 3:  Decrease in PHQ 9 scores      I am currently under the care of a [facility] psychiatric provider: no     My [facility] psychiatric provider is: NA     I am currently taking psychiatric medications: Yes, as prescribed     I feel that I will be ready for discharge from mental health care when I reach the following (measurable goal/objective): I am making decisions without need for support on a consistent basis.        For children and adults who have a legal guardian:          Has there been any change to custody orders and/or guardianship status? NA. If yes, attach updated documentation.     Behavioral Health Treatment Plan [facility]: Diagnosis and Treatment Plan explained to Ronn Prtaher acknowledges an understanding of their  diagnosis. Ronn Prather agrees to this treatment plan.     I have been offered a copy of this Treatment Plan. Yes via [employer]

## 2024-05-15 ENCOUNTER — DOCUMENTATION (OUTPATIENT)
Dept: PSYCHIATRY | Facility: CLINIC | Age: 49
End: 2024-05-15

## 2024-05-15 ENCOUNTER — TELEPHONE (OUTPATIENT)
Dept: PSYCHIATRY | Facility: CLINIC | Age: 49
End: 2024-05-15
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
## 2024-05-15 NOTE — TELEPHONE ENCOUNTER
Per provider's request, a JOAO is needed. As part of TA, provider can not accept MA. All future appts have been canceled.

## 2024-05-20 ENCOUNTER — TELEPHONE (OUTPATIENT)
Dept: PSYCHIATRY | Facility: CLINIC | Age: 49
End: 2024-05-20

## 2024-05-20 NOTE — TELEPHONE ENCOUNTER
JOAO for Sruthi Pulliam-Aracelis,  IC attempted to call and LVM for patient regarding JOAO and being added to waitlist for Talk Therapy based on preferences.

## 2024-05-21 NOTE — TELEPHONE ENCOUNTER
JOAO from Sruthi Fisher.  IC LVM for pt regarding JOAO for talk therapy.  IC was returning voicemail left by pt.    HIGH PRIORITY, ADD TO WAIT LIST.

## 2024-05-21 NOTE — TELEPHONE ENCOUNTER
Patient has been added to the Talk Therapy wait list without a referral.    Insurance: [employer]  Insurance Type:    Commercial []   Medicaid [x]   [facility] (if applicable)   Medicare []  Location Preference: [email or web address]  Provider Preference: pref fem prov  Virtual: Yes [] No [x]  Were outside resources sent: Yes [] No [x]

## 2024-05-22 ENCOUNTER — TELEPHONE (OUTPATIENT)
Dept: PSYCHIATRY | Facility: CLINIC | Age: 49
End: 2024-05-22

## 2024-05-22 NOTE — TELEPHONE ENCOUNTER
Contacted patient off of Talk Therapy  to verify needs of services in attempts to offer patient an appointment at [place] Office. spoke with patient whom stated she was interested in virtual due to JOAO from Sruthi Fisher.  IC explained that due to new provider and JOAO, sessions must be in person due to type of insurance.  Pt and IC worked to find available appts that best suit pt's schedule.    Appts:  Lexie Rees 07/19 & 08/02  Pt was informed to arrive 15 min prior to appts and NP packet will be sent via [employer].  IC provided [place] office address.

## 2024-07-18 NOTE — PROGRESS NOTES
Assessment & Plan   Problem List Items Addressed This Visit    None  Visit Diagnoses       Well woman exam    -  Primary    Routine screening for STI (sexually transmitted infection)        Relevant Orders    Chlamydia/GC amplified DNA by PCR            Discussion  I have discussed the importance of monthly self-breast exams, exercise and healthy diet as well as adequate intake of calcium and vitamin D.     Encourage safe sexual practices; STI testing - desires   Contraception - denies    The current ASCCP guidelines were reviewed. Patient's last pap was 2023 and therefore, a pap with HPV cotesting is  not indicated at this time.     Mammogram is UTD; 2024 BIRADS2    Colorectal screening is UTD; 2021    All questions have been answered to her satisfaction  RTO for APE or sooner if needed      Subjective     HPI   Ronn Prather is a 48 y.o. female who presents for annual well woman exam.      x 16 months. Denies bleeding.    No vulvar itch/burn; No vaginal itch/burn; No abn discharge or odor;     No urinary sx - burning/pain/frequency/hematuria    (+) SBEs - no breast masses, asymmetry, nipple discharge or bleeding, changes in skin of breast, or breast tenderness bilaterally    No abd/pelvic pain or HAs; +diarrhea due to cholecystectomy.     + menopausal symptoms: +hot flashes/night sweats, tolerable at this time.     Pt is sexually active. +pain with IC in certain positions. She declines sti/hiv/hep testing; Feels safe at home    Current contraception: no  Condom use: no    (+) PCP for routine Bw/care;        Review of Systems   Constitutional:  Negative for fatigue.   Eyes:  Negative for photophobia and visual disturbance.   Respiratory:  Negative for cough and shortness of breath.    Cardiovascular:  Negative for chest pain and palpitations.   Gastrointestinal:  Negative for abdominal pain, blood in stool, constipation, diarrhea, nausea and rectal pain.   Genitourinary:  Negative for dyspareunia, dysuria,  flank pain, frequency, genital sores, menstrual problem, pelvic pain, urgency, vaginal bleeding, vaginal discharge and vaginal pain.   Musculoskeletal:  Negative for arthralgias and back pain.   Skin:  Negative for rash.   Neurological:  Negative for weakness and headaches.       The following portions of the patient's history were reviewed and updated as appropriate: allergies, current medications, past family history, past medical history, past social history, past surgical history, and problem list.         OB History          6    Para   3    Term   0       3    AB   3    Living   3         SAB        IAB   3    Ectopic        Multiple        Live Births   3           Obstetric Comments   Surgical VIp x 3  2 early deliveries, one w cerclage placed during final pregnancy               Past Medical History:   Diagnosis Date    Abnormal Pap smear of cervix     Anemia     Arthritis     Depression     Edema     Fatigue     Kidney stone     Vitamin B12 deficiency     Vitamin D deficiency        Past Surgical History:   Procedure Laterality Date    BREAST BIOPSY  10/24/2019    CHOLECYSTECTOMY      ENDOMETRIAL ABLATION      KNEE ARTHROSCOPY Bilateral     KNEE SURGERY      ORIF TIBIA FRACTURE Right 2018    Procedure: OPEN REDUCTION W/ INTERNAL FIXATION (ORIF) TIBIA;  Surgeon: Konstantin Todd MD;  [facility];  Service: Orthopedics    ORIF TIBIAL PLATEAU Right 2018    Procedure: OPEN REDUCTION W/ INTERNAL FIXATION (ORIF) TIBIAL PLATEAU;  Surgeon: Konstantin Todd MD;  [facility];  Service: Orthopedics    SC LAPAROSCOPY SURG CHOLECYSTECTOMY N/A 2021    Procedure: LAPAROSCOPIC CHOLECYSTECTOMY, UMBILICAL HERNIA REPAIR;  Surgeon: Mariusz Soto MD;  [facility];  Service: General    SC REMOVAL IMPLANT DEEP Right 2018    Procedure: REMOVAL HARDWARE TIBIA;  Surgeon: Konstantin Todd MD;  [facility];  Service: Orthopedics    TUBAL LIGATION      US GUIDED  BREAST BIOPSY LEFT COMPLETE Left 10/24/2019       Family History   Problem Relation Age of Onset    Cancer Mother         either cervical or ovarian    Heart disease Mother         heart surgery    Heart attack Father         stent    Diabetes Father     Hypertension Father     Cancer Father 58        Stomach and Lung Cancer    Rheum arthritis Daughter     No Known Problems Daughter     Lupus Maternal Grandmother     No Known Problems Maternal Grandfather     No Known Problems Paternal Grandmother     No Known Problems Paternal Grandfather     No Known Problems Son     No Known Problems Maternal Aunt     No Known Problems Maternal Aunt     No Known Problems Maternal Aunt     No Known Problems Paternal Aunt     Lupus Cousin     Arthritis Family     Stomach cancer Family     Ovarian cancer Family     Alcohol abuse Neg Hx     Depression Neg Hx     Drug abuse Neg Hx     Substance Abuse Neg Hx     Mental illness Neg Hx     Breast cancer Neg Hx     Colon cancer Neg Hx        Social History     Socioeconomic History    Marital status:      Spouse name: Not on file    Number of children: 1    Years of education: Not on file    Highest education level: Not on file   Occupational History    Occupation: Teacher in [place]   Tobacco Use    Smoking status: Never    Smokeless tobacco: Never   Vaping Use    Vaping status: Never Used   Substance and Sexual Activity    Alcohol use: Yes     Comment: Occassional    Drug use: No    Sexual activity: Yes     Partners: Male     Birth control/protection: Condom Male, Female Sterilization   Other Topics Concern    Not on file   Social History Narrative    Single    Daughter, grand daughter and son lives with her    Works part time- 3 days in the field, 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     Social Determinants of Health     Financial Resource Strain: Not on file   Food Insecurity: Not on file   Transportation Needs: Not on file   Physical Activity: Not on file   Stress: Not on file   Social Connections: Not  "on file   Intimate Partner Violence: Not on file   Housing Stability: Not on file         Current Outpatient Medications:     acetaminophen (TYLENOL) 325 mg tablet, 650 mg every 6 hours for mild pain, Disp: 30 tablet, Rfl: 0    amitriptyline (ELAVIL) 150 MG tablet, TAKE ONE TABLET BY MOUTH AT BEDTIME, Disp: 90 tablet, Rfl: 1    busPIRone (BUSPAR) 5 mg tablet, Take 1 tablet (5 mg total) by mouth 2 (two) times a day, Disp: 60 tablet, Rfl: 1    Cholecalciferol (Vitamin D3) 50 MCG (2000 UT) CAPS, Take 1 capsule (2,000 Units total) by mouth daily, Disp: 90 capsule, Rfl: 1    cyanocobalamin 1,000 mcg/mL, Inject 1 mL (1,000 mcg total) into a muscle every 30 (thirty) days, Disp: 10 mL, Rfl: 1    SYRINGE-NEEDLE, DISP, 3 ML 25G X 1\" 3 ML MISC, Use every 30 (thirty) days, Disp: 50 each, Rfl: 0    No Known Allergies    Objective   Vitals:    07/19/24 1552   BP: 132/80   BP Location: Left arm   Patient Position: Sitting   Cuff Size: Standard   Weight: 70.3 kg (155 lb)   Height: 5' 7\" (1.702 m)     Physical Exam  Vitals and nursing note reviewed.   Constitutional:       Appearance: Normal appearance. She is well-developed and normal weight.   HENT:      Head: Normocephalic and atraumatic.   Eyes:      Conjunctiva/sclera: Conjunctivae normal.   Cardiovascular:      Rate and Rhythm: Normal rate and regular rhythm.      Heart sounds: Normal heart sounds.   Pulmonary:      Effort: Pulmonary effort is normal.      Breath sounds: Normal breath sounds.   Chest:   Breasts:     Breasts are symmetrical.      Right: Normal. No inverted nipple, mass, nipple discharge, skin change or tenderness.      Left: Normal. No inverted nipple, mass, nipple discharge, skin change or tenderness.   Abdominal:      General: Abdomen is flat. There is no distension.      Palpations: Abdomen is soft. There is no mass.      Tenderness: There is no abdominal tenderness. There is no right CVA tenderness or left CVA tenderness.   Genitourinary:     General: " Normal vulva.      Exam position: Lithotomy position.      Pubic Area: No rash or pubic lice.       Labia:         Right: No rash or tenderness.         Left: No rash or tenderness.       Urethra: No urethral pain.      Vagina: Normal. No vaginal discharge.      Cervix: Normal.      Uterus: Normal. No uterine prolapse.       Adnexa: Right adnexa normal and left adnexa normal.        Right: No mass or tenderness.          Left: No mass or tenderness.     Musculoskeletal:         General: No tenderness. Normal range of motion.      Cervical back: Normal range of motion. No tenderness.      Right lower leg: No edema.      Left lower leg: No edema.   Lymphadenopathy:      Cervical: No cervical adenopathy.      Upper Body:      Right upper body: No supraclavicular or axillary adenopathy.      Left upper body: No supraclavicular or axillary adenopathy.      Lower Body: No right inguinal adenopathy. No left inguinal adenopathy.   Skin:     General: Skin is warm and dry.   Neurological:      Mental Status: She is alert and oriented to person, place, and time.      Motor: No weakness.   Psychiatric:         Mood and Affect: Mood normal.         Behavior: Behavior normal.         Thought Content: Thought content normal.         Judgment: Judgment normal.         There are no Patient Instructions on file for this visit.

## 2024-07-19 ENCOUNTER — ANNUAL EXAM (OUTPATIENT)
Dept: OBGYN CLINIC | Facility: CLINIC | Age: 49
End: 2024-07-19
Payer: COMMERCIAL

## 2024-07-19 ENCOUNTER — SOCIAL WORK (OUTPATIENT)
Dept: BEHAVIORAL/MENTAL HEALTH CLINIC | Facility: CLINIC | Age: 49
End: 2024-07-19
Payer: COMMERCIAL

## 2024-07-19 ENCOUNTER — PREP FOR PROCEDURE (OUTPATIENT)
Dept: GASTROENTEROLOGY | Facility: AMBULARY SURGERY CENTER | Age: 49
End: 2024-07-19

## 2024-07-19 VITALS
SYSTOLIC BLOOD PRESSURE: 132 MMHG | BODY MASS INDEX: 24.33 KG/M2 | DIASTOLIC BLOOD PRESSURE: 80 MMHG | WEIGHT: 155 LBS | HEIGHT: 67 IN

## 2024-07-19 DIAGNOSIS — Z01.419 WELL WOMAN EXAM: Primary | ICD-10-CM

## 2024-07-19 DIAGNOSIS — Z11.3 ROUTINE SCREENING FOR STI (SEXUALLY TRANSMITTED INFECTION): ICD-10-CM

## 2024-07-19 DIAGNOSIS — F33.1 MODERATE EPISODE OF RECURRENT MAJOR DEPRESSIVE DISORDER (HCC): Primary | ICD-10-CM

## 2024-07-19 DIAGNOSIS — D13.1 FUNDIC GLAND POLYPS OF STOMACH, BENIGN: Primary | ICD-10-CM

## 2024-07-19 PROCEDURE — 87491 CHLMYD TRACH DNA AMP PROBE: CPT

## 2024-07-19 PROCEDURE — 99396 PREV VISIT EST AGE 40-64: CPT

## 2024-07-19 PROCEDURE — 90791 PSYCH DIAGNOSTIC EVALUATION: CPT

## 2024-07-19 PROCEDURE — 87591 N.GONORRHOEAE DNA AMP PROB: CPT
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
## 2024-07-22 LAB
C TRACH DNA SPEC QL NAA+PROBE: NEGATIVE
N GONORRHOEA DNA SPEC QL NAA+PROBE: NEGATIVE

## 2024-08-02 ENCOUNTER — TELEMEDICINE (OUTPATIENT)
Dept: BEHAVIORAL/MENTAL HEALTH CLINIC | Facility: CLINIC | Age: 49
End: 2024-08-02
Payer: COMMERCIAL

## 2024-08-02 DIAGNOSIS — F33.1 MODERATE EPISODE OF RECURRENT MAJOR DEPRESSIVE DISORDER (HCC): Primary | ICD-10-CM

## 2024-08-02 PROCEDURE — 90837 PSYTX W PT 60 MINUTES: CPT

## 2024-08-02 NOTE — PSYCH
Virtual Regular Visit    Verification of patient location:    Patient is located at Home in which this clinician practices in the state of [place]      Assessment/Plan:    Problem List Items Addressed This Visit       Moderate episode of recurrent major depressive disorder (HCC) - Primary       Goals addressed in session: Goal 1          Reason for visit is No chief complaint on file.       Encounter provider Lexie Rees      Recent Visits  No visits were found meeting these conditions.  Showing recent visits within past 7 days and meeting all other requirements  Today's Visits  Date Type Provider Dept   08/02/24 Telemedicine Lexie Rees [facility] Therapist [place]   Showing today's visits and meeting all other requirements  Future Appointments  No visits were found meeting these conditions.  Showing future appointments within next 150 days and meeting all other requirements       The patient was identified by name and date of birth. Ronn Prather was informed that this is a telemedicine visit and that the visit is being conducted throughthe [employer] platform. She agrees to proceed..  My office door was closed. No one else was in the room.  She acknowledged consent and understanding of privacy and security of the video platform. The patient has agreed to participate and understands they can discontinue the visit at any time.    Patient is aware this is a billable service.     HPI     Past Medical History:   Diagnosis Date    Abnormal Pap smear of cervix     Anemia     Arthritis     Depression     Edema     Fatigue     Kidney stone     Vitamin B12 deficiency     Vitamin D deficiency        Past Surgical History:   Procedure Laterality Date    BREAST BIOPSY  10/24/2019    CHOLECYSTECTOMY      ENDOMETRIAL ABLATION      KNEE ARTHROSCOPY Bilateral     KNEE SURGERY      ORIF TIBIA FRACTURE Right 01/18/2018    Procedure: OPEN REDUCTION W/ INTERNAL FIXATION (ORIF) TIBIA;  Surgeon: Konstantin Todd MD;   "[facility];  Service: Orthopedics    ORIF TIBIAL PLATEAU Right 01/18/2018    Procedure: OPEN REDUCTION W/ INTERNAL FIXATION (ORIF) TIBIAL PLATEAU;  Surgeon: Konstantin Todd MD;  [facility];  Service: Orthopedics    OR LAPAROSCOPY SURG CHOLECYSTECTOMY N/A 08/12/2021    Procedure: LAPAROSCOPIC CHOLECYSTECTOMY, UMBILICAL HERNIA REPAIR;  Surgeon: Mariusz Soto MD;  [facility];  Service: General    OR REMOVAL IMPLANT DEEP Right 08/02/2018    Procedure: REMOVAL HARDWARE TIBIA;  Surgeon: Konstantin Todd MD;  [facility];  Service: Orthopedics    TUBAL LIGATION      US GUIDED BREAST BIOPSY LEFT COMPLETE Left 10/24/2019       Current Outpatient Medications   Medication Sig Dispense Refill    acetaminophen (TYLENOL) 325 mg tablet 650 mg every 6 hours for mild pain 30 tablet 0    amitriptyline (ELAVIL) 150 MG tablet TAKE ONE TABLET BY MOUTH AT BEDTIME 90 tablet 1    busPIRone (BUSPAR) 5 mg tablet Take 1 tablet (5 mg total) by mouth 2 (two) times a day 60 tablet 1    Cholecalciferol (Vitamin D3) 50 MCG (2000 UT) CAPS Take 1 capsule (2,000 Units total) by mouth daily 90 capsule 1    cyanocobalamin 1,000 mcg/mL Inject 1 mL (1,000 mcg total) into a muscle every 30 (thirty) days 10 mL 1    SYRINGE-NEEDLE, DISP, 3 ML 25G X 1\" 3 ML MISC Use every 30 (thirty) days 50 each 0     No current facility-administered medications for this visit.        No Known Allergies    Review of Systems    Video Exam    There were no vitals filed for this visit.    Physical Exam     Behavioral Health Psychotherapy Progress Note    Psychotherapy Provided: Individual Psychotherapy     1. Moderate episode of recurrent major depressive disorder (HCC)            Goals addressed in session: Goal 1     DATA: Ronn joined session virtually. Clinician utilized this session to work on rapport building with her and learn more about her relationship with her . Ronn reports difficulties within her marriage due to " "infidelity, poor communication, and lack of boundaries. She reports difficulty managing the stress of it all. She did note she recently went on vacation to celebrate her birthday which brought her some stress relief. She notes no reciprocity within her marriage and difficulty communicating. Ronn also discussed with clinician her living situation and noted she has a full household with added stress coming from the amount of people that reside in it causing conflict with her and her . Clinician actively listened during this session, this clinician used the following therapeutic modalities: Engagement Strategies and Supportive Psychotherapy    Substance Abuse was not addressed during this session. If the client is diagnosed with a co-occurring substance use disorder, please indicate any changes in the frequency or amount of use: N/A. Stage of change for addressing substance use diagnoses: No substance use/Not applicable    ASSESSMENT:  Ronn Prather presents with a Euthymic/ normal mood, her affect is Normal range and intensity, which is congruent, with her mood and the content of the session. The client has not made progress on their goals. Ronn was guarded at first and spoke only when spoken to but throughout the session she started to open up and communicate freely. Ronn Prather presents with a minimal risk of suicide, minimal risk of self-harm, and minimal risk of harm to others.    For any risk assessment that surpasses a \"low\" rating, a safety plan must be developed.    A safety plan was indicated: no  If yes, describe in detail safety plan was open    PLAN: Between sessions, Ronn Prather will continue to utilize coping skills and engage in self care to minimize symptoms of depression. At the next session, the therapist will use Cognitive Behavioral Therapy and Supportive Psychotherapy to address depression.    Behavioral Health Treatment Plan and Discharge Planning: Ronn Prather is aware of " and agrees to continue to work on their treatment plan. They have identified and are working toward their discharge goals. yes    Visit start and stop times:    08/02/24  Start Time: 0800  Stop Time: 0906  Total Visit Time: 66 minutes

## 2024-08-03 ENCOUNTER — HOSPITAL ENCOUNTER (OUTPATIENT)
Dept: RADIOLOGY | Facility: HOSPITAL | Age: 49
Discharge: HOME | End: 2024-08-03

## 2024-08-03 ENCOUNTER — APPOINTMENT (OUTPATIENT)
Dept: LAB | Facility: CLINIC | Age: 49
End: 2024-08-03

## 2024-08-03 DIAGNOSIS — M79.641 BILATERAL HAND PAIN: ICD-10-CM

## 2024-08-03 DIAGNOSIS — M79.642 BILATERAL HAND PAIN: ICD-10-CM

## 2024-08-03 LAB — CRP SERPL QL: <1 MG/L

## 2024-08-03 PROCEDURE — 86038 ANTINUCLEAR ANTIBODIES: CPT

## 2024-08-03 PROCEDURE — 36415 COLL VENOUS BLD VENIPUNCTURE: CPT

## 2024-08-03 PROCEDURE — 73130 X-RAY EXAM OF HAND: CPT

## 2024-08-04 LAB
ANA SER QL IA: NEGATIVE
CRYOGLOB RF SER-ACNC: ABNORMAL [IU]/ML
RHEUMATOID FACT SER QL LA: POSITIVE

## 2024-08-06 LAB — CCP AB SER IA-ACNC: 0.8

## 2024-08-07 ENCOUNTER — TELEPHONE (OUTPATIENT)
Dept: INTERNAL MEDICINE CLINIC | Facility: CLINIC | Age: 49
End: 2024-08-07

## 2024-08-07 DIAGNOSIS — M79.642 BILATERAL HAND PAIN: ICD-10-CM

## 2024-08-07 DIAGNOSIS — R76.8 POSITIVE ANA (ANTINUCLEAR ANTIBODY): Primary | ICD-10-CM

## 2024-08-07 DIAGNOSIS — M79.641 BILATERAL HAND PAIN: ICD-10-CM

## 2024-08-07 NOTE — TELEPHONE ENCOUNTER
Please call patient.    Labs show some abnormal labs, rheumatoid factor and KELTON. This can be positive in some but with no other issues.  Your hand x-rays were normal, it did not show any inflammation.    I can refer you to rheumatology if you are still having a lot of hand/ joint pains. Let me know.

## 2024-08-19 ENCOUNTER — TELEPHONE (OUTPATIENT)
Dept: PSYCHIATRY | Facility: CLINIC | Age: 49
End: 2024-08-19

## 2024-08-19 NOTE — TELEPHONE ENCOUNTER
Patient contacted the office to schedule a follow up visit with provider. Patient is now scheduled for 8/22/2024  at 8am virtually.

## 2024-08-19 NOTE — TELEPHONE ENCOUNTER
Clinician LVM for Patient in regards to scheduling an appointment for this week as she is on providers cancellation list. Please assist with scheduling prior to her next apt 8/30 if she returns contact.

## 2024-08-22 ENCOUNTER — TELEMEDICINE (OUTPATIENT)
Dept: BEHAVIORAL/MENTAL HEALTH CLINIC | Facility: CLINIC | Age: 49
End: 2024-08-22
Payer: COMMERCIAL

## 2024-08-22 DIAGNOSIS — F33.1 MODERATE EPISODE OF RECURRENT MAJOR DEPRESSIVE DISORDER (HCC): Primary | ICD-10-CM

## 2024-08-22 PROCEDURE — 90837 PSYTX W PT 60 MINUTES: CPT

## 2024-08-22 NOTE — PSYCH
Virtual Regular Visit    Verification of patient location:    Patient is located at Other in which this clinician practices in the state [facility].      Assessment/Plan:    Problem List Items Addressed This Visit       Moderate episode of recurrent major depressive disorder (HCC) - Primary       Goals addressed in session: Goal 1          Reason for visit is   Chief Complaint   Patient presents with    Depression        Encounter provider Lexie Rees      Recent Visits  Date Type Provider Dept   08/19/24 Telephone Lexie Rees [facility] [place]   Showing recent visits within past 7 days and meeting all other requirements  Today's Visits  Date Type Provider Dept   08/22/24 Telemedicine Lexie Rees [facility] Therapist [place]   Showing today's visits and meeting all other requirements  Future Appointments  No visits were found meeting these conditions.  Showing future appointments within next 150 days and meeting all other requirements       The patient was identified by name and date of birth. Ronn Prather was informed that this is a telemedicine visit and that the visit is being conducted throughthe [employer] platform. She agrees to proceed..  My office door was closed. No one else was in the room.  She acknowledged consent and understanding of privacy and security of the video platform. The patient has agreed to participate and understands they can discontinue the visit at any time.    Patient is aware this is a billable service.     HPI     Past Medical History:   Diagnosis Date    Abnormal Pap smear of cervix     Anemia     Arthritis     Depression     Edema     Fatigue     Kidney stone     Vitamin B12 deficiency     Vitamin D deficiency        Past Surgical History:   Procedure Laterality Date    BREAST BIOPSY  10/24/2019    CHOLECYSTECTOMY      ENDOMETRIAL ABLATION      KNEE ARTHROSCOPY Bilateral     KNEE SURGERY      ORIF TIBIA FRACTURE Right 01/18/2018    Procedure: OPEN  "REDUCTION W/ INTERNAL FIXATION (ORIF) TIBIA;  Surgeon: Konstantin Todd MD;  [facility];  Service: Orthopedics    ORIF TIBIAL PLATEAU Right 01/18/2018    Procedure: OPEN REDUCTION W/ INTERNAL FIXATION (ORIF) TIBIAL PLATEAU;  Surgeon: Konstantin Todd MD;  [facility];  Service: Orthopedics    [place] LAPAROSCOPY SURG CHOLECYSTECTOMY N/A 08/12/2021    Procedure: LAPAROSCOPIC CHOLECYSTECTOMY, UMBILICAL HERNIA REPAIR;  Surgeon: Mariusz Soto MD;  [facility];  Service: General    [place] REMOVAL IMPLANT DEEP Right 08/02/2018    Procedure: REMOVAL HARDWARE TIBIA;  Surgeon: Konstantin Todd MD;  [facility];  Service: Orthopedics    TUBAL LIGATION      US GUIDED BREAST BIOPSY LEFT COMPLETE Left 10/24/2019       Current Outpatient Medications   Medication Sig Dispense Refill    acetaminophen (TYLENOL) 325 mg tablet 650 mg every 6 hours for mild pain 30 tablet 0    amitriptyline (ELAVIL) 150 MG tablet TAKE ONE TABLET BY MOUTH AT BEDTIME 90 tablet 1    busPIRone (BUSPAR) 5 mg tablet Take 1 tablet (5 mg total) by mouth 2 (two) times a day 60 tablet 1    Cholecalciferol (Vitamin D3) 50 MCG (2000 UT) CAPS Take 1 capsule (2,000 Units total) by mouth daily 90 capsule 1    cyanocobalamin 1,000 mcg/mL Inject 1 mL (1,000 mcg total) into a muscle every 30 (thirty) days 10 mL 1    SYRINGE-NEEDLE, DISP, 3 ML 25G X 1\" 3 ML MISC Use every 30 (thirty) days 50 each 0     No current facility-administered medications for this visit.        No Known Allergies    Review of Systems    Video Exam    There were no vitals filed for this visit.    Physical Exam     Behavioral Health Psychotherapy Progress Note    Psychotherapy Provided: Individual Psychotherapy     1. Moderate episode of recurrent major depressive disorder (HCC)            Goals addressed in session: Goal 1     DATA: Ronn joined her individual session virtually. She joined in her car and was told to pull over in order to proceed with sessions which she " "did. Ronn discussed with clinician her current martial issues with her . We discussed communication techniques and Ronn noted she struggles with getting him to comprehend her emotions and feelings. She notes feeling \" negated and divorce is happening sooner or later if things don't change\". Ronn inquired about a couples therapy referral and the process to which this clinician provided information. Clinician inquired what her and  her husbands idea of what a marriage is/was to which she responded for her \" my  should be a protector and provider and he's not\". She notes knowing what his idea of marriage is something that is unknown.  During this session, this clinician used the following therapeutic modalities: Client-centered Therapy and Cognitive Behavioral Therapy    Substance Abuse was not addressed during this session. If the client is diagnosed with a co-occurring substance use disorder, please indicate any changes in the frequency or amount of use: N/A. Stage of change for addressing substance use diagnoses: No substance use/Not applicable    ASSESSMENT:  Ronn Prather presents with a Euthymic/ normal mood, her affect is Normal range and intensity, which is congruent, with her mood and the content of the session. The client has made progress on their goals. Francesca prides herself in self-care and \"living for her\" despite her circumstances in order to remain in positive spirits. Ronn Prather presents with a minimal risk of suicide, minimal risk of self-harm, and minimal risk of harm to others.    For any risk assessment that surpasses a \"low\" rating, a safety plan must be developed.    A safety plan was indicated: no  If yes, describe in detail N/A    PLAN: Between sessions, Ronn Prather will work on her communication skills with her  and attempt to verbalize . At the next session, the therapist will use Client-centered Therapy and Cognitive Behavioral Therapy to address " depression and martial issues. Clinician will send out a couples therapy referral.     Behavioral Health Treatment Plan and Discharge Planning: Kodyninimichael Prather is aware of and agrees to continue to work on their treatment plan. They have identified and are working toward their discharge goals. yes    Visit start and stop times:    08/22/24  Start Time: 0800  Stop Time: 0857  Total Visit Time: 57 minutes

## 2024-08-27 ENCOUNTER — TELEPHONE (OUTPATIENT)
Dept: PSYCHIATRY | Facility: CLINIC | Age: 49
End: 2024-08-27

## 2024-08-27 NOTE — TELEPHONE ENCOUNTER
Left voicemail informing patient and/or parent/guardian of the Psych Encounter form needing to be signed as a requirement from the insurance company for billing purposes. Patient can access form via [employer] and sign electronically.     Please make patient aware this form must be signed for each visit as a requirement to continue future visits with provider.    Virtual Encounter form 8/22/24

## 2024-08-28 ENCOUNTER — HOSPITAL ENCOUNTER (OUTPATIENT)
Dept: RADIOLOGY | Facility: HOSPITAL | Age: 49
Discharge: HOME | End: 2024-08-28
Payer: COMMERCIAL

## 2024-08-28 ENCOUNTER — OFFICE VISIT (OUTPATIENT)
Dept: RHEUMATOLOGY | Facility: CLINIC | Age: 49
End: 2024-08-28
Payer: COMMERCIAL

## 2024-08-28 ENCOUNTER — APPOINTMENT (OUTPATIENT)
Dept: LAB | Facility: CLINIC | Age: 49
End: 2024-08-28
Payer: COMMERCIAL

## 2024-08-28 VITALS
WEIGHT: 158 LBS | TEMPERATURE: 97.8 F | HEIGHT: 67 IN | BODY MASS INDEX: 24.8 KG/M2 | DIASTOLIC BLOOD PRESSURE: 78 MMHG | SYSTOLIC BLOOD PRESSURE: 128 MMHG

## 2024-08-28 DIAGNOSIS — M35.00 SICCA, UNSPECIFIED TYPE (HCC): ICD-10-CM

## 2024-08-28 DIAGNOSIS — R76.8 POSITIVE ANA (ANTINUCLEAR ANTIBODY): ICD-10-CM

## 2024-08-28 DIAGNOSIS — M15.3 POST-TRAUMATIC OSTEOARTHRITIS OF MULTIPLE JOINTS: Primary | ICD-10-CM

## 2024-08-28 DIAGNOSIS — M79.642 BILATERAL HAND PAIN: ICD-10-CM

## 2024-08-28 DIAGNOSIS — M79.2 RADICULAR PAIN IN RIGHT ARM: ICD-10-CM

## 2024-08-28 DIAGNOSIS — M79.641 BILATERAL HAND PAIN: ICD-10-CM

## 2024-08-28 DIAGNOSIS — M43.6 NECK STIFFNESS: ICD-10-CM

## 2024-08-28 DIAGNOSIS — R76.8 RHEUMATOID FACTOR POSITIVE: ICD-10-CM

## 2024-08-28 PROCEDURE — 99204 OFFICE O/P NEW MOD 45 MIN: CPT | Performed by: PHYSICIAN ASSISTANT

## 2024-08-28 PROCEDURE — 36415 COLL VENOUS BLD VENIPUNCTURE: CPT | Performed by: PHYSICIAN ASSISTANT

## 2024-08-28 PROCEDURE — 86235 NUCLEAR ANTIGEN ANTIBODY: CPT | Performed by: PHYSICIAN ASSISTANT

## 2024-08-28 PROCEDURE — 72050 X-RAY EXAM NECK SPINE 4/5VWS: CPT

## 2024-08-29 DIAGNOSIS — E55.9 VITAMIN D DEFICIENCY: ICD-10-CM

## 2024-08-29 PROBLEM — M15.3 POST-TRAUMATIC OSTEOARTHRITIS OF MULTIPLE JOINTS: Status: ACTIVE | Noted: 2024-08-29

## 2024-08-29 LAB
ENA SS-A AB SER-ACNC: <0.2 AI (ref 0–0.9)
ENA SS-B AB SER-ACNC: <0.2 AI (ref 0–0.9)
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
## 2024-08-30 RX ORDER — CYANOCOBALAMIN 1000 UG/ML
1000 INJECTION, SOLUTION INTRAMUSCULAR; SUBCUTANEOUS
Qty: 10 ML | Refills: 0 | Status: SHIPPED | OUTPATIENT
Start: 2024-08-30

## 2024-09-04 ENCOUNTER — TELEPHONE (OUTPATIENT)
Dept: PSYCHIATRY | Facility: CLINIC | Age: 49
End: 2024-09-04

## 2024-09-04 ENCOUNTER — TELEMEDICINE (OUTPATIENT)
Dept: BEHAVIORAL/MENTAL HEALTH CLINIC | Facility: CLINIC | Age: 49
End: 2024-09-04
Payer: COMMERCIAL

## 2024-09-04 DIAGNOSIS — F33.1 MODERATE EPISODE OF RECURRENT MAJOR DEPRESSIVE DISORDER (HCC): Primary | ICD-10-CM

## 2024-09-04 PROCEDURE — 90834 PSYTX W PT 45 MINUTES: CPT

## 2024-09-04 NOTE — PSYCH
Virtual Regular Visit    Verification of patient location:    Patient is located at Home in which this clinician practices in the state of [place]      Assessment/Plan:    Problem List Items Addressed This Visit       Moderate episode of recurrent major depressive disorder (HCC) - Primary       Goals addressed in session: Goal 1          Reason for visit is   Chief Complaint   Patient presents with    Depression        Encounter provider Lexie Rees      Recent Visits  No visits were found meeting these conditions.  Showing recent visits within past 7 days and meeting all other requirements  Today's Visits  Date Type Provider Dept   09/04/24 Telephone Krysta Watson MD [facility] [place]   09/04/24 Telemedicine Lexie Rees [facility]rap[facility]   Showing today's visits and meeting all other requirements  Future Appointments  No visits were found meeting these conditions.  Showing future appointments within next 150 days and meeting all other requirements       The patient was identified by name and date of birth. Ronn Prather was informed that this is a telemedicine visit and that the visit is being conducted throughthe [employer] platform. She agrees to proceed..  My office door was closed. No one else was in the room.  She acknowledged consent and understanding of privacy and security of the video platform. The patient has agreed to participate and understands they can discontinue the visit at any time.    Patient is aware this is a billable service.     HPI     Past Medical History:   Diagnosis Date    Abnormal Pap smear of cervix     Anemia     Arthritis     Depression     Edema     Fatigue     Kidney stone     Vitamin B12 deficiency     Vitamin D deficiency        Past Surgical History:   Procedure Laterality Date    BREAST BIOPSY  10/24/2019    CHOLECYSTECTOMY      ENDOMETRIAL ABLATION      KNEE ARTHROSCOPY Bilateral     KNEE SURGERY      ORIF TIBIA FRACTURE Right  "01/18/2018    Procedure: OPEN REDUCTION W/ INTERNAL FIXATION (ORIF) TIBIA;  Surgeon: Konstantin Todd MD;  [facility];  Service: Orthopedics    ORIF TIBIAL PLATEAU Right 01/18/2018    Procedure: OPEN REDUCTION W/ INTERNAL FIXATION (ORIF) TIBIAL PLATEAU;  Surgeon: Konstantin Todd MD;  [facility];  Service: Orthopedics    [place] LAPAROSCOPY SURG CHOLECYSTECTOMY N/A 08/12/2021    Procedure: LAPAROSCOPIC CHOLECYSTECTOMY, UMBILICAL HERNIA REPAIR;  Surgeon: Mariusz Soto MD;  [facility];  Service: General    [place] REMOVAL IMPLANT DEEP Right 08/02/2018    Procedure: REMOVAL HARDWARE TIBIA;  Surgeon: Konstantin Todd MD;  [facility];  Service: Orthopedics    TUBAL LIGATION      US GUIDED BREAST BIOPSY LEFT COMPLETE Left 10/24/2019       Current Outpatient Medications   Medication Sig Dispense Refill    acetaminophen (TYLENOL) 325 mg tablet 650 mg every 6 hours for mild pain 30 tablet 0    amitriptyline (ELAVIL) 150 MG tablet TAKE ONE TABLET BY MOUTH AT BEDTIME 90 tablet 1    busPIRone (BUSPAR) 5 mg tablet Take 1 tablet (5 mg total) by mouth 2 (two) times a day 60 tablet 1    Cholecalciferol (Vitamin D3) 50 MCG (2000 UT) CAPS Take 1 capsule (2,000 Units total) by mouth daily 90 capsule 1    cyanocobalamin 1,000 mcg/mL Inject 1 mL (1,000 mcg total) into a muscle every 30 (thirty) days 10 mL 0    SYRINGE-NEEDLE, DISP, 3 ML 25G X 1\" 3 ML MISC Use every 30 (thirty) days 50 each 0     No current facility-administered medications for this visit.        No Known Allergies    Review of Systems    Video Exam    There were no vitals filed for this visit.    Physical Exam     Behavioral Health Psychotherapy Progress Note    Psychotherapy Provided: Individual Psychotherapy     1. Moderate episode of recurrent major depressive disorder (HCC)            Goals addressed in session: Goal 1     DATA: Ronn joined her virtual session on time. She discussed difficulties with her family specifically her mother. " "She notes experiencing \"bad anxiety around her\" and having difficulty managing her moods due to feeling as if she is going to be judgmental and pry. She states \" my mom has not accepted that I am an adult\". Clinician worked with Ronn on boundary setting and assertiveness. Practicing skills was done for the rest of session. During this session, this clinician used the following therapeutic modalities: Cognitive Behavioral Therapy, Mindfulness-based Strategies, and Supportive Psychotherapy    Substance Abuse was not addressed during this session. If the client is diagnosed with a co-occurring substance use disorder, please indicate any changes in the frequency or amount of use: N/A. Stage of change for addressing substance use diagnoses: No substance use/Not applicable    ASSESSMENT:  Ronn Prather presents with a Euthymic/ normal mood, her affect is Normal range and intensity, which is congruent, with her mood and the content of the session. The client has made progress on their goals. Ronn is working to be more vocal and expressive with clinician. She is actively working towards her goals Ronn Prather presents with a minimal risk of suicide, minimal risk of self-harm, and minimal risk of harm to others.    For any risk assessment that surpasses a \"low\" rating, a safety plan must be developed.    A safety plan was indicated: no  If yes, describe in detail N/A    PLAN: Between sessions, Ronn Prather will practice and utilize boundary setting in personal life. At the next session, the therapist will use Cognitive Behavioral Therapy and Supportive Psychotherapy to address depression. Couples therapy referral was done and Ronn was informed to she would be waiting on a phone call.    Behavioral Health Treatment Plan and Discharge Planning: Ronn Prather is aware of and agrees to continue to work on their treatment plan. They have identified and are working toward their discharge goals. yes    Visit start " and stop times:    09/04/24  Start Time: 1300  Stop Time: 1350  Total Visit Time: 50 minutes

## 2024-09-04 NOTE — TELEPHONE ENCOUNTER
Provider contacted Client to inquire about their 1pm session as provider was unsure if it was actually supposed to be virtual and not in person. Provider left voicemail. If she calls back please switch to virtual if that is what is needed.

## 2024-09-24 ENCOUNTER — TELEMEDICINE (OUTPATIENT)
Dept: BEHAVIORAL/MENTAL HEALTH CLINIC | Facility: CLINIC | Age: 49
End: 2024-09-24
Payer: COMMERCIAL

## 2024-09-24 DIAGNOSIS — F33.1 MODERATE EPISODE OF RECURRENT MAJOR DEPRESSIVE DISORDER (HCC): Primary | ICD-10-CM

## 2024-09-24 PROCEDURE — 90834 PSYTX W PT 45 MINUTES: CPT

## 2024-09-24 NOTE — PSYCH
Virtual Regular Visit    Verification of patient location:    Patient is located at Home in which this clinician practices in the state of [place]      Assessment/Plan:    Problem List Items Addressed This Visit       Moderate episode of recurrent major depressive disorder (HCC) - Primary       Goals addressed in session: Goal 1          Reason for visit is No chief complaint on file.       Encounter provider Lexie Rees      Recent Visits  No visits were found meeting these conditions.  Showing recent visits within past 7 days and meeting all other requirements  Today's Visits  Date Type Provider Dept   09/24/24 Telemedicine Lexie Rees [facility] Therapist [place]   Showing today's visits and meeting all other requirements  Future Appointments  No visits were found meeting these conditions.  Showing future appointments within next 150 days and meeting all other requirements       The patient was identified by name and date of birth. Ronn Prather was informed that this is a telemedicine visit and that the visit is being conducted throughthe [employer] platform. She agrees to proceed..  My office door was closed. No one else was in the room.  She acknowledged consent and understanding of privacy and security of the video platform. The patient has agreed to participate and understands they can discontinue the visit at any time.    Patient is aware this is a billable service.       HPI     Past Medical History:   Diagnosis Date    Abnormal Pap smear of cervix     Anemia     Arthritis     Depression     Edema     Fatigue     Kidney stone     Vitamin B12 deficiency     Vitamin D deficiency        Past Surgical History:   Procedure Laterality Date    BREAST BIOPSY  10/24/2019    CHOLECYSTECTOMY      ENDOMETRIAL ABLATION      KNEE ARTHROSCOPY Bilateral     KNEE SURGERY      ORIF TIBIA FRACTURE Right 01/18/2018    Procedure: OPEN REDUCTION W/ INTERNAL FIXATION (ORIF) TIBIA;  Surgeon: Konstantin Todd MD;   "[facility];  Service: Orthopedics    ORIF TIBIAL PLATEAU Right 01/18/2018    Procedure: OPEN REDUCTION W/ INTERNAL FIXATION (ORIF) TIBIAL PLATEAU;  Surgeon: Konstantin Todd MD;  [facility];  Service: Orthopedics    [place] LAPAROSCOPY SURG CHOLECYSTECTOMY N/A 08/12/2021    Procedure: LAPAROSCOPIC CHOLECYSTECTOMY, UMBILICAL HERNIA REPAIR;  Surgeon: Mariusz Soto MD;  [facility];  Service: General    [place] REMOVAL IMPLANT DEEP Right 08/02/2018    Procedure: REMOVAL HARDWARE TIBIA;  Surgeon: Konstantin Todd MD;  [facility];  Service: Orthopedics    TUBAL LIGATION      US GUIDED BREAST BIOPSY LEFT COMPLETE Left 10/24/2019       Current Outpatient Medications   Medication Sig Dispense Refill    acetaminophen (TYLENOL) 325 mg tablet 650 mg every 6 hours for mild pain 30 tablet 0    amitriptyline (ELAVIL) 150 MG tablet TAKE ONE TABLET BY MOUTH AT BEDTIME 90 tablet 1    busPIRone (BUSPAR) 5 mg tablet Take 1 tablet (5 mg total) by mouth 2 (two) times a day 60 tablet 1    Cholecalciferol (Vitamin D3) 50 MCG (2000 UT) CAPS Take 1 capsule (2,000 Units total) by mouth daily 90 capsule 1    cyanocobalamin 1,000 mcg/mL Inject 1 mL (1,000 mcg total) into a muscle every 30 (thirty) days 10 mL 0    SYRINGE-NEEDLE, DISP, 3 ML 25G X 1\" 3 ML MISC Use every 30 (thirty) days 50 each 0     No current facility-administered medications for this visit.        No Known Allergies    Review of Systems    Video Exam    There were no vitals filed for this visit.    Physical Exam     Behavioral Health Psychotherapy Progress Note    Psychotherapy Provided: Individual Psychotherapy     1. Moderate episode of recurrent major depressive disorder (HCC)            Goals addressed in session: Goal 1     DATA: Ronn joined session virtually. She reports things have progressively gotten worse with her marriage and states she is feeling \"mentally exhausted\". She notes \"majority of the time its arguing\" which is why she " "believes as well as her  that \"couples therapy is imperative\". She reports she will be taking the time to \" relax and recuperate\" by visiting her mother out of state this weekend. She fears that due to past difficulties with her mother that there may be conflict going into this visit. Clinician assisted Ronn in conflict resolution and managing emotions when provoked by others. During this session, this clinician used the following therapeutic modalities: Client-centered Therapy and Cognitive Behavioral Therapy    Substance Abuse was not addressed during this session. If the client is diagnosed with a co-occurring substance use disorder, please indicate any changes in the frequency or amount of use: N/A. Stage of change for addressing substance use diagnoses: No substance use/Not applicable    ASSESSMENT:  Ronn Prather presents with a Euthymic/ normal mood, her affect is Normal range and intensity, which is congruent, with her mood and the content of the session. The client has made progress on their goals.    Ronn is struggling with maintaining peace in her relationship but makes effort to keep peace in her life.  Ronn Prather presents with a minimal risk of suicide, minimal risk of self-harm, and minimal risk of harm to others.    For any risk assessment that surpasses a \"low\" rating, a safety plan must be developed.    A safety plan was indicated: no  If yes, describe in detail N/A    PLAN: Between sessions, Ronn Prather will engage in self care to manage increase stress. At the next session, the therapist will use Client-centered Therapy and Cognitive Behavioral Therapy to address depression and relationship issues.    Behavioral Health Treatment Plan and Discharge Planning: Ronn Prather is aware of and agrees to continue to work on their treatment plan. They have identified and are working toward their discharge goals. yes    Visit start and stop times:    09/24/24  Start Time: 0800  Stop " Time: 0848  Total Visit Time: 48 minutes

## 2024-10-01 ENCOUNTER — APPOINTMENT (OUTPATIENT)
Dept: LAB | Facility: CLINIC | Age: 49
End: 2024-10-01
Payer: COMMERCIAL

## 2024-10-01 DIAGNOSIS — R39.9 URINARY SYMPTOM OR SIGN: ICD-10-CM

## 2024-10-01 DIAGNOSIS — R73.01 ABNORMAL FASTING GLUCOSE: ICD-10-CM

## 2024-10-01 DIAGNOSIS — E55.9 VITAMIN D DEFICIENCY: ICD-10-CM

## 2024-10-01 DIAGNOSIS — E53.8 VITAMIN B12 DEFICIENCY: ICD-10-CM

## 2024-10-01 DIAGNOSIS — D72.818 OTHER DECREASED WHITE BLOOD CELL (WBC) COUNT: ICD-10-CM

## 2024-10-01 DIAGNOSIS — E78.49 OTHER HYPERLIPIDEMIA: ICD-10-CM

## 2024-10-01 LAB
BACTERIA UR QL AUTO: ABNORMAL /HPF
BILIRUB UR QL STRIP: NEGATIVE
CLARITY UR: CLEAR
COLOR UR: ABNORMAL
GLUCOSE UR STRIP-MCNC: NEGATIVE MG/DL
HGB UR QL STRIP.AUTO: ABNORMAL
KETONES UR STRIP-MCNC: NEGATIVE MG/DL
LEUKOCYTE ESTERASE UR QL STRIP: NEGATIVE
NITRITE UR QL STRIP: NEGATIVE
NON-SQ EPI CELLS URNS QL MICRO: ABNORMAL /HPF
PH UR STRIP.AUTO: 6.5 [PH]
PROT UR STRIP-MCNC: NEGATIVE MG/DL
RBC #/AREA URNS AUTO: ABNORMAL /HPF
SP GR UR STRIP.AUTO: 1.02 (ref 1–1.03)
UROBILINOGEN UR STRIP-ACNC: <2 MG/DL
WBC #/AREA URNS AUTO: ABNORMAL /HPF

## 2024-10-01 PROCEDURE — 81001 URINALYSIS AUTO W/SCOPE: CPT

## 2024-10-04 ENCOUNTER — TELEMEDICINE (OUTPATIENT)
Dept: BEHAVIORAL/MENTAL HEALTH CLINIC | Facility: CLINIC | Age: 49
End: 2024-10-04
Payer: COMMERCIAL

## 2024-10-04 DIAGNOSIS — F33.1 MODERATE EPISODE OF RECURRENT MAJOR DEPRESSIVE DISORDER (HCC): Primary | ICD-10-CM

## 2024-10-04 PROCEDURE — 90837 PSYTX W PT 60 MINUTES: CPT
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
## 2024-10-11 ENCOUNTER — TELEMEDICINE (OUTPATIENT)
Dept: BEHAVIORAL/MENTAL HEALTH CLINIC | Facility: CLINIC | Age: 49
End: 2024-10-11

## 2024-10-11 DIAGNOSIS — F33.1 MODERATE EPISODE OF RECURRENT MAJOR DEPRESSIVE DISORDER (HCC): Primary | ICD-10-CM
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
## 2024-10-16 ENCOUNTER — TELEPHONE (OUTPATIENT)
Dept: PSYCHIATRY | Facility: CLINIC | Age: 49
End: 2024-10-16

## 2024-10-18 ENCOUNTER — TELEMEDICINE (OUTPATIENT)
Dept: BEHAVIORAL/MENTAL HEALTH CLINIC | Facility: CLINIC | Age: 49
End: 2024-10-18
Payer: COMMERCIAL

## 2024-10-18 DIAGNOSIS — F33.1 MODERATE EPISODE OF RECURRENT MAJOR DEPRESSIVE DISORDER (HCC): Primary | ICD-10-CM

## 2024-10-18 PROCEDURE — 90834 PSYTX W PT 45 MINUTES: CPT
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
## 2024-10-25 ENCOUNTER — TELEMEDICINE (OUTPATIENT)
Dept: BEHAVIORAL/MENTAL HEALTH CLINIC | Facility: CLINIC | Age: 49
End: 2024-10-25
Payer: COMMERCIAL

## 2024-10-25 DIAGNOSIS — F33.1 MODERATE EPISODE OF RECURRENT MAJOR DEPRESSIVE DISORDER (HCC): Primary | ICD-10-CM

## 2024-10-25 PROCEDURE — 90837 PSYTX W PT 60 MINUTES: CPT

## 2024-10-25 NOTE — PSYCH
Virtual Regular Visit    Verification of patient location:    Patient is located at Home in which this clinician practices in the state [facility]      Assessment/Plan:    Problem List Items Addressed This Visit       Moderate episode of recurrent major depressive disorder (HCC) - Primary       Goals addressed in session: Goal 1          Reason for visit is No chief complaint on file.       Encounter provider Lexie Rees      Recent Visits  Date Type Provider Dept   10/18/24 Telemedicine Lexie Rees [facility] Therapist [place]   Showing recent visits within past 7 days and meeting all other requirements  Today's Visits  Date Type Provider Dept   10/25/24 Telemedicine Lexie Rees [facility] Therapist [place]   Showing today's visits and meeting all other requirements  Future Appointments  No visits were found meeting these conditions.  Showing future appointments within next 150 days and meeting all other requirements       The patient was identified by name and date of birth. Ronn Prather was informed that this is a telemedicine visit and that the visit is being conducted throughthe [employer] platform. She agrees to proceed..  My office door was closed. No one else was in the room.  She acknowledged consent and understanding of privacy and security of the video platform. The patient has agreed to participate and understands they can discontinue the visit at any time.    Patient is aware this is a billable service.     HPI     Past Medical History:   Diagnosis Date    Abnormal Pap smear of cervix     Anemia     Arthritis     Depression     Edema     Fatigue     Kidney stone     Vitamin B12 deficiency     Vitamin D deficiency        Past Surgical History:   Procedure Laterality Date    BREAST BIOPSY  10/24/2019    CHOLECYSTECTOMY      ENDOMETRIAL ABLATION      KNEE ARTHROSCOPY Bilateral     KNEE SURGERY      ORIF TIBIA FRACTURE Right 01/18/2018    Procedure: OPEN REDUCTION W/ INTERNAL  "FIXATION (ORIF) TIBIA;  Surgeon: Konstantin Todd MD;  [facility];  Service: Orthopedics    ORIF TIBIAL PLATEAU Right 01/18/2018    Procedure: OPEN REDUCTION W/ INTERNAL FIXATION (ORIF) TIBIAL PLATEAU;  Surgeon: Konstantin Todd MD;  [facility];  Service: Orthopedics    AZ LAPAROSCOPY SURG CHOLECYSTECTOMY N/A 08/12/2021    Procedure: LAPAROSCOPIC CHOLECYSTECTOMY, UMBILICAL HERNIA REPAIR;  Surgeon: Mariusz Soto MD;  [facility];  Service: General    [place] REMOVAL IMPLANT DEEP Right 08/02/2018    Procedure: REMOVAL HARDWARE TIBIA;  Surgeon: Konstantin Todd MD;  [facility];  Service: Orthopedics    TUBAL LIGATION      US GUIDED BREAST BIOPSY LEFT COMPLETE Left 10/24/2019       Current Outpatient Medications   Medication Sig Dispense Refill    acetaminophen (TYLENOL) 325 mg tablet 650 mg every 6 hours for mild pain 30 tablet 0    amitriptyline (ELAVIL) 150 MG tablet TAKE ONE TABLET BY MOUTH AT BEDTIME 90 tablet 1    busPIRone (BUSPAR) 5 mg tablet Take 1 tablet (5 mg total) by mouth 2 (two) times a day 60 tablet 1    Cholecalciferol (Vitamin D3) 50 MCG (2000 UT) CAPS Take 1 capsule (2,000 Units total) by mouth daily 90 capsule 1    cyanocobalamin 1,000 mcg/mL Inject 1 mL (1,000 mcg total) into a muscle every 30 (thirty) days 10 mL 0    SYRINGE-NEEDLE, DISP, 3 ML 25G X 1\" 3 ML MISC Use every 30 (thirty) days 50 each 0     No current facility-administered medications for this visit.        No Known Allergies    Review of Systems    Video Exam    There were no vitals filed for this visit.    Physical Exam     Behavioral Health Psychotherapy Progress Note    Psychotherapy Provided: Individual Psychotherapy     1. Moderate episode of recurrent major depressive disorder (HCC)            Goals addressed in session: Goal 1     DATA: Ronn joined session virtually. She discussed with clinician changes going on with her job and home life. She reports finally taking the stand to send out \" " "eviction notices\" to her family members in her household. She reports struggling with this but after another incident where her boundaries were walked over yet again she felt this was something she needed to do. She reports trouble with her step-sons mother regarding the child's registration for school. Ronn stated she was able to set boundaries and express her emotions effictevly and commonly regarding co-parenting her step-son. During this session, this clinician used the following therapeutic modalities: Client-centered Therapy and Supportive Psychotherapy    Substance Abuse was not addressed during this session. If the client is diagnosed with a co-occurring substance use disorder, please indicate any changes in the frequency or amount of use: N/A. Stage of change for addressing substance use diagnoses: No substance use/Not applicable    ASSESSMENT:  Ronn Prather presents with a Euthymic/ normal mood, her affect is Normal range and intensity, which is congruent, with her mood and the content of the session. The client has made progress on their goals. Ronn Prather presents with a minimal risk of suicide, minimal risk of self-harm, and minimal risk of harm to others.    For any risk assessment that surpasses a \"low\" rating, a safety plan must be developed.    A safety plan was indicated: no  If yes, describe in detail N/A    PLAN: Between sessions, Ronn Prather will continue to utilize effective communication in efforts to continue verbalizing her feelings and emotions. At the next session, the therapist will use Client-centered Therapy and Cognitive Behavioral Therapy to address mood and family issues.    Behavioral Health Treatment Plan and Discharge Planning: Ronn Prather is aware of and agrees to continue to work on their treatment plan. They have identified and are working toward their discharge goals. yes    Visit start and stop times:    10/25/24  Start Time: 0805  Stop Time: 0853  Total Visit " Time: 48 minutes

## 2024-11-01 ENCOUNTER — TELEMEDICINE (OUTPATIENT)
Dept: BEHAVIORAL/MENTAL HEALTH CLINIC | Facility: CLINIC | Age: 49
End: 2024-11-01
Payer: COMMERCIAL

## 2024-11-01 ENCOUNTER — APPOINTMENT (OUTPATIENT)
Dept: LAB | Facility: CLINIC | Age: 49
End: 2024-11-01
Payer: COMMERCIAL

## 2024-11-01 ENCOUNTER — OFFICE VISIT (OUTPATIENT)
Dept: INTERNAL MEDICINE CLINIC | Facility: CLINIC | Age: 49
End: 2024-11-01
Payer: COMMERCIAL

## 2024-11-01 ENCOUNTER — TELEPHONE (OUTPATIENT)
Dept: INTERNAL MEDICINE CLINIC | Facility: CLINIC | Age: 49
End: 2024-11-01

## 2024-11-01 VITALS
HEIGHT: 67 IN | WEIGHT: 162 LBS | SYSTOLIC BLOOD PRESSURE: 132 MMHG | OXYGEN SATURATION: 100 % | DIASTOLIC BLOOD PRESSURE: 86 MMHG | TEMPERATURE: 97.4 F | BODY MASS INDEX: 25.43 KG/M2 | HEART RATE: 85 BPM

## 2024-11-01 DIAGNOSIS — F33.1 MODERATE EPISODE OF RECURRENT MAJOR DEPRESSIVE DISORDER (HCC): ICD-10-CM

## 2024-11-01 DIAGNOSIS — F33.1 MODERATE EPISODE OF RECURRENT MAJOR DEPRESSIVE DISORDER (HCC): Primary | ICD-10-CM

## 2024-11-01 DIAGNOSIS — G89.4 CHRONIC PAIN SYNDROME: ICD-10-CM

## 2024-11-01 DIAGNOSIS — R39.9 URINARY SYMPTOM OR SIGN: ICD-10-CM

## 2024-11-01 DIAGNOSIS — E55.9 VITAMIN D DEFICIENCY: ICD-10-CM

## 2024-11-01 DIAGNOSIS — M15.3 POST-TRAUMATIC OSTEOARTHRITIS OF MULTIPLE JOINTS: Primary | ICD-10-CM

## 2024-11-01 DIAGNOSIS — R39.9 URINARY SYMPTOM OR SIGN: Primary | ICD-10-CM

## 2024-11-01 DIAGNOSIS — F41.9 ANXIETY: ICD-10-CM

## 2024-11-01 DIAGNOSIS — E53.8 VITAMIN B12 DEFICIENCY: ICD-10-CM

## 2024-11-01 DIAGNOSIS — Z71.9 HEALTH COUNSELING: ICD-10-CM

## 2024-11-01 DIAGNOSIS — D72.818 OTHER DECREASED WHITE BLOOD CELL (WBC) COUNT: ICD-10-CM

## 2024-11-01 DIAGNOSIS — M17.31 POST-TRAUMATIC OSTEOARTHRITIS OF RIGHT KNEE: ICD-10-CM

## 2024-11-01 LAB
25(OH)D3 SERPL-MCNC: 14.6 NG/ML (ref 30–100)
ALBUMIN SERPL BCG-MCNC: 4.3 G/DL (ref 3.5–5)
ALP SERPL-CCNC: 90 U/L (ref 34–104)
ALT SERPL W P-5'-P-CCNC: 13 U/L (ref 7–52)
ANION GAP SERPL CALCULATED.3IONS-SCNC: 4 MMOL/L (ref 4–13)
AST SERPL W P-5'-P-CCNC: 14 U/L (ref 13–39)
BACTERIA UR QL AUTO: ABNORMAL /HPF
BASOPHILS # BLD AUTO: 0.02 THOUSANDS/ΜL (ref 0–0.1)
BASOPHILS NFR BLD AUTO: 1 % (ref 0–1)
BILIRUB SERPL-MCNC: 0.44 MG/DL (ref 0.2–1)
BILIRUB UR QL STRIP: NEGATIVE
BUN SERPL-MCNC: 10 MG/DL (ref 5–25)
CALCIUM SERPL-MCNC: 9.8 MG/DL (ref 8.4–10.2)
CHLORIDE SERPL-SCNC: 107 MMOL/L (ref 96–108)
CHOLEST SERPL-MCNC: 173 MG/DL
CLARITY UR: CLEAR
CO2 SERPL-SCNC: 29 MMOL/L (ref 21–32)
COLOR UR: YELLOW
CREAT SERPL-MCNC: 0.78 MG/DL (ref 0.6–1.3)
EOSINOPHIL # BLD AUTO: 0.03 THOUSAND/ΜL (ref 0–0.61)
EOSINOPHIL NFR BLD AUTO: 1 % (ref 0–6)
ERYTHROCYTE [DISTWIDTH] IN BLOOD BY AUTOMATED COUNT: 13.3 % (ref 11.6–15.1)
EST. AVERAGE GLUCOSE BLD GHB EST-MCNC: 117 MG/DL
GFR SERPL CREATININE-BSD FRML MDRD: 90 ML/MIN/1.73SQ M
GLUCOSE P FAST SERPL-MCNC: 89 MG/DL (ref 65–99)
GLUCOSE UR STRIP-MCNC: NEGATIVE MG/DL
HBA1C MFR BLD: 5.7 %
HCT VFR BLD AUTO: 42.7 % (ref 34.8–46.1)
HDLC SERPL-MCNC: 65 MG/DL
HGB BLD-MCNC: 13.7 G/DL (ref 11.5–15.4)
HGB UR QL STRIP.AUTO: ABNORMAL
IMM GRANULOCYTES # BLD AUTO: 0 THOUSAND/UL (ref 0–0.2)
IMM GRANULOCYTES NFR BLD AUTO: 0 % (ref 0–2)
KETONES UR STRIP-MCNC: NEGATIVE MG/DL
LDLC SERPL CALC-MCNC: 99 MG/DL (ref 0–100)
LEUKOCYTE ESTERASE UR QL STRIP: NEGATIVE
LYMPHOCYTES # BLD AUTO: 1.94 THOUSANDS/ΜL (ref 0.6–4.47)
LYMPHOCYTES NFR BLD AUTO: 46 % (ref 14–44)
MCH RBC QN AUTO: 28.7 PG (ref 26.8–34.3)
MCHC RBC AUTO-ENTMCNC: 32.1 G/DL (ref 31.4–37.4)
MCV RBC AUTO: 90 FL (ref 82–98)
MONOCYTES # BLD AUTO: 0.28 THOUSAND/ΜL (ref 0.17–1.22)
MONOCYTES NFR BLD AUTO: 7 % (ref 4–12)
MUCOUS THREADS UR QL AUTO: ABNORMAL
NEUTROPHILS # BLD AUTO: 1.92 THOUSANDS/ΜL (ref 1.85–7.62)
NEUTS SEG NFR BLD AUTO: 45 % (ref 43–75)
NITRITE UR QL STRIP: NEGATIVE
NON-SQ EPI CELLS URNS QL MICRO: ABNORMAL /HPF
NONHDLC SERPL-MCNC: 108 MG/DL
NRBC BLD AUTO-RTO: 0 /100 WBCS
PH UR STRIP.AUTO: 6 [PH]
PLATELET # BLD AUTO: 194 THOUSANDS/UL (ref 149–390)
PMV BLD AUTO: 12.1 FL (ref 8.9–12.7)
POTASSIUM SERPL-SCNC: 4.4 MMOL/L (ref 3.5–5.3)
PROT SERPL-MCNC: 7.5 G/DL (ref 6.4–8.4)
PROT UR STRIP-MCNC: NEGATIVE MG/DL
RBC # BLD AUTO: 4.77 MILLION/UL (ref 3.81–5.12)
RBC #/AREA URNS AUTO: ABNORMAL /HPF
SODIUM SERPL-SCNC: 140 MMOL/L (ref 135–147)
SP GR UR STRIP.AUTO: 1.02 (ref 1–1.03)
TRIGL SERPL-MCNC: 43 MG/DL
TSH SERPL DL<=0.05 MIU/L-ACNC: 0.86 UIU/ML (ref 0.45–4.5)
UROBILINOGEN UR QL STRIP.AUTO: 0.2 E.U./DL
VIT B12 SERPL-MCNC: 208 PG/ML (ref 180–914)
WBC # BLD AUTO: 4.19 THOUSAND/UL (ref 4.31–10.16)
WBC #/AREA URNS AUTO: ABNORMAL /HPF

## 2024-11-01 PROCEDURE — 90837 PSYTX W PT 60 MINUTES: CPT

## 2024-11-01 PROCEDURE — 81001 URINALYSIS AUTO W/SCOPE: CPT

## 2024-11-01 PROCEDURE — 80053 COMPREHEN METABOLIC PANEL: CPT

## 2024-11-01 PROCEDURE — 84443 ASSAY THYROID STIM HORMONE: CPT

## 2024-11-01 PROCEDURE — 99214 OFFICE O/P EST MOD 30 MIN: CPT | Performed by: INTERNAL MEDICINE

## 2024-11-01 PROCEDURE — 82306 VITAMIN D 25 HYDROXY: CPT

## 2024-11-01 PROCEDURE — 82607 VITAMIN B-12: CPT

## 2024-11-01 PROCEDURE — 85025 COMPLETE CBC W/AUTO DIFF WBC: CPT

## 2024-11-01 PROCEDURE — 80061 LIPID PANEL: CPT

## 2024-11-01 PROCEDURE — 83036 HEMOGLOBIN GLYCOSYLATED A1C: CPT

## 2024-11-01 RX ORDER — BUSPIRONE HYDROCHLORIDE 5 MG/1
5 TABLET ORAL 2 TIMES DAILY
Qty: 60 TABLET | Refills: 1 | Status: SHIPPED | OUTPATIENT
Start: 2024-11-01

## 2024-11-01 RX ORDER — CEPHALEXIN 500 MG/1
500 CAPSULE ORAL EVERY 12 HOURS SCHEDULED
Qty: 10 CAPSULE | Refills: 0 | Status: SHIPPED | OUTPATIENT
Start: 2024-11-01 | End: 2024-11-06

## 2024-11-01 RX ORDER — AMITRIPTYLINE HYDROCHLORIDE 150 MG/1
150 TABLET ORAL
Qty: 90 TABLET | Refills: 1 | Status: SHIPPED | OUTPATIENT
Start: 2024-11-01

## 2024-11-01 RX ORDER — ACETAMINOPHEN 160 MG
2000 TABLET,DISINTEGRATING ORAL DAILY
Qty: 90 CAPSULE | Refills: 1 | Status: SHIPPED | OUTPATIENT
Start: 2024-11-01

## 2024-11-01 NOTE — ASSESSMENT & PLAN NOTE
Sees therapist regularly. Taking amitriptyline.  Taking buspirone 5 mg prn only.    Orders:    amitriptyline (ELAVIL) 150 MG tablet; Take 1 tablet (150 mg total) by mouth daily at bedtime

## 2024-11-01 NOTE — PROGRESS NOTES
Ambulatory Visit  Name: Ronn Prather      : 1975      MRN: [id number]  Encounter Provider: Krysta Watson MD  Encounter Date: 2024   Encounter department: [facility] INTERNAL MEDICINE    Assessment & Plan  Post-traumatic osteoarthritis of multiple joints  Reviewed labs, x-rays.  Taking amitriptyline daily at 150 mg qHS.  Saw rheum, will see prn.  Resume physical therapy, as requested.    Orders:    Ambulatory Referral to Physical Therapy; Future    Chronic pain syndrome  As above.         Moderate episode of recurrent major depressive disorder (HCC)  Sees therapist regularly. Taking amitriptyline.  Taking buspirone 5 mg prn only.    Orders:    amitriptyline (ELAVIL) 150 MG tablet; Take 1 tablet (150 mg total) by mouth daily at bedtime    Other decreased white blood cell (WBC) count  Repeat CBC.         Vitamin B12 deficiency  Continue monthly injections.         Vitamin D deficiency  Taking D3.    Orders:    Cholecalciferol (Vitamin D3) 50 MCG (2000 UT) capsule; Take 1 capsule (2,000 Units total) by mouth daily    Post-traumatic osteoarthritis of right knee  Stable.    Orders:    Ambulatory Referral to Physical Therapy; Future    Urinary symptom or sign    Orders:    UA (URINE) with reflex to Scope; Future    Anxiety    Orders:    busPIRone (BUSPAR) 5 mg tablet; Take 1 tablet (5 mg total) by mouth 2 (two) times a day    Health counseling  Flu vaccine updated.        Follow up in 6 months or as needed.    History of Present Illness     She is upset about her weight gain. She stopped going to the gym due to cost. She tried to exercise on her own at home but is too busy or no space. She would like to go to physical therapy again.    She reports intermittent joint pains, mostly her hands. She did see rheumatology.    She is seeing a new therapist. She ha been taking her amitriptyline daily.    She reports occasional vaginal discharge. She requested a urine test a few weeks ago but no UTI,  "denies any symptoms now. She is just concerned that her urine is cloudy frequently.        Review of Systems   Constitutional:  Positive for activity change. Negative for appetite change and fatigue.   HENT:  Negative for congestion, ear pain and postnasal drip.    Eyes:  Negative for visual disturbance.   Respiratory:  Negative for cough and shortness of breath.    Cardiovascular:  Negative for chest pain and leg swelling.   Gastrointestinal:  Negative for abdominal pain, constipation and diarrhea.   Genitourinary:  Negative for dysuria, frequency and urgency.   Musculoskeletal:  Negative for arthralgias and myalgias.   Skin:  Negative for rash and wound.   Neurological:  Negative for dizziness, numbness and headaches.   Hematological:  Does not bruise/bleed easily.   Psychiatric/Behavioral:  Negative for confusion. The patient is not nervous/anxious.            Objective     /86 (BP Location: Right arm, Patient Position: Sitting, Cuff Size: Large)   Pulse 85   Temp (!) 97.4 °F (36.3 °C) (Temporal)   Ht 5' 7\" (1.702 m)   Wt 73.5 kg (162 lb)   LMP 12/04/2022 (Exact Date)   SpO2 100%   Breastfeeding No   BMI 25.37 kg/m²     Physical Exam  Vitals and nursing note reviewed.   Constitutional:       General: She is not in acute distress.     Appearance: She is well-developed.   HENT:      Head: Normocephalic and atraumatic.      Nose: Nose normal.      Mouth/Throat:      Mouth: Mucous membranes are moist.   Eyes:      Pupils: Pupils are equal, round, and reactive to light.   Cardiovascular:      Rate and Rhythm: Normal rate and regular rhythm.      Heart sounds: Normal heart sounds.   Pulmonary:      Effort: Pulmonary effort is normal.      Breath sounds: Normal breath sounds. No wheezing.   Abdominal:      General: Bowel sounds are normal.      Palpations: Abdomen is soft.   Musculoskeletal:         General: No swelling.      Right lower leg: No edema.      Left lower leg: No edema.   Skin:     General: " Skin is warm.      Findings: No rash.   Neurological:      General: No focal deficit present.      Mental Status: She is alert and oriented to person, place, and time.   Psychiatric:         Mood and Affect: Mood and affect normal. Mood is not anxious or depressed.         Behavior: Behavior normal.           Labs & imaging results reviewed with patient.

## 2024-11-01 NOTE — PSYCH
Virtual Regular Visit    Verification of patient location:    Patient is located at Home in which this clinician practices in the state [facility]      Assessment/Plan:    Problem List Items Addressed This Visit       Moderate episode of recurrent major depressive disorder (HCC) - Primary       Goals addressed in session: Goal 1          Reason for visit is No chief complaint on file.       Encounter provider Lexie Rees      Recent Visits  Date Type Provider Dept   10/25/24 Telemedicine Lexie Rees [facility] Therapist [place]   Showing recent visits within past 7 days and meeting all other requirements  Today's Visits  Date Type Provider Dept   11/01/24 Telemedicine Lexie Rees [facility] Therapist [place]   11/01/24 Office Visit Krysta Watson MD [facility]   Showing today's visits and meeting all other requirements  Future Appointments  No visits were found meeting these conditions.  Showing future appointments within next 150 days and meeting all other requirements       The patient was identified by name and date of birth. Ronn Prather was informed that this is a telemedicine visit and that the visit is being conducted throughthe [employer] Embedded platform. She agrees to proceed..  My office door was closed. No one else was in the room.  She acknowledged consent and understanding of privacy and security of the video platform. The patient has agreed to participate and understands they can discontinue the visit at any time.    Patient is aware this is a billable service.       HPI     Past Medical History:   Diagnosis Date    Abnormal Pap smear of cervix     Anemia     Arthritis     Depression     Edema     Fatigue     Kidney stone     Vitamin B12 deficiency     Vitamin D deficiency        Past Surgical History:   Procedure Laterality Date    BREAST BIOPSY  10/24/2019    CHOLECYSTECTOMY      ENDOMETRIAL ABLATION      KNEE ARTHROSCOPY Bilateral     KNEE SURGERY       "ORIF TIBIA FRACTURE Right 01/18/2018    Procedure: OPEN REDUCTION W/ INTERNAL FIXATION (ORIF) TIBIA;  Surgeon: Konstantin Todd MD;  [facility];  Service: Orthopedics    ORIF TIBIAL PLATEAU Right 01/18/2018    Procedure: OPEN REDUCTION W/ INTERNAL FIXATION (ORIF) TIBIAL PLATEAU;  Surgeon: Konstantin Todd MD;  [facility];  Service: Orthopedics    MS LAPAROSCOPY SURG CHOLECYSTECTOMY N/A 08/12/2021    Procedure: LAPAROSCOPIC CHOLECYSTECTOMY, UMBILICAL HERNIA REPAIR;  Surgeon: Mariusz Soto MD;  [facility];  Service: General    MS REMOVAL IMPLANT DEEP Right 08/02/2018    Procedure: REMOVAL HARDWARE TIBIA;  Surgeon: Konstantin Todd MD;  [facility];  Service: Orthopedics    TUBAL LIGATION      US GUIDED BREAST BIOPSY LEFT COMPLETE Left 10/24/2019       Current Outpatient Medications   Medication Sig Dispense Refill    acetaminophen (TYLENOL) 325 mg tablet 650 mg every 6 hours for mild pain 30 tablet 0    amitriptyline (ELAVIL) 150 MG tablet Take 1 tablet (150 mg total) by mouth daily at bedtime 90 tablet 1    busPIRone (BUSPAR) 5 mg tablet Take 1 tablet (5 mg total) by mouth 2 (two) times a day 60 tablet 1    Cholecalciferol (Vitamin D3) 50 MCG (2000 UT) capsule Take 1 capsule (2,000 Units total) by mouth daily 90 capsule 1    cyanocobalamin 1,000 mcg/mL Inject 1 mL (1,000 mcg total) into a muscle every 30 (thirty) days 10 mL 0    SYRINGE-NEEDLE, DISP, 3 ML 25G X 1\" 3 ML MISC Use every 30 (thirty) days 50 each 0     No current facility-administered medications for this visit.        No Known Allergies    Review of Systems    Video Exam    There were no vitals filed for this visit.    Physical Exam   Behavioral Health Psychotherapy Progress Note    Psychotherapy Provided: Individual Psychotherapy     1. Moderate episode of recurrent major depressive disorder (HCC)            Goals addressed in session: Goal 1     DATA: Ronn joined session virtually. She actively participated in the " "update in her treatment plan. She reflected on the progress she has made throughout her treatment. She reports she is still actively working on her goal and has  \"begun to make the changes that need to be made but I still struggle with removing my feelings from the decisions I've made\". For the rest of session she discussed her family. She reports her daughter is moving out of state and she has been working to better her relationship with her and distance may help with that.  She reflected with clinician on how she is struggling with her decisions as she feels like she's is \" always the issue\" but understands everyone needs to grow and she \" can't enable them anymore\". She did make the revelation that when she is stressed she does engage in spending habits to cope. During this session, this clinician used the following therapeutic modalities: Engagement Strategies and Supportive Psychotherapy    Substance Abuse was not addressed during this session. If the client is diagnosed with a co-occurring substance use disorder, please indicate any changes in the frequency or amount of use: N/A. Stage of change for addressing substance use diagnoses: No substance use/Not applicable    ASSESSMENT:  Ronn Prather presents with a Euthymic/ normal mood, her affect is Normal range and intensity, which is congruent, with her mood and the content of the session. The client has made progress on their goals. Ronn continues to be open with clinician and has been effectively building rapport despite past issues regarding age difference. Ronn Prather presents with a minimal risk of suicide, minimal risk of self-harm, and minimal risk of harm to others.    For any risk assessment that surpasses a \"low\" rating, a safety plan must be developed.    A safety plan was indicated: no  If yes, describe in detail N/A    PLAN: Between sessions, Ronn Prather will continue working on her treatment plan goals. At the next session, the " therapist will use Cognitive Behavioral Therapy, Motivational Interviewing, and Supportive Psychotherapy to address depression and communications.    Behavioral Health Treatment Plan and Discharge Planning: Ronn Prather is aware of and agrees to continue to work on their treatment plan. They have identified and are working toward their discharge goals. yes    Visit start and stop times:    11/01/24  Start Time: 1000  Stop Time: 1057  Total Visit Time: 57 minutes

## 2024-11-01 NOTE — BH TREATMENT PLAN
"Ronn Prather  1975      Date of Initial Psychotherapy Assessment: 02/03/23  Date of Current Treatment Plan: 11/1/2024  Treatment Plan Target Date: 04/30/2025  Treatment Plan Expiration Date: 04/30/2025     Diagnosis:   1. Moderate episode of recurrent major depressive disorder (HCC)          Area(s) of Need: Boundaries, mild depression, interpersonal relationships     Long Term Goal 1 (in the client's own words): Be able to stick to what I know I need to do for myself  Update: \"Begun to make the changes that need to be made but I still struggle with removing my feelings from the decisions I've made\"     Target Date for completion: 04/30/2025             Anticipated therapeutic modalities: CBT, mindfulness interventions and supportive therapy             People identified to complete this goal: Ronn Prather and Lexie DANIELSON            Objective 1: (identify the means of measuring success in meeting the objective): Practice assertiveness techniques and use techniques in everyday life, especially with family members and especially during times of conflict                    Objective 2: (identify the means of measuring success in meeting the objective):  Completing all homework as assigned to promote self awareness      Objective 3:  Decrease in PHQ 9 scores      I am currently under the care of a [facility] psychiatric provider: no     My [facility] psychiatric provider is: NA     I am currently taking psychiatric medications: \"No, off and on I'm just not a medication person but I know I need to take them\"     I feel that I will be ready for discharge from mental health care when I reach the following (measurable goal/objective): I am making decisions without need for support on a consistent basis.        For children and adults who have a legal guardian:          Has there been any change to custody orders and/or guardianship status? NA. If yes, attach updated documentation.     Behavioral Health " Treatment Plan [facility]: Diagnosis and Treatment Plan explained to Ronn Prather acknowledges an understanding of their diagnosis. Ronn Prather agrees to this treatment plan.     I have been offered a copy of this Treatment Plan. Yes via [employer]

## 2024-11-01 NOTE — ASSESSMENT & PLAN NOTE
Taking D3.    Orders:    Cholecalciferol (Vitamin D3) 50 MCG (2000 UT) capsule; Take 1 capsule (2,000 Units total) by mouth daily

## 2024-11-01 NOTE — TELEPHONE ENCOUNTER
Please call patient.  Urine showed possible UTI. Since you have symptoms, will just treat. Antibiotics sent to the pharmacy.

## 2024-11-01 NOTE — ASSESSMENT & PLAN NOTE
Reviewed labs, x-rays.  Taking amitriptyline daily at 150 mg qHS.  Saw rheum, will see prn.  Resume physical therapy, as requested.    Orders:    Ambulatory Referral to Physical Therapy; Future

## 2024-11-01 NOTE — ASSESSMENT & PLAN NOTE
Orders:    busPIRone (BUSPAR) 5 mg tablet; Take 1 tablet (5 mg total) by mouth 2 (two) times a day

## 2024-11-04 RX ORDER — ERGOCALCIFEROL 1.25 MG/1
50000 CAPSULE, LIQUID FILLED ORAL WEEKLY
Qty: 12 CAPSULE | Refills: 0 | Status: SHIPPED | OUTPATIENT
Start: 2024-11-04

## 2024-11-06 ENCOUNTER — TELEPHONE (OUTPATIENT)
Dept: PSYCHIATRY | Facility: CLINIC | Age: 49
End: 2024-11-06

## 2024-11-29 ENCOUNTER — TELEMEDICINE (OUTPATIENT)
Dept: BEHAVIORAL/MENTAL HEALTH CLINIC | Facility: CLINIC | Age: 49
End: 2024-11-29
Payer: COMMERCIAL

## 2024-11-29 ENCOUNTER — EVALUATION (OUTPATIENT)
Dept: PHYSICAL THERAPY | Facility: CLINIC | Age: 49
End: 2024-11-29
Payer: COMMERCIAL

## 2024-11-29 DIAGNOSIS — M15.3 POST-TRAUMATIC OSTEOARTHRITIS OF MULTIPLE JOINTS: ICD-10-CM

## 2024-11-29 DIAGNOSIS — M17.31 POST-TRAUMATIC OSTEOARTHRITIS OF RIGHT KNEE: ICD-10-CM

## 2024-11-29 DIAGNOSIS — F33.1 MODERATE EPISODE OF RECURRENT MAJOR DEPRESSIVE DISORDER (HCC): Primary | ICD-10-CM

## 2024-11-29 PROCEDURE — 97162 PT EVAL MOD COMPLEX 30 MIN: CPT | Performed by: PHYSICAL THERAPIST

## 2024-11-29 PROCEDURE — 90834 PSYTX W PT 45 MINUTES: CPT

## 2024-11-29 PROCEDURE — 97530 THERAPEUTIC ACTIVITIES: CPT | Performed by: PHYSICAL THERAPIST
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
## 2024-11-29 NOTE — PSYCH
Virtual Regular Visit    Verification of patient location:    Patient is located at Home in which this clinician practices in the state of [place]      Assessment/Plan:    Problem List Items Addressed This Visit       Moderate episode of recurrent major depressive disorder (HCC) - Primary       Goals addressed in session: Goal 1          Reason for visit is   Chief Complaint   Patient presents with    Depression        Encounter provider Lexie Rees      Recent Visits  No visits were found meeting these conditions.  Showing recent visits within past 7 days and meeting all other requirements  Today's Visits  Date Type Provider Dept   11/29/24 Telemedicine Lexie Rees [facility] Therapist [place]   Showing today's visits and meeting all other requirements  Future Appointments  No visits were found meeting these conditions.  Showing future appointments within next 150 days and meeting all other requirements       The patient was identified by name and date of birth. Ronn Prather was informed that this is a telemedicine visit and that the visit is being conducted throughthe Epic Embedded platform. She agrees to proceed..  My office door was closed. No one else was in the room.  She acknowledged consent and understanding of privacy and security of the video platform. The patient has agreed to participate and understands they can discontinue the visit at any time.    Patient is aware this is a billable service.     HPI     Past Medical History:   Diagnosis Date    Abnormal Pap smear of cervix     Anemia     Arthritis     Depression     Edema     Fatigue     Kidney stone     Vitamin B12 deficiency     Vitamin D deficiency        Past Surgical History:   Procedure Laterality Date    BREAST BIOPSY  10/24/2019    CHOLECYSTECTOMY      ENDOMETRIAL ABLATION      KNEE ARTHROSCOPY Bilateral     KNEE SURGERY      ORIF TIBIA FRACTURE Right 01/18/2018    Procedure: OPEN REDUCTION W/ INTERNAL FIXATION (ORIF) TIBIA;   "Surgeon: Konstantin Todd MD;  [facility];  Service: Orthopedics    ORIF TIBIAL PLATEAU Right 01/18/2018    Procedure: OPEN REDUCTION W/ INTERNAL FIXATION (ORIF) TIBIAL PLATEAU;  Surgeon: Konstantin Todd MD;  [facility];  Service: Orthopedics    [place] LAPAROSCOPY SURG CHOLECYSTECTOMY N/A 08/12/2021    Procedure: LAPAROSCOPIC CHOLECYSTECTOMY, UMBILICAL HERNIA REPAIR;  Surgeon: Mariusz Soto MD;  [facility];  Service: General    [place] REMOVAL IMPLANT DEEP Right 08/02/2018    Procedure: REMOVAL HARDWARE TIBIA;  Surgeon: Konstantin Todd MD;  [facility];  Service: Orthopedics    TUBAL LIGATION      US GUIDED BREAST BIOPSY LEFT COMPLETE Left 10/24/2019       Current Outpatient Medications   Medication Sig Dispense Refill    acetaminophen (TYLENOL) 325 mg tablet 650 mg every 6 hours for mild pain 30 tablet 0    amitriptyline (ELAVIL) 150 MG tablet Take 1 tablet (150 mg total) by mouth daily at bedtime 90 tablet 1    busPIRone (BUSPAR) 5 mg tablet Take 1 tablet (5 mg total) by mouth 2 (two) times a day 60 tablet 1    Cholecalciferol (Vitamin D3) 50 MCG (2000 UT) capsule Take 1 capsule (2,000 Units total) by mouth daily 90 capsule 1    cyanocobalamin 1,000 mcg/mL Inject 1 mL (1,000 mcg total) into a muscle every 30 (thirty) days 10 mL 0    ergocalciferol (VITAMIN D2) 50,000 units Take 1 capsule (50,000 Units total) by mouth once a week 12 capsule 0    SYRINGE-NEEDLE, DISP, 3 ML 25G X 1\" 3 ML MISC Use every 30 (thirty) days 50 each 0     No current facility-administered medications for this visit.        No Known Allergies    Review of Systems    Video Exam    There were no vitals filed for this visit.    Physical Exam     Behavioral Health Psychotherapy Progress Note    Psychotherapy Provided: Individual Psychotherapy     1. Moderate episode of recurrent major depressive disorder (HCC)            Goals addressed in session: Goal 1     DATA: Ronn joined session virtually. She discussed with " "clinician events that occurred over the holiday noting a relaxed thanksgiving. She noted she is in good place mentally and has been engaging in self care like going on dates with her  and traveling with her best friend. She reflected with clinician she is attempting to put herself first over others and manage her emotions effectively.During this session, this clinician used the following therapeutic modalities: Client-centered Therapy    Substance Abuse was not addressed during this session. If the client is diagnosed with a co-occurring substance use disorder, please indicate any changes in the frequency or amount of use: n/a. Stage of change for addressing substance use diagnoses: No substance use/Not applicable    ASSESSMENT:  Ronn Prather presents with a Euthymic/ normal mood her affect is Normal range and intensity, which is congruent, with her mood and the content of the session. The client has made progress on their goals.Ronn notes progress with staying consistent with taking her  medication as prescribed. She notes improvement in mood and  decrease in negative outburst.Ronn Prather presents with a minimal risk of suicide, minimal risk of self-harm, and minimal risk of harm to others.    For any risk assessment that surpasses a \"low\" rating, a safety plan must be developed.    A safety plan was indicated: no  If yes, describe in detail n/a    PLAN: Between sessions, Ronn Prather will continue to take her medications as prescribed, engage in self care, and manage her emotions effectively. At the next session, the therapist will use Cognitive Behavioral Therapy and Supportive Psychotherapy to address depression.    Behavioral Health Treatment Plan and Discharge Planning: Ronn Prather is aware of and agrees to continue to work on their treatment plan. They have identified and are working toward their discharge goals. yes    Visit start and stop times:    11/29/24  Start Time: 0808  Stop Time: " 0853  Total Visit Time: 45 minutes

## 2024-12-12 ENCOUNTER — APPOINTMENT (OUTPATIENT)
Dept: PHYSICAL THERAPY | Facility: CLINIC | Age: 49
End: 2024-12-12
Payer: COMMERCIAL

## 2024-12-13 ENCOUNTER — TELEMEDICINE (OUTPATIENT)
Dept: BEHAVIORAL/MENTAL HEALTH CLINIC | Facility: CLINIC | Age: 49
End: 2024-12-13
Payer: COMMERCIAL

## 2024-12-13 ENCOUNTER — OFFICE VISIT (OUTPATIENT)
Dept: PHYSICAL THERAPY | Facility: CLINIC | Age: 49
End: 2024-12-13
Payer: COMMERCIAL

## 2024-12-13 DIAGNOSIS — F33.1 MODERATE EPISODE OF RECURRENT MAJOR DEPRESSIVE DISORDER (HCC): Primary | ICD-10-CM

## 2024-12-13 DIAGNOSIS — M17.31 POST-TRAUMATIC OSTEOARTHRITIS OF RIGHT KNEE: ICD-10-CM

## 2024-12-13 DIAGNOSIS — M15.3 POST-TRAUMATIC OSTEOARTHRITIS OF MULTIPLE JOINTS: Primary | ICD-10-CM

## 2024-12-13 PROCEDURE — 97530 THERAPEUTIC ACTIVITIES: CPT | Performed by: PHYSICAL THERAPIST

## 2024-12-13 PROCEDURE — 90832 PSYTX W PT 30 MINUTES: CPT

## 2024-12-13 PROCEDURE — 97112 NEUROMUSCULAR REEDUCATION: CPT | Performed by: PHYSICAL THERAPIST

## 2024-12-13 PROCEDURE — 97140 MANUAL THERAPY 1/> REGIONS: CPT | Performed by: PHYSICAL THERAPIST

## 2024-12-13 NOTE — PROGRESS NOTES
"Daily Note     Today's date: 2024  Patient name: Ronn Prather  : 1975  MRN: [id number]  Referring provider: Krysta Watson MD  Dx:   Encounter Diagnosis     ICD-10-CM    1. Post-traumatic osteoarthritis of multiple joints  M15.3       2. Post-traumatic osteoarthritis of right knee  M17.31           Start Time: 0700  Stop Time: 0745  Total time in clinic (min): 45 minutes    Subjective: Patient reports she was very achy after going to [place] and walking around a lot       Objective: See treatment diary below      Assessment: Tolerated treatment well. Noted some quad soreness with Nustep but no increase in pain in knee joints. Patient responded well to manual treatment and kinesiotaping to bilateral knees. Reports of slightly reduced pain end of session compared to on arrival. Patient exhibited good technique with therapeutic exercises and would benefit from continued PT      Plan: Continue per plan of care.  Progress treatment as tolerated.          POC Expires Auth Status Start Date Expiration Date PT Visit Limit    2024 After 24th visit   BOMN   Date 2024      Used 1 2      Remaining           Diagnosis: chronic Right knee   Precautions: possible RA; hx of arthroscopic surgery bilat knees; R tib fx ORIF (2018); depression, OA   Next Physician's Appt:   MedBridge HEP:   Manuals       STM/IASTM  DK, STM bilat knees      Laser        Taping?  DK, bilat KT \"H\" around knee                      Neuro Re-Ed        Quad sets  2-3\" x15 ea      Glute sets        Supine SLR        TA ball press        Hip ADD ball squeeze        BKFO/clams  BKFO alt x15 ea      Bridges on SB        Wobble Board        SLS        TKEs                Ther Ex        Calf stretch  Supine w/ strap 5-10\" x10 ea      HS stretch        DKTC w/ ball        LTRs        Heel Raises        Side-stepping        Standing hip ABD, Ext                         Ther Activity             Bike for LE " mobility    NuStep (recumb unavail) x5min         Mini Squats        STS        Leg Press        Ja walkouts                Pt Ed HEP, POC Tape, POC, HEP      Re-Evaluation             Modalities             Heat/ice (PRN)         TENS

## 2024-12-13 NOTE — PSYCH
Virtual Regular Visit    Verification of patient location:    Patient is located at Home in which this clinician practices in the state of [place]      Assessment/Plan:    Problem List Items Addressed This Visit       Moderate episode of recurrent major depressive disorder (HCC) - Primary       Goals addressed in session: Goal 1     Depression Follow-up Plan Completed: Not applicable    Reason for visit is No chief complaint on file.       Encounter provider Lexie Rees      Recent Visits  No visits were found meeting these conditions.  Showing recent visits within past 7 days and meeting all other requirements  Today's Visits  Date Type Provider Dept   12/13/24 Telemedicine Lexie Rees [facility] Therapist [place]   Showing today's visits and meeting all other requirements  Future Appointments  No visits were found meeting these conditions.  Showing future appointments within next 150 days and meeting all other requirements       The patient was identified by name and date of birth. Ronn Prather was informed that this is a telemedicine visit and that the visit is being conducted throughthe Epic Embedded platform. She agrees to proceed..  My office door was closed. No one else was in the room.  She acknowledged consent and understanding of privacy and security of the video platform. The patient has agreed to participate and understands they can discontinue the visit at any time.    Patient is aware this is a billable service.       HPI     Past Medical History:   Diagnosis Date    Abnormal Pap smear of cervix     Anemia     Arthritis     Depression     Edema     Fatigue     Kidney stone     Vitamin B12 deficiency     Vitamin D deficiency        Past Surgical History:   Procedure Laterality Date    BREAST BIOPSY  10/24/2019    CHOLECYSTECTOMY      ENDOMETRIAL ABLATION      KNEE ARTHROSCOPY Bilateral     KNEE SURGERY      ORIF TIBIA FRACTURE Right 01/18/2018    Procedure: OPEN REDUCTION W/ INTERNAL  "FIXATION (ORIF) TIBIA;  Surgeon: Konstantin Todd MD;  [facility];  Service: Orthopedics    ORIF TIBIAL PLATEAU Right 01/18/2018    Procedure: OPEN REDUCTION W/ INTERNAL FIXATION (ORIF) TIBIAL PLATEAU;  Surgeon: Konstantin Todd MD;  [facility];  Service: Orthopedics    [place] LAPAROSCOPY SURG CHOLECYSTECTOMY N/A 08/12/2021    Procedure: LAPAROSCOPIC CHOLECYSTECTOMY, UMBILICAL HERNIA REPAIR;  Surgeon: Mariusz Soto MD;  [facility];  Service: General    [place] REMOVAL IMPLANT DEEP Right 08/02/2018    Procedure: REMOVAL HARDWARE TIBIA;  Surgeon: Konstantin Todd MD;  [facility];  Service: Orthopedics    TUBAL LIGATION      US GUIDED BREAST BIOPSY LEFT COMPLETE Left 10/24/2019       Current Outpatient Medications   Medication Sig Dispense Refill    acetaminophen (TYLENOL) 325 mg tablet 650 mg every 6 hours for mild pain 30 tablet 0    amitriptyline (ELAVIL) 150 MG tablet Take 1 tablet (150 mg total) by mouth daily at bedtime 90 tablet 1    busPIRone (BUSPAR) 5 mg tablet Take 1 tablet (5 mg total) by mouth 2 (two) times a day 60 tablet 1    Cholecalciferol (Vitamin D3) 50 MCG (2000 UT) capsule Take 1 capsule (2,000 Units total) by mouth daily 90 capsule 1    cyanocobalamin 1,000 mcg/mL Inject 1 mL (1,000 mcg total) into a muscle every 30 (thirty) days 10 mL 0    ergocalciferol (VITAMIN D2) 50,000 units Take 1 capsule (50,000 Units total) by mouth once a week 12 capsule 0    SYRINGE-NEEDLE, DISP, 3 ML 25G X 1\" 3 ML MISC Use every 30 (thirty) days 50 each 0     No current facility-administered medications for this visit.        No Known Allergies    Review of Systems    Video Exam    There were no vitals filed for this visit.    Physical Exam     Behavioral Health Psychotherapy Progress Note    Psychotherapy Provided: Individual Psychotherapy     1. Moderate episode of recurrent major depressive disorder (HCC)            Goals addressed in session: Goal 1     DATA: Ronn joined session " "virtually to discuss family and the upcoming holidays. She expressed difficulties managing custody in regards to her grandchildren and traveling with the holidays. She notes she has been \" consciously not entertaining things that do not affect me\". She notes things being remedied with her kids regarding her children finding out she was . Ronn expressed frustration and the desire to have her feelings and emotions acknowledged by her family. Clinician worked with Ronn to discuss effective communication and expression within her family in order to minimize conflict. During this session, this clinician used the following therapeutic modalities: Supportive Psychotherapy    Substance Abuse was not addressed during this session. If the client is diagnosed with a co-occurring substance use disorder, please indicate any changes in the frequency or amount of use: n/a. Stage of change for addressing substance use diagnoses: No substance use/Not applicable    ASSESSMENT:  Ronn Prather presents with a Euthymic/ normal mood, her affect is Normal range and intensity, which is congruent, with her mood and the content of the session. The client has made progress on their goals.Ronn is working to be accountable in the role she plays in situations that occur with her family. She continues to stay consistent with her medication regimen. Ronn Prather presents with a minimal risk of suicide, minimal risk of self-harm, and minimal risk of harm to others.    For any risk assessment that surpasses a \"low\" rating, a safety plan must be developed.    A safety plan was indicated: no  If yes, describe in detail n/a    PLAN: Between sessions, Ronn Prather will work to mend conflict with members of her family by voicing feelings and concerns effectively. At the next session, the therapist will use Cognitive Behavioral Therapy and Supportive Psychotherapy to address depression.    Behavioral Health Treatment Plan and " Discharge Planning: Ronn Prather is aware of and agrees to continue to work on their treatment plan. They have identified and are working toward their discharge goals. yes    Depression Follow-up Plan Completed: Not applicable    Visit start and stop times:    12/13/24  Start Time: 0806  Stop Time: 0840  Total Visit Time: 34 minutes

## 2024-12-17 ENCOUNTER — OFFICE VISIT (OUTPATIENT)
Dept: PHYSICAL THERAPY | Facility: CLINIC | Age: 49
End: 2024-12-17
Payer: COMMERCIAL

## 2024-12-17 DIAGNOSIS — M15.3 POST-TRAUMATIC OSTEOARTHRITIS OF MULTIPLE JOINTS: Primary | ICD-10-CM

## 2024-12-17 DIAGNOSIS — M17.31 POST-TRAUMATIC OSTEOARTHRITIS OF RIGHT KNEE: ICD-10-CM

## 2024-12-17 PROCEDURE — 97140 MANUAL THERAPY 1/> REGIONS: CPT | Performed by: PHYSICAL THERAPIST

## 2024-12-17 PROCEDURE — 97530 THERAPEUTIC ACTIVITIES: CPT | Performed by: PHYSICAL THERAPIST

## 2024-12-17 PROCEDURE — 97112 NEUROMUSCULAR REEDUCATION: CPT | Performed by: PHYSICAL THERAPIST

## 2024-12-17 NOTE — PROGRESS NOTES
"Daily Note     Today's date: 2024  Patient name: Ronn Prather  : 1975  MRN: [id number]  Referring provider: Krysta Watson MD  Dx:   Encounter Diagnosis     ICD-10-CM    1. Post-traumatic osteoarthritis of multiple joints  M15.3       2. Post-traumatic osteoarthritis of right knee  M17.31           Start Time: 0700  Stop Time: 0745  Total time in clinic (min): 45 minutes    Subjective: Patient reports taping helped reduced pain in knees with walking, etc.       Objective: See treatment diary below      Assessment: Tolerated treatment well. Improved ability to perform quad sets. Good response to laser treatment to Right knee with reports of some pain relief. Patient exhibited good technique with therapeutic exercises and would benefit from continued PT      Plan: Continue per plan of care.  Progress treatment as tolerated.          POC Expires Auth Status Start Date Expiration Date PT Visit Limit    2024 After 24th visit   BOMN   Date 2024     Used 1 2 3     Remaining           Diagnosis: chronic Right knee   Precautions: possible RA; hx of arthroscopic surgery bilat knees; R tib fx ORIF (2018); depression, OA   Next Physician's Appt:   [email or web address] HEP:   Manuals      STM/IASTM  DK, STM bilat knees      Laser   DK, R knee x4min, 4000J     Taping?  DK, bilat KT \"H\" around knee DK, bilat KT \"H\" around knee                     Neuro Re-Ed        Quad sets  2-3\" x15 ea 2-3\" x15 ea     Glute sets        Supine SLR        TA ball press        Hip ADD ball squeeze        BKFO/clams  BKFO alt x15 ea Pink TB alt x15 ea     Bridges on SB        Wobble Board        SLS        TKEs                Ther Ex        Calf stretch  Supine w/ strap 5-10\" x10 ea Supine w/ strap 5-10\" x10 ea     HS stretch        DKTC w/ ball        LTRs        Heel Raises        Side-stepping        Standing hip ABD, Ext                         Ther Activity             Bike for LE " mobility    NuStep (recumb unavail) x5min  Nustep x5min Lv5       Mini Squats        STS        Leg Press        Doran walkouts                Pt Ed HEP, POC Tape, POC, HEP Tape, pain science, HEP     Re-Evaluation             Modalities             Heat/ice (PRN)         TENS

## 2024-12-18 ENCOUNTER — TELEPHONE (OUTPATIENT)
Dept: PSYCHIATRY | Facility: CLINIC | Age: 49
End: 2024-12-18

## 2024-12-19 ENCOUNTER — APPOINTMENT (OUTPATIENT)
Dept: PHYSICAL THERAPY | Facility: CLINIC | Age: 49
End: 2024-12-19
Payer: COMMERCIAL

## 2024-12-20 ENCOUNTER — TELEMEDICINE (OUTPATIENT)
Dept: BEHAVIORAL/MENTAL HEALTH CLINIC | Facility: CLINIC | Age: 49
End: 2024-12-20
Payer: COMMERCIAL

## 2024-12-20 ENCOUNTER — OFFICE VISIT (OUTPATIENT)
Dept: PHYSICAL THERAPY | Facility: CLINIC | Age: 49
End: 2024-12-20
Payer: COMMERCIAL

## 2024-12-20 DIAGNOSIS — M15.3 POST-TRAUMATIC OSTEOARTHRITIS OF MULTIPLE JOINTS: Primary | ICD-10-CM

## 2024-12-20 DIAGNOSIS — M17.31 POST-TRAUMATIC OSTEOARTHRITIS OF RIGHT KNEE: ICD-10-CM

## 2024-12-20 DIAGNOSIS — F33.1 MODERATE EPISODE OF RECURRENT MAJOR DEPRESSIVE DISORDER (HCC): Primary | ICD-10-CM

## 2024-12-20 PROCEDURE — 90837 PSYTX W PT 60 MINUTES: CPT

## 2024-12-20 PROCEDURE — 97530 THERAPEUTIC ACTIVITIES: CPT | Performed by: PHYSICAL THERAPIST

## 2024-12-20 PROCEDURE — 97140 MANUAL THERAPY 1/> REGIONS: CPT | Performed by: PHYSICAL THERAPIST

## 2024-12-20 PROCEDURE — 97112 NEUROMUSCULAR REEDUCATION: CPT | Performed by: PHYSICAL THERAPIST

## 2024-12-20 NOTE — PSYCH
Virtual Regular Visit    Verification of patient location:    Patient is located at Home in which this clinician practices in the state of [place]      Assessment/Plan:    Problem List Items Addressed This Visit       Moderate episode of recurrent major depressive disorder (HCC) - Primary       Goals addressed in session: Goal 1     Depression Follow-up Plan Completed: No    Reason for visit is   Chief Complaint   Patient presents with    Depression        Encounter provider Lexie Rees      Recent Visits  Date Type Provider Dept   12/13/24 Telemedicine Lexie Rees [facility]rap[facility] [place]   Showing recent visits within past 7 days and meeting all other requirements  Today's Visits  Date Type Provider Dept   12/20/24 Telemedicine Lexie Rees [facility]rapist [place]   Showing today's visits and meeting all other requirements  Future Appointments  No visits were found meeting these conditions.  Showing future appointments within next 150 days and meeting all other requirements       The patient was identified by name and date of birth. Ronn Prather was informed that this is a telemedicine visit and that the visit is being conducted throughthe Epic Embedded platform. She agrees to proceed..  My office door was closed. No one else was in the room.  She acknowledged consent and understanding of privacy and security of the video platform. The patient has agreed to participate and understands they can discontinue the visit at any time.    Patient is aware this is a billable service.       HPI     Past Medical History:   Diagnosis Date    Abnormal Pap smear of cervix     Anemia     Arthritis     Depression     Edema     Fatigue     Kidney stone     Vitamin B12 deficiency     Vitamin D deficiency        Past Surgical History:   Procedure Laterality Date    BREAST BIOPSY  10/24/2019    CHOLECYSTECTOMY      ENDOMETRIAL ABLATION      KNEE ARTHROSCOPY Bilateral     KNEE SURGERY      ORIF  "TIBIA FRACTURE Right 01/18/2018    Procedure: OPEN REDUCTION W/ INTERNAL FIXATION (ORIF) TIBIA;  Surgeon: Konstantin Todd MD;  Location: [facility];  Service: Orthopedics    ORIF TIBIAL PLATEAU Right 01/18/2018    Procedure: OPEN REDUCTION W/ INTERNAL FIXATION (ORIF) TIBIAL PLATEAU;  Surgeon: Konstantin Todd MD;  Location: [facility];  Service: Orthopedics    CA LAPAROSCOPY SURG CHOLECYSTECTOMY N/A 08/12/2021    Procedure: LAPAROSCOPIC CHOLECYSTECTOMY, UMBILICAL HERNIA REPAIR;  Surgeon: Mariusz Soto MD;  Location: [facility];  Service: General    CA REMOVAL IMPLANT DEEP Right 08/02/2018    Procedure: REMOVAL HARDWARE TIBIA;  Surgeon: Konstantin Todd MD;  Location: [facility];  Service: Orthopedics    TUBAL LIGATION      US GUIDED BREAST BIOPSY LEFT COMPLETE Left 10/24/2019       Current Outpatient Medications   Medication Sig Dispense Refill    acetaminophen (TYLENOL) 325 mg tablet 650 mg every 6 hours for mild pain 30 tablet 0    amitriptyline (ELAVIL) 150 MG tablet Take 1 tablet (150 mg total) by mouth daily at bedtime 90 tablet 1    busPIRone (BUSPAR) 5 mg tablet Take 1 tablet (5 mg total) by mouth 2 (two) times a day 60 tablet 1    Cholecalciferol (Vitamin D3) 50 MCG (2000 UT) capsule Take 1 capsule (2,000 Units total) by mouth daily 90 capsule 1    cyanocobalamin 1,000 mcg/mL Inject 1 mL (1,000 mcg total) into a muscle every 30 (thirty) days 10 mL 0    ergocalciferol (VITAMIN D2) 50,000 units Take 1 capsule (50,000 Units total) by mouth once a week 12 capsule 0    SYRINGE-NEEDLE, DISP, 3 ML 25G X 1\" 3 ML MISC Use every 30 (thirty) days 50 each 0     No current facility-administered medications for this visit.        No Known Allergies    Review of Systems    Video Exam    There were no vitals filed for this visit.    Physical Exam     Behavioral Health Psychotherapy Progress Note    Psychotherapy Provided: Individual Psychotherapy     1. Moderate episode of recurrent major depressive disorder (HCC)      " "      Goals addressed in session: Goal 1     DATA: Ronn joined session virtually. She discussed the holiday plans coming up for her and her family. She reports she has been doing well and has been \" working extremely hard to remain at peace\". She reports she recently stood firm in her boundaries with her family members and reports \"feeling at peace\". Clinician worked with Ronn to process emotions surrounding growth and challenging affects of others adapting to her growth.During this session, this clinician used the following therapeutic modalities: Client-centered Therapy    Substance Abuse was not addressed during this session. If the client is diagnosed with a co-occurring substance use disorder, please indicate any changes in the frequency or amount of use: n/a. Stage of change for addressing substance use diagnoses: No substance use/Not applicable    ASSESSMENT:  Ronn Prather presents with a Euthymic/ normal mood, her affect is Normal range and intensity, which is congruent, with her mood and the content of the session. The client has made progress on their goals.Ronn is actively standing firm in her boundaries and expressing her emotions. She reports improved mood and decrease in depression and anxiety symptoms.Ronn Prather presents with a minimal risk of suicide, minimal risk of self-harm, and minimal risk of harm to others.    For any risk assessment that surpasses a \"low\" rating, a safety plan must be developed.    A safety plan was indicated: no  If yes, describe in detail n/a    PLAN: Between sessions, Ronn Prather will continue working on treatment plan goals. At the next session, the therapist will use Client-centered Therapy and Cognitive Behavioral Therapy to address mood.    Behavioral Health Treatment Plan and Discharge Planning: Ronn Prather is aware of and agrees to continue to work on their treatment plan. They have identified and are working toward their discharge goals. " yes    Depression Follow-up Plan Completed: No    Visit start and stop times:    12/20/24  Start Time: 0812  Stop Time: 0905  Total Visit Time: 53 minutes

## 2024-12-20 NOTE — PROGRESS NOTES
"Daily Note     Today's date: 2024  Patient name: Ronn Prather  : 1975  MRN: [id number]  Referring provider: Krysta Watson MD  Dx:   Encounter Diagnosis     ICD-10-CM    1. Post-traumatic osteoarthritis of multiple joints  M15.3       2. Post-traumatic osteoarthritis of right knee  M17.31           Start Time: 920  Stop Time: 1005  Total time in clinic (min): 45 minutes    Subjective: Patient reports feeling better with the tape and after the laser treatment last visit.       Objective: See treatment diary below      Assessment: Tolerated treatment well. Worked on progressing quad strengthening with SLR. Noted some difficulty but able to maintain TKE and no increase in pain. Added bridges on SB to work on glute strength. Re-taped around patella with good response. Patient exhibited good technique with therapeutic exercises and would benefit from continued PT      Plan: Continue per plan of care.  Progress treatment as tolerated.          POC Expires Auth Status Start Date Expiration Date PT Visit Limit    2024 After 24th visit   BOMN   Date 2024    Used 1 2 3 4    Remaining           Diagnosis: chronic Right knee   Precautions: possible RA; hx of arthroscopic surgery bilat knees; R tib fx ORIF (2018); depression, OA   Next Physician's Appt:   [facility] HEP:   Manuals     STM/IASTM  DK, STM bilat knees      Laser   DK, R knee x4min, 4000J DK, R knee x4min, 4000J    Taping?  DK, bilat KT \"H\" around knee DK, bilat KT \"H\" around knee DK, bilat KT \"H\" around knee                    Neuro Re-Ed        Quad sets  2-3\" x15 ea 2-3\" x15 ea     Glute sets        Supine SLR    +QS x10 ea    TA ball press        Hip ADD ball squeeze        BKFO/clams  BKFO alt x15 ea Pink TB alt x15 ea Pink TB alt x15 ea    Bridges on SB    2\" x15    Wobble Board        SLS        TKEs                Ther Ex        Calf stretch  Supine w/ strap 5-10\" x10 " "ea Supine w/ strap 5-10\" x10 ea     HS stretch        DKTC w/ ball    2x10    LTRs        Heel Raises        Side-stepping        Standing hip ABD, Ext                         Ther Activity             Bike for LE mobility    NuStep (recumb unavail) x5min  Nustep x5min Lv5  Nustep x5min Lv5     Mini Squats        STS        Leg Press        Ja walkouts                Pt Ed HEP, POC Tape, POC, HEP Tape, pain science, HEP Tape, orthotics    Re-Evaluation             Modalities             Heat/ice (PRN)         TENS                                              "

## 2024-12-24 ENCOUNTER — APPOINTMENT (OUTPATIENT)
Dept: PHYSICAL THERAPY | Facility: CLINIC | Age: 49
End: 2024-12-24
Payer: COMMERCIAL

## 2024-12-26 ENCOUNTER — APPOINTMENT (OUTPATIENT)
Dept: PHYSICAL THERAPY | Facility: CLINIC | Age: 49
End: 2024-12-26
Payer: COMMERCIAL

## 2024-12-30 ENCOUNTER — EVALUATION (OUTPATIENT)
Dept: PHYSICAL THERAPY | Facility: CLINIC | Age: 49
End: 2024-12-30
Payer: COMMERCIAL

## 2024-12-30 DIAGNOSIS — M25.562 CHRONIC PAIN OF LEFT KNEE: ICD-10-CM

## 2024-12-30 DIAGNOSIS — G89.29 CHRONIC PAIN OF LEFT KNEE: ICD-10-CM

## 2024-12-30 DIAGNOSIS — M17.31 POST-TRAUMATIC OSTEOARTHRITIS OF RIGHT KNEE: ICD-10-CM

## 2024-12-30 DIAGNOSIS — M15.3 POST-TRAUMATIC OSTEOARTHRITIS OF MULTIPLE JOINTS: Primary | ICD-10-CM

## 2024-12-30 PROCEDURE — 97110 THERAPEUTIC EXERCISES: CPT | Performed by: PHYSICAL THERAPIST

## 2024-12-30 PROCEDURE — 97530 THERAPEUTIC ACTIVITIES: CPT | Performed by: PHYSICAL THERAPIST

## 2024-12-30 PROCEDURE — 97140 MANUAL THERAPY 1/> REGIONS: CPT | Performed by: PHYSICAL THERAPIST
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
## 2025-01-02 ENCOUNTER — OFFICE VISIT (OUTPATIENT)
Dept: PHYSICAL THERAPY | Facility: CLINIC | Age: 50
End: 2025-01-02
Payer: COMMERCIAL

## 2025-01-02 DIAGNOSIS — M25.562 CHRONIC PAIN OF LEFT KNEE: ICD-10-CM

## 2025-01-02 DIAGNOSIS — G89.29 CHRONIC PAIN OF LEFT KNEE: ICD-10-CM

## 2025-01-02 DIAGNOSIS — M17.31 POST-TRAUMATIC OSTEOARTHRITIS OF RIGHT KNEE: ICD-10-CM

## 2025-01-02 DIAGNOSIS — M15.3 POST-TRAUMATIC OSTEOARTHRITIS OF MULTIPLE JOINTS: Primary | ICD-10-CM

## 2025-01-02 PROCEDURE — 97112 NEUROMUSCULAR REEDUCATION: CPT | Performed by: PHYSICAL THERAPIST

## 2025-01-02 PROCEDURE — 97530 THERAPEUTIC ACTIVITIES: CPT | Performed by: PHYSICAL THERAPIST

## 2025-01-02 PROCEDURE — 97110 THERAPEUTIC EXERCISES: CPT | Performed by: PHYSICAL THERAPIST

## 2025-01-02 NOTE — PROGRESS NOTES
"Daily Note     Today's date: 2025  Patient name: Ronn Prather  : 1975  MRN: [id number]  Referring provider: Krysta Watson MD  Dx:   Encounter Diagnosis     ICD-10-CM    1. Post-traumatic osteoarthritis of multiple joints  M15.3       2. Chronic pain of left knee  M25.562     G89.29       3. Post-traumatic osteoarthritis of right knee  M17.31           Start Time: 705  Stop Time: 745  Total time in clinic (min): 40 minutes    Subjective: Patient reports she does not feel as much pain in the knee, just surrounding the knee.      Objective: See treatment diary below      Assessment: Tolerated treatment well. Progressed functional strengthening with leg press and TKEs with good tolerance and no increase in pain. Tightness reported in quad end of session, which improved with self-stretches. Held off on taping today to assess effects of not having tape over the weekend at next appointment. She had good tolerance to treatment without reports of pain end of session. Patient exhibited good technique with therapeutic exercises and would benefit from continued PT      Plan: Continue per plan of care.  Progress treatment as tolerated.          POC Expires Auth Status Start Date Expiration Date PT Visit Limit    2025 After 24th visit   BOMN   Date 2024   Used 6 2 3 4 5   Remaining           Diagnosis: chronic bilateral knee pain   Precautions: possible RA; hx of arthroscopic surgery bilat knees; R tib fx ORIF (2018); depression, OA   Next Physician's Appt:   [employer] HEP:   Manuals    STM/IASTM  DK, STM bilat knees      Laser   DK, R knee x4min, 4000J DK, R knee x4min, 4000J    Taping?  DK, bilat KT \"H\" around knee DK, bilat KT \"H\" around knee DK, bilat KT \"H\" around knee DK, bilat KT \"H\" around knee                   Neuro Re-Ed        Quad sets  2-3\" x15 ea 2-3\" x15 ea     Supine SLR    +QS x10 ea +QS x10 ea   Hip ADD ball " "squeeze        BKFO/clams  BKFO alt x15 ea Pink TB alt x15 ea Pink TB alt x15 ea    Bridges on SB    2\" x15 2\" x15   Wobble Board        SLS        TKEs Standing purple 2x10 ea               Ther Ex        Calf stretch Supine w/ strap 5-10\" x10 ea Supine w/ strap 5-10\" x10 ea Supine w/ strap 5-10\" x10 ea     HS stretch        DKTC w/ ball 2x10   2x10 2x10   LTRs        Heel Raises        Side-stepping        Standing hip ABD, Ext                         Ther Activity            Bike for LE mobility Nustep x5min Lv5  NuStep (recumb unavail) x5min  Nustep x5min Lv5  Nustep x5min Lv5  Nustep x5min Lv5   Mini Squats        STS        Step Ups        Leg Press DL 50# x10, 60# x10       [place]udson walkouts F/B 13# x5 laps               Pt Ed DOMS Tape, POC, HEP Tape, pain science, HEP Tape, orthotics POC   Re-Evaluation         DK   Modalities            Heat/ice (PRN)         TENS                                    "

## 2025-01-06 ENCOUNTER — OFFICE VISIT (OUTPATIENT)
Dept: PHYSICAL THERAPY | Facility: CLINIC | Age: 50
End: 2025-01-06
Payer: COMMERCIAL

## 2025-01-06 DIAGNOSIS — M17.31 POST-TRAUMATIC OSTEOARTHRITIS OF RIGHT KNEE: ICD-10-CM

## 2025-01-06 DIAGNOSIS — G89.29 CHRONIC PAIN OF LEFT KNEE: ICD-10-CM

## 2025-01-06 DIAGNOSIS — M25.562 CHRONIC PAIN OF LEFT KNEE: ICD-10-CM

## 2025-01-06 DIAGNOSIS — M15.3 POST-TRAUMATIC OSTEOARTHRITIS OF MULTIPLE JOINTS: Primary | ICD-10-CM

## 2025-01-06 PROCEDURE — 97530 THERAPEUTIC ACTIVITIES: CPT | Performed by: PHYSICAL THERAPIST

## 2025-01-06 PROCEDURE — 97110 THERAPEUTIC EXERCISES: CPT | Performed by: PHYSICAL THERAPIST

## 2025-01-06 PROCEDURE — 97112 NEUROMUSCULAR REEDUCATION: CPT | Performed by: PHYSICAL THERAPIST

## 2025-01-06 NOTE — PROGRESS NOTES
"Daily Note     Today's date: 2025  Patient name: Ronn Prather  : 1975  MRN: [id number]  Referring provider: Krysta Watson MD  Dx:   Encounter Diagnosis     ICD-10-CM    1. Post-traumatic osteoarthritis of multiple joints  M15.3       2. Chronic pain of left knee  M25.562     G89.29       3. Post-traumatic osteoarthritis of right knee  M17.31           Start Time: 0700  Stop Time: 0745  Total time in clinic (min): 45 minutes    Subjective: Patient reports she is feeling a lot better today.      Objective: See treatment diary below      Assessment: Tolerated treatment well. Patient had good tolerance to functional strengthening. Able to perform step ups with good quad control and no increase in pain with completion. Improved quad activation with exercises and awareness for contraction with functional Wbing tasks, walking, etc. Patient exhibited good technique with therapeutic exercises and would benefit from continued PT      Plan: Continue per plan of care.  Progress treatment as tolerated.          POC Expires Auth Status Start Date Expiration Date PT Visit Limit    2025 After 24th visit   BOMN   Date 2024   Used 6 7 3 4 5   Remaining           Diagnosis: chronic bilateral knee pain   Precautions: possible RA; hx of arthroscopic surgery bilat knees; R tib fx ORIF (2018); depression, OA   Next Physician's Appt:   [employer] HEP:   Manuals    STM/IASTM        Laser   DK, R knee x4min, 4000J DK, R knee x4min, 4000J    Taping?   DK, bilat KT \"H\" around knee DK, bilat KT \"H\" around knee DK, bilat KT \"H\" around knee                   Neuro Re-Ed        Quad sets   2-3\" x15 ea     Supine SLR  + QS x15 ea  +QS x10 ea +QS x10 ea   Hip ADD ball squeeze        BKFO/clams   Pink TB alt x15 ea Pink TB alt x15 ea    Bridges on SB    2\" x15 2\" x15   Wobble Board  2' ea      SLS        TKEs Standing purple 2x10 ea Standing purple 2x10 ea " "             Ther Ex        Calf stretch Supine w/ strap 5-10\" x10 ea  Supine w/ strap 5-10\" x10 ea     HS stretch  Supine w/ strap 5-10\" x10 ea      DKTC w/ ball 2x10   2x10 2x10   LTRs        Side-stepping        Standing hip ABD, Ext                         Ther Activity           Bike for LE mobility Nustep x5min Lv5 Nustep x5min Lv5  Nustep x5min Lv5  Nustep x5min Lv5  Nustep x5min Lv5   Mini Squats        STS        Step Ups  6\" x10 ea cues for quad control      Leg Press DL 50# x10, 60# x10 DL 60# 2x10      [place] walkouts F/B 13# x5 laps F/B 13# x6 laps              Pt Ed DOMS  Tape, pain science, HEP Tape, orthotics POC   Re-Evaluation        DK   Modalities            Heat/ice (PRN)         TENS                                      "

## 2025-01-07 ENCOUNTER — OFFICE VISIT (OUTPATIENT)
Dept: PHYSICAL THERAPY | Facility: CLINIC | Age: 50
End: 2025-01-07
Payer: COMMERCIAL

## 2025-01-07 DIAGNOSIS — M17.31 POST-TRAUMATIC OSTEOARTHRITIS OF RIGHT KNEE: ICD-10-CM

## 2025-01-07 DIAGNOSIS — M15.3 POST-TRAUMATIC OSTEOARTHRITIS OF MULTIPLE JOINTS: Primary | ICD-10-CM

## 2025-01-07 DIAGNOSIS — M25.562 CHRONIC PAIN OF LEFT KNEE: ICD-10-CM

## 2025-01-07 DIAGNOSIS — G89.29 CHRONIC PAIN OF LEFT KNEE: ICD-10-CM

## 2025-01-07 PROCEDURE — 97110 THERAPEUTIC EXERCISES: CPT | Performed by: PHYSICAL THERAPIST

## 2025-01-07 PROCEDURE — 97112 NEUROMUSCULAR REEDUCATION: CPT | Performed by: PHYSICAL THERAPIST

## 2025-01-07 NOTE — PROGRESS NOTES
"Daily Note     Today's date: 2025  Patient name: Ronn Prather  : 1975  MRN: [id number]  Referring provider: Krysta Watson MD  Dx:   Encounter Diagnosis     ICD-10-CM    1. Post-traumatic osteoarthritis of multiple joints  M15.3       2. Chronic pain of left knee  M25.562     G89.29       3. Post-traumatic osteoarthritis of right knee  M17.31                      Subjective: Patient notes that she has a lot of pain and tightness in the front of the knee/quad. She is most aggravated when she does too much. She relaxed after her last PT session for most of the weekend and so yesterday's session she was fine. Cooking and prolonged standing does aggravate the symptoms.       Objective: See treatment diary below      Assessment: Tolerated treatment well. Patient would benefit from continued PT. She was able to maintain balance well on rockerboard this date. She had greater tightness on L knee with initiation of prone quad stretch. She did require cues to avoid glute compensation during quad set for SLR flexion on L.       Plan: Continue per plan of care.         POC Expires Auth Status Start Date Expiration Date PT Visit Limit    2025 After 24th visit   BOMN   Date 2024   Used 6 7 8 4 5   Remaining           Diagnosis: chronic bilateral knee pain   Precautions: possible RA; hx of arthroscopic surgery bilat knees; R tib fx ORIF (2018); depression, OA   Next Physician's Appt:   [employer] HEP:   Manuals    STM/IASTM        Laser    DK, R knee x4min, 4000J    Taping?   RS, b/l patellar offload taping DK, bilat KT \"H\" around knee DK, bilat KT \"H\" around knee                   Neuro Re-Ed        Quad sets        Supine SLR  + QS x15 ea + QS x15ea +QS x10 ea +QS x10 ea   Hip ADD ball squeeze        BKFO/clams    Pink TB alt x15 ea    Bridges on SB    2\" x15 2\" x15   Wobble Board  2' ea 2' ea      SLS        TKEs Standing purple 2x10 ea " "Standing purple 2x10 ea              Ther Ex        Calf stretch Supine w/ strap 5-10\" x10 ea       Prone quad stretch   10x10\" ea      HS stretch  Supine w/ strap 5-10\" x10 ea      DKTC w/ ball 2x10   2x10 2x10   LTRs        Side-stepping        Standing hip ABD, Ext        Hip flexor stretch                 Ther Activity           Bike for LE mobility Nustep x5min Lv5 Nustep x5min Lv5  Nustep x5min Lv5  Nustep x5min Lv5  Nustep x5min Lv5   Mini Squats        STS        Step Ups  6\" x10 ea cues for quad control      Leg Press DL 50# x10, 60# x10 DL 60# 2x10 DL 60# 2x10     [place] walkouts F/B 13# x5 laps F/B 13# x6 laps              Pt Ed DOMS   Tape, orthotics POC   Re-Evaluation        DK   Modalities            Heat/ice (PRN)         TENS                                        "

## 2025-01-10 ENCOUNTER — TELEMEDICINE (OUTPATIENT)
Dept: BEHAVIORAL/MENTAL HEALTH CLINIC | Facility: CLINIC | Age: 50
End: 2025-01-10
Payer: COMMERCIAL

## 2025-01-10 DIAGNOSIS — F33.1 MODERATE EPISODE OF RECURRENT MAJOR DEPRESSIVE DISORDER (HCC): Primary | ICD-10-CM

## 2025-01-10 PROCEDURE — 90837 PSYTX W PT 60 MINUTES: CPT

## 2025-01-10 NOTE — PSYCH
Virtual Regular Visit    Verification of patient location:    Patient is located at Home in which this clinician practices in the state of [place]      Assessment/Plan:    Problem List Items Addressed This Visit       Moderate episode of recurrent major depressive disorder (HCC) - Primary       Goals addressed in session: Goal 1     Depression Follow-up Plan Completed: Yes    Reason for visit is No chief complaint on file.       Encounter provider Lexie Rees      Recent Visits  No visits were found meeting these conditions.  Showing recent visits within past 7 days and meeting all other requirements  Today's Visits  Date Type Provider Dept   01/10/25 Telemedicine Lexie Rees [facility] Therapist [place]   Showing today's visits and meeting all other requirements  Future Appointments  No visits were found meeting these conditions.  Showing future appointments within next 150 days and meeting all other requirements       The patient was identified by name and date of birth. Ronn Prather was informed that this is a telemedicine visit and that the visit is being conducted throughthe Epic Embedded platform. She agrees to proceed..  My office door was closed. No one else was in the room.  She acknowledged consent and understanding of privacy and security of the video platform. The patient has agreed to participate and understands they can discontinue the visit at any time.    Patient is aware this is a billable service.       HPI     Past Medical History:   Diagnosis Date    Abnormal Pap smear of cervix     Anemia     Arthritis     Depression     Edema     Fatigue     Kidney stone     Vitamin B12 deficiency     Vitamin D deficiency        Past Surgical History:   Procedure Laterality Date    BREAST BIOPSY  10/24/2019    CHOLECYSTECTOMY      ENDOMETRIAL ABLATION      KNEE ARTHROSCOPY Bilateral     KNEE SURGERY      ORIF TIBIA FRACTURE Right 01/18/2018    Procedure: OPEN REDUCTION W/ INTERNAL FIXATION (ORIF)  "TIBIA;  Surgeon: Konstantin Todd MD;  Location: [facility];  Service: Orthopedics    ORIF TIBIAL PLATEAU Right 01/18/2018    Procedure: OPEN REDUCTION W/ INTERNAL FIXATION (ORIF) TIBIAL PLATEAU;  Surgeon: Konstantin Todd MD;  Location: [facility];  Service: Orthopedics    IA LAPAROSCOPY SURG CHOLECYSTECTOMY N/A 08/12/2021    Procedure: LAPAROSCOPIC CHOLECYSTECTOMY, UMBILICAL HERNIA REPAIR;  Surgeon: Mariusz Soto MD;  Location: [facility];  Service: General    IA REMOVAL IMPLANT DEEP Right 08/02/2018    Procedure: REMOVAL HARDWARE TIBIA;  Surgeon: Konstantin Todd MD;  Location: [facility];  Service: Orthopedics    TUBAL LIGATION      US GUIDED BREAST BIOPSY LEFT COMPLETE Left 10/24/2019       Current Outpatient Medications   Medication Sig Dispense Refill    acetaminophen (TYLENOL) 325 mg tablet 650 mg every 6 hours for mild pain 30 tablet 0    amitriptyline (ELAVIL) 150 MG tablet Take 1 tablet (150 mg total) by mouth daily at bedtime 90 tablet 1    busPIRone (BUSPAR) 5 mg tablet Take 1 tablet (5 mg total) by mouth 2 (two) times a day 60 tablet 1    Cholecalciferol (Vitamin D3) 50 MCG (2000 UT) capsule Take 1 capsule (2,000 Units total) by mouth daily 90 capsule 1    cyanocobalamin 1,000 mcg/mL Inject 1 mL (1,000 mcg total) into a muscle every 30 (thirty) days 10 mL 0    ergocalciferol (VITAMIN D2) 50,000 units Take 1 capsule (50,000 Units total) by mouth once a week 12 capsule 0    SYRINGE-NEEDLE, DISP, 3 ML 25G X 1\" 3 ML MISC Use every 30 (thirty) days 50 each 0     No current facility-administered medications for this visit.        No Known Allergies    Review of Systems    Video Exam    There were no vitals filed for this visit.    Physical Exam     Behavioral Health Psychotherapy Progress Note    Psychotherapy Provided: Individual Psychotherapy     1. Moderate episode of recurrent major depressive disorder (HCC)            Goals addressed in session: Goal 1     DATA: Ronn joined session virtually. She " "utilized time in session to discuss familial and partner relationship. She reflected on being open and vulnerable with her children regarding the ongoing conflict surrounding her . She does note difficulties within her marriage progress due to recently starting couples counseling. Clinician reinforced not taking conflict from sessions home and continuing them on their own.    During this session, this clinician used the following therapeutic modalities: Cognitive Behavioral Therapy and Supportive Psychotherapy    Substance Abuse was not addressed during this session. If the client is diagnosed with a co-occurring substance use disorder, please indicate any changes in the frequency or amount of use: n/a. Stage of change for addressing substance use diagnoses: No substance use/Not applicable    ASSESSMENT:  Ronn Prather presents with a Euthymic/ normal mood.     her affect is Normal range and intensity, which is congruent, with her mood and the content of the session. The client has made progress on their goals. Ronn Prather presents with a minimal risk of suicide, minimal risk of self-harm, and minimal risk of harm to others.    For any risk assessment that surpasses a \"low\" rating, a safety plan must be developed.    A safety plan was indicated: no  If yes, describe in detail n/a    PLAN: Between sessions, Ronn Prather will continue working on her goals and work with therapist to address concerns for relationship. At the next session, the therapist will use Cognitive Behavioral Therapy and Supportive Psychotherapy to address mood and relationship.    Behavioral Health Treatment Plan and Discharge Planning: Ronn Prather is aware of and agrees to continue to work on their treatment plan. They have identified and are working toward their discharge goals. yes    Depression Follow-up Plan Completed: Yes    Visit start and stop times:    01/10/25  Start Time: 0810  Stop Time: 0914  Total Visit Time: 64 " minutes

## 2025-01-15 ENCOUNTER — OFFICE VISIT (OUTPATIENT)
Dept: PHYSICAL THERAPY | Facility: CLINIC | Age: 50
End: 2025-01-15
Payer: COMMERCIAL

## 2025-01-15 ENCOUNTER — TELEPHONE (OUTPATIENT)
Dept: PSYCHIATRY | Facility: CLINIC | Age: 50
End: 2025-01-15

## 2025-01-15 DIAGNOSIS — M25.562 CHRONIC PAIN OF LEFT KNEE: ICD-10-CM

## 2025-01-15 DIAGNOSIS — G89.29 CHRONIC PAIN OF LEFT KNEE: ICD-10-CM

## 2025-01-15 DIAGNOSIS — M17.31 POST-TRAUMATIC OSTEOARTHRITIS OF RIGHT KNEE: ICD-10-CM

## 2025-01-15 DIAGNOSIS — M15.3 POST-TRAUMATIC OSTEOARTHRITIS OF MULTIPLE JOINTS: Primary | ICD-10-CM

## 2025-01-15 PROCEDURE — 97110 THERAPEUTIC EXERCISES: CPT | Performed by: PHYSICAL THERAPIST

## 2025-01-15 PROCEDURE — 97530 THERAPEUTIC ACTIVITIES: CPT | Performed by: PHYSICAL THERAPIST

## 2025-01-15 PROCEDURE — 97140 MANUAL THERAPY 1/> REGIONS: CPT | Performed by: PHYSICAL THERAPIST

## 2025-01-15 NOTE — PROGRESS NOTES
"Daily Note     Today's date: 1/15/2025  Patient name: Ronn Prather  : 1975  MRN: [id number]  Referring provider: Krysta Watson MD  Dx:   Encounter Diagnosis     ICD-10-CM    1. Post-traumatic osteoarthritis of multiple joints  M15.3       2. Chronic pain of left knee  M25.562     G89.29       3. Post-traumatic osteoarthritis of right knee  M17.31           Start Time: 0700  Stop Time: 0745  Total time in clinic (min): 45 minutes    Subjective: Patient reports tightness in Right knee and \"pins and needles\" in Left knee.      Objective: See treatment diary below      Assessment: Tolerated treatment well. Worked on functional quad strengthening with good tolerance. Performed leann walkouts with good tolerance and motor control without increase in pain reported. IASTM to bilateral quads to address tightness with good response. Patient exhibited good technique with therapeutic exercises and would benefit from continued PT      Plan: Continue per plan of care.  Progress treatment as tolerated.          POC Expires Auth Status Start Date Expiration Date PT Visit Limit    2025 After 24th visit   BOMN   Date 2025 2025 2025 1/15/2025    Used 1 2 3 4    Remaining           Diagnosis: chronic bilateral knee pain   Precautions: possible RA; hx of arthroscopic surgery bilat knees; R tib fx ORIF (2018); depression, OA   Next Physician's Appt:   [facility] HEP:   Manuals 1/2 1/6 1/7 1/15    STM/IASTM    DK: IASTM quad bilaterally    Laser        Taping?   RS, b/l patellar offload taping                     Neuro Re-Ed        Quad sets        Supine SLR  + QS x15 ea + QS x15ea     Hip ADD ball squeeze        BKFO/clams        Bridges on SB        Wobble Board  2' ea 2' ea      SLS        TKEs Standing purple 2x10 ea Standing purple 2x10 ea  Standing purple 2x10 ea            Ther Ex        Calf stretch Supine w/ strap 5-10\" x10 ea       Prone quad stretch   10x10\" ea  10x10\" ea     HS stretch  " "Supine w/ strap 5-10\" x10 ea      DKTC w/ ball 2x10   + HS set 2x10    LTRs        Side-stepping        Standing hip ABD, Ext        Hip flexor stretch                 Ther Activity         Bike for LE mobility Nustep x5min Lv5 Nustep x5min Lv5  Nustep x5min Lv5 Nustep x5min Lv5    Mini Squats        STS        Step Ups  6\" x10 ea cues for quad control      Leg Press DL 50# x10, 60# x10 DL 60# 2x10 DL 60# 2x10 DL 60# 2x10    [place] walkouts F/B 13# x5 laps F/B 13# x6 laps  F/B 13# x6 laps            Pt Ed DOMS   POC    Re-Evaluation         Modalities          Heat/ice (PRN)         TENS                                      "

## 2025-01-17 ENCOUNTER — OFFICE VISIT (OUTPATIENT)
Dept: PHYSICAL THERAPY | Facility: CLINIC | Age: 50
End: 2025-01-17
Payer: COMMERCIAL

## 2025-01-17 DIAGNOSIS — G89.29 CHRONIC PAIN OF LEFT KNEE: ICD-10-CM

## 2025-01-17 DIAGNOSIS — M17.31 POST-TRAUMATIC OSTEOARTHRITIS OF RIGHT KNEE: ICD-10-CM

## 2025-01-17 DIAGNOSIS — M25.562 CHRONIC PAIN OF LEFT KNEE: ICD-10-CM

## 2025-01-17 DIAGNOSIS — M15.3 POST-TRAUMATIC OSTEOARTHRITIS OF MULTIPLE JOINTS: Primary | ICD-10-CM

## 2025-01-17 PROCEDURE — 97530 THERAPEUTIC ACTIVITIES: CPT | Performed by: PHYSICAL THERAPIST

## 2025-01-17 PROCEDURE — 97110 THERAPEUTIC EXERCISES: CPT | Performed by: PHYSICAL THERAPIST

## 2025-01-17 PROCEDURE — 97112 NEUROMUSCULAR REEDUCATION: CPT | Performed by: PHYSICAL THERAPIST

## 2025-01-17 NOTE — PROGRESS NOTES
Daily Note     Today's date: 2025  Patient name: Ronn Prather  : 1975  MRN: [id number]  Referring provider: Krysta Watson MD  Dx:   Encounter Diagnosis     ICD-10-CM    1. Post-traumatic osteoarthritis of multiple joints  M15.3       2. Chronic pain of left knee  M25.562     G89.29       3. Post-traumatic osteoarthritis of right knee  M17.31           Start Time: 0705  Stop Time: 0750  Total time in clinic (min): 45 minutes    Subjective: Patient reports feeling a little better today.       Objective: See treatment diary below      Assessment: Tolerated treatment well. Improved motor control with functional Wbing exercises such as leg press and step ups. Cuing on good quad activation and control. Improved tolerance with leann walkouts, but requires cuing on posture. Reduced stiffness and pain noted after DKTC and hip flexor stretches. She was challenged with bridges on SB due to glute weakness. Patient exhibited good technique with therapeutic exercises and would benefit from continued PT      Plan: Continue per plan of care.  Progress treatment as tolerated.          POC Expires Auth Status Start Date Expiration Date PT Visit Limit    2025 After 24th visit   BOMN   Date 2025 2025 2025 1/15/2025 2025   Used 1 2 3 4 5   Remaining           Diagnosis: chronic bilateral knee pain   Precautions: possible RA; hx of arthroscopic surgery bilat knees; R tib fx ORIF (2018); depression, OA   Next Physician's Appt:   [employer] HEP:   Manuals 1/2 1/6 1/7 1/15 1/17   STM/IASTM    DK: IASTM quad bilaterally    Laser        Taping?   RS, b/l patellar offload taping                     Neuro Re-Ed        Quad sets        Supine SLR  + QS x15 ea + QS x15ea  + QS x20 ea   BKFO/clams        Bridges on SB     10x   Wobble Board  2' ea 2' ea      SLS        TKEs Standing purple 2x10 ea Standing purple 2x10 ea  Standing purple 2x10 ea Standing purple 2x10 ea                   Ther Ex       "  Calf stretch Supine w/ strap 5-10\" x10 ea       Prone quad stretch   10x10\" ea  10x10\" ea     HS stretch  Supine w/ strap 5-10\" x10 ea      DKTC w/ ball 2x10   + HS set 2x10 + HS set 2x10   Hip flexor stretch     Supine w/ strap 10x10\" ea                     Ther Activity         Bike for LE mobility Nustep x5min Lv5 Nustep x5min Lv5  Nustep x5min Lv5 Nustep x5min Lv5 Nustep x5min Lv5   Mini Squats        Step Ups  6\" x10 ea cues for quad control   6\" x15 ea cues for quad control   Leg Press DL 50# x10, 60# x10 DL 60# 2x10 DL 60# 2x10 DL 60# 2x10 DL 60# 2x10   [place] walkouts F/B 13# x5 laps F/B 13# x6 laps  F/B 13# x6 laps F/B 15# x10 laps           Pt Ed DOMS   POC    Re-Evaluation         Modalities          Heat/ice (PRN)         TENS                                        "

## 2025-01-20 ENCOUNTER — APPOINTMENT (OUTPATIENT)
Dept: PHYSICAL THERAPY | Facility: CLINIC | Age: 50
End: 2025-01-20
Payer: COMMERCIAL

## 2025-01-21 ENCOUNTER — OFFICE VISIT (OUTPATIENT)
Dept: PHYSICAL THERAPY | Facility: CLINIC | Age: 50
End: 2025-01-21
Payer: COMMERCIAL

## 2025-01-21 DIAGNOSIS — M25.562 CHRONIC PAIN OF LEFT KNEE: ICD-10-CM

## 2025-01-21 DIAGNOSIS — G89.29 CHRONIC PAIN OF LEFT KNEE: ICD-10-CM

## 2025-01-21 DIAGNOSIS — M15.3 POST-TRAUMATIC OSTEOARTHRITIS OF MULTIPLE JOINTS: Primary | ICD-10-CM

## 2025-01-21 DIAGNOSIS — M17.31 POST-TRAUMATIC OSTEOARTHRITIS OF RIGHT KNEE: ICD-10-CM

## 2025-01-21 PROCEDURE — 97140 MANUAL THERAPY 1/> REGIONS: CPT

## 2025-01-21 PROCEDURE — 97112 NEUROMUSCULAR REEDUCATION: CPT

## 2025-01-21 PROCEDURE — 97530 THERAPEUTIC ACTIVITIES: CPT

## 2025-01-21 NOTE — PROGRESS NOTES
"Daily Note     Today's date: 2025  Patient name: Ronn Prather  : 1975  MRN: [id number]  Referring provider: Krysta Watson MD  Dx:   Encounter Diagnosis     ICD-10-CM    1. Post-traumatic osteoarthritis of multiple joints  M15.3       2. Chronic pain of left knee  M25.562     G89.29       3. Post-traumatic osteoarthritis of right knee  M17.31                      Subjective: Pt states that she has been busy over the weekend cleaning out her house so she has been up more standing and moving.  This has caused more knee pain, not as bad as when she started but more than what is has been.        Objective: See treatment diary below      Assessment: Tolerated treatment well.  Continued with outlined program to improve overall LE strength.  Light and occasional UE support on 1 side during SLS on foam to maintain balance.  Increased weight on leg press and TKE with good tolerance, no increased knee pain.  Pt progressing slowly towards her goals and would benefit from continued therapy.      Plan: Continue per plan of care.         POC Expires Auth Status Start Date Expiration Date PT Visit Limit    2025 After 24th visit   BOMN   Date 2025 2025 2025 1/15/2025 2025   Used 6 2 3 4 5   Remaining           Diagnosis: chronic bilateral knee pain   Precautions: possible RA; hx of arthroscopic surgery bilat knees; R tib fx ORIF (2018); depression, OA   Next Physician's Appt:   [employer] HEP:   Manuals 1/21 1/6 1/7 1/15 1/17   STM/IASTM TH: IASTM B/L quad   DK: IASTM quad bilaterally    Laser        Taping?   RS, b/l patellar offload taping                     Neuro Re-Ed        Quad sets        Supine SLR  + QS x15 ea + QS x15ea  + QS x20 ea   BKFO/clams        Bridges on SB     10x   Wobble Board 2' ea 2' ea 2' ea      SLS on foam 3x30\" ea       TKEs 8# 20x ea Standing purple 2x10 ea  Standing purple 2x10 ea Standing purple 2x10 ea                   Ther Ex        Calf stretch      " "  Prone quad stretch   10x10\" ea  10x10\" ea     HS stretch  Supine w/ strap 5-10\" x10 ea      DKTC w/ ball    + HS set 2x10 + HS set 2x10   Hip flexor stretch     Supine w/ strap 10x10\" ea                     Ther Activity         Bike for LE mobility Nustep x5min Lv6 Nustep x5min Lv5  Nustep x5min Lv5 Nustep x5min Lv5 Nustep x5min Lv5   Mini Squats        Step Ups  6\" x10 ea cues for quad control   6\" x15 ea cues for quad control   Leg Press DL 80# 2x10 DL 60# 2x10 DL 60# 2x10 DL 60# 2x10 DL 60# 2x10   Ja walkouts  F/B 13# x6 laps  F/B 13# x6 laps F/B 15# x10 laps           Pt Ed    POC    Re-Evaluation         Modalities          Heat/ice (PRN)         TENS                                          "

## 2025-01-22 ENCOUNTER — OFFICE VISIT (OUTPATIENT)
Dept: PHYSICAL THERAPY | Facility: CLINIC | Age: 50
End: 2025-01-22
Payer: COMMERCIAL

## 2025-01-22 DIAGNOSIS — M17.31 POST-TRAUMATIC OSTEOARTHRITIS OF RIGHT KNEE: ICD-10-CM

## 2025-01-22 DIAGNOSIS — M25.562 CHRONIC PAIN OF LEFT KNEE: ICD-10-CM

## 2025-01-22 DIAGNOSIS — M15.3 POST-TRAUMATIC OSTEOARTHRITIS OF MULTIPLE JOINTS: Primary | ICD-10-CM

## 2025-01-22 DIAGNOSIS — G89.29 CHRONIC PAIN OF LEFT KNEE: ICD-10-CM

## 2025-01-22 PROCEDURE — 97530 THERAPEUTIC ACTIVITIES: CPT | Performed by: PHYSICAL THERAPIST

## 2025-01-22 PROCEDURE — 97140 MANUAL THERAPY 1/> REGIONS: CPT | Performed by: PHYSICAL THERAPIST

## 2025-01-22 PROCEDURE — 97112 NEUROMUSCULAR REEDUCATION: CPT | Performed by: PHYSICAL THERAPIST

## 2025-01-22 NOTE — PROGRESS NOTES
"Daily Note     Today's date: 2025  Patient name: Ronn Prather  : 1975  MRN: [id number]  Referring provider: Krysta Watson MD  Dx:   Encounter Diagnosis     ICD-10-CM    1. Post-traumatic osteoarthritis of multiple joints  M15.3       2. Chronic pain of left knee  M25.562     G89.29       3. Post-traumatic osteoarthritis of right knee  M17.31           Start Time: 0700  Stop Time: 0745  Total time in clinic (min): 45 minutes    Subjective: Patient reports she did not do too much strenuous work yesterday, thus feels a little better today.       Objective: See treatment diary below      Assessment: Tolerated treatment well. Focused on motor control and stability exercises. Added SL RDLs to improve quad and glute control in SLS. Patient completed all other exercises with good form and without increase in pain. Patient exhibited good technique with therapeutic exercises and would benefit from continued PT      Plan: Continue per plan of care.  Progress treatment as tolerated.          POC Expires Auth Status Start Date Expiration Date PT Visit Limit    2025 After 24th visit   BOMN   Date 2025 2025 2025 1/15/2025 2025   Used 6 7 3 4 5   Remaining           Diagnosis: chronic bilateral knee pain   Precautions: possible RA; hx of arthroscopic surgery bilat knees; R tib fx ORIF (2018); depression, OA   Next Physician's Appt:   [email or web address] HEP:   Manuals 1/21 1/22 1/7 1/15 1/17   STM/IASTM TH: IASTM B/L quad DK: IASTM quad bilaterally  DK: IASTM quad bilaterally    Laser        Taping?   RS, b/l patellar offload taping                     Neuro Re-Ed        Quad sets        Supine SLR   + QS x15ea  + QS x20 ea   BKFO/clams        Bridges on SB     10x   Prone knee bends  YTB x10 ea      Wobble Board 2' ea  2' ea      SLS on foam 3x30\" ea       TKEs 8# 20x ea 10# 20x ea  Standing purple 2x10 ea Standing purple 2x10 ea   SL RDL  4 cone taps on chair x2 ea              Ther Ex      " "  Calf stretch        Prone quad stretch   10x10\" ea  10x10\" ea     HS stretch        DKTC w/ ball    + HS set 2x10 + HS set 2x10   Hip flexor stretch  Supine w/ strap 10x10\" ea    Supine w/ strap 10x10\" ea                     Ther Activity         Bike for LE mobility Nustep x5min Lv6 Nustep x5min Lv6  Nustep x5min Lv5 Nustep x5min Lv5 Nustep x5min Lv5   Mini Squats        Step Ups     6\" x15 ea cues for quad control   Leg Press DL 80# 2x10 DL 80# 2x10 DL 60# 2x10 DL 60# 2x10 DL 60# 2x10   [place] walkouts  F/B 15# x10 laps  F/B 13# x6 laps F/B 15# x10 laps           Pt Ed    POC    Re-Evaluation         Modalities          Heat/ice (PRN)         TENS                                            "

## 2025-01-24 ENCOUNTER — TELEMEDICINE (OUTPATIENT)
Dept: BEHAVIORAL/MENTAL HEALTH CLINIC | Facility: CLINIC | Age: 50
End: 2025-01-24
Payer: COMMERCIAL

## 2025-01-24 ENCOUNTER — TELEPHONE (OUTPATIENT)
Dept: GASTROENTEROLOGY | Facility: CLINIC | Age: 50
End: 2025-01-24

## 2025-01-24 ENCOUNTER — OFFICE VISIT (OUTPATIENT)
Dept: GASTROENTEROLOGY | Facility: CLINIC | Age: 50
End: 2025-01-24
Payer: COMMERCIAL

## 2025-01-24 VITALS
SYSTOLIC BLOOD PRESSURE: 131 MMHG | HEIGHT: 67 IN | HEART RATE: 76 BPM | WEIGHT: 168 LBS | BODY MASS INDEX: 26.37 KG/M2 | DIASTOLIC BLOOD PRESSURE: 89 MMHG

## 2025-01-24 DIAGNOSIS — Z86.0100 HX OF COLONIC POLYP: ICD-10-CM

## 2025-01-24 DIAGNOSIS — K31.7 MULTIPLE GASTRIC POLYPS: Primary | ICD-10-CM

## 2025-01-24 DIAGNOSIS — K21.9 GASTROESOPHAGEAL REFLUX DISEASE, UNSPECIFIED WHETHER ESOPHAGITIS PRESENT: ICD-10-CM

## 2025-01-24 DIAGNOSIS — F33.1 MODERATE EPISODE OF RECURRENT MAJOR DEPRESSIVE DISORDER (HCC): Primary | ICD-10-CM

## 2025-01-24 PROCEDURE — 90837 PSYTX W PT 60 MINUTES: CPT

## 2025-01-24 PROCEDURE — 99213 OFFICE O/P EST LOW 20 MIN: CPT | Performed by: NURSE PRACTITIONER

## 2025-01-24 RX ORDER — SODIUM CHLORIDE, SODIUM LACTATE, POTASSIUM CHLORIDE, CALCIUM CHLORIDE 600; 310; 30; 20 MG/100ML; MG/100ML; MG/100ML; MG/100ML
125 INJECTION, SOLUTION INTRAVENOUS CONTINUOUS
OUTPATIENT
Start: 2025-01-24

## 2025-01-24 RX ORDER — FAMOTIDINE 20 MG/1
20 TABLET, FILM COATED ORAL 2 TIMES DAILY
Qty: 60 TABLET | Refills: 5 | Status: SHIPPED | OUTPATIENT
Start: 2025-01-24 | End: 2025-02-23

## 2025-01-24 NOTE — PROGRESS NOTES
Name: Ronn Prather      : 1975      MRN: [id number]  Encounter Provider: STEPHANIE Treadwell  Encounter Date: 2025   Encounter department: [facility] GASTROENTEROLOGY [place] [address]  :  Assessment & Plan  Multiple gastric polyps  Patient has history of multiple gastric polyps several removed during EGD/20 3/2021.  Biopsy showed fundic gland polyps.  Recommendations were for repeat EGD in 3 years.  Orders:    EGD; Future    Gastroesophageal reflux disease, unspecified whether esophagitis present  Patient reports intermittent episodes of acid reflux review of biopsies from EGD done 2021 EG biopsy did showed inflammation.Patient reports certain foods will trigger symptoms and cause epigastric discomfort. Patient denies nausea, vomiting, or lower abdominal pain.  She does have tenderness in epigastric area with palpation.  Orders:    famotidine (PEPCID) 20 mg tablet; Take 1 tablet (20 mg total) by mouth 2 (two) times a day    Hx of colonic polyp  Patient has history of colon polyps. Colonoscopy done 8/3/2021 showed 1 small polyp removed.  Left-sided diverticulosis.  Polyp biopsy hyperplastic polyp    -Surveillance colonoscopy due 2031       Follow up after procedure    History of Present Illness   HPI  Ronn Prather is a 49 y.o. female who presents to office for follow up. Patient reports intermittent episodes of acid reflux review of biopsies from EGD done 2021 EG biopsy did showed inflammation.Patient reports certain foods will trigger symptoms and cause epigastric discomfort. Patient denies nausea, vomiting, or lower abdominal pain. Patient denies blood in stool, blood from rectal area, or black tarry stool.  Bowel patterns are regular. She does have tenderness in epigastric area with palpation.      EGD done 2021 showed erythematous mucosa in antrum. 10 or more polyps measuring 4 mm to 7 AM removed.  Irregular Z-line.  Biopsy of duodenum.Biopsy duodenum no  diagnostic abnormality.  Stomach polyps fragments of fundic gland polyps negative for dysplasia.  Antrum biopsy mild chronic inactive gastritis.  GE junction mild acute inflammation.  No intestinal metaplasia. Colonoscopy done 8/30/2021 showed 1 small polyp.  Left-sided diverticulosis. Biopsy showed hyperplastic polyp.        Review of Systems   Constitutional:  Negative for chills and fever.   HENT:  Negative for ear pain and sore throat.    Eyes:  Negative for pain and visual disturbance.   Respiratory:  Negative for cough and shortness of breath.    Cardiovascular:  Negative for chest pain and palpitations.   Gastrointestinal:  Positive for abdominal pain. Negative for vomiting.        Positive acid reflux   Genitourinary:  Negative for dysuria and hematuria.   Musculoskeletal:  Negative for arthralgias and back pain.   Skin:  Negative for color change and rash.   Neurological:  Negative for seizures and syncope.   All other systems reviewed and are negative.    Medical History Reviewed by provider this encounter:  Tobacco  Allergies  Meds  Problems  Med Hx  Surg Hx  Fam Hx     .  Past Medical History   Past Medical History:   Diagnosis Date    Abnormal Pap smear of cervix     Anemia     Arthritis     Depression     Edema     Fatigue     Kidney stone     Vitamin B12 deficiency     Vitamin D deficiency      Past Surgical History:   Procedure Laterality Date    BREAST BIOPSY  10/24/2019    CHOLECYSTECTOMY      ENDOMETRIAL ABLATION      KNEE ARTHROSCOPY Bilateral     KNEE SURGERY      ORIF TIBIA FRACTURE Right 01/18/2018    Procedure: OPEN REDUCTION W/ INTERNAL FIXATION (ORIF) TIBIA;  Surgeon: Konstantin Todd MD;  [facility];  Service: Orthopedics    ORIF TIBIAL PLATEAU Right 01/18/2018    Procedure: OPEN REDUCTION W/ INTERNAL FIXATION (ORIF) TIBIAL PLATEAU;  Surgeon: Konstantin Todd MD;  [facility];  Service: Orthopedics    CA LAPAROSCOPY SURG CHOLECYSTECTOMY N/A 08/12/2021    Procedure:  LAPAROSCOPIC CHOLECYSTECTOMY, UMBILICAL HERNIA REPAIR;  Surgeon: Mariusz Soto MD;  [facility];  Service: General    IL REMOVAL IMPLANT DEEP Right 08/02/2018    Procedure: REMOVAL HARDWARE TIBIA;  Surgeon: Konstantin Todd MD;  [facility];  Service: Orthopedics    TUBAL LIGATION      US GUIDED BREAST BIOPSY LEFT COMPLETE Left 10/24/2019     Family History   Problem Relation Age of Onset    Cancer Mother         either cervical or ovarian    Heart disease Mother         heart surgery    Heart attack Father         stent    Diabetes Father     Hypertension Father     Cancer Father 58        Stomach and Lung Cancer    Rheum arthritis Daughter     No Known Problems Daughter     Lupus Maternal Grandmother     No Known Problems Maternal Grandfather     No Known Problems Paternal Grandmother     No Known Problems Paternal Grandfather     No Known Problems Son     No Known Problems Maternal Aunt     No Known Problems Maternal Aunt     No Known Problems Maternal Aunt     No Known Problems Paternal Aunt     Lupus Cousin     Arthritis Family     Stomach cancer Family     Ovarian cancer Family     Alcohol abuse Neg Hx     Depression Neg Hx     Drug abuse Neg Hx     Substance Abuse Neg Hx     Mental illness Neg Hx     Breast cancer Neg Hx     Colon cancer Neg Hx       reports that she has never smoked. She has been exposed to tobacco smoke. She has never used smokeless tobacco. She reports current alcohol use. She reports that she does not use drugs.  Current Outpatient Medications on File Prior to Visit   Medication Sig Dispense Refill    amitriptyline (ELAVIL) 150 MG tablet Take 1 tablet (150 mg total) by mouth daily at bedtime 90 tablet 1    busPIRone (BUSPAR) 5 mg tablet Take 1 tablet (5 mg total) by mouth 2 (two) times a day 60 tablet 1    Cholecalciferol (Vitamin D3) 50 MCG (2000 UT) capsule Take 1 capsule (2,000 Units total) by mouth daily 90 capsule 1    cyanocobalamin 1,000 mcg/mL Inject 1 mL (1,000  "mcg total) into a muscle every 30 (thirty) days 10 mL 0    ergocalciferol (VITAMIN D2) 50,000 units Take 1 capsule (50,000 Units total) by mouth once a week 12 capsule 0    SYRINGE-NEEDLE, DISP, 3 ML 25G X 1\" 3 ML MISC Use every 30 (thirty) days 50 each 0    acetaminophen (TYLENOL) 325 mg tablet 650 mg every 6 hours for mild pain (Patient not taking: Reported on 1/24/2025) 30 tablet 0     No current facility-administered medications on file prior to visit.   No Known Allergies   Current Outpatient Medications on File Prior to Visit   Medication Sig Dispense Refill    amitriptyline (ELAVIL) 150 MG tablet Take 1 tablet (150 mg total) by mouth daily at bedtime 90 tablet 1    busPIRone (BUSPAR) 5 mg tablet Take 1 tablet (5 mg total) by mouth 2 (two) times a day 60 tablet 1    Cholecalciferol (Vitamin D3) 50 MCG (2000 UT) capsule Take 1 capsule (2,000 Units total) by mouth daily 90 capsule 1    cyanocobalamin 1,000 mcg/mL Inject 1 mL (1,000 mcg total) into a muscle every 30 (thirty) days 10 mL 0    ergocalciferol (VITAMIN D2) 50,000 units Take 1 capsule (50,000 Units total) by mouth once a week 12 capsule 0    SYRINGE-NEEDLE, DISP, 3 ML 25G X 1\" 3 ML MISC Use every 30 (thirty) days 50 each 0    acetaminophen (TYLENOL) 325 mg tablet 650 mg every 6 hours for mild pain (Patient not taking: Reported on 1/24/2025) 30 tablet 0     No current facility-administered medications on file prior to visit.      Social History     Tobacco Use    Smoking status: Never     Passive exposure: Past    Smokeless tobacco: Never   Vaping Use    Vaping status: Never Used   Substance and Sexual Activity    Alcohol use: Yes     Comment: Occassional    Drug use: No    Sexual activity: Yes     Partners: Male     Birth control/protection: Condom Male, Female Sterilization        Objective   /89 (BP Location: Left arm, Patient Position: Sitting, Cuff Size: Standard)   Pulse 76   Ht 5' 7\" (1.702 m)   Wt 76.2 kg (168 lb)   LMP 12/04/2022 " (Exact Date)   BMI 26.31 kg/m²      Physical Exam  Vitals and nursing note reviewed.   Constitutional:       General: She is not in acute distress.     Appearance: She is well-developed.   HENT:      Head: Normocephalic and atraumatic.   Eyes:      Conjunctiva/sclera: Conjunctivae normal.   Cardiovascular:      Rate and Rhythm: Normal rate and regular rhythm.      Pulses: Normal pulses.      Heart sounds: Normal heart sounds. No murmur heard.  Pulmonary:      Effort: Pulmonary effort is normal. No respiratory distress.      Breath sounds: Normal breath sounds. No stridor. No wheezing, rhonchi or rales.   Abdominal:      General: Bowel sounds are normal. There is no distension.      Palpations: Abdomen is soft. There is no mass.      Tenderness: There is abdominal tenderness in the epigastric area. There is no guarding or rebound.   Musculoskeletal:         General: No swelling.      Cervical back: Neck supple.      Right lower leg: No edema.      Left lower leg: No edema.   Skin:     General: Skin is warm and dry.      Capillary Refill: Capillary refill takes less than 2 seconds.      Coloration: Skin is not jaundiced or pale.   Neurological:      Mental Status: She is alert and oriented to person, place, and time.   Psychiatric:         Mood and Affect: Mood normal.

## 2025-01-24 NOTE — PATIENT INSTRUCTIONS
Follow antireflux diet  May have 8 ounce cups of caffeine daily avoid any additional caffeine, carbonated beverages, spicy fatty foods, and acidic foods.  Do not eat 2 hours before you go to bed and sleep with your head of bed elevated on multiple pillows.

## 2025-01-24 NOTE — TELEPHONE ENCOUNTER
Scheduled date of EGD(as of today): 3/14/25  Physician performing EGD: Dr Kim  Location of EGD:   [facility] with patient [facility]   Clearances: n/a

## 2025-01-24 NOTE — PSYCH
Virtual Regular Visit    Verification of patient location:    Patient is located at Home in which this clinician practices in the state of [place]      Assessment/Plan:    Problem List Items Addressed This Visit       Moderate episode of recurrent major depressive disorder (HCC) - Primary       Goals addressed in session: Goal 1     Depression Follow-up Plan Completed: Yes    Reason for visit is No chief complaint on file.       Encounter provider Lexie Rees      Recent Visits  No visits were found meeting these conditions.  Showing recent visits within past 7 days and meeting all other requirements  Today's Visits  Date Type Provider Dept   01/24/25 Telemedicine Lexie Rees [facility] Therapist [place]   Showing today's visits and meeting all other requirements  Future Appointments  No visits were found meeting these conditions.  Showing future appointments within next 150 days and meeting all other requirements       The patient was identified by name and date of birth. Ronn Prather was informed that this is a telemedicine visit and that the visit is being conducted throughthe Epic Embedded platform. She agrees to proceed..  My office door was closed. No one else was in the room.  She acknowledged consent and understanding of privacy and security of the video platform. The patient has agreed to participate and understands they can discontinue the visit at any time.    Patient is aware this is a billable service.     HPI     Past Medical History:   Diagnosis Date    Abnormal Pap smear of cervix     Anemia     Arthritis     Depression     Edema     Fatigue     Kidney stone     Vitamin B12 deficiency     Vitamin D deficiency        Past Surgical History:   Procedure Laterality Date    BREAST BIOPSY  10/24/2019    CHOLECYSTECTOMY      ENDOMETRIAL ABLATION      KNEE ARTHROSCOPY Bilateral     KNEE SURGERY      ORIF TIBIA FRACTURE Right 01/18/2018    Procedure: OPEN REDUCTION W/ INTERNAL FIXATION (ORIF)  "TIBIA;  Surgeon: Konstantin Todd MD;  [facility];  Service: Orthopedics    ORIF TIBIAL PLATEAU Right 01/18/2018    Procedure: OPEN REDUCTION W/ INTERNAL FIXATION (ORIF) TIBIAL PLATEAU;  Surgeon: Konstantin Todd MD;  [facility];  Service: Orthopedics    IA LAPAROSCOPY SURG CHOLECYSTECTOMY N/A 08/12/2021    Procedure: LAPAROSCOPIC CHOLECYSTECTOMY, UMBILICAL HERNIA REPAIR;  Surgeon: Mariusz Soto MD;  [facility];  Service: General    IA REMOVAL IMPLANT DEEP Right 08/02/2018    Procedure: REMOVAL HARDWARE TIBIA;  Surgeon: Konstantin Todd MD;  [facility];  Service: Orthopedics    TUBAL LIGATION      US GUIDED BREAST BIOPSY LEFT COMPLETE Left 10/24/2019       Current Outpatient Medications   Medication Sig Dispense Refill    acetaminophen (TYLENOL) 325 mg tablet 650 mg every 6 hours for mild pain 30 tablet 0    amitriptyline (ELAVIL) 150 MG tablet Take 1 tablet (150 mg total) by mouth daily at bedtime 90 tablet 1    busPIRone (BUSPAR) 5 mg tablet Take 1 tablet (5 mg total) by mouth 2 (two) times a day 60 tablet 1    Cholecalciferol (Vitamin D3) 50 MCG (2000 UT) capsule Take 1 capsule (2,000 Units total) by mouth daily 90 capsule 1    cyanocobalamin 1,000 mcg/mL Inject 1 mL (1,000 mcg total) into a muscle every 30 (thirty) days 10 mL 0    ergocalciferol (VITAMIN D2) 50,000 units Take 1 capsule (50,000 Units total) by mouth once a week 12 capsule 0    SYRINGE-NEEDLE, DISP, 3 ML 25G X 1\" 3 ML MISC Use every 30 (thirty) days 50 each 0     No current facility-administered medications for this visit.        No Known Allergies    Review of Systems    Video Exam    There were no vitals filed for this visit.    Physical Exam     Behavioral Health Psychotherapy Progress Note    Psychotherapy Provided: Individual Psychotherapy     1. Moderate episode of recurrent major depressive disorder (HCC)            Goals addressed in session: Goal 1     DATA: Ronn joined session virtually. She " "discussed events from couples therapy and feeling as if her  is utilizing what was learned in therapy or talked about in therapy as \"ammunition against her\". Ronn reflected on their relationship and reported a period of time where she \" lost herself loving him\". Clinician worked with Ronn to process negative emotions and process for couple therapy. During this session, this clinician used the following therapeutic modalities: Supportive Psychotherapy    Substance Abuse was not addressed during this session. If the client is diagnosed with a co-occurring substance use disorder, please indicate any changes in the frequency or amount of use: n/a. Stage of change for addressing substance use diagnoses: No substance use/Not applicable    ASSESSMENT:  Ronn Prather presents with a Euthymic/ normal mood her affect is Normal range and intensity, which is congruent, with her mood and the content of the session. The client has made progress on their goals. Ronn Prather presents with a minimal risk of suicide, minimal risk of self-harm, and minimal risk of harm to others.    For any risk assessment that surpasses a \"low\" rating, a safety plan must be developed.    A safety plan was indicated: no  If yes, describe in detail n/a    PLAN: Between sessions, Ronn Prather will continue working on her communication and assertiveness techniques. At the next session, the therapist will use Client-centered Therapy and Cognitive Behavioral Therapy to address moods and relationship issues.    Behavioral Health Treatment Plan and Discharge Planning: Ronn Prather is aware of and agrees to continue to work on their treatment plan. They have identified and are working toward their discharge goals. yes    Depression Follow-up Plan Completed: Yes    Visit start and stop times:    01/24/25  Start Time: 0808  Stop Time: 0902  Total Visit Time: 54 minutes        "

## 2025-01-28 ENCOUNTER — EVALUATION (OUTPATIENT)
Dept: PHYSICAL THERAPY | Facility: CLINIC | Age: 50
End: 2025-01-28
Payer: COMMERCIAL

## 2025-01-28 DIAGNOSIS — M17.31 POST-TRAUMATIC OSTEOARTHRITIS OF RIGHT KNEE: ICD-10-CM

## 2025-01-28 DIAGNOSIS — G89.29 CHRONIC PAIN OF LEFT KNEE: ICD-10-CM

## 2025-01-28 DIAGNOSIS — M25.562 CHRONIC PAIN OF LEFT KNEE: ICD-10-CM

## 2025-01-28 DIAGNOSIS — M15.3 POST-TRAUMATIC OSTEOARTHRITIS OF MULTIPLE JOINTS: Primary | ICD-10-CM

## 2025-01-28 PROCEDURE — 97140 MANUAL THERAPY 1/> REGIONS: CPT | Performed by: PHYSICAL THERAPIST

## 2025-01-28 PROCEDURE — 97110 THERAPEUTIC EXERCISES: CPT | Performed by: PHYSICAL THERAPIST

## 2025-01-28 PROCEDURE — 97530 THERAPEUTIC ACTIVITIES: CPT | Performed by: PHYSICAL THERAPIST
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
## 2025-01-30 ENCOUNTER — OFFICE VISIT (OUTPATIENT)
Dept: PHYSICAL THERAPY | Facility: CLINIC | Age: 50
End: 2025-01-30
Payer: COMMERCIAL

## 2025-01-30 DIAGNOSIS — M15.3 POST-TRAUMATIC OSTEOARTHRITIS OF MULTIPLE JOINTS: Primary | ICD-10-CM

## 2025-01-30 DIAGNOSIS — M25.562 CHRONIC PAIN OF LEFT KNEE: ICD-10-CM

## 2025-01-30 DIAGNOSIS — G89.29 CHRONIC PAIN OF LEFT KNEE: ICD-10-CM

## 2025-01-30 PROCEDURE — 97530 THERAPEUTIC ACTIVITIES: CPT

## 2025-01-30 PROCEDURE — 97112 NEUROMUSCULAR REEDUCATION: CPT

## 2025-01-30 PROCEDURE — 97140 MANUAL THERAPY 1/> REGIONS: CPT
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
## 2025-02-05 ENCOUNTER — OFFICE VISIT (OUTPATIENT)
Dept: PHYSICAL THERAPY | Facility: CLINIC | Age: 50
End: 2025-02-05
Payer: COMMERCIAL

## 2025-02-05 ENCOUNTER — TELEPHONE (OUTPATIENT)
Age: 50
End: 2025-02-05

## 2025-02-05 DIAGNOSIS — M17.31 POST-TRAUMATIC OSTEOARTHRITIS OF RIGHT KNEE: ICD-10-CM

## 2025-02-05 DIAGNOSIS — Z98.890 S/P ORIF (OPEN REDUCTION INTERNAL FIXATION) FRACTURE: ICD-10-CM

## 2025-02-05 DIAGNOSIS — Z87.81 S/P ORIF (OPEN REDUCTION INTERNAL FIXATION) FRACTURE: ICD-10-CM

## 2025-02-05 DIAGNOSIS — M54.16 LUMBAR RADICULOPATHY: ICD-10-CM

## 2025-02-05 DIAGNOSIS — M25.562 CHRONIC PAIN OF LEFT KNEE: ICD-10-CM

## 2025-02-05 DIAGNOSIS — M15.3 POST-TRAUMATIC OSTEOARTHRITIS OF MULTIPLE JOINTS: Primary | ICD-10-CM

## 2025-02-05 DIAGNOSIS — Z46.89 ENCOUNTER FOR EVALUATION OF ORTHOTIC DEVICE: Primary | ICD-10-CM

## 2025-02-05 DIAGNOSIS — G89.29 CHRONIC PAIN OF LEFT KNEE: ICD-10-CM

## 2025-02-05 PROCEDURE — 97110 THERAPEUTIC EXERCISES: CPT | Performed by: PHYSICAL THERAPIST

## 2025-02-05 PROCEDURE — 97530 THERAPEUTIC ACTIVITIES: CPT | Performed by: PHYSICAL THERAPIST

## 2025-02-05 PROCEDURE — 97140 MANUAL THERAPY 1/> REGIONS: CPT | Performed by: PHYSICAL THERAPIST
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
## 2025-02-05 NOTE — TELEPHONE ENCOUNTER
Patient called in post PT visit and therapist is advising a referral for PT for evaluation for custom orthotics inserts for pain for insurance reimbursement.  Patient reports she requires therapy for right foot; she has been seen by podiatry. Please follow up with patient for provider response. Permission to leave details on voicemail if not available.

## 2025-02-17 ENCOUNTER — OFFICE VISIT (OUTPATIENT)
Dept: PHYSICAL THERAPY | Facility: CLINIC | Age: 50
End: 2025-02-17
Payer: COMMERCIAL

## 2025-02-17 DIAGNOSIS — G89.29 CHRONIC PAIN OF LEFT KNEE: ICD-10-CM

## 2025-02-17 DIAGNOSIS — M79.672 PAIN IN BOTH FEET: ICD-10-CM

## 2025-02-17 DIAGNOSIS — M25.562 CHRONIC PAIN OF LEFT KNEE: ICD-10-CM

## 2025-02-17 DIAGNOSIS — Z46.89 ENCOUNTER FOR EVALUATION OF ORTHOTIC DEVICE: Primary | ICD-10-CM

## 2025-02-17 DIAGNOSIS — M79.671 PAIN IN BOTH FEET: ICD-10-CM

## 2025-02-17 PROCEDURE — 97760 ORTHOTIC MGMT&TRAING 1ST ENC: CPT | Performed by: PHYSICAL THERAPIST
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
## 2025-02-19 ENCOUNTER — OFFICE VISIT (OUTPATIENT)
Dept: PHYSICAL THERAPY | Facility: CLINIC | Age: 50
End: 2025-02-19
Payer: COMMERCIAL

## 2025-02-19 ENCOUNTER — TELEPHONE (OUTPATIENT)
Dept: PSYCHIATRY | Facility: CLINIC | Age: 50
End: 2025-02-19

## 2025-02-19 DIAGNOSIS — M79.671 PAIN IN BOTH FEET: Primary | ICD-10-CM

## 2025-02-19 DIAGNOSIS — G89.29 CHRONIC PAIN OF LEFT KNEE: ICD-10-CM

## 2025-02-19 DIAGNOSIS — M17.31 POST-TRAUMATIC OSTEOARTHRITIS OF RIGHT KNEE: ICD-10-CM

## 2025-02-19 DIAGNOSIS — M79.672 PAIN IN BOTH FEET: Primary | ICD-10-CM

## 2025-02-19 DIAGNOSIS — M25.562 CHRONIC PAIN OF LEFT KNEE: ICD-10-CM

## 2025-02-19 DIAGNOSIS — M15.3 POST-TRAUMATIC OSTEOARTHRITIS OF MULTIPLE JOINTS: ICD-10-CM

## 2025-02-19 PROCEDURE — 97112 NEUROMUSCULAR REEDUCATION: CPT | Performed by: PHYSICAL THERAPIST

## 2025-02-19 PROCEDURE — 97140 MANUAL THERAPY 1/> REGIONS: CPT | Performed by: PHYSICAL THERAPIST

## 2025-02-19 PROCEDURE — 97530 THERAPEUTIC ACTIVITIES: CPT | Performed by: PHYSICAL THERAPIST
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
## 2025-02-19 NOTE — TELEPHONE ENCOUNTER
Spoke to PT to inform ins is inactive. PT was aware and said she sent the correct documentation in to the state and is just waiting for it to reactivate. Cxl 2/21/25 appt and is hoping by next f/u 3/7 it should be taken care of

## 2025-02-21 ENCOUNTER — TELEPHONE (OUTPATIENT)
Age: 50
End: 2025-02-21

## 2025-02-21 ENCOUNTER — APPOINTMENT (OUTPATIENT)
Dept: PHYSICAL THERAPY | Facility: CLINIC | Age: 50
End: 2025-02-21
Payer: COMMERCIAL

## 2025-02-26 ENCOUNTER — APPOINTMENT (OUTPATIENT)
Dept: PHYSICAL THERAPY | Facility: CLINIC | Age: 50
End: 2025-02-26
Payer: COMMERCIAL

## 2025-02-28 ENCOUNTER — APPOINTMENT (OUTPATIENT)
Dept: PHYSICAL THERAPY | Facility: CLINIC | Age: 50
End: 2025-02-28
Payer: COMMERCIAL

## 2025-03-20 ENCOUNTER — TELEPHONE (OUTPATIENT)
Dept: PSYCHIATRY | Facility: CLINIC | Age: 50
End: 2025-03-20

## 2025-03-20 NOTE — TELEPHONE ENCOUNTER
LVM for pt to confirm apt for tomorrow 3/21 at 8am. Please assist with checking if insurance is active upon call back.

## 2025-03-21 ENCOUNTER — APPOINTMENT (OUTPATIENT)
Dept: LAB | Facility: CLINIC | Age: 50
End: 2025-03-21
Payer: COMMERCIAL

## 2025-03-21 ENCOUNTER — TELEPHONE (OUTPATIENT)
Age: 50
End: 2025-03-21

## 2025-03-21 DIAGNOSIS — R39.9 URINARY SYMPTOM OR SIGN: ICD-10-CM

## 2025-03-21 DIAGNOSIS — R39.9 URINARY SYMPTOM OR SIGN: Primary | ICD-10-CM

## 2025-03-21 LAB
BACTERIA UR QL AUTO: ABNORMAL /HPF
BILIRUB UR QL STRIP: NEGATIVE
CLARITY UR: ABNORMAL
COLOR UR: ABNORMAL
GLUCOSE UR STRIP-MCNC: NEGATIVE MG/DL
HGB UR QL STRIP.AUTO: ABNORMAL
KETONES UR STRIP-MCNC: NEGATIVE MG/DL
LEUKOCYTE ESTERASE UR QL STRIP: ABNORMAL
NITRITE UR QL STRIP: NEGATIVE
NON-SQ EPI CELLS URNS QL MICRO: ABNORMAL /HPF
PH UR STRIP.AUTO: 6 [PH]
PROT UR STRIP-MCNC: NEGATIVE MG/DL
RBC #/AREA URNS AUTO: ABNORMAL /HPF
SP GR UR STRIP.AUTO: 1.02 (ref 1–1.03)
UROBILINOGEN UR STRIP-ACNC: <2 MG/DL
WBC #/AREA URNS AUTO: ABNORMAL /HPF

## 2025-03-21 PROCEDURE — 87077 CULTURE AEROBIC IDENTIFY: CPT

## 2025-03-21 PROCEDURE — 81001 URINALYSIS AUTO W/SCOPE: CPT

## 2025-03-21 PROCEDURE — 87086 URINE CULTURE/COLONY COUNT: CPT

## 2025-03-21 PROCEDURE — 87186 SC STD MICRODIL/AGAR DIL: CPT

## 2025-03-21 RX ORDER — CEPHALEXIN 500 MG/1
500 CAPSULE ORAL EVERY 12 HOURS SCHEDULED
Qty: 10 CAPSULE | Refills: 0 | Status: SHIPPED | OUTPATIENT
Start: 2025-03-21 | End: 2025-03-26

## 2025-03-21 NOTE — TELEPHONE ENCOUNTER
Patient calling to get a meds for a UTI . Patient was told that she needs an appt and she asked if she could get a lab order to get the test done instead.. please advise and call patient back.  Reta Wen

## 2025-03-23 LAB — BACTERIA UR CULT: ABNORMAL

## 2025-03-24 ENCOUNTER — TELEPHONE (OUTPATIENT)
Dept: PSYCHIATRY | Facility: CLINIC | Age: 50
End: 2025-03-24

## 2025-03-24 ENCOUNTER — RESULTS FOLLOW-UP (OUTPATIENT)
Dept: INTERNAL MEDICINE CLINIC | Facility: CLINIC | Age: 50
End: 2025-03-24

## 2025-03-24 NOTE — LETTER
25     Ronn Prather   : 1975   [address] [facility] [id number]       It is the policy of [facility] to monitor and manage appointments that have been no-showed or cancelled with less than 48-hour notice. This is necessary to ensure that we are able to provide timely access for all patients to our providers. Undue numbers of unutilized appointments delays necessary medical care for all patients.      Dear Ronn Prather      We are sorry that you missed your appointment with Lexie Rees MA on 3/21/2025. It has been 2 months or more since your last appointment. Your health and follow-up care are important to us. We want to make you aware of your next appointment with Lexie Rees MA that is scheduled for Friday, 2025 at 8:00 AM am.    Please be aware that our office policy states that if you 'no show' two or more Therapy appointments without prior notice of cancellation within in a calendar year, you may be discharged from Therapy treatment.  We want to bring this to your attention now to prevent an interruption of your care.  If you have any questions about this policy, please call us at the number above.         Thank you in advance for your cooperation and assistance.       Sincerely,      [facility] Support Staff

## 2025-03-24 NOTE — TELEPHONE ENCOUNTER
NO-SHOW LETTER MAILED TO Ronn Prather.  ADDRESS: [address] [place] [id number]  SENT VIA [employer]

## 2025-03-31 ENCOUNTER — EVALUATION (OUTPATIENT)
Dept: PHYSICAL THERAPY | Facility: CLINIC | Age: 50
End: 2025-03-31
Payer: COMMERCIAL

## 2025-03-31 DIAGNOSIS — M25.561 RIGHT KNEE PAIN, UNSPECIFIED CHRONICITY: Primary | ICD-10-CM

## 2025-03-31 DIAGNOSIS — M25.562 LEFT KNEE PAIN, UNSPECIFIED CHRONICITY: ICD-10-CM

## 2025-03-31 PROCEDURE — 97530 THERAPEUTIC ACTIVITIES: CPT | Performed by: PHYSICAL THERAPIST

## 2025-03-31 PROCEDURE — 97162 PT EVAL MOD COMPLEX 30 MIN: CPT | Performed by: PHYSICAL THERAPIST
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
## 2025-04-04 ENCOUNTER — OFFICE VISIT (OUTPATIENT)
Dept: PHYSICAL THERAPY | Facility: CLINIC | Age: 50
End: 2025-04-04
Payer: COMMERCIAL

## 2025-04-04 ENCOUNTER — TELEMEDICINE (OUTPATIENT)
Dept: BEHAVIORAL/MENTAL HEALTH CLINIC | Facility: CLINIC | Age: 50
End: 2025-04-04
Payer: COMMERCIAL

## 2025-04-04 DIAGNOSIS — M25.561 RIGHT KNEE PAIN, UNSPECIFIED CHRONICITY: Primary | ICD-10-CM

## 2025-04-04 DIAGNOSIS — F33.1 MODERATE EPISODE OF RECURRENT MAJOR DEPRESSIVE DISORDER (HCC): Primary | ICD-10-CM

## 2025-04-04 DIAGNOSIS — M25.562 LEFT KNEE PAIN, UNSPECIFIED CHRONICITY: ICD-10-CM

## 2025-04-04 PROCEDURE — 97140 MANUAL THERAPY 1/> REGIONS: CPT | Performed by: PHYSICAL THERAPIST

## 2025-04-04 PROCEDURE — 97110 THERAPEUTIC EXERCISES: CPT | Performed by: PHYSICAL THERAPIST

## 2025-04-04 PROCEDURE — 97112 NEUROMUSCULAR REEDUCATION: CPT | Performed by: PHYSICAL THERAPIST

## 2025-04-04 PROCEDURE — 90834 PSYTX W PT 45 MINUTES: CPT

## 2025-04-04 NOTE — PSYCH
"Virtual Regular VisitName: Ronn Prather      : 1975      MRN: [id number]  Encounter Provider: Lexie Rees  Encounter Date: 2025   Encounter department: [facility] THERAPIST [place]  :  Assessment & Plan  Moderate episode of recurrent major depressive disorder (HCC)             Goals addressed in session: Goal 1     DATA: Ronn joined session virtually after a break in services due insurance issues. She provided an update on her relationship and home life. She reports ongoing issues with her  having stopped marriage counseling after feeling like it was \" causing more problems than solutions\". She utilized the rest of session to discuss home life and life changes. During this session, this clinician used the following therapeutic modalities: Client-centered Therapy    Substance Abuse was not addressed during this session. If the client is diagnosed with a co-occurring substance use disorder, please indicate any changes in the frequency or amount of use: n/a. Stage of change for addressing substance use diagnoses: No substance use/Not applicable    ASSESSMENT:  Ronn presents with a Euthymic/ normal mood. Angelicas affect is Normal range and intensity, which is congruent, with their mood and the content of the session. The client has made progress on their goals as evidenced by improved communication, mood stability and anger management.    Ronn presents with a minimal risk of suicide, minimal risk of self-harm, and minimal risk of harm to others.    For any risk assessment that surpasses a \"low\" rating, a safety plan must be developed.    A safety plan was indicated: no  If yes, describe in detail n/a    PLAN: Between sessions, Ronn will continue working on treatment plan goals. At the next session, the therapist will use Client-centered Therapy and Cognitive Behavioral Therapy to address depressed mood and relationship issues. Clinician and Ronn will " work to update treatment plan goals next session.    Behavioral Health Treatment Plan [facility]: Diagnosis and Treatment Plan explained to Ronn, Ronn relates understanding diagnosis and is agreeable to Treatment Plan. Yes     Depression Follow-up Plan Completed: Yes     Reason for visit is   Chief Complaint   Patient presents with    Depression      Recent Visits  No visits were found meeting these conditions.  Showing recent visits within past 7 days and meeting all other requirements  Today's Visits  Date Type Provider Dept   04/04/25 Telemedicine Lexie Rees [facility] Therapist [place]   Showing today's visits and meeting all other requirements  Future Appointments  No visits were found meeting these conditions.  Showing future appointments within next 150 days and meeting all other requirements     History of Present Illness     HPI    Past Medical History   Past Medical History:   Diagnosis Date    Abnormal Pap smear of cervix     Anemia     Arthritis     Depression     Edema     Fatigue     Kidney stone     Vitamin B12 deficiency     Vitamin D deficiency      Past Surgical History:   Procedure Laterality Date    BREAST BIOPSY  10/24/2019    CHOLECYSTECTOMY      ENDOMETRIAL ABLATION      KNEE ARTHROSCOPY Bilateral     KNEE SURGERY      ORIF TIBIA FRACTURE Right 01/18/2018    Procedure: OPEN REDUCTION W/ INTERNAL FIXATION (ORIF) TIBIA;  Surgeon: Konstantin Todd MD;  Location: [facility];  Service: Orthopedics    ORIF TIBIAL PLATEAU Right 01/18/2018    Procedure: OPEN REDUCTION W/ INTERNAL FIXATION (ORIF) TIBIAL PLATEAU;  Surgeon: Konstantin Todd MD;  Location: [facility];  Service: Orthopedics    TN LAPAROSCOPY SURG CHOLECYSTECTOMY N/A 08/12/2021    Procedure: LAPAROSCOPIC CHOLECYSTECTOMY, UMBILICAL HERNIA REPAIR;  Surgeon: Mariusz Soto MD;  Location: [facility];  Service: General    TN REMOVAL IMPLANT DEEP Right 08/02/2018    Procedure: REMOVAL HARDWARE TIBIA;  Surgeon: Konstantin Todd MD;   "Location: [facility];  Service: Orthopedics    TUBAL LIGATION      US GUIDED BREAST BIOPSY LEFT COMPLETE Left 10/24/2019     Current Outpatient Medications   Medication Instructions    acetaminophen (TYLENOL) 325 mg tablet 650 mg every 6 hours for mild pain    amitriptyline (ELAVIL) 150 mg, Oral, Daily at bedtime    busPIRone (BUSPAR) 5 mg, Oral, 2 times daily    cyanocobalamin 1,000 mcg, Intramuscular, Every 30 days    ergocalciferol (VITAMIN D2) 50,000 Units, Oral, Weekly    famotidine (PEPCID) 20 mg, Oral, 2 times daily    SYRINGE-NEEDLE, DISP, 3 ML 25G X 1\" 3 ML MISC Does not apply, Every 30 days    Vitamin D3 2,000 Units, Oral, Daily     No Known Allergies    Objective   LMP 12/04/2022 (Exact Date)     Video Exam  Physical Exam     Administrative Statements   Encounter provider Lexie Rees    The Patient is located at Other and in the following state in which clinician practices in the state of [place].    The patient was identified by name and date of birth. Ronn Prather was informed that this is a telemedicine visit and that the visit is being conducted through the Epic Embedded platform. She agrees to proceed..  My office door was closed. No one else was in the room.  She acknowledged consent and understanding of privacy and security of the video platform. The patient has agreed to participate and understands they can discontinue the visit at any time.    I have spent a total time of 44 minutes in caring for this patient on the day of the visit/encounter including Counseling / Coordination of care, not including the time spent for establishing the audio/video connection.    Visit Time  Start Time: 0800  Stop Time: 0844  Total Visit Time: 44 minutes  "

## 2025-04-04 NOTE — PROGRESS NOTES
"Daily Note     Today's date: 2025  Patient name: Ronn Prather  : 1975  MRN: [id number]  Referring provider: Krysta Watson MD  Dx:   Encounter Diagnosis     ICD-10-CM    1. Right knee pain, unspecified chronicity  M25.561       2. Left knee pain, unspecified chronicity  M25.562           Start Time: 0700  Stop Time: 0745  Total time in clinic (min): 45 minutes    Subjective: Patient reports no new changes since IE.      Objective: See treatment diary below      Assessment: Tolerated treatment well. Initiated session with EPAT bilateral knees, distal quad and medial knee. More soreness noted on Right knee more than Left. Ankle TC and subtalar mobs performed with good tolerance and reports of improved mobility after. Added HS stretches edge of table with good tolerance. Patient exhibited good technique with therapeutic exercises and would benefit from continued PT      Plan: Continue per plan of care.  Progress treatment as tolerated.          POC Expires Auth Status Start Date Expiration Date PT Visit Limit    2025 After 24 visit   BOMN   Date 3/31/2025 2025      Used 13 14      Remaining           Diagnosis: chronic bilateral knee pain   Precautions: possible RA; hx of arthroscopic surgery bilat knees; R tib fx ORIF (2018); depression, OA   Next Physician's Appt:   [employer] HEP:   Manuals 3/31 4/4      STM/IASTM        Ankle JMs  DK, bilat TC DF, subtalar inv Gr4      Laser        EPAT  DK, D20-T  L: 1.9, R: 1.5      Taping?                        Neuro Re-Ed        Quad sets        Supine SLR  + QS x15 ea      BKFO/clams        Bridges on SB        Prone knee bends        Wobble Board        SLS on foam        TKEs        SL RDL                Ther Ex        Calf stretch  Standing 10\"x10 ea      Prone quad stretch        HS stretch  EOT 5-10\"x10 ea      DKTC w/ ball        Hip flexor stretch                         Ther Activity        Bike for LE mobility  Nustep Lv5 x5min      Mini " Squats        Step Ups        Leg Press        Ja walkouts                Pt Ed HEP, POC       Re-Evaluation        Modalities        Heat/ice (PRN)         TENS

## 2025-04-07 ENCOUNTER — OFFICE VISIT (OUTPATIENT)
Dept: PHYSICAL THERAPY | Facility: CLINIC | Age: 50
End: 2025-04-07
Payer: COMMERCIAL

## 2025-04-07 DIAGNOSIS — M25.562 LEFT KNEE PAIN, UNSPECIFIED CHRONICITY: ICD-10-CM

## 2025-04-07 DIAGNOSIS — M25.561 RIGHT KNEE PAIN, UNSPECIFIED CHRONICITY: Primary | ICD-10-CM

## 2025-04-07 PROCEDURE — 97530 THERAPEUTIC ACTIVITIES: CPT | Performed by: PHYSICAL THERAPIST

## 2025-04-07 PROCEDURE — 97140 MANUAL THERAPY 1/> REGIONS: CPT | Performed by: PHYSICAL THERAPIST

## 2025-04-07 PROCEDURE — 97110 THERAPEUTIC EXERCISES: CPT | Performed by: PHYSICAL THERAPIST
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
## 2025-04-11 ENCOUNTER — OFFICE VISIT (OUTPATIENT)
Dept: PHYSICAL THERAPY | Facility: CLINIC | Age: 50
End: 2025-04-11
Payer: COMMERCIAL

## 2025-04-11 DIAGNOSIS — M25.562 LEFT KNEE PAIN, UNSPECIFIED CHRONICITY: ICD-10-CM

## 2025-04-11 DIAGNOSIS — M25.561 RIGHT KNEE PAIN, UNSPECIFIED CHRONICITY: Primary | ICD-10-CM

## 2025-04-11 PROCEDURE — 97112 NEUROMUSCULAR REEDUCATION: CPT | Performed by: PHYSICAL THERAPIST

## 2025-04-11 PROCEDURE — 97530 THERAPEUTIC ACTIVITIES: CPT | Performed by: PHYSICAL THERAPIST

## 2025-04-11 PROCEDURE — 97140 MANUAL THERAPY 1/> REGIONS: CPT | Performed by: PHYSICAL THERAPIST

## 2025-04-11 NOTE — PROGRESS NOTES
"Daily Note     Today's date: 2025  Patient name: Ronn Prather  : 1975  MRN: [id number]  Referring provider: Krysta Watson MD  Dx:   Encounter Diagnosis     ICD-10-CM    1. Right knee pain, unspecified chronicity  M25.561       2. Left knee pain, unspecified chronicity  M25.562           Start Time: 0700  Stop Time: 0745  Total time in clinic (min): 45 minutes    Subjective: Patient reports feeling good today. She notices stiffness in her ankles more.       Objective: See treatment diary below      Assessment: Tolerated treatment well. Introduced leann walkouts to work on motor control and proper technique with heel-toe gait mechanics. Educated patient on importance of ankle mobility and overall gait mechanics to avoid strain on knees. Patient exhibited good technique with therapeutic exercises and would benefit from continued PT      Plan: Continue per plan of care.  Progress treatment as tolerated.          POC Expires Auth Status Start Date Expiration Date PT Visit Limit    2025 After 24th visit   BOMN   Date 3/31/2025 2025 2025 2025    Used 10 11 12 13    Remaining           Diagnosis: chronic bilateral knee pain   Precautions: possible RA; hx of arthroscopic surgery bilat knees; R tib fx ORIF (2018); depression, OA   Next Physician's Appt:   [email or web address] HEP:   Manuals 3/31 4/4 4/7 4/11    STM/IASTM        Ankle JMs  DK, bilat TC DF, subtalar inv Gr4 DK, bilat TC DF, subtalar inv Gr4     Laser        EPAT  DK, D20-T  L: 1.9, R: 1.5 DK, D20-T  L: 1.5, R: 1.2 DK, D20-T  L: 1.5, R: 1.4    Taping?                        Neuro Re-Ed        Supine SLR  + QS x15 ea      BKFO/clams        Bridges on SB        Prone knee bends        Wobble Board    2 min ea    SLS on foam        TKEs        SL RDL                Ther Ex        Calf stretch  Standing 10\"x10 ea Standing 10\"x10 ea     Prone quad stretch        HS stretch  EOT 5-10\"x10 ea EOT 5-10\"x10 ea     DKTC w/ ball        Hip " flexor stretch        HR/TR   Standing x20 ea                      Ther Activity        Bike for LE mobility  Nustep Lv5 x5min Nustep Lv5 x5min Nustep Lv5 x5min    Mini Squats        Step Ups        Leg Press   DL 95# 2x10 DL 95# 2x10    SL Leg Press   55# 2x10 ea 55# 2x10 ea    Ja walkouts    F/B 12# x6 laps            Pt Ed HEP, POC       Re-Evaluation        Modalities        Heat/ice (PRN)         TENS

## 2025-04-14 ENCOUNTER — OFFICE VISIT (OUTPATIENT)
Dept: PHYSICAL THERAPY | Facility: CLINIC | Age: 50
End: 2025-04-14
Payer: COMMERCIAL

## 2025-04-14 DIAGNOSIS — M25.562 LEFT KNEE PAIN, UNSPECIFIED CHRONICITY: ICD-10-CM

## 2025-04-14 DIAGNOSIS — M25.561 RIGHT KNEE PAIN, UNSPECIFIED CHRONICITY: Primary | ICD-10-CM

## 2025-04-14 PROCEDURE — 97112 NEUROMUSCULAR REEDUCATION: CPT

## 2025-04-14 PROCEDURE — 97530 THERAPEUTIC ACTIVITIES: CPT

## 2025-04-14 PROCEDURE — 97140 MANUAL THERAPY 1/> REGIONS: CPT

## 2025-04-14 NOTE — PROGRESS NOTES
"Daily Note     Today's date: 2025  Patient name: Ronn Prather  : 1975  MRN: [id number]  Referring provider: Krysta Watson MD  Dx:   Encounter Diagnosis     ICD-10-CM    1. Right knee pain, unspecified chronicity  M25.561       2. Left knee pain, unspecified chronicity  M25.562                      Subjective: Pt states that she is doing well, knees are about the same.        Objective: See treatment diary below      Assessment: Tolerated treatment well.  Continued with outlined program and progressed as able.  Pt challenged with SL RDL and requires cues to maintain a flat back and overall form in general.  No c/o increased pain sx's throughout.  EPAT to B/L knees, attempted in supine with less tolerance due to increased sensation.  Switched to seated for R knee with improved tolerance.  Pt will benefit from continued therapy.  Will progress as able.      Plan: Continue per plan of care.         POC Expires Auth Status Start Date Expiration Date PT Visit Limit    2025 After 24th visit   BOMN   Date 3/31/2025 2025 2025 2025 2025   Used 10 11 12 13 14   Remaining           Diagnosis: chronic bilateral knee pain   Precautions: possible RA; hx of arthroscopic surgery bilat knees; R tib fx ORIF (2018); depression, OA   Next Physician's Appt:   [facility] [employer] HEP:   Manuals 3/31 4/4 4/7 4/11 4/14   STM/IASTM        Ankle JMs  DK, bilat TC DF, subtalar inv Gr4 DK, bilat TC DF, subtalar inv Gr4     Laser        EPAT  DK, D20-T  L: 1.9, R: 1.5 DK, D20-T  L: 1.5, R: 1.2 DK, D20-T  L: 1.5, R: 1.4 TH, D20-T   L 1.2, R: 1.2   Taping?                        Neuro Re-Ed        Supine SLR  + QS x15 ea      BKFO/clams        Bridges on SB        Prone knee bends        Wobble Board    2 min ea 2 mins ea   SLS on foam     30\"x3 ea   TKEs        SL RDL     10x ea CUES!           Ther Ex        Calf stretch  Standing 10\"x10 ea Standing 10\"x10 ea     Prone quad stretch        HS stretch  EOT " "5-10\"x10 ea EOT 5-10\"x10 ea     DKTC w/ ball        Hip flexor stretch        HR/TR   Standing x20 ea                      Ther Activity        Bike for LE mobility  Nustep Lv5 x5min Nustep Lv5 x5min Nustep Lv5 x5min NuStep Lv 5x 5 mins   Mini Squats        Step Ups        Leg Press   DL 95# 2x10 DL 95# 2x10 DL 95# 2x10   SL Leg Press   55# 2x10 ea 55# 2x10 ea 55# 2x10 ea   [place] walkouts    F/B 12# x6 laps F/B 12# x8 laps   X-walks        Pt Ed HEP, POC       Re-Evaluation        Modalities        Heat/ice (PRN)         TENS                                 "

## 2025-04-17 ENCOUNTER — OFFICE VISIT (OUTPATIENT)
Dept: PHYSICAL THERAPY | Facility: CLINIC | Age: 50
End: 2025-04-17
Payer: COMMERCIAL

## 2025-04-17 DIAGNOSIS — M25.561 RIGHT KNEE PAIN, UNSPECIFIED CHRONICITY: Primary | ICD-10-CM

## 2025-04-17 DIAGNOSIS — M25.562 LEFT KNEE PAIN, UNSPECIFIED CHRONICITY: ICD-10-CM

## 2025-04-17 DIAGNOSIS — Z47.89 ORTHOTIC TRAINING: ICD-10-CM

## 2025-04-17 PROCEDURE — 97763 ORTHC/PROSTC MGMT SBSQ ENC: CPT | Performed by: PHYSICAL THERAPIST

## 2025-04-17 NOTE — PROGRESS NOTES
Orthotic Pick-Up      Custom orthotics fitted to patients shoe and dispensed. Patient educated on appropriate break in period. Patient will follow up if any future problems arise.      Device was fitted to her [employer] sneakers this date. She was educated on heel lock lacing if she places in any other sneakers that do not have the sock fit. She was also educated to use neutral sneakers for any future purchases.

## 2025-04-18 ENCOUNTER — APPOINTMENT (OUTPATIENT)
Dept: PHYSICAL THERAPY | Facility: CLINIC | Age: 50
End: 2025-04-18
Payer: COMMERCIAL

## 2025-04-21 ENCOUNTER — OFFICE VISIT (OUTPATIENT)
Dept: PHYSICAL THERAPY | Facility: CLINIC | Age: 50
End: 2025-04-21
Payer: COMMERCIAL

## 2025-04-21 DIAGNOSIS — M25.562 LEFT KNEE PAIN, UNSPECIFIED CHRONICITY: ICD-10-CM

## 2025-04-21 DIAGNOSIS — M25.561 RIGHT KNEE PAIN, UNSPECIFIED CHRONICITY: Primary | ICD-10-CM

## 2025-04-21 PROCEDURE — 97530 THERAPEUTIC ACTIVITIES: CPT

## 2025-04-21 PROCEDURE — 97140 MANUAL THERAPY 1/> REGIONS: CPT

## 2025-04-21 NOTE — PROGRESS NOTES
"Daily Note     Today's date: 2025  Patient name: Ronn Prather  : 1975  MRN: [id number]  Referring provider: Krysta Watson MD  Dx:   Encounter Diagnosis     ICD-10-CM    1. Right knee pain, unspecified chronicity  M25.561       2. Left knee pain, unspecified chronicity  M25.562                      Subjective: Pt states that she has been wearing her inserts and they do help to take the pressure off of her knee.       Objective: See treatment diary below      Assessment: Tolerated treatment well.   Progressing pt with hip and knee strengthening as able.  Increased weight and reps on leg press with good tolerance, no increased pain sx's.  Muscle fatigue noted with x-walks.  EPAT to B/L knees, tenderness noted lateral L knee and medial R knee.  Low intensity utilized to tolerance.  Pt will benefit from continued therapy.  Will progress as able.      Plan: Continue per plan of care.         POC Expires Auth Status Start Date Expiration Date PT Visit Limit    2025 After 24th visit   BOMN   Date 2025   Used 15 11 12 13 14   Remaining           Diagnosis: chronic bilateral knee pain   Precautions: possible RA; hx of arthroscopic surgery bilat knees; R tib fx ORIF (2018); depression, OA   Next Physician's Appt:   [facility] [employer] HEP:   Manuals    STM/IASTM        Ankle JMs  DK, bilat TC DF, subtalar inv Gr4 DK, bilat TC DF, subtalar inv Gr4     Laser        EPAT TH, D20-T  1.2 B/L DK, D20-T  L: 1.9, R: 1.5 DK, D20-T  L: 1.5, R: 1.2 DK, D20-T  L: 1.5, R: 1.4 TH, D20-T   L 1.2, R: 1.2   Taping?                        Neuro Re-Ed        Supine SLR  + QS x15 ea      BKFO/clams        Bridges on SB        Prone knee bends        Wobble Board 2 mins ea   2 min ea 2 mins ea   SLS on foam 30\"x3 ea    30\"x3 ea   TKEs        SL RDL     10x ea CUES!           Ther Ex        Calf stretch  Standing 10\"x10 ea Standing 10\"x10 ea     Prone quad stretch     " "   HS stretch  EOT 5-10\"x10 ea EOT 5-10\"x10 ea     DKTC w/ ball        Hip flexor stretch        HR/TR   Standing x20 ea                      Ther Activity        Bike for LE mobility NuStep Lv 5 min Lv 5 Nustep Lv5 x5min Nustep Lv5 x5min Nustep Lv5 x5min NuStep Lv 5x 5 mins   Mini Squats        Wall sits with palloff press        Step Ups        Leg Press # 3x10 seat 5  DL 95# 2x10 DL 95# 2x10 DL 95# 2x10   SL Leg Press 55# 3x10 ea  55# 2x10 ea 55# 2x10 ea 55# 2x10 ea   [place] walkouts    F/B 12# x6 laps F/B 12# x8 laps   X-walks GTB 2x blue line       Pt Ed        Re-Evaluation        Modalities        Heat/ice (PRN)         TENS                                   "

## 2025-04-25 ENCOUNTER — OFFICE VISIT (OUTPATIENT)
Dept: PHYSICAL THERAPY | Facility: CLINIC | Age: 50
End: 2025-04-25
Payer: COMMERCIAL

## 2025-04-25 DIAGNOSIS — M25.561 RIGHT KNEE PAIN, UNSPECIFIED CHRONICITY: ICD-10-CM

## 2025-04-25 DIAGNOSIS — M25.562 LEFT KNEE PAIN, UNSPECIFIED CHRONICITY: Primary | ICD-10-CM

## 2025-04-25 PROCEDURE — 97530 THERAPEUTIC ACTIVITIES: CPT | Performed by: PHYSICAL THERAPIST

## 2025-04-25 PROCEDURE — 97112 NEUROMUSCULAR REEDUCATION: CPT | Performed by: PHYSICAL THERAPIST

## 2025-04-25 PROCEDURE — 97140 MANUAL THERAPY 1/> REGIONS: CPT | Performed by: PHYSICAL THERAPIST

## 2025-04-25 NOTE — PROGRESS NOTES
"Daily Note     Today's date: 2025  Patient name: Ronn Prather  : 1975  MRN: [id number]  Referring provider: Krysta Watson MD  Dx:   Encounter Diagnosis     ICD-10-CM    1. Left knee pain, unspecified chronicity  M25.562       2. Right knee pain, unspecified chronicity  M25.561           Start Time: 0700  Stop Time: 0745  Total time in clinic (min): 45 minutes    Subjective: Patient reports feeling a little better overall. She reports she notices some burning occasionally when putting pressure in Right foot, but relieves with she doesn't have any pressure.       Objective: See treatment diary below      Assessment: Tolerated treatment well. Increased sensitivity to EPAT, therefore performed at lower level with better tolerance. Added TB TKEs to improve quad stability and motor control with standing/walking tasks. Completed exercises today well without increase in pain. Patient exhibited good technique with therapeutic exercises and would benefit from continued PT      Plan: Continue per plan of care.  Progress note during next visit.         POC Expires Auth Status Start Date Expiration Date PT Visit Limit    2025 After 24th visit   BOMN   Date 2025   Used 15 16 12 13 14   Remaining           Diagnosis: chronic bilateral knee pain   Precautions: possible RA; hx of arthroscopic surgery bilat knees; R tib fx ORIF (2018); depression, OA   Next Physician's Appt:   [facility] HEP:   Manuals    STM/IASTM        Ankle JMs   DK, bilat TC DF, subtalar inv Gr4     Laser        EPAT TH, D20-T  1.2 B/L DK, D20-T  L: 1.5, R: 1.2 DK, D20-T  L: 1.5, R: 1.2 DK, D20-T  L: 1.5, R: 1.4 TH, D20-T   L 1.2, R: 1.2   Taping?                        Neuro Re-Ed        Supine SLR        BKFO/clams        Bridges on SB        Prone knee bends        Wobble Board 2 mins ea 2 min ea  2 min ea 2 mins ea   SLS on foam 30\"x3 ea    30\"x3 ea   TKEs  Blue TB " "x20 ea      SL RDL     10x ea CUES!           Ther Ex        Calf stretch   Standing 10\"x10 ea     Prone quad stretch        HS stretch   EOT 5-10\"x10 ea     DKTC w/ ball        Hip flexor stretch        HR/TR   Standing x20 ea                      Ther Activity        Bike for LE mobility NuStep Lv 5 min Lv 5 NuStep Lv 5 min Lv 5 Nustep Lv5 x5min Nustep Lv5 x5min NuStep Lv 5x 5 mins   Mini Squats        Wall sits with palloff press        Step Ups        Leg Press # 3x10 seat 5 # 3x10 seat 5 DL 95# 2x10 DL 95# 2x10 DL 95# 2x10   SL Leg Press 55# 3x10 ea 55# 3x10 ea 55# 2x10 ea 55# 2x10 ea 55# 2x10 ea   [place] walkouts    F/B 12# x6 laps F/B 12# x8 laps   X-walks GTB 2x blue line       Pt Ed  Pain science      Re-Evaluation        Modalities        Heat/ice (PRN)         TENS                                     "

## 2025-04-28 ENCOUNTER — OFFICE VISIT (OUTPATIENT)
Dept: PHYSICAL THERAPY | Facility: CLINIC | Age: 50
End: 2025-04-28
Payer: COMMERCIAL

## 2025-04-28 DIAGNOSIS — M25.561 RIGHT KNEE PAIN, UNSPECIFIED CHRONICITY: ICD-10-CM

## 2025-04-28 DIAGNOSIS — Z47.89 ORTHOTIC TRAINING: ICD-10-CM

## 2025-04-28 DIAGNOSIS — M25.562 LEFT KNEE PAIN, UNSPECIFIED CHRONICITY: Primary | ICD-10-CM

## 2025-04-28 PROCEDURE — 97140 MANUAL THERAPY 1/> REGIONS: CPT | Performed by: PHYSICAL THERAPIST

## 2025-04-28 PROCEDURE — 97112 NEUROMUSCULAR REEDUCATION: CPT

## 2025-04-28 NOTE — PROGRESS NOTES
"Daily Note     Today's date: 2025  Patient name: Ronn Prather  : 1975  MRN: [id number]  Referring provider: Krysta Watson MD  Dx:   Encounter Diagnosis     ICD-10-CM    1. Left knee pain, unspecified chronicity  M25.562       2. Right knee pain, unspecified chronicity  M25.561       3. Orthotic training  Z47.89                      Subjective: Pt states that she thinks that the shock wave may be helping some.  She isn't sure if it is helping with the pain, but does feel that it is making her knees looser and easier to move.  Pt states that she is up to 8 hours with the inserts and it is going well.  Her one foot is still sore so she may go to the podiatrist for that.    Objective: See treatment diary below      Assessment: Tolerated treatment well.  Continued with progressions in strengthening with good tolerance.  Pt had difficulty with storks due to muscle fatigue and weakness.  No increased knee pain reported.  EPAT to B/L knees in low intensity.  Will continue to progress as able as pt will benefit from continued therapy.    Plan: Continue per plan of care.         POC Expires Auth Status Start Date Expiration Date PT Visit Limit    2025 After 24th visit   BOMN   Date 2025   Used 15 16 17 13 14   Remaining           Diagnosis: chronic bilateral knee pain   Precautions: possible RA; hx of arthroscopic surgery bilat knees; R tib fx ORIF (2018); depression, OA   Next Physician's Appt:   [employer] HEP:   Manuals    STM/IASTM        Ankle JMs        Laser        EPAT TH, D20-T  1.2 B/L DK, D20-T  L: 1.5, R: 1.2 IM, D20-S  L 1.5, R, 1.0 DK, D20-T  L: 1.5, R: 1.4 TH, D20-T   L 1.2, R: 1.2   Taping?                        Neuro Re-Ed        Supine SLR        BKFO/clams        Bridges on SB        Prone knee bends        Wobble Board 2 mins ea 2 min ea 2 min ea 2 min ea 2 mins ea   SLS on foam 30\"x3 ea  30\"x3 ea  30\"x3 ea "   TKEs  Blue TB x20 ea Blue TB x20 ea     SL RDL     10x ea CUES!   Storks   Blue 15x ea B/L     Ther Ex        Calf stretch        Prone quad stretch        HS stretch        DKTC w/ ball        Hip flexor stretch        HR/TR                         Ther Activity        Bike for LE mobility NuStep Lv 5 min Lv 5 NuStep Lv 5 min Lv 5 Nu Step Lv 7   5 mins Nustep Lv5 x5min NuStep Lv 5x 5 mins   Mini Squats        Wall sits with palloff press        Step Ups        Leg Press # 3x10 seat 5 # 3x10 seat 5 # 3x10  Seat 5 DL 95# 2x10 DL 95# 2x10   SL Leg Press 55# 3x10 ea 55# 3x10 ea 55# 3x10 ea 55# 2x10 ea 55# 2x10 ea   [place] walkouts    F/B 12# x6 laps F/B 12# x8 laps   X-walks GTB 2x blue line       Pt Ed  Pain science      Re-Evaluation        Modalities        Heat/ice (PRN)         TENS

## 2025-05-01 ENCOUNTER — NURSE TRIAGE (OUTPATIENT)
Age: 50
End: 2025-05-01

## 2025-05-01 NOTE — TELEPHONE ENCOUNTER
"FOLLOW UP: Patient asked to check with PCP if ok to wait for her regular scheduled appt next Friday 5/9. Denies pain. Any suggestion until appt?    REASON FOR CONVERSATION: Eye Problem    SYMPTOMS: right eyelid twitching and pulling sensation. Denies any visual disturbance or pain, denies cold symptoms, eye discharge     OTHER: made an appt with Ophthalmology the end of the month .      DISPOSITION: Discuss With PCP and Callback by Nurse Today (overriding Home Care)  Reason for Disposition   MILD eye pain and present < 24 hours    Answer Assessment - Initial Assessment Questions  1. ONSET: \"When did the pain start?\" (e.g., minutes, hours, days)      Eyelid twitching and pulling   2. TIMING: \"Does the pain come and go, or has it been constant since it started?\" (e.g., constant, intermittent, fleeting)      Intermittent    3. SEVERITY: \"How bad is the pain?\"  (Scale 1-10; mild, moderate or severe)      Moderate   4. LOCATION: \"Where does it hurt?\"  (e.g., eyelid, eye, cheekbone)      Right eyelid  5. CAUSE: \"What do you think is causing the pain?\"      unknown  6. VISION: \"Do you have blurred vision or changes in your vision?\"       Denies   7. EYE DISCHARGE: \"Is there any discharge (pus) from the eye(s)?\"  If Yes, ask: \"What color is it?\"       Denies   8. FEVER: \"Do you have a fever?\" If Yes, ask: \"What is it, how was it measured, and when did it start?\"       Denies   9. OTHER SYMPTOMS: \"Do you have any other symptoms?\" (e.g., headache, nasal discharge, facial rash)      Denies    Protocols used: Eye Pain and Other Symptoms-Adult-[place]    "

## 2025-05-01 NOTE — TELEPHONE ENCOUNTER
Patient said she is not using her electronics more than usual. She will wait until her scheduled appointment to see you.

## 2025-05-01 NOTE — TELEPHONE ENCOUNTER
Please call patient.  Ask if she has been using the computer, TV of or any electronics more often, if reading more.  The twitching may be due to eye fatigue.  Monitor for any vision changes, or other symptoms.  Otherwise, okay to wait for scheduled appointment with me.

## 2025-05-02 ENCOUNTER — ANESTHESIA (OUTPATIENT)
Dept: GASTROENTEROLOGY | Facility: HOSPITAL | Age: 50
End: 2025-05-02
Payer: COMMERCIAL

## 2025-05-02 ENCOUNTER — EVALUATION (OUTPATIENT)
Dept: PHYSICAL THERAPY | Facility: CLINIC | Age: 50
End: 2025-05-02
Payer: COMMERCIAL

## 2025-05-02 ENCOUNTER — ANESTHESIA EVENT (OUTPATIENT)
Dept: GASTROENTEROLOGY | Facility: HOSPITAL | Age: 50
End: 2025-05-02
Payer: COMMERCIAL

## 2025-05-02 ENCOUNTER — HOSPITAL ENCOUNTER (OUTPATIENT)
Dept: GASTROENTEROLOGY | Facility: HOSPITAL | Age: 50
Setting detail: OUTPATIENT SURGERY
End: 2025-05-02
Attending: NURSE PRACTITIONER
Payer: COMMERCIAL

## 2025-05-02 VITALS
OXYGEN SATURATION: 100 % | HEART RATE: 78 BPM | SYSTOLIC BLOOD PRESSURE: 129 MMHG | DIASTOLIC BLOOD PRESSURE: 76 MMHG | TEMPERATURE: 97.2 F | RESPIRATION RATE: 18 BRPM

## 2025-05-02 DIAGNOSIS — K31.7 MULTIPLE GASTRIC POLYPS: ICD-10-CM

## 2025-05-02 DIAGNOSIS — M25.562 LEFT KNEE PAIN, UNSPECIFIED CHRONICITY: Primary | ICD-10-CM

## 2025-05-02 DIAGNOSIS — M25.561 RIGHT KNEE PAIN, UNSPECIFIED CHRONICITY: ICD-10-CM

## 2025-05-02 PROCEDURE — 88305 TISSUE EXAM BY PATHOLOGIST: CPT | Performed by: SPECIALIST

## 2025-05-02 PROCEDURE — 43239 EGD BIOPSY SINGLE/MULTIPLE: CPT | Performed by: INTERNAL MEDICINE

## 2025-05-02 PROCEDURE — 97140 MANUAL THERAPY 1/> REGIONS: CPT | Performed by: PHYSICAL THERAPIST

## 2025-05-02 PROCEDURE — 43251 EGD REMOVE LESION SNARE: CPT | Performed by: INTERNAL MEDICINE

## 2025-05-02 PROCEDURE — 97530 THERAPEUTIC ACTIVITIES: CPT | Performed by: PHYSICAL THERAPIST

## 2025-05-02 RX ORDER — PROPOFOL 10 MG/ML
INJECTION, EMULSION INTRAVENOUS AS NEEDED
Status: DISCONTINUED | OUTPATIENT
Start: 2025-05-02 | End: 2025-05-02

## 2025-05-02 RX ORDER — SODIUM CHLORIDE, SODIUM LACTATE, POTASSIUM CHLORIDE, CALCIUM CHLORIDE 600; 310; 30; 20 MG/100ML; MG/100ML; MG/100ML; MG/100ML
INJECTION, SOLUTION INTRAVENOUS CONTINUOUS PRN
Status: DISCONTINUED | OUTPATIENT
Start: 2025-05-02 | End: 2025-05-02

## 2025-05-02 RX ORDER — LIDOCAINE HYDROCHLORIDE 20 MG/ML
INJECTION, SOLUTION EPIDURAL; INFILTRATION; INTRACAUDAL; PERINEURAL AS NEEDED
Status: DISCONTINUED | OUTPATIENT
Start: 2025-05-02 | End: 2025-05-02

## 2025-05-02 RX ADMIN — LIDOCAINE HYDROCHLORIDE 100 MG: 20 INJECTION, SOLUTION EPIDURAL; INFILTRATION; INTRACAUDAL; PERINEURAL at 15:28

## 2025-05-02 RX ADMIN — PROPOFOL 40 MG: 10 INJECTION, EMULSION INTRAVENOUS at 15:30

## 2025-05-02 RX ADMIN — PROPOFOL 40 MG: 10 INJECTION, EMULSION INTRAVENOUS at 15:32

## 2025-05-02 RX ADMIN — SODIUM CHLORIDE, SODIUM LACTATE, POTASSIUM CHLORIDE, AND CALCIUM CHLORIDE: .6; .31; .03; .02 INJECTION, SOLUTION INTRAVENOUS at 15:18

## 2025-05-02 RX ADMIN — PROPOFOL 120 MG: 10 INJECTION, EMULSION INTRAVENOUS at 15:28

## 2025-05-02 RX ADMIN — PROPOFOL 40 MG: 10 INJECTION, EMULSION INTRAVENOUS at 15:34

## 2025-05-02 NOTE — H&P
History and Physical -  [facility]  Ronn Prather 49 y.o. female MRN: [id number]                  HPI: Ronn Prather is a 49 y.o. year old female who presents for history of GERD and polyps      REVIEW OF SYSTEMS: Per the HPI, and otherwise unremarkable.    Historical Information   Past Medical History:   Diagnosis Date    Abnormal Pap smear of cervix     Anemia     Arthritis     Depression     Edema     Fatigue     Kidney stone     Vitamin B12 deficiency     Vitamin D deficiency      Past Surgical History:   Procedure Laterality Date    BREAST BIOPSY  10/24/2019    CHOLECYSTECTOMY      ENDOMETRIAL ABLATION      KNEE ARTHROSCOPY Bilateral     KNEE SURGERY      ORIF TIBIA FRACTURE Right 01/18/2018    Procedure: OPEN REDUCTION W/ INTERNAL FIXATION (ORIF) TIBIA;  Surgeon: Konstantin Todd MD;  Location: [facility];  Service: Orthopedics    ORIF TIBIAL PLATEAU Right 01/18/2018    Procedure: OPEN REDUCTION W/ INTERNAL FIXATION (ORIF) TIBIAL PLATEAU;  Surgeon: Konstantin Todd MD;  Location: [facility];  Service: Orthopedics    ME LAPAROSCOPY SURG CHOLECYSTECTOMY N/A 08/12/2021    Procedure: LAPAROSCOPIC CHOLECYSTECTOMY, UMBILICAL HERNIA REPAIR;  Surgeon: Mariusz Soto MD;  Location: [facility];  Service: General    ME REMOVAL IMPLANT DEEP Right 08/02/2018    Procedure: REMOVAL HARDWARE TIBIA;  Surgeon: Konstantin Todd MD;  Location: [facility];  Service: Orthopedics    TUBAL LIGATION      US GUIDED BREAST BIOPSY LEFT COMPLETE Left 10/24/2019     Social History   Social History     Substance and Sexual Activity   Alcohol Use Yes    Comment: Occassional     Social History     Substance and Sexual Activity   Drug Use No     Social History     Tobacco Use   Smoking Status Never    Passive exposure: Past   Smokeless Tobacco Never     Family History   Problem Relation Age of Onset    Cancer Mother         either cervical or ovarian    Heart disease Mother         heart surgery    Heart attack Father          "stent    Diabetes Father     Hypertension Father     Cancer Father 58        Stomach and Lung Cancer    Rheum arthritis Daughter     No Known Problems Daughter     Lupus Maternal Grandmother     No Known Problems Maternal Grandfather     No Known Problems Paternal Grandmother     No Known Problems Paternal Grandfather     No Known Problems Son     No Known Problems Maternal Aunt     No Known Problems Maternal Aunt     No Known Problems Maternal Aunt     No Known Problems Paternal Aunt     Lupus Cousin     Arthritis Family     Stomach cancer Family     Ovarian cancer Family     Alcohol abuse Neg Hx     Depression Neg Hx     Drug abuse Neg Hx     Substance Abuse Neg Hx     Mental illness Neg Hx     Breast cancer Neg Hx     Colon cancer Neg Hx        Meds/Allergies       Current Outpatient Medications:     acetaminophen (TYLENOL) 325 mg tablet    amitriptyline (ELAVIL) 150 MG tablet    busPIRone (BUSPAR) 5 mg tablet    Cholecalciferol (Vitamin D3) 50 MCG (2000 UT) capsule    cyanocobalamin 1,000 mcg/mL    ergocalciferol (VITAMIN D2) 50,000 units    famotidine (PEPCID) 20 mg tablet    SYRINGE-NEEDLE, DISP, 3 ML 25G X 1\" 3 ML MISC    No Known Allergies    Objective     LMP 12/04/2022 (Exact Date)       PHYSICAL EXAM    Gen: NAD  Head: NCAT  CV: RRR  CHEST: Clear  ABD: soft, NT/ND  EXT: no edema      ASSESSMENT/PLAN:  This is a 49 y.o. year old female here for EGD, and she is stable and optimized for her procedure.        "

## 2025-05-02 NOTE — ANESTHESIA PREPROCEDURE EVALUATION
Procedure:  EGD    Relevant Problems   CARDIO   (+) Asymptomatic PVCs   (+) Other hyperlipidemia   (+) Ventricular bigeminy      MUSCULOSKELETAL   (+) Post-traumatic osteoarthritis of multiple joints   (+) Post-traumatic osteoarthritis of right knee      NEURO/PSYCH   (+) Anxiety   (+) Chronic pain syndrome   (+) Moderate episode of recurrent major depressive disorder (HCC)        Physical Exam    Airway    Mallampati score: II  TM Distance: >3 FB  Neck ROM: full     Dental   No notable dental hx     Cardiovascular  Rhythm: regular, Rate: normal, Cardiovascular exam normal    Pulmonary  Pulmonary exam normal Breath sounds clear to auscultation    Other Findings  post-pubertal.      Anesthesia Plan  ASA Score- 2     Anesthesia Type- IV sedation with anesthesia with ASA Monitors.         Additional Monitors:     Airway Plan:            Plan Factors-Exercise tolerance (METS): >4 METS.    Chart reviewed. EKG reviewed. Imaging results reviewed. Existing labs reviewed. Patient summary reviewed.    Patient is not a current smoker.  Patient did not smoke on day of surgery.            Induction- intravenous.    Postoperative Plan-     Perioperative Resuscitation Plan - Level 1 - Full Code.       Informed Consent- Anesthetic plan and risks discussed with patient.  I personally reviewed this patient with the CRNA. Discussed and agreed on the Anesthesia Plan with the CRNA..      NPO Status:  No vitals data found for the desired time range.
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
## 2025-05-02 NOTE — ANESTHESIA POSTPROCEDURE EVALUATION
Post-Op Assessment Note    CV Status:  Stable  Pain Score: 0    Pain management: adequate       Mental Status:  Sleepy and arousable   Hydration Status:  Euvolemic   PONV Controlled:  Controlled   Airway Patency:  Patent     Post Op Vitals Reviewed: Yes    No anethesia notable event occurred.    Staff: CRNA           Last Filed PACU Vitals:  Vitals Value Taken Time   Temp 99.3 °F (37.4 °C) 05/02/25 1540   Pulse 77 05/02/25 1540   /59 05/02/25 1540   Resp 24 05/02/25 1540   SpO2 98 % 05/02/25 1540

## 2025-05-05 ENCOUNTER — OFFICE VISIT (OUTPATIENT)
Dept: PHYSICAL THERAPY | Facility: CLINIC | Age: 50
End: 2025-05-05
Payer: COMMERCIAL

## 2025-05-05 DIAGNOSIS — F41.9 ANXIETY: ICD-10-CM

## 2025-05-05 DIAGNOSIS — M25.561 RIGHT KNEE PAIN, UNSPECIFIED CHRONICITY: ICD-10-CM

## 2025-05-05 DIAGNOSIS — E55.9 VITAMIN D DEFICIENCY: ICD-10-CM

## 2025-05-05 DIAGNOSIS — F33.1 MODERATE EPISODE OF RECURRENT MAJOR DEPRESSIVE DISORDER (HCC): ICD-10-CM

## 2025-05-05 DIAGNOSIS — M25.562 LEFT KNEE PAIN, UNSPECIFIED CHRONICITY: Primary | ICD-10-CM

## 2025-05-05 PROCEDURE — 97530 THERAPEUTIC ACTIVITIES: CPT

## 2025-05-05 PROCEDURE — 97112 NEUROMUSCULAR REEDUCATION: CPT
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
## 2025-05-07 ENCOUNTER — RESULTS FOLLOW-UP (OUTPATIENT)
Dept: GASTROENTEROLOGY | Facility: CLINIC | Age: 50
End: 2025-05-07

## 2025-05-07 PROCEDURE — 88305 TISSUE EXAM BY PATHOLOGIST: CPT | Performed by: SPECIALIST

## 2025-05-07 NOTE — RESULT ENCOUNTER NOTE
Please call the patient regarding her abnormal result.  EGD biopsy shows mild gastritis/inflammation.  Stomach polyps were benign.  Follow up in my office as needed

## 2025-05-08 ENCOUNTER — TELEMEDICINE (OUTPATIENT)
Dept: BEHAVIORAL/MENTAL HEALTH CLINIC | Facility: CLINIC | Age: 50
End: 2025-05-08
Payer: COMMERCIAL

## 2025-05-08 DIAGNOSIS — F33.1 MODERATE EPISODE OF RECURRENT MAJOR DEPRESSIVE DISORDER (HCC): Primary | ICD-10-CM

## 2025-05-08 PROCEDURE — 90834 PSYTX W PT 45 MINUTES: CPT

## 2025-05-08 NOTE — BH TREATMENT PLAN
"Ronn Prather  1975      Date of Initial Psychotherapy Assessment: 02/03/23  Date of Current Treatment Plan: 5/8/2025  Treatment Plan Target Date: 11/4/2025  Treatment Plan Expiration Date: 11/4/2025     Diagnosis:   1. Moderate episode of recurrent major depressive disorder (HCC)          Area(s) of Need: Boundaries, mild depression, interpersonal relationships     Long Term Goal 1 (in the client's own words): \"Be able to stick to what I know I need to do for myself and focus on me\"     Target Date for completion: TBD             Anticipated therapeutic modalities: CBT, mindfulness interventions and supportive therapy             People identified to complete this goal: Ronn Prather and Lexie DANIELSON            Objective 1: (identify the means of measuring success in meeting the objective): Practice assertiveness techniques and use techniques in everyday life, especially with family members and especially during times of conflict                    Objective 2: (identify the means of measuring success in meeting the objective):  Completing all homework as assigned to promote self awareness      Objective 3:  Decrease in PHQ 9 scores      I am currently under the care of a [facility] psychiatric provider: no     My [facility] psychiatric provider is: NA     I am currently taking psychiatric medications: \"No, putting work to be consistent with medications but working to manage timely refills.       I feel that I will be ready for discharge from mental health care when I reach the following (measurable goal/objective): I am making decisions without need for support on a consistent basis.        For children and adults who have a legal guardian:          Has there been any change to custody orders and/or guardianship status? NA. If yes, attach updated documentation.     Behavioral Health Treatment Plan [facility]: Diagnosis and Treatment Plan explained to Ronn Prather acknowledges an " understanding of their diagnosis. Ronn Prather agrees to this treatment plan.     I have been offered a copy of this Treatment Plan. Yes via [employer]

## 2025-05-08 NOTE — PSYCH
"Virtual Regular VisitName: Ronn Prather      : 1975      MRN: [id number]  Encounter Provider: Lexie Rees  Encounter Date: 2025   Encounter department: [facility] [place]  :  Assessment & Plan  Moderate episode of recurrent major depressive disorder (HCC)             Goals addressed in session: Goal 1     DATA: Ronn joined session to discuss life updates. She reports the desire to focus more on herself as opposed to those her around. She notes she has been carrying the load and stress of others which has been weighing her down. She decided to continue focusing on self identity, assertiveness, self advocacy, and self-love. Ronn agreed to electronically sign her treatment plan update prior to next scheduled session. During this session, this clinician used the following therapeutic modalities: Supportive Psychotherapy    Substance Abuse was not addressed during this session. If the client is diagnosed with a co-occurring substance use disorder, please indicate any changes in the frequency or amount of use: n/a. Stage of change for addressing substance use diagnoses: No substance use/Not applicable    ASSESSMENT:  Ronn presents with a Euthymic/ normal mood. Angelicas affect is Normal range and intensity, which is congruent, with their mood and the content of the session. The client has made progress on their goals as evidenced by ability to utilize skills and techniques inside and outside of sessions.    Ronn presents with a minimal risk of suicide, minimal risk of self-harm, and minimal risk of harm to others.    For any risk assessment that surpasses a \"low\" rating, a safety plan must be developed.    A safety plan was indicated: no  If yes, describe in detail n/a    PLAN: Between sessions, Ronn will continue working on treatment plan goals. At the next session, the therapist will use Cognitive Behavioral Therapy and Supportive Psychotherapy to " address mood.    Behavioral Health Treatment Plan [facility]: Diagnosis and Treatment Plan explained to Ronn, Ronn relates understanding diagnosis and is agreeable to Treatment Plan. Yes     Depression Follow-up Plan Completed: Yes     Reason for visit is No chief complaint on file.     Recent Visits  No visits were found meeting these conditions.  Showing recent visits within past 7 days and meeting all other requirements  Today's Visits  Date Type Provider Dept   05/08/25 Telemedicine Lexie Rees [facility] [place]   Showing today's visits and meeting all other requirements  Future Appointments  No visits were found meeting these conditions.  Showing future appointments within next 150 days and meeting all other requirements     History of Present Illness     HPI    Past Medical History   Past Medical History:   Diagnosis Date    Abnormal Pap smear of cervix     Anemia     Arthritis     Depression     Edema     Fatigue     Kidney stone     Vitamin B12 deficiency     Vitamin D deficiency      Past Surgical History:   Procedure Laterality Date    BREAST BIOPSY  10/24/2019    CHOLECYSTECTOMY      ENDOMETRIAL ABLATION      KNEE ARTHROSCOPY Bilateral     KNEE SURGERY      ORIF TIBIA FRACTURE Right 01/18/2018    Procedure: OPEN REDUCTION W/ INTERNAL FIXATION (ORIF) TIBIA;  Surgeon: Konstantin Todd MD;  Location: [facility];  Service: Orthopedics    ORIF TIBIAL PLATEAU Right 01/18/2018    Procedure: OPEN REDUCTION W/ INTERNAL FIXATION (ORIF) TIBIAL PLATEAU;  Surgeon: Konstantin Todd MD;  [facility];  Service: Orthopedics    OH LAPAROSCOPY SURG CHOLECYSTECTOMY N/A 08/12/2021    Procedure: LAPAROSCOPIC CHOLECYSTECTOMY, UMBILICAL HERNIA REPAIR;  Surgeon: Mariusz Soto MD;  Location: [facility];  Service: General    OH REMOVAL IMPLANT DEEP Right 08/02/2018    Procedure: REMOVAL HARDWARE TIBIA;  Surgeon: Konstantin Todd MD;  Location: [facility];  Service: Orthopedics    TUBAL LIGATION    "   US GUIDED BREAST BIOPSY LEFT COMPLETE Left 10/24/2019     Current Outpatient Medications   Medication Instructions    acetaminophen (TYLENOL) 325 mg tablet 650 mg every 6 hours for mild pain    amitriptyline (ELAVIL) 150 mg, Oral, Daily at bedtime    busPIRone (BUSPAR) 5 mg, Oral, 2 times daily    cyanocobalamin 1,000 mcg, Intramuscular, Every 30 days    ergocalciferol (VITAMIN D2) 50,000 Units, Oral, Weekly    famotidine (PEPCID) 20 mg, Oral, 2 times daily    SYRINGE-NEEDLE, DISP, 3 ML 25G X 1\" 3 ML MISC Does not apply, Every 30 days    Vitamin D3 2,000 Units, Oral, Daily     No Known Allergies    Objective   LMP 12/04/2022 (Exact Date)     Video Exam  Physical Exam     Administrative Statements   Encounter provider Lexie Rees    The Patient is located at Other and in the following state in which I hold an active license [place].    The patient was identified by name and date of birth. Ronn Prather was informed that this is a telemedicine visit and that the visit is being conducted through the Epic Embedded platform. She agrees to proceed..  My office door was closed. No one else was in the room.  She acknowledged consent and understanding of privacy and security of the video platform. The patient has agreed to participate and understands they can discontinue the visit at any time.    I have spent a total time of 52 minutes in caring for this patient on the day of the visit/encounter including Counseling / Coordination of care, not including the time spent for establishing the audio/video connection.    Visit Time  Start Time: 0800  Stop Time: 0852  Total Visit Time: 52 minutes  "

## 2025-05-09 ENCOUNTER — OFFICE VISIT (OUTPATIENT)
Dept: INTERNAL MEDICINE CLINIC | Facility: CLINIC | Age: 50
End: 2025-05-09
Payer: COMMERCIAL

## 2025-05-09 ENCOUNTER — OFFICE VISIT (OUTPATIENT)
Dept: PHYSICAL THERAPY | Facility: CLINIC | Age: 50
End: 2025-05-09
Payer: COMMERCIAL

## 2025-05-09 VITALS
OXYGEN SATURATION: 99 % | TEMPERATURE: 97 F | DIASTOLIC BLOOD PRESSURE: 70 MMHG | HEIGHT: 67 IN | BODY MASS INDEX: 25.9 KG/M2 | SYSTOLIC BLOOD PRESSURE: 120 MMHG | WEIGHT: 165 LBS | HEART RATE: 71 BPM

## 2025-05-09 DIAGNOSIS — E53.8 VITAMIN B12 DEFICIENCY: ICD-10-CM

## 2025-05-09 DIAGNOSIS — F33.1 MODERATE EPISODE OF RECURRENT MAJOR DEPRESSIVE DISORDER (HCC): ICD-10-CM

## 2025-05-09 DIAGNOSIS — M17.31 POST-TRAUMATIC OSTEOARTHRITIS OF RIGHT KNEE: ICD-10-CM

## 2025-05-09 DIAGNOSIS — M25.562 LEFT KNEE PAIN, UNSPECIFIED CHRONICITY: Primary | ICD-10-CM

## 2025-05-09 DIAGNOSIS — E55.9 VITAMIN D DEFICIENCY: ICD-10-CM

## 2025-05-09 DIAGNOSIS — M15.3 POST-TRAUMATIC OSTEOARTHRITIS OF MULTIPLE JOINTS: ICD-10-CM

## 2025-05-09 DIAGNOSIS — R25.3 EYELID TWITCH: Primary | ICD-10-CM

## 2025-05-09 DIAGNOSIS — Z00.00 ANNUAL PHYSICAL EXAM: ICD-10-CM

## 2025-05-09 DIAGNOSIS — M25.561 RIGHT KNEE PAIN, UNSPECIFIED CHRONICITY: ICD-10-CM

## 2025-05-09 DIAGNOSIS — K29.30 SUPERFICIAL GASTRITIS WITHOUT HEMORRHAGE, UNSPECIFIED CHRONICITY: ICD-10-CM

## 2025-05-09 DIAGNOSIS — R73.01 ABNORMAL FASTING GLUCOSE: ICD-10-CM

## 2025-05-09 PROBLEM — K29.70 GASTRITIS: Status: ACTIVE | Noted: 2025-05-09

## 2025-05-09 PROCEDURE — 97140 MANUAL THERAPY 1/> REGIONS: CPT | Performed by: PHYSICAL THERAPIST

## 2025-05-09 PROCEDURE — 97530 THERAPEUTIC ACTIVITIES: CPT | Performed by: PHYSICAL THERAPIST

## 2025-05-09 PROCEDURE — 97112 NEUROMUSCULAR REEDUCATION: CPT | Performed by: PHYSICAL THERAPIST

## 2025-05-09 PROCEDURE — 99396 PREV VISIT EST AGE 40-64: CPT | Performed by: INTERNAL MEDICINE

## 2025-05-09 PROCEDURE — 99214 OFFICE O/P EST MOD 30 MIN: CPT | Performed by: INTERNAL MEDICINE

## 2025-05-09 RX ORDER — AMITRIPTYLINE HYDROCHLORIDE 150 MG/1
150 TABLET ORAL
Qty: 90 TABLET | Refills: 1 | Status: CANCELLED | OUTPATIENT
Start: 2025-05-09

## 2025-05-09 RX ORDER — ACETAMINOPHEN 160 MG
2000 TABLET,DISINTEGRATING ORAL DAILY
Qty: 90 CAPSULE | Refills: 1 | Status: SHIPPED | OUTPATIENT
Start: 2025-05-09

## 2025-05-09 RX ORDER — ERGOCALCIFEROL 1.25 MG/1
50000 CAPSULE, LIQUID FILLED ORAL WEEKLY
Qty: 12 CAPSULE | Refills: 0 | OUTPATIENT
Start: 2025-05-09

## 2025-05-09 RX ORDER — CYANOCOBALAMIN 1000 UG/ML
1000 INJECTION, SOLUTION INTRAMUSCULAR; SUBCUTANEOUS
Qty: 10 ML | Refills: 1 | Status: SHIPPED | OUTPATIENT
Start: 2025-05-09

## 2025-05-09 RX ORDER — BUSPIRONE HYDROCHLORIDE 5 MG/1
5 TABLET ORAL 2 TIMES DAILY
Qty: 60 TABLET | Refills: 2 | Status: SHIPPED | OUTPATIENT
Start: 2025-05-09

## 2025-05-09 RX ORDER — AMITRIPTYLINE HYDROCHLORIDE 150 MG/1
150 TABLET ORAL
Qty: 90 TABLET | Refills: 1 | Status: SHIPPED | OUTPATIENT
Start: 2025-05-09

## 2025-05-09 NOTE — PROGRESS NOTES
Name: Ronn Prather      : 1975      MRN: [id number]  Encounter Provider: Krysta Watson MD  Encounter Date: 2025   Encounter department: [facility] INTERNAL MEDICINE  :  Assessment & Plan  Eyelid twitch  Suspect due to stress. Keep appointment with Ophtha.       Post-traumatic osteoarthritis of multiple joints  Stable.  Restart amitriptyline 150 mg qHS.  Ongoing physical therapy.       Post-traumatic osteoarthritis of right knee  As above.  Follow up with Ortho as scheduled.       Moderate episode of recurrent major depressive disorder (HCC)  Sees therapist regularly.  On amitriptyline, takes buspirone prn only.       Vitamin B12 deficiency  On monthly B12.  Orders:  •  CBC and differential; Future  •  Vitamin B12; Future    Vitamin D deficiency  Taking D3 daily and D2 weekly.  Orders:  •  Vitamin D 25 hydroxy; Future    Abnormal fasting glucose    Orders:  •  Comprehensive metabolic panel; Future  •  Hemoglobin A1C; Future    Superficial gastritis without hemorrhage, unspecified chronicity  Taking famotidine 20 mg bid.  Reviewed recent EGD.  Follow up with GI next month.       Annual physical exam  Reschedule mammogram.       Follow up in 6 months or as needed.       History of Present Illness   She complains of twitching of her right upper eyelid since a few weeks ago.  She cannot think of a certain stressful event that may have triggered it.  She reports no blurring of vision, denies any allergy symptoms.  No itching, eye discharge, eye redness or pain.  She had tried eating more bananas, taking potassium supplements with no change.  She ran out of her amitriptyline a few weeks ago.  She continues to speak to her therapist twice a month.    She reports ongoing stress with finances.  She has several grandchildren living with her, ages between 1-10.    She went to a cruise with her  a few months ago, with a goal for relaxation.  She has a girls trip scheduled this weekend,  "hoping she could relax a little bit.    She has been going to physical therapy, reports that it has helped.  She is doing her exercises, wearing inserts.      Review of Systems   Constitutional:  Negative for appetite change and fatigue.   HENT:  Negative for congestion, ear pain and postnasal drip.    Eyes:  Negative for pain, discharge, redness, itching and visual disturbance.   Respiratory:  Negative for cough and shortness of breath.    Cardiovascular:  Negative for chest pain and leg swelling.   Gastrointestinal:  Negative for abdominal pain, constipation and diarrhea.   Genitourinary:  Negative for dysuria, frequency and urgency.   Musculoskeletal:  Negative for arthralgias and myalgias.   Skin:  Negative for rash and wound.   Neurological:  Negative for dizziness, numbness and headaches.   Hematological:  Does not bruise/bleed easily.   Psychiatric/Behavioral:  Negative for confusion. The patient is not nervous/anxious.        Objective   /70   Pulse 71   Temp (!) 97 °F (36.1 °C)   Ht 5' 7\" (1.702 m)   Wt 74.8 kg (165 lb)   LMP 12/04/2022 (Exact Date)   SpO2 99%   BMI 25.84 kg/m²      Physical Exam  Vitals and nursing note reviewed.   Constitutional:       General: She is not in acute distress.     Appearance: She is well-developed.   HENT:      Head: Normocephalic and atraumatic.      Right Ear: External ear normal.      Left Ear: External ear normal.      Mouth/Throat:      Mouth: Mucous membranes are moist.   Eyes:      General: Lids are normal.         Right eye: No discharge or hordeolum.         Left eye: No discharge or hordeolum.      Extraocular Movements: Extraocular movements intact.      Pupils: Pupils are equal, round, and reactive to light.   Cardiovascular:      Rate and Rhythm: Normal rate and regular rhythm.      Heart sounds: Normal heart sounds.   Pulmonary:      Effort: Pulmonary effort is normal.      Breath sounds: Normal breath sounds. No wheezing.   Abdominal:      " General: Bowel sounds are normal.      Palpations: Abdomen is soft.   Musculoskeletal:         General: No swelling.      Right lower leg: No edema.      Left lower leg: No edema.   Skin:     General: Skin is warm.      Findings: No rash.   Neurological:      General: No focal deficit present.      Mental Status: She is alert and oriented to person, place, and time.   Psychiatric:         Mood and Affect: Mood and affect normal. Mood is not anxious or depressed.         Behavior: Behavior normal.           Labs & imaging results reviewed with patient.

## 2025-05-09 NOTE — ASSESSMENT & PLAN NOTE
On monthly B12.  Orders:  •  CBC and differential; Future  •  Vitamin B12; Future   pt c/o midsternal chest burning x 2 weeks, worsening past few days with dizziness and sob x 2 days and left arm pain.  denies any pmhx

## 2025-05-09 NOTE — PROGRESS NOTES
Daily Note     Today's date: 2025  Patient name: Ronn Prather  : 1975  MRN: [id number]  Referring provider: Krysta Watson MD  Dx:   Encounter Diagnosis     ICD-10-CM    1. Left knee pain, unspecified chronicity  M25.562       2. Right knee pain, unspecified chronicity  M25.561           Start Time: 705  Stop Time: 745  Total time in clinic (min): 40 minutes    Subjective: Patient reports taping on left knee helped to take some of the pressure off. Patient reports she has an appointment with ortho end of the month.      Objective: See treatment diary below      Assessment: Tolerated treatment well. Initiated session working on eccentric strengthening with decline squats heels on wedge to mimic incline quad training. Added leann walkouts and TKE to work on motor control and muscle stability for better ease and mechanics with weight-bearing tasks. Teddy taping to bilateral patella similar to Left from last session, with good response and reports of relief in pain. Patient exhibited good technique with therapeutic exercises and would benefit from continued PT      Plan: Continue per plan of care.  Progress treatment as tolerated.          POC Expires Auth Status Start Date Expiration Date PT Visit Limit    2025 After 24th visit   BOMN   Date 2025   Used 20 16 17 18 19   Remaining           Diagnosis: chronic bilateral knee pain   Precautions: possible RA; hx of arthroscopic surgery bilat knees; R tib fx ORIF (2018); depression, OA   Next Physician's Appt:   [employer] HEP:   Manuals    STM/IASTM    DK, IASTM bilat distal quad    Ankle JMs        Laser        EPAT  DK, D20-T  L: 1.5, R: 1.2 IM, D20-S  L 1.5, R, 1.0 declined    Taping? Teddy CLEMENTS bilat patella medial glide    Teddy                    Neuro Re-Ed        Supine SLR        BKFO/clams        Bridges on SB        Prone knee bends        Wobble Board  2 min ea 2  "min ea  2 min ea   SLS on foam   30\"x3 ea  30\"x3 ea   TKEs [place] 8# x20 ea Blue TB x20 ea Blue TB x20 ea     SL RDL        Storks   Blue 15x ea B/L  Green 15x ea B/L   Ther Ex        Calf stretch        Prone quad stretch        HS stretch        DKTC w/ ball        Hip flexor stretch        HR/TR                         Ther Activity        Bike for LE mobility NuStep Lv 5x 5 mins NuStep Lv 5 min Lv 5 Nu Step Lv 7   5 mins Nu Step Lv 7   5 mins NuStep Lv 5x 5 mins   Mini Squats Decline w/ wedge 2x10       Wall sits with palloff press        Step Ups        Leg Press  # 3x10 seat 5 # 3x10  Seat 5  # 3x10  Seat 5   SL Leg Press  55# 3x10 ea 55# 3x10 ea  55# 3x10 ea   [place] walkouts F/B 10# x7 laps       X-walks     GTB 2x blue line   Pt Ed tape Pain science  POC, pain science F/O ortho   Re-Evaluation    DK    Modalities        Heat/ice (PRN)         TENS                             "

## 2025-05-12 ENCOUNTER — OFFICE VISIT (OUTPATIENT)
Dept: PHYSICAL THERAPY | Facility: CLINIC | Age: 50
End: 2025-05-12
Payer: COMMERCIAL

## 2025-05-12 DIAGNOSIS — M25.561 RIGHT KNEE PAIN, UNSPECIFIED CHRONICITY: ICD-10-CM

## 2025-05-12 DIAGNOSIS — M25.562 LEFT KNEE PAIN, UNSPECIFIED CHRONICITY: Primary | ICD-10-CM

## 2025-05-12 PROCEDURE — 97140 MANUAL THERAPY 1/> REGIONS: CPT

## 2025-05-12 PROCEDURE — 97530 THERAPEUTIC ACTIVITIES: CPT

## 2025-05-12 PROCEDURE — 97112 NEUROMUSCULAR REEDUCATION: CPT

## 2025-05-12 NOTE — PROGRESS NOTES
"Daily Note     Today's date: 2025  Patient name: Ronn Prather  : 1975  MRN: [id number]  Referring provider: Krysta Watson MD  Dx:   Encounter Diagnosis     ICD-10-CM    1. Left knee pain, unspecified chronicity  M25.562       2. Right knee pain, unspecified chronicity  M25.561                      Subjective: Pt states that her knees are about the same as she was really busy over the weekend. Pt does note that she was able to tolerate a little more before her knee pain kicked in.        Objective: See treatment diary below      Assessment: Tolerated treatment well.  Continued with oultined program to improve knee and hip strength for improved motor control.  Cues for leg press to improve ROM and control of knee extension.  Fair carryover is noted.  Muscle fatigue noted with x-walks.  Taping to B/L knee with decreased pain after.  Pt progressing slowly towards her goals and will benefit from continued therapy.      Plan: Continue per plan of care.         POC Expires Auth Status Start Date Expiration Date PT Visit Limit    2025 After 24th visit   BOMN   Date 2025   Used 20 21 17 18 19   Remaining           Diagnosis: chronic bilateral knee pain   Precautions: possible RA; hx of arthroscopic surgery bilat knees; R tib fx ORIF (2018); depression, OA   Next Physician's Appt:   [employer] HEP:   Manuals    STM/IASTM    DK, IASTM bilat distal quad    Ankle JMs        Laser        EPAT   IM, D20-S  L 1.5, R, 1.0 declined    Taping? Teddy CLEMENTS bilat patella medial glide TH, Teddy  B/L med glide   Teddy                    Neuro Re-Ed        Supine SLR        BKFO/clams        Bridges on SB        Prone knee bends        Wobble Board  2 mins ea 2 min ea  2 min ea   SLS on foam  30\"x3 ea 30\"x3 ea  30\"x3 ea   TKEs Ja 8# x20 ea  Blue TB x20 ea     SL RDL        Storks   Blue 15x ea B/L  Green 15x ea B/L   Ther Ex        Calf " stretch        Prone quad stretch        HS stretch        DKTC w/ ball        Hip flexor stretch        HR/TR                         Ther Activity        Bike for LE mobility NuStep Lv 5x 5 mins NuStep Lv 5 min Lv 5 Nu Step Lv 7   5 mins Nu Step Lv 7   5 mins NuStep Lv 5x 5 mins   Mini Squats Decline w/ wedge 2x10       Wall sits with palloff press        Step Ups        Leg Press  # 3x10  Seat 5 # 3x10  Seat 5  # 3x10  Seat 5   SL Leg Press  55# 3x10 ea 55# 3x10 ea  55# 3x10 ea   [place] walkouts F/B 10# x7 laps       X-walks  GTB 2x blue line   GTB 2x blue line   Pt Ed tape   POC, pain science F/O ortho   Re-Evaluation    DK    Modalities        Heat/ice (PRN)         TENS

## 2025-05-14 ENCOUNTER — APPOINTMENT (OUTPATIENT)
Dept: PHYSICAL THERAPY | Facility: CLINIC | Age: 50
End: 2025-05-14
Payer: COMMERCIAL

## 2025-05-19 ENCOUNTER — OFFICE VISIT (OUTPATIENT)
Dept: PHYSICAL THERAPY | Facility: CLINIC | Age: 50
End: 2025-05-19
Payer: COMMERCIAL

## 2025-05-19 ENCOUNTER — TELEPHONE (OUTPATIENT)
Dept: OBGYN CLINIC | Facility: CLINIC | Age: 50
End: 2025-05-19

## 2025-05-19 DIAGNOSIS — M25.562 LEFT KNEE PAIN, UNSPECIFIED CHRONICITY: Primary | ICD-10-CM

## 2025-05-19 DIAGNOSIS — M25.561 RIGHT KNEE PAIN, UNSPECIFIED CHRONICITY: ICD-10-CM

## 2025-05-19 PROCEDURE — 97112 NEUROMUSCULAR REEDUCATION: CPT | Performed by: PHYSICAL THERAPIST

## 2025-05-19 PROCEDURE — 97140 MANUAL THERAPY 1/> REGIONS: CPT | Performed by: PHYSICAL THERAPIST

## 2025-05-19 PROCEDURE — 97530 THERAPEUTIC ACTIVITIES: CPT | Performed by: PHYSICAL THERAPIST

## 2025-05-19 NOTE — PROGRESS NOTES
"Daily Note     Today's date: 2025  Patient name: Ronn Prather  : 1975  MRN: [id number]  Referring provider: Krysta Watson MD  Dx:   Encounter Diagnosis     ICD-10-CM    1. Left knee pain, unspecified chronicity  M25.562       2. Right knee pain, unspecified chronicity  M25.561           Start Time: 0700  Stop Time: 0753  Total time in clinic (min): 53 minutes    Subjective: Patient reports she has been feeling better. She reports she is able to do more activities without significant increase in pain.       Objective: See treatment diary below      Assessment: Tolerated treatment well. Worked on eccentric control and stability exercises. Added squats on wobble board to work on stabilization and perturbations for better motor control. Added backwards walking with emphasis on eccentric control and mechanics. Patient tolerated session well without increase in pain. Patient exhibited good technique with therapeutic exercises and would benefit from continued PT      Plan: Continue per plan of care.  Progress treatment as tolerated.          POC Expires Auth Status Start Date Expiration Date PT Visit Limit    2025 After 24th visit   BOMN   Date 2025   Used 20 21 22 18 19   Remaining           Diagnosis: chronic bilateral knee pain   Precautions: possible RA; hx of arthroscopic surgery bilat knees; R tib fx ORIF (2018); depression, OA   Next Physician's Appt:   [employer] HEP:   Manuals    STM/IASTM   DK, IASTM bilat distal quad DK, IASTM bilat distal quad    Ankle JMs        Laser        EPAT    declined    Taping? Teddy CLEMENTS bilat patella medial glide TH, Teddy  B/L med glide   Teddy                    Neuro Re-Ed        Supine SLR        BKFO/clams        Bridges on SB        Prone knee bends        Wobble Board  2 mins ea Squats on wobble board 2x10 ea  2 min ea   SLS on foam  30\"x3 ea   30\"x3 ea   TKEs Ja 8# x20 ea    "    SL RDL        Storks     Green 15x ea B/L   Ther Ex        Calf stretch        Prone quad stretch        HS stretch        DKTC w/ ball        Hip flexor stretch        HR/TR                         Ther Activity        Bike for LE mobility NuStep Lv 5x 5 mins NuStep Lv 5 min Lv 5 NuStep Lv 5 min Lv 5 Nu Step Lv 7   5 mins NuStep Lv 5x 5 mins   Mini Squats Decline w/ wedge 2x10       Wall sits with palloff press        Step Ups        Leg Press  # 3x10  Seat 5 # 3x10  Seat 5  # 3x10  Seat 5   SL Leg Press  55# 3x10 ea 55# 3x10 ea  55# 3x10 ea   [place] walkouts F/B 10# x7 laps  B/F 15# x8 laps     X-walks  GTB 2x blue line   GTB 2x blue line   Pt Ed tape   POC, pain science F/O ortho   Re-Evaluation    DK    Modalities        Heat/ice (PRN)         TENS

## 2025-05-19 NOTE — TELEPHONE ENCOUNTER
LMOM for patient to call and reschedule their appointment for 5/23/25, provider will be OOO.  Left phone number.

## 2025-05-23 ENCOUNTER — OFFICE VISIT (OUTPATIENT)
Dept: PHYSICAL THERAPY | Facility: CLINIC | Age: 50
End: 2025-05-23
Payer: COMMERCIAL

## 2025-05-23 ENCOUNTER — OFFICE VISIT (OUTPATIENT)
Age: 50
End: 2025-05-23
Payer: COMMERCIAL

## 2025-05-23 VITALS
WEIGHT: 165 LBS | HEART RATE: 88 BPM | HEIGHT: 67 IN | BODY MASS INDEX: 25.9 KG/M2 | SYSTOLIC BLOOD PRESSURE: 140 MMHG | DIASTOLIC BLOOD PRESSURE: 90 MMHG

## 2025-05-23 DIAGNOSIS — M25.561 RIGHT KNEE PAIN, UNSPECIFIED CHRONICITY: ICD-10-CM

## 2025-05-23 DIAGNOSIS — M54.50 CHRONIC BILATERAL LOW BACK PAIN WITHOUT SCIATICA: Primary | ICD-10-CM

## 2025-05-23 DIAGNOSIS — M54.12 CERVICAL RADICULOPATHY: ICD-10-CM

## 2025-05-23 DIAGNOSIS — M54.2 NECK PAIN: ICD-10-CM

## 2025-05-23 DIAGNOSIS — M25.562 LEFT KNEE PAIN, UNSPECIFIED CHRONICITY: Primary | ICD-10-CM

## 2025-05-23 DIAGNOSIS — G89.29 CHRONIC BILATERAL LOW BACK PAIN WITHOUT SCIATICA: Primary | ICD-10-CM

## 2025-05-23 DIAGNOSIS — M79.18 MYOFASCIAL PAIN: ICD-10-CM

## 2025-05-23 PROCEDURE — 98941 CHIROPRACT MANJ 3-4 REGIONS: CPT | Performed by: CHIROPRACTOR

## 2025-05-23 PROCEDURE — 97530 THERAPEUTIC ACTIVITIES: CPT | Performed by: PHYSICAL THERAPIST

## 2025-05-23 PROCEDURE — 97110 THERAPEUTIC EXERCISES: CPT | Performed by: PHYSICAL THERAPIST

## 2025-05-23 PROCEDURE — 97140 MANUAL THERAPY 1/> REGIONS: CPT | Performed by: PHYSICAL THERAPIST

## 2025-05-23 PROCEDURE — 99203 OFFICE O/P NEW LOW 30 MIN: CPT | Performed by: CHIROPRACTOR

## 2025-05-23 NOTE — PROGRESS NOTES
"Daily Note     Today's date: 2025  Patient name: Ronn Prather  : 1975  MRN: [id number]  Referring provider: Krysta Watson MD  Dx:   Encounter Diagnosis     ICD-10-CM    1. Left knee pain, unspecified chronicity  M25.562       2. Right knee pain, unspecified chronicity  M25.561           Start Time: 705  Stop Time: 745  Total time in clinic (min): 40 minutes    Subjective: Patient reports she is doing a lot more around the house and at work for longer periods of time before she notices the tightness or soreness in her knees. 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      Objective: See treatment diary below      Assessment: Tolerated treatment well. Patient noted to have improved gait pattern and control with leann walkouts. Able to tolerate higher resistance with TKEs. Added SL RDLs with cone taps to improve LE co contraction and stability. Patient exhibited good technique with therapeutic exercises and would benefit from continued PT      Plan: Continue per plan of care.  Progress treatment as tolerated.          POC Expires Auth Status Start Date Expiration Date PT Visit Limit    2025 After 24th visit   BOMN   Date 2025   Used 20 21 22 23 19   Remaining           Diagnosis: chronic bilateral knee pain   Precautions: possible RA; hx of arthroscopic surgery bilat knees; R tib fx ORIF (2018); depression, OA   Next Physician's Appt:   [employer] HEP:   Manuals    STM/IASTM   DK, IASTM bilat distal quad DK, IASTM bilat distal quad    Ankle JMs        Laser        EPAT        Taping? Teddy CLEMENTS bilat patella medial glide Teddy SOTO  B/NABIL med glireza Espinal                    Neuro Re-Ed        Supine SLR        BKFO/clams        Bridges on SB        Prone knee bends        Wobble Board  2 mins ea Squats on wobble board 2x10 ea  2 min ea   SLS on foam  30\"x3 ea   30\"x3 ea   TKEs " Ja 8# x20 ea   Ja 17.5# x20 ea    SL RDL    2 cone tap on stool x5 ea    [place]     Green 15x ea B/L   Ther Ex        Calf stretch        Prone quad stretch        HS stretch        DKTC w/ ball        Hip flexor stretch        HR/TR                         Ther Activity        Bike for LE mobility NuStep Lv 5x 5 mins NuStep Lv 5 min Lv 5 NuStep Lv 5 min Lv 5 NuStep Lv 5 min Lv 5 NuStep Lv 5x 5 mins   Mini Squats Decline w/ wedge 2x10       Wall sits with palloff press        Step Ups        Leg Press  # 3x10  Seat 5 # 3x10  Seat 5 # 3x10  Seat 5 # 3x10  Seat 5   SL Leg Press  55# 3x10 ea 55# 3x10 ea 60# 3x10 ea 55# 3x10 ea   Ja walkouts F/B 10# x7 laps  B/F 15# x8 laps B/F 15# x8 laps    X-walks  GTB 2x blue line   GTB 2x blue line   Pt Ed tape    F/O ortho   Re-Evaluation        Modalities        Heat/ice (PRN)         TENS

## 2025-05-27 ENCOUNTER — OFFICE VISIT (OUTPATIENT)
Dept: PHYSICAL THERAPY | Facility: CLINIC | Age: 50
End: 2025-05-27
Payer: COMMERCIAL

## 2025-05-27 DIAGNOSIS — M25.561 RIGHT KNEE PAIN, UNSPECIFIED CHRONICITY: ICD-10-CM

## 2025-05-27 DIAGNOSIS — M25.562 LEFT KNEE PAIN, UNSPECIFIED CHRONICITY: Primary | ICD-10-CM

## 2025-05-27 PROCEDURE — 97140 MANUAL THERAPY 1/> REGIONS: CPT

## 2025-05-27 PROCEDURE — 97530 THERAPEUTIC ACTIVITIES: CPT

## 2025-05-27 NOTE — PROGRESS NOTES
Daily Note     Today's date: 2025  Patient name: Ronn Prather  : 1975  MRN: [id number]  Referring provider: Krysta Watson MD  Dx:   Encounter Diagnosis     ICD-10-CM    1. Left knee pain, unspecified chronicity  M25.562       2. Right knee pain, unspecified chronicity  M25.561                      Subjective: Pt states that she was really busy over the weekend and was not in excruciating pain with her knee so that was good.  She reports that this morning her knee didn't feel so tight as sometimes.        Objective: See treatment diary below      Assessment: Tolerated treatment well.  Continued with progressions in hip and knee strengthening as indicated.  Pt was challenged with SL RDL and muscle fatigue was noted.  Multiple cues needed for goblet squats for proper technique, to avoid knees over toes and to keep her chest up.  Fair carryover is noted.  Minimal discomfort noted in L knee with goblet squats.  IASTM to B/L distal quads to improve blood flow and decrease muscle tightness.  Pt progressing slowly towards her goals and will benefit from continued therapy.        Plan: Continue per plan of care.         POC Expires Auth Status Start Date Expiration Date PT Visit Limit    2025 After 24th visit   BOMN   Date 2025   Used 20 21 22 23 24   Remaining           Diagnosis: chronic bilateral knee pain   Precautions: possible RA; hx of arthroscopic surgery bilat knees; R tib fx ORIF (2018); depression, OA   Next Physician's Appt:   [employer] HEP:   Manuals    STM/IASTM   DK, IASTM bilat distal quad DK, IASTM bilat distal quad TH, IASTM B/L  Distal quad   Ankle JMs        Laser        EPAT        Taping? STARR, Teddy bilat patella medial glide TH, Teddy  B/L med glide                      Neuro Re-Ed        Supine SLR        BKFO/clams        Bridges on SB        Prone knee bends        Wobble Board  2 mins ea Squats on  "wobble board 2x10 ea     SLS on foam  30\"x3 ea      TKEs [place] 8# x20 ea   Ja 17.5# x20 ea    SL RDL    2 cone tap on stool x5 ea 5# KB 10x ea   Storks        Ther Ex        Calf stretch        Prone quad stretch        HS stretch        DKTC w/ ball        Hip flexor stretch        HR/TR                         Ther Activity        Bike for LE mobility NuStep Lv 5x 5 mins NuStep Lv 5 min Lv 5 NuStep Lv 5 min Lv 5 NuStep Lv 5 min Lv 5 NuStep Lv 7x 5 mins   Mini Squats Decline w/ wedge 2x10    Goblet squat to chair 5# 10x2 cues!   Wall sits with palloff press        Step Ups        Leg Press  # 3x10  Seat 5 # 3x10  Seat 5 # 3x10  Seat 5 # 3x10  Seat 5   SL Leg Press  55# 3x10 ea 55# 3x10 ea 60# 3x10 ea 65# 3x10 ea   Ja walkouts F/B 10# x7 laps  B/F 15# x8 laps B/F 15# x8 laps Bckwds 15# 8x   X-walks  GTB 2x blue line   GTB 2x blue line   Pt Ed tape       Re-Evaluation        Modalities        Heat/ice (PRN)         TENS                                     "

## 2025-05-30 ENCOUNTER — APPOINTMENT (OUTPATIENT)
Dept: PHYSICAL THERAPY | Facility: CLINIC | Age: 50
End: 2025-05-30
Payer: COMMERCIAL

## 2025-06-02 ENCOUNTER — APPOINTMENT (OUTPATIENT)
Dept: PHYSICAL THERAPY | Facility: CLINIC | Age: 50
End: 2025-06-02
Payer: COMMERCIAL

## 2025-06-04 ENCOUNTER — EVALUATION (OUTPATIENT)
Dept: PHYSICAL THERAPY | Facility: CLINIC | Age: 50
End: 2025-06-04
Payer: COMMERCIAL

## 2025-06-04 DIAGNOSIS — M25.562 LEFT KNEE PAIN, UNSPECIFIED CHRONICITY: Primary | ICD-10-CM

## 2025-06-04 DIAGNOSIS — M25.561 RIGHT KNEE PAIN, UNSPECIFIED CHRONICITY: ICD-10-CM

## 2025-06-04 PROCEDURE — 97140 MANUAL THERAPY 1/> REGIONS: CPT | Performed by: PHYSICAL THERAPIST

## 2025-06-04 PROCEDURE — 97530 THERAPEUTIC ACTIVITIES: CPT | Performed by: PHYSICAL THERAPIST
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
## 2025-06-10 ENCOUNTER — OFFICE VISIT (OUTPATIENT)
Dept: PHYSICAL THERAPY | Facility: CLINIC | Age: 50
End: 2025-06-10
Payer: COMMERCIAL

## 2025-06-10 DIAGNOSIS — M25.562 LEFT KNEE PAIN, UNSPECIFIED CHRONICITY: Primary | ICD-10-CM

## 2025-06-10 DIAGNOSIS — M25.561 RIGHT KNEE PAIN, UNSPECIFIED CHRONICITY: ICD-10-CM

## 2025-06-10 PROCEDURE — 97140 MANUAL THERAPY 1/> REGIONS: CPT

## 2025-06-10 PROCEDURE — 97112 NEUROMUSCULAR REEDUCATION: CPT

## 2025-06-10 PROCEDURE — 97110 THERAPEUTIC EXERCISES: CPT
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
## 2025-06-11 ENCOUNTER — OFFICE VISIT (OUTPATIENT)
Dept: GASTROENTEROLOGY | Facility: CLINIC | Age: 50
End: 2025-06-11
Payer: COMMERCIAL

## 2025-06-11 VITALS
BODY MASS INDEX: 26.21 KG/M2 | HEIGHT: 67 IN | DIASTOLIC BLOOD PRESSURE: 86 MMHG | HEART RATE: 67 BPM | WEIGHT: 167 LBS | SYSTOLIC BLOOD PRESSURE: 129 MMHG

## 2025-06-11 DIAGNOSIS — K31.7 GASTRIC POLYPS: ICD-10-CM

## 2025-06-11 DIAGNOSIS — K29.70 GASTRITIS WITHOUT BLEEDING, UNSPECIFIED CHRONICITY, UNSPECIFIED GASTRITIS TYPE: Primary | ICD-10-CM

## 2025-06-11 DIAGNOSIS — Z86.0100 HX OF COLONIC POLYPS: ICD-10-CM

## 2025-06-11 PROCEDURE — 99213 OFFICE O/P EST LOW 20 MIN: CPT | Performed by: NURSE PRACTITIONER

## 2025-06-11 NOTE — ASSESSMENT & PLAN NOTE
History of gastric polyps.  EGD done 5/2/2025 with 12 gastric polyps removed.  Biopsy showed fundic gland polyps.  No dysplasia.

## 2025-06-11 NOTE — ASSESSMENT & PLAN NOTE
Patient has history of colon polyps. Colonoscopy done 8/3/2021 showed 1 small polyp removed.  Left-sided diverticulosis.  Polyp biopsy hyperplastic polyp.  Surveillance colonoscopy due 8/2031

## 2025-06-11 NOTE — PROGRESS NOTES
Name: Ronn Prather      : 1975      MRN: [id number]  Encounter Provider: STEPHANIE Treadwell  Encounter Date: 2025   Encounter department: [facility] GASTROENTEROLOG[facility] [place] [address]  :  Assessment & Plan  Gastritis without bleeding, unspecified chronicity, unspecified gastritis type  EGD done 2025 showed irregular Z-line.  Mild, patchy abnormal mucosa with erosion.  Duodenum normal.  Biopsy showed mild inactive gastritis.  Patient currently denies any GI symptoms.  Continue antireflux diet and measures  May take Pepcid as needed   Gastric polyps  History of gastric polyps.  EGD done 2025 with 12 gastric polyps removed.  Biopsy showed fundic gland polyps.  No dysplasia.       Hx of colonic polyps  Patient has history of colon polyps. Colonoscopy done 8/3/2021 showed 1 small polyp removed.  Left-sided diverticulosis.  Polyp biopsy hyperplastic polyp.  Surveillance colonoscopy due 2031     Follow-up in 1 year    History of Present Illness   HPI  Ronn Prather is a 49 y.o. female who presents to office for follow-up.  Results of EGD and biopsy reviewed with patient.  Patient denies nausea, vomiting, acid reflux, heartburn, epigastric or abdominal pain.  Patient denies blood in stool, blood from rectal area, or black tarry stool.  Currently bowel patterns are regular.  Denies any current constipation.  Abdomen exam positive for mild tenderness in lower abdomen.      EGD done 2025 showed irregular Z-line.  Mild, patchy abnormal mucosa with erosion.  Stomach polyps x 12 removed.  Duodenum normal.  Biopsy showed mild inactive gastritis and fundic gland polyps x 12. Colonoscopy done 2021 showed 1 small polyp. Left-sided diverticulosis. Biopsy showed hyperplastic polyp.     Review of Systems   Constitutional:  Negative for chills and fever.   HENT:  Negative for ear pain and sore throat.    Eyes:  Negative for pain and visual disturbance.   Respiratory:  Negative for  "cough and shortness of breath.    Cardiovascular:  Negative for chest pain and palpitations.   Gastrointestinal:  Negative for abdominal pain and vomiting.   Genitourinary:  Negative for dysuria and hematuria.   Musculoskeletal:  Negative for arthralgias and back pain.   Skin:  Negative for color change and rash.   Neurological:  Negative for seizures and syncope.   All other systems reviewed and are negative.    Medical History Reviewed by provider this encounter:     .  Past Medical History   Past Medical History[1]  Past Surgical History[2]  Family History[3]   reports that she has never smoked. She has been exposed to tobacco smoke. She has never used smokeless tobacco. She reports current alcohol use. She reports that she does not use drugs.  Current Outpatient Medications   Medication Instructions    amitriptyline (ELAVIL) 150 mg, Oral, Daily at bedtime    busPIRone (BUSPAR) 5 mg, Oral, 2 times daily    cyanocobalamin 1,000 mcg, Intramuscular, Every 30 days    ergocalciferol (VITAMIN D2) 50,000 Units, Oral, Weekly    famotidine (PEPCID) 20 mg, Oral, 2 times daily    SYRINGE-NEEDLE, DISP, 3 ML 25G X 1\" 3 ML MISC Does not apply, Every 30 days    Vitamin D3 2,000 Units, Oral, Daily   Allergies[4]   Medications Ordered Prior to Encounter[5]   Social History[6]     Objective   /86 (BP Location: Left arm, Patient Position: Sitting, Cuff Size: Standard)   Pulse 67   Ht 5' 6.5\" (1.689 m)   Wt 75.8 kg (167 lb)   LMP 12/04/2022 (Exact Date)   BMI 26.55 kg/m²      Physical Exam  Vitals and nursing note reviewed.   Constitutional:       General: She is not in acute distress.     Appearance: She is well-developed.   HENT:      Head: Normocephalic and atraumatic.     Eyes:      Conjunctiva/sclera: Conjunctivae normal.       Cardiovascular:      Rate and Rhythm: Normal rate and regular rhythm.      Pulses: Normal pulses.      Heart sounds: Normal heart sounds. No murmur heard.  Pulmonary:      Effort: Pulmonary " effort is normal. No respiratory distress.      Breath sounds: Normal breath sounds. No stridor. No wheezing, rhonchi or rales.   Abdominal:      General: Bowel sounds are normal. There is no distension.      Palpations: Abdomen is soft. There is no mass.      Tenderness: There is no abdominal tenderness. There is no guarding or rebound.     Musculoskeletal:         General: No swelling.      Cervical back: Neck supple.      Right lower leg: No edema.      Left lower leg: No edema.     Skin:     General: Skin is warm and dry.      Capillary Refill: Capillary refill takes less than 2 seconds.      Coloration: Skin is not jaundiced or pale.     Neurological:      Mental Status: She is alert and oriented to person, place, and time.     Psychiatric:         Mood and Affect: Mood normal.              [1]   Past Medical History:  Diagnosis Date    Abnormal Pap smear of cervix     Anemia     Arthritis     Depression     Edema     Fatigue     Kidney stone     Vitamin B12 deficiency     Vitamin D deficiency    [2]   Past Surgical History:  Procedure Laterality Date    BREAST BIOPSY  10/24/2019    CHOLECYSTECTOMY      ENDOMETRIAL ABLATION      KNEE ARTHROSCOPY Bilateral     KNEE SURGERY      ORIF TIBIA FRACTURE Right 01/18/2018    Procedure: OPEN REDUCTION W/ INTERNAL FIXATION (ORIF) TIBIA;  Surgeon: Konstantin Todd MD;  Location: [facility];  Service: Orthopedics    ORIF TIBIAL PLATEAU Right 01/18/2018    Procedure: OPEN REDUCTION W/ INTERNAL FIXATION (ORIF) TIBIAL PLATEAU;  Surgeon: Konstantin Todd MD;  Location: [facility];  Service: Orthopedics    AL LAPAROSCOPY SURG CHOLECYSTECTOMY N/A 08/12/2021    Procedure: LAPAROSCOPIC CHOLECYSTECTOMY, UMBILICAL HERNIA REPAIR;  Surgeon: Mariusz Soto MD;  Location: [facility];  Service: General    AL REMOVAL IMPLANT DEEP Right 08/02/2018    Procedure: REMOVAL HARDWARE TIBIA;  Surgeon: Konstantin Todd MD;  Location: [facility];  Service: Orthopedics    TUBAL LIGATION      US GUIDED  BREAST BIOPSY LEFT COMPLETE Left 10/24/2019   [3]   Family History  Problem Relation Name Age of Onset    Cancer Mother Mom         either cervical or ovarian    Heart disease Mother Mom         heart surgery    Heart attack Father Dad         stent    Diabetes Father Dad     Hypertension Father Dad     Cancer Father Dad 58        Stomach and Lung Cancer    Rheum arthritis Daughter      No Known Problems Daughter      Lupus Maternal Grandmother      No Known Problems Maternal Grandfather      No Known Problems Paternal Grandmother      No Known Problems Paternal Grandfather      No Known Problems Son      No Known Problems Maternal Aunt      No Known Problems Maternal Aunt      No Known Problems Maternal Aunt      No Known Problems Paternal Aunt      Lupus Cousin maternal     Arthritis Family      Stomach cancer Family      Ovarian cancer Family      Alcohol abuse Neg Hx      Depression Neg Hx      Drug abuse Neg Hx      Substance Abuse Neg Hx      Mental illness Neg Hx      Breast cancer Neg Hx      Colon cancer Neg Hx     [4] No Known Allergies  [5]   Current Outpatient Medications on File Prior to Visit   Medication Sig Dispense Refill    amitriptyline (ELAVIL) 150 MG tablet Take 1 tablet (150 mg total) by mouth daily at bedtime 90 tablet 1    busPIRone (BUSPAR) 5 mg tablet Take 1 tablet (5 mg total) by mouth 2 (two) times a day 60 tablet 2    Cholecalciferol (Vitamin D3) 50 MCG (2000 UT) capsule Take 1 capsule (2,000 Units total) by mouth daily 90 capsule 1    cyanocobalamin 1,000 mcg/mL Inject 1 mL (1,000 mcg total) into a muscle every 30 (thirty) days 10 mL 1    ergocalciferol (VITAMIN D2) 50,000 units Take 1 capsule (50,000 Units total) by mouth once a week 12 capsule 0    famotidine (PEPCID) 20 mg tablet Take 1 tablet (20 mg total) by mouth 2 (two) times a day (Patient taking differently: Take 20 mg by mouth in the morning and 20 mg in the evening. Take as needed.) 60 tablet 5    SYRINGE-NEEDLE, DISP, 3 ML  "25G X 1\" 3 ML MISC Use every 30 (thirty) days 50 each 0     No current facility-administered medications on file prior to visit.   [6]   Social History  Tobacco Use    Smoking status: Never     Passive exposure: Past    Smokeless tobacco: Never   Vaping Use    Vaping status: Never Used   Substance and Sexual Activity    Alcohol use: Yes     Comment: Occassional    Drug use: No    Sexual activity: Yes     Partners: Male     Birth control/protection: Condom Male, Female Sterilization     "

## 2025-06-11 NOTE — PATIENT INSTRUCTIONS
Feel like you are backed up in stool and need to cleanse out may use 238 g of MiraLAX mixed in 64 ounces of Gatorade and drink 1 glass every 15 minutes until completed  Continue to avoid trigger foods caffeine, carbonated beverages, spicy fatty foods and tomato-based products may have 1-2 8 ounce cups of coffee daily  Continue famotidine as needed

## 2025-06-11 NOTE — ASSESSMENT & PLAN NOTE
EGD done 5/2/2025 showed irregular Z-line.  Mild, patchy abnormal mucosa with erosion.  Duodenum normal.  Biopsy showed mild inactive gastritis.  Patient currently denies any GI symptoms.  Continue antireflux diet and measures  May take Pepcid as needed

## 2025-06-13 ENCOUNTER — OFFICE VISIT (OUTPATIENT)
Dept: PHYSICAL THERAPY | Facility: CLINIC | Age: 50
End: 2025-06-13
Payer: COMMERCIAL

## 2025-06-13 ENCOUNTER — PROCEDURE VISIT (OUTPATIENT)
Age: 50
End: 2025-06-13
Payer: COMMERCIAL

## 2025-06-13 ENCOUNTER — OFFICE VISIT (OUTPATIENT)
Dept: OBGYN CLINIC | Facility: CLINIC | Age: 50
End: 2025-06-13
Payer: COMMERCIAL

## 2025-06-13 ENCOUNTER — APPOINTMENT (OUTPATIENT)
Dept: RADIOLOGY | Facility: AMBULARY SURGERY CENTER | Age: 50
End: 2025-06-13
Attending: STUDENT IN AN ORGANIZED HEALTH CARE EDUCATION/TRAINING PROGRAM
Payer: COMMERCIAL

## 2025-06-13 VITALS — WEIGHT: 167.99 LBS | BODY MASS INDEX: 26.37 KG/M2 | HEIGHT: 67 IN

## 2025-06-13 VITALS — BODY MASS INDEX: 26.37 KG/M2 | WEIGHT: 168 LBS | HEIGHT: 67 IN

## 2025-06-13 DIAGNOSIS — M54.12 CERVICAL RADICULOPATHY: ICD-10-CM

## 2025-06-13 DIAGNOSIS — M79.18 MYOFASCIAL PAIN: ICD-10-CM

## 2025-06-13 DIAGNOSIS — M25.561 RIGHT KNEE PAIN, UNSPECIFIED CHRONICITY: ICD-10-CM

## 2025-06-13 DIAGNOSIS — G89.29 CHRONIC BILATERAL LOW BACK PAIN WITHOUT SCIATICA: Primary | ICD-10-CM

## 2025-06-13 DIAGNOSIS — M25.561 PAIN IN BOTH KNEES, UNSPECIFIED CHRONICITY: ICD-10-CM

## 2025-06-13 DIAGNOSIS — M25.562 LEFT KNEE PAIN, UNSPECIFIED CHRONICITY: Primary | ICD-10-CM

## 2025-06-13 DIAGNOSIS — M25.562 PAIN IN BOTH KNEES, UNSPECIFIED CHRONICITY: ICD-10-CM

## 2025-06-13 DIAGNOSIS — M17.12 PRIMARY OSTEOARTHRITIS OF LEFT KNEE: ICD-10-CM

## 2025-06-13 DIAGNOSIS — M17.31 POST-TRAUMATIC OSTEOARTHRITIS OF RIGHT KNEE: Primary | ICD-10-CM

## 2025-06-13 DIAGNOSIS — M54.2 NECK PAIN: ICD-10-CM

## 2025-06-13 DIAGNOSIS — M54.50 CHRONIC BILATERAL LOW BACK PAIN WITHOUT SCIATICA: Primary | ICD-10-CM

## 2025-06-13 PROCEDURE — 98942 CHIROPRACTIC MANJ 5 REGIONS: CPT | Performed by: CHIROPRACTOR

## 2025-06-13 PROCEDURE — 97112 NEUROMUSCULAR REEDUCATION: CPT

## 2025-06-13 PROCEDURE — 73564 X-RAY EXAM KNEE 4 OR MORE: CPT

## 2025-06-13 PROCEDURE — 20610 DRAIN/INJ JOINT/BURSA W/O US: CPT

## 2025-06-13 PROCEDURE — 97110 THERAPEUTIC EXERCISES: CPT

## 2025-06-13 PROCEDURE — 99204 OFFICE O/P NEW MOD 45 MIN: CPT | Performed by: STUDENT IN AN ORGANIZED HEALTH CARE EDUCATION/TRAINING PROGRAM

## 2025-06-13 PROCEDURE — 97140 MANUAL THERAPY 1/> REGIONS: CPT

## 2025-06-13 RX ORDER — BUPIVACAINE HYDROCHLORIDE 2.5 MG/ML
4 INJECTION, SOLUTION INFILTRATION; PERINEURAL
Status: COMPLETED | OUTPATIENT
Start: 2025-06-13 | End: 2025-06-13

## 2025-06-13 RX ORDER — TRIAMCINOLONE ACETONIDE 40 MG/ML
40 INJECTION, SUSPENSION INTRA-ARTICULAR; INTRAMUSCULAR
Status: COMPLETED | OUTPATIENT
Start: 2025-06-13 | End: 2025-06-13

## 2025-06-13 RX ORDER — CELECOXIB 100 MG/1
100 CAPSULE ORAL 2 TIMES DAILY
Qty: 60 CAPSULE | Refills: 0 | Status: SHIPPED | OUTPATIENT
Start: 2025-06-13 | End: 2025-07-13

## 2025-06-13 RX ADMIN — TRIAMCINOLONE ACETONIDE 40 MG: 40 INJECTION, SUSPENSION INTRA-ARTICULAR; INTRAMUSCULAR at 08:30

## 2025-06-13 RX ADMIN — BUPIVACAINE HYDROCHLORIDE 4 ML: 2.5 INJECTION, SOLUTION INFILTRATION; PERINEURAL at 08:30

## 2025-06-13 NOTE — ASSESSMENT & PLAN NOTE
X-rays obtained in office were reviewed in office  Explained to the patient that she has posttraumatic osteoarthritis of the right knee following tibial plateau fracture with open duction fixation and subsequent hardware removal.  I do not feel she candidate for any arthroscopic intervention at this time recommend continued nonoperative treatment versus consideration of total knee arthroplasty.  She has not had significant relief with corticosteroid injection, viscosupplement injection, or physical therapy.  Recommended and placed referral to Dr. Jacobs to discuss possibility of total knee arthroplasty given failure of conservative treatment.  Patient is not sure if she is yet ready to proceed however I recommended a visit to establish care and discuss options  Recommended corticosteroid injection which was given without complication  Recommended Celebrex for further pain control, order was sent to   Follow up as needed   Orders:    Ambulatory Referral to Orthopedic Surgery; Future

## 2025-06-13 NOTE — PROGRESS NOTES
"Daily Note     Today's date: 2025  Patient name: Ronn Prather  : 1975  MRN: [id number]  Referring provider: Krysta Watson MD  Dx:   Encounter Diagnosis     ICD-10-CM    1. Left knee pain, unspecified chronicity  M25.562       2. Right knee pain, unspecified chronicity  M25.561           Start Time: 703  Stop Time: 745  Total time in clinic (min): 42 minutes    Subjective: Patient reports that she has stiffness and mild discomfort into bilateral knees presently.       Objective: See treatment diary below      Assessment: Added PB Bridges with good tolerance and appropriate challenge. She appears to tolerate treatment well. Palpable tightness and restricting in L>R distal quad/ITB areas. Patient demonstrated fatigue post treatment, exhibited good technique with therapeutic exercises, and would benefit from continued PT      Plan: Continue per plan of care.  Progress treatment as tolerated.          POC Expires Auth Status Start Date Expiration Date PT Visit Limit    2025 After 24th visit   BOMN   Date 2025 6/10/2025 6/13/25 2025 2025   Used 10 11 12 8 9   Remaining           Diagnosis: chronic bilateral knee pain   Precautions: possible RA; hx of arthroscopic surgery bilat knees; R tib fx ORIF (2018); depression, OA   Next Physician's Appt:   Andrea HEP:   Manuals 6/4 6/10 6/13  5/27   STM/IASTM DK, IASTM bilat distal quad BRAYDEN, IASTM bilat distal quad JL, IASTM bilat distal quad/ITB  TH, IASTM B/L  Distal quad   Ankle JMs        Laser        EPAT        Taping?                        Neuro Re-Ed        Supine SLR        BKFO/clams        Bridges on SB   5\"x20     Prone knee bends        Wobble Board        SLS on foam        TKEs  Ja 17.5# x20 ea  Ja 17.5# x20 ea      SL RDL  5# KB 12x ea to step and finger tip support  NV  5# KB 10x ea   Storks        Ther Ex        Calf stretch        Prone quad stretch        HS stretch        DKTC w/ ball        Hip flexor " stretch        HR/TR                         Ther Activity        Bike for LE mobility NuStep Lv 5 min Lv 5 Bike 5 min Lv 5 Upright Bike L4 5min   NuStep Lv 7x 5 mins   Mini Squats  Goblet squat to chair+airex  5# 2x10  Goblet squat to chair+airex  5# 2x10   Goblet squat to chair 5# 10x2 cues!   Wall sits with palloff press        Step Ups        Leg Press # 3x10  Seat 5 # 1x10        115# 2x10  Seat 5 # 3x10   seat 5  # 3x10  Seat 5   SL Leg Press 55# 3x10 ea 55# 3x10 ea 65# 3x10 ea  65# 3x10 ea   [place] walkouts     Bckwds 15# 8x   X-walks  GTB 2x blue line  GTB 2x blue line  GTB 2x blue line   Pt Ed POC       Re-Evaluation DK       Modalities        Heat/ice (PRN)         TENS

## 2025-06-13 NOTE — PROGRESS NOTES
"[facility] [place]  [address]  [address]  [place] [id number] [phone]       Ronn Prather  [id number]  1975    ORTHOPAEDIC SURGERY OUTPATIENT NOTE  6/13/2025      Assessment & Plan  Post-traumatic osteoarthritis of right knee  X-rays obtained in office were reviewed in office  Explained to the patient that she has posttraumatic osteoarthritis of the right knee following tibial plateau fracture with open duction fixation and subsequent hardware removal.  I do not feel she candidate for any arthroscopic intervention at this time recommend continued nonoperative treatment versus consideration of total knee arthroplasty.  She has not had significant relief with corticosteroid injection, viscosupplement injection, or physical therapy.  Recommended and placed referral to Dr. Jacobs to discuss possibility of total knee arthroplasty given failure of conservative treatment.  Patient is not sure if she is yet ready to proceed however I recommended a visit to establish care and discuss options  Recommended corticosteroid injection which was given without complication  Recommended Celebrex for further pain control, order was sent to   Follow up as needed   Orders:    Ambulatory Referral to Orthopedic Surgery; Future    Primary osteoarthritis of left knee  X-ray of the left knee was reviewed with patient  Recommended corticosteroid injection which was given without complication and was well tolerated  Celebrex was ordered  Follow up as needed       Large joint arthrocentesis: L knee    Performed by: Christie Valerio PA-C  Authorized by: Anderson Ambrose DO    Universal Protocol:  Consent: Verbal consent obtained. Written consent not obtained  Risks and benefits: risks, benefits and alternatives were discussed  Consent given by: patient  Time out: Immediately prior to procedure a \"time out\" was called to verify the correct patient, procedure, equipment, " "support staff and site/side marked as required.  Timeout called at: 6/13/2025 10:05 AM.  Patient understanding: patient states understanding of the procedure being performed  Relevant documents: relevant documents present and verified  Test results: test results available and properly labeled  Site marked: the operative site was marked  Radiology Images displayed and confirmed. If images not available, report reviewed: imaging studies available  Patient identity confirmed: verbally with patient, provided demographic data and hospital-assigned identification number  Supporting Documentation  Indications: pain and joint swelling     Is this a Visco injection? NoProcedure Details  Location: knee - L knee  Preparation: Patient was prepped and draped in the usual sterile fashion  Needle size: 18 G  Ultrasound guidance: no  Approach: anterolateral  Medications administered: 4 mL bupivacaine 0.25 %; 40 mg triamcinolone acetonide 40 mg/mL    Patient tolerance: patient tolerated the procedure well with no immediate complications  Dressing:  Sterile dressing applied      Large joint arthrocentesis: R knee    Performed by: Christie Valerio PA-C  Authorized by: Anderson Ambrose DO    Universal Protocol:  Consent: Verbal consent obtained. Written consent not obtained  Risks and benefits: risks, benefits and alternatives were discussed  Consent given by: patient  Time out: Immediately prior to procedure a \"time out\" was called to verify the correct patient, procedure, equipment, support staff and site/side marked as required.  Timeout called at: 6/13/2025 10:05 AM.  Patient understanding: patient states understanding of the procedure being performed  Relevant documents: relevant documents present and verified  Test results: test results available and properly labeled  Site marked: the operative site was marked  Radiology Images displayed and confirmed. If images not available, report reviewed: imaging studies available  Patient " identity confirmed: verbally with patient, provided demographic data and hospital-assigned identification number  Supporting Documentation  Indications: pain and joint swelling     Is this a Visco injection? NoProcedure Details  Location: knee - R knee  Preparation: Patient was prepped and draped in the usual sterile fashion  Needle size: 18 G  Ultrasound guidance: no  Approach: anterolateral  Medications administered: 4 mL bupivacaine 0.25 %; 40 mg triamcinolone acetonide 40 mg/mL    Patient tolerance: patient tolerated the procedure well with no immediate complications  Dressing:  Sterile dressing applied             Translation: No    HISTORY:  49 y.o. female  who is present in office for evaluation of bilateral knee pain. She notes that she has been dealing with knee pain for years now, she notes that she had two prior surgeries on both knees. She is a  and has to bend and squat a lot which causes dull achy soreness and pain. She notes that she had visco injections in the knees which she notes that did not help, she does not recall if her prior corticosteroid injections gave relief. She denies any numbness or tingling or instability. Her pain is worse on the right and is rated to be a 5-6/10. She has tried physical therapy, gastrin, shock therapy all of which do not provide much relief. She notes that she has difficulty standing to cook at times and is looking for pain relief.     Surgical History:  2 pirior left knee arthroscopic menisectomies-unknown laterality, last being 2008 with Dr. Aleman  ORIF for tibial plateau fracture 1/18/2018 with Dr. Todd, Removal of tibia hardware 8/2/2018    Previous Injection(s): Right knee visco 6/4/21      The following portions of the patient's history were reviewed and updated as appropriate: allergies, current medications, past family history, past social history, past surgical history and problem list.    LMP 12/04/2022 (Exact Date)    Lab Results    Component Value Date    HGBA1C 5.7 (H) 11/01/2024         Past Medical History[1]    Past Surgical History[2]    Social History     Socioeconomic History    Marital status:      Spouse name: Not on file    Number of children: 1    Years of education: Not on file    Highest education level: Not on file   Occupational History    Occupation: Teacher in [place]   Tobacco Use    Smoking status: Never     Passive exposure: Past    Smokeless tobacco: Never   Vaping Use    Vaping status: Never Used   Substance and Sexual Activity    Alcohol use: Yes     Comment: Occassional    Drug use: No    Sexual activity: Yes     Partners: Male     Birth control/protection: Condom Male, Female Sterilization   Other Topics Concern    Not on file   Social History Narrative    Single    Daughter, grand daughter and son lives with her    Works part time- 3 days in the field, 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     Social Drivers of Health     Financial Resource Strain: Not on file   Food Insecurity: No Food Insecurity (5/9/2025)    Nursing - Inadequate Food Risk Classification     Worried About Running Out of Food in the Last Year: Never true     Ran Out of Food in the Last Year: Never true     Ran Out of Food in the Last Year: Not on file   Transportation Needs: No Transportation Needs (5/9/2025)    PRAPARE - Transportation     Lack of Transportation (Medical): No     Lack of Transportation (Non-Medical): No   Physical Activity: Not on file   Stress: Not on file   Social Connections: Not on file   Intimate Partner Violence: Not on file   Housing Stability: Low Risk  (5/9/2025)    Housing Stability Vital Sign     Unable to Pay for Housing in the Last Year: No     Number of Times Moved in the Last Year: 0     Homeless in the Last Year: No       Family History[3]     Patient's Medications   New Prescriptions    No medications on file   Previous Medications    AMITRIPTYLINE (ELAVIL) 150 MG TABLET    Take 1 tablet (150 mg total) by mouth daily at bedtime     "BUSPIRONE (BUSPAR) 5 MG TABLET    Take 1 tablet (5 mg total) by mouth 2 (two) times a day    CHOLECALCIFEROL (VITAMIN D3) 50 MCG (2000 UT) CAPSULE    Take 1 capsule (2,000 Units total) by mouth daily    CYANOCOBALAMIN 1,000 MCG/ML    Inject 1 mL (1,000 mcg total) into a muscle every 30 (thirty) days    ERGOCALCIFEROL (VITAMIN D2) 50,000 UNITS    Take 1 capsule (50,000 Units total) by mouth once a week    FAMOTIDINE (PEPCID) 20 MG TABLET    Take 1 tablet (20 mg total) by mouth 2 (two) times a day    SYRINGE-NEEDLE, DISP, 3 ML 25G X 1\" 3 ML MISC    Use every 30 (thirty) days   Modified Medications    No medications on file   Discontinued Medications    No medications on file       Allergies[4]       REVIEW OF SYSTEMS:  Constitutional: Negative.    HEENT: Negative.    Respiratory: Negative.    Skin: Negative.    Neurological: Negative.    Psychiatric/Behavioral: Negative.  Musculoskeletal: Negative except for that mentioned in the HPI.    Gen: No acute distress, resting comfortably in bed  HEENT: Eyes clear, moist mucus membranes, hearing intact  Respiratory: No audible wheezing or stridor  Cardiovascular: Well Perfused peripherally, 2+ distal pulse  Abdomen: nondistended, no peritoneal signs     PHYSICAL EXAM:      Bilateral Knee Exam  Alignment:    genu varus.  Inspection:  No swelling. No edema. No erythema.  Palpation:  +tenderness to medial and later joint line left knee, + medial joint line tenderness right knee  ROM:  0-120 with crepitus  Strength:  Not tested.  Stability:  (-) Varus instability. (-) Valgus instability. (-) Lachman. (-) Posterior drawer.  Tests:  (+) [place]ar.  Patella:  (-) J-sign. (-) Patellar apprehension. (-) Patellar tilt.Patella crepitus bilaterally  Neurovascular:   2+ DP & PT pulses.  Gait:  Normal.     IMAGING:  XR of bilateral knee - shows moderate to sever post-traumatic osteoarthritis, left knee shows moderate tricompartmental osteoarthritis without acute osseous abnormality. I  do " "not currently have a radiology reading from [facility], but will check the result once the reading is performed.          Christie Valerio PA-C    Scribe Attestation      I,:   am acting as a scribe while in the presence of the attending physician.:       I,:   personally performed the services described in this documentation    as scribed in my presence.:               Portions of the record may have been created with voice recognition software.  Occasional wrong word or \"sound a like\" substitutions may have occurred due to the inherent limitations of voice recognition software.  Read the chart carefully and recognize, using context, where substitutions have occurred. All patient's questions were answered to their satisfaction.           [1]   Past Medical History:  Diagnosis Date    Abnormal Pap smear of cervix     Anemia     Arthritis     Depression     Edema     Fatigue     Kidney stone     Vitamin B12 deficiency     Vitamin D deficiency    [2]   Past Surgical History:  Procedure Laterality Date    BREAST BIOPSY  10/24/2019    CHOLECYSTECTOMY      ENDOMETRIAL ABLATION      KNEE ARTHROSCOPY Bilateral     KNEE SURGERY      ORIF TIBIA FRACTURE Right 01/18/2018    Procedure: OPEN REDUCTION W/ INTERNAL FIXATION (ORIF) TIBIA;  Surgeon: Konstantin Todd MD;  [facility];  Service: Orthopedics    ORIF TIBIAL PLATEAU Right 01/18/2018    Procedure: OPEN REDUCTION W/ INTERNAL FIXATION (ORIF) TIBIAL PLATEAU;  Surgeon: Konstantin Todd MD;  [facility];  Service: Orthopedics    NM LAPAROSCOPY SURG CHOLECYSTECTOMY N/A 08/12/2021    Procedure: LAPAROSCOPIC CHOLECYSTECTOMY, UMBILICAL HERNIA REPAIR;  Surgeon: Mariusz Soto MD;  Location[facility];  Service: General    NM REMOVAL IMPLANT DEEP Right 08/02/2018    Procedure: REMOVAL HARDWARE TIBIA;  Surgeon: Konstantin Todd MD;  [facility];  Service: Orthopedics    TUBAL LIGATION      US GUIDED BREAST BIOPSY LEFT COMPLETE Left 10/24/2019   [3]   Family " History  Problem Relation Name Age of Onset    Cancer Mother Mom         either cervical or ovarian    Heart disease Mother Mom         heart surgery    Heart attack Father Dad         stent    Diabetes Father Dad     Hypertension Father Dad     Cancer Father Dad 58        Stomach and Lung Cancer    Rheum arthritis Daughter      No Known Problems Daughter      Lupus Maternal Grandmother      No Known Problems Maternal Grandfather      No Known Problems Paternal Grandmother      No Known Problems Paternal Grandfather      No Known Problems Son      No Known Problems Maternal Aunt      No Known Problems Maternal Aunt      No Known Problems Maternal Aunt      No Known Problems Paternal Aunt      Lupus Cousin maternal     Arthritis Family      Stomach cancer Family      Ovarian cancer Family      Alcohol abuse Neg Hx      Depression Neg Hx      Drug abuse Neg Hx      Substance Abuse Neg Hx      Mental illness Neg Hx      Breast cancer Neg Hx      Colon cancer Neg Hx     [4] No Known Allergies

## 2025-06-17 ENCOUNTER — OFFICE VISIT (OUTPATIENT)
Dept: PHYSICAL THERAPY | Facility: CLINIC | Age: 50
End: 2025-06-17
Payer: COMMERCIAL

## 2025-06-17 DIAGNOSIS — M25.562 LEFT KNEE PAIN, UNSPECIFIED CHRONICITY: Primary | ICD-10-CM

## 2025-06-17 DIAGNOSIS — M25.561 RIGHT KNEE PAIN, UNSPECIFIED CHRONICITY: ICD-10-CM

## 2025-06-17 PROCEDURE — 97530 THERAPEUTIC ACTIVITIES: CPT

## 2025-06-17 PROCEDURE — 97112 NEUROMUSCULAR REEDUCATION: CPT

## 2025-06-17 NOTE — PROGRESS NOTES
"Daily Note     Today's date: 2025  Patient name: Ronn Prather  : 1975  MRN: [id number]  Referring provider: Krysta Watson MD  Dx:   Encounter Diagnosis     ICD-10-CM    1. Left knee pain, unspecified chronicity  M25.562       2. Right knee pain, unspecified chronicity  M25.561                      Subjective: Pt states that her L knee is feeling better since she had injections in her knees.  L knee feels much better, R knee is better but still painful.  She will be seeing another surgeon in August for follow up.        Objective: See treatment diary below      Assessment: Tolerated treatment well.  Progressed pt with hip, knee and core strengthening as tolerated.  Able to increase reps with SL RDL with cues needed for full hip extension.  Mini lunges onto bosu with cues for small range and to maintain proper form with good carryover noted.  Fatigue noted with storks, cues for smaller quick movements.  IASTM to R knee only as L has no pain this morning since injection.  Pt progressing slowly towards her goals and will benefit from continued therapy.  Will progress as able.      Plan: Continue per plan of care.         POC Expires Auth Status Start Date Expiration Date PT Visit Limit    2025 After 24th visit   BOMN   Date 2025 6/10/2025 6/13/25 2025 2025   Used 10 11 12 13 9   Remaining           Diagnosis: chronic bilateral knee pain   Precautions: possible RA; hx of arthroscopic surgery bilat knees; R tib fx ORIF (2018); depression, OA   Next Physician's Appt:   [employer] HEP:   Manuals 6/4 6/10 6/13 6/17 5/27   STM/IASTM DK, IASTM bilat distal quad BRAYDEN, IASTM bilat distal quad JL, IASTM bilat distal quad/ITB TH, IASTM R distal quad TH, IASTM B/L  Distal quad   Ankle JMs        Laser        EPAT        Taping?                        Neuro Re-Ed        Supine SLR        BKFO/clams        Bridges on SB   5\"x20     Prone knee bends        Wobble Board        SLS on foam      "   TKEs  Ja 17.5# x20 ea  [place] 17.5# x20 ea      SL RDL  5# KB 12x ea to step and finger tip support  NV 5# 10x2 ea 5# KB 10x ea   Mini lunge onto bosu    10x ea cues    Storks    GTB 10x ea B/L    Ther Ex        Calf stretch        Prone quad stretch        HS stretch        DKTC w/ ball        Hip flexor stretch        HR/TR                         Ther Activity        Bike for LE mobility NuStep Lv 5 min Lv 5 Bike 5 min Lv 5 Upright Bike L4 5min  NuStep Lv7 5 mins NuStep Lv 7x 5 mins   Mini Squats  Goblet squat to chair+airex  5# 2x10  Goblet squat to chair+airex  5# 2x10   Goblet squat to chair 5# 10x2 cues!   Wall sits with palloff press        Step Ups        Leg Press # 3x10  Seat 5 # 1x10        115# 2x10  Seat 5 # 3x10   seat 5 # 3x10  Seat 5 # 3x10  Seat 5   SL Leg Press 55# 3x10 ea 55# 3x10 ea 65# 3x10 ea 65# 3x10 ea 65# 3x10 ea   [place] walkouts    W/bar 9# 5x ea Bckwds 15# 8x   X-walks  GTB 2x blue line  GTB 2x blue line  GTB 2x blue line   Pt Ed POC       Re-Evaluation DK       Modalities        Heat/ice (PRN)         TENS

## 2025-06-22 NOTE — PROGRESS NOTES
Assessment:   Diagnosis ICD-10-CM Associated Orders   1. Chronic bilateral low back pain without sciatica  M54.50     G89.29       2. Neck pain  M54.2       3. Myofascial pain  M79.18       4. Cervical radiculopathy  M54.12                No follow-ups on file.    Discussion:  Reviewed home program continue care    Treatment: 30292  Manipulations of the right innominate, sacrum, L5 L4 to drop maneuver followed by flexion distraction L4-L5 L5-S1.  Manipulation T4 C5 via seated stairstepping techniques well-tolerated.        HPI:  Ronn returns for treatment reports ongoing neck and lower back pain bilaterally 4-8 on a pain scale    The following portions of the patient's history were reviewed and updated as appropriate: allergies, current medications, past family history, past medical history, past social history, past surgical history, and problem list.    Review of Systems    Physical Exam:  Exam reveals pelvic liquidy leg activity quality biomechanical joint dysfunction present right sacroiliac joint L5-S1 L4-L5 motion units.    Myofascial involvement right periscapular region active trigger points noted T4-T5 C5-C6 segmental dysfunctions.

## 2025-06-22 NOTE — PROGRESS NOTES
Assessment:  Diagnoses and all orders for this visit:    Chronic bilateral low back pain without sciatica    Neck pain    Myofascial pain    Cervical radiculopathy       No follow-ups on file.    Discussion:  Neurologically stable mechanical and myofascial pain.  Trial of conservative care reassess in 4 weeks.    Treatment:  27269  Manipulation to the right innominate, sacrum, L5 via drop maneuver well-tolerated.  Manipulation T4 well-tolerated.  Pretreat MFR right shoulder blade        HPI:  Back Pain  This is a chronic problem. The current episode started more than 1 year ago. The problem occurs intermittently. The problem has been waxing and waning since onset. The pain is present in the lumbar spine and sacro-iliac. The quality of the pain is described as aching and shooting. The pain is at a severity of 5/10. The pain is moderate. The symptoms are aggravated by bending and twisting. She has tried home exercises and heat (Spinal cord stimulator) for the symptoms. The treatment provided mild relief.     Ronn Prather is a 49 y.o. female who presents for evaluation and possible treatment in regards to 2 distinct complaints.  She describes low back pain atraumatic in nature that has been ongoing for some time radiates down bilateral buttocks both hips and into the lower legs in a nondermatomal distribution.  She denies numbness or tingling or weakness in the lower extremity.  Pain is exacerbated by bending twisting and turning alleviated by rest.  Denies any recent known infection fever chills night sweats pain upon recumbency or changes in bowel bladder.    In addition she describes right shoulder blade pain stiffness tightness neck upper back intermittent throbbing symptoms into the right upper extremity.      Chief Complaint   Patient presents with   • Right Shoulder - Follow-up     Right shoulder and right arm numbness and soreness that is intermittent throbbing. Pain score 0     • Back Pain     Lower  lumbar pain that is intermittent pain that radiates down buttock, both hips down to feet. Pain score 4-5     Right leg is  tingling and numb at times and left leg does swell at times         Past Medical History[1]   Past Surgical History[2]  Family History[3]  Social History[4]  Current Medications[5]  Allergies as of 05/23/2025   • (No Known Allergies)       The following portions of the patient's history were reviewed and updated as appropriate: allergies, current medications, past family history, past medical history, past social history, past surgical history, and problem list.    Review of Systems   Constitutional: Negative.    Musculoskeletal:  Positive for back pain and myalgias.   Neurological: Negative.    Psychiatric/Behavioral: Negative.         Physical Exam:  Physical Exam  Vitals reviewed.   Constitutional:       Appearance: Normal appearance.     Cardiovascular:      Rate and Rhythm: Normal rate.   Pulmonary:      Effort: Pulmonary effort is normal.     Musculoskeletal:      Right shoulder: Tenderness present. Decreased range of motion.      Left shoulder: Normal.      Cervical back: Tenderness present. Pain with movement present. Decreased range of motion.      Thoracic back: Tenderness present. Decreased range of motion.      Lumbar back: Spasms and tenderness present. Decreased range of motion. Negative right straight leg raise test and negative left straight leg raise test.        Back:      Skin:     General: Skin is warm and dry.     Neurological:      General: No focal deficit present.      Mental Status: She is alert and oriented to person, place, and time.     Psychiatric:         Mood and Affect: Mood normal.         Behavior: Behavior normal.         Thought Content: Thought content normal.         Data Reviewed:  I have reviewed past medical notes.  I have reviewed diagnostics including a January 2023 MRI of thoracic spine which was essentially unremarkable.  Thoracolumbar x-rays in May  2023 revealed the presence of a spinal cord stimulator.               [1]  Past Medical History:  Diagnosis Date   • Abnormal Pap smear of cervix    • Anemia    • Arthritis    • Depression    • Edema    • Fatigue    • Kidney stone    • Vitamin B12 deficiency    • Vitamin D deficiency    [2]  Past Surgical History:  Procedure Laterality Date   • BREAST BIOPSY  10/24/2019   • CHOLECYSTECTOMY     • ENDOMETRIAL ABLATION     • KNEE ARTHROSCOPY Bilateral    • KNEE SURGERY     • ORIF TIBIA FRACTURE Right 01/18/2018    Procedure: OPEN REDUCTION W/ INTERNAL FIXATION (ORIF) TIBIA;  Surgeon: Konstantin Todd MD;  [facility];  Service: Orthopedics   • ORIF TIBIAL PLATEAU Right 01/18/2018    Procedure: OPEN REDUCTION W/ INTERNAL FIXATION (ORIF) TIBIAL PLATEAU;  Surgeon: Konstantin Todd MD;  [facility];  Service: Orthopedics   • IN LAPAROSCOPY SURG CHOLECYSTECTOMY N/A 08/12/2021    Procedure: LAPAROSCOPIC CHOLECYSTECTOMY, UMBILICAL HERNIA REPAIR;  Surgeon: Mariusz Soto MD;  [facility];  Service: General   • IN REMOVAL IMPLANT DEEP Right 08/02/2018    Procedure: REMOVAL HARDWARE TIBIA;  Surgeon: Konstantin Todd MD;  [facility];  Service: Orthopedics   • TUBAL LIGATION     • US GUIDED BREAST BIOPSY LEFT COMPLETE Left 10/24/2019   [3]  Family History  Problem Relation Name Age of Onset   • Cancer Mother Mom         either cervical or ovarian   • Heart disease Mother Mom         heart surgery   • Heart attack Father Dad         stent   • Diabetes Father Dad    • Hypertension Father Dad    • Cancer Father Dad 58        Stomach and Lung Cancer   • Rheum arthritis Daughter     • No Known Problems Daughter     • Lupus Maternal Grandmother     • No Known Problems Maternal Grandfather     • No Known Problems Paternal Grandmother     • No Known Problems Paternal Grandfather     • No Known Problems Son     • No Known Problems Maternal Aunt     • No Known Problems Maternal Aunt     • No Known Problems  Maternal Aunt     • No Known Problems Paternal Aunt     • Lupus Cousin maternal    • Arthritis Family     • Stomach cancer Family     • Ovarian cancer Family     • Alcohol abuse Neg Hx     • Depression Neg Hx     • Drug abuse Neg Hx     • Substance Abuse Neg Hx     • Mental illness Neg Hx     • Breast cancer Neg Hx     • Colon cancer Neg Hx     [4]  Social History  Socioeconomic History   • Marital status:    • Number of children: 1   Occupational History   • Occupation: Teacher in [place]   Tobacco Use   • Smoking status: Never     Passive exposure: Past   • Smokeless tobacco: Never   Vaping Use   • Vaping status: Never Used   Substance and Sexual Activity   • Alcohol use: Yes     Comment: Occassional   • Drug use: No   • Sexual activity: Yes     Partners: Male     Birth control/protection: Condom Male, Female Sterilization   Social History Narrative    Single    Daughter, grand daughter and son lives with her    Works part time- 3 days in the field, 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     Social Drivers of Health     Food Insecurity: No Food Insecurity (5/9/2025)    Nursing - Inadequate Food Risk Classification    • Worried About Running Out of Food in the Last Year: Never true    • Ran Out of Food in the Last Year: Never true   Transportation Needs: No Transportation Needs (5/9/2025)    PRAPARE - Transportation    • Lack of Transportation (Medical): No    • Lack of Transportation (Non-Medical): No   Housing Stability: Low Risk  (5/9/2025)    Housing Stability Vital Sign    • Unable to Pay for Housing in the Last Year: No    • Number of Times Moved in the Last Year: 0    • Homeless in the Last Year: No   [5]    Current Outpatient Medications:   •  amitriptyline (ELAVIL) 150 MG tablet, Take 1 tablet (150 mg total) by mouth daily at bedtime, Disp: 90 tablet, Rfl: 1  •  busPIRone (BUSPAR) 5 mg tablet, Take 1 tablet (5 mg total) by mouth 2 (two) times a day, Disp: 60 tablet, Rfl: 2  •  celecoxib (CeleBREX) 100 mg capsule, Take 1 capsule  "(100 mg total) by mouth 2 (two) times a day, Disp: 60 capsule, Rfl: 0  •  Cholecalciferol (Vitamin D3) 50 MCG (2000 UT) capsule, Take 1 capsule (2,000 Units total) by mouth daily, Disp: 90 capsule, Rfl: 1  •  cyanocobalamin 1,000 mcg/mL, Inject 1 mL (1,000 mcg total) into a muscle every 30 (thirty) days, Disp: 10 mL, Rfl: 1  •  ergocalciferol (VITAMIN D2) 50,000 units, Take 1 capsule (50,000 Units total) by mouth once a week, Disp: 12 capsule, Rfl: 0  •  famotidine (PEPCID) 20 mg tablet, Take 1 tablet (20 mg total) by mouth 2 (two) times a day (Patient taking differently: Take 20 mg by mouth in the morning and 20 mg in the evening. Take as needed.), Disp: 60 tablet, Rfl: 5  •  SYRINGE-NEEDLE, DISP, 3 ML 25G X 1\" 3 ML MISC, Use every 30 (thirty) days, Disp: 50 each, Rfl: 0"

## 2025-06-24 ENCOUNTER — EVALUATION (OUTPATIENT)
Dept: PHYSICAL THERAPY | Facility: CLINIC | Age: 50
End: 2025-06-24
Payer: COMMERCIAL

## 2025-06-24 DIAGNOSIS — M25.561 RIGHT KNEE PAIN, UNSPECIFIED CHRONICITY: ICD-10-CM

## 2025-06-24 DIAGNOSIS — M25.562 LEFT KNEE PAIN, UNSPECIFIED CHRONICITY: Primary | ICD-10-CM

## 2025-06-24 PROCEDURE — 97530 THERAPEUTIC ACTIVITIES: CPT | Performed by: PHYSICAL THERAPIST

## 2025-06-24 PROCEDURE — 97140 MANUAL THERAPY 1/> REGIONS: CPT | Performed by: PHYSICAL THERAPIST
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
## 2025-06-27 ENCOUNTER — PROCEDURE VISIT (OUTPATIENT)
Age: 50
End: 2025-06-27
Payer: COMMERCIAL

## 2025-06-27 VITALS
BODY MASS INDEX: 26.21 KG/M2 | SYSTOLIC BLOOD PRESSURE: 150 MMHG | WEIGHT: 167 LBS | HEIGHT: 67 IN | DIASTOLIC BLOOD PRESSURE: 90 MMHG | HEART RATE: 89 BPM

## 2025-06-27 DIAGNOSIS — M54.2 NECK PAIN: ICD-10-CM

## 2025-06-27 DIAGNOSIS — M54.50 CHRONIC BILATERAL LOW BACK PAIN WITHOUT SCIATICA: Primary | ICD-10-CM

## 2025-06-27 DIAGNOSIS — M79.18 MYOFASCIAL PAIN: ICD-10-CM

## 2025-06-27 DIAGNOSIS — M54.12 CERVICAL RADICULOPATHY: ICD-10-CM

## 2025-06-27 DIAGNOSIS — G89.29 CHRONIC BILATERAL LOW BACK PAIN WITHOUT SCIATICA: Primary | ICD-10-CM

## 2025-06-27 PROCEDURE — 98942 CHIROPRACTIC MANJ 5 REGIONS: CPT | Performed by: CHIROPRACTOR

## 2025-06-27 NOTE — PROGRESS NOTES
Assessment:   Diagnosis ICD-10-CM Associated Orders   1. Chronic bilateral low back pain without sciatica  M54.50     G89.29       2. Neck pain  M54.2       3. Myofascial pain  M79.18       4. Cervical radiculopathy  M54.12                No follow-ups on file.    Discussion:  Reviewed home program continue care    Treatment: 78879  Manipulations of the right innominate, sacrum, L5 L4 to drop maneuver followed by flexion distraction L4-L5 L5-S1.  Manipulation T4 C5 via seated stairstepping techniques well-tolerated.        HPI:  Ronn returns for treatment reports ongoing neck and lower back pain bilaterally.  Reports that her neck and upper back are feeling better no pain.  Not much in terms of back pain but does have right knee and leg pain 4 on a pain scale.    The following portions of the patient's history were reviewed and updated as appropriate: allergies, current medications, past family history, past medical history, past social history, past surgical history, and problem list.    Review of Systems    Physical Exam:  Exam reveals pelvic liquidy leg activity quality biomechanical joint dysfunction present right sacroiliac joint L5-S1 L4-L5 motion units.    Myofascial involvement right periscapular region active trigger points noted T4-T5 C5-C6 segmental dysfunctions.

## 2025-06-30 ENCOUNTER — TELEMEDICINE (OUTPATIENT)
Dept: BEHAVIORAL/MENTAL HEALTH CLINIC | Facility: CLINIC | Age: 50
End: 2025-06-30
Payer: COMMERCIAL

## 2025-06-30 DIAGNOSIS — F33.1 MODERATE EPISODE OF RECURRENT MAJOR DEPRESSIVE DISORDER (HCC): Primary | ICD-10-CM

## 2025-06-30 PROCEDURE — 90837 PSYTX W PT 60 MINUTES: CPT

## 2025-06-30 NOTE — PSYCH
"Virtual Regular VisitName: Ronn Prather      : 1975      MRN: [id number]  Encounter Provider: Lexie Rees  Encounter Date: 2025   Encounter department: [facility] [place]  :  Assessment & Plan  Moderate episode of recurrent major depressive disorder (HCC)             Goals addressed in session: Goal 1     DATA: Ronn joined session virtually noting life updates updates. She reports positive and negative changes that occurred. She reflected on the celebration of her sons graduation and going for his masters. She did report the possibility of divorce due to lack of change and accountability on her husbands part. Clinician and Ronn discussed hopes for the relationship and effective communication. During this session, this clinician used the following therapeutic modalities: Supportive Psychotherapy    Substance Abuse was not addressed during this session. If the client is diagnosed with a co-occurring substance use disorder, please indicate any changes in the frequency or amount of use: n/a. Stage of change for addressing substance use diagnoses: No substance use/Not applicable    ASSESSMENT:  Ronn presents with a Euthymic/ normal mood. Angelicas affect is Normal range and intensity, which is congruent, with their mood and the content of the session. The client has made progress on their goals as evidenced by wiling to prioritize self and engage in self care to manage negative moods .    Ronn presents with a minimal risk of suicide, minimal risk of self-harm, and minimal risk of harm to others.    For any risk assessment that surpasses a \"low\" rating, a safety plan must be developed.    A safety plan was indicated: no  If yes, describe in detail n/a    PLAN: Between sessions, Ronn will work to engage in effective communication and work to be less reactionary. At the next session, the therapist will use Cognitive Behavioral Therapy and Supportive " "Psychotherapy to address depression.    Behavioral Health Treatment Plan [facility]: Diagnosis and Treatment Plan explained to Ronn Brody relates understanding diagnosis and is agreeable to Treatment Plan. Yes     Depression Follow-up Plan Completed: Yes     Reason for visit is No chief complaint on file.     Recent Visits  No visits were found meeting these conditions.  Showing recent visits within past 7 days and meeting all other requirements  Today's Visits  Date Type Provider Dept   06/30/25 Telemedicine Lexie Rees [facility] [place]   Showing today's visits and meeting all other requirements  Future Appointments  No visits were found meeting these conditions.  Showing future appointments within next 150 days and meeting all other requirements     History of Present Illness     HPI    Past Medical History   Past Medical History[1]  Past Surgical History[2]  Current Outpatient Medications   Medication Instructions    amitriptyline (ELAVIL) 150 mg, Oral, Daily at bedtime    busPIRone (BUSPAR) 5 mg, Oral, 2 times daily    celecoxib (CELEBREX) 100 mg, Oral, 2 times daily    cyanocobalamin 1,000 mcg, Intramuscular, Every 30 days    ergocalciferol (VITAMIN D2) 50,000 Units, Oral, Weekly    famotidine (PEPCID) 20 mg, Oral, 2 times daily    SYRINGE-NEEDLE, DISP, 3 ML 25G X 1\" 3 ML MISC Does not apply, Every 30 days    Vitamin D3 2,000 Units, Oral, Daily     Allergies[3]    Objective   LMP 12/04/2022 (Exact Date)     Video Exam  Physical Exam     Administrative Statements   Encounter provider Lexie Rees    The Patient is located at Other and in the following state in which clinician practices in the state of [place].    The patient was identified by name and date of birth. Ronn Prather was informed that this is a telemedicine visit and that the visit is being conducted through the Epic Embedded platform. She agrees to proceed..  My office door was closed. No one else was in the room.  She " acknowledged consent and understanding of privacy and security of the video platform. The patient has agreed to participate and understands they can discontinue the visit at any time.    I have spent a total time of 56 minutes in caring for this patient on the day of the visit/encounter including Counseling / Coordination of care, not including the time spent for establishing the audio/video connection.    Visit Time  Start Time: 0806  Stop Time: 0902  Total Visit Time: 56 minutes       [1]   Past Medical History:  Diagnosis Date    Abnormal Pap smear of cervix     Anemia     Arthritis     Depression     Edema     Fatigue     Kidney stone     Vitamin B12 deficiency     Vitamin D deficiency    [2]   Past Surgical History:  Procedure Laterality Date    BREAST BIOPSY  10/24/2019    CHOLECYSTECTOMY      ENDOMETRIAL ABLATION      KNEE ARTHROSCOPY Bilateral     KNEE SURGERY      ORIF TIBIA FRACTURE Right 01/18/2018    Procedure: OPEN REDUCTION W/ INTERNAL FIXATION (ORIF) TIBIA;  Surgeon: Konstantin Todd MD;  [facility];  Service: Orthopedics    ORIF TIBIAL PLATEAU Right 01/18/2018    Procedure: OPEN REDUCTION W/ INTERNAL FIXATION (ORIF) TIBIAL PLATEAU;  Surgeon: Konstantin Todd MD;  [facility];  Service: Orthopedics    AL LAPAROSCOPY SURG CHOLECYSTECTOMY N/A 08/12/2021    Procedure: LAPAROSCOPIC CHOLECYSTECTOMY, UMBILICAL HERNIA REPAIR;  Surgeon: Mariusz Soto MD;  [facility];  Service: General    AL REMOVAL IMPLANT DEEP Right 08/02/2018    Procedure: REMOVAL HARDWARE TIBIA;  Surgeon: Konstantin Todd MD;  [facility];  Service: Orthopedics    TUBAL LIGATION      US GUIDED BREAST BIOPSY LEFT COMPLETE Left 10/24/2019   [3] No Known Allergies

## 2025-07-01 ENCOUNTER — OFFICE VISIT (OUTPATIENT)
Dept: PHYSICAL THERAPY | Facility: CLINIC | Age: 50
End: 2025-07-01
Payer: COMMERCIAL

## 2025-07-01 DIAGNOSIS — M25.562 LEFT KNEE PAIN, UNSPECIFIED CHRONICITY: Primary | ICD-10-CM

## 2025-07-01 DIAGNOSIS — M25.561 RIGHT KNEE PAIN, UNSPECIFIED CHRONICITY: ICD-10-CM

## 2025-07-01 PROCEDURE — 97112 NEUROMUSCULAR REEDUCATION: CPT

## 2025-07-01 PROCEDURE — 97530 THERAPEUTIC ACTIVITIES: CPT

## 2025-07-01 NOTE — PROGRESS NOTES
"Daily Note     Today's date: 2025  Patient name: Ronn Prather  : 1975  MRN: [id number]  Referring provider: Krysta Watson MD  Dx:   Encounter Diagnosis     ICD-10-CM    1. Left knee pain, unspecified chronicity  M25.562       2. Right knee pain, unspecified chronicity  M25.561                      Subjective: Pt states that her left knee is great, R knee is still painful.        Objective: See treatment diary below      Assessment: Tolerated treatment well.  Progressed pt with strengthening as indicated.  Pt challenged with SL RDL and muscle fatigue is noted.  Few cues needed to avoid compensations.  No increased pain with lateral lunges.  Pt progressing slowly towards her goals and will benefit from continued therapy.        Plan: Continue per plan of care.         POC Expires Auth Status Start Date Expiration Date PT Visit Limit    2025 After 24 visit   BOMN   Date 2025 6/10/2025 6/13/25 2025 2025   Used 15 11 12 13 14   Remaining           Diagnosis: chronic bilateral knee pain   Precautions: possible RA; hx of arthroscopic surgery bilat knees; R tib fx ORIF (2018); depression, OA   Next Physician's Appt:   meets HEP:   Manuals 7/1 6/10 6/13 6/17 6/24   STM/IASTM TH, IASTM to R knee distal quad BRAYDEN, IASTM bilat distal quad JL, IASTM bilat distal quad/ITB TH, IASTM R distal quad DK, IASTM bilat distal quad   Ankle JMs        Laser        EPAT        Taping?                        Neuro Re-Ed        Supine SLR        BKFO/clams        Bridges on SB   5\"x20     Prone knee bends        Wobble Board        SLS on foam        TKEs  Ja 17.5# x20 ea  Ja 17.5# x20 ea      SL RDL 5# 10x2 ea 5# KB 12x ea to step and finger tip support  NV 5# 10x2 ea    Mini lunge onto bosu    10x ea cues    Storks    GTB 10x ea B/L    Ther Ex        Calf stretch        Prone quad stretch        HS stretch        DKTC w/ ball        Hip flexor stretch        HR/TR                         " Ther Activity        Bike for LE mobility NuStep Lv 5 min Lv 5 Bike 5 min Lv 5 Upright Bike L4 5min  NuStep Lv7 5 mins NuStep Lv7 5 mins   Mini Squats Goblet squat to chair+airex 5# 2x10 Goblet squat to chair+airex  5# 2x10  Goblet squat to chair+airex  5# 2x10      Lateral lunge w/TRX 10x ea alt       Wall sits with palloff press        Step Ups        Leg Press # 3x10 seat 5 # 1x10        115# 2x10  Seat 5 # 3x10   seat 5 # 3x10  Seat 5    SL Leg Press 65# 3x10 ea 55# 3x10 ea 65# 3x10 ea 65# 3x10 ea    Ja walkouts W/bar 9# 5x ea   W/bar 9# 5x ea    X-walks  GTB 2x blue line  GTB 2x blue line     Pt Ed     POC   Re-Evaluation     DK   Modalities        Heat/ice (PRN)         TENS

## 2025-07-02 ENCOUNTER — TELEPHONE (OUTPATIENT)
Dept: PSYCHIATRY | Facility: CLINIC | Age: 50
End: 2025-07-02

## 2025-07-02 NOTE — TELEPHONE ENCOUNTER
Left voicemail informing patient and/or parent/guardian of the Psych Encounter form needing to be signed as a requirement from the insurance company for billing purposes. Patient can access form via [facility] and sign electronically.     Please make patient aware this form must be signed for each visit as a requirement to continue future visits with provider.

## 2025-07-08 ENCOUNTER — APPOINTMENT (OUTPATIENT)
Dept: PHYSICAL THERAPY | Facility: CLINIC | Age: 50
End: 2025-07-08
Payer: COMMERCIAL

## 2025-07-10 ENCOUNTER — OFFICE VISIT (OUTPATIENT)
Dept: PHYSICAL THERAPY | Facility: CLINIC | Age: 50
End: 2025-07-10
Attending: INTERNAL MEDICINE
Payer: COMMERCIAL

## 2025-07-10 DIAGNOSIS — M25.561 RIGHT KNEE PAIN, UNSPECIFIED CHRONICITY: ICD-10-CM

## 2025-07-10 DIAGNOSIS — M25.562 LEFT KNEE PAIN, UNSPECIFIED CHRONICITY: Primary | ICD-10-CM

## 2025-07-10 PROCEDURE — 97112 NEUROMUSCULAR REEDUCATION: CPT

## 2025-07-10 PROCEDURE — 97530 THERAPEUTIC ACTIVITIES: CPT

## 2025-07-10 NOTE — PROGRESS NOTES
"Daily Note     Today's date: 7/10/2025  Patient name: Ronn Prather  : 1975  MRN: [id number]  Referring provider: Krysta Watson MD  Dx:   Encounter Diagnosis     ICD-10-CM    1. Left knee pain, unspecified chronicity  M25.562       2. Right knee pain, unspecified chronicity  M25.561                      Subjective: Pt states that her knees have been feeling a little better      Objective: See treatment diary below      Assessment: Tolerated treatment well.  Continued with progressions in hip and knee strengthening as indicated.  Pt has improved tolerance to storks with less fatigue noted.  Pt challenged with RDL's due to muscle weakness and cues are given for proper form.  Good carryover is noted.  Pt progressing slowly towards her goals and will benefit from continued therapy.  Will progress as able      Plan: Continue per plan of care.         POC Expires Auth Status Start Date Expiration Date PT Visit Limit    2025 After 24 visit   BOMN   Date 2025 7/10/2025 6/13/25 2025 2025   Used 15 7 12 13 14   Remaining  5         Diagnosis: chronic bilateral knee pain   Precautions: possible RA; hx of arthroscopic surgery bilat knees; R tib fx ORIF (2018); depression, OA   Next Physician's Appt:   Influx HEP:   Manuals 7/1 7/10 6/13 6/17 6/24   STM/IASTM TH, IASTM to R knee distal quad TH, IASTM to R knee distal quad JL, IASTM bilat distal quad/ITB TH, IASTM R distal quad DK, IASTM bilat distal quad   Ankle JMs        Laser        EPAT        Taping?                        Neuro Re-Ed        Supine SLR        BKFO/clams        Bridges on SB   5\"x20     Prone knee bends        Wobble Board        SLS on foam        TKEs   Ja 17.5# x20 ea      SL RDL 5# 10x2 ea 5# 10x2 ea NV 5# 10x2 ea    Mini lunge onto bosu    10x ea cues    Storks  GTB 10x ea B/L  GTB 10x ea B/L    Ther Ex        Calf stretch        Prone quad stretch        HS stretch        DKTC w/ ball        Hip flexor " stretch        HR/TR                         Ther Activity        Bike for LE mobility NuStep Lv 5 min Lv 5 NuStep Lv 5 min Lv 5 Upright Bike L4 5min  NuStep Lv7 5 mins NuStep Lv7 5 mins   Mini Squats Goblet squat to chair+airex 5# 2x10 Goblet squat to chair+airex 5# 2x10 Goblet squat to chair+airex  5# 2x10      Lateral lunge w/TRX 10x ea alt 10x ea alt      Wall sits with palloff press        Step Ups        Leg Press # 3x10 seat 5  # 3x10   seat 5 # 3x10  Seat 5    SL Leg Press 65# 3x10 ea  65# 3x10 ea 65# 3x10 ea    Ja walkouts W/bar 9# 5x ea W/bar 9# 5x ea  W/bar 9# 5x ea    X-walks   GTB 2x blue line     Pt Ed     POC   Re-Evaluation     DK   Modalities        Heat/ice (PRN)         TENS

## 2025-07-11 ENCOUNTER — TELEMEDICINE (OUTPATIENT)
Dept: BEHAVIORAL/MENTAL HEALTH CLINIC | Facility: CLINIC | Age: 50
End: 2025-07-11
Payer: COMMERCIAL

## 2025-07-11 ENCOUNTER — PROCEDURE VISIT (OUTPATIENT)
Age: 50
End: 2025-07-11
Payer: COMMERCIAL

## 2025-07-11 VITALS
HEIGHT: 67 IN | SYSTOLIC BLOOD PRESSURE: 148 MMHG | DIASTOLIC BLOOD PRESSURE: 87 MMHG | WEIGHT: 167 LBS | HEART RATE: 86 BPM | BODY MASS INDEX: 26.21 KG/M2

## 2025-07-11 DIAGNOSIS — M79.18 MYOFASCIAL PAIN: ICD-10-CM

## 2025-07-11 DIAGNOSIS — M54.50 CHRONIC BILATERAL LOW BACK PAIN WITHOUT SCIATICA: Primary | ICD-10-CM

## 2025-07-11 DIAGNOSIS — F33.1 MODERATE EPISODE OF RECURRENT MAJOR DEPRESSIVE DISORDER (HCC): Primary | ICD-10-CM

## 2025-07-11 DIAGNOSIS — M54.2 NECK PAIN: ICD-10-CM

## 2025-07-11 DIAGNOSIS — M54.12 CERVICAL RADICULOPATHY: ICD-10-CM

## 2025-07-11 DIAGNOSIS — G89.29 CHRONIC BILATERAL LOW BACK PAIN WITHOUT SCIATICA: Primary | ICD-10-CM

## 2025-07-11 PROCEDURE — 90834 PSYTX W PT 45 MINUTES: CPT

## 2025-07-11 PROCEDURE — 98941 CHIROPRACT MANJ 3-4 REGIONS: CPT | Performed by: CHIROPRACTOR

## 2025-07-11 NOTE — PSYCH
"Virtual Regular VisitName: Ronn Prather      : 1975      MRN: [id number]  Encounter Provider: Lexie Rees  Encounter Date: 2025   Encounter department: [facility] [place]  :  Assessment & Plan  Moderate episode of recurrent major depressive disorder (HCC)             Goals addressed in session: Goal 1     DATA: Ronn joined session expressing feelings of exhaustion due to work. She notes she has been picking up extra shifts for additional financial supports. Clinician encouraged balance of work and self care to minimize burnout. Ronn utilized time in session to discuss an incident where she faced discrimination and filed a report to human resources. She expressed feelings of frustration and annoyance.During this session, this clinician used the following therapeutic modalities: Supportive Psychotherapy    Substance Abuse was not addressed during this session. If the client is diagnosed with a co-occurring substance use disorder, please indicate any changes in the frequency or amount of use: n/a. Stage of change for addressing substance use diagnoses: No substance use/Not applicable    ASSESSMENT:  Ronn presents with a Euthymic/ normal mood. Angelicas affect is Normal range and intensity, which is congruent, with their mood and the content of the session. The client has made progress on their goals as evidenced by practicing assertiveness and advocating for self.    Ronn presents with a minimal risk of suicide, minimal risk of self-harm, and minimal risk of harm to others.    For any risk assessment that surpasses a \"low\" rating, a safety plan must be developed.    A safety plan was indicated: no  If yes, describe in detail n/a    PLAN: Between sessions, Ronn will continue working on treatment plan goals. At the next session, the therapist will use Cognitive Behavioral Therapy and Supportive Psychotherapy to address depression.    Behavioral " "Health Treatment Plan [facility]: Diagnosis and Treatment Plan explained to Ronn Ronn relates understanding diagnosis and is agreeable to Treatment Plan. Yes     Depression Follow-up Plan Completed: Yes     Reason for visit is No chief complaint on file.     Recent Visits  No visits were found meeting these conditions.  Showing recent visits within past 7 days and meeting all other requirements  Today's Visits  Date Type Provider Dept   07/11/25 Telemedicine Lexie Rees [facility] [place]   Showing today's visits and meeting all other requirements  Future Appointments  No visits were found meeting these conditions.  Showing future appointments within next 150 days and meeting all other requirements     History of Present Illness     HPI    Past Medical History   Past Medical History[1]  Past Surgical History[2]  Current Outpatient Medications   Medication Instructions    amitriptyline (ELAVIL) 150 mg, Oral, Daily at bedtime    busPIRone (BUSPAR) 5 mg, Oral, 2 times daily    celecoxib (CELEBREX) 100 mg, Oral, 2 times daily    cyanocobalamin 1,000 mcg, Intramuscular, Every 30 days    ergocalciferol (VITAMIN D2) 50,000 Units, Oral, Weekly    famotidine (PEPCID) 20 mg, Oral, 2 times daily    SYRINGE-NEEDLE, DISP, 3 ML 25G X 1\" 3 ML MISC Does not apply, Every 30 days    Vitamin D3 2,000 Units, Oral, Daily     Allergies[3]    Objective   LMP 12/04/2022 (Exact Date)     Video Exam  Physical Exam     Administrative Statements   Encounter provider Lexie Rees    The Patient is located at Home and in the following state in which I hold an active license [place].    The patient was identified by name and date of birth. Ronn Prather was informed that this is a telemedicine visit and that the visit is being conducted through the Epic Embedded platform. She agrees to proceed..  My office door was closed. No one else was in the room.  She acknowledged consent and understanding of privacy and security " of the video platform. The patient has agreed to participate and understands they can discontinue the visit at any time.    I have spent a total time of 50 minutes in caring for this patient on the day of the visit/encounter including Counseling / Coordination of care, not including the time spent for establishing the audio/video connection.    Visit Time  Start Time: 0808  Stop Time: 0858  Total Visit Time: 50 minutes       [1]   Past Medical History:  Diagnosis Date    Abnormal Pap smear of cervix     Anemia     Arthritis     Depression     Edema     Fatigue     Kidney stone     Vitamin B12 deficiency     Vitamin D deficiency    [2]   Past Surgical History:  Procedure Laterality Date    BREAST BIOPSY  10/24/2019    CHOLECYSTECTOMY      ENDOMETRIAL ABLATION      KNEE ARTHROSCOPY Bilateral     KNEE SURGERY      ORIF TIBIA FRACTURE Right 01/18/2018    Procedure: OPEN REDUCTION W/ INTERNAL FIXATION (ORIF) TIBIA;  Surgeon: Konstantin Todd MD;  [facility];  Service: Orthopedics    ORIF TIBIAL PLATEAU Right 01/18/2018    Procedure: OPEN REDUCTION W/ INTERNAL FIXATION (ORIF) TIBIAL PLATEAU;  Surgeon: Konstantin Todd MD;  [facility];  Service: Orthopedics    NC LAPAROSCOPY SURG CHOLECYSTECTOMY N/A 08/12/2021    Procedure: LAPAROSCOPIC CHOLECYSTECTOMY, UMBILICAL HERNIA REPAIR;  Surgeon: Mariusz Soto MD;  [facility];  Service: General    NC REMOVAL IMPLANT DEEP Right 08/02/2018    Procedure: REMOVAL HARDWARE TIBIA;  Surgeon: Konstantin Todd MD;  [facility];  Service: Orthopedics    TUBAL LIGATION      US GUIDED BREAST BIOPSY LEFT COMPLETE Left 10/24/2019   [3] No Known Allergies

## 2025-07-11 NOTE — PROGRESS NOTES
Assessment:   Diagnosis ICD-10-CM Associated Orders   1. Chronic bilateral low back pain without sciatica  M54.50     G89.29       2. Neck pain  M54.2       3. Myofascial pain  M79.18       4. Cervical radiculopathy  M54.12                No follow-ups on file.    Discussion:  Reviewed home program see as needed.    Treatment: 15075  Manipulations of the right innominate, sacrum, L5 L4 to drop maneuver followed by flexion distraction L4-L5 L5-S1.  Manipulation T4  via seated stairstepping techniques well-tolerated.        HPI:  Ronn returns for treatment reports significant improvement in her condition.  Not much in terms of back pain at this point some slight stiffness 0 on a pain scale.    The following portions of the patient's history were reviewed and updated as appropriate: allergies, current medications, past family history, past medical history, past social history, past surgical history, and problem list.    Review of Systems    Physical Exam:  Exam reveals pelvic obliquity leg length inequality biomechanical joint dysfunction present right sacroiliac joint L5-S1 L4-L5 motion units.    Myofascial involvement right periscapular region active trigger points noted T4-T5  segmental dysfunctions.

## 2025-07-15 ENCOUNTER — APPOINTMENT (OUTPATIENT)
Dept: PHYSICAL THERAPY | Facility: CLINIC | Age: 50
End: 2025-07-15
Payer: COMMERCIAL

## 2025-07-18 ENCOUNTER — OFFICE VISIT (OUTPATIENT)
Dept: PHYSICAL THERAPY | Facility: CLINIC | Age: 50
End: 2025-07-18
Payer: COMMERCIAL

## 2025-07-18 DIAGNOSIS — M25.562 LEFT KNEE PAIN, UNSPECIFIED CHRONICITY: Primary | ICD-10-CM

## 2025-07-18 DIAGNOSIS — M25.561 RIGHT KNEE PAIN, UNSPECIFIED CHRONICITY: ICD-10-CM

## 2025-07-18 PROCEDURE — 97530 THERAPEUTIC ACTIVITIES: CPT | Performed by: PHYSICAL THERAPIST

## 2025-07-18 PROCEDURE — 97140 MANUAL THERAPY 1/> REGIONS: CPT | Performed by: PHYSICAL THERAPIST

## 2025-07-18 NOTE — PROGRESS NOTES
Daily Note     Today's date: 2025  Patient name: Ronn Prather  : 1975  MRN: [id number]  Referring provider: Krysta Watson MD  Dx:   Encounter Diagnosis     ICD-10-CM    1. Left knee pain, unspecified chronicity  M25.562       2. Right knee pain, unspecified chronicity  M25.561           Start Time: 1000  Stop Time: 1045  Total time in clinic (min): 45 minutes    Subjective: Patient reports no significant pain in Right knee, but notices stiffness at times.       Objective: See treatment diary below      Assessment: Tolerated treatment well. Worked on functional strengthening and motor control based exercises. Added SL stability exercises with good tolerance and no increase in pain. Good reports of pain relief after IASTM to Right distal quad. Patient exhibited good technique with therapeutic exercises and would benefit from continued PT      Plan: Continue per plan of care.  Progress treatment as tolerated.          POC Expires Auth Status Start Date Expiration Date PT Visit Limit    2025 Approved (12) 2025 BOMN   Date 2025 7/10/2025 2025 2025 2025   Used 6 7 8 4 5   Remaining 6 5 4 8 7      Diagnosis: chronic bilateral knee pain   Precautions: possible RA; hx of arthroscopic surgery bilat knees; R tib fx ORIF (2018); depression, OA   Next Physician's Appt:   Spreadknowledge HEP:   Manuals 7/1 7/10 7/18 6/17 6/24   STM/IASTM TH, IASTM to R knee distal quad TH, IASTM to R knee distal quad DK, IASTM to R knee distal quad TH, IASTM R distal quad DK, IASTM bilat distal quad   Ankle JMs        Laser        EPAT        Taping?                        Neuro Re-Ed        Supine SLR        BKFO/clams        Bridges on SB        Prone knee bends        Wobble Board        TKEs        SL ball toss trampoline   GMB 2x10 ea     SL RDL 5# 10x2 ea 5# 10x2 ea  5# 10x2 ea    Mini lunge onto bosu    10x ea cues    BOSU step up   GTB 10x ea B/L  GTB 10x ea B/L     Ther Ex        Calf stretch        Prone quad stretch        HS stretch        DKTC w/ ball        Hip flexor stretch        HR/TR                         Ther Activity        Bike for LE mobility NuStep Lv 5 min Lv 5 NuStep Lv 5 min Lv 5 NuStep Lv 5 min Lv 5 NuStep Lv7 5 mins NuStep Lv7 5 mins   Mini Squats Goblet squat to chair+airex 5# 2x10 Goblet squat to chair+airex 5# 2x10 Goblet squat to chair+airex 5# 2x10     Lateral lunge w/TRX 10x ea alt 10x ea alt 15x ea alt     Wall sits with palloff press   RMB 3x10     Leg Press # 3x10 seat 5  # 3x10 seat 5 # 3x10  Seat 5    SL Leg Press 65# 3x10 ea   65# 3x10 ea    [place] walkouts W/bar 9# 5x ea W/bar 9# 5x ea W/bar 9# 5x ea W/bar 9# 5x ea    X-walks        Pt Ed     POC   Re-Evaluation     DK   Modalities        Heat/ice (PRN)         TENS

## 2025-07-21 NOTE — PROGRESS NOTES
Assessment & Plan  Encounter for gynecological examination (general) (routine) without abnormal findings  The current ASCCP guidelines were reviewed. Patient's last pap was 3/20/23 - WNL and therefore, a pap with HPV cotesting is indicated at this time. I emphasized the importance of an annual pelvic and breast exam. Patient ok to have a pap done today.  I have discussed the importance of monthly self-breast exams, exercise and healthy diet as well as adequate intake of calcium and vitamin D. Encourage at least 1200 mg calcium citrate + 2000 IUs vitamin D3 divided through diet and supplement throughout the day; Encourage 30-40 min weight bearing exercise most days of week  In addition, colon cancer screening with a colonoscopy is recommended starting at age 45-50 and reviewed benefits - patient is up to date with screening.  All questions have been answered to her satisfaction  RTO for APE or sooner if needed  Orders:    Liquid-based pap, screening    Screening for STD (sexually transmitted disease)  STI testing - C/G collected; STI labs ordered  Orders:    Chlamydia/GC amplified DNA by PCR    Hepatitis B surface antigen; Future    Hepatitis C antibody; Future    HIV 1/2 AG/AB w Reflex [facility] for 2 yr old and above; Future    RPR-Syphilis Screening (Total Syphilis IGG/IGM); Future    Encounter for screening mammogram for malignant neoplasm of breast  A yearly mammogram is recommended for breast cancer screening starting at age 40;   Orders:    Mammo screening bilateral w 3d and cad; Future    Heterogeneously dense tissue of both breasts on mammography  Type C dense breast tissue may obscure small masses on mammogram. Patient can consider B/L screening ultrasound to supplement her B/L screening mammogram - strongly encourage patient to check with insurance for coverage first.  Orders:    US breast screening bilateral complete (ABUS); Future    Encounter for breast cancer screening other than mammogram  Type C dense  breast tissue may obscure small masses on mammogram. Patient can consider B/L screening ultrasound to supplement her B/L screening mammogram - strongly encourage patient to check with insurance for coverage first.  Orders:    US breast screening bilateral complete (ABUS); Future      Subjective     HPI   Ronn Prather is a 49 y.o. female who presents for annual well woman exam.   LMP - 12/4/22; Denies  bleeding  No vulvar itch/burn; No vaginal itch/burn; No abn discharge or odor; No urinary sx - burning/pain/frequency/hematuria  (+) SBEs - no breast masses, asymmetry, nipple discharge or bleeding, changes in skin of breast, or breast tenderness bilaterally  No abd/pelvic pain or HAs;   (+) menopausal symptoms: (+) hot flashes/night sweats - come and go - tolerating at this time; no significant problems with intercourse, vaginal dryness; sleeping well;   Pt is sexually active in a mutually monog/ sexual relationship; She requests sti/hiv/hep testing; Feels safe at home  Current contraception: BT  (+) PCP for routine Bw/care;    Last Pap - 3/20/23 - WNL; 4/2/21 - WNL; 10/30/19 - WNL (-) HRHPV type 16/18 neg  History of abnormal Pap smear: yes  Last mammo - 1/19/24 - R br asymm; L br B2TC; 2/16/24 - R br dx mammo and u/s - simple cyst - B2TC - back to b/l screen and can consider ABUS  History of abnormal mammogram: yes  Last colonoscopy - 8/2021 - WNL and follow-up in about 10 years  Last STI screen - 11/30/21 - C/G neg    Review of Systems   Constitutional:  Positive for fatigue (chronic - has BW through PCP). Negative for activity change, fever and unexpected weight change (noted some weight gain - working on losing weight).   HENT:  Negative for congestion, dental problem, sinus pressure and sinus pain.    Eyes:  Negative for visual disturbance.   Respiratory:  Negative for cough, shortness of breath and wheezing.    Cardiovascular:  Negative for chest pain and leg swelling.   Gastrointestinal:  Negative  for abdominal distention, abdominal pain, blood in stool, constipation, diarrhea, nausea and vomiting.   Endocrine: Negative for polydipsia.   Genitourinary:  Negative for difficulty urinating, dyspareunia, dysuria, frequency, hematuria, menstrual problem, pelvic pain, urgency, vaginal bleeding, vaginal discharge and vaginal pain.   Musculoskeletal:  Negative for arthralgias and back pain.   Allergic/Immunologic: Negative for environmental allergies.   Neurological:  Negative for dizziness, seizures and headaches.   Psychiatric/Behavioral:  Negative for dysphoric mood (medical managment through PCP) and sleep disturbance. The patient is not nervous/anxious.        The following portions of the patient's history were reviewed and updated as appropriate: allergies, current medications, past family history, past medical history, past social history, past surgical history, and problem list.         OB History          6    Para   3    Term   0       3    AB   3    Living   3         SAB        IAB   3    Ectopic        Multiple        Live Births   3           Obstetric Comments   Surgical VIp x 3  2 early deliveries, one w cerclage placed during final pregnancy               Past Medical History[1]    Past Surgical History[2]    Family History[3]    Social History     Socioeconomic History    Marital status:      Spouse name: Not on file    Number of children: 1    Years of education: Not on file    Highest education level: Not on file   Occupational History    Occupation: Teacher in [place]   Tobacco Use    Smoking status: Never     Passive exposure: Past    Smokeless tobacco: Never   Vaping Use    Vaping status: Never Used   Substance and Sexual Activity    Alcohol use: Yes     Comment: Occassional    Drug use: No    Sexual activity: Yes     Partners: Male     Birth control/protection: Condom Male, Female Sterilization   Other Topics Concern    Not on file   Social History Narrative    Single     "Daughter, grand daughter and son lives with her    Works part time- 3 days in the field, 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     Social Drivers of Health     Financial Resource Strain: Not on file   Food Insecurity: No Food Insecurity (5/9/2025)    Nursing - Inadequate Food Risk Classification     Worried About Running Out of Food in the Last Year: Never true     Ran Out of Food in the Last Year: Never true     Ran Out of Food in the Last Year: Not on file   Transportation Needs: No Transportation Needs (5/9/2025)    PRAPARE - Transportation     Lack of Transportation (Medical): No     Lack of Transportation (Non-Medical): No   Physical Activity: Not on file   Stress: Not on file   Social Connections: Not on file   Intimate Partner Violence: Not on file   Housing Stability: Low Risk  (5/9/2025)    Housing Stability Vital Sign     Unable to Pay for Housing in the Last Year: No     Number of Times Moved in the Last Year: 0     Homeless in the Last Year: No       Current Medications[4]    Allergies[5]    Objective   Vitals:    07/22/25 0816   BP: 122/82   BP Location: Left arm   Patient Position: Sitting   Cuff Size: Standard   Weight: 76.7 kg (169 lb)   Height: 5' 6.5\" (1.689 m)     Physical Exam  Vitals reviewed.   Constitutional:       General: She is awake. She is not in acute distress.     Appearance: Normal appearance. She is well-developed and well-groomed. She is not ill-appearing, toxic-appearing or diaphoretic.   HENT:      Head: Normocephalic and atraumatic.     Eyes:      Conjunctiva/sclera: Conjunctivae normal.     Neck:      Thyroid: No thyroid mass, thyromegaly or thyroid tenderness.     Cardiovascular:      Rate and Rhythm: Normal rate and regular rhythm.      Heart sounds: Normal heart sounds. No murmur heard.  Pulmonary:      Effort: Pulmonary effort is normal. No tachypnea, bradypnea or respiratory distress.      Breath sounds: Normal breath sounds. No stridor or decreased air movement. No wheezing.   Chest:   Breasts:    "  Breasts are symmetrical.      Right: Normal. No swelling, bleeding, inverted nipple, mass, nipple discharge, skin change or tenderness.      Left: Normal. No swelling, bleeding, inverted nipple, mass, nipple discharge, skin change or tenderness.   Abdominal:      General: There is no distension.      Palpations: Abdomen is soft. There is no hepatomegaly, splenomegaly or mass.      Tenderness: There is no abdominal tenderness.      Hernia: No hernia is present. There is no hernia in the left inguinal area or right inguinal area.   Genitourinary:     General: Normal vulva.      Exam position: Supine.      Pubic Area: No rash or pubic lice.       Labia:         Right: No rash, tenderness, lesion or injury.         Left: No rash, tenderness, lesion or injury.       Urethra: No prolapse, urethral pain, urethral swelling or urethral lesion.      Vagina: Normal. No signs of injury and foreign body. No vaginal discharge, erythema, tenderness, bleeding, lesions or prolapsed vaginal walls.      Cervix: No cervical motion tenderness, discharge, friability, lesion, erythema or cervical bleeding.      Uterus: Not deviated, not enlarged, not fixed, not tender and no uterine prolapse.       Adnexa:         Right: No mass, tenderness or fullness.          Left: No mass, tenderness or fullness.        Comments: Rectal exam and hemoccult stool card deferred by patient and provider since up to date on colon cancer screening  Lymphadenopathy:      Cervical: No cervical adenopathy.      Upper Body:      Right upper body: No supraclavicular or axillary adenopathy.      Left upper body: No supraclavicular or axillary adenopathy.      Lower Body: No right inguinal adenopathy. No left inguinal adenopathy.     Skin:     General: Skin is warm and dry.     Neurological:      Mental Status: She is alert and oriented to person, place, and time.     Psychiatric:         Mood and Affect: Mood and affect normal.         Speech: Speech normal.          Behavior: Behavior normal. Behavior is cooperative.         Thought Content: Thought content normal.         Judgment: Judgment normal.                [1]   Past Medical History:  Diagnosis Date    Abnormal Pap smear of cervix     Anemia     Arthritis     Depression     Difficulty walking     Edema     Fatigue     Fractures     Kidney stone     Osteoarthritis     Urinary tract infection     Vitamin B12 deficiency     Vitamin D deficiency    [2]   Past Surgical History:  Procedure Laterality Date    BREAST BIOPSY  10/24/2019    CHOLECYSTECTOMY      COLPOSCOPY  2017    ENDOMETRIAL ABLATION      FRACTURE SURGERY  1/18/2018    Right leg    KNEE ARTHROSCOPY Bilateral     KNEE SURGERY      ORIF TIBIA FRACTURE Right 01/18/2018    Procedure: OPEN REDUCTION W/ INTERNAL FIXATION (ORIF) TIBIA;  Surgeon: Konstantin Todd MD;  [facility];  Service: Orthopedics    ORIF TIBIAL PLATEAU Right 01/18/2018    Procedure: OPEN REDUCTION W/ INTERNAL FIXATION (ORIF) TIBIAL PLATEAU;  Surgeon: Konstantin Todd MD;  [facility];  Service: Orthopedics    ORTHOPEDIC SURGERY      ID LAPAROSCOPY SURG CHOLECYSTECTOMY N/A 08/12/2021    Procedure: LAPAROSCOPIC CHOLECYSTECTOMY, UMBILICAL HERNIA REPAIR;  Surgeon: Mariusz Soto MD;  [facility];  Service: General    ID REMOVAL IMPLANT DEEP Right 08/02/2018    Procedure: REMOVAL HARDWARE TIBIA;  Surgeon: Konstantin Todd MD;  [facility];  Service: Orthopedics    TUBAL LIGATION      US GUIDED BREAST BIOPSY LEFT COMPLETE Left 10/24/2019   [3]   Family History  Problem Relation Name Age of Onset    Cancer Mother Brennan Prather         either cervical or ovarian    Heart disease Mother Brennan Prather         heart surgery    Heart attack Father Ty Prather         stent    Diabetes Father Ty Prather     Hypertension Father Ty Prather     Cancer Father Ty Prather         Stomach and Lung Cancer    Rheum arthritis Daughter Daughter     No Known Problems Daughter    "   Lupus Maternal Grandmother Maternal Grandmother     No Known Problems Maternal Grandfather      No Known Problems Paternal Grandmother      No Known Problems Paternal Grandfather      No Known Problems Son      No Known Problems Maternal Aunt      No Known Problems Maternal Aunt      No Known Problems Maternal Aunt      No Known Problems Paternal Aunt      Lupus Cousin maternal     Arthritis Family      Stomach cancer Family      Ovarian cancer Family      Lupus Cousin 1st cousin     Alcohol abuse Neg Hx      Depression Neg Hx      Drug abuse Neg Hx      Substance Abuse Neg Hx      Mental illness Neg Hx      Breast cancer Neg Hx      Colon cancer Neg Hx     [4]   Current Outpatient Medications:     amitriptyline (ELAVIL) 150 MG tablet, Take 1 tablet (150 mg total) by mouth daily at bedtime, Disp: 90 tablet, Rfl: 1    busPIRone (BUSPAR) 5 mg tablet, Take 1 tablet (5 mg total) by mouth 2 (two) times a day, Disp: 60 tablet, Rfl: 2    celecoxib (CeleBREX) 100 mg capsule, Take 1 capsule (100 mg total) by mouth 2 (two) times a day, Disp: 60 capsule, Rfl: 0    Cholecalciferol (Vitamin D3) 50 MCG (2000 UT) capsule, Take 1 capsule (2,000 Units total) by mouth daily, Disp: 90 capsule, Rfl: 1    cyanocobalamin 1,000 mcg/mL, Inject 1 mL (1,000 mcg total) into a muscle every 30 (thirty) days, Disp: 10 mL, Rfl: 1    ergocalciferol (VITAMIN D2) 50,000 units, Take 1 capsule (50,000 Units total) by mouth once a week, Disp: 12 capsule, Rfl: 0    famotidine (PEPCID) 20 mg tablet, Take 1 tablet (20 mg total) by mouth 2 (two) times a day, Disp: 60 tablet, Rfl: 5    SYRINGE-NEEDLE, DISP, 3 ML 25G X 1\" 3 ML MISC, Use every 30 (thirty) days, Disp: 50 each, Rfl: 0  [5] No Known Allergies    "

## 2025-07-22 ENCOUNTER — EVALUATION (OUTPATIENT)
Dept: PHYSICAL THERAPY | Facility: CLINIC | Age: 50
End: 2025-07-22
Payer: COMMERCIAL

## 2025-07-22 ENCOUNTER — TELEPHONE (OUTPATIENT)
Age: 50
End: 2025-07-22

## 2025-07-22 ENCOUNTER — OFFICE VISIT (OUTPATIENT)
Dept: URGENT CARE | Facility: CLINIC | Age: 50
End: 2025-07-22
Payer: COMMERCIAL

## 2025-07-22 ENCOUNTER — ANNUAL EXAM (OUTPATIENT)
Dept: OBGYN CLINIC | Facility: CLINIC | Age: 50
End: 2025-07-22
Payer: COMMERCIAL

## 2025-07-22 VITALS
TEMPERATURE: 97.2 F | OXYGEN SATURATION: 100 % | DIASTOLIC BLOOD PRESSURE: 84 MMHG | SYSTOLIC BLOOD PRESSURE: 141 MMHG | HEART RATE: 83 BPM

## 2025-07-22 VITALS
WEIGHT: 169 LBS | SYSTOLIC BLOOD PRESSURE: 122 MMHG | HEIGHT: 67 IN | BODY MASS INDEX: 26.53 KG/M2 | DIASTOLIC BLOOD PRESSURE: 82 MMHG

## 2025-07-22 DIAGNOSIS — Z11.3 SCREENING FOR STD (SEXUALLY TRANSMITTED DISEASE): ICD-10-CM

## 2025-07-22 DIAGNOSIS — R92.333 HETEROGENEOUSLY DENSE TISSUE OF BOTH BREASTS ON MAMMOGRAPHY: ICD-10-CM

## 2025-07-22 DIAGNOSIS — H10.33 ACUTE BACTERIAL CONJUNCTIVITIS OF BOTH EYES: Primary | ICD-10-CM

## 2025-07-22 DIAGNOSIS — Z12.39 ENCOUNTER FOR BREAST CANCER SCREENING OTHER THAN MAMMOGRAM: ICD-10-CM

## 2025-07-22 DIAGNOSIS — M25.561 RIGHT KNEE PAIN, UNSPECIFIED CHRONICITY: ICD-10-CM

## 2025-07-22 DIAGNOSIS — Z01.419 ENCOUNTER FOR GYNECOLOGICAL EXAMINATION (GENERAL) (ROUTINE) WITHOUT ABNORMAL FINDINGS: Primary | ICD-10-CM

## 2025-07-22 DIAGNOSIS — Z12.31 ENCOUNTER FOR SCREENING MAMMOGRAM FOR MALIGNANT NEOPLASM OF BREAST: ICD-10-CM

## 2025-07-22 DIAGNOSIS — M25.562 LEFT KNEE PAIN, UNSPECIFIED CHRONICITY: Primary | ICD-10-CM

## 2025-07-22 PROBLEM — N92.6 MENSTRUAL PERIODS IRREGULAR: Status: RESOLVED | Noted: 2022-04-20 | Resolved: 2025-07-22

## 2025-07-22 PROCEDURE — G0145 SCR C/V CYTO,THINLAYER,RESCR: HCPCS | Performed by: PHYSICIAN ASSISTANT

## 2025-07-22 PROCEDURE — 87491 CHLMYD TRACH DNA AMP PROBE: CPT | Performed by: PHYSICIAN ASSISTANT

## 2025-07-22 PROCEDURE — 87591 N.GONORRHOEAE DNA AMP PROB: CPT | Performed by: PHYSICIAN ASSISTANT

## 2025-07-22 PROCEDURE — 97530 THERAPEUTIC ACTIVITIES: CPT | Performed by: PHYSICAL THERAPIST

## 2025-07-22 PROCEDURE — 97140 MANUAL THERAPY 1/> REGIONS: CPT | Performed by: PHYSICAL THERAPIST

## 2025-07-22 PROCEDURE — 99213 OFFICE O/P EST LOW 20 MIN: CPT | Performed by: PHYSICIAN ASSISTANT

## 2025-07-22 PROCEDURE — 99396 PREV VISIT EST AGE 40-64: CPT | Performed by: PHYSICIAN ASSISTANT

## 2025-07-22 RX ORDER — CIPROFLOXACIN HYDROCHLORIDE 3.5 MG/ML
1 SOLUTION/ DROPS TOPICAL EVERY 4 HOURS
Qty: 2.1 ML | Refills: 0 | Status: SHIPPED | OUTPATIENT
Start: 2025-07-22 | End: 2025-07-29

## 2025-07-22 NOTE — PROGRESS NOTES
[facility]  Name: Ronn Prather      : 1975      MRN: [id number]  Encounter Provider: Dede Ray PA-C  Encounter Date: 2025   Encounter department: [facility]  :  Assessment & Plan  Acute bacterial conjunctivitis of both eyes    Orders:    ciprofloxacin (CILOXAN) 0.3 % ophthalmic solution; Administer 1 drop to both eyes every 4 (four) hours for 7 days        Patient Instructions  Apply drops as directed  Throw away current contacts, may start wearing contacts again upon completion of treatment  Wash hands frequently and avoid rubbing eyes   Follow up with PCP in 3-5 days.  Proceed to  ER if symptoms worsen.    If tests are performed, our office will contact you with results only if changes need to made to the care plan discussed with you at the visit. You [facility].    Chief Complaint:   Chief Complaint   Patient presents with    Eye Problem     Patient reports this morning she had crust around left eye, but throughout the day the right eye became, swollen, painful, began to drain and vision became blurry, eye is reddened. Patient reports wearing contacts and they are both in .      History of Present Illness   Conjunctivitis   The current episode started today. The onset was sudden. The problem has been rapidly worsening. The problem is severe. Nothing relieves the symptoms. Nothing aggravates the symptoms. Associated symptoms include eye itching, photophobia, eye discharge, eye pain and eye redness. Pertinent negatives include no fever, no decreased vision, no double vision, no abdominal pain, no vomiting, no congestion, no ear pain, no headaches, no rhinorrhea, no sore throat, no cough and no rash. The eye pain is mild. Both eyes are affected. The eye pain is not associated with movement. The eyelid exhibits swelling and redness.   Pt reports onset of symptoms this morning in bilat eyes although right is worse  "than left.  Driving home from work this afternoon she noted increased photophobia and some blurring.  She wears contacts and does sleep in them regularly per prescription.        Review of Systems   Constitutional:  Negative for chills and fever.   HENT:  Negative for congestion, ear pain, rhinorrhea and sore throat.    Eyes:  Positive for photophobia, pain, discharge, redness and itching. Negative for double vision and visual disturbance.   Respiratory:  Negative for cough and shortness of breath.    Cardiovascular:  Negative for chest pain and palpitations.   Gastrointestinal:  Negative for abdominal pain and vomiting.   Genitourinary:  Negative for dysuria and hematuria.   Musculoskeletal:  Negative for arthralgias and back pain.   Skin:  Negative for color change and rash.   Neurological:  Negative for seizures, syncope and headaches.   All other systems reviewed and are negative.    Past Medical History   Past Medical History[1]  Past Surgical History[2]  Family History[3]  she reports that she has never smoked. She has been exposed to tobacco smoke. She has never used smokeless tobacco. She reports current alcohol use. She reports that she does not use drugs.  Current Outpatient Medications   Medication Instructions    amitriptyline (ELAVIL) 150 mg, Oral, Daily at bedtime    busPIRone (BUSPAR) 5 mg, Oral, 2 times daily    celecoxib (CELEBREX) 100 mg, Oral, 2 times daily    ciprofloxacin (CILOXAN) 0.3 % ophthalmic solution 1 drop, Both Eyes, Every 4 hours    cyanocobalamin 1,000 mcg, Intramuscular, Every 30 days    ergocalciferol (VITAMIN D2) 50,000 Units, Oral, Weekly    famotidine (PEPCID) 20 mg, Oral, 2 times daily    SYRINGE-NEEDLE, DISP, 3 ML 25G X 1\" 3 ML MISC Does not apply, Every 30 days    Vitamin D3 2,000 Units, Oral, Daily   Allergies[4]     Objective   /84   Pulse 83   Temp (!) 97.2 °F (36.2 °C)   LMP 12/04/2022 (Exact Date)   SpO2 100%      Physical Exam  Vitals and nursing note reviewed. " "  Constitutional:       General: She is not in acute distress.     Appearance: She is well-developed.   HENT:      Head: Normocephalic and atraumatic.      Right Ear: Tympanic membrane, ear canal and external ear normal.      Left Ear: Ear canal and external ear normal.      Ears:      Comments: Left ear: Moderate injection without bulging or suppurative fluid     Nose: Nose normal.      Mouth/Throat:      Mouth: Mucous membranes are moist.      Pharynx: No posterior oropharyngeal erythema.     Eyes:      Extraocular Movements: Extraocular movements intact.      Pupils: Pupils are equal, round, and reactive to light.      Comments: Left eye: mild conjunctival injection, no drainage  Right eye: significant conjunctival injection and purulent drainage    Tetracaine drops applied to right eye and fluorescein stain applied.  Visualization under Wood's lamp showed no increased or localized uptake     Cardiovascular:      Rate and Rhythm: Normal rate and regular rhythm.      Heart sounds: No murmur heard.  Pulmonary:      Effort: Pulmonary effort is normal. No respiratory distress.      Breath sounds: Normal breath sounds.   Abdominal:      Palpations: Abdomen is soft.      Tenderness: There is no abdominal tenderness.     Musculoskeletal:         General: No swelling.      Cervical back: Neck supple.     Skin:     General: Skin is warm and dry.      Capillary Refill: Capillary refill takes less than 2 seconds.     Neurological:      Mental Status: She is alert.     Psychiatric:         Mood and Affect: Mood normal.         Portions of the record may have been created with voice recognition software.  Occasional wrong word or \"sound a like\" substitutions may have occurred due to the inherent limitations of voice recognition software.  Read the chart carefully and recognize, using context, where substitutions have occurred.       [1]   Past Medical History:  Diagnosis Date    Abnormal Pap smear of cervix     Anemia     " Arthritis     Depression     Difficulty walking     Edema     Fatigue     Fractures     Kidney stone     Osteoarthritis     Urinary tract infection     Vitamin B12 deficiency     Vitamin D deficiency    [2]   Past Surgical History:  Procedure Laterality Date    BREAST BIOPSY  10/24/2019    CHOLECYSTECTOMY      COLPOSCOPY  2017    ENDOMETRIAL ABLATION      FRACTURE SURGERY  1/18/2018    Right leg    KNEE ARTHROSCOPY Bilateral     KNEE SURGERY      ORIF TIBIA FRACTURE Right 01/18/2018    Procedure: OPEN REDUCTION W/ INTERNAL FIXATION (ORIF) TIBIA;  Surgeon: Konstantin Todd MD;  [facility];  Service: Orthopedics    ORIF TIBIAL PLATEAU Right 01/18/2018    Procedure: OPEN REDUCTION W/ INTERNAL FIXATION (ORIF) TIBIAL PLATEAU;  Surgeon: Konstantin Todd MD;  [facility];  Service: Orthopedics    ORTHOPEDIC SURGERY      [place] LAPAROSCOPY SURG CHOLECYSTECTOMY N/A 08/12/2021    Procedure: LAPAROSCOPIC CHOLECYSTECTOMY, UMBILICAL HERNIA REPAIR;  Surgeon: Mariusz Soto MD;  [facility];  Service: General    [place] REMOVAL IMPLANT DEEP Right 08/02/2018    Procedure: REMOVAL HARDWARE TIBIA;  Surgeon: Konstatnin Todd MD;  [facility];  Service: Orthopedics    TUBAL LIGATION      US GUIDED BREAST BIOPSY LEFT COMPLETE Left 10/24/2019   [3]   Family History  Problem Relation Name Age of Onset    Cancer Mother Brennan Prather         either cervical or ovarian    Heart disease Mother Brennan Prather         heart surgery    Heart attack Father Ty Prather         stent    Diabetes Father Ty Prather     Hypertension Father Ty Prather     Cancer Father Ty Prather         Stomach and Lung Cancer    Rheum arthritis Daughter Daughter     No Known Problems Daughter      Lupus Maternal Grandmother Maternal Grandmother     No Known Problems Maternal Grandfather      No Known Problems Paternal Grandmother      No Known Problems Paternal Grandfather      No Known Problems Son      No Known Problems Maternal Aunt       No Known Problems Maternal Aunt      No Known Problems Maternal Aunt      No Known Problems Paternal Aunt      Lupus Cousin maternal     Arthritis Family      Stomach cancer Family      Ovarian cancer Family      Lupus Cousin 1st cousin     Alcohol abuse Neg Hx      Depression Neg Hx      Drug abuse Neg Hx      Substance Abuse Neg Hx      Mental illness Neg Hx      Breast cancer Neg Hx      Colon cancer Neg Hx     [4] No Known Allergies

## 2025-07-22 NOTE — PATIENT INSTRUCTIONS
Patient Instructions  Apply drops as directed  Throw away current contacts, may start wearing contacts again upon completion of treatment  Wash hands frequently and avoid rubbing eyes   Follow up with PCP in 3-5 days.  Proceed to  ER if symptoms worsen.    If tests are performed, our office will contact you with results only if changes need to made to the care plan discussed with you at the visit. You can review your full results on [facility] MyChart.

## 2025-07-22 NOTE — TELEPHONE ENCOUNTER
Yesterday- she woke up with crusty eyes, drainage, and red eyes. Patient states she has no blurring vision or eye pain. She works in a , so she is not sure if she has been exposed to anyone sick.

## 2025-07-22 NOTE — TELEPHONE ENCOUNTER
Pt. Would like to know if Dr. Watson could send something over to the pharmacy for her Conjunctivitis.   She uses [facility]. [place].   Ty

## 2025-07-22 NOTE — LETTER
July 22, 2025     Patient: Ronn Prather   YOB: 1975   Date of Visit: 7/22/2025       To Whom It May Concern:    It is my medical opinion that Ronn Prather may return to work on 7/24/25.    If you have any questions or concerns, please don't hesitate to call.         Sincerely,        Dede Ray PA-C    CC: No Recipients
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
## 2025-07-22 NOTE — TELEPHONE ENCOUNTER
Patient called back and message relayed. She said that her right eye is starting to have pain and she is having some blurriness. Please, advise.

## 2025-07-22 NOTE — TELEPHONE ENCOUNTER
It has only been 24 hours since symptoms started, probably viral and no need for antibiotic treatment for now.    Avoid touching your eyes and continue frequent hand washing.

## 2025-07-22 NOTE — TELEPHONE ENCOUNTER
Called patient- she confirmed symptoms - redness, green discharge in both eyes and would like drops sent to [facility].

## 2025-07-22 NOTE — TELEPHONE ENCOUNTER
Ask when symptoms started and if with other cough or cold symptoms.  Any blurring of vision, eye pain?  Ask if exposed to anyone sick recently.

## 2025-07-23 NOTE — PROGRESS NOTES
Discharge Summary    Ronn Prather is a 49 y.o. year-old female who presents with reports of bilateral knee pain . Patient has attended 24 visits since IE. Patient has been making good progress since IE, with improving pain levels and function overall since start of PT . Patient has made good progress towards set goals established on IE. Patient will be discharged at this time per patient's request to continue with HEP at home to maintain PT gains made . She was instructed to contact this office with any issues or concerns in the future.

## 2025-07-24 LAB
C TRACH DNA SPEC QL NAA+PROBE: NEGATIVE
N GONORRHOEA DNA SPEC QL NAA+PROBE: NEGATIVE

## 2025-07-25 ENCOUNTER — TELEPHONE (OUTPATIENT)
Dept: PSYCHIATRY | Facility: CLINIC | Age: 50
End: 2025-07-25

## 2025-07-25 NOTE — TELEPHONE ENCOUNTER
NO-SHOW LETTER MAILED TO Ronn Prather.  ADDRESS: [address] [place] [phone]  SENT VIA [employer]

## 2025-07-28 LAB
LAB AP GYN PRIMARY INTERPRETATION: NORMAL
LAB AP LMP: NORMAL
Lab: NORMAL
PATH INTERP SPEC-IMP: NORMAL

## (undated) DEVICE — INTENDED FOR TISSUE SEPARATION, AND OTHER PROCEDURES THAT REQUIRE A SHARP SURGICAL BLADE TO PUNCTURE OR CUT.: Brand: BARD-PARKER SAFETY BLADES SIZE 10, STERILE

## (undated) DEVICE — PADDING CAST 4 IN  COTTON STRL

## (undated) DEVICE — SUT VICRYL PLUS 2-0 CTB-1 27 IN VCPB259H

## (undated) DEVICE — ABDOMINAL PAD: Brand: DERMACEA

## (undated) DEVICE — PAD GROUNDING ADULT

## (undated) DEVICE — ACE WRAP 6 IN UNSTERILE

## (undated) DEVICE — 5.0MM LOCKING SCREW SLF-TPNG WITH T25 STARDRIVE RECESS 20MM: Type: IMPLANTABLE DEVICE | Site: TIBIA | Status: NON-FUNCTIONAL

## (undated) DEVICE — LIGHT HANDLE COVER SLEEVE DISP BLUE STELLAR

## (undated) DEVICE — NON-STERILE REUSABLE TOURNIQUET CUFF SINGLE BLADDER, DUAL PORT AND QUICK CONNECT CONNECTOR: Brand: COLOR CUFF

## (undated) DEVICE — NEEDLE 22 G X 1 1/2 SAFETY

## (undated) DEVICE — PROXIMATE PLUS MD MULTI-DIRECTIONAL RELEASE SKIN STAPLERS CONTAINS 35 STAINLESS STEEL STAPLES APPROXIMATE CLOSED DIMENSIONS: 6.9MM X 3.9MM WIDE: Brand: PROXIMATE

## (undated) DEVICE — VIAL DECANTER

## (undated) DEVICE — TROCAR: Brand: KII FIOS FIRST ENTRY

## (undated) DEVICE — IRRIG ENDO FLO TUBING

## (undated) DEVICE — Device

## (undated) DEVICE — ELECTRODE LAP J HOOK SPLIT STEM E-Z CLEAN 33CM -0021S

## (undated) DEVICE — GLOVE SRG BIOGEL 8

## (undated) DEVICE — DRAPE C-ARM X-RAY

## (undated) DEVICE — INTENDED FOR TISSUE SEPARATION, AND OTHER PROCEDURES THAT REQUIRE A SHARP SURGICAL BLADE TO PUNCTURE OR CUT.: Brand: BARD-PARKER SAFETY BLADES SIZE 11, STERILE

## (undated) DEVICE — DRAPE EQUIPMENT RF WAND

## (undated) DEVICE — HARMONIC 1100 SHEARS, 36CM SHAFT LENGTH: Brand: HARMONIC

## (undated) DEVICE — PACK PBDS LAP CHOLE RF

## (undated) DEVICE — STERILE SURGICAL LUBRICANT,  TUBE: Brand: SURGILUBE

## (undated) DEVICE — SILVER-COATED ANTIMICROBIAL BARRIER DRESSING: Brand: ACTICOAT   4" X 8"

## (undated) DEVICE — PLUMEPEN PRO 10FT

## (undated) DEVICE — CHLORAPREP HI-LITE 26ML ORANGE

## (undated) DEVICE — GAUZE SPONGES,16 PLY: Brand: CURITY

## (undated) DEVICE — STOCKINETTE REGULAR

## (undated) DEVICE — LIGAMAX 5 MM ENDOSCOPIC MULTIPLE CLIP APPLIER: Brand: LIGAMAX

## (undated) DEVICE — TISSUE RETRIEVAL SYSTEM: Brand: INZII RETRIEVAL SYSTEM

## (undated) DEVICE — SPONGE PVP SCRUB WING STERILE

## (undated) DEVICE — 3.2MM DRILL BIT/QC/145MM

## (undated) DEVICE — 4.3MM DRILL BIT/QC/180MM

## (undated) DEVICE — KERLIX BANDAGE ROLL: Brand: KERLIX

## (undated) DEVICE — TUBING SMOKE EVAC W/FILTRATION DEVICE PLUMEPORT ACTIV

## (undated) DEVICE — GLOVE INDICATOR PI UNDERGLOVE SZ 8.5 BLUE

## (undated) DEVICE — SUT MONOCRYL 4-0 PS-2 27 IN Y426H

## (undated) DEVICE — SUT VICRYL PLUS 1 CTB-1 36 IN VCPB947H

## (undated) DEVICE — BULB SYRINGE,IRRIGATION WITH PROTECTIVE CAP: Brand: DOVER

## (undated) DEVICE — THE SIMPULSE SOLO SYSTEM WITH ULTREX RETRACTABLE SPLASH SHIELD TIP: Brand: SIMPULSE SOLO

## (undated) DEVICE — ADHESIVE SKIN HIGH VISCOSITY EXOFIN 1ML

## (undated) DEVICE — DRESSING GUAZE ADH BORDER 4 X 4 IN

## (undated) DEVICE — CUFF TOURNIQUET 30 X 4 IN QUICK CONNECT DISP 1BLA

## (undated) DEVICE — 2.5MM DRILL TIP GUIDE WIRE 200MM

## (undated) DEVICE — TROCAR APPPLE 5MM EXTENDED LENGTH

## (undated) DEVICE — UNIVERSAL MAJOR EXTREMITY,KIT: Brand: CARDINAL HEALTH

## (undated) DEVICE — UNDYED BRAIDED (POLYGLACTIN 910), SYNTHETIC ABSORBABLE SUTURE: Brand: COATED VICRYL

## (undated) DEVICE — GLOVE SRG BIOGEL ECLIPSE 7

## (undated) DEVICE — REM POLYHESIVE ADULT PATIENT RETURN ELECTRODE: Brand: VALLEYLAB